# Patient Record
Sex: FEMALE | Race: BLACK OR AFRICAN AMERICAN | Employment: OTHER | URBAN - METROPOLITAN AREA
[De-identification: names, ages, dates, MRNs, and addresses within clinical notes are randomized per-mention and may not be internally consistent; named-entity substitution may affect disease eponyms.]

---

## 2017-03-22 ENCOUNTER — OP HISTORICAL/CONVERTED ENCOUNTER (OUTPATIENT)
Dept: OTHER | Age: 62
End: 2017-03-22

## 2017-06-06 ENCOUNTER — OP HISTORICAL/CONVERTED ENCOUNTER (OUTPATIENT)
Dept: OTHER | Age: 62
End: 2017-06-06

## 2017-12-06 ENCOUNTER — OP HISTORICAL/CONVERTED ENCOUNTER (OUTPATIENT)
Dept: OTHER | Age: 62
End: 2017-12-06

## 2018-01-24 ENCOUNTER — OP HISTORICAL/CONVERTED ENCOUNTER (OUTPATIENT)
Dept: OTHER | Age: 63
End: 2018-01-24

## 2018-06-07 ENCOUNTER — OP HISTORICAL/CONVERTED ENCOUNTER (OUTPATIENT)
Dept: OTHER | Age: 63
End: 2018-06-07

## 2019-02-04 ENCOUNTER — OP HISTORICAL/CONVERTED ENCOUNTER (OUTPATIENT)
Dept: OTHER | Age: 64
End: 2019-02-04

## 2019-02-04 LAB — MAMMOGRAPHY, EXTERNAL: NORMAL

## 2019-09-30 LAB — HBA1C MFR BLD HPLC: 7.7 %

## 2019-11-25 ENCOUNTER — OP HISTORICAL/CONVERTED ENCOUNTER (OUTPATIENT)
Dept: OTHER | Age: 64
End: 2019-11-25

## 2020-03-13 ENCOUNTER — OP HISTORICAL/CONVERTED ENCOUNTER (OUTPATIENT)
Dept: OTHER | Age: 65
End: 2020-03-13

## 2020-06-25 VITALS
SYSTOLIC BLOOD PRESSURE: 149 MMHG | TEMPERATURE: 97.8 F | BODY MASS INDEX: 31.82 KG/M2 | DIASTOLIC BLOOD PRESSURE: 90 MMHG | OXYGEN SATURATION: 94 % | HEIGHT: 66 IN | HEART RATE: 89 BPM | WEIGHT: 198 LBS

## 2020-06-25 PROBLEM — R01.1 HEART MURMUR: Status: ACTIVE | Noted: 2020-06-25

## 2020-06-25 PROBLEM — K21.9 GERD (GASTROESOPHAGEAL REFLUX DISEASE): Status: ACTIVE | Noted: 2020-06-25

## 2020-06-25 PROBLEM — I10 BENIGN ESSENTIAL HYPERTENSION: Status: ACTIVE | Noted: 2020-06-25

## 2020-06-25 PROBLEM — E78.2 MIXED HYPERLIPIDEMIA: Status: ACTIVE | Noted: 2020-06-25

## 2020-06-25 PROBLEM — F17.200 TOBACCO DEPENDENCE SYNDROME: Status: ACTIVE | Noted: 2020-06-25

## 2020-06-25 LAB
A/G RATIO, EXTERNAL: 1.3
ABS BASOPHILS, EXTERNAL: NORMAL
ABS EOSINOPHILS, EXTERNAL: NORMAL
ABS IMM GRANS, EXTERNAL: NORMAL
ABS LYMPHOCYTES, EXTERNAL: NORMAL
ABS MONOCYTES, EXTERNAL: NORMAL
ABS NEUTROPHILS, EXTERNAL: NORMAL
ALBUMIN, EXTERNAL: 4.6
ALK PHOS, EXTERNAL: 77
ANION GAP BLD CALC-SCNC: 11 MMOL/L
BASOPHILS, EXTERNAL: NORMAL
BILI DIRECT, EXTERNAL: NORMAL
BILI TOTAL, EXTERNAL: 0.6
BUN BLD-MCNC: 11 MG/DL
BUN/CREATININE RATIO, EXTERNAL: 17
CALCIUM, EXTERNAL: 10
CALCIUM, ION, EXTERNAL: NORMAL
CHLORIDE, EXTERNAL: 97
CO2, EXTERNAL: 28
CREATININE SER, EXTERNAL: 0.65
DF, EXTERNAL: NORMAL
EGFR IF AFA, EXTERNAL: >60
EGFR NOT AFA, EXTERNAL: >60
EOSINOPHILS, EXTERNAL: NORMAL
ERYTHROCYTE [DISTWIDTH] IN BLOOD BY AUTOMATED COUNT: 43.4 %
GLOBULIN, EXTERNAL: 3.5
GLUCOSE SER, EXTERNAL: 118
HCT, EXTERNAL: 42.2
HGB, EXTERNAL: 14.6
IMMATURE GRANULOCYTES, EXTERNAL: NORMAL
INR BLD: 1
LYMPHOCYTES, EXTERNAL: NORMAL
Lab: 26.4
MCH RBC QN AUTO: 28 PG
MCHC RBC AUTO-ENTMCNC: 34.6 G/DL
MCV RBC AUTO: 81 FL
MONOCYTES, EXTERNAL: NORMAL
NEUTROPHILS, EXTERNAL: NORMAL
NRBC, EXTERNAL: NORMAL
PLATELET ESTIMATE, EXTERNAL: NORMAL
PLATELETS, EXTERNAL: 459
PMV BLD: 8.9 FL
POTASSIUM, EXTERNAL: 3.7
PROTEIN TOT, EXTERNAL: 8.1
PROTHROMBIN TIME: 10.8 S
RBC # BLD: 5.21 10*6/UL
RBC COMMENTS, EXTERNAL: NORMAL
SGOT (AST), EXTERNAL: 18
SGPT (ALT), EXTERNAL: 21
SODIUM, EXTERNAL: 136
TSH SERPL-ACNC: 2.42 M[IU]/L
WBC # BLD: 9.21 10*3/UL

## 2020-06-25 RX ORDER — ATORVASTATIN CALCIUM 40 MG/1
TABLET, FILM COATED ORAL DAILY
COMMUNITY
End: 2020-10-16

## 2020-06-25 RX ORDER — AMMONIUM LACTATE 12 G/100G
LOTION TOPICAL 2 TIMES DAILY
COMMUNITY
End: 2021-05-10

## 2020-06-25 RX ORDER — IBUPROFEN 200 MG
CAPSULE ORAL 2 TIMES DAILY
COMMUNITY
End: 2020-11-25 | Stop reason: SDUPTHER

## 2020-06-25 RX ORDER — AMLODIPINE BESYLATE 10 MG/1
TABLET ORAL DAILY
COMMUNITY
End: 2021-01-03

## 2020-06-25 RX ORDER — KETOCONAZOLE 20 MG/G
CREAM TOPICAL DAILY
COMMUNITY
End: 2021-02-05

## 2020-06-25 RX ORDER — CARVEDILOL 3.12 MG/1
TABLET ORAL 2 TIMES DAILY WITH MEALS
COMMUNITY
End: 2021-04-12 | Stop reason: SDUPTHER

## 2020-06-25 RX ORDER — METFORMIN HYDROCHLORIDE 500 MG/1
1000 TABLET ORAL 2 TIMES DAILY WITH MEALS
COMMUNITY
End: 2020-09-01 | Stop reason: SDUPTHER

## 2020-06-25 RX ORDER — HYDROCHLOROTHIAZIDE 25 MG/1
25 TABLET ORAL DAILY
COMMUNITY
End: 2020-09-10 | Stop reason: SDUPTHER

## 2020-06-25 RX ORDER — GLIMEPIRIDE 2 MG/1
TABLET ORAL 2 TIMES DAILY
COMMUNITY
End: 2020-11-25 | Stop reason: DRUGHIGH

## 2020-06-25 RX ORDER — LISINOPRIL 20 MG/1
TABLET ORAL 2 TIMES DAILY
COMMUNITY
End: 2020-12-04 | Stop reason: SDUPTHER

## 2020-07-21 ENCOUNTER — TELEPHONE (OUTPATIENT)
Dept: INFECTIOUS DISEASES | Age: 65
End: 2020-07-21

## 2020-07-21 DIAGNOSIS — E11.65 TYPE 2 DIABETES MELLITUS WITH HYPERGLYCEMIA, WITHOUT LONG-TERM CURRENT USE OF INSULIN (HCC): Primary | ICD-10-CM

## 2020-08-28 VITALS
HEIGHT: 66 IN | SYSTOLIC BLOOD PRESSURE: 145 MMHG | WEIGHT: 196.9 LBS | HEART RATE: 83 BPM | BODY MASS INDEX: 31.64 KG/M2 | DIASTOLIC BLOOD PRESSURE: 79 MMHG | OXYGEN SATURATION: 95 % | TEMPERATURE: 98 F

## 2020-08-28 PROBLEM — D64.9 ANEMIA: Status: ACTIVE | Noted: 2020-08-28

## 2020-08-28 PROBLEM — E11.29 MICROALBUMINURIA DUE TO TYPE 2 DIABETES MELLITUS (HCC): Status: ACTIVE | Noted: 2020-08-28

## 2020-08-28 PROBLEM — K92.2 ACUTE GASTROINTESTINAL HEMORRHAGE: Status: ACTIVE | Noted: 2020-08-28

## 2020-08-28 PROBLEM — L84 FOOT CALLUS: Status: ACTIVE | Noted: 2020-08-28

## 2020-08-28 PROBLEM — E66.01 MORBID OBESITY (HCC): Status: ACTIVE | Noted: 2020-08-28

## 2020-08-28 PROBLEM — R80.9 MICROALBUMINURIA DUE TO TYPE 2 DIABETES MELLITUS (HCC): Status: ACTIVE | Noted: 2020-08-28

## 2020-08-28 PROBLEM — I44.7 LEFT BUNDLE BRANCH BLOCK: Status: ACTIVE | Noted: 2020-08-28

## 2020-08-28 PROBLEM — I25.10 CAD (CORONARY ARTERY DISEASE): Status: ACTIVE | Noted: 2020-08-28

## 2020-08-28 PROBLEM — J30.9 ALLERGIC RHINITIS: Status: ACTIVE | Noted: 2020-08-28

## 2020-08-28 RX ORDER — ASPIRIN 81 MG/1
TABLET ORAL DAILY
COMMUNITY
End: 2021-02-05

## 2020-09-01 RX ORDER — METFORMIN HYDROCHLORIDE 500 MG/1
500 TABLET ORAL 2 TIMES DAILY WITH MEALS
Qty: 180 TAB | Refills: 0 | Status: SHIPPED | OUTPATIENT
Start: 2020-09-01 | End: 2020-11-25 | Stop reason: ALTCHOICE

## 2020-09-01 RX ORDER — METFORMIN HYDROCHLORIDE 500 MG/1
TABLET ORAL
Qty: 120 TAB | Refills: 3 | Status: SHIPPED | OUTPATIENT
Start: 2020-09-01 | End: 2020-11-25 | Stop reason: SDUPTHER

## 2020-09-01 NOTE — TELEPHONE ENCOUNTER
Sent metformin to [facility]. I will address Jardiance issue when I see her at the next appointment and review her glucose readings.

## 2020-09-10 ENCOUNTER — TELEPHONE (OUTPATIENT)
Dept: FAMILY MEDICINE CLINIC | Age: 65
End: 2020-09-10

## 2020-09-10 RX ORDER — HYDROCHLOROTHIAZIDE 25 MG/1
25 TABLET ORAL DAILY
Qty: 90 TAB | Refills: 4 | Status: SHIPPED | OUTPATIENT
Start: 2020-09-10 | End: 2021-02-05

## 2020-09-10 RX ORDER — POTASSIUM CHLORIDE 750 MG/1
10 TABLET, EXTENDED RELEASE ORAL 2 TIMES WEEKLY
Qty: 90 TAB | Refills: 3 | Status: SHIPPED | OUTPATIENT
Start: 2020-09-11 | End: 2020-09-11 | Stop reason: SDUPTHER

## 2020-09-10 NOTE — TELEPHONE ENCOUNTER
Patient left a message requesting RX-Potassium and RX-HCTZ be sent to the pharmacy.  Patient requested call back at [phone]

## 2020-09-11 DIAGNOSIS — I10 BENIGN ESSENTIAL HYPERTENSION: Primary | ICD-10-CM

## 2020-09-11 RX ORDER — POTASSIUM CHLORIDE 750 MG/1
TABLET, EXTENDED RELEASE ORAL
Qty: 40 TAB | Refills: 3 | Status: SHIPPED | OUTPATIENT
Start: 2020-09-11 | End: 2021-12-13

## 2020-09-11 NOTE — PROGRESS NOTES
Patient concerned because the rx was supposed to be m, w, f and it says twice weeky AND it says micro on the bottle, never had. They are same. May proceed and I did send corrected rx to pharmacy. 1. Benign essential hypertension    - potassium chloride (KLOR-CON) 10 mEq tablet; By oral route take one tablet Monday, Wednesday and Friday  Dispense: 40 Tab;  Refill: [facility],

## 2020-10-07 ENCOUNTER — OFFICE VISIT (OUTPATIENT)
Dept: FAMILY MEDICINE CLINIC | Age: 65
End: 2020-10-07
Payer: MEDICARE

## 2020-10-07 VITALS
TEMPERATURE: 98.4 F | BODY MASS INDEX: 32.95 KG/M2 | HEIGHT: 66 IN | OXYGEN SATURATION: 92 % | SYSTOLIC BLOOD PRESSURE: 155 MMHG | DIASTOLIC BLOOD PRESSURE: 80 MMHG | WEIGHT: 205 LBS | HEART RATE: 100 BPM

## 2020-10-07 DIAGNOSIS — E11.65 UNCONTROLLED TYPE 2 DIABETES MELLITUS WITH HYPERGLYCEMIA (HCC): ICD-10-CM

## 2020-10-07 DIAGNOSIS — I10 BENIGN ESSENTIAL HYPERTENSION: Primary | ICD-10-CM

## 2020-10-07 DIAGNOSIS — R01.1 HEART MURMUR: ICD-10-CM

## 2020-10-07 DIAGNOSIS — I25.10 CORONARY ARTERY DISEASE INVOLVING NATIVE HEART WITHOUT ANGINA PECTORIS, UNSPECIFIED VESSEL OR LESION TYPE: ICD-10-CM

## 2020-10-07 DIAGNOSIS — K21.9 GASTROESOPHAGEAL REFLUX DISEASE WITHOUT ESOPHAGITIS: ICD-10-CM

## 2020-10-07 PROCEDURE — 99213 OFFICE O/P EST LOW 20 MIN: CPT | Performed by: FAMILY MEDICINE

## 2020-10-07 NOTE — PROGRESS NOTES
ID:  Leobardo Garcia , 77 y.o., female  CC:   Follow up medical problems    HPI:    Carlitos Cheek comes in for a follow up. Been scheduled for surgery on her heart valve and she had to have it cancelled due to COVID-19. Sugar was 107 this morning. Ran out of meds for a few days last month while we were transitioning to Epic. That did cause some stress for her. Has a history of anemia and she has no hematochezia or melena. PMHX:    Patient Active Problem List   Diagnosis Code    Benign essential hypertension I10    Body mass index 30.0-30.9, adult Z68.30    Type II diabetes mellitus, uncontrolled ([facility]) E11.65    GERD (gastroesophageal reflux disease) K21.9    Heart murmur R01.1    Mixed hyperlipidemia E78.2    Tobacco dependence syndrome F17.200    Acute gastrointestinal hemorrhage K92.2    Allergic rhinitis J30.9    Anemia D64.9    CAD (coronary artery disease) I25.10    Foot callus L84    Left bundle branch block I44.7    Microalbuminuria due to type 2 diabetes mellitus ([facility]) E11.29, R80.9    Morbid obesity ([facility]) E66.01         ALLERGIES:   Allergies   Allergen Reactions    Aspirin Unknown (comments)     hemorraging - ended up in the hospital         MEDICATIONS:     Current Outpatient Medications:     potassium chloride (KLOR-CON) 10 mEq tablet, By oral route take one tablet Monday, Wednesday and Friday, Disp: 40 Tab, Rfl: 3    hydroCHLOROthiazide (HYDRODIURIL) 25 mg tablet, Take 1 Tab by mouth daily. , Disp: 90 Tab, Rfl: 4    metFORMIN (GLUCOPHAGE) 500 mg tablet, Take 2 tablets twice a day with meals, Disp: 120 Tab, Rfl: 3    aspirin delayed-release (Aspirin Low Dose) 81 mg tablet, Take  by mouth daily. , Disp: , Rfl:     amLODIPine (NORVASC) 10 mg tablet, Take  by mouth daily. , Disp: , Rfl:     atorvastatin (LIPITOR) 40 mg tablet, Take  by mouth daily. , Disp: , Rfl:     glucose blood VI test strips (blood glucose test) strip, by Does Not Apply route two (2) times a day., Disp: , Rfl:   carvediloL (COREG) 3.125 mg tablet, Take  by mouth two (2) times daily (with meals). , Disp: , Rfl:     glimepiride (AMARYL) 2 mg tablet, Take  by mouth two (2) times a day., Disp: , Rfl:     lisinopriL (PRINIVIL, ZESTRIL) 20 mg tablet, Take  by mouth two (2) times a day., Disp: , Rfl:     metFORMIN (GLUCOPHAGE) 500 mg tablet, Take 1 Tab by mouth two (2) times daily (with meals). , Disp: 180 Tab, Rfl: 0    ammonium lactate (LAC-HYDRIN) 12 % lotion, Apply  to affected area two (2) times a day. rub in to affected area well, Disp: , Rfl:     ketoconazole (NIZORAL) 2 % topical cream, Apply  to affected area daily. , Disp: , Rfl:     SOCIAL:   Social History     Tobacco Use    Smoking status: Former Smoker     Packs/day: 0.50     Last attempt to quit:      Years since quittin.7    Smokeless tobacco: Never Used   Substance Use Topics    Alcohol use: Not Currently     Frequency: Never    Drug use: Never       SURGERIES:   Past Surgical History:   Procedure Laterality Date    HX COLONOSCOPY  2015    due in [id number]. Done for anemia and rectal bleeding          FAMILY HX:  Family History   Problem Relation Age of Onset    Cancer Mother         uterine    Hypertension Father     Cancer Sister         breast         Review of systems:   I personally collected this information from the patient , available records and family members present-[email or web address]  Review of Systems   Constitutional: Negative for appetite change, chills, diaphoresis, fatigue, fever and unexpected weight change. HENT: Negative for congestion, ear pain, hearing loss, postnasal drip, rhinorrhea, sinus pressure, sinus pain, sneezing, sore throat, tinnitus, trouble swallowing and voice change. Eyes: Negative for photophobia, pain, discharge, itching and visual disturbance. Respiratory: Negative for cough, chest tightness, shortness of breath and wheezing. Cardiovascular: Negative for chest pain, palpitations and leg swelling. Gastrointestinal: Positive for constipation. Negative for abdominal pain, blood in stool, diarrhea, nausea and vomiting. Endocrine: Negative for cold intolerance and heat intolerance. Genitourinary: Negative for difficulty urinating, dysuria, frequency, hematuria and urgency. Musculoskeletal: Positive for arthralgias, back pain and myalgias. Negative for gait problem and neck pain. Skin: Negative for color change and rash. Neurological: Negative for dizziness, tremors, syncope, speech difficulty, weakness, numbness and headaches. Hematological: Negative for adenopathy. Does not bruise/bleed easily. Psychiatric/Behavioral: Negative for decreased concentration, dysphoric mood, sleep disturbance and suicidal ideas. The patient is not nervous/anxious. Vitals:   Visit Vitals  BP (!) 155/80 (BP 1 Location: Left arm, BP Patient Position: Sitting)   Pulse 100   Temp 98.4 °F (36.9 °C) (Temporal)   Ht 5' 6\" (1.676 m)   Wt 205 lb (93 kg)   SpO2 92%   BMI 33.09 kg/m²           PHYSICAL:   Physical Exam  Vitals signs reviewed. Constitutional:       General: She is not in acute distress. Appearance: Normal appearance. She is obese. She is not ill-appearing. HENT:      Nose: No congestion or rhinorrhea. Mouth/Throat:      Mouth: Mucous membranes are moist.      Pharynx: No oropharyngeal exudate or posterior oropharyngeal erythema. Eyes:      General: No scleral icterus. Extraocular Movements: Extraocular movements intact. Pupils: Pupils are equal, round, and reactive to light. Neck:      Musculoskeletal: No neck rigidity or muscular tenderness. Cardiovascular:      Rate and Rhythm: Normal rate and regular rhythm. Heart sounds: Murmur (grade 2/6 early systolic murmur upper right sternal border) present. No friction rub. No gallop. Pulmonary:      Effort: Pulmonary effort is normal.      Breath sounds: No wheezing, rhonchi or rales.    Abdominal:      General: Bowel sounds are normal. There is no distension. Palpations: Abdomen is soft. There is no mass. Musculoskeletal:         General: No swelling, tenderness or deformity (knees). Right lower leg: No edema. Left lower leg: No edema. Lymphadenopathy:      Cervical: No cervical adenopathy. Skin:     Coloration: Skin is not jaundiced. Findings: No erythema or rash. Neurological:      General: No focal deficit present. Mental Status: She is alert and oriented to person, place, and time. Cranial Nerves: No cranial nerve deficit. Sensory: No sensory deficit. Gait: Gait (Ambulation status: ) normal.   Psychiatric:         Mood and Affect: Mood normal.         Behavior: Behavior normal.         Thought Content: Thought content normal.         Judgment: Judgment normal.         ASSESSMENT/PLAN:      1. Benign essential hypertension    - CBC WITH AUTOMATED DIFF    2. Coronary artery disease involving native heart without angina pectoris, unspecified vessel or lesion type    Echocardiogram done January 2020 showed his aortic stenosis, moderate with flow decrease and also some mitral valve regurg with no prolapse. She does need valvular repair but due to COVID-19 cannot get it. Her ejection fraction was 45 to 50% with some left ventricular hypertrophy and mild diastolic dysfunction. 3. Gastroesophageal reflux disease without esophagitis      4. Uncontrolled type 2 diabetes mellitus with hyperglycemia (HCC)    - HEMOGLOBIN A1C WITH EAG  - LIPID PANEL  - METABOLIC PANEL, COMPREHENSIVE    5. Body mass index 30.0-30.9, adult      6. Heart murmur            Discussion:    The patient and I discussed the following items/issues; Each diagnosis listed for today's visit including medications, treatment, testing such as labs, imagine, referrals and when to call regarding results and appointments. Reminded patient to keep any and all appointments with specialists, labs, imaging.    Reminded patient to make sure we get copies of any specialists care, labs and imaging. Reminded patient to call of come by the office if there are any concerns, questions , comments or problems. The patient verbalized understanding of the care plan and all questions were answered to the patient's satisfaction prior to leaving the office. The patient was told that failure to comply with recommended testing could result in abnormal health consequences. The patient was instructed to have yearly routine health maintenance including but not limited to age appropriate vaccines, testing, screening exams. The patient actively participated in medical decision making. FOLLOW UP:   Patient knows to keep any and all future visits scheduled unless told otherwise. Patient knows to call, come back if any concerns, questions, comments or problems arise. This visit may have been completed , in part, with voice recognition software as well as typing and may have syntax errors despite editing.       Mitchel Collins, DO

## 2020-10-07 NOTE — PATIENT INSTRUCTIONS
     
Routine Health maintenance: You need to get a yearly follow up/physical exam to review, discuss age and gender appropriate exams, labs, vaccines and screening tests. This includes cardiovascular health risk, cancer screens and other abi related topics. Medications-take all medications as directed. Please do not stop unless you talk to your doctor or health care provider first. Report any problems immediately. Referrals: if you have been given a referral, please call the office if you do not hear from provider in one week. You may make the appointment yourself. Please keep all appointments with specialists and ask them to send their notes, thoughts, recommendations to us , as your PCP. Imaging/Labs:  Be sure to get these images in a timely manner. IF your test must be scheduled, let us know if you need help getting this done and if you do not hear from that provider in a week , call us or them.   BE SURE to call the office if you do not hear regarding the results in one week after the test is performed Image or lab). It is our intention to inform you of the results ALWAYS, even if normal you should get a notification (Call, portal message). PLEASE jalil if you do not get the results. PLEASE follow all recommendations and call/come in /ask questions if you do not understand of if problems develop after or in between visits. Failure to comply with recommended health care advise could result in serious health consequences. Thank you for choosing our practice and please let us know how we can help you feel better and stay well!

## 2020-10-13 ENCOUNTER — TELEPHONE (OUTPATIENT)
Dept: FAMILY MEDICINE CLINIC | Age: 65
End: 2020-10-13

## 2020-10-13 NOTE — TELEPHONE ENCOUNTER
Pt came into the office to have lab work done. On the way out asked about getting a handicap plate. States that with her heart condition she gets SOB easily when walking. Especially with long distances. Form placed on your desk for completion.

## 2020-10-14 LAB
ALBUMIN SERPL-MCNC: 4.3 G/DL (ref 3.8–4.8)
ALBUMIN/GLOB SERPL: 1.3 {RATIO} (ref 1.2–2.2)
ALP SERPL-CCNC: 101 IU/L (ref 39–117)
ALT SERPL-CCNC: 30 IU/L (ref 0–32)
AST SERPL-CCNC: 24 IU/L (ref 0–40)
BASOPHILS # BLD AUTO: 0.1 X10E3/UL (ref 0–0.2)
BASOPHILS NFR BLD AUTO: 0 %
BILIRUB SERPL-MCNC: 0.5 MG/DL (ref 0–1.2)
BUN SERPL-MCNC: 10 MG/DL (ref 8–27)
BUN/CREAT SERPL: 19 (ref 12–28)
CALCIUM SERPL-MCNC: 10.1 MG/DL (ref 8.7–10.3)
CHLORIDE SERPL-SCNC: 92 MMOL/L (ref 96–106)
CHOLEST SERPL-MCNC: 123 MG/DL (ref 100–199)
CO2 SERPL-SCNC: 26 MMOL/L (ref 20–29)
CREAT SERPL-MCNC: 0.53 MG/DL (ref 0.57–1)
EOSINOPHIL # BLD AUTO: 0.2 X10E3/UL (ref 0–0.4)
EOSINOPHIL NFR BLD AUTO: 1 %
ERYTHROCYTE [DISTWIDTH] IN BLOOD BY AUTOMATED COUNT: 14.4 % (ref 11.7–15.4)
EST. AVERAGE GLUCOSE BLD GHB EST-MCNC: 180 MG/DL
GLOBULIN SER CALC-MCNC: 3.2 G/DL (ref 1.5–4.5)
GLUCOSE SERPL-MCNC: 170 MG/DL (ref 65–99)
HBA1C MFR BLD: 7.9 % (ref 4.8–5.6)
HCT VFR BLD AUTO: 42.4 % (ref 34–46.6)
HDLC SERPL-MCNC: 54 MG/DL
HGB BLD-MCNC: 14.1 G/DL (ref 11.1–15.9)
IMM GRANULOCYTES # BLD AUTO: 0 X10E3/UL (ref 0–0.1)
IMM GRANULOCYTES NFR BLD AUTO: 0 %
LDLC SERPL CALC-MCNC: 55 MG/DL (ref 0–99)
LYMPHOCYTES # BLD AUTO: 2.3 X10E3/UL (ref 0.7–3.1)
LYMPHOCYTES NFR BLD AUTO: 21 %
MCH RBC QN AUTO: 27.3 PG (ref 26.6–33)
MCHC RBC AUTO-ENTMCNC: 33.3 G/DL (ref 31.5–35.7)
MCV RBC AUTO: 82 FL (ref 79–97)
MONOCYTES # BLD AUTO: 0.8 X10E3/UL (ref 0.1–0.9)
MONOCYTES NFR BLD AUTO: 7 %
NEUTROPHILS # BLD AUTO: 7.9 X10E3/UL (ref 1.4–7)
NEUTROPHILS NFR BLD AUTO: 71 %
PLATELET # BLD AUTO: 422 X10E3/UL (ref 150–450)
POTASSIUM SERPL-SCNC: 4.2 MMOL/L (ref 3.5–5.2)
PROT SERPL-MCNC: 7.5 G/DL (ref 6–8.5)
RBC # BLD AUTO: 5.17 X10E6/UL (ref 3.77–5.28)
SODIUM SERPL-SCNC: 134 MMOL/L (ref 134–144)
TRIGL SERPL-MCNC: 65 MG/DL (ref 0–149)
VLDLC SERPL CALC-MCNC: 14 MG/DL (ref 5–40)
WBC # BLD AUTO: 11.3 X10E3/UL (ref 3.4–10.8)

## 2020-10-14 NOTE — PROGRESS NOTES
White count is mildly up, barely really. Likely allergies  A1c is high. Should increase the metformin to TWO tablets BID.  IF on that (DR Kirill De La Rosa says that she was on it but last refill was for 1 bid)  Cholesterol is great  Liver and kidneys are great

## 2020-10-19 DIAGNOSIS — E11.65 UNCONTROLLED TYPE 2 DIABETES MELLITUS WITH HYPERGLYCEMIA (HCC): Primary | ICD-10-CM

## 2020-10-19 NOTE — PROGRESS NOTES
77 y.o. , Alcidesjorge l BurkBenson , female       Allergies   Allergen Reactions    Aspirin Unknown (comments)     hemorraging - ended up in the hospital     Current Outpatient Medications on File Prior to Visit   Medication Sig Dispense Refill    atorvastatin (LIPITOR) 40 mg tablet TAKE 1 TABLET BY MOUTH ONCE DAILY AT BEDTIME FOR 30 DAYS 30 Tab 4    potassium chloride (KLOR-CON) 10 mEq tablet By oral route take one tablet Monday, Wednesday and Friday 40 Tab 3    hydroCHLOROthiazide (HYDRODIURIL) 25 mg tablet Take 1 Tab by mouth daily. 90 Tab 4    metFORMIN (GLUCOPHAGE) 500 mg tablet Take 1 Tab by mouth two (2) times daily (with meals). 180 Tab 0    metFORMIN (GLUCOPHAGE) 500 mg tablet Take 2 tablets twice a day with meals 120 Tab 3    aspirin delayed-release (Aspirin Low Dose) 81 mg tablet Take  by mouth daily.  amLODIPine (NORVASC) 10 mg tablet Take  by mouth daily.  ammonium lactate (LAC-HYDRIN) 12 % lotion Apply  to affected area two (2) times a day. rub in to affected area well      glucose blood VI test strips (blood glucose test) strip by Does Not Apply route two (2) times a day.  carvediloL (COREG) 3.125 mg tablet Take  by mouth two (2) times daily (with meals).  glimepiride (AMARYL) 2 mg tablet Take  by mouth two (2) times a day.  ketoconazole (NIZORAL) 2 % topical cream Apply  to affected area daily.  lisinopriL (PRINIVIL, ZESTRIL) 20 mg tablet Take  by mouth two (2) times a day. No current facility-administered medications on file prior to visit.       Patient Active Problem List   Diagnosis Code    Benign essential hypertension I10    Body mass index 30.0-30.9, adult Z68.30    Type II diabetes mellitus, uncontrolled (HCC) E11.65    GERD (gastroesophageal reflux disease) K21.9    Heart murmur R01.1    Mixed hyperlipidemia E78.2    Tobacco dependence syndrome F17.200    Acute gastrointestinal hemorrhage K92.2    Allergic rhinitis J30.9    Anemia D64.9    CAD (coronary artery disease) I25.10    Foot callus L84    Left bundle branch block I44.7    Microalbuminuria due to type 2 diabetes mellitus ([facility]) E11.29, R80.9    Morbid obesity ([facility]) E66.01       1. Uncontrolled type 2 diabetes mellitus with hyperglycemia ([facility])    - SITagliptin (JANUVIA) 50 mg tablet; Take 1 Tab by mouth daily. Dispense: 90 Tab;  Refill: [address] [facility],

## 2020-10-20 ENCOUNTER — TELEPHONE (OUTPATIENT)
Dept: FAMILY MEDICINE CLINIC | Age: 65
End: 2020-10-20

## 2020-10-20 DIAGNOSIS — E11.65 UNCONTROLLED TYPE 2 DIABETES MELLITUS WITH HYPERGLYCEMIA (HCC): Primary | ICD-10-CM

## 2020-10-20 RX ORDER — PIOGLITAZONEHYDROCHLORIDE 15 MG/1
TABLET ORAL
Qty: 90 TAB | Refills: 0 | Status: SHIPPED | OUTPATIENT
Start: 2020-10-20 | End: 2020-10-22 | Stop reason: SDUPTHER

## 2020-10-20 NOTE — TELEPHONE ENCOUNTER
Patient called stating that the prescription for SITagliptin (JANUVIA) 50 mg tablet costs $1800, she can not afford this amount.   Can another prescription for a different medication be sent in?

## 2020-10-20 NOTE — TELEPHONE ENCOUNTER
77 y.o. , Cherylene Plants , female       Allergies   Allergen Reactions    Aspirin Unknown (comments)     hemorraging - ended up in the hospital     Current Outpatient Medications on File Prior to Visit   Medication Sig Dispense Refill    [DISCONTINUED] SITagliptin (JANUVIA) 50 mg tablet Take 1 Tab by mouth daily. 90 Tab 3    atorvastatin (LIPITOR) 40 mg tablet TAKE 1 TABLET BY MOUTH ONCE DAILY AT BEDTIME FOR 30 DAYS 30 Tab 4    potassium chloride (KLOR-CON) 10 mEq tablet By oral route take one tablet Monday, Wednesday and Friday 40 Tab 3    hydroCHLOROthiazide (HYDRODIURIL) 25 mg tablet Take 1 Tab by mouth daily. 90 Tab 4    metFORMIN (GLUCOPHAGE) 500 mg tablet Take 1 Tab by mouth two (2) times daily (with meals). 180 Tab 0    metFORMIN (GLUCOPHAGE) 500 mg tablet Take 2 tablets twice a day with meals 120 Tab 3    aspirin delayed-release (Aspirin Low Dose) 81 mg tablet Take  by mouth daily.  amLODIPine (NORVASC) 10 mg tablet Take  by mouth daily.  ammonium lactate (LAC-HYDRIN) 12 % lotion Apply  to affected area two (2) times a day. rub in to affected area well      glucose blood VI test strips (blood glucose test) strip by Does Not Apply route two (2) times a day.  carvediloL (COREG) 3.125 mg tablet Take  by mouth two (2) times daily (with meals).  glimepiride (AMARYL) 2 mg tablet Take  by mouth two (2) times a day.  ketoconazole (NIZORAL) 2 % topical cream Apply  to affected area daily.  lisinopriL (PRINIVIL, ZESTRIL) 20 mg tablet Take  by mouth two (2) times a day. No current facility-administered medications on file prior to visit.       Patient Active Problem List   Diagnosis Code    Benign essential hypertension I10    Body mass index 30.0-30.9, adult Z68.30    Type II diabetes mellitus, uncontrolled (HCC) E11.65    GERD (gastroesophageal reflux disease) K21.9    Heart murmur R01.1    Mixed hyperlipidemia E78.2    Tobacco dependence syndrome F17.200    Acute gastrointestinal hemorrhage K92.2    Allergic rhinitis J30.9    Anemia D64.9    CAD (coronary artery disease) I25.10    Foot callus L84    Left bundle branch block I44.7    Microalbuminuria due to type 2 diabetes mellitus ([facility] E11.29, R80.9    Morbid obesity (HCC) E66.01       1. Uncontrolled type 2 diabetes mellitus with hyperglycemia ([facility]    Januvia over 1800.00 so we will try this, if not getting sugar down, consider insulin or endo or both. - pioglitazone (ACTOS) 15 mg tablet; One daily by oral route (NOT taking januvia)  Dispense: 90 Tab;  Refill: 0      Conrad Call, DO

## 2020-10-22 ENCOUNTER — TELEPHONE (OUTPATIENT)
Dept: FAMILY MEDICINE CLINIC | Age: 65
End: 2020-10-22

## 2020-10-22 DIAGNOSIS — E11.65 UNCONTROLLED TYPE 2 DIABETES MELLITUS WITH HYPERGLYCEMIA (HCC): ICD-10-CM

## 2020-10-22 RX ORDER — PIOGLITAZONEHYDROCHLORIDE 15 MG/1
TABLET ORAL
Qty: 90 TAB | Refills: 3 | Status: SHIPPED | OUTPATIENT
Start: 2020-10-22 | End: 2020-11-25 | Stop reason: DRUGHIGH

## 2020-10-22 NOTE — TELEPHONE ENCOUNTER
77 y.o. , Lordelmar Hoots , female       Allergies   Allergen Reactions    Aspirin Unknown (comments)     hemorraging - ended up in the hospital     Current Outpatient Medications on File Prior to Visit   Medication Sig Dispense Refill    [DISCONTINUED] pioglitazone (ACTOS) 15 mg tablet One daily by oral route (NOT taking januvia) 90 Tab 0    atorvastatin (LIPITOR) 40 mg tablet TAKE 1 TABLET BY MOUTH ONCE DAILY AT BEDTIME FOR 30 DAYS 30 Tab 4    potassium chloride (KLOR-CON) 10 mEq tablet By oral route take one tablet Monday, Wednesday and Friday 40 Tab 3    hydroCHLOROthiazide (HYDRODIURIL) 25 mg tablet Take 1 Tab by mouth daily. 90 Tab 4    metFORMIN (GLUCOPHAGE) 500 mg tablet Take 1 Tab by mouth two (2) times daily (with meals). 180 Tab 0    metFORMIN (GLUCOPHAGE) 500 mg tablet Take 2 tablets twice a day with meals 120 Tab 3    aspirin delayed-release (Aspirin Low Dose) 81 mg tablet Take  by mouth daily.  amLODIPine (NORVASC) 10 mg tablet Take  by mouth daily.  ammonium lactate (LAC-HYDRIN) 12 % lotion Apply  to affected area two (2) times a day. rub in to affected area well      glucose blood VI test strips (blood glucose test) strip by Does Not Apply route two (2) times a day.  carvediloL (COREG) 3.125 mg tablet Take  by mouth two (2) times daily (with meals).  glimepiride (AMARYL) 2 mg tablet Take  by mouth two (2) times a day.  ketoconazole (NIZORAL) 2 % topical cream Apply  to affected area daily.  lisinopriL (PRINIVIL, ZESTRIL) 20 mg tablet Take  by mouth two (2) times a day. No current facility-administered medications on file prior to visit.       Patient Active Problem List   Diagnosis Code    Benign essential hypertension I10    Body mass index 30.0-30.9, adult Z68.30    Type II diabetes mellitus, uncontrolled (HCC) E11.65    GERD (gastroesophageal reflux disease) K21.9    Heart murmur R01.1    Mixed hyperlipidemia E78.2    Tobacco dependence syndrome F17.200    Acute gastrointestinal hemorrhage K92.2    Allergic rhinitis J30.9    Anemia D64.9    CAD (coronary artery disease) I25.10    Foot callus L84    Left bundle branch block I44.7    [facility] to type [facility] E11.29, R80.9    Morbid obesity (HCC) E66.01       1. Uncontrolled type 2 diabetes mellitus with hyperglycemia (HCC)    - pioglitazone (ACTOS) 15 mg tablet; One daily by oral route (NOT taking januvia)  Dispense: 90 Tab;  Refill: [address], DO

## 2020-11-25 ENCOUNTER — OFFICE VISIT (OUTPATIENT)
Dept: ENDOCRINOLOGY | Age: 65
End: 2020-11-25
Payer: MEDICARE

## 2020-11-25 VITALS
OXYGEN SATURATION: 89 % | SYSTOLIC BLOOD PRESSURE: 159 MMHG | HEIGHT: 65 IN | DIASTOLIC BLOOD PRESSURE: 84 MMHG | WEIGHT: 207.3 LBS | HEART RATE: 97 BPM | BODY MASS INDEX: 34.54 KG/M2 | TEMPERATURE: 98.2 F

## 2020-11-25 DIAGNOSIS — E11.65 TYPE 2 DIABETES MELLITUS WITH HYPERGLYCEMIA, WITHOUT LONG-TERM CURRENT USE OF INSULIN (HCC): Primary | ICD-10-CM

## 2020-11-25 LAB
GLUCOSE POC: 60 MG/DL
GLUCOSE POC: 94 MG/DL

## 2020-11-25 PROCEDURE — 99214 OFFICE O/P EST MOD 30 MIN: CPT | Performed by: INTERNAL MEDICINE

## 2020-11-25 PROCEDURE — 82962 GLUCOSE BLOOD TEST: CPT | Performed by: INTERNAL MEDICINE

## 2020-11-25 RX ORDER — PIOGLITAZONEHYDROCHLORIDE 45 MG/1
45 TABLET ORAL DAILY
Qty: 30 TAB | Refills: 3 | Status: SHIPPED | OUTPATIENT
Start: 2020-11-25 | End: 2020-12-25

## 2020-11-25 RX ORDER — IBUPROFEN 200 MG
CAPSULE ORAL 2 TIMES DAILY
Qty: 50 STRIP | Refills: 5 | Status: SHIPPED | OUTPATIENT
Start: 2020-11-25 | End: 2021-02-25 | Stop reason: SDUPTHER

## 2020-11-25 RX ORDER — METFORMIN HYDROCHLORIDE 500 MG/1
TABLET ORAL
Qty: 120 TAB | Refills: 3 | Status: SHIPPED | OUTPATIENT
Start: 2020-11-25 | End: 2021-02-25 | Stop reason: SDUPTHER

## 2020-11-25 RX ORDER — LANCETS
EACH MISCELLANEOUS
Qty: 50 EACH | Refills: 5 | Status: SHIPPED | OUTPATIENT
Start: 2020-11-25 | End: 2021-02-25 | Stop reason: SDUPTHER

## 2020-11-25 RX ORDER — ATORVASTATIN CALCIUM 40 MG/1
40 TABLET, FILM COATED ORAL DAILY
Qty: 30 TAB | Refills: 4 | Status: SHIPPED | OUTPATIENT
Start: 2020-11-25 | End: 2020-12-25

## 2020-11-25 NOTE — PROGRESS NOTES
History and Physical    Patient: Tamir Hammond MRN: [id number]  SSN: [id number]    YOB: 1955  Age: 77 y.o. Sex: female      Subjective: Tamir Hammond is a 77 y.o. female with past medical history of hypertension, hyperlipidemia is here for follow-up of type 2 diabetes mellitus. She remains in primary care of Dr. Ouida Primrose. Patient continues to be on metformin 500 mg 2 tablets twice a day, glimepiride 2 mg 2 tablet twice a day. At the last visit we decided to start Jardiance but it was too expensive. Primary care physician put her on pioglitazone 15 mg daily which she is tolerating well. No lower extremity edema. She was supposed to have CABG in April 2020 but it was canceled because of coronavirus. She has follow-up appointment with Dr. Gabrielle Elizabeth in January. She denies any chest pain or shortness of breath. She has been very stressed because some of her relatives passed away because of COVID-19. Checking blood glucose once a day. She has symptoms of hypoglycemia sometimes overnight and sometimes during the day, approximately once in 2 weeks. She is taking all her medications regularly. Glucometer reading: patient is checking blood glucose one to 2 times every day.  Readings are ranging from 81 to 253 mg/dL    updated diabetes history:  · Diagnosis: 2015    · Current treatment: Glimepiride 2 mg 2 tabs bid, Metformin 500 mg 2 tablets twice a day, pioglitazone 15 mg daily    · Past treatment: januvia?, Jardiance (expensive)    · Glucose checks: not checking    · Hyperglycemia: unknown    · Hypoglycemia: no    · Meals per day: 3, breakfast: eggs, and hotdog, lunch: chicken and vegetables, dinner: same, snacks: junk food,     · Exercise: no    · DM related hospitalizations: no        Complications of DM:    · CAD: no    · CVA: no    · PVD: no    · Amputations: no     · Retinopathy: no; last exam was 7/2020    · Gastropathy: no    · Nephropathy: yes    · Neuropathy: no        Medications:    · Statin: atorvastatin 40 mg    · ACE-I: lisinopril    · ASA: no allergic        · Diabetes education: no     Past Medical History:   Diagnosis Date    Acute gastrointestinal hemorrhage 2020    Allergic rhinitis 2020    Anemia 2020    Benign essential hypertension 2020    Body mass index 30.0-30.9, adult 2020    CAD (coronary artery disease) 2020    Foot callus 2020    GERD (gastroesophageal reflux disease) 2020    Heart murmur 2020    Hx of colonoscopy 2015    Done for anemia and rectal bleeding    Left bundle branch block 2020    Microalbuminuria due to type 2 diabetes mellitus ([facility] [address] 2020    Mixed hyperlipidemia 2020    Morbid obesity ([facility] [address] 2020    Tobacco dependence syndrome 2020    Type II diabetes mellitus, uncontrolled ([facility] [address] 2020     Past Surgical History:   Procedure Laterality Date    HX COLONOSCOPY      due in [id number]. Done for anemia and rectal bleeding      Family History   Problem Relation Age of Onset    Cancer Mother         uterine    Hypertension Father     Cancer Sister         breast     Social History     Tobacco Use    Smoking status: Former Smoker     Packs/day: 0.50     Last attempt to quit:      Years since quittin.9    Smokeless tobacco: Never Used   Substance Use Topics    Alcohol use: Not Currently     Frequency: Never      Prior to Admission medications    Medication Sig Start Date End Date Taking? Authorizing Provider   metFORMIN (GLUCOPHAGE) 500 mg tablet Take 2 tablets twice a day with meals 20  Yes Kai Quiles MD   pioglitazone (ACTOS) 45 mg tablet Take 1 Tab by mouth daily for 30 days. 20 Yes Kai Quiles MD   atorvastatin (LIPITOR) 40 mg tablet Take 1 Tab by mouth daily for 30 days.  20 Yes Kai Quiles MD   lancets misc Use to check glucose once a day 20  Yes Kai Quiles MD   glucose blood VI test strips (blood glucose test) strip by Does Not Apply route two (2) times a day. Use to check glucose once a day 11/25/20  Yes Michael Osorio MD   potassium chloride (KLOR-CON) 10 mEq tablet By oral route take one tablet Monday, Wednesday and Friday 9/11/20  Yes Jovon Thakur, DO   hydroCHLOROthiazide (HYDRODIURIL) 25 mg tablet Take 1 Tab by mouth daily. 9/10/20  Yes Jovon Thakur, DO   aspirin delayed-release (Aspirin Low Dose) 81 mg tablet Take  by mouth daily. Yes Provider, Historical   amLODIPine (NORVASC) 10 mg tablet Take  by mouth daily. Yes Provider, Historical   ammonium lactate (LAC-HYDRIN) 12 % lotion Apply  to affected area two (2) times a day. rub in to affected area well   Yes Provider, Historical   carvediloL (COREG) 3.125 mg tablet Take  by mouth two (2) times daily (with meals). Yes Provider, Historical   ketoconazole (NIZORAL) 2 % topical cream Apply  to affected area daily. Yes Provider, Historical   lisinopriL (PRINIVIL, ZESTRIL) 20 mg tablet Take  by mouth two (2) times a day. Yes Provider, Historical        Allergies   Allergen Reactions    Aspirin Unknown (comments)     hemorraging - ended up in the hospital       Review of Systems:  ROS    A comprehensive review of systems was preformed and it is negative except mentioned in HPI    Objective:     Vitals:    11/25/20 1230   BP: (!) 159/84   Pulse: 97   Temp: 98.2 °F (36.8 °C)   TempSrc: Temporal   SpO2: (!) 89%   Weight: 207 lb 4.8 oz (94 kg)   Height: 5' 5\" (1.651 m)        Physical Exam:    Physical Exam  Vitals signs and nursing note reviewed. Constitutional:       Appearance: Normal appearance. Cardiovascular:      Rate and Rhythm: Normal rate and regular rhythm. Pulmonary:      Effort: Pulmonary effort is normal.      Breath sounds: Normal breath sounds. Neurological:      Mental Status: She is alert. Labs and Imaging:  Results for Skyler Fang (MRN [id number]) as of 11/25/2020 12:52   Ref.  Range 10/13/2020 10: 58   Sodium Latest Ref Range: 134 - 144 mmol/L 134   Potassium Latest Ref Range: 3.5 - 5.2 mmol/L 4.2   Chloride Latest Ref Range: 96 - 106 mmol/L 92 (L)   CO2 Latest Ref Range: 20 - 29 mmol/L 26   Glucose Latest Ref Range: 65 - 99 mg/dL 170 (H)   BUN Latest Ref Range: 8 - 27 mg/dL 10   Creatinine Latest Ref Range: 0.57 - 1.00 mg/dL 0.53 (L)   BUN/Creatinine ratio Latest Ref Range: 12 - 28  19   Calcium Latest Ref Range: 8.7 - 10.3 mg/dL 10.1   GFR est non-AA Latest Ref Range: >59 mL/min/1.73 99   GFR est AA Latest Ref Range: >59 mL/min/1.73 114   Bilirubin, total Latest Ref Range: 0.0 - 1.2 mg/dL 0.5   Protein, total Latest Ref Range: 6.0 - 8.5 g/dL 7.5   Albumin Latest Ref Range: 3.8 - 4.8 g/dL 4.3   A-G Ratio Latest Ref Range: 1.2 - 2.2  1.3   ALT Latest Ref Range: 0 - 32 IU/L 30   AST Latest Ref Range: 0 - 40 IU/L 24   Alk.  phosphatase Latest Ref Range: 39 - 117 IU/L 101   Triglyceride Latest Ref Range: 0 - 149 mg/dL 65   Cholesterol, total Latest Ref Range: 100 - 199 mg/dL 123   HDL Cholesterol Latest Ref Range: >39 mg/dL 54   LDL Cholesterol Latest Ref Range: 0 - 99 mg/dL 55   Hemoglobin A1c, (calculated) Latest Ref Range: 4.8 - 5.6 % 7.9 (H)   Estimated average glucose Latest Units: mg/dL 180     Last 3 Recorded Weights in this Encounter    11/25/20 1230   Weight: 207 lb 4.8 oz (94 kg)        Lab Results   Component Value Date/Time    Hemoglobin A1c 7.9 (H) 10/13/2020 10:58 AM    Hemoglobin A1c, External 7.7 09/30/2019        Assessment:     Patient Active Problem List   Diagnosis Code    Benign essential hypertension I10    Body mass index 30.0-30.9, adult Z68.30    Type II diabetes mellitus, uncontrolled (HCC) E11.65    GERD (gastroesophageal reflux disease) K21.9    Heart murmur R01.1    Mixed hyperlipidemia E78.2    Tobacco dependence syndrome F17.200    Acute gastrointestinal hemorrhage K92.2    Allergic rhinitis J30.9    Anemia D64.9    CAD (coronary artery disease) I25.10    Foot callus L84    Left bundle branch block I44.7    Microalbuminuria due to type 2 diabetes mellitus ([facility] [address] E11.29, R80.9    Morbid obesity ([facility] E66.01           Plan:     type II diabetes mellitus uncontrolled  Hemoglobin A1c was 10.2% on 5772536., 9.9% on 4146220, 7.7% on 9-, 7.4% on 1-6-2020, 7.9% on 10-    Fingerstick blood glucose is 66 mg/dL in my office today. 95 mg/dL after correction    Up to date with diabetes related annual labs: yes    Up to date with diabetic eye exam: yes    plan:  target A1c is less than 7%. However avoiding hypoglycemia is important given patient's cardiac issues. Continue metformin 500 mg 2 tablets twice a day   Stop glimepiride. Increase pioglitazone to 45 mg daily. Continue to follow diabetic diet. Check blood glucose 1-2 times per day and I will see her back in 3 months. essential hypertension  Blood pressure is elevated today as patient is stressed because of social issues. I will reevaluate at next visit. mixed hyperlipidemia  LDL 47 in January 2020, 55 in . Continue atorvastatin 40 mg daily. microalbuminuria due to type 2 diabetes mellitus  already on lisinopril. morbid obesity  diet and exercise  body mass index 30+ - obesity    heart murmur  following with cardiology    coronary arteriosclerosis  abnormal stress test and cardiac catheter. Patient has appointment with cardiothoracic surgery in January 2021 to discuss CABG. Orders Placed This Encounter    metFORMIN (GLUCOPHAGE) 500 mg tablet     Sig: Take 2 tablets twice a day with meals     Dispense:  120 Tab     Refill:  3    pioglitazone (ACTOS) 45 mg tablet     Sig: Take 1 Tab by mouth daily for 30 days. Dispense:  30 Tab     Refill:  3    atorvastatin (LIPITOR) 40 mg tablet     Sig: Take 1 Tab by mouth daily for 30 days.      Dispense:  30 Tab     Refill:  4    lancets misc     Sig: Use to check glucose once a day     Dispense:  50 Each     Refill:  5    glucose blood VI test strips (blood glucose test) strip     Sig: by Does Not Apply route two (2) times a day.  Use to check glucose once a day     Dispense:  50 Strip     Refill:  5        Signed By: Lamar Santillan MD     November 25, 2020      Return to clinic 3 months

## 2020-12-04 DIAGNOSIS — I10 BENIGN ESSENTIAL HYPERTENSION: Primary | ICD-10-CM

## 2020-12-07 RX ORDER — LISINOPRIL 20 MG/1
20 TABLET ORAL 2 TIMES DAILY
Qty: 180 TAB | Refills: 2 | Status: SHIPPED | OUTPATIENT
Start: 2020-12-07 | End: 2020-12-09 | Stop reason: SDUPTHER

## 2020-12-08 ENCOUNTER — TELEPHONE (OUTPATIENT)
Dept: FAMILY MEDICINE CLINIC | Age: 65
End: 2020-12-08

## 2020-12-08 DIAGNOSIS — I10 BENIGN ESSENTIAL HYPERTENSION: ICD-10-CM

## 2020-12-08 NOTE — TELEPHONE ENCOUNTER
Patient called stating that the prescription for lisinopriL (PRINIVIL, ZESTRIL) 20 mg tablet was sent to the wrong pharmacy [facility]). Prescription needs to be sent to [facility]). Can this be sent to [facility]?

## 2020-12-09 RX ORDER — LISINOPRIL 20 MG/1
20 TABLET ORAL 2 TIMES DAILY
Qty: 180 TAB | Refills: 2 | Status: SHIPPED | OUTPATIENT
Start: 2020-12-09 | End: 2020-12-11 | Stop reason: SDUPTHER

## 2020-12-09 NOTE — TELEPHONE ENCOUNTER
77 y.o. , [employer] , female       Allergies   Allergen Reactions    Aspirin Unknown (comments)     hemorraging - ended up in the hospital     Current Outpatient Medications on File Prior to Visit   Medication Sig Dispense Refill    [DISCONTINUED] lisinopriL (PRINIVIL, ZESTRIL) 20 mg tablet Take 1 Tab by mouth two (2) times a day. 180 Tab 2    metFORMIN (GLUCOPHAGE) 500 mg tablet Take 2 tablets twice a day with meals 120 Tab 3    pioglitazone (ACTOS) 45 mg tablet Take 1 Tab by mouth daily for 30 days. 30 Tab 3    atorvastatin (LIPITOR) 40 mg tablet Take 1 Tab by mouth daily for 30 days. 30 Tab 4    lancets misc Use to check glucose once a day 50 Each 5    glucose blood VI test strips (blood glucose test) strip by Does Not Apply route two (2) times a day. Use to check glucose once a day 50 Strip 5    potassium chloride (KLOR-CON) 10 mEq tablet By oral route take one tablet Monday, Wednesday and Friday 40 Tab 3    hydroCHLOROthiazide (HYDRODIURIL) 25 mg tablet Take 1 Tab by mouth daily. 90 Tab 4    aspirin delayed-release (Aspirin Low Dose) 81 mg tablet Take  by mouth daily.  amLODIPine (NORVASC) 10 mg tablet Take  by mouth daily.  ammonium lactate (LAC-HYDRIN) 12 % lotion Apply  to affected area two (2) times a day. rub in to affected area well      carvediloL (COREG) 3.125 mg tablet Take  by mouth two (2) times daily (with meals).  ketoconazole (NIZORAL) 2 % topical cream Apply  to affected area daily. No current facility-administered medications on file prior to visit.       Patient Active Problem List   Diagnosis Code    Benign essential hypertension I10    Body mass index 30.0-30.9, adult Z68.30    Type II diabetes mellitus, uncontrolled (HCC) E11.65    GERD (gastroesophageal reflux disease) K21.9    Heart murmur R01.1    Mixed hyperlipidemia E78.2    Tobacco dependence syndrome F17.200    Acute gastrointestinal hemorrhage K92.2    Allergic rhinitis J30.9    Anemia D64.9    CAD (coronary artery disease) I25.10    Foot callus L84    Left bundle branch block I44.7    Microalbuminuria due to type 2 diabetes mellitus ([facility]) E11.29, R80.9    Morbid obesity ([facility]) E66.01       1. Benign essential hypertension    - lisinopriL (PRINIVIL, ZESTRIL) 20 mg tablet; Take 1 Tab by mouth two (2) times a day. Dispense: 180 Tab;  Refill: [address], DO

## 2020-12-11 DIAGNOSIS — I10 BENIGN ESSENTIAL HYPERTENSION: ICD-10-CM

## 2020-12-11 RX ORDER — LISINOPRIL 20 MG/1
20 TABLET ORAL 2 TIMES DAILY
Qty: 180 TAB | Refills: 2 | Status: SHIPPED | OUTPATIENT
Start: 2020-12-11 | End: 2021-02-05

## 2020-12-13 ENCOUNTER — APPOINTMENT (OUTPATIENT)
Dept: GENERAL RADIOLOGY | Age: 65
DRG: 191 | End: 2020-12-13
Attending: EMERGENCY MEDICINE
Payer: MEDICARE

## 2020-12-13 ENCOUNTER — HOSPITAL ENCOUNTER (INPATIENT)
Age: 65
LOS: 5 days | Discharge: HOME HEALTH | DRG: 191 | End: 2020-12-18
Attending: EMERGENCY MEDICINE | Admitting: INTERNAL MEDICINE
Payer: MEDICARE

## 2020-12-13 DIAGNOSIS — E87.1 HYPONATREMIA: ICD-10-CM

## 2020-12-13 DIAGNOSIS — D64.9 ACUTE ANEMIA: Primary | ICD-10-CM

## 2020-12-13 DIAGNOSIS — J44.9 CHRONIC OBSTRUCTIVE PULMONARY DISEASE, UNSPECIFIED COPD TYPE (HCC): ICD-10-CM

## 2020-12-13 PROBLEM — J44.1 COPD EXACERBATION (HCC): Status: ACTIVE | Noted: 2020-12-13

## 2020-12-13 LAB
ALBUMIN SERPL-MCNC: 3.6 G/DL (ref 3.5–5)
ALBUMIN/GLOB SERPL: 0.8 {RATIO} (ref 1.1–2.2)
ALP SERPL-CCNC: 78 U/L (ref 45–117)
ALT SERPL-CCNC: 25 U/L (ref 12–78)
ANION GAP SERPL CALC-SCNC: 8 MMOL/L (ref 5–15)
AST SERPL W P-5'-P-CCNC: 18 U/L (ref 15–37)
ATRIAL RATE: 97 BPM
BASOPHILS # BLD: 0 K/UL (ref 0–0.1)
BASOPHILS NFR BLD: 0 % (ref 0–1)
BILIRUB SERPL-MCNC: 0.6 MG/DL (ref 0.2–1)
BNP SERPL-MCNC: 103 PG/ML
BUN SERPL-MCNC: 11 MG/DL (ref 6–20)
BUN/CREAT SERPL: 18 (ref 12–20)
CA-I BLD-MCNC: 8.9 MG/DL (ref 8.5–10.1)
CALCULATED P AXIS, ECG09: 52 DEGREES
CALCULATED R AXIS, ECG10: -57 DEGREES
CALCULATED T AXIS, ECG11: 104 DEGREES
CHLORIDE SERPL-SCNC: 86 MMOL/L (ref 97–108)
CO2 SERPL-SCNC: 27 MMOL/L (ref 21–32)
COVID-19 RAPID TEST, COVR: NEGATIVE
CREAT SERPL-MCNC: 0.62 MG/DL (ref 0.55–1.02)
CRP SERPL-MCNC: <0.29 MG/DL (ref 0–0.6)
D DIMER PPP FEU-MCNC: 0.5 UG/ML(FEU)
DIAGNOSIS, 93000: NORMAL
DIFFERENTIAL METHOD BLD: ABNORMAL
EOSINOPHIL # BLD: 0.1 K/UL (ref 0–0.4)
EOSINOPHIL NFR BLD: 1 % (ref 0–7)
ERYTHROCYTE [DISTWIDTH] IN BLOOD BY AUTOMATED COUNT: 15.4 % (ref 11.5–14.5)
GLOBULIN SER CALC-MCNC: 4.6 G/DL (ref 2–4)
GLUCOSE SERPL-MCNC: 148 MG/DL (ref 65–100)
HCT VFR BLD AUTO: 27.7 % (ref 35–47)
HGB BLD-MCNC: 9 G/DL (ref 11.5–16)
IMM GRANULOCYTES # BLD AUTO: 0.1 K/UL (ref 0–0.04)
IMM GRANULOCYTES NFR BLD AUTO: 1 % (ref 0–0.5)
LYMPHOCYTES # BLD: 2.3 K/UL (ref 0.8–3.5)
LYMPHOCYTES NFR BLD: 17 % (ref 12–49)
MAGNESIUM SERPL-MCNC: 1.9 MG/DL (ref 1.6–2.4)
MCH RBC QN AUTO: 25.5 PG (ref 26–34)
MCHC RBC AUTO-ENTMCNC: 32.5 G/DL (ref 30–36.5)
MCV RBC AUTO: 78.5 FL (ref 80–99)
MONOCYTES # BLD: 1 K/UL (ref 0–1)
MONOCYTES NFR BLD: 7 % (ref 5–13)
NEUTS SEG # BLD: 10.1 K/UL (ref 1.8–8)
NEUTS SEG NFR BLD: 74 % (ref 32–75)
P-R INTERVAL, ECG05: 136 MS
PHOSPHATE SERPL-MCNC: 3 MG/DL (ref 2.6–4.7)
PLATELET # BLD AUTO: 519 K/UL (ref 150–400)
PMV BLD AUTO: 8.4 FL (ref 8.9–12.9)
POTASSIUM SERPL-SCNC: 3.9 MMOL/L (ref 3.5–5.1)
PROCALCITONIN SERPL-MCNC: <0.05 NG/ML
PROT SERPL-MCNC: 8.2 G/DL (ref 6.4–8.2)
Q-T INTERVAL, ECG07: 384 MS
QRS DURATION, ECG06: 138 MS
QTC CALCULATION (BEZET), ECG08: 487 MS
RBC # BLD AUTO: 3.53 M/UL (ref 3.8–5.2)
SARS-COV-2, COV2: NORMAL
SODIUM SERPL-SCNC: 121 MMOL/L (ref 136–145)
SPECIMEN SOURCE: ABNORMAL
TROPONIN I SERPL-MCNC: <0.05 NG/ML
TROPONIN I SERPL-MCNC: <0.05 NG/ML
VENTRICULAR RATE, ECG03: 97 BPM
WBC # BLD AUTO: 13.4 K/UL (ref 3.6–11)

## 2020-12-13 PROCEDURE — A9270 NON-COVERED ITEM OR SERVICE: HCPCS | Performed by: EMERGENCY MEDICINE

## 2020-12-13 PROCEDURE — 74011250636 HC RX REV CODE- 250/636: Performed by: INTERNAL MEDICINE

## 2020-12-13 PROCEDURE — 74011636637 HC RX REV CODE- 636/637: Performed by: EMERGENCY MEDICINE

## 2020-12-13 PROCEDURE — 80053 COMPREHEN METABOLIC PANEL: CPT

## 2020-12-13 PROCEDURE — 84484 ASSAY OF TROPONIN QUANT: CPT

## 2020-12-13 PROCEDURE — 99283 EMERGENCY DEPT VISIT LOW MDM: CPT

## 2020-12-13 PROCEDURE — 83735 ASSAY OF MAGNESIUM: CPT

## 2020-12-13 PROCEDURE — 65270000029 HC RM PRIVATE

## 2020-12-13 PROCEDURE — 84100 ASSAY OF PHOSPHORUS: CPT

## 2020-12-13 PROCEDURE — 74011250637 HC RX REV CODE- 250/637: Performed by: INTERNAL MEDICINE

## 2020-12-13 PROCEDURE — 85379 FIBRIN DEGRADATION QUANT: CPT

## 2020-12-13 PROCEDURE — 94640 AIRWAY INHALATION TREATMENT: CPT

## 2020-12-13 PROCEDURE — 83540 ASSAY OF IRON: CPT

## 2020-12-13 PROCEDURE — 86140 C-REACTIVE PROTEIN: CPT

## 2020-12-13 PROCEDURE — 83880 ASSAY OF NATRIURETIC PEPTIDE: CPT

## 2020-12-13 PROCEDURE — 71045 X-RAY EXAM CHEST 1 VIEW: CPT

## 2020-12-13 PROCEDURE — 84145 PROCALCITONIN (PCT): CPT

## 2020-12-13 PROCEDURE — 87635 SARS-COV-2 COVID-19 AMP PRB: CPT

## 2020-12-13 PROCEDURE — 93005 ELECTROCARDIOGRAM TRACING: CPT

## 2020-12-13 PROCEDURE — 36415 COLL VENOUS BLD VENIPUNCTURE: CPT

## 2020-12-13 PROCEDURE — 74011000250 HC RX REV CODE- 250: Performed by: EMERGENCY MEDICINE

## 2020-12-13 PROCEDURE — 85025 COMPLETE CBC W/AUTO DIFF WBC: CPT

## 2020-12-13 RX ORDER — IPRATROPIUM BROMIDE AND ALBUTEROL SULFATE 2.5; .5 MG/3ML; MG/3ML
3 SOLUTION RESPIRATORY (INHALATION)
Status: COMPLETED | OUTPATIENT
Start: 2020-12-13 | End: 2020-12-13

## 2020-12-13 RX ORDER — GUAIFENESIN 100 MG/5ML
81 LIQUID (ML) ORAL DAILY
Status: DISCONTINUED | OUTPATIENT
Start: 2020-12-14 | End: 2020-12-14

## 2020-12-13 RX ORDER — ACETAMINOPHEN 325 MG/1
650 TABLET ORAL
Status: DISCONTINUED | OUTPATIENT
Start: 2020-12-13 | End: 2020-12-18 | Stop reason: HOSPADM

## 2020-12-13 RX ORDER — ENOXAPARIN SODIUM 100 MG/ML
40 INJECTION SUBCUTANEOUS DAILY
Status: DISCONTINUED | OUTPATIENT
Start: 2020-12-14 | End: 2020-12-14

## 2020-12-13 RX ORDER — SODIUM CHLORIDE 0.9 % (FLUSH) 0.9 %
5-40 SYRINGE (ML) INJECTION AS NEEDED
Status: DISCONTINUED | OUTPATIENT
Start: 2020-12-13 | End: 2020-12-18 | Stop reason: HOSPADM

## 2020-12-13 RX ORDER — IPRATROPIUM BROMIDE AND ALBUTEROL SULFATE 2.5; .5 MG/3ML; MG/3ML
3 SOLUTION RESPIRATORY (INHALATION)
Status: DISCONTINUED | OUTPATIENT
Start: 2020-12-13 | End: 2020-12-13

## 2020-12-13 RX ORDER — POLYETHYLENE GLYCOL 3350 17 G/17G
17 POWDER, FOR SOLUTION ORAL DAILY PRN
Status: DISCONTINUED | OUTPATIENT
Start: 2020-12-13 | End: 2020-12-18 | Stop reason: HOSPADM

## 2020-12-13 RX ORDER — PREDNISONE 20 MG/1
40 TABLET ORAL
Status: DISCONTINUED | OUTPATIENT
Start: 2020-12-14 | End: 2020-12-16

## 2020-12-13 RX ORDER — LISINOPRIL 40 MG/1
40 TABLET ORAL 2 TIMES DAILY
Status: DISCONTINUED | OUTPATIENT
Start: 2020-12-13 | End: 2020-12-14

## 2020-12-13 RX ORDER — PROMETHAZINE HYDROCHLORIDE 25 MG/1
12.5 TABLET ORAL
Status: DISCONTINUED | OUTPATIENT
Start: 2020-12-13 | End: 2020-12-18 | Stop reason: HOSPADM

## 2020-12-13 RX ORDER — BUDESONIDE AND FORMOTEROL FUMARATE DIHYDRATE 160; 4.5 UG/1; UG/1
2 AEROSOL RESPIRATORY (INHALATION)
Status: DISCONTINUED | OUTPATIENT
Start: 2020-12-13 | End: 2020-12-18 | Stop reason: HOSPADM

## 2020-12-13 RX ORDER — SODIUM CHLORIDE 9 MG/ML
50 INJECTION, SOLUTION INTRAVENOUS CONTINUOUS
Status: DISCONTINUED | OUTPATIENT
Start: 2020-12-13 | End: 2020-12-15

## 2020-12-13 RX ORDER — ATORVASTATIN CALCIUM 40 MG/1
40 TABLET, FILM COATED ORAL DAILY
Status: DISCONTINUED | OUTPATIENT
Start: 2020-12-14 | End: 2020-12-13

## 2020-12-13 RX ORDER — ACETAMINOPHEN 650 MG/1
650 SUPPOSITORY RECTAL
Status: DISCONTINUED | OUTPATIENT
Start: 2020-12-13 | End: 2020-12-18 | Stop reason: HOSPADM

## 2020-12-13 RX ORDER — ATORVASTATIN CALCIUM 40 MG/1
40 TABLET, FILM COATED ORAL DAILY
Status: DISCONTINUED | OUTPATIENT
Start: 2020-12-13 | End: 2020-12-18 | Stop reason: HOSPADM

## 2020-12-13 RX ORDER — HYDROCHLOROTHIAZIDE 25 MG/1
25 TABLET ORAL DAILY
Status: DISCONTINUED | OUTPATIENT
Start: 2020-12-14 | End: 2020-12-14

## 2020-12-13 RX ORDER — CARVEDILOL 3.12 MG/1
3.12 TABLET ORAL 2 TIMES DAILY WITH MEALS
Status: DISCONTINUED | OUTPATIENT
Start: 2020-12-14 | End: 2020-12-18 | Stop reason: HOSPADM

## 2020-12-13 RX ORDER — CARVEDILOL 3.12 MG/1
6.25 TABLET ORAL 2 TIMES DAILY WITH MEALS
Status: DISCONTINUED | OUTPATIENT
Start: 2020-12-14 | End: 2020-12-13

## 2020-12-13 RX ORDER — INSULIN LISPRO 100 [IU]/ML
INJECTION, SOLUTION INTRAVENOUS; SUBCUTANEOUS
Status: DISCONTINUED | OUTPATIENT
Start: 2020-12-13 | End: 2020-12-18 | Stop reason: HOSPADM

## 2020-12-13 RX ORDER — ONDANSETRON 2 MG/ML
4 INJECTION INTRAMUSCULAR; INTRAVENOUS
Status: DISCONTINUED | OUTPATIENT
Start: 2020-12-13 | End: 2020-12-18 | Stop reason: HOSPADM

## 2020-12-13 RX ORDER — SODIUM CHLORIDE 0.9 % (FLUSH) 0.9 %
5-40 SYRINGE (ML) INJECTION EVERY 8 HOURS
Status: DISCONTINUED | OUTPATIENT
Start: 2020-12-13 | End: 2020-12-18 | Stop reason: HOSPADM

## 2020-12-13 RX ORDER — MAGNESIUM SULFATE 100 %
4 CRYSTALS MISCELLANEOUS AS NEEDED
Status: DISCONTINUED | OUTPATIENT
Start: 2020-12-13 | End: 2020-12-18 | Stop reason: HOSPADM

## 2020-12-13 RX ORDER — PREDNISONE 20 MG/1
60 TABLET ORAL ONCE
Status: COMPLETED | OUTPATIENT
Start: 2020-12-13 | End: 2020-12-13

## 2020-12-13 RX ORDER — LISINOPRIL 40 MG/1
40 TABLET ORAL DAILY
Status: DISCONTINUED | OUTPATIENT
Start: 2020-12-14 | End: 2020-12-13

## 2020-12-13 RX ORDER — ALBUTEROL SULFATE 90 UG/1
2 AEROSOL, METERED RESPIRATORY (INHALATION)
Status: DISCONTINUED | OUTPATIENT
Start: 2020-12-13 | End: 2020-12-16 | Stop reason: SDUPTHER

## 2020-12-13 RX ORDER — DEXTROSE 50 % IN WATER (D50W) INTRAVENOUS SYRINGE
25-50 AS NEEDED
Status: DISCONTINUED | OUTPATIENT
Start: 2020-12-13 | End: 2020-12-18 | Stop reason: HOSPADM

## 2020-12-13 RX ORDER — CARVEDILOL 3.12 MG/1
6.25 TABLET ORAL 2 TIMES DAILY WITH MEALS
Status: DISCONTINUED | OUTPATIENT
Start: 2020-12-13 | End: 2020-12-13

## 2020-12-13 RX ADMIN — ATORVASTATIN CALCIUM 40 MG: 40 TABLET, FILM COATED ORAL at 20:53

## 2020-12-13 RX ADMIN — CARVEDILOL 6.25 MG: 3.12 TABLET, FILM COATED ORAL at 20:52

## 2020-12-13 RX ADMIN — SODIUM CHLORIDE 50 ML/HR: 9 INJECTION, SOLUTION INTRAVENOUS at 17:11

## 2020-12-13 RX ADMIN — IPRATROPIUM BROMIDE AND ALBUTEROL SULFATE 3 ML: .5; 3 SOLUTION RESPIRATORY (INHALATION) at 15:12

## 2020-12-13 RX ADMIN — ALBUTEROL SULFATE 2 PUFF: 108 AEROSOL, METERED RESPIRATORY (INHALATION) at 20:37

## 2020-12-13 RX ADMIN — IPRATROPIUM BROMIDE AND ALBUTEROL SULFATE 3 ML: .5; 3 SOLUTION RESPIRATORY (INHALATION) at 15:19

## 2020-12-13 RX ADMIN — PREDNISONE 60 MG: 20 TABLET ORAL at 14:30

## 2020-12-13 RX ADMIN — IPRATROPIUM BROMIDE AND ALBUTEROL SULFATE 3 ML: .5; 3 SOLUTION RESPIRATORY (INHALATION) at 15:08

## 2020-12-13 RX ADMIN — LISINOPRIL 40 MG: 40 TABLET ORAL at 21:00

## 2020-12-13 RX ADMIN — BUDESONIDE AND FORMOTEROL FUMARATE DIHYDRATE 2 PUFF: 160; 4.5 AEROSOL RESPIRATORY (INHALATION) at 20:37

## 2020-12-13 NOTE — LETTER
Herlinda Rowley accompanied Mynor Lal to the emergency department on 12/13/20 and assisting with care on 12/14/20. They may return to work on 12/15/20. If you have any questions or concerns, please don't hesitate to call.

## 2020-12-13 NOTE — ED PROVIDER NOTES
HPI   Chief Complaint   Patient presents with    Shortness of Breath   7year-old female history of type 2 diabetes, CAD, hypertension presents with shortness of breath. Patient reports 2 weeks of progressively worsening shortness of breath, now it is so bad that she has shortness of breath at rest, unable to lie down, unable to walk even to the bathroom due to shortness of breath. Patient denies any chest pain, cough, fevers, chills, sick contacts, body edema. Patient says she was a chronic smoker and quit 6 years ago, patient denies being prescribed any inhalers. Patient reports being compliant with all her meds including her diuretic. Patient says she is supposed to get a CABG in a month. Pt reports poor appetite several weeks, denies bleeding. Past Medical History:   Diagnosis Date    Acute gastrointestinal hemorrhage 8/28/2020    Allergic rhinitis 8/28/2020    Anemia 8/28/2020    Benign essential hypertension 6/25/2020    Body mass index 30.0-30.9, adult 6/25/2020    CAD (coronary artery disease) 8/28/2020    Foot callus 8/28/2020    GERD (gastroesophageal reflux disease) 6/25/2020    Heart murmur 6/25/2020    Hx of colonoscopy 01/01/2015    Done for anemia and rectal bleeding    Left bundle branch block 8/28/2020    Microalbuminuria due to type 2 diabetes mellitus ([address] 8/28/2020    Mixed hyperlipidemia 6/25/2020    Morbid obesity ([address] 8/28/2020    Tobacco dependence syndrome 6/25/2020    Type II diabetes mellitus, uncontrolled ([address] 6/25/2020       Past Surgical History:   Procedure Laterality Date    HX COLONOSCOPY  2015    due in [id number].   Done for anemia and rectal bleeding         Family History:   Problem Relation Age of Onset    Cancer Mother         uterine    Hypertension Father     Cancer Sister         breast       Social History     Socioeconomic History    Marital status:      Spouse name: Not on file    Number of children: Not on file    Years of education: Not on file    Highest education level: Not on file   Occupational History    Not on file   Social Needs    Financial resource strain: Not on file    Food insecurity     Worry: Not on file     Inability: Not on file    Transportation needs     Medical: Not on file     Non-medical: Not on file   Tobacco Use    Smoking status: Former Smoker     Packs/day: 0.50     Last attempt to quit:      Years since quittin.9    Smokeless tobacco: Never Used   Substance and Sexual Activity    Alcohol use: Not Currently     Frequency: Never    Drug use: Never    Sexual activity: Not Currently   Lifestyle    Physical activity     Days per week: Not on file     Minutes per session: Not on file    Stress: Only a little   Relationships    Social connections     Talks on phone: Not on file     Gets together: Not on file     Attends Bahai service: Not on file     Active member of club or organization: Not on file     Attends meetings of clubs or organizations: Not on file     Relationship status: Not on file    Intimate partner violence     Fear of current or ex partner: Not on file     Emotionally abused: Not on file     Physically abused: Not on file     Forced sexual activity: Not on file   Other Topics Concern     Service Not Asked    Blood Transfusions Not Asked    Caffeine Concern Not Asked    Occupational Exposure Not Asked   Sandor Erwin Hazards Not Asked    Sleep Concern Not Asked    Stress Concern Not Asked    Weight Concern Not Asked    Special Diet Not Asked    Back Care Not Asked    Exercise Not Asked    Bike Helmet Not Asked    [address] Not Asked    Self-Exams Not Asked   Social History Narrative    Not on file         ALLERGIES: Aspirin    Review of Systems   Constitutional: Positive for appetite change. Respiratory: Positive for shortness of breath. All other systems reviewed and are negative.       Vitals:    20 1352   BP: 137/75   Pulse: 96   Resp: 16 Temp: 98 °F (36.7 °C)   SpO2: 99%   Weight: 93.9 kg (207 lb)   Height: 5' 5\" (1.651 m)            Physical Exam   Patient Vitals for the past 8 hrs:   Temp Pulse Resp BP SpO2   12/13/20 1352 98 °F (36.7 °C) 96 16 137/75 99 %        Nursing note and vitals reviewed. Constitutional: NAD. Head: Normocephalic and atraumatic. Mouth/Throat: Airway patent. Moist mucous membranes. Eyes: EOMI. No scleral icterus. Neck: Neck supple. Cardiovascular: Normal rate, regular rhythm. +murmur. Good pulses throughout. Pulmonary/Chest: Mild respiratory distress on room air, tachypneic, accessory muscle use. Bilateral breath sounds diminished, prolonged expiratory phase. No crackles. Abdominal/GI: BS normal, Soft, non-tender, non-distended. No rebound or guarding. Musculoskeletal: No gross injuries or deformities. No leg edema. Neurological: Alert and oriented to person, place, and time. Cranial Nerves 2-12 intact. Moving all extremities. No gross deficits. Psych: Pleasant, cooperative. Skin: Skin is warm and dry. No rash noted. MDM   Ddx = undiagnosed asthma versus COPD, viral respiratory infection, pneumonia, heart failure, ACS, PE. Patient's EKG read by me at 1355 shows normal sinus rhythm, rate 97, left axis deviation, left bundle branch block, does not meet modified Sgarbossa criteria for STEMI equivalent. Treated with duoneb x 3, prednisone 60mg. On re-assessment breathing improved. Workup showing no pneumonia, no ACS< does have acute microcytic anemia and hyponatremia. I consulted Dr. Drew Mortimer, admitting.    Labs Reviewed   COVID-19 RAPID TEST - Abnormal; Notable for the following components:       Result Value    COVID-19 rapid test Negative (*)     All other components within normal limits   CBC WITH AUTOMATED DIFF - Abnormal; Notable for the following components:    WBC 13.4 (*)     RBC 3.53 (*)     HGB 9.0 (*)     HCT 27.7 (*)     MCV 78.5 (*)     MCH 25.5 (*)     RDW 15.4 (*)     PLATELET 083 (*) MPV 8.4 (*)     IMMATURE GRANULOCYTES 1 (*)     ABS. NEUTROPHILS 10.1 (*)     ABS. IMM. GRANS. 0.1 (*)     All other components within normal limits   METABOLIC PANEL, COMPREHENSIVE - Abnormal; Notable for the following components:    Sodium 121 (*)     Chloride 86 (*)     Glucose 148 (*)     Globulin 4.6 (*)     A-G Ratio 0.8 (*)     All other components within normal limits   PROCALCITONIN - Abnormal; Notable for the following components:    Procalcitonin <0.05 (*)     All other components within normal limits   D DIMER - Abnormal; Notable for the following components:    D DIMER 0.50 (*)     All other components within normal limits   TROPONIN I   BNP   MAGNESIUM   PHOSPHORUS   C REACTIVE PROTEIN, QT   SARS-COV-2   TROPONIN I   IRON PROFILE   OCCULT BLOOD, STOOL     XR CHEST SNGL V   Final Result   IMPRESSION: No acute pulmonary or pleural disease. Medications   albuterol-ipratropium (DUO-NEB) 2.5 MG-0.5 MG/3 ML (has no administration in time range)   albuterol-ipratropium (DUO-NEB) 2.5 MG-0.5 MG/3 ML (has no administration in time range)   albuterol-ipratropium (DUO-NEB) 2.5 MG-0.5 MG/3 ML (has no administration in time range)   predniSONE (DELTASONE) tablet 60 mg (has no administration in time range)     Daniel AMOR MD, am  the first and primary ED provider for this patient.           Procedures

## 2020-12-13 NOTE — CONSULTS
Consult  Pulmonary, Critical Care    Name: Alexandra Rey MRN: [id number]   : 1955 Hospital: [facility]   Date: 2020  Admission date: 2020 Hospital Day: 1       Subjective/Interval History:   Patient seen in the emergency room. Consult has been placed for shortness of breath and wheezing. States that several years ago her doctor put her on an inhaler but she does not remember what it was in did not continue it. She has a known history of heart disease and is scheduled to have nonemergent coronary artery bypass grafting sometime soon. Over the last 1 to 2 weeks she has had progressively worsening shortness of breath initially it was with exertion then it continued to worsen to the point of being at rest sitting up or laying down. She does have a history of reflux but states it is controlled on Pepcid. She was a smoker but only for about 30 years of about three fourths a pack per day having stopped 6 years ago. She denies any history of childhood asthma has never been told she has COPD. Hospital Problems  Date Reviewed: 2020          Codes Class Noted POA    Hyponatremia ICD-10-CM: E87.1  ICD-9-CM: 276.1  2020 Unknown        COPD exacerbation ([facility] ICD-10-CM: J44.1  ICD-9-CM: 491.21  2020 Unknown        Anemia ICD-10-CM: D64.9  ICD-9-CM: 285.9  2020 Unknown              IMPRESSION:   1. Dyspnea likely due to COPD  2. Exacerbation COPD has cleared with prednisone and nebulized albuterol Atrovent  3. Coronary artery disease with questionable scheduling for bypass surgery in the near future  4. Severe hyponatremia  5. Anemia Marked decrease in hemoglobin compared with October  6. Body mass index is 34.45 kg/m². 7.       RECOMMENDATIONS/PLAN:   1. Covid testing has been ordered so will be unable to give nebulized bronchodilators. Will use inhaled albuterol and Symbicort  2. Agree with your prednisone  3.  We will repeat hemoglobin in a.m.  4. We will give IV saline overnight and repeat sodium in a.m.  5.           [] High complexity decision making was performed  [x] See my orders for details      Subjective/Initial History:     I was asked by Tara Hamm MD to see Ayse White  a 77 y.o.  female in consultation for a chief complaint of dyspnea      Allergies   Allergen Reactions    Aspirin Unknown (comments)     hemorraging - ended up in the hospital        [facility] reviewed and pertinent medications noted or modified as needed     Current Facility-Administered Medications   Medication    sodium chloride (NS) flush 5-40 mL    sodium chloride (NS) flush 5-40 mL    acetaminophen (TYLENOL) tablet 650 mg    Or    acetaminophen (TYLENOL) suppository 650 mg    polyethylene glycol (MIRALAX) packet 17 g    promethazine (PHENERGAN) tablet 12.5 mg    Or    ondansetron (ZOFRAN) injection 4 mg    [START ON 12/14/2020] enoxaparin (LOVENOX) injection 40 mg    [START ON 12/14/2020] predniSONE (DELTASONE) tablet 40 mg    insulin lispro (HUMALOG) injection    glucose chewable tablet 16 g    glucagon (GLUCAGEN) injection 1 mg    dextrose (D50W) injection syrg 12.5-25 g    0.9% sodium chloride infusion    albuterol (PROVENTIL HFA, VENTOLIN HFA, PROAIR HFA) inhaler 2 Puff    budesonide-formoteroL (SYMBICORT) 160-4.5 mcg/actuation HFA inhaler 2 Puff     Current Outpatient Medications   Medication Sig    lisinopriL (PRINIVIL, ZESTRIL) 20 mg tablet Take 1 Tab by mouth two (2) times a day.  metFORMIN (GLUCOPHAGE) 500 mg tablet Take 2 tablets twice a day with meals    pioglitazone (ACTOS) 45 mg tablet Take 1 Tab by mouth daily for 30 days.  atorvastatin (LIPITOR) 40 mg tablet Take 1 Tab by mouth daily for 30 days.  lancets misc Use to check glucose once a day    glucose blood VI test strips (blood glucose test) strip by Does Not Apply route two (2) times a day.  Use to check glucose once a day    potassium chloride (KLOR-CON) 10 mEq tablet By oral route take one tablet Monday, Wednesday and Friday    hydroCHLOROthiazide (HYDRODIURIL) 25 mg tablet Take 1 Tab by mouth daily.  aspirin delayed-release (Aspirin Low Dose) 81 mg tablet Take  by mouth daily.  amLODIPine (NORVASC) 10 mg tablet Take  by mouth daily.  ammonium lactate (LAC-HYDRIN) 12 % lotion Apply  to affected area two (2) times a day. rub in to affected area well    carvediloL (COREG) 3.125 mg tablet Take  by mouth two (2) times daily (with meals).  ketoconazole (NIZORAL) 2 % topical cream Apply  to affected area daily. Patient PCP: Boni Last DO  PMH:  has a past medical history of Acute gastrointestinal hemorrhage (8/28/2020), Allergic rhinitis (8/28/2020), Anemia (8/28/2020), Benign essential hypertension (6/25/2020), Body mass index 30.0-30.9, adult (6/25/2020), CAD (coronary artery disease) (8/28/2020), Foot callus (8/28/2020), GERD (gastroesophageal reflux disease) (6/25/2020), Heart murmur (6/25/2020), colonoscopy (01/01/2015), Left bundle branch block (8/28/2020), Microalbuminuria due to type 2 diabetes mellitus ([facility] (8/28/2020), Mixed hyperlipidemia (6/25/2020), Morbid obesity ([address] (8/28/2020), Tobacco dependence syndrome (6/25/2020), and Type II diabetes mellitus, uncontrolled ([address] (6/25/2020). PSH:   has a past surgical history that includes hx colonoscopy (2015). FHX: family history includes Cancer in her mother and sister; Hypertension in her father. SHX:  reports that she quit smoking about 6 years ago. She smoked 0.50 packs per day. She has never used smokeless tobacco. She reports previous alcohol use. She reports that she does not use drugs.      Systemic review:  General weight stable no chronic fever chills or sweats  Eyes no double vision or momentary blindness  ENT no facial pain over the sinuses  Endocrinologic she has diabetes denies polyuria polydipsia  Musculoskeletal no swollen tender joints  Neurologic no seizures or syncope  Gastrointestinal she has a history of reflux takes Pepcid over-the-counter 10 mg once a day and states she is asymptomatic does not have any sour bitter taste waking her up at night  Genitourinary no pain or discomfort on urination no bleeding  Cardiovascular has a history of heart diseases being scheduled for bypass surgery sometime in the near future. Denies any ankle edema or chest pain no diaphoresis  Respiratory with worsening shortness of breath particularly with exertion but has progressed to being at rest.  She states she was wheezing could not talk in full sentences. Has received nebulized bronchodilator and prednisone IV and states she feels so much better    Objective:     Vital Signs: Telemetry:    normal sinus rhythm Intake/Output:   Visit Vitals  /75 (BP Patient Position: At rest)   Pulse 96   Temp 98 °F (36.7 °C)   Resp 16   Ht 5' 5\" (1.651 m)   Wt 93.9 kg (207 lb)   SpO2 100%   BMI 34.45 kg/m²       Temp (24hrs), Av °F (36.7 °C), Min:98 °F (36.7 °C), Max:98 °F (36.7 °C)        O2 Device: Room air         Wt Readings from Last 4 Encounters:   20 93.9 kg (207 lb)   20 94 kg (207 lb 4.8 oz)   10/07/20 93 kg (205 lb)   20 89.3 kg (196 lb 14.4 oz)        No intake or output data in the 24 hours ending 20 1711    Last shift:      No intake/output data recorded. Last 3 shifts: No intake/output data recorded.        Physical Exam:   General:  female; currently on room air in no distress oxygen saturation 98%  HEENT: NCAT, oral mucosa clear  Eyes: anicteric; conjunctiva clear extraocular movements normal  Neck: no nodes, no JVD no accessory muscle usage  Chest: no deformity,  Cardiac: Regular rate and rhythm no murmur no ankle edema  Lungs: Prolongation of exhalation and coarsening of exhalation but no definite wheezes there may be a few rales in the bases  Abd: Soft normal bowel sounds no organomegaly no tenderness  Ext: no edema; no joint swelling; No clubbing  : clear urine  Neuro: Awake alert oriented speech is clear moves all 4 extremities  Psych- no agitation, oriented to person;   Skin: warm, dry, no cyanosis;   Pulses: Brachial radial femoral pulses all intact  Capillary: Normal capillary refill    Labs:    Recent Labs     12/13/20  1400   WBC 13.4*   HGB 9.0*   *   10/13/2020 hemoglobin 14.1  Recent Labs     12/13/20  1400   *   K 3.9   CL 86*   CO2 27   *   BUN 11   CREA 0.62   CA 8.9   MG 1.9   PHOS 3.0   ALB 3.6   ALT 25     Room air oxygen saturation 98%  Recent Labs     12/13/20  1400   TROIQ <0.05     Procalcitonin less than 0.05    CRP less than 0.29  Lab Results   Component Value Date/Time    TSH, External 2.420 01/16/2019       Imaging:    CXR Results  (Last 48 hours)               12/13/20 1418  XR CHEST SNGL V Final result    Impression:  IMPRESSION: No acute pulmonary or pleural disease. Narrative:  Chest, AP upright portable, 1418 hours. Comparison with two-view exam from   11/25/2019. Cardiopericardial silhouette top normal in size. Central pulmonary   vessels appear borderline enlarged, stable. Calcification and tortuosity of   thoracic aorta. There is nonspecific central peribronchial cuffing. No evidence   of pulmonary edema, air space pneumonia, or pleural effusion. No evidence of   pneumothorax. Results from [place] encounter on 12/13/20   XR CHEST SNGL V    Narrative Chest, AP upright portable, 1418 hours. Comparison with two-view exam from  11/25/2019. Cardiopericardial silhouette top normal in size. Central pulmonary  vessels appear borderline enlarged, stable. Calcification and tortuosity of  thoracic aorta. There is nonspecific central peribronchial cuffing. No evidence  of pulmonary edema, air space pneumonia, or pleural effusion. No evidence of  pneumothorax. Impression IMPRESSION: No acute pulmonary or pleural disease.      · Discussion worsening dyspnea shortness of breath with marked improvement after 3 doses of nebulized albuterol Atrovent and 160 mg prednisone tablet. Most likely has underlying COPD/asthma with marked improvement after bronchodilators. Will place on albuterol and Symbicort inhalers. Prednisone 40 mg has been ordered for tomorrow. Covid testing is being underway. I see nothing on the chest x-ray that would make me worried about Covid. CRP is less than 0.29. Her sodium is markedly low.   Her hemoglobin is also markedly diminished compared with October and may be playing a role in her shortness of breath    Catherine Lanier MD

## 2020-12-13 NOTE — ED NOTES
TRANSFER - OUT REPORT:    Verbal report given to LISANDRO Wills on Alexandra Zavala  being transferred to [phone], 5W. Report consisted of patients Situation, Background, Assessment and   Recommendations(SBAR). Information from the following report(s) SBAR, ED Summary, [facility] and Recent Results was reviewed with the receiving nurse. Lines:       Opportunity for questions and clarification was provided.

## 2020-12-13 NOTE — ED TRIAGE NOTES
Pt stated she started having SOB ~ 2 weeks ago that has been getting progressively worse. Pt stated pt of [facility]. Needs CABG suppose to go see him next month to set a date.

## 2020-12-13 NOTE — H&P
History & Physical    Primary Care Provider: Trudy Bergman DO  Source of Information: Patient as well as ED physician and records. History of Presenting Illness:   Fred Roberts is a 77 y.o. female who presents with type 2 diabetes, coronary artery disease and follows with Dr. Julito Pichardo and tells me that a CABG is planned. She presents with 2 weeks of progressively worsening shortness of breath, extreme to the point that she could lie down, unable to walk to the bathroom due to shortness of breath. She denies any chest pain, cough, fevers, chills, sick contacts or edema. She states that she has had heart failure in the past and this feels different. She has been wheezing some as well. She is an ex-smoker quitting in about 2014. She states she is compliant with her medications. She denies prior history of COPD. Has had a cough not significantly productive. X-ray here is clear. White count elevated to 13.4, troponin negative and her BNP is 103. Labs reveal significant hyponatremia at 121 and her hemoglobin is dropped from 13-9.0. Denies any dark stools or bloody stools. Despite her significant shortness of breath she is saturating at 99% on room air. Is given duo nebs and prednisone in the emergency department and states that she is feeling improved. She is referred to us for admission in lieu of her significant shortness of breath, suspect possibly secondary to COPD but has an underlying significant coronary artery disease with planned intervention with CABG.            Review of Systems:  Review of Systems - General ROS: negative for - chills, fever or malaise  Psychological ROS: negative for - anxiety or depression  Ophthalmic ROS: negative for - blurry vision or double vision  Hematological and Lymphatic ROS: negative for - bleeding problems or night sweats  Endocrine ROS: negative for - palpitations, polydipsia/polyuria or temperature intolerance  Respiratory ROS: positive for - orthopnea, shortness of breath and wheezing  negative for - pleuritic pain or sputum changes  Cardiovascular ROS: positive for -exertional dyspnea  negative for - chest pain, edema, irregular heartbeat, loss of consciousness, palpitations or rapid heart rate  Gastrointestinal ROS: no abdominal pain, change in bowel habits, or black or bloody stools  Genito-Urinary ROS: no dysuria, trouble voiding, or hematuria  Musculoskeletal ROS: negative  Neurological ROS: no TIA or stroke symptoms      Past Medical History:   Diagnosis Date    Acute gastrointestinal hemorrhage 8/28/2020    Allergic rhinitis 8/28/2020    Anemia 8/28/2020    Benign essential hypertension 6/25/2020    Body mass index 30.0-30.9, adult 6/25/2020    CAD (coronary artery disease) 8/28/2020    Foot callus 8/28/2020    GERD (gastroesophageal reflux disease) 6/25/2020    Heart murmur 6/25/2020    Hx of colonoscopy 01/01/2015    Done for anemia and rectal bleeding    Left bundle branch block 8/28/2020    Microalbuminuria due to type 2 diabetes mellitus ([facility] 8/28/2020    Mixed hyperlipidemia 6/25/2020    Morbid obesity ([facility] 8/28/2020    Tobacco dependence syndrome 6/25/2020    Type II diabetes mellitus, uncontrolled ([facility] 6/25/2020      Past Surgical History:   Procedure Laterality Date    HX COLONOSCOPY  2015    due in [id number]. Done for anemia and rectal bleeding     Prior to Admission medications    Medication Sig Start Date End Date Taking? Authorizing Provider   lisinopriL (PRINIVIL, ZESTRIL) 20 mg tablet Take 1 Tab by mouth two (2) times a day. 12/11/20   Geraldine Sheets, DO   metFORMIN (GLUCOPHAGE) 500 mg tablet Take 2 tablets twice a day with meals 11/25/20   Manjit Meadows MD   pioglitazone (ACTOS) 45 mg tablet Take 1 Tab by mouth daily for 30 days. 11/25/20 12/25/20  Manjit Meadows MD   atorvastatin (LIPITOR) 40 mg tablet Take 1 Tab by mouth daily for 30 days.  11/25/20 12/25/20  Manjit eMadows MD   lancets misc Use to check glucose once a day 11/25/20   Cat Vincent MD   glucose blood VI test strips (blood glucose test) strip by Does Not Apply route two (2) times a day. Use to check glucose once a day 11/25/20   Cat Vincent MD   potassium chloride (KLOR-CON) 10 mEq tablet By oral route take one tablet Monday, Wednesday and Friday 9/11/20   Avril Mccormick, DO   hydroCHLOROthiazide (HYDRODIURIL) 25 mg tablet Take 1 Tab by mouth daily. 9/10/20   Avril Mccormick, DO   aspirin delayed-release (Aspirin Low Dose) 81 mg tablet Take  by mouth daily. Provider, Historical   amLODIPine (NORVASC) 10 mg tablet Take  by mouth daily. Provider, Historical   ammonium lactate (LAC-HYDRIN) 12 % lotion Apply  to affected area two (2) times a day. rub in to affected area well    Provider, Historical   carvediloL (COREG) 3.125 mg tablet Take  by mouth two (2) times daily (with meals). Provider, Historical   ketoconazole (NIZORAL) 2 % topical cream Apply  to affected area daily. Provider, Historical     Allergies   Allergen Reactions    Aspirin Unknown (comments)     hemorraging - ended up in the hospital      Family History   Problem Relation Age of Onset    Cancer Mother         uterine    Hypertension Father     Cancer Sister         breast        SOCIAL HISTORY:  Patient resides:  Independently x   Assisted Living    SNF    With family care       Smoking history:   None    Former x   Chronic      Alcohol history:   None x   Social    Chronic      Ambulates:   Independently x   w/cane    w/walker    w/wc    CODE STATUS:  DNR    Full x   Other      Objective:     Physical Exam:     Visit Vitals  /75 (BP Patient Position: At rest)   Pulse 96   Temp 98 °F (36.7 °C)   Resp 16   Ht 5' 5\" (1.651 m)   Wt 93.9 kg (207 lb)   SpO2 100%   BMI 34.45 kg/m²      O2 Device: Room air    General:   Alert, cooperative, calm now and in no distress. Head:  Normocephalic, without obvious abnormality, atraumatic.    Eyes:  Conjunctivae/corneas clear.  EOMI   Nose: Nares normal. Septum midline. Mucosa normal. No drainage or sinus tenderness. Throat: Lips, mucosa, and tongue normal. Teeth and gums normal.   Neck:  Oral mucosa is on the dry side   Lungs:    Good air entry, mild expiratory wheeze. Chest wall:  No tenderness or deformity. Heart:  Regular rate and rhythm, S1, S2 normal, no murmur, click, rub or gallop. Abdomen:   Soft, non-tender. Bowel sounds normal. No masses,  No organomegaly. Extremities: Extremities normal, atraumatic, no cyanosis or edema. Pulses: 2+ and symmetric all extremities. Skin: Skin color, texture, turgor normal. No rashes or lesions   Neurologic: CNII-XII intact. No motor or sensory deficits. Data Review:     Recent Days:  Recent Labs     12/13/20  1400   WBC 13.4*   HGB 9.0*   HCT 27.7*   *     Recent Labs     12/13/20  1400   *   K 3.9   CL 86*   CO2 27   *   BUN 11   CREA 0.62   CA 8.9   MG 1.9   PHOS 3.0   ALB 3.6   TBILI 0.6   ALT 25     No results for input(s): PH, PCO2, PO2, HCO3, FIO2 in the last 72 hours.     24 Hour Results:  Recent Results (from the past 24 hour(s))   EKG, 12 LEAD, INITIAL    Collection Time: 12/13/20  1:52 PM   Result Value Ref Range    Ventricular Rate 97 BPM    Atrial Rate 97 BPM    P-R Interval 136 ms    QRS Duration 138 ms    Q-T Interval 384 ms    QTC Calculation (Bezet) 487 ms    Calculated P Axis 52 degrees    Calculated R Axis -57 degrees    Calculated T Axis 104 degrees    Diagnosis       Normal sinus rhythm  Left axis deviation  Left bundle branch block  Abnormal ECG  When compared with ECG of 25-NOV-2019 10:09,  T wave inversion less evident in Lateral leads  Confirmed by Randi Gupta ([id number]) on 12/13/2020 3:40:59 PM     CBC WITH AUTOMATED DIFF    Collection Time: 12/13/20  2:00 PM   Result Value Ref Range    WBC 13.4 (H) 3.6 - 11.0 K/uL    RBC 3.53 (L) 3.80 - 5.20 M/uL    HGB 9.0 (L) 11.5 - 16.0 g/dL    HCT 27.7 (L) 35.0 - 47.0 %    MCV 78.5 (L) 80.0 - 99.0 FL    MCH 25.5 (L) 26.0 - 34.0 PG    MCHC 32.5 30.0 - 36.5 g/dL    RDW 15.4 (H) 11.5 - 14.5 %    PLATELET 810 (H) 618 - 400 K/uL    MPV 8.4 (L) 8.9 - 12.9 FL    NEUTROPHILS 74 32 - 75 %    LYMPHOCYTES 17 12 - 49 %    MONOCYTES 7 5 - 13 %    EOSINOPHILS 1 0 - 7 %    BASOPHILS 0 0 - 1 %    IMMATURE GRANULOCYTES 1 (H) 0.0 - 0.5 %    ABS. NEUTROPHILS 10.1 (H) 1.8 - 8.0 K/UL    ABS. LYMPHOCYTES 2.3 0.8 - 3.5 K/UL    ABS. MONOCYTES 1.0 0.0 - 1.0 K/UL    ABS. EOSINOPHILS 0.1 0.0 - 0.4 K/UL    ABS. BASOPHILS 0.0 0.0 - 0.1 K/UL    ABS. IMM. GRANS. 0.1 (H) 0.00 - 0.04 K/UL    DF AUTOMATED     TROPONIN I    Collection Time: 12/13/20  2:00 PM   Result Value Ref Range    Troponin-I, Qt. <0.05 <9.66 ng/mL   METABOLIC PANEL, COMPREHENSIVE    Collection Time: 12/13/20  2:00 PM   Result Value Ref Range    Sodium 121 (L) 136 - 145 mmol/L    Potassium 3.9 3.5 - 5.1 mmol/L    Chloride 86 (L) 97 - 108 mmol/L    CO2 27 21 - 32 mmol/L    Anion gap 8 5 - 15 mmol/L    Glucose 148 (H) 65 - 100 mg/dL    BUN 11 6 - 20 mg/dL    Creatinine 0.62 0.55 - 1.02 mg/dL    BUN/Creatinine ratio 18 12 - 20      GFR est AA >60 >60 ml/min/1.73m2    GFR est non-AA >60 >60 ml/min/1.73m2    Calcium 8.9 8.5 - 10.1 mg/dL    Bilirubin, total 0.6 0.2 - 1.0 mg/dL    AST (SGOT) 18 15 - 37 U/L    ALT (SGPT) 25 12 - 78 U/L    Alk.  phosphatase 78 45 - 117 U/L    Protein, total 8.2 6.4 - 8.2 g/dL    Albumin 3.6 3.5 - 5.0 g/dL    Globulin 4.6 (H) 2.0 - 4.0 g/dL    A-G Ratio 0.8 (L) 1.1 - 2.2     BNP    Collection Time: 12/13/20  2:00 PM   Result Value Ref Range    NT pro- <125 pg/mL   MAGNESIUM    Collection Time: 12/13/20  2:00 PM   Result Value Ref Range    Magnesium 1.9 1.6 - 2.4 mg/dL   PHOSPHORUS    Collection Time: 12/13/20  2:00 PM   Result Value Ref Range    Phosphorus 3.0 2.6 - 4.7 mg/dL   C REACTIVE PROTEIN, QT    Collection Time: 12/13/20  2:00 PM   Result Value Ref Range    C-Reactive protein <0.29 0.00 - 0.60 mg/dL         Imaging: Assessment/Plan:       --Shortness of breath: She reports a significant cardiac history with planned CABG upcoming following with Dr. Randi Novoa. She has no evidence for fluid overload or decompensated CHF though she has had it in the past.  She was wheezing on presentation, she is an ex-smoker, I suspect that there is an element of COPD as she did improve after prednisone and nebulizers in the ED. We will continue prednisone 40 mg for 5 days, add azithromycin empirically, Covid rapid test is done here in the ED and will follow up on this, D-dimer is requested just pro-Jovon.  CRP incidentally is less than 0.25. Will maintain oxygen as needed to maintain sats above 91%. --HypoNatremia: Sodium is 121. She is on hydrochlorothiazide and has had decreased appetite over the last several weeks she states. Hold hydrochlorothiazide, check urine electrolytes and osmolality. Judicious hydration. Significant cardiac history, does not look fluid overloaded or diluted but her hemoglobin did come down quite a bit from 14.1 in October to 9.0 now. We will follow this closely, check fecal occult blood and check anemia profile,    --Diabetes mellitus type II on oral medications: She is on glimepiride and Metformin, will continue and watch blood sugars before meals at bedtime, SSI coverage with hypoglycemic protocol    --Hypertension: Pressure is controlled right now, will review her home medications and resume appropriate medications. --Coronary artery disease apparently pretty severe as CABG is planned through Dr. Latricia Morris. Will consult Dr. Latricia Morris. Does not look like she is decompensated now, troponin negative, no chest pain. --HLD: cont statin    Vtep: Heparin  Full code  Dispo: IP anticipating 2 midnights, high risk for deterioration. We will consult pulmonology as well as cardiology. Anticipate home with outpatient follow-up self-care. Spent 45 minutes.   Signed By: Alfonzo Kern MD     December 13, 2020

## 2020-12-14 ENCOUNTER — APPOINTMENT (OUTPATIENT)
Dept: NON INVASIVE DIAGNOSTICS | Age: 65
DRG: 191 | End: 2020-12-14
Attending: INTERNAL MEDICINE
Payer: MEDICARE

## 2020-12-14 LAB
ALBUMIN SERPL-MCNC: 3.3 G/DL (ref 3.5–5)
ANION GAP SERPL CALC-SCNC: 10 MMOL/L (ref 5–15)
BASOPHILS # BLD: 0 K/UL (ref 0–0.1)
BASOPHILS NFR BLD: 0 % (ref 0–1)
BUN SERPL-MCNC: 12 MG/DL (ref 6–20)
BUN/CREAT SERPL: 17 (ref 12–20)
CA-I BLD-MCNC: 8.8 MG/DL (ref 8.5–10.1)
CHLORIDE SERPL-SCNC: 89 MMOL/L (ref 97–108)
CO2 SERPL-SCNC: 27 MMOL/L (ref 21–32)
CREAT SERPL-MCNC: 0.71 MG/DL (ref 0.55–1.02)
DIFFERENTIAL METHOD BLD: ABNORMAL
ECHO AV AREA PEAK VELOCITY: 1.04 CM2
ECHO AV AREA VTI: 1.14 CM2
ECHO AV AREA/BSA PEAK VELOCITY: 0.5 CM2/M2
ECHO AV AREA/BSA VTI: 0.6 CM2/M2
ECHO AV MEAN GRADIENT: 25 MMHG
ECHO AV MEAN VELOCITY: 235 CM/S
ECHO AV PEAK GRADIENT: 47 MMHG
ECHO AV VTI: 67.5 CM
ECHO EST RA PRESSURE: 3 MMHG
ECHO LV E' SEPTAL VELOCITY: 5.07 CM/S
ECHO LV EDV A2C: 64.5 CM3
ECHO LV EJECTION FRACTION A2C: 20 %
ECHO LV EJECTION FRACTION BIPLANE: 40.9 % (ref 55–100)
ECHO LV ESV A2C: 33.4 CM3
ECHO LV INTERNAL DIMENSION DIASTOLIC: 4.01 CM (ref 3.9–5.3)
ECHO LV INTERNAL DIMENSION SYSTOLIC: 3.22 CM
ECHO LV IVSD: 1.3 CM (ref 0.6–0.9)
ECHO LV MASS 2D: 195 G (ref 67–162)
ECHO LV MASS INDEX 2D: 97.2 G/M2 (ref 43–95)
ECHO LV POSTERIOR WALL DIASTOLIC: 1.37 CM (ref 0.6–0.9)
ECHO LVOT DIAM: 2 CM
ECHO LVOT PEAK GRADIENT: 5 MMHG
ECHO LVOT SV: 77 CM3
ECHO LVOT VTI: 23 CM
ECHO LVOT VTI: 23.8 CM
ECHO LVOT VTI: 24.6 CM
ECHO LVOT VTI: 27.1 CM
ECHO MV A VELOCITY: 117 CM/S
ECHO MV E DECELERATION TIME (DT): 151 MS
ECHO MV E VELOCITY: 135 CM/S
ECHO MV E/A RATIO: 1.15
ECHO MV E/E' SEPTAL: 26.63
ECHO PV PEAK INSTANTANEOUS GRADIENT SYSTOLIC: 6 MMHG
ECHO PV REGURGITANT MAX VELOCITY: 113 CM/S
ECHO PV REGURGITANT MAX VELOCITY: 122 CM/S
ECHO PV REGURGITANT MAX VELOCITY: 342 CM/S
ECHO PV REGURGITANT MAX VELOCITY: 545 CM/S
ECHO PVEIN A DURATION: 144 MS
ECHO PVEIN A VELOCITY: 48.9 CM/S
ECHO RIGHT VENTRICULAR SYSTOLIC PRESSURE (RVSP): 33 MMHG
ECHO RV INTERNAL DIMENSION: 2.78 CM
ECHO TV MAX VELOCITY: 272 CM/S
ECHO TV REGURGITANT PEAK GRADIENT: 30 MMHG
EOSINOPHIL # BLD: 0 K/UL (ref 0–0.4)
EOSINOPHIL NFR BLD: 0 % (ref 0–7)
ERYTHROCYTE [DISTWIDTH] IN BLOOD BY AUTOMATED COUNT: 15.3 % (ref 11.5–14.5)
GLUCOSE BLD STRIP.AUTO-MCNC: 159 MG/DL (ref 65–100)
GLUCOSE BLD STRIP.AUTO-MCNC: 240 MG/DL (ref 65–100)
GLUCOSE BLD STRIP.AUTO-MCNC: 242 MG/DL (ref 65–100)
GLUCOSE BLD STRIP.AUTO-MCNC: 262 MG/DL (ref 65–100)
GLUCOSE SERPL-MCNC: 197 MG/DL (ref 65–100)
HCT VFR BLD AUTO: 24.7 % (ref 35–47)
HGB BLD-MCNC: 8 G/DL (ref 11.5–16)
IMM GRANULOCYTES # BLD AUTO: 0 K/UL (ref 0–0.04)
IMM GRANULOCYTES NFR BLD AUTO: 0 % (ref 0–0.5)
IRON SATN MFR SERPL: 4 % (ref 20–50)
IRON SATN MFR SERPL: 5 % (ref 20–50)
IRON SERPL-MCNC: 21 UG/DL (ref 35–150)
IRON SERPL-MCNC: 22 UG/DL (ref 35–150)
LVOT MG: 3 MMHG
LYMPHOCYTES # BLD: 1.6 K/UL (ref 0.8–3.5)
LYMPHOCYTES NFR BLD: 17 % (ref 12–49)
MAGNESIUM SERPL-MCNC: 2.2 MG/DL (ref 1.6–2.4)
MCH RBC QN AUTO: 25.5 PG (ref 26–34)
MCHC RBC AUTO-ENTMCNC: 32.4 G/DL (ref 30–36.5)
MCV RBC AUTO: 78.7 FL (ref 80–99)
MONOCYTES # BLD: 0.7 K/UL (ref 0–1)
MONOCYTES NFR BLD: 8 % (ref 5–13)
NEUTS SEG # BLD: 6.8 K/UL (ref 1.8–8)
NEUTS SEG NFR BLD: 75 % (ref 32–75)
PERFORMED BY, TECHID: ABNORMAL
PHOSPHATE SERPL-MCNC: 2.8 MG/DL (ref 2.6–4.7)
PLATELET # BLD AUTO: 489 K/UL (ref 150–400)
PMV BLD AUTO: 8.5 FL (ref 8.9–12.9)
POTASSIUM SERPL-SCNC: 3.5 MMOL/L (ref 3.5–5.1)
RBC # BLD AUTO: 3.14 M/UL (ref 3.8–5.2)
SODIUM SERPL-SCNC: 126 MMOL/L (ref 136–145)
SODIUM UR-SCNC: 14 MMOL/L
TIBC SERPL-MCNC: 473 UG/DL (ref 250–450)
TIBC SERPL-MCNC: 527 UG/DL (ref 250–450)
WBC # BLD AUTO: 9.1 K/UL (ref 3.6–11)

## 2020-12-14 PROCEDURE — 84300 ASSAY OF URINE SODIUM: CPT

## 2020-12-14 PROCEDURE — 74011250637 HC RX REV CODE- 250/637: Performed by: INTERNAL MEDICINE

## 2020-12-14 PROCEDURE — 80069 RENAL FUNCTION PANEL: CPT

## 2020-12-14 PROCEDURE — 85025 COMPLETE CBC W/AUTO DIFF WBC: CPT

## 2020-12-14 PROCEDURE — 83540 ASSAY OF IRON: CPT

## 2020-12-14 PROCEDURE — 83735 ASSAY OF MAGNESIUM: CPT

## 2020-12-14 PROCEDURE — 94760 N-INVAS EAR/PLS OXIMETRY 1: CPT

## 2020-12-14 PROCEDURE — 83935 ASSAY OF URINE OSMOLALITY: CPT

## 2020-12-14 PROCEDURE — 82962 GLUCOSE BLOOD TEST: CPT

## 2020-12-14 PROCEDURE — 93306 TTE W/DOPPLER COMPLETE: CPT

## 2020-12-14 PROCEDURE — 65270000029 HC RM PRIVATE

## 2020-12-14 PROCEDURE — 94640 AIRWAY INHALATION TREATMENT: CPT

## 2020-12-14 PROCEDURE — 74011636637 HC RX REV CODE- 636/637: Performed by: INTERNAL MEDICINE

## 2020-12-14 PROCEDURE — 36415 COLL VENOUS BLD VENIPUNCTURE: CPT

## 2020-12-14 RX ORDER — PIOGLITAZONEHYDROCHLORIDE 15 MG/1
45 TABLET ORAL
Status: DISCONTINUED | OUTPATIENT
Start: 2020-12-15 | End: 2020-12-18 | Stop reason: HOSPADM

## 2020-12-14 RX ORDER — PANTOPRAZOLE SODIUM 40 MG/1
40 TABLET, DELAYED RELEASE ORAL
Status: DISCONTINUED | OUTPATIENT
Start: 2020-12-14 | End: 2020-12-18 | Stop reason: HOSPADM

## 2020-12-14 RX ORDER — METFORMIN HYDROCHLORIDE 500 MG/1
500 TABLET ORAL 2 TIMES DAILY WITH MEALS
Status: DISCONTINUED | OUTPATIENT
Start: 2020-12-15 | End: 2020-12-18 | Stop reason: HOSPADM

## 2020-12-14 RX ORDER — LISINOPRIL 20 MG/1
20 TABLET ORAL 2 TIMES DAILY
Status: DISCONTINUED | OUTPATIENT
Start: 2020-12-14 | End: 2020-12-18 | Stop reason: HOSPADM

## 2020-12-14 RX ADMIN — PREDNISONE 40 MG: 20 TABLET ORAL at 08:38

## 2020-12-14 RX ADMIN — ALBUTEROL SULFATE 2 PUFF: 108 AEROSOL, METERED RESPIRATORY (INHALATION) at 07:49

## 2020-12-14 RX ADMIN — Medication 10 ML: at 21:00

## 2020-12-14 RX ADMIN — ALBUTEROL SULFATE 2 PUFF: 108 AEROSOL, METERED RESPIRATORY (INHALATION) at 19:22

## 2020-12-14 RX ADMIN — INSULIN LISPRO 6 UNITS: 100 INJECTION, SOLUTION INTRAVENOUS; SUBCUTANEOUS at 17:24

## 2020-12-14 RX ADMIN — LISINOPRIL 40 MG: 40 TABLET ORAL at 08:38

## 2020-12-14 RX ADMIN — INSULIN LISPRO 4 UNITS: 100 INJECTION, SOLUTION INTRAVENOUS; SUBCUTANEOUS at 08:38

## 2020-12-14 RX ADMIN — PANTOPRAZOLE SODIUM 40 MG: 40 TABLET, DELAYED RELEASE ORAL at 17:24

## 2020-12-14 RX ADMIN — INSULIN LISPRO 2 UNITS: 100 INJECTION, SOLUTION INTRAVENOUS; SUBCUTANEOUS at 12:11

## 2020-12-14 RX ADMIN — LISINOPRIL 20 MG: 20 TABLET ORAL at 20:50

## 2020-12-14 RX ADMIN — INSULIN LISPRO 4 UNITS: 100 INJECTION, SOLUTION INTRAVENOUS; SUBCUTANEOUS at 20:50

## 2020-12-14 RX ADMIN — BUDESONIDE AND FORMOTEROL FUMARATE DIHYDRATE 2 PUFF: 160; 4.5 AEROSOL RESPIRATORY (INHALATION) at 07:49

## 2020-12-14 RX ADMIN — BUDESONIDE AND FORMOTEROL FUMARATE DIHYDRATE 2 PUFF: 160; 4.5 AEROSOL RESPIRATORY (INHALATION) at 19:22

## 2020-12-14 RX ADMIN — CARVEDILOL 3.12 MG: 3.12 TABLET, FILM COATED ORAL at 08:38

## 2020-12-14 RX ADMIN — CARVEDILOL 3.12 MG: 3.12 TABLET, FILM COATED ORAL at 17:24

## 2020-12-14 RX ADMIN — ALBUTEROL SULFATE 2 PUFF: 108 AEROSOL, METERED RESPIRATORY (INHALATION) at 01:23

## 2020-12-14 RX ADMIN — ALBUTEROL SULFATE 2 PUFF: 108 AEROSOL, METERED RESPIRATORY (INHALATION) at 13:44

## 2020-12-14 NOTE — PROGRESS NOTES
Hospitalist Progress Note               Daily Progress Note: 12/14/2020  75-year-old female presenting with type 2 diabetes, coronary artery disease significant enough to having a CABG and aortic valve replacement planned with Dr. Naif Olsen deferred secondary to Covid pandemic. Her ejection fraction was estimated at 40% in January 2020. No syncope or presyncope. I believe next month presents with 2 weeks of progressively worsening shortness of breath, extreme to the point that she could not lie down and unable to walk to the bathroom due to shortness of breath. No chest pain along the way and states that her shortness of breath feels different. She was also wheezing. She is an ex-smoker quitting in 2014. Despite her significant shortness of breath she still saturating above 98%. Also hyponatremia on presentation with a sodium of 121, not fluid overloaded, no evidence of CHF decompensation. Findings are not suggestive of Covid and CRP is less than 0.29. Her anemia has dropped from her recent visit in October from 13 roughly to 9. She was much better after receiving nebulized albuterol and Atrovent and prednisone in the ED. Judicious hydration, na better to 126. Cardio and pulm following. Subjective: The patient is seen for follow  up. Covid is negative. Better in re to sob, no wheeze. Pulm/cardio appreciated.       Problem List:  Problem List as of 12/14/2020 Date Reviewed: 11/25/2020          Codes Class Noted - Resolved    Hyponatremia ICD-10-CM: E87.1  ICD-9-CM: 276.1  12/13/2020 - Present        COPD exacerbation ([facility] ICD-10-CM: J44.1  ICD-9-CM: 491.21  12/13/2020 - Present        Acute gastrointestinal hemorrhage ICD-10-CM: K92.2  ICD-9-CM: 578.9  8/28/2020 - Present        Allergic rhinitis ICD-10-CM: J30.9  ICD-9-CM: 477.9  8/28/2020 - Present        Anemia ICD-10-CM: D64.9  ICD-9-CM: 285.9  8/28/2020 - Present        CAD (coronary artery disease) ICD-10-CM: I25.10  ICD-9-CM: 414.00 8/28/2020 - Present        Foot callus ICD-10-CM: L84  ICD-9-CM: 700  8/28/2020 - Present        Left bundle branch block ICD-10-CM: I44.7  ICD-9-CM: 426.3  8/28/2020 - Present        Microalbuminuria due to type 2 diabetes mellitus ([facility] ICD-10-CM: E11.29, R80.9  ICD-9-CM: 250.40, 791.0  8/28/2020 - Present        Morbid obesity ([facility] ICD-10-CM: E66.01  ICD-9-CM: 278.01  8/28/2020 - Present        Benign essential hypertension ICD-10-CM: I10  ICD-9-CM: 401.1  6/25/2020 - Present        Body mass index 30.0-30.9, adult ICD-10-CM: Z68.30  ICD-9-CM: V85.30  6/25/2020 - Present        Type II diabetes mellitus, uncontrolled ([place] [address].) ICD-10-CM: E11.65  ICD-9-CM: 250.02  6/25/2020 - Present        GERD (gastroesophageal reflux disease) ICD-10-CM: K21.9  ICD-9-CM: 530.81  6/25/2020 - Present        Heart murmur ICD-10-CM: R01.1  ICD-9-CM: 785.2  6/25/2020 - Present        Mixed hyperlipidemia ICD-10-CM: E78.2  ICD-9-CM: 272.2  6/25/2020 - Present        Tobacco dependence syndrome ICD-10-CM: F17.200  ICD-9-CM: 305.1  6/25/2020 - Present              Medications reviewed  Current Facility-Administered Medications   Medication Dose Route Frequency    sodium chloride (NS) flush 5-40 mL  5-40 mL IntraVENous Q8H    sodium chloride (NS) flush 5-40 mL  5-40 mL IntraVENous PRN    acetaminophen (TYLENOL) tablet 650 mg  650 mg Oral Q6H PRN    Or    acetaminophen (TYLENOL) suppository 650 mg  650 mg Rectal Q6H PRN    polyethylene glycol (MIRALAX) packet 17 g  17 g Oral DAILY PRN    promethazine (PHENERGAN) tablet 12.5 mg  12.5 mg Oral Q6H PRN    Or    ondansetron (ZOFRAN) injection 4 mg  4 mg IntraVENous Q6H PRN    predniSONE (DELTASONE) tablet 40 mg  40 mg Oral DAILY WITH BREAKFAST    insulin lispro (HUMALOG) injection   SubCUTAneous AC&HS    glucose chewable tablet 16 g  4 Tab Oral PRN    glucagon (GLUCAGEN) injection 1 mg  1 mg IntraMUSCular PRN    dextrose (D50W) injection syrg 12.5-25 g  25-50 mL IntraVENous PRN    0.9% sodium chloride infusion  50 mL/hr IntraVENous CONTINUOUS    albuterol (PROVENTIL HFA, VENTOLIN HFA, PROAIR HFA) inhaler 2 Puff  2 Puff Inhalation Q6H RT    budesonide-formoteroL (SYMBICORT) 160-4.5 mcg/actuation HFA inhaler 2 Puff  2 Puff Inhalation BID RT    hydroCHLOROthiazide (HYDRODIURIL) tablet 25 mg  25 mg Oral DAILY    lisinopriL (PRINIVIL, ZESTRIL) tablet 40 mg  40 mg Oral BID    atorvastatin (LIPITOR) tablet 40 mg  40 mg Oral DAILY    carvediloL (COREG) tablet 3.125 mg  3.125 mg Oral BID WITH MEALS       Review of Systems:   Review of Systems   Constitutional: Positive for malaise/fatigue. Negative for chills and fever. HENT: Negative. Eyes: Negative. Respiratory: Negative for cough and shortness of breath. Cardiovascular: Negative for chest pain. Gastrointestinal: Negative for abdominal pain, diarrhea, heartburn and nausea. Genitourinary: Negative. Musculoskeletal: Negative. Skin: Negative. Endo/Heme/Allergies: Negative. Objective:   Physical Exam:     Visit Vitals  /68 (BP 1 Location: Left arm, BP Patient Position: At rest)   Pulse (!) 101   Temp 97.7 °F (36.5 °C)   Resp 18   Ht 5' 5\" (1.651 m)   Wt 93.9 kg (207 lb)   SpO2 98%   Breastfeeding No   BMI 34.45 kg/m²      O2 Device: Room air    Temp (24hrs), Av.9 °F (36.6 °C), Min:97.5 °F (36.4 °C), Max:98.2 °F (36.8 °C)    No intake/output data recorded. No intake/output data recorded. General:  Alert, cooperative, no distress, appears stated age. RA   Lungs:   Prolonged exp phase, few scattered rhonci   Chest wall:  No tenderness or deformity. Heart:  Regular rate and rhythm, S1, S2 normal, no murmur, click, rub or gallop. no le edema   Abdomen:   Soft, non-tender. Bowel sounds normal. No masses,  No organomegaly. Extremities: Extremities normal, atraumatic, no cyanosis or edema. Pulses: 2+ and symmetric all extremities.    Skin: Skin color, texture, turgor normal. No rashes or lesions Neurologic: CNII-XII intact. No gross sensory or motor deficits     Data Review:       Recent Days:  Recent Labs     12/14/20  0505 12/13/20  1400   WBC 9.1 13.4*   HGB 8.0* 9.0*   HCT 24.7* 27.7*   * 519*     Recent Labs     12/14/20  0505 12/13/20  1400   * 121*   K 3.5 3.9   CL 89* 86*   CO2 27 27   * 148*   BUN 12 11   CREA 0.71 0.62   CA 8.8 8.9   MG 2.2 1.9   PHOS 2.8 3.0   ALB 3.3* 3.6   TBILI  --  0.6   ALT  --  25     No results for input(s): PH, PCO2, PO2, HCO3, FIO2 in the last 72 hours. 24 Hour Results:  Recent Results (from the past 24 hour(s))   EKG, 12 LEAD, INITIAL    Collection Time: 12/13/20  1:52 PM   Result Value Ref Range    Ventricular Rate 97 BPM    Atrial Rate 97 BPM    P-R Interval 136 ms    QRS Duration 138 ms    Q-T Interval 384 ms    QTC Calculation (Bezet) 487 ms    Calculated P Axis 52 degrees    Calculated R Axis -57 degrees    Calculated T Axis 104 degrees    Diagnosis       Normal sinus rhythm  Left axis deviation  Left bundle branch block  Abnormal ECG  When compared with ECG of 25-NOV-2019 10:09,  T wave inversion less evident in Lateral leads  Confirmed by Ramona Bender ([id number]) on 12/13/2020 3:40:59 PM     CBC WITH AUTOMATED DIFF    Collection Time: 12/13/20  2:00 PM   Result Value Ref Range    WBC 13.4 (H) 3.6 - 11.0 K/uL    RBC 3.53 (L) 3.80 - 5.20 M/uL    HGB 9.0 (L) 11.5 - 16.0 g/dL    HCT 27.7 (L) 35.0 - 47.0 %    MCV 78.5 (L) 80.0 - 99.0 FL    MCH 25.5 (L) 26.0 - 34.0 PG    MCHC 32.5 30.0 - 36.5 g/dL    RDW 15.4 (H) 11.5 - 14.5 %    PLATELET 340 (H) 509 - 400 K/uL    MPV 8.4 (L) 8.9 - 12.9 FL    NEUTROPHILS 74 32 - 75 %    LYMPHOCYTES 17 12 - 49 %    MONOCYTES 7 5 - 13 %    EOSINOPHILS 1 0 - 7 %    BASOPHILS 0 0 - 1 %    IMMATURE GRANULOCYTES 1 (H) 0.0 - 0.5 %    ABS. NEUTROPHILS 10.1 (H) 1.8 - 8.0 K/UL    ABS. LYMPHOCYTES 2.3 0.8 - 3.5 K/UL    ABS. MONOCYTES 1.0 0.0 - 1.0 K/UL    ABS. EOSINOPHILS 0.1 0.0 - 0.4 K/UL    ABS.  BASOPHILS 0.0 0.0 - 0.1 K/UL    ABS. IMM. GRANS. 0.1 (H) 0.00 - 0.04 K/UL    DF AUTOMATED     TROPONIN I    Collection Time: 12/13/20  2:00 PM   Result Value Ref Range    Troponin-I, Qt. <0.05 <9.93 ng/mL   METABOLIC PANEL, COMPREHENSIVE    Collection Time: 12/13/20  2:00 PM   Result Value Ref Range    Sodium 121 (L) 136 - 145 mmol/L    Potassium 3.9 3.5 - 5.1 mmol/L    Chloride 86 (L) 97 - 108 mmol/L    CO2 27 21 - 32 mmol/L    Anion gap 8 5 - 15 mmol/L    Glucose 148 (H) 65 - 100 mg/dL    BUN 11 6 - 20 mg/dL    Creatinine 0.62 0.55 - 1.02 mg/dL    BUN/Creatinine ratio 18 12 - 20      GFR est AA >60 >60 ml/min/1.73m2    GFR est non-AA >60 >60 ml/min/1.73m2    Calcium 8.9 8.5 - 10.1 mg/dL    Bilirubin, total 0.6 0.2 - 1.0 mg/dL    AST (SGOT) 18 15 - 37 U/L    ALT (SGPT) 25 12 - 78 U/L    Alk.  phosphatase 78 45 - 117 U/L    Protein, total 8.2 6.4 - 8.2 g/dL    Albumin 3.6 3.5 - 5.0 g/dL    Globulin 4.6 (H) 2.0 - 4.0 g/dL    A-G Ratio 0.8 (L) 1.1 - 2.2     BNP    Collection Time: 12/13/20  2:00 PM   Result Value Ref Range    NT pro- <125 pg/mL   MAGNESIUM    Collection Time: 12/13/20  2:00 PM   Result Value Ref Range    Magnesium 1.9 1.6 - 2.4 mg/dL   PHOSPHORUS    Collection Time: 12/13/20  2:00 PM   Result Value Ref Range    Phosphorus 3.0 2.6 - 4.7 mg/dL   C REACTIVE PROTEIN, QT    Collection Time: 12/13/20  2:00 PM   Result Value Ref Range    C-Reactive protein <0.29 0.00 - 0.60 mg/dL   PROCALCITONIN    Collection Time: 12/13/20  3:45 PM   Result Value Ref Range    Procalcitonin <0.05 (H) 0 ng/mL   D DIMER    Collection Time: 12/13/20  3:45 PM   Result Value Ref Range    D DIMER 0.50 (H) <0.50 ug/ml(FEU)   SARS-COV-2    Collection Time: 12/13/20  4:20 PM   Result Value Ref Range    SARS-CoV-2 Please find results under separate order     COVID-19 RAPID TEST    Collection Time: 12/13/20  4:20 PM   Result Value Ref Range    Specimen source Nasopharyngeal      COVID-19 rapid test Negative (A) Not Detected     TROPONIN I    Collection Time: 12/13/20  9:26 PM   Result Value Ref Range    Troponin-I, Qt. <0.05 <0.05 ng/mL   MAGNESIUM    Collection Time: 12/14/20  5:05 AM   Result Value Ref Range    Magnesium 2.2 1.6 - 2.4 mg/dL   RENAL FUNCTION PANEL    Collection Time: 12/14/20  5:05 AM   Result Value Ref Range    Sodium 126 (L) 136 - 145 mmol/L    Potassium 3.5 3.5 - 5.1 mmol/L    Chloride 89 (L) 97 - 108 mmol/L    CO2 27 21 - 32 mmol/L    Anion gap 10 5 - 15 mmol/L    Glucose 197 (H) 65 - 100 mg/dL    BUN 12 6 - 20 mg/dL    Creatinine 0.71 0.55 - 1.02 mg/dL    BUN/Creatinine ratio 17 12 - 20      GFR est AA >60 >60 ml/min/1.73m2    GFR est non-AA >60 >60 ml/min/1.73m2    Calcium 8.8 8.5 - 10.1 mg/dL    Phosphorus 2.8 2.6 - 4.7 mg/dL    Albumin 3.3 (L) 3.5 - 5.0 g/dL   CBC WITH AUTOMATED DIFF    Collection Time: 12/14/20  5:05 AM   Result Value Ref Range    WBC 9.1 3.6 - 11.0 K/uL    RBC 3.14 (L) 3.80 - 5.20 M/uL    HGB 8.0 (L) 11.5 - 16.0 g/dL    HCT 24.7 (L) 35.0 - 47.0 %    MCV 78.7 (L) 80.0 - 99.0 FL    MCH 25.5 (L) 26.0 - 34.0 PG    MCHC 32.4 30.0 - 36.5 g/dL    RDW 15.3 (H) 11.5 - 14.5 %    PLATELET 592 (H) 022 - 400 K/uL    MPV 8.5 (L) 8.9 - 12.9 FL    NEUTROPHILS 75 32 - 75 %    LYMPHOCYTES 17 12 - 49 %    MONOCYTES 8 5 - 13 %    EOSINOPHILS 0 0 - 7 %    BASOPHILS 0 0 - 1 %    IMMATURE GRANULOCYTES 0 0.0 - 0.5 %    ABS. NEUTROPHILS 6.8 1.8 - 8.0 K/UL    ABS. LYMPHOCYTES 1.6 0.8 - 3.5 K/UL    ABS. MONOCYTES 0.7 0.0 - 1.0 K/UL    ABS. EOSINOPHILS 0.0 0.0 - 0.4 K/UL    ABS. BASOPHILS 0.0 0.0 - 0.1 K/UL    ABS. IMM.  GRANS. 0.0 0.00 - 0.04 K/UL    DF AUTOMATED     GLUCOSE, POC    Collection Time: 12/14/20  8:04 AM   Result Value Ref Range    Glucose (POC) 240 (H) 65 - 100 mg/dL    Performed by WESLY JESUS            Assessment/     Patient Active Problem List   Diagnosis Code    Benign essential hypertension I10    Body mass index 30.0-30.9, adult Z68.30    Type II diabetes mellitus, uncontrolled (HCC) E11.65    GERD (gastroesophageal reflux disease) K21.9    Heart murmur R01.1    Mixed hyperlipidemia E78.2    Tobacco dependence syndrome F17.200    Acute gastrointestinal hemorrhage K92.2    Allergic rhinitis J30.9    Anemia D64.9    CAD (coronary artery disease) I25.10    Foot callus L84    Left bundle branch block I44.7    Microalbuminuria due to type 2 diabetes mellitus ([facility]) E11.29, R80.9    Morbid obesity ([facility]) E66.01    Hyponatremia E87.1    COPD exacerbation ([facility]) J44.1           --Apparent COPD exacerbation: No prior diagnosis, remote smoker quit in 2014. DuoNeb and prednisone in the ED, with improvement subsequently, maintaining adequate O2 saturation on room air. No evidence of volume overload or CHF decompensation with a history of cardiomyopathy. Perhaps anemia, is contributing. In October of this year her hemoglobin was 18.1, 9 on presentation here and 8 this morning. Cardio + pulm appreciated      --Hyponatremia, sodium 121, has improved to 126 with gentle hydration. She is on hydrochlorothiazide suspect this contributes as well as a 2 to 3-week history of very diminished p.o. intake. dc hydrochlorothiazide. Checking urine electrolytes and osmolality. She does not look fluid overloaded or decompensated in regards to her cardiomyopathy with an EF of 40%. --Diabetes mellitus type 2 on oral medications: She is on actos and Metformin, continued. SSI and hypoglycemic protocol, diabetic diet. --Hypertension: Pressure has remained adequate, resumed home medications holding hydrochlorothiazide and loop 2/2 hyponatremia. Coreg is resumed at 3.125 mg twice daily, lisinopril resumed but decreasing to 20 mg twice daily and lieu of SBP of 114, wanted to keep it above 120 in lieu of her aortic stenosis. --Coronary artery disease and moderate aortic stenosis, following with Dr. Andrew Perry, diagnosed earlier this year, deferred secondary to Covid pandemic. Tentatively scheduled for January.   Cardiology follow-up appreciated. --Anemia: Hemoglobin 14 in October of this year, is dropped to 9 on presentation and today is 8. Anemia panel and FOBT are pending. VTEP: Mechanical in lieu of her drop in hemoglobin  GI prophylaxis Pepcid  Full CODE STATUS  Disposition: Respiratory status is improved, sodium slowly improving, following hemoglobin, clinically she seems in proving and anticipate home within a couple of days with outpatient follow-up, self-care. Can prob go home 1-2 days. Care Plan discussed with: Patient/Family and Nurse    Total time spent with patient: 30 minutes.     Danielle Jose MD

## 2020-12-14 NOTE — PROGRESS NOTES
Pulmonary, Critical Care Note    Name: Luba Fernando MRN: [id number]   : 1955 Hospital: [address] [place] [address]   Date: 2020  Admission date: 2020 Hospital Day: 2       Subjective/Interval History:   Patient seen in the emergency room. Consult has been placed for shortness of breath and wheezing. States that several years ago her doctor put her on an inhaler but she does not remember what it was in did not continue it. She has a known history of heart disease and is scheduled to have nonemergent coronary artery bypass grafting sometime soon. Over the last 1 to 2 weeks she has had progressively worsening shortness of breath initially it was with exertion then it continued to worsen to the point of being at rest sitting up or laying down. She does have a history of reflux but states it is controlled on Pepcid. She was a smoker but only for about 30 years of about three fourths a pack per day having stopped 6 years ago. She denies any history of childhood asthma has never been told she has COPD. Hospital Problems  Date Reviewed: 2020          Codes Class Noted POA    Hyponatremia ICD-10-CM: E87.1  ICD-9-CM: 276.1  2020 Unknown        COPD exacerbation ([facility] ICD-10-CM: J44.1  ICD-9-CM: 491.21  2020 Unknown        Anemia ICD-10-CM: D64.9  ICD-9-CM: 285.9  2020 Unknown              IMPRESSION:   1. Dyspnea likely due to COPD  2. Exacerbation COPD has cleared with prednisone and nebulized albuterol Atrovent  3. Coronary artery disease with questionable scheduling for bypass surgery in the near future  4. Severe hyponatremia  5. Anemia Marked decrease in hemoglobin compared with October  Body mass index is 34.45 kg/m². RECOMMENDATIONS/PLAN:   1. Covid testing has been ordered so will be unable to give nebulized bronchodilators. Will use inhaled albuterol and Symbicort  2. Agree with your prednisone  3.  We will repeat hemoglobin in a.m.  4. We will give IV saline overnight and repeat sodium in a.m. [] High complexity decision making was performed  [x] See my orders for details      Subjective/Initial History:     I was asked by Tj Jaquez MD to see Sung Hope  a 77 y.o.    female in consultation for a chief complaint of dyspnea      Allergies   Allergen Reactions    Aspirin Unknown (comments)     hemorraging - ended up in the hospital        [facility] reviewed and pertinent medications noted or modified as needed     Current Facility-Administered Medications   Medication    sodium chloride (NS) flush 5-40 mL    sodium chloride (NS) flush 5-40 mL    acetaminophen (TYLENOL) tablet 650 mg    Or    acetaminophen (TYLENOL) suppository 650 mg    polyethylene glycol (MIRALAX) packet 17 g    promethazine (PHENERGAN) tablet 12.5 mg    Or    ondansetron (ZOFRAN) injection 4 mg    predniSONE (DELTASONE) tablet 40 mg    insulin lispro (HUMALOG) injection    glucose chewable tablet 16 g    glucagon (GLUCAGEN) injection 1 mg    dextrose (D50W) injection syrg 12.5-25 g    0.9% sodium chloride infusion    albuterol (PROVENTIL HFA, VENTOLIN HFA, PROAIR HFA) inhaler 2 Puff    budesonide-formoteroL (SYMBICORT) 160-4.5 mcg/actuation HFA inhaler 2 Puff    hydroCHLOROthiazide (HYDRODIURIL) tablet 25 mg    lisinopriL (PRINIVIL, ZESTRIL) tablet 40 mg    atorvastatin (LIPITOR) tablet 40 mg    carvediloL (COREG) tablet 3.125 mg      Patient PCP: Naheed Paul DO  PMH:  has a past medical history of Acute gastrointestinal hemorrhage (8/28/2020), Allergic rhinitis (8/28/2020), Anemia (8/28/2020), Benign essential hypertension (6/25/2020), Body mass index 30.0-30.9, adult (6/25/2020), CAD (coronary artery disease) (8/28/2020), Foot callus (8/28/2020), GERD (gastroesophageal reflux disease) (6/25/2020), Heart murmur (6/25/2020), colonoscopy (01/01/2015), Left bundle branch block (8/28/2020), Microalbuminuria due to type 2 diabetes mellitus ([facility] (2020), Mixed hyperlipidemia (2020), Morbid obesity ([facility] (2020), Tobacco dependence syndrome (2020), and Type II diabetes mellitus, uncontrolled ([facility] (2020). PSH:   has a past surgical history that includes hx colonoscopy (). FHX: family history includes Cancer in her mother and sister; Hypertension in her father. SHX:  reports that she quit smoking about 6 years ago. She smoked 0.50 packs per day. She has never used smokeless tobacco. She reports previous alcohol use. She reports that she does not use drugs. Systemic review:  General weight stable no chronic fever chills or sweats  Eyes no double vision or momentary blindness  ENT no facial pain over the sinuses  Endocrinologic she has diabetes denies polyuria polydipsia  Musculoskeletal no swollen tender joints  Neurologic no seizures or syncope  Gastrointestinal she has a history of reflux takes Pepcid over-the-counter 10 mg once a day and states she is asymptomatic does not have any sour bitter taste waking her up at night  Genitourinary no pain or discomfort on urination no bleeding  Cardiovascular has a history of heart diseases being scheduled for bypass surgery sometime in the near future. Denies any ankle edema or chest pain no diaphoresis  Respiratory with worsening shortness of breath particularly with exertion but has progressed to being at rest.  She states she was wheezing could not talk in full sentences.   Has received nebulized bronchodilator and prednisone IV and states she feels so much better    Objective:     Vital Signs: Telemetry:    normal sinus rhythm Intake/Output:   Visit Vitals  /68 (BP 1 Location: Left arm, BP Patient Position: At rest)   Pulse (!) 101   Temp 97.7 °F (36.5 °C)   Resp 18   Ht 5' 5\" (1.651 m)   Wt 93.9 kg (207 lb)   SpO2 98%   Breastfeeding No   BMI 34.45 kg/m²       Temp (24hrs), Av.9 °F (36.6 °C), Min:97.5 °F (36.4 °C), Max:98.2 °F (36.8 °C)        O2 Device: Room air         Wt Readings from Last 4 Encounters:   12/13/20 93.9 kg (207 lb)   11/25/20 94 kg (207 lb 4.8 oz)   10/07/20 93 kg (205 lb)   08/28/20 89.3 kg (196 lb 14.4 oz)        No intake or output data in the 24 hours ending 12/14/20 1016    Last shift:      No intake/output data recorded. Last 3 shifts: No intake/output data recorded. Physical Exam:   General:  female; currently on room air in no distress oxygen saturation 98%  HEENT: NCAT, oral mucosa clear  Eyes: anicteric; conjunctiva clear extraocular movements normal  Neck: no nodes, no JVD no accessory muscle usage  Chest: no deformity,  Cardiac: Regular rate and rhythm no murmur no ankle edema  Lungs: Prolongation of exhalation and coarsening of exhalation but no definite wheezes there may be a few rales in the bases  Abd: Soft normal bowel sounds no organomegaly no tenderness  Ext: no edema; no joint swelling; No clubbing  : clear urine  Neuro: Awake alert oriented speech is clear moves all 4 extremities  Psych- no agitation, oriented to person;   Skin: warm, dry, no cyanosis;   Pulses: Brachial radial femoral pulses all intact  Capillary: Normal capillary refill    Labs:    Recent Labs     12/14/20  0505 12/13/20  1400   WBC 9.1 13.4*   HGB 8.0* 9.0*   * 519*   10/13/2020 hemoglobin 14.1  Recent Labs     12/14/20  0505 12/13/20  1400   * 121*   K 3.5 3.9   CL 89* 86*   CO2 27 27   * 148*   BUN 12 11   CREA 0.71 0.62   CA 8.8 8.9   MG 2.2 1.9   PHOS 2.8 3.0   ALB 3.3* 3.6   ALT  --  25     Room air oxygen saturation 98%  Recent Labs     12/13/20  2126 12/13/20  1400   TROIQ <0.05 <0.05     Procalcitonin less than 0.05    CRP less than 0.29  Lab Results   Component Value Date/Time    TSH, External 2.420 01/16/2019       Imaging:    CXR Results  (Last 48 hours)               12/13/20 1418  XR CHEST SNGL V Final result    Impression:  IMPRESSION: No acute pulmonary or pleural disease.        Narrative: Chest, AP upright portable, 1418 hours. Comparison with two-view exam from   11/25/2019. Cardiopericardial silhouette top normal in size. Central pulmonary   vessels appear borderline enlarged, stable. Calcification and tortuosity of   thoracic aorta. There is nonspecific central peribronchial cuffing. No evidence   of pulmonary edema, air space pneumonia, or pleural effusion. No evidence of   pneumothorax. Results from [place] encounter on 12/13/20   XR CHEST SNGL V    Narrative Chest, AP upright portable, 1418 hours. Comparison with two-view exam from  11/25/2019. Cardiopericardial silhouette top normal in size. Central pulmonary  vessels appear borderline enlarged, stable. Calcification and tortuosity of  thoracic aorta. There is nonspecific central peribronchial cuffing. No evidence  of pulmonary edema, air space pneumonia, or pleural effusion. No evidence of  pneumothorax. Impression IMPRESSION: No acute pulmonary or pleural disease. · Discussion worsening dyspnea shortness of breath with marked improvement after 3 doses of nebulized albuterol Atrovent and 160 mg prednisone tablet. Most likely has underlying COPD/asthma with marked improvement after bronchodilators. Will place on albuterol and Symbicort inhalers. Prednisone 40 mg has been ordered for tomorrow. Covid testing is being underway. I see nothing on the chest x-ray that would make me worried about Covid. CRP is less than 0.29. Her sodium is markedly low.   Her hemoglobin is also markedly diminished compared with October and may be playing a role in her shortness of breath    Edgardo Chaudhary MD

## 2020-12-14 NOTE — PROGRESS NOTES
Bedside and Verbal shift change report given to Melody Smith RN (oncoming nurse) by Shell Morris (offgoing nurse). Report included the following information SBAR, ED Summary, Intake/Output, MAR, Recent Results and Alarm Parameters .

## 2020-12-14 NOTE — PROGRESS NOTES
Reason for Admission:   Abdominal Pain RUR: %15 Plan for utilizing home health:      None at this time. PCP: First and Last name:  Richard Alexander Name of Practice:  
 Are you a current patient: Yes/No:  yes Approximate date of last visit:  Nov. 
 Can you participate in a virtual visit with your PCP:  
                 
Current Advanced Directive/Advance Care Plan: Patient states she does not have an advanced directive. NOK is Karolyn Castaneda [phone] Transition of Care Plan:                   
Patient lives at home with a roommate. Does not drive, uses a cane at home. Patient plans on going home with no services at this time.

## 2020-12-14 NOTE — PROGRESS NOTES
Progress Note      12/14/2020 3:35 PM  NAME: Sung Hope   MRN:  [id number]   Admit Diagnosis: COPD exacerbation ([facility] [address] [J44.1]  Hyponatremia [E87.1]  Anemia [D64.9]      Problem List:     1. Dyspnea  2. Coronary artery disease, left main and three-vessel  3. Aortic stenosis  4. Left ventricular systolic dysfunction   5.anemia     Assessment/Plan:     1. She has significant coronary artery disease with moderate aortic stenosis. Is awaiting to have cardiac surgery. I will get an appointment with the cardiac surgeon for follow-up. 2. Protonix is added due to drop in hemoglobin  3. Echocardiogram shows moderate left ventricular systolic dysfunction with ejection fraction 40% and moderate aortic stenosis. []       High complexity decision making was performed in this patient at high risk for decompensation with multiple organ involvement. Subjective: Sung Life denies chest pain, dyspnea. Discussed with RN events overnight. Review of Systems:    Symptom Y/N Comments  Symptom Y/N Comments   Fever/Chills N   Chest Pain N    Poor Appetite N   Edema N    Cough N   Abdominal Pain N    Sputum N   Joint Pain N    SOB/ACEVEDO N   Pruritis/Rash N    Nausea/vomit N   Tolerating PT/OT Y    Diarrhea N   Tolerating Diet Y    Constipation N   Other       Could NOT obtain due to:      Objective:      Physical Exam:    Last 24hrs VS reviewed since prior progress note. Most recent are:    Visit Vitals  /68 (BP 1 Location: Left arm, BP Patient Position: At rest)   Pulse (!) 101   Temp 97.7 °F (36.5 °C)   Resp 18   Ht 5' 5\" (1.651 m)   Wt 93.9 kg (207 lb)   SpO2 98%   Breastfeeding No   BMI 34.45 kg/m²     No intake or output data in the 24 hours ending 12/14/20 1633     General Appearance: Well developed, well nourished, alert & oriented x 3,    no acute distress. Ears/Nose/Mouth/Throat: Hearing grossly normal.  Neck: Supple. Chest: Lungs clear to auscultation bilaterally.   Cardiovascular: Regular rate and rhythm, S1S2 normal, grade 2 x 6 ejection systolic murmur is heard in the aortic region. Abdomen: Soft, non-tender, bowel sounds are active. Extremities: No edema bilaterally. Skin: Warm and dry. []         Post-cath site without hematoma, bruit, tenderness, or thrill. Distal pulses intact. PMH/ reviewed - no change compared to H&P    Data Review    Telemetry: normal sinus rhythm     EKG:   []  No new EKG for review    Lab Data Personally Reviewed:    Recent Labs     12/14/20  0505 12/13/20  1400   WBC 9.1 13.4*   HGB 8.0* 9.0*   HCT 24.7* 27.7*   * 519*     No results for input(s): INR, PTP, APTT, INREXT in the last 72 hours. Recent Labs     12/14/20  0505 12/13/20  1400   * 121*   K 3.5 3.9   CL 89* 86*   CO2 27 27   BUN 12 11   CREA 0.71 0.62   * 148*   CA 8.8 8.9   MG 2.2 1.9     Recent Labs     12/13/20  2126 12/13/20  1400   TROIQ <0.05 <0.05     Lab Results   Component Value Date/Time    Cholesterol, total 123 10/13/2020 10:58 AM    HDL Cholesterol 54 10/13/2020 10:58 AM    LDL Chol Calc (NIH) 55 10/13/2020 10:58 AM    Triglyceride 65 10/13/2020 10:58 AM       Recent Labs     12/14/20  0505 12/13/20  1400   AP  --  78   TP  --  8.2   ALB 3.3* 3.6   GLOB  --  4.6*     No results for input(s): PH, PCO2, PO2 in the last 72 hours.     Medications Personally Reviewed:    Current Facility-Administered Medications   Medication Dose Route Frequency    lisinopriL (PRINIVIL, ZESTRIL) tablet 20 mg  20 mg Oral BID    pantoprazole (PROTONIX) tablet 40 mg  40 mg Oral ACB    sodium chloride (NS) flush 5-40 mL  5-40 mL IntraVENous Q8H    sodium chloride (NS) flush 5-40 mL  5-40 mL IntraVENous PRN    acetaminophen (TYLENOL) tablet 650 mg  650 mg Oral Q6H PRN    Or    acetaminophen (TYLENOL) suppository 650 mg  650 mg Rectal Q6H PRN    polyethylene glycol (MIRALAX) packet 17 g  17 g Oral DAILY PRN    promethazine (PHENERGAN) tablet 12.5 mg  12.5 mg Oral Q6H PRN    Or  ondansetron (ZOFRAN) injection 4 mg  4 mg IntraVENous Q6H PRN    predniSONE (DELTASONE) tablet 40 mg  40 mg Oral DAILY WITH BREAKFAST    insulin lispro (HUMALOG) injection   SubCUTAneous AC&HS    glucose chewable tablet 16 g  4 Tab Oral PRN    glucagon (GLUCAGEN) injection 1 mg  1 mg IntraMUSCular PRN    dextrose (D50W) injection syrg 12.5-25 g  25-50 mL IntraVENous PRN    0.9% sodium chloride infusion  50 mL/hr IntraVENous CONTINUOUS    albuterol (PROVENTIL HFA, VENTOLIN HFA, PROAIR HFA) inhaler 2 Puff  2 Puff Inhalation Q6H RT    budesonide-formoteroL (SYMBICORT) 160-4.5 mcg/actuation HFA inhaler 2 Puff  2 Puff Inhalation BID RT    hydroCHLOROthiazide (HYDRODIURIL) tablet 25 mg  25 mg Oral DAILY    atorvastatin (LIPITOR) tablet 40 mg  40 mg Oral DAILY    carvediloL (COREG) tablet 3.125 mg  3.125 mg Oral BID WITH MEALS   Serum sodium is gradually going up she has moderate left ventricular systolic dysfunction. Lower dose of diuretic is planned after sodium improves.     Zoila Ramirez MD

## 2020-12-14 NOTE — CONSULTS
This is an inpatient cardiology consultation requested by Dr. Alhaji Claros for dyspnea at rest and known chronic systolic heart failure / CAD. CHIEF COMPLAINT:  Shortness of breath at rest    HISTORY OF PRESENT ILLNESS  47-year-old woman followed by Nena Crowell. Has history of coronary atherosclerosis and per patient was found to have numerous lesions at time of cardiac catheterization about a year ago. I was able to obtain records from our office and indeed she has critical left main and ostial left circumflex stenosis. She has concomitant issue of moderate aortic stenosis. she was actually scheduled for cardiothoracic surgery (coronary artery bypass grafting with aortic valve replacement) however this was deferred owing to the COVID-19 viral pandemic. Per discussion between patient and her usual cardiologist discussion was to have CABG surgery in January 2021. On today on interview she states that for the last week she has been more short of breath than usual.  She notes no lower extremity edema. She notes no chest pain on my interview. She states that she has wheeze. She denies any more salt than usual.  She states that she does have history of chronic systolic heart failure. She personally thinks that her symptoms are more consistent with \"lung problem\"    In regards to her diabetes she notes no polyuria polydipsia. Diabetes is known to be controlled    In regards to her chronic systolic heart failure echocardiogram in the office dated 1/24/2020 demonstrated LVEF of 40%    In regards to her aortic stenosis she denies syncope or presyncope. Calculated aortic valve area on last echocardiogram was 1.26 cm² with dimensionless index of 0.40 all consistent with moderate stenosis. REVIEW OF SYSTEMS  All other systems reviewed and are negative except for the pertinent positives as noted in the HPI.       PAST MEDICAL HISTORY  Chronic systolic heart failure  COPD  Diabetes  CAD  Aortic valve stenosis, moderate    SOCIAL HISTORY  No alcohol, tobacco, or drug use. Tobacco use was extensive in the past      FAMILY HISTORY  Fully reviewed and negative. No history of early heart disease amongst first degree relatives. Father with history of hypertension. mother with history of ovarian cancer. Both parents are . PHYSICAL EXAMINATION  Visit Vitals  /75 (BP Patient Position: At rest)   Pulse 96   Temp 98 °F (36.7 °C)   Resp 16   Ht 5' 5\" (1.651 m)   Wt 93.9 kg (207 lb)   SpO2 100%   BMI 34.45 kg/m²       General: no acute distress, conversant  HEENT/neck: no JVD, no masses, trachea midline. Pulmonary: Clear to ausculation however decreased breath sounds than expected  Cardiovascular: Regular rate, regular rhythm; Mildly displaced PMI. 2/6 JOSE. GI: soft, nontender, no hepatosplenomegaly  Hematology/Oncology: no lymphadenopathy; no bruising  Skin: warm and dry, no rashes, lesions, or ulcer  Musculoskeletal: Moving all four extremities. Gait and station not assessed    MEDICAL DECISION MAKING  Last office visit note with Dr. Justa Santoro (cardiology) dated 10/15/2020 was independently viewed, my impression: Plan is for coronary bypass grafting as well as aortic valve replacement in 2021    Echocardiogram from 2020 was independently viewed, my impression: LVEF of 40 to 45% with moderate aortic valve stenosis    Laboratory panel from February of this year was independently viewed, my impression: Normal renal function    Laboratory panel from today was independently viewed, my impression: Hypomagnesemia is present    ECG was independently viewed, my impression: Left bundle branch block is present which has been present on previous electrocardiograms    IMPRESSION/PLAN  Acute COPD exacerbation  Chronic systolic heart failure  Coronary atherosclerosis, stable yet extensive  Aortic valve disease (moderate aortic stenosis, calcific)    For her acute COPD exacerbation agree with steroids. Agree with COVID-19 testing. Agree with nebulizer therapy as able. I agree with pulmonary that this is likely main reason for presentation. She already feels better with respiratory therapy    For her chronic systolic heart failure will continue home carvedilol and lisinopril therapy. Reasonable to continue amlodipine and hydrochlorothiazide therapy as well as these have been longstanding medications for her. Will defer to Dr. Verona Soto if he wishes to increase lisinopril therapy with discontinuation of either amlodipine or hydrochlorothiazide (in favor of more evidence-based medications for CHF). He will follow tomorrow    In regards to her coronary atherosclerosis please continue aspirin and statin therapy. No indication for P2 Y 12 agent at the current time. In regards to her aortic valve disease would limit hypotension as this can precipitate cardiovascular collapse owing to too much afterload reduction. Ideally keep systolic blood pressure greater than 120 mmHg. We will follow closely.

## 2020-12-15 LAB
ALBUMIN SERPL-MCNC: 3.3 G/DL (ref 3.5–5)
ALBUMIN/GLOB SERPL: 0.8 {RATIO} (ref 1.1–2.2)
ALP SERPL-CCNC: 66 U/L (ref 45–117)
ALT SERPL-CCNC: 27 U/L (ref 12–78)
ANION GAP SERPL CALC-SCNC: 7 MMOL/L (ref 5–15)
AST SERPL W P-5'-P-CCNC: 20 U/L (ref 15–37)
BASOPHILS # BLD: 0 K/UL (ref 0–0.1)
BASOPHILS NFR BLD: 0 % (ref 0–1)
BILIRUB SERPL-MCNC: 0.3 MG/DL (ref 0.2–1)
BUN SERPL-MCNC: 13 MG/DL (ref 6–20)
BUN/CREAT SERPL: 15 (ref 12–20)
CA-I BLD-MCNC: 8.8 MG/DL (ref 8.5–10.1)
CHLORIDE SERPL-SCNC: 98 MMOL/L (ref 97–108)
CO2 SERPL-SCNC: 27 MMOL/L (ref 21–32)
CREAT SERPL-MCNC: 0.89 MG/DL (ref 0.55–1.02)
DIFFERENTIAL METHOD BLD: ABNORMAL
EOSINOPHIL # BLD: 0 K/UL (ref 0–0.4)
EOSINOPHIL NFR BLD: 0 % (ref 0–7)
ERYTHROCYTE [DISTWIDTH] IN BLOOD BY AUTOMATED COUNT: 16 % (ref 11.5–14.5)
GLOBULIN SER CALC-MCNC: 4.1 G/DL (ref 2–4)
GLUCOSE BLD STRIP.AUTO-MCNC: 133 MG/DL (ref 65–100)
GLUCOSE BLD STRIP.AUTO-MCNC: 238 MG/DL (ref 65–100)
GLUCOSE BLD STRIP.AUTO-MCNC: 281 MG/DL (ref 65–100)
GLUCOSE BLD STRIP.AUTO-MCNC: 317 MG/DL (ref 65–100)
GLUCOSE SERPL-MCNC: 311 MG/DL (ref 65–100)
HCT VFR BLD AUTO: 25.1 % (ref 35–47)
HGB BLD-MCNC: 7.7 G/DL (ref 11.5–16)
IMM GRANULOCYTES # BLD AUTO: 0.1 K/UL (ref 0–0.04)
IMM GRANULOCYTES NFR BLD AUTO: 0 % (ref 0–0.5)
LYMPHOCYTES # BLD: 1.4 K/UL (ref 0.8–3.5)
LYMPHOCYTES NFR BLD: 11 % (ref 12–49)
MCH RBC QN AUTO: 24.8 PG (ref 26–34)
MCHC RBC AUTO-ENTMCNC: 30.7 G/DL (ref 30–36.5)
MCV RBC AUTO: 80.7 FL (ref 80–99)
MONOCYTES # BLD: 0.6 K/UL (ref 0–1)
MONOCYTES NFR BLD: 5 % (ref 5–13)
NEUTS SEG # BLD: 10.3 K/UL (ref 1.8–8)
NEUTS SEG NFR BLD: 84 % (ref 32–75)
OSMOLALITY UR: 273 MOSM/KG H2O
PERFORMED BY, TECHID: ABNORMAL
PLATELET # BLD AUTO: 527 K/UL (ref 150–400)
PMV BLD AUTO: 8.6 FL (ref 8.9–12.9)
POTASSIUM SERPL-SCNC: 4.5 MMOL/L (ref 3.5–5.1)
PROT SERPL-MCNC: 7.4 G/DL (ref 6.4–8.2)
RBC # BLD AUTO: 3.11 M/UL (ref 3.8–5.2)
SODIUM SERPL-SCNC: 132 MMOL/L (ref 136–145)
WBC # BLD AUTO: 12.3 K/UL (ref 3.6–11)

## 2020-12-15 PROCEDURE — 74011250637 HC RX REV CODE- 250/637: Performed by: INTERNAL MEDICINE

## 2020-12-15 PROCEDURE — 74011636637 HC RX REV CODE- 636/637: Performed by: INTERNAL MEDICINE

## 2020-12-15 PROCEDURE — 94640 AIRWAY INHALATION TREATMENT: CPT

## 2020-12-15 PROCEDURE — 74011000250 HC RX REV CODE- 250: Performed by: HOSPITALIST

## 2020-12-15 PROCEDURE — 36415 COLL VENOUS BLD VENIPUNCTURE: CPT

## 2020-12-15 PROCEDURE — 65270000029 HC RM PRIVATE

## 2020-12-15 PROCEDURE — 74011250637 HC RX REV CODE- 250/637: Performed by: HOSPITALIST

## 2020-12-15 PROCEDURE — 85025 COMPLETE CBC W/AUTO DIFF WBC: CPT

## 2020-12-15 PROCEDURE — 80053 COMPREHEN METABOLIC PANEL: CPT

## 2020-12-15 PROCEDURE — 94760 N-INVAS EAR/PLS OXIMETRY 1: CPT

## 2020-12-15 PROCEDURE — 82962 GLUCOSE BLOOD TEST: CPT

## 2020-12-15 RX ORDER — MAG HYDROX/ALUMINUM HYD/SIMETH 200-200-20
30 SUSPENSION, ORAL (FINAL DOSE FORM) ORAL
Status: DISCONTINUED | OUTPATIENT
Start: 2020-12-15 | End: 2020-12-18 | Stop reason: HOSPADM

## 2020-12-15 RX ADMIN — Medication 10 ML: at 05:23

## 2020-12-15 RX ADMIN — ALBUTEROL SULFATE 2 PUFF: 108 AEROSOL, METERED RESPIRATORY (INHALATION) at 07:58

## 2020-12-15 RX ADMIN — METFORMIN HYDROCHLORIDE 500 MG: 500 TABLET ORAL at 08:25

## 2020-12-15 RX ADMIN — INSULIN LISPRO 8 UNITS: 100 INJECTION, SOLUTION INTRAVENOUS; SUBCUTANEOUS at 20:46

## 2020-12-15 RX ADMIN — Medication 10 ML: at 14:30

## 2020-12-15 RX ADMIN — CARVEDILOL 3.12 MG: 3.12 TABLET, FILM COATED ORAL at 08:25

## 2020-12-15 RX ADMIN — LISINOPRIL 20 MG: 20 TABLET ORAL at 20:45

## 2020-12-15 RX ADMIN — INSULIN LISPRO 4 UNITS: 100 INJECTION, SOLUTION INTRAVENOUS; SUBCUTANEOUS at 17:02

## 2020-12-15 RX ADMIN — PIOGLITAZONE 45 MG: 15 TABLET ORAL at 08:25

## 2020-12-15 RX ADMIN — ALBUTEROL SULFATE 2 PUFF: 108 AEROSOL, METERED RESPIRATORY (INHALATION) at 01:46

## 2020-12-15 RX ADMIN — Medication 10 ML: at 20:48

## 2020-12-15 RX ADMIN — BUDESONIDE AND FORMOTEROL FUMARATE DIHYDRATE 2 PUFF: 160; 4.5 AEROSOL RESPIRATORY (INHALATION) at 20:03

## 2020-12-15 RX ADMIN — LIDOCAINE HYDROCHLORIDE 30 ML: 20 SOLUTION ORAL; TOPICAL at 16:30

## 2020-12-15 RX ADMIN — ATORVASTATIN CALCIUM 40 MG: 40 TABLET, FILM COATED ORAL at 08:25

## 2020-12-15 RX ADMIN — BUDESONIDE AND FORMOTEROL FUMARATE DIHYDRATE 2 PUFF: 160; 4.5 AEROSOL RESPIRATORY (INHALATION) at 07:57

## 2020-12-15 RX ADMIN — ALBUTEROL SULFATE 2 PUFF: 108 AEROSOL, METERED RESPIRATORY (INHALATION) at 13:33

## 2020-12-15 RX ADMIN — PREDNISONE 40 MG: 20 TABLET ORAL at 08:25

## 2020-12-15 RX ADMIN — LISINOPRIL 20 MG: 20 TABLET ORAL at 08:25

## 2020-12-15 RX ADMIN — INSULIN LISPRO 6 UNITS: 100 INJECTION, SOLUTION INTRAVENOUS; SUBCUTANEOUS at 12:27

## 2020-12-15 RX ADMIN — ALBUTEROL SULFATE 2 PUFF: 108 AEROSOL, METERED RESPIRATORY (INHALATION) at 20:03

## 2020-12-15 RX ADMIN — PANTOPRAZOLE SODIUM 40 MG: 40 TABLET, DELAYED RELEASE ORAL at 08:25

## 2020-12-15 RX ADMIN — METFORMIN HYDROCHLORIDE 500 MG: 500 TABLET ORAL at 17:02

## 2020-12-15 RX ADMIN — CARVEDILOL 3.12 MG: 3.12 TABLET, FILM COATED ORAL at 17:02

## 2020-12-15 RX ADMIN — ALUMINUM HYDROXIDE, MAGNESIUM HYDROXIDE, AND SIMETHICONE 30 ML: 200; 200; 20 SUSPENSION ORAL at 12:52

## 2020-12-15 NOTE — PROGRESS NOTES
Pulmonary, Critical Care Note    Name: Peri Barajas MRN: [id number]   : 1955 Hospital: [address] [place] [address]   Date: 12/15/2020  Admission date: 2020 Hospital Day: 3       Subjective/Interval History:   Patient seen in the emergency room. Consult has been placed for shortness of breath and wheezing. States that several years ago her doctor put her on an inhaler but she does not remember what it was in did not continue it. She has a known history of heart disease and is scheduled to have nonemergent coronary artery bypass grafting sometime soon. Over the last 1 to 2 weeks she has had progressively worsening shortness of breath initially it was with exertion then it continued to worsen to the point of being at rest sitting up or laying down. She does have a history of reflux but states it is controlled on Pepcid. She was a smoker but only for about 30 years of about three fourths a pack per day having stopped 6 years ago. She denies any history of childhood asthma has never been told she has COPD. Hospital Problems  Date Reviewed: 2020          Codes Class Noted POA    Hyponatremia ICD-10-CM: E87.1  ICD-9-CM: 276.1  2020 Unknown        [facility] ICD-10-CM: J44.1  ICD-9-CM: 491.21  2020 Unknown        Anemia ICD-10-CM: D64.9  ICD-9-CM: 285.9  2020 Unknown              IMPRESSION:   1. Dyspnea likely due to COPD  2. Exacerbation COPD has cleared with prednisone and nebulized albuterol Atrovent  3. Coronary artery disease with questionable scheduling for bypass surgery in the near future  4. Severe hyponatremia  5. Anemia Marked decrease in hemoglobin compared with October  Body mass index is 34.49 kg/m². RECOMMENDATIONS/PLAN:   1. Covid testing has been ordered so will be unable to give nebulized bronchodilators. Will use inhaled albuterol and Symbicort  2. Agree with your prednisone  3.  We will give IV saline overnight and repeat sodium pending          [] High complexity decision making was performed  [x] See my orders for details      Subjective/Initial History:     I was asked by Javier Capone MD to see Alexandra Elgin  a 77 y.o.    female in consultation for a chief complaint of dyspnea      Allergies   Allergen Reactions    Aspirin Unknown (comments)     hemorraging - ended up in the hospital        [facility] reviewed and pertinent medications noted or modified as needed     Current Facility-Administered Medications   Medication    lisinopriL (PRINIVIL, ZESTRIL) tablet 20 mg    pantoprazole (PROTONIX) tablet 40 mg    metFORMIN (GLUCOPHAGE) tablet 500 mg    pioglitazone (ACTOS) tablet 45 mg    sodium chloride (NS) flush 5-40 mL    sodium chloride (NS) flush 5-40 mL    acetaminophen (TYLENOL) tablet 650 mg    Or    acetaminophen (TYLENOL) suppository 650 mg    polyethylene glycol (MIRALAX) packet 17 g    promethazine (PHENERGAN) tablet 12.5 mg    Or    ondansetron (ZOFRAN) injection 4 mg    predniSONE (DELTASONE) tablet 40 mg    insulin lispro (HUMALOG) injection    glucose chewable tablet 16 g    glucagon (GLUCAGEN) injection 1 mg    dextrose (D50W) injection syrg 12.5-25 g    0.9% sodium chloride infusion    albuterol (PROVENTIL HFA, VENTOLIN HFA, PROAIR HFA) inhaler 2 Puff    budesonide-formoteroL (SYMBICORT) 160-4.5 mcg/actuation HFA inhaler 2 Puff    atorvastatin (LIPITOR) tablet 40 mg    carvediloL (COREG) tablet 3.125 mg      Patient PCP: Geraldine Nixon DO  PMH:  has a past medical history of Acute gastrointestinal hemorrhage (8/28/2020), Allergic rhinitis (8/28/2020), Anemia (8/28/2020), Benign essential hypertension (6/25/2020), Body mass index 30.0-30.9, adult (6/25/2020), CAD (coronary artery disease) (8/28/2020), Foot callus (8/28/2020), GERD (gastroesophageal reflux disease) (6/25/2020), Heart murmur (6/25/2020), colonoscopy (01/01/2015), Left bundle branch block (8/28/2020), Microalbuminuria due to type 2 diabetes mellitus ([facility] [address] (2020), Mixed hyperlipidemia (2020), Morbid obesity ([facility] [address] (2020), Tobacco dependence syndrome (2020), and Type II diabetes mellitus, uncontrolled ([facility] (2020). PSH:   has a past surgical history that includes hx colonoscopy (). FHX: family history includes Cancer in her mother and sister; Hypertension in her father. SHX:  reports that she quit smoking about 6 years ago. She smoked 0.50 packs per day. She has never used smokeless tobacco. She reports previous alcohol use. She reports that she does not use drugs. Systemic review:  General weight stable no chronic fever chills or sweats  Eyes no double vision or momentary blindness  ENT no facial pain over the sinuses  Endocrinologic she has diabetes denies polyuria polydipsia  Musculoskeletal no swollen tender joints  Neurologic no seizures or syncope  Gastrointestinal she has a history of reflux takes Pepcid over-the-counter 10 mg once a day and states she is asymptomatic does not have any sour bitter taste waking her up at night  Genitourinary no pain or discomfort on urination no bleeding  Cardiovascular has a history of heart diseases being scheduled for bypass surgery sometime in the near future. Denies any ankle edema or chest pain no diaphoresis  Respiratory with worsening shortness of breath particularly with exertion but has progressed to being at rest.  She states she was wheezing could not talk in full sentences.   Has received nebulized bronchodilator and prednisone IV and states she feels so much better    Objective:     Vital Signs: Telemetry:    normal sinus rhythm Intake/Output:   Visit Vitals  BP (!) 156/81 (BP 1 Location: Left arm, BP Patient Position: At rest)   Pulse (!) 102   Temp 97.6 °F (36.4 °C)   Resp 18   Ht 5' 4.96\" (1.65 m)   Wt 93.9 kg (207 lb 0.2 oz)   SpO2 96%   Breastfeeding No   BMI 34.49 kg/m²       Temp (24hrs), Av.9 °F (36.6 °C), Min:97.6 °F (36.4 °C), Max:98.1 °F (36.7 °C)        O2 Device: Room air         Wt Readings from Last 4 Encounters:   12/14/20 93.9 kg (207 lb 0.2 oz)   11/25/20 94 kg (207 lb 4.8 oz)   10/07/20 93 kg (205 lb)   08/28/20 89.3 kg (196 lb 14.4 oz)        No intake or output data in the 24 hours ending 12/15/20 0945    Last shift:      No intake/output data recorded. Last 3 shifts: No intake/output data recorded. Physical Exam:   General:  female; currently on room air in no distress oxygen saturation 98%  HEENT: NCAT, oral mucosa clear  Eyes: anicteric; conjunctiva clear extraocular movements normal  Neck: no nodes, no JVD no accessory muscle usage  Chest: no deformity,  Cardiac: Regular rate and rhythm no murmur no ankle edema  Lungs: Prolongation of exhalation and coarsening of exhalation but no definite wheezes there may be a few rales in the bases  Abd: Soft normal bowel sounds no organomegaly no tenderness  Ext: no edema; no joint swelling;  No clubbing  : clear urine  Neuro: Awake alert oriented speech is clear moves all 4 extremities  Psych- no agitation, oriented to person;   Skin: warm, dry, no cyanosis;   Pulses: Brachial radial femoral pulses all intact  Capillary: Normal capillary refill    Labs:    Recent Labs     12/14/20  0505 12/13/20  1400   WBC 9.1 13.4*   HGB 8.0* 9.0*   * 519*   10/13/2020 hemoglobin 14.1  Recent Labs     12/14/20  0505 12/13/20  1400   * 121*   K 3.5 3.9   CL 89* 86*   CO2 27 27   * 148*   BUN 12 11   CREA 0.71 0.62   CA 8.8 8.9   MG 2.2 1.9   PHOS 2.8 3.0   ALB 3.3* 3.6   ALT  --  25     Room air oxygen saturation 98%  Recent Labs     12/13/20  2126 12/13/20  1400   TROIQ <0.05 <0.05     Procalcitonin less than 0.05    CRP less than 0.29  Lab Results   Component Value Date/Time    TSH, External 2.420 01/16/2019       Imaging:    CXR Results  (Last 48 hours)               12/13/20 1418  XR CHEST SNGL V Final result    Impression: IMPRESSION: No acute pulmonary or pleural disease. Narrative:  Chest, AP upright portable, 1418 hours. Comparison with two-view exam from   11/25/2019. Cardiopericardial silhouette top normal in size. Central pulmonary   vessels appear borderline enlarged, stable. Calcification and tortuosity of   thoracic aorta. There is nonspecific central peribronchial cuffing. No evidence   of pulmonary edema, air space pneumonia, or pleural effusion. No evidence of   pneumothorax. Results from [place] encounter on 12/13/20   XR CHEST SNGL V    Narrative Chest, AP upright portable, 1418 hours. Comparison with two-view exam from  11/25/2019. Cardiopericardial silhouette top normal in size. Central pulmonary  vessels appear borderline enlarged, stable. Calcification and tortuosity of  thoracic aorta. There is nonspecific central peribronchial cuffing. No evidence  of pulmonary edema, air space pneumonia, or pleural effusion. No evidence of  pneumothorax. Impression IMPRESSION: No acute pulmonary or pleural disease. · Discussion worsening dyspnea shortness of breath with marked improvement after 3 doses of nebulized albuterol Atrovent and 160 mg prednisone tablet. Most likely has underlying COPD/asthma with marked improvement after bronchodilators. Will place on albuterol and Symbicort inhalers. Prednisone 40 mg has been ordered for tomorrow. Covid testing is being underway. I see nothing on the chest x-ray that would make me worried about Covid. CRP is less than 0.29. Her sodium is markedly low.   Her hemoglobin is also markedly diminished compared with October and may be playing a role in her shortness of breath    Swati Kay MD

## 2020-12-15 NOTE — PROGRESS NOTES
Hospitalist Progress Note               Daily Progress Note: 12/15/2020     65F, H/o CAD with severe AS complicated by anemia presents with shortnes of breath s.t AECOPD in the setting of AS and anemia. HOSPITAL COURSE:   She was managed with prednisone and gentle hydration while her HCTZ was stopped. She has a f/u with CTSurgery in January.    If ambulatory and less short winded will plan for DC [facility], will be living with daughter    Problem List:  Problem List as of 12/15/2020 Date Reviewed: 11/25/2020          Codes Class Noted - Resolved    Hyponatremia ICD-10-CM: E87.1  ICD-9-CM: 276.1  12/13/2020 - Present        COPD exacerbation ([facility] ICD-10-CM: J44.1  ICD-9-CM: 491.21  12/13/2020 - Present        Acute gastrointestinal hemorrhage ICD-10-CM: K92.2  ICD-9-CM: 578.9  8/28/2020 - Present        Allergic rhinitis ICD-10-CM: J30.9  ICD-9-CM: 477.9  8/28/2020 - Present        Anemia ICD-10-CM: D64.9  ICD-9-CM: 285.9  8/28/2020 - Present        CAD (coronary artery disease) ICD-10-CM: I25.10  ICD-9-CM: 414.00  8/28/2020 - Present        Foot callus ICD-10-CM: L84  ICD-9-CM: 700  8/28/2020 - Present        Left bundle branch block ICD-10-CM: I44.7  ICD-9-CM: 426.3  8/28/2020 - Present        Microalbuminuria due to type 2 diabetes mellitus ([facility] ICD-10-CM: E11.29, R80.9  ICD-9-CM: 250.40, 791.0  8/28/2020 - Present        Morbid obesity ([facility] ICD-10-CM: E66.01  ICD-9-CM: 278.01  8/28/2020 - Present        Benign essential hypertension ICD-10-CM: I10  ICD-9-CM: 401.1  6/25/2020 - Present        Body mass index 30.0-30.9, adult ICD-10-CM: Z68.30  ICD-9-CM: V85.30  6/25/2020 - Present        Type II diabetes mellitus, uncontrolled ([facility] ICD-10-CM: E11.65  ICD-9-CM: 250.02  6/25/2020 - Present        GERD (gastroesophageal reflux disease) ICD-10-CM: K21.9  ICD-9-CM: 530.81  6/25/2020 - Present        Heart murmur ICD-10-CM: R01.1  ICD-9-CM: 785.2  6/25/2020 - Present        Mixed hyperlipidemia ICD-10-CM: E78.2  ICD-9-CM: 272.2  6/25/2020 - Present        Tobacco dependence syndrome ICD-10-CM: F17.200  ICD-9-CM: 305.1  6/25/2020 - Present              Medications reviewed  Current Facility-Administered Medications   Medication Dose Route Frequency    lisinopriL (PRINIVIL, ZESTRIL) tablet 20 mg  20 mg Oral BID    pantoprazole (PROTONIX) tablet 40 mg  40 mg Oral ACB    metFORMIN (GLUCOPHAGE) tablet 500 mg  500 mg Oral BID WITH MEALS    pioglitazone (ACTOS) tablet 45 mg  45 mg Oral ACB    sodium chloride (NS) flush 5-40 mL  5-40 mL IntraVENous Q8H    sodium chloride (NS) flush 5-40 mL  5-40 mL IntraVENous PRN    acetaminophen (TYLENOL) tablet 650 mg  650 mg Oral Q6H PRN    Or    acetaminophen (TYLENOL) suppository 650 mg  650 mg Rectal Q6H PRN    polyethylene glycol (MIRALAX) packet 17 g  17 g Oral DAILY PRN    promethazine (PHENERGAN) tablet 12.5 mg  12.5 mg Oral Q6H PRN    Or    ondansetron (ZOFRAN) injection 4 mg  4 mg IntraVENous Q6H PRN    predniSONE (DELTASONE) tablet 40 mg  40 mg Oral DAILY WITH BREAKFAST    insulin lispro (HUMALOG) injection   SubCUTAneous AC&HS    glucose chewable tablet 16 g  4 Tab Oral PRN    glucagon (GLUCAGEN) injection 1 mg  1 mg IntraMUSCular PRN    dextrose (D50W) injection syrg 12.5-25 g  25-50 mL IntraVENous PRN    0.9% sodium chloride infusion  50 mL/hr IntraVENous CONTINUOUS    albuterol (PROVENTIL HFA, VENTOLIN HFA, PROAIR HFA) inhaler 2 Puff  2 Puff Inhalation Q6H RT    budesonide-formoteroL (SYMBICORT) 160-4.5 mcg/actuation HFA inhaler 2 Puff  2 Puff Inhalation BID RT    atorvastatin (LIPITOR) tablet 40 mg  40 mg Oral DAILY    carvediloL (COREG) tablet 3.125 mg  3.125 mg Oral BID WITH MEALS       Review of Systems:   Review of Systems   Constitutional: Positive for malaise/fatigue. Negative for chills and fever. HENT: Negative. Eyes: Negative. Respiratory: Negative for cough and shortness of breath. Cardiovascular: Negative for chest pain. Gastrointestinal: Negative for abdominal pain, diarrhea, heartburn and nausea. Genitourinary: Negative. Musculoskeletal: Negative. Skin: Negative. Endo/Heme/Allergies: Negative. Objective:   Physical Exam:     Visit Vitals  BP (!) 156/81 (BP 1 Location: Left arm, BP Patient Position: At rest)   Pulse (!) 102   Temp 97.6 °F (36.4 °C)   Resp 18   Ht 5' 4.96\" (1.65 m)   Wt 93.9 kg (207 lb 0.2 oz)   SpO2 96%   Breastfeeding No   BMI 34.49 kg/m²      O2 Device: Room air    Temp (24hrs), Av.9 °F (36.6 °C), Min:97.6 °F (36.4 °C), Max:98.1 °F (36.7 °C)    No intake/output data recorded. No intake/output data recorded. General:  Alert, cooperative, no distress, appears stated age. RA   Lungs:   Prolonged exp phase, few scattered rhonci   Chest wall:  No tenderness or deformity. Heart:  Regular rate and rhythm, S1, S2 normal, no murmur, click, rub or gallop. no le edema   Abdomen:   Soft, non-tender. Bowel sounds normal. No masses,  No organomegaly. Extremities: Extremities normal, atraumatic, no cyanosis or edema. Pulses: 2+ and symmetric all extremities. Skin: Skin color, texture, turgor normal. No rashes or lesions   Neurologic: CNII-XII intact. No gross sensory or motor deficits     Data Review:       Recent Days:  Recent Labs     20  0505 20  1400   WBC 9.1 13.4*   HGB 8.0* 9.0*   HCT 24.7* 27.7*   * 519*     Recent Labs     20  0505 20  1400   * 121*   K 3.5 3.9   CL 89* 86*   CO2 27 27   * 148*   BUN 12 11   CREA 0.71 0.62   CA 8.8 8.9   MG 2.2 1.9   PHOS 2.8 3.0   ALB 3.3* 3.6   TBILI  --  0.6   ALT  --  25     No results for input(s): PH, PCO2, PO2, HCO3, FIO2 in the last 72 hours.     24 Hour Results:  Recent Results (from the past 24 hour(s))   ECHO ADULT COMPLETE    Collection Time: 20  3:20 PM   Result Value Ref Range    Aortic Valve Systolic Mean Gradient 00.00 mmHg    AoV VTI 67.50 cm    Aortic valve mean velocity 235.00 cm/s Pulmonic Regurgitant End Max Velocity 342.00 cm/s    AoV PG 47.00 mmHg    Aortic Valve Area by Continuity of VTI 1.14 cm2    Aortic Valve Area by Continuity of Peak Velocity 1.04 cm2    IVSd 1.30 (A) 0.6 - 0.9 cm    LVIDd 4.01 3.9 - 5.3 cm    LVIDs 3.22 cm    LVOT d 2.00 cm    Left Ventricular Outflow Tract Mean Gradient 3.00 mmHg    LVOT VTI 24.60 cm    LVOT VTI 23.00 cm    LVOT VTI 27.10 cm    LVOT VTI 23.80 cm    Pulmonic Regurgitant End Max Velocity 113.00 cm/s    LVOT Peak Gradient 5.00 mmHg    LVPWd 1.37 (A) 0.6 - 0.9 cm    LV E' Septal Velocity 5.07 cm/s    LV ED Vol A2C 64.50 cm3    BP EF 40.9 (A) 55 - 100 %    LV ES Vol A2C 33.40 cm3    E/E' septal 26.63     LV Ejection Fraction MOD 2C 20 %    LVOT SV 77.0 cm3    Pulmonic Regurgitant End Max Velocity 545.00 cm/s    Mitral Valve E Wave Deceleration Time 151.00 ms    MV A Lior 117.00 cm/s    MV E Lior 135.00 cm/s    MV E/A 1.15     Pulmonic Regurgitant End Max Velocity 122.00 cm/s    Pulmonic Valve Systolic Peak Instantaneous Gradient 6.00 mmHg    P Vein A Dur 144.00 ms    Pulmonary Vein \"A\" Wave Velocity 48.90 cm/s    Est. RA Pressure 3.00 mmHg    RVIDd 2.78 cm    RVSP 33.00 mmHg    Tricuspid Valve Max Velocity 272.00 cm/s    Triscuspid Valve Regurgitation Peak Gradient 30.00 mmHg    LV Mass .0 67 - 162 g    LV Mass AL Index 97.2 43 - 95 g/m2    SHARON/BSA Pk Lior 0.5 cm2/m2    SHARON/BSA VTI 0.6 cm2/m2   GLUCOSE, POC    Collection Time: 12/14/20  4:24 PM   Result Value Ref Range    Glucose (POC) 262 (H) 65 - 100 mg/dL    Performed by WESLY JESUS    SODIUM, UR, RANDOM    Collection Time: 12/14/20  7:00 PM   Result Value Ref Range    Sodium,urine random 14 mmol/L   GLUCOSE, POC    Collection Time: 12/14/20  8:46 PM   Result Value Ref Range    Glucose (POC) 242 (H) 65 - 100 mg/dL    Performed by RUDDY Foote    Collection Time: 12/15/20  8:19 AM   Result Value Ref Range    Glucose (POC) 133 (H) 65 - 100 mg/dL    Performed by Brant Knowles GLUCOSE, POC    Collection Time: 12/15/20 11:51 AM   Result Value Ref Range    Glucose (POC) 281 (H) 65 - 100 mg/dL    Performed by Naveed Najera            Assessment/     Patient Active Problem List   Diagnosis Code    Benign essential hypertension I10    Body mass index 30.0-30.9, adult Z68.30    Type II diabetes mellitus, uncontrolled ([facility]) E11.65    GERD (gastroesophageal reflux disease) K21.9    Heart murmur R01.1    Mixed hyperlipidemia E78.2    Tobacco dependence syndrome F17.200    Acute gastrointestinal hemorrhage K92.2    Allergic rhinitis J30.9    Anemia D64.9    CAD (coronary artery disease) I25.10    Foot callus L84    Left bundle branch block I44.7    Microalbuminuria due to type 2 diabetes mellitus ([facility]) E11.29, R80.9    Morbid obesity ([facility]) E66.01    Hyponatremia E87.1    COPD exacerbation ([facility]) J44.1             (1) shortness of breath s.t AECOPD, AS and anemia: prednisone and duonebs, valve replacement in January. Improved. (2) hyponatremia: hypovolemic s/t HCTZ use. Will stop. Repeat Na in AM.    (3) DMII: on actos and metformin. (4) HTN: hold HCTZ and lasix s/t #2. (5) CAD needing CABG. Plan for January    (6) Anemia: Hemoglobin 14 in October of this year, is dropped to 9 on presentation and today is 8. Anemia panel and FOBT are pending. VTEP: Mechanical in lieu of her drop in hemoglobin  GI prophylaxis Pepcid  Full CODE STATUS  Disposition: [facility] tomorrow. Care Plan discussed with: Patient/Family and Nurse    Total time spent with patient: 30 minutes.     Stefani Mckeon MD

## 2020-12-15 NOTE — PROGRESS NOTES
Reason for Admission:    Anemia                   RUR Score:          15           Plan for utilizing home health: If I need it I would like it. PCP: First and Last name:  Mary Mcdonald   Name of Practice:    Are you a current patient: Yes/No: Yes   Approximate date of last visit: Oct 7th, 2020   Can you participate in a virtual visit with your PCP: YES                    Current Advanced Directive/Advance Care Plan:  Patients daughter Seema Tolbert is surrogate decision maker [phone]                         Transition of Care Plan:           Patient lives in [place], [place]. Lives alone. Patient drives. Independent with IADLS, ADLS. When patient is discharged from hospital she will be staying with her daughter until she gets her strength back up. Patient will not have any needs at this time on discharge. CM will continue to follow for discharge needs.

## 2020-12-15 NOTE — PROGRESS NOTES
Physician Progress Note      Richard Saldana  CSN #:                  [id number]  :                       1955  ADMIT DATE:       2020 1:53 PM  [address] [place] DATE:  RESPONDING  PROVIDER #:        Sylvan Prader MD          QUERY TEXT:    Pt admitted with COPD exacerbation and has anemia documented. If possible, please document in progress notes and discharge summary further specificity regarding the acuity and type of anemia:      The medical record reflects the following:  Risk Factors: 77year old female  Clinical Indicators:  Attending PN - Anemia: Hemoglobin 14 in October of this year, is dropped to 9 on presentation and today is 8. Iron: 21, 22. TIBC: 527, 473. Iron % saturation: 4, 5. Treatment: monitor labs    Please call [phone] with any questions  Options provided:  -- Anemia due to iron deficiency  -- Anemia due to chronic disease  -- Other - I will add my own diagnosis  -- Disagree - Not applicable / Not valid  -- Disagree - Clinically unable to determine / Unknown  -- Refer to Clinical Documentation Reviewer    PROVIDER RESPONSE TEXT:    This patient has anemia due to chronic disease. Query created by:  Fidelina Canales on 12/15/2020 10:40 AM      Electronically signed by:  Sylvan Prader MD 12/15/2020 4:48 PM

## 2020-12-16 ENCOUNTER — APPOINTMENT (OUTPATIENT)
Dept: GENERAL RADIOLOGY | Age: 65
DRG: 191 | End: 2020-12-16
Attending: HOSPITALIST
Payer: MEDICARE

## 2020-12-16 LAB
ALBUMIN SERPL-MCNC: 3.1 G/DL (ref 3.5–5)
ALBUMIN/GLOB SERPL: 0.9 {RATIO} (ref 1.1–2.2)
ALP SERPL-CCNC: 58 U/L (ref 45–117)
ALT SERPL-CCNC: 21 U/L (ref 12–78)
ANION GAP SERPL CALC-SCNC: 4 MMOL/L (ref 5–15)
AST SERPL W P-5'-P-CCNC: 13 U/L (ref 15–37)
BASOPHILS # BLD: 0 K/UL (ref 0–0.1)
BASOPHILS NFR BLD: 0 % (ref 0–1)
BILIRUB SERPL-MCNC: 0.4 MG/DL (ref 0.2–1)
BUN SERPL-MCNC: 12 MG/DL (ref 6–20)
BUN/CREAT SERPL: 21 (ref 12–20)
CA-I BLD-MCNC: 8.7 MG/DL (ref 8.5–10.1)
CHLORIDE SERPL-SCNC: 100 MMOL/L (ref 97–108)
CO2 SERPL-SCNC: 30 MMOL/L (ref 21–32)
CREAT SERPL-MCNC: 0.56 MG/DL (ref 0.55–1.02)
DIFFERENTIAL METHOD BLD: ABNORMAL
EOSINOPHIL # BLD: 0 K/UL (ref 0–0.4)
EOSINOPHIL NFR BLD: 0 % (ref 0–7)
ERYTHROCYTE [DISTWIDTH] IN BLOOD BY AUTOMATED COUNT: 16 % (ref 11.5–14.5)
GLOBULIN SER CALC-MCNC: 3.6 G/DL (ref 2–4)
GLUCOSE BLD STRIP.AUTO-MCNC: 131 MG/DL (ref 65–100)
GLUCOSE BLD STRIP.AUTO-MCNC: 268 MG/DL (ref 65–100)
GLUCOSE BLD STRIP.AUTO-MCNC: 293 MG/DL (ref 65–100)
GLUCOSE BLD STRIP.AUTO-MCNC: 298 MG/DL (ref 65–100)
GLUCOSE SERPL-MCNC: 122 MG/DL (ref 65–100)
HCT VFR BLD AUTO: 23.9 % (ref 35–47)
HGB BLD-MCNC: 7.4 G/DL (ref 11.5–16)
IMM GRANULOCYTES # BLD AUTO: 0.1 K/UL (ref 0–0.04)
IMM GRANULOCYTES NFR BLD AUTO: 0 % (ref 0–0.5)
LYMPHOCYTES # BLD: 4.2 K/UL (ref 0.8–3.5)
LYMPHOCYTES NFR BLD: 31 % (ref 12–49)
MCH RBC QN AUTO: 24.7 PG (ref 26–34)
MCHC RBC AUTO-ENTMCNC: 31 G/DL (ref 30–36.5)
MCV RBC AUTO: 79.7 FL (ref 80–99)
MONOCYTES # BLD: 1.2 K/UL (ref 0–1)
MONOCYTES NFR BLD: 9 % (ref 5–13)
NEUTS SEG # BLD: 8.3 K/UL (ref 1.8–8)
NEUTS SEG NFR BLD: 60 % (ref 32–75)
PERFORMED BY, TECHID: ABNORMAL
PLATELET # BLD AUTO: 473 K/UL (ref 150–400)
PMV BLD AUTO: 8.7 FL (ref 8.9–12.9)
POTASSIUM SERPL-SCNC: 3.8 MMOL/L (ref 3.5–5.1)
PROT SERPL-MCNC: 6.7 G/DL (ref 6.4–8.2)
RBC # BLD AUTO: 3 M/UL (ref 3.8–5.2)
SODIUM SERPL-SCNC: 134 MMOL/L (ref 136–145)
WBC # BLD AUTO: 13.7 K/UL (ref 3.6–11)

## 2020-12-16 PROCEDURE — 82962 GLUCOSE BLOOD TEST: CPT

## 2020-12-16 PROCEDURE — 65270000029 HC RM PRIVATE

## 2020-12-16 PROCEDURE — 71045 X-RAY EXAM CHEST 1 VIEW: CPT

## 2020-12-16 PROCEDURE — 74011636637 HC RX REV CODE- 636/637: Performed by: INTERNAL MEDICINE

## 2020-12-16 PROCEDURE — 74011250637 HC RX REV CODE- 250/637: Performed by: INTERNAL MEDICINE

## 2020-12-16 PROCEDURE — 74011000250 HC RX REV CODE- 250: Performed by: HOSPITALIST

## 2020-12-16 PROCEDURE — 74011250636 HC RX REV CODE- 250/636: Performed by: HOSPITALIST

## 2020-12-16 PROCEDURE — 94640 AIRWAY INHALATION TREATMENT: CPT

## 2020-12-16 PROCEDURE — 94760 N-INVAS EAR/PLS OXIMETRY 1: CPT

## 2020-12-16 PROCEDURE — 36415 COLL VENOUS BLD VENIPUNCTURE: CPT

## 2020-12-16 PROCEDURE — 85025 COMPLETE CBC W/AUTO DIFF WBC: CPT

## 2020-12-16 PROCEDURE — 80053 COMPREHEN METABOLIC PANEL: CPT

## 2020-12-16 PROCEDURE — 74011250637 HC RX REV CODE- 250/637: Performed by: HOSPITALIST

## 2020-12-16 RX ORDER — LORAZEPAM 1 MG/1
1 TABLET ORAL
Status: DISCONTINUED | OUTPATIENT
Start: 2020-12-16 | End: 2020-12-18 | Stop reason: HOSPADM

## 2020-12-16 RX ORDER — ALBUTEROL SULFATE 90 UG/1
2 AEROSOL, METERED RESPIRATORY (INHALATION)
Status: DISCONTINUED | OUTPATIENT
Start: 2020-12-16 | End: 2020-12-18 | Stop reason: HOSPADM

## 2020-12-16 RX ORDER — IPRATROPIUM BROMIDE AND ALBUTEROL SULFATE 2.5; .5 MG/3ML; MG/3ML
3 SOLUTION RESPIRATORY (INHALATION)
Status: DISCONTINUED | OUTPATIENT
Start: 2020-12-16 | End: 2020-12-16

## 2020-12-16 RX ORDER — PREDNISONE 20 MG/1
40 TABLET ORAL
Qty: 10 TAB | Refills: 0 | Status: SHIPPED | OUTPATIENT
Start: 2020-12-16 | End: 2020-12-21

## 2020-12-16 RX ADMIN — LIDOCAINE HYDROCHLORIDE 30 ML: 20 SOLUTION ORAL; TOPICAL at 11:00

## 2020-12-16 RX ADMIN — ALBUTEROL SULFATE 2 PUFF: 108 AEROSOL, METERED RESPIRATORY (INHALATION) at 19:53

## 2020-12-16 RX ADMIN — CEFTRIAXONE 1 G: 1 INJECTION, POWDER, FOR SOLUTION INTRAMUSCULAR; INTRAVENOUS at 12:21

## 2020-12-16 RX ADMIN — Medication 10 ML: at 20:42

## 2020-12-16 RX ADMIN — LORAZEPAM 1 MG: 1 TABLET ORAL at 20:30

## 2020-12-16 RX ADMIN — Medication 10 ML: at 14:08

## 2020-12-16 RX ADMIN — METFORMIN HYDROCHLORIDE 500 MG: 500 TABLET ORAL at 09:36

## 2020-12-16 RX ADMIN — PIOGLITAZONE 45 MG: 15 TABLET ORAL at 09:36

## 2020-12-16 RX ADMIN — LISINOPRIL 20 MG: 20 TABLET ORAL at 09:37

## 2020-12-16 RX ADMIN — Medication 10 ML: at 06:28

## 2020-12-16 RX ADMIN — BUDESONIDE AND FORMOTEROL FUMARATE DIHYDRATE 2 PUFF: 160; 4.5 AEROSOL RESPIRATORY (INHALATION) at 19:53

## 2020-12-16 RX ADMIN — INSULIN LISPRO 6 UNITS: 100 INJECTION, SOLUTION INTRAVENOUS; SUBCUTANEOUS at 12:15

## 2020-12-16 RX ADMIN — ALBUTEROL SULFATE 2 PUFF: 108 AEROSOL, METERED RESPIRATORY (INHALATION) at 02:00

## 2020-12-16 RX ADMIN — BUDESONIDE AND FORMOTEROL FUMARATE DIHYDRATE 2 PUFF: 160; 4.5 AEROSOL RESPIRATORY (INHALATION) at 07:36

## 2020-12-16 RX ADMIN — INSULIN LISPRO 6 UNITS: 100 INJECTION, SOLUTION INTRAVENOUS; SUBCUTANEOUS at 20:31

## 2020-12-16 RX ADMIN — PREDNISONE 40 MG: 20 TABLET ORAL at 09:37

## 2020-12-16 RX ADMIN — METHYLPREDNISOLONE SODIUM SUCCINATE 60 MG: 40 INJECTION, POWDER, FOR SOLUTION INTRAMUSCULAR; INTRAVENOUS at 12:19

## 2020-12-16 RX ADMIN — PANTOPRAZOLE SODIUM 40 MG: 40 TABLET, DELAYED RELEASE ORAL at 09:37

## 2020-12-16 RX ADMIN — INSULIN LISPRO 6 UNITS: 100 INJECTION, SOLUTION INTRAVENOUS; SUBCUTANEOUS at 18:11

## 2020-12-16 RX ADMIN — ALBUTEROL SULFATE 2 PUFF: 108 AEROSOL, METERED RESPIRATORY (INHALATION) at 07:36

## 2020-12-16 RX ADMIN — METFORMIN HYDROCHLORIDE 500 MG: 500 TABLET ORAL at 18:11

## 2020-12-16 RX ADMIN — ATORVASTATIN CALCIUM 40 MG: 40 TABLET, FILM COATED ORAL at 09:37

## 2020-12-16 RX ADMIN — METHYLPREDNISOLONE SODIUM SUCCINATE 40 MG: 40 INJECTION, POWDER, FOR SOLUTION INTRAMUSCULAR; INTRAVENOUS at 18:10

## 2020-12-16 RX ADMIN — CARVEDILOL 3.12 MG: 3.12 TABLET, FILM COATED ORAL at 18:11

## 2020-12-16 RX ADMIN — LISINOPRIL 20 MG: 20 TABLET ORAL at 20:31

## 2020-12-16 RX ADMIN — CARVEDILOL 3.12 MG: 3.12 TABLET, FILM COATED ORAL at 09:37

## 2020-12-16 NOTE — PROGRESS NOTES
Pulmonary, Critical Care Note    Name: Lissette Mccarty MRN: [id number]   : 1955 Hospital: [facility]   Date: 2020  Admission date: 2020 Hospital Day: 4       Subjective/Interval History:   Patient seen in the emergency room. Consult has been placed for shortness of breath and wheezing. States that several years ago her doctor put her on an inhaler but she does not remember what it was in did not continue it. She has a known history of heart disease and is scheduled to have nonemergent coronary artery bypass grafting sometime soon. Over the last 1 to 2 weeks she has had progressively worsening shortness of breath initially it was with exertion then it continued to worsen to the point of being at rest sitting up or laying down. She does have a history of reflux but states it is controlled on Pepcid. She was a smoker but only for about 30 years of about three fourths a pack per day having stopped 6 years ago. She denies any history of childhood asthma has never been told she has COPD. Hospital Problems  Date Reviewed: 2020          Codes Class Noted POA    Hyponatremia ICD-10-CM: E87.1  ICD-9-CM: 276.1  2020 Unknown        COPD exacerbation ([facility] ICD-10-CM: J44.1  ICD-9-CM: 491.21  2020 Unknown        Anemia ICD-10-CM: D64.9  ICD-9-CM: 285.9  2020 Unknown              IMPRESSION:   1. Dyspnea likely due to COPD  2. Exacerbation COPD has cleared with prednisone and nebulized albuterol Atrovent  3. Coronary artery disease with questionable scheduling for bypass surgery in the near future  4. Severe hyponatremia  5. Anemia Marked decrease in hemoglobin compared with October  Body mass index is 34.49 kg/m². RECOMMENDATIONS/PLAN:   1. Covid testing has been ordered so will be unable to give nebulized bronchodilators. Will use inhaled albuterol and Symbicort  2.  Agree with prednisone            [] High complexity decision making was performed  [x] See my orders for details      Subjective/Initial History:     I was asked by Brittanie Kelly MD to see Arlet Florentino  a 77 y.o.    female in consultation for a chief complaint of dyspnea      Allergies   Allergen Reactions    Aspirin Unknown (comments)     hemorraging - ended up in the hospital        [facility] reviewed and pertinent medications noted or modified as needed     Current Facility-Administered Medications   Medication    alum-mag hydroxide-simeth (MYLANTA) oral suspension 30 mL    lisinopriL (PRINIVIL, ZESTRIL) tablet 20 mg    pantoprazole (PROTONIX) tablet 40 mg    metFORMIN (GLUCOPHAGE) tablet 500 mg    pioglitazone (ACTOS) tablet 45 mg    sodium chloride (NS) flush 5-40 mL    sodium chloride (NS) flush 5-40 mL    acetaminophen (TYLENOL) tablet 650 mg    Or    acetaminophen (TYLENOL) suppository 650 mg    polyethylene glycol (MIRALAX) packet 17 g    promethazine (PHENERGAN) tablet 12.5 mg    Or    ondansetron (ZOFRAN) injection 4 mg    predniSONE (DELTASONE) tablet 40 mg    insulin lispro (HUMALOG) injection    glucose chewable tablet 16 g    glucagon (GLUCAGEN) injection 1 mg    dextrose (D50W) injection syrg 12.5-25 g    albuterol (PROVENTIL HFA, VENTOLIN HFA, PROAIR HFA) inhaler 2 Puff    budesonide-formoteroL (SYMBICORT) 160-4.5 mcg/actuation HFA inhaler 2 Puff    atorvastatin (LIPITOR) tablet 40 mg    carvediloL (COREG) tablet 3.125 mg      Patient PCP: Shannen Napier DO  PMH:  has a past medical history of Acute gastrointestinal hemorrhage (8/28/2020), Allergic rhinitis (8/28/2020), Anemia (8/28/2020), Benign essential hypertension (6/25/2020), Body mass index 30.0-30.9, adult (6/25/2020), CAD (coronary artery disease) (8/28/2020), Foot callus (8/28/2020), GERD (gastroesophageal reflux disease) (6/25/2020), Heart murmur (6/25/2020), colonoscopy (01/01/2015), Left bundle branch block (8/28/2020), Microalbuminuria due to type 2 diabetes mellitus ([facility] (2020), Mixed hyperlipidemia (2020), Morbid obesity ([facility] (2020), Tobacco dependence syndrome (2020), and Type II diabetes mellitus, uncontrolled ([facility] (2020). PSH:   has a past surgical history that includes hx colonoscopy (). FHX: family history includes Cancer in her mother and sister; Hypertension in her father. SHX:  reports that she quit smoking about 6 years ago. She smoked 0.50 packs per day. She has never used smokeless tobacco. She reports previous alcohol use. She reports that she does not use drugs. Systemic review:  General weight stable no chronic fever chills or sweats  Eyes no double vision or momentary blindness  ENT no facial pain over the sinuses  Endocrinologic she has diabetes denies polyuria polydipsia  Musculoskeletal no swollen tender joints  Neurologic no seizures or syncope  Gastrointestinal she has a history of reflux takes Pepcid over-the-counter 10 mg once a day and states she is asymptomatic does not have any sour bitter taste waking her up at night  Genitourinary no pain or discomfort on urination no bleeding  Cardiovascular has a history of heart diseases being scheduled for bypass surgery sometime in the near future. Denies any ankle edema or chest pain no diaphoresis  Respiratory with worsening shortness of breath particularly with exertion but has progressed to being at rest.  She states she was wheezing could not talk in full sentences.   Has received nebulized bronchodilator and prednisone IV and states she feels so much better    Objective:     Vital Signs: Telemetry:    normal sinus rhythm Intake/Output:   Visit Vitals  /64 (BP 1 Location: Left arm, BP Patient Position: At rest)   Pulse (!) 104   Temp 97.8 °F (36.6 °C)   Resp 18   Ht 5' 4.96\" (1.65 m)   Wt 93.9 kg (207 lb 0.2 oz)   SpO2 97%   Breastfeeding No   BMI 34.49 kg/m²       Temp (24hrs), Av.6 °F (36.4 °C), Min:97.5 °F (36.4 °C), Max:97.8 °F (36.6 °C)        O2 Device: Room air         Wt Readings from Last 4 Encounters:   12/14/20 93.9 kg (207 lb 0.2 oz)   11/25/20 94 kg (207 lb 4.8 oz)   10/07/20 93 kg (205 lb)   08/28/20 89.3 kg (196 lb 14.4 oz)        No intake or output data in the 24 hours ending 12/16/20 0951    Last shift:      No intake/output data recorded. Last 3 shifts: No intake/output data recorded. Physical Exam:   General:  female; currently on room air in no distress oxygen saturation 98%  HEENT: NCAT, oral mucosa clear  Eyes: anicteric; conjunctiva clear extraocular movements normal  Neck: no nodes, no JVD no accessory muscle usage  Chest: no deformity,  Cardiac: Regular rate and rhythm no murmur no ankle edema  Lungs: Prolongation of exhalation and coarsening of exhalation but no definite wheezes there may be a few rales in the bases  Abd: Soft normal bowel sounds no organomegaly no tenderness  Ext: no edema; no joint swelling;  No clubbing  : clear urine  Neuro: Awake alert oriented speech is clear moves all 4 extremities  Psych- no agitation, oriented to person;   Skin: warm, dry, no cyanosis;   Pulses: Brachial radial femoral pulses all intact  Capillary: Normal capillary refill    Labs:    Recent Labs     12/16/20  0727 12/15/20  1918 12/14/20  0505   WBC 13.7* 12.3* 9.1   HGB 7.4* 7.7* 8.0*   * 527* 489*   10/13/2020 hemoglobin 14.1  Recent Labs     12/16/20  0727 12/15/20  1918 12/14/20  0505 12/13/20  1400   * 132* 126* 121*   K 3.8 4.5 3.5 3.9    98 89* 86*   CO2 30 27 27 27   * 311* 197* 148*   BUN 12 13 12 11   CREA 0.56 0.89 0.71 0.62   CA 8.7 8.8 8.8 8.9   MG  --   --  2.2 1.9   PHOS  --   --  2.8 3.0   ALB 3.1* 3.3* 3.3* 3.6   ALT 21 27  --  25     Room air oxygen saturation 98%  Recent Labs     12/13/20 2126 12/13/20  1400   TROIQ <0.05 <0.05     Procalcitonin less than 0.05    CRP less than 0.29  Lab Results   Component Value Date/Time    TSH, External 2.420 01/16/2019 Imaging:    CXR Results  (Last 48 hours)    None        Results from Hospital Encounter encounter on 12/13/20   XR CHEST SNGL V    Narrative Chest, AP upright portable, 1418 hours. Comparison with two-view exam from  11/25/2019. Cardiopericardial silhouette top normal in size. Central pulmonary  vessels appear borderline enlarged, stable. Calcification and tortuosity of  thoracic aorta. There is nonspecific central peribronchial cuffing. No evidence  of pulmonary edema, air space pneumonia, or pleural effusion. No evidence of  pneumothorax. Impression IMPRESSION: No acute pulmonary or pleural disease. · Discussion worsening dyspnea shortness of breath with marked improvement after 3 doses of nebulized albuterol Atrovent and 160 mg prednisone tablet. Most likely has underlying COPD/asthma with marked improvement after bronchodilators. Will place on albuterol and Symbicort inhalers. Prednisone 40 mg has been ordered for tomorrow. Covid testing is being underway. I see nothing on the chest x-ray that would make me worried about Covid. CRP is less than 0.29. Her sodium is markedly low.   Her hemoglobin is also markedly diminished compared with October and may be playing a role in her shortness of breath    Paras Huntley MD

## 2020-12-16 NOTE — ROUTINE PROCESS
Bedside shift change report given to Kirsten Neal RN (oncoming nurse) by Kirsten Neal RN (offgoing nurse). Report included the following information SBAR.

## 2020-12-16 NOTE — DISCHARGE SUMMARY
Physician Discharge Summary     Patient ID:    Corky Diamond  [id number]  21 y.o.  1955    Admit date: 12/13/2020    Discharge date : 12/16/2020    Chronic Diagnoses:    Problem List as of 12/16/2020 Date Reviewed: 11/25/2020          Codes Class Noted - Resolved    Hyponatremia ICD-10-CM: E87.1  ICD-9-CM: 276.1  12/13/2020 - Present        COPD exacerbation ([place] [address].) ICD-10-CM: J44.1  ICD-9-CM: 491.21  12/13/2020 - Present        Acute gastrointestinal hemorrhage ICD-10-CM: K92.2  ICD-9-CM: 578.9  8/28/2020 - Present        Allergic rhinitis ICD-10-CM: J30.9  ICD-9-CM: 477.9  8/28/2020 - Present        Anemia ICD-10-CM: D64.9  ICD-9-CM: 285.9  8/28/2020 - Present        CAD (coronary artery disease) ICD-10-CM: I25.10  ICD-9-CM: 414.00  8/28/2020 - Present        Foot callus ICD-10-CM: L84  ICD-9-CM: 700  8/28/2020 - Present        Left bundle branch block ICD-10-CM: I44.7  ICD-9-CM: 426.3  8/28/2020 - Present        Microalbuminuria due to type 2 diabetes mellitus ([place] [address].) ICD-10-CM: E11.29, R80.9  ICD-9-CM: 250.40, 791.0  8/28/2020 - Present        Morbid obesity ([place] [address].) ICD-10-CM: E66.01  ICD-9-CM: 278.01  8/28/2020 - Present        Benign essential hypertension ICD-10-CM: I10  ICD-9-CM: 401.1  6/25/2020 - Present        Body mass index 30.0-30.9, adult ICD-10-CM: Z68.30  ICD-9-CM: V85.30  6/25/2020 - Present        Type II diabetes mellitus, uncontrolled ([place] [address].) ICD-10-CM: E11.65  ICD-9-CM: 250.02  6/25/2020 - Present        GERD (gastroesophageal reflux disease) ICD-10-CM: K21.9  ICD-9-CM: 530.81  6/25/2020 - Present        Heart murmur ICD-10-CM: R01.1  ICD-9-CM: 785.2  6/25/2020 - Present        Mixed hyperlipidemia ICD-10-CM: E78.2  ICD-9-CM: 272.2  6/25/2020 - Present        Tobacco dependence syndrome ICD-10-CM: F17.200  ICD-9-CM: 305.1  6/25/2020 - Present          22    Final Diagnoses:   COPD exacerbation ([facility] [address] [J44.1]  Hyponatremia [E87.1]  Anemia [D64.9]    Reason for Hospitalization:    Worsening shortness of breath since few days    Hospital Course:     65F, H/o CAD with severe AS complicated by anemia presents with shortnes of breath s.t AECOPD in the setting of AS and anemia. She was managed with prednisone and gentle hydration while her HCTZ was stopped. She has a f/u with CTSurgery in January. INSTRUCTIONS ON DISCHARGE      Please take prednisone 40mg daily for 5 days and then stop.      F/u with cardiothoracic surgery as scheduled.       has been set up for you     Incase of worsening shortness of breath and wheezing use inhaler as needed every 6 hourly        Discharge Medications:   Current Discharge Medication List      START taking these medications    Details   predniSONE (DELTASONE) 20 mg tablet Take 40 mg by mouth daily (with breakfast) for 5 days. Qty: 10 Tab, Refills: 0      albuterol sulfate 90 mcg/actuation aebs Take 1 Puff by inhalation every six (6) hours as needed for Wheezing for up to 1 day. Qty: 1 Each, Refills: 0         CONTINUE these medications which have NOT CHANGED    Details   lisinopriL (PRINIVIL, ZESTRIL) 20 mg tablet Take 1 Tab by mouth two (2) times a day. Qty: 180 Tab, Refills: 2    Associated Diagnoses: Benign essential hypertension      metFORMIN (GLUCOPHAGE) 500 mg tablet Take 2 tablets twice a day with meals  Qty: 120 Tab, Refills: 3    Associated Diagnoses: Type 2 diabetes mellitus with hyperglycemia, without long-term current use of insulin ([facility])      pioglitazone (ACTOS) 45 mg tablet Take 1 Tab by mouth daily for 30 days. Qty: 30 Tab, Refills: 3    Associated Diagnoses: Type 2 diabetes mellitus with hyperglycemia, without long-term current use of insulin ([facility])      atorvastatin (LIPITOR) 40 mg tablet Take 1 Tab by mouth daily for 30 days.   Qty: 30 Tab, Refills: 4    Associated Diagnoses: Type 2 diabetes mellitus with hyperglycemia, without long-term current use of insulin ([facility])      lancets misc Use to check glucose once a day  Qty: 50 Each, Refills: 5    Associated Diagnoses: Type 2 diabetes mellitus with hyperglycemia, without long-term current use of insulin ([facility])      glucose blood VI test strips (blood glucose test) strip by Does Not Apply route two (2) times a day. Use to check glucose once a day  Qty: 50 Strip, Refills: 5    Associated Diagnoses: Type 2 diabetes mellitus with hyperglycemia, without long-term current use of insulin ([facility])      potassium chloride (KLOR-CON) 10 mEq tablet By oral route take one tablet Monday, Wednesday and Friday  Qty: 40 Tab, Refills: 3    Associated Diagnoses: Benign essential hypertension      hydroCHLOROthiazide (HYDRODIURIL) 25 mg tablet Take 1 Tab by mouth daily. Qty: 90 Tab, Refills: 4      aspirin delayed-release (Aspirin Low Dose) 81 mg tablet Take  by mouth daily. amLODIPine (NORVASC) 10 mg tablet Take  by mouth daily. ammonium lactate (LAC-HYDRIN) 12 % lotion Apply  to affected area two (2) times a day. rub in to affected area well      carvediloL (COREG) 3.125 mg tablet Take  by mouth two (2) times daily (with meals). ketoconazole (NIZORAL) 2 % topical cream Apply  to affected area daily. Follow up Care:    1. Rosetta Lomas DO in 1-2 weeks. Please call to set up an appointment shortly after discharge. Diet: cardiac    Disposition:  Home with [facility]    Advanced Directive:   FULL c   DNR      Discharge Exam:  General:  Alert, cooperative, no distress, appears stated age. RA   Lungs:   Prolonged exp phase, few scattered rhonci   Chest wall:  No tenderness or deformity. Heart:  Regular rate and rhythm, S1, S2 normal, no murmur, click, rub or gallop. no le edema   Abdomen:   Soft, non-tender. Bowel sounds normal. No masses,  No organomegaly. Extremities: Extremities normal, atraumatic, no cyanosis or edema. Pulses: 2+ and symmetric all extremities. Skin: Skin color, texture, turgor normal. No rashes or lesions   Neurologic: CNII-XII intact.  No gross sensory or motor deficits          CONSULTATIONS: cardiology    Significant Diagnostic Studies:     Radiologic Studies -   Results from Hospital Encounter encounter on 12/13/20   XR CHEST SNGL V    Narrative Chest, AP upright portable, 1418 hours. Comparison with two-view exam from  11/25/2019. Cardiopericardial silhouette top normal in size. Central pulmonary  vessels appear borderline enlarged, stable. Calcification and tortuosity of  thoracic aorta. There is nonspecific central peribronchial cuffing. No evidence  of pulmonary edema, air space pneumonia, or pleural effusion. No evidence of  pneumothorax. Impression IMPRESSION: No acute pulmonary or pleural disease.       CT Results  (Last 48 hours)    None              Discharge time spent 35 minutes    Signed:  Elisabet Kline MD  12/16/2020  9:25 AM

## 2020-12-17 LAB
GLUCOSE BLD STRIP.AUTO-MCNC: 159 MG/DL (ref 65–100)
GLUCOSE BLD STRIP.AUTO-MCNC: 216 MG/DL (ref 65–100)
GLUCOSE BLD STRIP.AUTO-MCNC: 229 MG/DL (ref 65–100)
GLUCOSE BLD STRIP.AUTO-MCNC: 231 MG/DL (ref 65–100)
GLUCOSE BLD STRIP.AUTO-MCNC: 257 MG/DL (ref 65–100)
PERFORMED BY, TECHID: ABNORMAL

## 2020-12-17 PROCEDURE — 74011250637 HC RX REV CODE- 250/637: Performed by: INTERNAL MEDICINE

## 2020-12-17 PROCEDURE — 74011250636 HC RX REV CODE- 250/636: Performed by: HOSPITALIST

## 2020-12-17 PROCEDURE — 82962 GLUCOSE BLOOD TEST: CPT

## 2020-12-17 PROCEDURE — 94760 N-INVAS EAR/PLS OXIMETRY 1: CPT

## 2020-12-17 PROCEDURE — 74011000250 HC RX REV CODE- 250: Performed by: HOSPITALIST

## 2020-12-17 PROCEDURE — 65270000029 HC RM PRIVATE

## 2020-12-17 PROCEDURE — 94640 AIRWAY INHALATION TREATMENT: CPT

## 2020-12-17 PROCEDURE — 74011250637 HC RX REV CODE- 250/637: Performed by: HOSPITALIST

## 2020-12-17 PROCEDURE — 74011636637 HC RX REV CODE- 636/637: Performed by: INTERNAL MEDICINE

## 2020-12-17 RX ORDER — FAMOTIDINE 10 MG/1
10 TABLET ORAL
Qty: 30 TAB | Refills: 0 | Status: SHIPPED | OUTPATIENT
Start: 2020-12-17 | End: 2021-02-05

## 2020-12-17 RX ORDER — PANTOPRAZOLE SODIUM 40 MG/1
40 TABLET, DELAYED RELEASE ORAL DAILY
Qty: 30 TAB | Refills: 0 | Status: ON HOLD | OUTPATIENT
Start: 2020-12-17 | End: 2021-02-05 | Stop reason: SDUPTHER

## 2020-12-17 RX ORDER — LORAZEPAM 1 MG/1
1 TABLET ORAL
Qty: 5 TAB | Refills: 0 | Status: SHIPPED | OUTPATIENT
Start: 2020-12-17 | End: 2021-07-12

## 2020-12-17 RX ADMIN — LISINOPRIL 20 MG: 20 TABLET ORAL at 08:57

## 2020-12-17 RX ADMIN — METHYLPREDNISOLONE SODIUM SUCCINATE 40 MG: 40 INJECTION, POWDER, FOR SOLUTION INTRAMUSCULAR; INTRAVENOUS at 06:33

## 2020-12-17 RX ADMIN — METHYLPREDNISOLONE SODIUM SUCCINATE 40 MG: 40 INJECTION, POWDER, FOR SOLUTION INTRAMUSCULAR; INTRAVENOUS at 00:17

## 2020-12-17 RX ADMIN — METFORMIN HYDROCHLORIDE 500 MG: 500 TABLET ORAL at 08:57

## 2020-12-17 RX ADMIN — BUDESONIDE AND FORMOTEROL FUMARATE DIHYDRATE 2 PUFF: 160; 4.5 AEROSOL RESPIRATORY (INHALATION) at 19:48

## 2020-12-17 RX ADMIN — METHYLPREDNISOLONE SODIUM SUCCINATE 40 MG: 40 INJECTION, POWDER, FOR SOLUTION INTRAMUSCULAR; INTRAVENOUS at 12:36

## 2020-12-17 RX ADMIN — CARVEDILOL 3.12 MG: 3.12 TABLET, FILM COATED ORAL at 17:17

## 2020-12-17 RX ADMIN — INSULIN LISPRO 2 UNITS: 100 INJECTION, SOLUTION INTRAVENOUS; SUBCUTANEOUS at 17:17

## 2020-12-17 RX ADMIN — METFORMIN HYDROCHLORIDE 500 MG: 500 TABLET ORAL at 17:17

## 2020-12-17 RX ADMIN — PIOGLITAZONE 45 MG: 15 TABLET ORAL at 08:57

## 2020-12-17 RX ADMIN — Medication 10 ML: at 08:59

## 2020-12-17 RX ADMIN — ALBUTEROL SULFATE 2 PUFF: 108 AEROSOL, METERED RESPIRATORY (INHALATION) at 08:23

## 2020-12-17 RX ADMIN — ALBUTEROL SULFATE 2 PUFF: 108 AEROSOL, METERED RESPIRATORY (INHALATION) at 19:48

## 2020-12-17 RX ADMIN — ALBUTEROL SULFATE 2 PUFF: 108 AEROSOL, METERED RESPIRATORY (INHALATION) at 14:47

## 2020-12-17 RX ADMIN — INSULIN LISPRO 4 UNITS: 100 INJECTION, SOLUTION INTRAVENOUS; SUBCUTANEOUS at 08:58

## 2020-12-17 RX ADMIN — METHYLPREDNISOLONE SODIUM SUCCINATE 40 MG: 40 INJECTION, POWDER, FOR SOLUTION INTRAMUSCULAR; INTRAVENOUS at 17:17

## 2020-12-17 RX ADMIN — ALUMINUM HYDROXIDE, MAGNESIUM HYDROXIDE, AND SIMETHICONE 30 ML: 200; 200; 20 SUSPENSION ORAL at 17:18

## 2020-12-17 RX ADMIN — Medication 10 ML: at 14:00

## 2020-12-17 RX ADMIN — BUDESONIDE AND FORMOTEROL FUMARATE DIHYDRATE 2 PUFF: 160; 4.5 AEROSOL RESPIRATORY (INHALATION) at 08:23

## 2020-12-17 RX ADMIN — ALUMINUM HYDROXIDE, MAGNESIUM HYDROXIDE, AND SIMETHICONE 30 ML: 200; 200; 20 SUSPENSION ORAL at 05:00

## 2020-12-17 RX ADMIN — CEFTRIAXONE 1 G: 1 INJECTION, POWDER, FOR SOLUTION INTRAMUSCULAR; INTRAVENOUS at 12:36

## 2020-12-17 RX ADMIN — CARVEDILOL 3.12 MG: 3.12 TABLET, FILM COATED ORAL at 08:58

## 2020-12-17 RX ADMIN — PANTOPRAZOLE SODIUM 40 MG: 40 TABLET, DELAYED RELEASE ORAL at 06:33

## 2020-12-17 RX ADMIN — LISINOPRIL 20 MG: 20 TABLET ORAL at 20:42

## 2020-12-17 RX ADMIN — Medication 10 ML: at 21:38

## 2020-12-17 RX ADMIN — INSULIN LISPRO 4 UNITS: 100 INJECTION, SOLUTION INTRAVENOUS; SUBCUTANEOUS at 21:36

## 2020-12-17 RX ADMIN — INSULIN LISPRO 6 UNITS: 100 INJECTION, SOLUTION INTRAVENOUS; SUBCUTANEOUS at 12:37

## 2020-12-17 RX ADMIN — LORAZEPAM 1 MG: 1 TABLET ORAL at 21:36

## 2020-12-17 RX ADMIN — ATORVASTATIN CALCIUM 40 MG: 40 TABLET, FILM COATED ORAL at 08:58

## 2020-12-17 NOTE — PROGRESS NOTES
Pulmonary,  Note    Name: Arlet Florentino MRN: [id number]   : 1955 Hospital: [address] [place] [address]   Date: 2020  Admission date: 2020 Hospital Day: 5       Subjective/Interval History:   Patient seen in the emergency room. Consult has been placed for shortness of breath and wheezing. States that several years ago her doctor put her on an inhaler but she does not remember what it was in did not continue it. She has a known history of heart disease and is scheduled to have nonemergent coronary artery bypass grafting sometime soon. Over the last 1 to 2 weeks she has had progressively worsening shortness of breath initially it was with exertion then it continued to worsen to the point of being at rest sitting up or laying down. She does have a history of reflux but states it is controlled on Pepcid. She was a smoker but only for about 30 years of about three fourths a pack per day having stopped 6 years ago. She denies any history of childhood asthma has never been told she has COPD. Hospital Problems  Date Reviewed: 2020          Codes Class Noted POA    Hyponatremia ICD-10-CM: E87.1  ICD-9-CM: 276.1  2020 Unknown        COPD exacerbation ([facility] [address] ICD-10-CM: J44.1  ICD-9-CM: 491.21  2020 Unknown        Anemia ICD-10-CM: D64.9  ICD-9-CM: 285.9  2020 Unknown              IMPRESSION:   1. Dyspnea likely due to COPD  2. Exacerbation COPD has cleared with prednisone and nebulized albuterol Atrovent  3. Coronary artery disease with questionable scheduling for bypass surgery in the near future  4. Severe hyponatremia  5. Anemia Marked decrease in hemoglobin compared with October  Body mass index is 34.49 kg/m². RECOMMENDATIONS/PLAN:   1. Covid testing has been ordered so will be unable to give nebulized bronchodilators. Will use inhaled albuterol and Symbicort  2.  Agree with prednisone            [] High complexity decision making was performed  [x] See my orders for details      Subjective/Initial History:     I was asked by rAabella Peters MD to see Raina Sharpe  a 77 y.o.    female in consultation for a chief complaint of dyspnea      Allergies   Allergen Reactions    Aspirin Unknown (comments)     hemorraging - ended up in the hospital        [facility] reviewed and pertinent medications noted or modified as needed     Current Facility-Administered Medications   Medication    cefTRIAXone (ROCEPHIN) 1 g in sterile water (preservative free) 10 mL IV syringe    methylPREDNISolone (PF) (SOLU-MEDROL) injection 40 mg    albuterol (PROVENTIL HFA, VENTOLIN HFA, PROAIR HFA) inhaler 2 Puff    LORazepam (ATIVAN) tablet 1 mg    alum-mag hydroxide-simeth (MYLANTA) oral suspension 30 mL    lisinopriL (PRINIVIL, ZESTRIL) tablet 20 mg    pantoprazole (PROTONIX) tablet 40 mg    metFORMIN (GLUCOPHAGE) tablet 500 mg    pioglitazone (ACTOS) tablet 45 mg    sodium chloride (NS) flush 5-40 mL    sodium chloride (NS) flush 5-40 mL    acetaminophen (TYLENOL) tablet 650 mg    Or    acetaminophen (TYLENOL) suppository 650 mg    polyethylene glycol (MIRALAX) packet 17 g    promethazine (PHENERGAN) tablet 12.5 mg    Or    ondansetron (ZOFRAN) injection 4 mg    insulin lispro (HUMALOG) injection    glucose chewable tablet 16 g    glucagon (GLUCAGEN) injection 1 mg    dextrose (D50W) injection syrg 12.5-25 g    budesonide-formoteroL (SYMBICORT) 160-4.5 mcg/actuation HFA inhaler 2 Puff    atorvastatin (LIPITOR) tablet 40 mg    carvediloL (COREG) tablet 3.125 mg      Patient PCP: Vincent Davis DO  PMH:  has a past medical history of Acute gastrointestinal hemorrhage (8/28/2020), Allergic rhinitis (8/28/2020), Anemia (8/28/2020), Benign essential hypertension (6/25/2020), Body mass index 30.0-30.9, adult (6/25/2020), CAD (coronary artery disease) (8/28/2020), Foot callus (8/28/2020), GERD (gastroesophageal reflux disease) (6/25/2020), Heart murmur (6/25/2020), colonoscopy (01/01/2015), Left bundle branch block (8/28/2020), Microalbuminuria due to type 2 diabetes mellitus ([facility] [address] (8/28/2020), Mixed hyperlipidemia (6/25/2020), Morbid obesity ([facility] [address] (8/28/2020), Tobacco dependence syndrome (6/25/2020), and Type II diabetes mellitus, uncontrolled ([facility] (6/25/2020). PSH:   has a past surgical history that includes hx colonoscopy (2015). FHX: family history includes Cancer in her mother and sister; Hypertension in her father. SHX:  reports that she quit smoking about 6 years ago. She smoked 0.50 packs per day. She has never used smokeless tobacco. She reports previous alcohol use. She reports that she does not use drugs. Systemic review:  General weight stable no chronic fever chills or sweats  Eyes no double vision or momentary blindness  ENT no facial pain over the sinuses  Endocrinologic she has diabetes denies polyuria polydipsia  Musculoskeletal no swollen tender joints  Neurologic no seizures or syncope  Gastrointestinal she has a history of reflux takes Pepcid over-the-counter 10 mg once a day and states she is asymptomatic does not have any sour bitter taste waking her up at night  Genitourinary no pain or discomfort on urination no bleeding  Cardiovascular has a history of heart diseases being scheduled for bypass surgery sometime in the near future. Denies any ankle edema or chest pain no diaphoresis  Respiratory with worsening shortness of breath particularly with exertion but has progressed to being at rest.  She states she was wheezing could not talk in full sentences. Has received nebulized bronchodilator and prednisone IV and states she feels so much better    Objective:     Vital Signs: Telemetry:    normal sinus rhythm Intake/Output:   Visit Vitals  BP (!) 155/82   Pulse 98   Temp 98.2 °F (36.8 °C)   Resp 18   Ht 5' 4.96\" (1.65 m)   Wt 93.9 kg (207 lb 0.2 oz)   SpO2 95% Comment: Simultaneous filing. User may not have seen previous data. Breastfeeding No   BMI 34.49 kg/m²       Temp (24hrs), Av.1 °F (36.7 °C), Min:97.7 °F (36.5 °C), Max:98.6 °F (37 °C)        O2 Device: Room air(Simultaneous filing. User may not have seen previous data.)         Wt Readings from Last 4 Encounters:   20 93.9 kg (207 lb 0.2 oz)   20 94 kg (207 lb 4.8 oz)   10/07/20 93 kg (205 lb)   20 89.3 kg (196 lb 14.4 oz)        No intake or output data in the 24 hours ending 20 1144    Last shift:      No intake/output data recorded. Last 3 shifts: No intake/output data recorded. Physical Exam:   General:  female; currently on room air in no distress oxygen saturation 98%  HEENT: NCAT, oral mucosa clear  Eyes: anicteric; conjunctiva clear extraocular movements normal  Neck: no nodes, no JVD no accessory muscle usage  Chest: no deformity,  Cardiac: Regular rate and rhythm no murmur no ankle edema  Lungs: Prolongation of exhalation and coarsening of exhalation but no definite wheezes there may be a few rales in the bases  Abd: Soft normal bowel sounds no organomegaly no tenderness  Ext: no edema; no joint swelling; No clubbing  : clear urine  Neuro: Awake alert oriented speech is clear moves all 4 extremities  Psych- no agitation, oriented to person;   Skin: warm, dry, no cyanosis;   Pulses: Brachial radial femoral pulses all intact  Capillary: Normal capillary refill    Labs:    Recent Labs     20  0727 12/15/20  1918   WBC 13.7* 12.3*   HGB 7.4* 7.7*   * 527*   10/13/2020 hemoglobin 14.1  Recent Labs     20  0727 12/15/20  1918   * 132*   K 3.8 4.5    98   CO2 30 27   * 311*   BUN 12 13   CREA 0.56 0.89   CA 8.7 8.8   ALB 3.1* 3.3*   ALT 21 27     Room air oxygen saturation 98%  No results for input(s): CPK, CKNDX, TROIQ in the last 72 hours.     No lab exists for component: CPKMB  Procalcitonin less than 0.05    CRP less than 0.29  Lab Results   Component Value Date/Time    TSH, External 2.420 01/16/2019       Imaging:    CXR Results  (Last 48 hours)               12/16/20 1657  XR CHEST PORT Final result    Impression:  Impression:   No acute findings. Mild emphysematous change. Narrative:  Study: XR CHEST PORT       Clinical indication: wheezing,. Comparison: Chest x-ray 12/13/2020. Findings:       Lungs are slightly hyperinflated. No consolidative airspace disease, pleural   effusion or pneumothorax. Cardiomediastinal contours are within normal limits. No pulmonary edema. No acute osseous abnormality identified. Results from Hospital Encounter encounter on 12/13/20   XR CHEST PORT    Narrative Study: XR CHEST PORT    Clinical indication: wheezing,. Comparison: Chest x-ray 12/13/2020. Findings:    Lungs are slightly hyperinflated. No consolidative airspace disease, pleural  effusion or pneumothorax. Cardiomediastinal contours are within normal limits. No pulmonary edema. No acute osseous abnormality identified. Impression Impression:  No acute findings. Mild emphysematous change. XR CHEST SNGL V    Narrative Chest, AP upright portable, 1418 hours. Comparison with two-view exam from  11/25/2019. Cardiopericardial silhouette top normal in size. Central pulmonary  vessels appear borderline enlarged, stable. Calcification and tortuosity of  thoracic aorta. There is nonspecific central peribronchial cuffing. No evidence  of pulmonary edema, air space pneumonia, or pleural effusion. No evidence of  pneumothorax. Impression IMPRESSION: No acute pulmonary or pleural disease.        Emily Frye MD

## 2020-12-17 NOTE — DISCHARGE SUMMARY
Physician Discharge Summary     Patient ID:    Sung Hope  [id number]  89 y.o.  1955    Admit date: 12/13/2020    Discharge date : 12/17/2020    Chronic Diagnoses:    Problem List as of 12/17/2020 Date Reviewed: 11/25/2020          Codes Class Noted - Resolved    Hyponatremia ICD-10-CM: E87.1  ICD-9-CM: 276.1  12/13/2020 - Present        COPD exacerbation ([place] [address] ICD-10-CM: J44.1  ICD-9-CM: 491.21  12/13/2020 - Present        Acute gastrointestinal hemorrhage ICD-10-CM: K92.2  ICD-9-CM: 578.9  8/28/2020 - Present        Allergic rhinitis ICD-10-CM: J30.9  ICD-9-CM: 477.9  8/28/2020 - Present        Anemia ICD-10-CM: D64.9  ICD-9-CM: 285.9  8/28/2020 - Present        CAD (coronary artery disease) ICD-10-CM: I25.10  ICD-9-CM: 414.00  8/28/2020 - Present        Foot callus ICD-10-CM: L84  ICD-9-CM: 700  8/28/2020 - Present        Left bundle branch block ICD-10-CM: I44.7  ICD-9-CM: 426.3  8/28/2020 - Present        Microalbuminuria due to type 2 diabetes mellitus ([place] [address] ICD-10-CM: E11.29, R80.9  ICD-9-CM: 250.40, 791.0  8/28/2020 - Present        Morbid obesity ([place] [address] ICD-10-CM: E66.01  ICD-9-CM: 278.01  8/28/2020 - Present        Benign essential hypertension ICD-10-CM: I10  ICD-9-CM: 401.1  6/25/2020 - Present        Body mass index 30.0-30.9, adult ICD-10-CM: Z68.30  ICD-9-CM: V85.30  6/25/2020 - Present        Type II diabetes mellitus, uncontrolled ([place] [address] ICD-10-CM: E11.65  ICD-9-CM: 250.02  6/25/2020 - Present        GERD (gastroesophageal reflux disease) ICD-10-CM: K21.9  ICD-9-CM: 530.81  6/25/2020 - Present        Heart murmur ICD-10-CM: R01.1  ICD-9-CM: 785.2  6/25/2020 - Present        Mixed hyperlipidemia ICD-10-CM: E78.2  ICD-9-CM: 272.2  6/25/2020 - Present        Tobacco dependence syndrome ICD-10-CM: F17.200  ICD-9-CM: 305.1  6/25/2020 - Present          22    Final Diagnoses:   COPD exacerbation ([facility] [address] [J44.1]  Hyponatremia [E87.1]  Anemia [D64.9]    Reason for Hospitalization:    Worsening shortness of breath since few days    Hospital Course:       65F, H/o CAD with severe AS complicated by anemia presents with shortnes of breath s.t AECOPD in the setting of AS and anemia.     She was managed with prednisone and gentle hydration while her HCTZ was stopped. She has a f/u with CTSurgery in January.      INSTRUCTIONS ON DISCHARGE      Please take prednisone 40mg daily for 5 days and then stop.      F/u with cardiothoracic surgery as scheduled.       has been set up for you     Incase of worsening shortness of breath and wheezing use inhaler as needed every 6 hourly    protonix 40mg daily and famotidine 10mg at bed time        Discharge Medications:   Current Discharge Medication List      START taking these medications    Details   pantoprazole (Protonix) 40 mg tablet Take 1 Tab by mouth daily. Qty: 30 Tab, Refills: 0      famotidine (PEPCID) 10 mg tablet Take 1 Tab by mouth nightly. Qty: 30 Tab, Refills: 0      LORazepam (Ativan) 1 mg tablet Take 1 Tab by mouth nightly. Max Daily Amount: 1 mg. Qty: 5 Tab, Refills: 0    Associated Diagnoses: Chronic obstructive pulmonary disease, unspecified COPD type ([facility])      predniSONE (DELTASONE) 20 mg tablet Take 40 mg by mouth daily (with breakfast) for 5 days. Qty: 10 Tab, Refills: 0      albuterol sulfate 90 mcg/actuation aebs Take 1 Puff by inhalation every six (6) hours as needed for Wheezing for up to 1 day. Qty: 1 Each, Refills: 0         CONTINUE these medications which have NOT CHANGED    Details   lisinopriL (PRINIVIL, ZESTRIL) 20 mg tablet Take 1 Tab by mouth two (2) times a day.   Qty: 180 Tab, Refills: 2    Associated Diagnoses: Benign essential hypertension      metFORMIN (GLUCOPHAGE) 500 mg tablet Take 2 tablets twice a day with meals  Qty: 120 Tab, Refills: 3    Associated Diagnoses: Type 2 diabetes mellitus with hyperglycemia, without long-term current use of insulin ([facility])      pioglitazone (ACTOS) 45 mg tablet Take 1 Tab by mouth daily for 30 days. Qty: 30 Tab, Refills: 3    Associated Diagnoses: Type 2 diabetes mellitus with hyperglycemia, without long-term current use of insulin ([facility])      atorvastatin (LIPITOR) 40 mg tablet Take 1 Tab by mouth daily for 30 days. Qty: 30 Tab, Refills: 4    Associated Diagnoses: Type 2 diabetes mellitus with hyperglycemia, without long-term current use of insulin ([facility])      lancets misc Use to check glucose once a day  Qty: 50 Each, Refills: 5    Associated Diagnoses: Type 2 diabetes mellitus with hyperglycemia, without long-term current use of insulin ([facility])      glucose blood VI test strips (blood glucose test) strip by Does Not Apply route two (2) times a day. Use to check glucose once a day  Qty: 50 Strip, Refills: 5    Associated Diagnoses: Type 2 diabetes mellitus with hyperglycemia, without long-term current use of insulin ([facility])      potassium chloride (KLOR-CON) 10 mEq tablet By oral route take one tablet Monday, Wednesday and Friday  Qty: 40 Tab, Refills: 3    Associated Diagnoses: Benign essential hypertension      hydroCHLOROthiazide (HYDRODIURIL) 25 mg tablet Take 1 Tab by mouth daily. Qty: 90 Tab, Refills: 4      aspirin delayed-release (Aspirin Low Dose) 81 mg tablet Take  by mouth daily. amLODIPine (NORVASC) 10 mg tablet Take  by mouth daily. ammonium lactate (LAC-HYDRIN) 12 % lotion Apply  to affected area two (2) times a day. rub in to affected area well      carvediloL (COREG) 3.125 mg tablet Take  by mouth two (2) times daily (with meals). ketoconazole (NIZORAL) 2 % topical cream Apply  to affected area daily. Follow up Care:    1. René Cintron DO in 1-2 weeks. Please call to set up an appointment shortly after discharge. Diet:  cardiac    Disposition:  Home with [facility]    Advanced Directive:   FULL c   DNR      Discharge Exam:  See today's note. General:  Alert, cooperative, no distress, appears stated age. RA   Lungs: Prolonged exp phase, few scattered rhonci   Chest wall:  No tenderness or deformity. Heart:  Regular rate and rhythm, S1, S2 normal, no murmur, click, rub or gallop. no le edema   Abdomen:   Soft, non-tender. Bowel sounds normal. No masses,  No organomegaly. Extremities: Extremities normal, atraumatic, no cyanosis or edema. Pulses: 2+ and symmetric all extremities. Skin: Skin color, texture, turgor normal. No rashes or lesions   Neurologic: CNII-XII intact. No gross sensory or motor deficits      CONSULTATIONS: cardiology and pulmonology    Significant Diagnostic Studies:     Radiologic Studies -   Results from Hospital Encounter encounter on 12/13/20   XR CHEST PORT    Narrative Study: XR CHEST PORT    Clinical indication: wheezing,. Comparison: Chest x-ray 12/13/2020. Findings:    Lungs are slightly hyperinflated. No consolidative airspace disease, pleural  effusion or pneumothorax. Cardiomediastinal contours are within normal limits. No pulmonary edema. No acute osseous abnormality identified. Impression Impression:  No acute findings. Mild emphysematous change.       CT Results  (Last 48 hours)    None              Discharge time spent 35 minutes    Signed:  Mary Ellen Roberts MD  12/17/2020  10:54 AM

## 2020-12-17 NOTE — ROUTINE PROCESS
Bedside and Verbal shift change report given to [address] [place] [address] Road. (oncoming nurse) by Anjel Murguia. (offgoing nurse). Report included the following information SBAR, Intake/Output, MAR and Recent Results.

## 2020-12-17 NOTE — PROGRESS NOTES
Patient initially had a discharge order for home health but it was changed to home-self. Per nurse discharge is being held until tomorrow based on patient's condition. BOBO met with the patient at the bedside to discuss discharge plan. She stated she is going to go home with her daughter at discharge and would like home health arranged. She cannot exactly remember her daughter's address and will ask her and write it down tonight. BOBO inquired about HH choice and she stated she will also ask her daughter that and notify CM tomorrow morning.

## 2020-12-17 NOTE — PROGRESS NOTES
Progress Note      12/16/2020 9:34 PM  NAME: Cherylene Plants   MRN:  [id number]   Admit Diagnosis: COPD exacerbation ([facility] [address] [J44.1]  Hyponatremia [E87.1]  Anemia [D64.9]      Problem List:     1. Cad ,3 vessel   2. Aortic stenosis     Assessment/Plan:     1. Discussed patient's status with cardiac surgeon and he will follow up as out patient         []       High complexity decision making was performed in this patient at high risk for decompensation with multiple organ involvement. Subjective: Cherylene Plants denies chest pain, dyspnea. Discussed with RN events overnight. Review of Systems:    Symptom Y/N Comments  Symptom Y/N Comments   Fever/Chills N   Chest Pain N    Poor Appetite N   Edema N    Cough N   Abdominal Pain N    Sputum N   Joint Pain N    SOB/ACEVEDO N   Pruritis/Rash N    Nausea/vomit N   Tolerating PT/OT Y    Diarrhea N   Tolerating Diet Y    Constipation N   Other       Could NOT obtain due to:      Objective:      Physical Exam:    Last 24hrs VS reviewed since prior progress note. Most recent are:    Visit Vitals  BP (!) 164/85   Pulse 98   Temp 98 °F (36.7 °C)   Resp 20   Ht 5' 4.96\" (1.65 m)   Wt 93.9 kg (207 lb 0.2 oz)   SpO2 98%   Breastfeeding No   BMI 34.49 kg/m²     No intake or output data in the 24 hours ending 12/16/20 2134     General Appearance: Well developed, well nourished, alert & oriented x 3,    no acute distress. Ears/Nose/Mouth/Throat: Hearing grossly normal.  Neck: Supple. Chest: Lungs clear to auscultation bilaterally. Cardiovascular: Regular rate and rhythm, S1S2 normal, no murmur. Abdomen: Soft, non-tender, bowel sounds are active. Extremities: No edema bilaterally. Skin: Warm and dry. []         Post-cath site without hematoma, bruit, tenderness, or thrill. Distal pulses intact.     PMH/SH reviewed - no change compared to H&P    Data Review    Telemetry: normal sinus rhythm     EKG:   []  No new EKG for review    Lab Data Personally Reviewed:    Recent Labs     12/16/20  0727 12/15/20  1918   WBC 13.7* 12.3*   HGB 7.4* 7.7*   HCT 23.9* 25.1*   * 527*     No results for input(s): INR, PTP, APTT, INREXT in the last 72 hours. Recent Labs     12/16/20  0727 12/15/20  1918 12/14/20  0505   * 132* 126*   K 3.8 4.5 3.5    98 89*   CO2 30 27 27   BUN 12 13 12   CREA 0.56 0.89 0.71   * 311* 197*   CA 8.7 8.8 8.8   MG  --   --  2.2     No results for input(s): CPK, CKNDX, TROIQ in the last 72 hours. No lab exists for component: CPKMB  Lab Results   Component Value Date/Time    Cholesterol, total 123 10/13/2020 10:58 AM    HDL Cholesterol 54 10/13/2020 10:58 AM    LDL Chol Calc (NIH) 55 10/13/2020 10:58 AM    Triglyceride 65 10/13/2020 10:58 AM       Recent Labs     12/16/20  0727 12/15/20  1918 12/14/20  0505   AP 58 66  --    TP 6.7 7.4  --    ALB 3.1* 3.3* 3.3*   GLOB 3.6 4.1*  --      No results for input(s): PH, PCO2, PO2 in the last 72 hours.     Medications Personally Reviewed:    Current Facility-Administered Medications   Medication Dose Route Frequency    cefTRIAXone (ROCEPHIN) 1 g in sterile water (preservative free) 10 mL IV syringe  1 g IntraVENous Q24H    methylPREDNISolone (PF) (SOLU-MEDROL) injection 40 mg  40 mg IntraVENous Q6H    albuterol (PROVENTIL HFA, VENTOLIN HFA, PROAIR HFA) inhaler 2 Puff  2 Puff Inhalation Q6HWA RT    LORazepam (ATIVAN) tablet 1 mg  1 mg Oral Q6H PRN    alum-mag hydroxide-simeth (MYLANTA) oral suspension 30 mL  30 mL Oral Q4H PRN    lisinopriL (PRINIVIL, ZESTRIL) tablet 20 mg  20 mg Oral BID    pantoprazole (PROTONIX) tablet 40 mg  40 mg Oral ACB    metFORMIN (GLUCOPHAGE) tablet 500 mg  500 mg Oral BID WITH MEALS    pioglitazone (ACTOS) tablet 45 mg  45 mg Oral ACB    sodium chloride (NS) flush 5-40 mL  5-40 mL IntraVENous Q8H    sodium chloride (NS) flush 5-40 mL  5-40 mL IntraVENous PRN    acetaminophen (TYLENOL) tablet 650 mg  650 mg Oral Q6H PRN    Or    acetaminophen (TYLENOL) suppository 650 mg  650 mg Rectal Q6H PRN    polyethylene glycol (MIRALAX) packet 17 g  17 g Oral DAILY PRN    promethazine (PHENERGAN) tablet 12.5 mg  12.5 mg Oral Q6H PRN    Or    ondansetron (ZOFRAN) injection 4 mg  4 mg IntraVENous Q6H PRN    insulin lispro (HUMALOG) injection   SubCUTAneous AC&HS    glucose chewable tablet 16 g  4 Tab Oral PRN    glucagon (GLUCAGEN) injection 1 mg  1 mg IntraMUSCular PRN    dextrose (D50W) injection syrg 12.5-25 g  25-50 mL IntraVENous PRN    budesonide-formoteroL (SYMBICORT) 160-4.5 mcg/actuation HFA inhaler 2 Puff  2 Puff Inhalation BID RT    atorvastatin (LIPITOR) tablet 40 mg  40 mg Oral DAILY    carvediloL (COREG) tablet 3.125 mg  3.125 mg Oral BID WITH MEALS         Dinorah Aviles MD

## 2020-12-18 ENCOUNTER — TELEPHONE (OUTPATIENT)
Dept: FAMILY MEDICINE CLINIC | Age: 65
End: 2020-12-18

## 2020-12-18 VITALS
DIASTOLIC BLOOD PRESSURE: 81 MMHG | TEMPERATURE: 98.2 F | SYSTOLIC BLOOD PRESSURE: 157 MMHG | OXYGEN SATURATION: 98 % | WEIGHT: 214.73 LBS | HEART RATE: 89 BPM | HEIGHT: 65 IN | RESPIRATION RATE: 18 BRPM | BODY MASS INDEX: 35.78 KG/M2

## 2020-12-18 LAB
GLUCOSE BLD STRIP.AUTO-MCNC: 238 MG/DL (ref 65–100)
GLUCOSE BLD STRIP.AUTO-MCNC: 244 MG/DL (ref 65–100)
GLUCOSE BLD STRIP.AUTO-MCNC: 248 MG/DL (ref 65–100)
PERFORMED BY, TECHID: ABNORMAL

## 2020-12-18 PROCEDURE — 74011250637 HC RX REV CODE- 250/637: Performed by: INTERNAL MEDICINE

## 2020-12-18 PROCEDURE — 82962 GLUCOSE BLOOD TEST: CPT

## 2020-12-18 PROCEDURE — 94640 AIRWAY INHALATION TREATMENT: CPT

## 2020-12-18 PROCEDURE — 74011000250 HC RX REV CODE- 250: Performed by: HOSPITALIST

## 2020-12-18 PROCEDURE — 74011250637 HC RX REV CODE- 250/637: Performed by: HOSPITALIST

## 2020-12-18 PROCEDURE — 74011250636 HC RX REV CODE- 250/636: Performed by: HOSPITALIST

## 2020-12-18 PROCEDURE — 74011636637 HC RX REV CODE- 636/637: Performed by: INTERNAL MEDICINE

## 2020-12-18 RX ADMIN — PIOGLITAZONE 45 MG: 15 TABLET ORAL at 08:51

## 2020-12-18 RX ADMIN — INSULIN LISPRO 4 UNITS: 100 INJECTION, SOLUTION INTRAVENOUS; SUBCUTANEOUS at 12:11

## 2020-12-18 RX ADMIN — PANTOPRAZOLE SODIUM 40 MG: 40 TABLET, DELAYED RELEASE ORAL at 05:52

## 2020-12-18 RX ADMIN — Medication 10 ML: at 14:00

## 2020-12-18 RX ADMIN — BUDESONIDE AND FORMOTEROL FUMARATE DIHYDRATE 2 PUFF: 160; 4.5 AEROSOL RESPIRATORY (INHALATION) at 09:17

## 2020-12-18 RX ADMIN — INSULIN LISPRO 4 UNITS: 100 INJECTION, SOLUTION INTRAVENOUS; SUBCUTANEOUS at 08:50

## 2020-12-18 RX ADMIN — ATORVASTATIN CALCIUM 40 MG: 40 TABLET, FILM COATED ORAL at 08:52

## 2020-12-18 RX ADMIN — PIOGLITAZONE 45 MG: 15 TABLET ORAL at 05:51

## 2020-12-18 RX ADMIN — PANTOPRAZOLE SODIUM 40 MG: 40 TABLET, DELAYED RELEASE ORAL at 08:52

## 2020-12-18 RX ADMIN — LISINOPRIL 20 MG: 20 TABLET ORAL at 08:52

## 2020-12-18 RX ADMIN — METFORMIN HYDROCHLORIDE 500 MG: 500 TABLET ORAL at 08:52

## 2020-12-18 RX ADMIN — CARVEDILOL 3.12 MG: 3.12 TABLET, FILM COATED ORAL at 08:52

## 2020-12-18 RX ADMIN — ALBUTEROL SULFATE 2 PUFF: 108 AEROSOL, METERED RESPIRATORY (INHALATION) at 09:17

## 2020-12-18 RX ADMIN — METHYLPREDNISOLONE SODIUM SUCCINATE 40 MG: 40 INJECTION, POWDER, FOR SOLUTION INTRAMUSCULAR; INTRAVENOUS at 00:02

## 2020-12-18 RX ADMIN — METHYLPREDNISOLONE SODIUM SUCCINATE 40 MG: 40 INJECTION, POWDER, FOR SOLUTION INTRAMUSCULAR; INTRAVENOUS at 05:51

## 2020-12-18 RX ADMIN — CEFTRIAXONE 1 G: 1 INJECTION, POWDER, FOR SOLUTION INTRAMUSCULAR; INTRAVENOUS at 12:12

## 2020-12-18 RX ADMIN — Medication 10 ML: at 06:05

## 2020-12-18 RX ADMIN — METHYLPREDNISOLONE SODIUM SUCCINATE 40 MG: 40 INJECTION, POWDER, FOR SOLUTION INTRAMUSCULAR; INTRAVENOUS at 12:12

## 2020-12-18 NOTE — PROGRESS NOTES
Discharge plan of care/case management plan validated with provider discharge order. Pt discharged home with [id number]. Discharge education and materials provided at bedside with pt and pt daughter Luther Rojas. Both verbalized understanding. Pt IV removed. Pt in no acute distress. Pt wheeled out to private vehicle.

## 2020-12-18 NOTE — PROGRESS NOTES
CM met with the patient and her daughter, Deena Campbell, at the bedside regarding home health. They are agreeable with no preference. Choice obtained and IMM obtained. Referrals have been sent. Patient will be going home with her daughter, Rozina Feng [phone] Address: [address] [place] [address],[address].

## 2020-12-18 NOTE — PROGRESS NOTES
Pulmonary,  Note    Name: Raina Sharpe MRN: [id number]   : 1955 Hospital: [address] [place] [address]   Date: 2020  Admission date: 2020 Hospital Day: 6       Subjective/Interval History:   Patient seen in the emergency room. Consult has been placed for shortness of breath and wheezing. States that several years ago her doctor put her on an inhaler but she does not remember what it was in did not continue it. She has a known history of heart disease and is scheduled to have nonemergent coronary artery bypass grafting sometime soon. Over the last 1 to 2 weeks she has had progressively worsening shortness of breath initially it was with exertion then it continued to worsen to the point of being at rest sitting up or laying down. She does have a history of reflux but states it is controlled on Pepcid. She was a smoker but only for about 30 years of about three fourths a pack per day having stopped 6 years ago. She denies any history of childhood asthma has never been told she has COPD. Hospital Problems  Date Reviewed: 2020          Codes Class Noted POA    Hyponatremia ICD-10-CM: E87.1  ICD-9-CM: 276.1  2020 Unknown        COPD exacerbation ([facility] [address] ICD-10-CM: J44.1  ICD-9-CM: 491.21  2020 Unknown        Anemia ICD-10-CM: D64.9  ICD-9-CM: 285.9  2020 Unknown              IMPRESSION:   1. Dyspnea likely due to COPD  2. Exacerbation COPD has cleared with prednisone and nebulized albuterol Atrovent  3. Coronary artery disease with questionable scheduling for bypass surgery in the near future  4. Severe hyponatremia  5. Anemia Marked decrease in hemoglobin compared with October  Body mass index is 35.78 kg/m². RECOMMENDATIONS/PLAN:   1. Covid testing has been ordered so will be unable to give nebulized bronchodilators. Will use inhaled albuterol and Symbicort  2.  Agree with prednisone            [] High complexity decision making was performed  [x] See my orders for details      Subjective/Initial History:     I was asked by Felix Cain MD to see Marisela Souza  a 77 y.o.    female in consultation for a chief complaint of dyspnea      Allergies   Allergen Reactions    Aspirin Unknown (comments)     hemorraging - ended up in the hospital        [facility] reviewed and pertinent medications noted or modified as needed     Current Facility-Administered Medications   Medication    cefTRIAXone (ROCEPHIN) 1 g in sterile water (preservative free) 10 mL IV syringe    methylPREDNISolone (PF) (SOLU-MEDROL) injection 40 mg    albuterol (PROVENTIL HFA, VENTOLIN HFA, PROAIR HFA) inhaler 2 Puff    LORazepam (ATIVAN) tablet 1 mg    alum-mag hydroxide-simeth (MYLANTA) oral suspension 30 mL    lisinopriL (PRINIVIL, ZESTRIL) tablet 20 mg    pantoprazole (PROTONIX) tablet 40 mg    metFORMIN (GLUCOPHAGE) tablet 500 mg    pioglitazone (ACTOS) tablet 45 mg    sodium chloride (NS) flush 5-40 mL    sodium chloride (NS) flush 5-40 mL    acetaminophen (TYLENOL) tablet 650 mg    Or    acetaminophen (TYLENOL) suppository 650 mg    polyethylene glycol (MIRALAX) packet 17 g    promethazine (PHENERGAN) tablet 12.5 mg    Or    ondansetron (ZOFRAN) injection 4 mg    insulin lispro (HUMALOG) injection    glucose chewable tablet 16 g    glucagon (GLUCAGEN) injection 1 mg    dextrose (D50W) injection syrg 12.5-25 g    budesonide-formoteroL (SYMBICORT) 160-4.5 mcg/actuation HFA inhaler 2 Puff    atorvastatin (LIPITOR) tablet 40 mg    carvediloL (COREG) tablet 3.125 mg      Patient PCP: Thien Arnold DO  PMH:  has a past medical history of Acute gastrointestinal hemorrhage (8/28/2020), Allergic rhinitis (8/28/2020), Anemia (8/28/2020), Benign essential hypertension (6/25/2020), Body mass index 30.0-30.9, adult (6/25/2020), CAD (coronary artery disease) (8/28/2020), Foot callus (8/28/2020), GERD (gastroesophageal reflux disease) (6/25/2020), Heart murmur (6/25/2020), colonoscopy (01/01/2015), Left bundle branch block (8/28/2020), Microalbuminuria due to type 2 diabetes mellitus ([facility] [address] (8/28/2020), Mixed hyperlipidemia (6/25/2020), Morbid obesity ([facility] [address] (8/28/2020), Tobacco dependence syndrome (6/25/2020), and Type II diabetes mellitus, uncontrolled ([facility] [address] (6/25/2020). PSH:   has a past surgical history that includes hx colonoscopy (2015). FHX: family history includes Cancer in her mother and sister; Hypertension in her father. SHX:  reports that she quit smoking about 6 years ago. She smoked 0.50 packs per day. She has never used smokeless tobacco. She reports previous alcohol use. She reports that she does not use drugs. Systemic review:  General weight stable no chronic fever chills or sweats  Eyes no double vision or momentary blindness  ENT no facial pain over the sinuses  Endocrinologic she has diabetes denies polyuria polydipsia  Musculoskeletal no swollen tender joints  Neurologic no seizures or syncope  Gastrointestinal she has a history of reflux takes Pepcid over-the-counter 10 mg once a day and states she is asymptomatic does not have any sour bitter taste waking her up at night  Genitourinary no pain or discomfort on urination no bleeding  Cardiovascular has a history of heart diseases being scheduled for bypass surgery sometime in the near future. Denies any ankle edema or chest pain no diaphoresis  Respiratory with worsening shortness of breath particularly with exertion but has progressed to being at rest.  She states she was wheezing could not talk in full sentences.   Has received nebulized bronchodilator and prednisone IV and states she feels so much better    Objective:     Vital Signs: Telemetry:    normal sinus rhythm Intake/Output:   Visit Vitals  BP (!) 157/81 (BP 1 Location: Left arm, BP Patient Position: At rest)   Pulse 89   Temp 98.2 °F (36.8 °C)   Resp 18   Ht 5' 4.96\" (1.65 m)   Wt 97.4 kg (214 lb 11.7 oz)   SpO2 98% Breastfeeding No   BMI 35.78 kg/m²       Temp (24hrs), Av.2 °F (36.8 °C), Min:97.5 °F (36.4 °C), Max:99.3 °F (37.4 °C)        O2 Device: Room air         Wt Readings from Last 4 Encounters:   20 97.4 kg (214 lb 11.7 oz)   20 94 kg (207 lb 4.8 oz)   10/07/20 93 kg (205 lb)   20 89.3 kg (196 lb 14.4 oz)        No intake or output data in the 24 hours ending 20 1155    Last shift:      No intake/output data recorded. Last 3 shifts: No intake/output data recorded. Physical Exam:   General:  female; currently on room air in no distress oxygen saturation 98%  HEENT: NCAT, oral mucosa clear  Eyes: anicteric; conjunctiva clear extraocular movements normal  Neck: no nodes, no JVD no accessory muscle usage  Chest: no deformity,  Cardiac: Regular rate and rhythm no murmur no ankle edema  Lungs: Prolongation of exhalation and coarsening of exhalation but no definite wheezes there may be a few rales in the bases  Abd: Soft normal bowel sounds no organomegaly no tenderness  Ext: no edema; no joint swelling; No clubbing  : clear urine  Neuro: Awake alert oriented speech is clear moves all 4 extremities  Psych- no agitation, oriented to person;   Skin: warm, dry, no cyanosis;   Pulses: Brachial radial femoral pulses all intact  Capillary: Normal capillary refill    Labs:    Recent Labs     20  0727 12/15/20  1918   WBC 13.7* 12.3*   HGB 7.4* 7.7*   * 527*   10/13/2020 hemoglobin 14.1  Recent Labs     20  0727 12/15/20  1918   * 132*   K 3.8 4.5    98   CO2 30 27   * 311*   BUN 12 13   CREA 0.56 0.89   CA 8.7 8.8   ALB 3.1* 3.3*   ALT 21 27     Room air oxygen saturation 98%  No results for input(s): CPK, CKNDX, TROIQ in the last 72 hours.     No lab exists for component: CPKMB  Procalcitonin less than 0.05    CRP less than 0.29  Lab Results   Component Value Date/Time    TSH, External 2.420 2019       Imaging:    CXR Results (Last 48 hours)               12/16/20 1657  XR CHEST PORT Final result    Impression:  Impression:   No acute findings. Mild emphysematous change. Narrative:  Study: XR CHEST PORT       Clinical indication: wheezing,. Comparison: Chest x-ray 12/13/2020. Findings:       Lungs are slightly hyperinflated. No consolidative airspace disease, pleural   effusion or pneumothorax. Cardiomediastinal contours are within normal limits. No pulmonary edema. No acute osseous abnormality identified. Results from Hospital Encounter encounter on 12/13/20   XR CHEST PORT    Narrative Study: XR CHEST PORT    Clinical indication: wheezing,. Comparison: Chest x-ray 12/13/2020. Findings:    Lungs are slightly hyperinflated. No consolidative airspace disease, pleural  effusion or pneumothorax. Cardiomediastinal contours are within normal limits. No pulmonary edema. No acute osseous abnormality identified. Impression Impression:  No acute findings. Mild emphysematous change. XR CHEST SNGL V    Narrative Chest, AP upright portable, 1418 hours. Comparison with two-view exam from  11/25/2019. Cardiopericardial silhouette top normal in size. Central pulmonary  vessels appear borderline enlarged, stable. Calcification and tortuosity of  thoracic aorta. There is nonspecific central peribronchial cuffing. No evidence  of pulmonary edema, air space pneumonia, or pleural effusion. No evidence of  pneumothorax. Impression IMPRESSION: No acute pulmonary or pleural disease.        Lola Bernal MD

## 2020-12-18 NOTE — DISCHARGE INSTRUCTIONS

## 2020-12-18 NOTE — PROGRESS NOTES
Problem: Falls - Risk of  Goal: *Absence of Falls  Description: Document Nilsa Floresccasin Fall Risk and appropriate interventions in the flowsheet.   Outcome: Progressing Towards Goal  Note: Fall Risk Interventions:  Mobility Interventions: Bed/chair exit alarm         Medication Interventions: Teach patient to arise slowly, Patient to call before getting OOB, Bed/chair exit alarm                   Problem: Patient Education: Go to Patient Education Activity  Goal: Patient/Family Education  Outcome: Progressing Towards Goal

## 2020-12-18 NOTE — PROGRESS NOTES
Hospitalist Progress Note               Daily Progress Note: 12/18/2020     65F, H/o CAD with severe AS complicated by anemia presents with shortnes of breath s.t AECOPD in the setting of AS and anemia. HOSPITAL COURSE:   65F, H/o CAD with severe AS complicated by anemia presents with shortnes of breath s.t AECOPD in the setting of AS and anemia.     She was managed with prednisone and gentle hydration while her HCTZ was stopped.    She has a f/u with CTSurgery in January.      INSTRUCTIONS ON DISCHARGE      Please take prednisone 40mg daily for 5 days and then stop.      F/u with cardiothoracic surgery as scheduled.      HH has been set up for you     Incase of worsening shortness of breath and wheezing use inhaler as needed every 6 hourly     protonix 40mg daily and famotidine 10mg at bed time   Problem List:  Problem List as of 12/18/2020 Date Reviewed: 11/25/2020          Codes Class Noted - Resolved    Hyponatremia ICD-10-CM: E87.1  ICD-9-CM: 276.1  12/13/2020 - Present        COPD exacerbation ([facility] ICD-10-CM: J44.1  ICD-9-CM: 491.21  12/13/2020 - Present        Acute gastrointestinal hemorrhage ICD-10-CM: K92.2  ICD-9-CM: 578.9  8/28/2020 - Present        Allergic rhinitis ICD-10-CM: J30.9  ICD-9-CM: 477.9  8/28/2020 - Present        Anemia ICD-10-CM: D64.9  ICD-9-CM: 285.9  8/28/2020 - Present        CAD (coronary artery disease) ICD-10-CM: I25.10  ICD-9-CM: 414.00  8/28/2020 - Present        Foot callus ICD-10-CM: L84  ICD-9-CM: 700  8/28/2020 - Present        Left bundle branch block ICD-10-CM: I44.7  ICD-9-CM: 426.3  8/28/2020 - Present        Microalbuminuria due to type 2 diabetes mellitus ([facility] ICD-10-CM: E11.29, R80.9  ICD-9-CM: 250.40, 791.0  8/28/2020 - Present        Morbid obesity ([facility] ICD-10-CM: E66.01  ICD-9-CM: 278.01  8/28/2020 - Present        Benign essential hypertension ICD-10-CM: I10  ICD-9-CM: 401.1  6/25/2020 - Present        Body mass index 30.0-30.9, adult ICD-10-CM: Z68.30  ICD-9-CM: V85.30  6/25/2020 - Present        Type II diabetes mellitus, uncontrolled ([facility] ICD-10-CM: E11.65  ICD-9-CM: 250.02  6/25/2020 - Present        GERD (gastroesophageal reflux disease) ICD-10-CM: K21.9  ICD-9-CM: 530.81  6/25/2020 - Present        Heart murmur ICD-10-CM: R01.1  ICD-9-CM: 785.2  6/25/2020 - Present        Mixed hyperlipidemia ICD-10-CM: E78.2  ICD-9-CM: 272.2  6/25/2020 - Present        Tobacco dependence syndrome ICD-10-CM: F17.200  ICD-9-CM: 305.1  6/25/2020 - Present              Medications reviewed  Current Facility-Administered Medications   Medication Dose Route Frequency    cefTRIAXone (ROCEPHIN) 1 g in sterile water (preservative free) 10 mL IV syringe  1 g IntraVENous Q24H    methylPREDNISolone (PF) (SOLU-MEDROL) injection 40 mg  40 mg IntraVENous Q6H    albuterol (PROVENTIL HFA, VENTOLIN HFA, PROAIR HFA) inhaler 2 Puff  2 Puff Inhalation Q6HWA RT    LORazepam (ATIVAN) tablet 1 mg  1 mg Oral Q6H PRN    alum-mag hydroxide-simeth (MYLANTA) oral suspension 30 mL  30 mL Oral Q4H PRN    lisinopriL (PRINIVIL, ZESTRIL) tablet 20 mg  20 mg Oral BID    pantoprazole (PROTONIX) tablet 40 mg  40 mg Oral ACB    metFORMIN (GLUCOPHAGE) tablet 500 mg  500 mg Oral BID WITH MEALS    pioglitazone (ACTOS) tablet 45 mg  45 mg Oral ACB    sodium chloride (NS) flush 5-40 mL  5-40 mL IntraVENous Q8H    sodium chloride (NS) flush 5-40 mL  5-40 mL IntraVENous PRN    acetaminophen (TYLENOL) tablet 650 mg  650 mg Oral Q6H PRN    Or    acetaminophen (TYLENOL) suppository 650 mg  650 mg Rectal Q6H PRN    polyethylene glycol (MIRALAX) packet 17 g  17 g Oral DAILY PRN    promethazine (PHENERGAN) tablet 12.5 mg  12.5 mg Oral Q6H PRN    Or    ondansetron (ZOFRAN) injection 4 mg  4 mg IntraVENous Q6H PRN    insulin lispro (HUMALOG) injection   SubCUTAneous AC&HS    glucose chewable tablet 16 g  4 Tab Oral PRN    glucagon (GLUCAGEN) injection 1 mg  1 mg IntraMUSCular PRN    dextrose (D50W) injection syrg 12.5-25 g  25-50 mL IntraVENous PRN    budesonide-formoteroL (SYMBICORT) 160-4.5 mcg/actuation HFA inhaler 2 Puff  2 Puff Inhalation BID RT    atorvastatin (LIPITOR) tablet 40 mg  40 mg Oral DAILY    carvediloL (COREG) tablet 3.125 mg  3.125 mg Oral BID WITH MEALS       Review of Systems:   Review of Systems   Constitutional: Positive for malaise/fatigue. Negative for chills and fever. HENT: Negative. Eyes: Negative. Respiratory: Negative for cough and shortness of breath. Cardiovascular: Negative for chest pain. Gastrointestinal: Negative for abdominal pain, diarrhea, heartburn and nausea. Genitourinary: Negative. Musculoskeletal: Negative. Skin: Negative. Endo/Heme/Allergies: Negative. Objective:   Physical Exam:     Visit Vitals  BP (!) 157/81 (BP 1 Location: Left arm, BP Patient Position: At rest)   Pulse 89   Temp 98.2 °F (36.8 °C)   Resp 18   Ht 5' 4.96\" (1.65 m)   Wt 97.4 kg (214 lb 11.7 oz)   SpO2 98%   Breastfeeding No   BMI 35.78 kg/m²      O2 Device: Room air    Temp (24hrs), Av.2 °F (36.8 °C), Min:97.5 °F (36.4 °C), Max:99.3 °F (37.4 °C)    No intake/output data recorded. No intake/output data recorded. General:  Alert, cooperative, no distress, appears stated age. RA   Lungs:   Prolonged exp phase, few scattered rhonci   Chest wall:  No tenderness or deformity. Heart:  Regular rate and rhythm, S1, S2 normal, no murmur, click, rub or gallop. no le edema   Abdomen:   Soft, non-tender. Bowel sounds normal. No masses,  No organomegaly. Extremities: Extremities normal, atraumatic, no cyanosis or edema. Pulses: 2+ and symmetric all extremities. Skin: Skin color, texture, turgor normal. No rashes or lesions   Neurologic: CNII-XII intact.  No gross sensory or motor deficits     Data Review:       Recent Days:  Recent Labs     20  0727 12/15/20  1918   WBC 13.7* 12.3*   HGB 7.4* 7.7*   HCT 23.9* 25.1*   * 527*     Recent Labs 12/16/20  0727 12/15/20  1918   * 132*   K 3.8 4.5    98   CO2 30 27   * 311*   BUN 12 13   CREA 0.56 0.89   CA 8.7 8.8   ALB 3.1* 3.3*   TBILI 0.4 0.3   ALT 21 27     No results for input(s): PH, PCO2, PO2, HCO3, FIO2 in the last 72 hours.     24 Hour Results:  Recent Results (from the past 24 hour(s))   GLUCOSE, POC    Collection Time: 12/17/20 11:05 AM   Result Value Ref Range    Glucose (POC) 257 (H) 65 - 100 mg/dL    Performed by [address] [place] [address], POC    Collection Time: 12/17/20  4:10 PM   Result Value Ref Range    Glucose (POC) 159 (H) 65 - 100 mg/dL    Performed by [address] [place] [address], POC    Collection Time: 12/17/20  7:37 PM   Result Value Ref Range    Glucose (POC) 216 (H) 65 - 100 mg/dL    Performed by [address], POC    Collection Time: 12/17/20  8:46 PM   Result Value Ref Range    Glucose (POC) 229 (H) 65 - 100 mg/dL    Performed by [address], POC    Collection Time: 12/18/20  7:36 AM   Result Value Ref Range    Glucose (POC) 248 (H) 65 - 100 mg/dL    Performed by BRITTANEY GENAO    GLUCOSE, POC    Collection Time: 12/18/20  8:06 AM   Result Value Ref Range    Glucose (POC) 244 (H) 65 - 100 mg/dL    Performed by Luis Fernando Mendoza            Assessment/     Patient Active Problem List   Diagnosis Code    Benign essential hypertension I10    Body mass index 30.0-30.9, adult Z68.30    Type II diabetes mellitus, uncontrolled ([facility]) E11.65    GERD (gastroesophageal reflux disease) K21.9    Heart murmur R01.1    Mixed hyperlipidemia E78.2    Tobacco dependence syndrome F17.200    Acute gastrointestinal hemorrhage K92.2    Allergic rhinitis J30.9    Anemia D64.9    CAD (coronary artery disease) I25.10    Foot callus L84    Left bundle branch block I44.7    Microalbuminuria due to type 2 diabetes mellitus ([facility]) E11.29, R80.9    Morbid obesity ([facility]) E66.01    Hyponatremia E87.1    COPD exacerbation ([facility]) J44.1             (1) shortness of breath s.t AECOPD, AS and anemia: prednisone and duonebs, valve replacement in January. Improved. (2) hyponatremia: hypovolemic s/t HCTZ use. Will stop. Repeat Na in AM.    (3) DMII: on actos and metformin. (4) HTN: hold HCTZ and lasix s/t #2. (5) CAD needing CABG. Plan for January    (6) Anemia: Hemoglobin 14 in October of this year, is dropped to 9 on presentation and today is 8. Anemia panel and FOBT are pending. Care Plan discussed with: Patient/Family and Nurse    Total time spent with patient: 30 minutes.     Elisabet Kline MD

## 2020-12-18 NOTE — TELEPHONE ENCOUNTER
Leslie with [facility] would like to know if you would follow this patient for home health. Being discharged from the hospital today and being seen tomorrow for home health.

## 2020-12-22 ENCOUNTER — TELEPHONE (OUTPATIENT)
Dept: FAMILY MEDICINE CLINIC | Age: 65
End: 2020-12-22

## 2020-12-31 ENCOUNTER — OFFICE VISIT (OUTPATIENT)
Dept: FAMILY MEDICINE CLINIC | Age: 65
End: 2020-12-31
Payer: MEDICARE

## 2020-12-31 VITALS
TEMPERATURE: 98.4 F | WEIGHT: 200 LBS | OXYGEN SATURATION: 94 % | BODY MASS INDEX: 34.15 KG/M2 | HEART RATE: 78 BPM | HEIGHT: 64 IN | SYSTOLIC BLOOD PRESSURE: 109 MMHG | DIASTOLIC BLOOD PRESSURE: 57 MMHG

## 2020-12-31 DIAGNOSIS — Z09 HOSPITAL DISCHARGE FOLLOW-UP: Primary | ICD-10-CM

## 2020-12-31 DIAGNOSIS — K21.9 GASTROESOPHAGEAL REFLUX DISEASE WITHOUT ESOPHAGITIS: ICD-10-CM

## 2020-12-31 DIAGNOSIS — I25.10 CORONARY ARTERY DISEASE INVOLVING NATIVE HEART WITHOUT ANGINA PECTORIS, UNSPECIFIED VESSEL OR LESION TYPE: ICD-10-CM

## 2020-12-31 DIAGNOSIS — J44.9 CHRONIC OBSTRUCTIVE PULMONARY DISEASE, UNSPECIFIED COPD TYPE (HCC): ICD-10-CM

## 2020-12-31 DIAGNOSIS — I10 BENIGN ESSENTIAL HYPERTENSION: ICD-10-CM

## 2020-12-31 DIAGNOSIS — E11.65 UNCONTROLLED TYPE 2 DIABETES MELLITUS WITH HYPERGLYCEMIA (HCC): ICD-10-CM

## 2020-12-31 PROCEDURE — 2022F DILAT RTA XM EVC RTNOPTHY: CPT | Performed by: FAMILY MEDICINE

## 2020-12-31 PROCEDURE — G8754 DIAS BP LESS 90: HCPCS | Performed by: FAMILY MEDICINE

## 2020-12-31 PROCEDURE — G8400 PT W/DXA NO RESULTS DOC: HCPCS | Performed by: FAMILY MEDICINE

## 2020-12-31 PROCEDURE — 99214 OFFICE O/P EST MOD 30 MIN: CPT | Performed by: FAMILY MEDICINE

## 2020-12-31 PROCEDURE — G9899 SCRN MAM PERF RSLTS DOC: HCPCS | Performed by: FAMILY MEDICINE

## 2020-12-31 PROCEDURE — 1101F PT FALLS ASSESS-DOCD LE1/YR: CPT | Performed by: FAMILY MEDICINE

## 2020-12-31 PROCEDURE — G8427 DOCREV CUR MEDS BY ELIG CLIN: HCPCS | Performed by: FAMILY MEDICINE

## 2020-12-31 PROCEDURE — 1111F DSCHRG MED/CURRENT MED MERGE: CPT | Performed by: FAMILY MEDICINE

## 2020-12-31 PROCEDURE — 1090F PRES/ABSN URINE INCON ASSESS: CPT | Performed by: FAMILY MEDICINE

## 2020-12-31 PROCEDURE — G8432 DEP SCR NOT DOC, RNG: HCPCS | Performed by: FAMILY MEDICINE

## 2020-12-31 PROCEDURE — G8536 NO DOC ELDER MAL SCRN: HCPCS | Performed by: FAMILY MEDICINE

## 2020-12-31 PROCEDURE — G8752 SYS BP LESS 140: HCPCS | Performed by: FAMILY MEDICINE

## 2020-12-31 PROCEDURE — 3017F COLORECTAL CA SCREEN DOC REV: CPT | Performed by: FAMILY MEDICINE

## 2020-12-31 PROCEDURE — 3051F HG A1C>EQUAL 7.0%<8.0%: CPT | Performed by: FAMILY MEDICINE

## 2020-12-31 PROCEDURE — G8417 CALC BMI ABV UP PARAM F/U: HCPCS | Performed by: FAMILY MEDICINE

## 2020-12-31 RX ORDER — BUDESONIDE AND FORMOTEROL FUMARATE DIHYDRATE 160; 4.5 UG/1; UG/1
2 AEROSOL RESPIRATORY (INHALATION) 2 TIMES DAILY
Qty: 1 INHALER | Refills: 3 | Status: SHIPPED | OUTPATIENT
Start: 2020-12-31 | End: 2021-07-12

## 2020-12-31 RX ORDER — METHYLPREDNISOLONE 4 MG/1
TABLET ORAL
Qty: 1 DOSE PACK | Refills: 0 | Status: SHIPPED | OUTPATIENT
Start: 2020-12-31 | End: 2021-01-03

## 2020-12-31 RX ORDER — IPRATROPIUM BROMIDE AND ALBUTEROL SULFATE 2.5; .5 MG/3ML; MG/3ML
3 SOLUTION RESPIRATORY (INHALATION)
Qty: 120 NEBULE | Refills: 3 | Status: SHIPPED | OUTPATIENT
Start: 2020-12-31 | End: 2021-07-12

## 2020-12-31 NOTE — PROGRESS NOTES
IIDENTIFYING INFORMATION:  Paty Mccall , 77 y.o., female      CHIEF COMPLAINT:   Chief Complaint   Patient presents with   [facility] Follow Up     seen at [facility] for SOB. Dx with COPD. States that she was pit on Symbicort but the hospital did not send it to the pharmacy. Was given rx for Ativan for 5 days and was told to ask you about vistaril.  Other     Also asking for rx for nebulizer. Since being home from the hospital pt has had no energy and is \"going down hill\".  Other     States that she was told that she had an infection at her last appt with us. When she want to the hospital they told her she had a lung infection.  Medication Question     asking for rx to help get some energy back. HISTORY OF PRESENT ILLNESS:    Paty Mccall is a 77 y.o. female with the below listed medical problems whom comes in today for follow-up from hospital.  She was dyspneic and fatigued. She has had some coronary disease. She is actually diagnosed with COPD. This plain x-ray 1 view showed some airspace issues that seems to be new. She has no fever, chills, sweats. She was supposed to get a prescription for Symbicort before she left the hospital but she did not. Her daughter is present as if the nebulizer might help. She says that this did not start when she was put on a new diabetic medicine. After further questioning and investigation it seems to be when the pioglitazone was increased from 15 to 45 mg. She also says she was diagnosed with congestive heart failure. Those notes from the hospital are reviewed and in the record. She has no orthopnea or PND.   She has minimal leg swelling that does not seem any worse than usual.    PAST MEDICAL HISTORY:   Patient Active Problem List   Diagnosis Code    Benign essential hypertension I10    Body mass index 30.0-30.9, adult Z68.30    Type II diabetes mellitus, uncontrolled ([facility]) E11.65    GERD (gastroesophageal reflux disease) K21.9    Heart murmur R01.1    Mixed hyperlipidemia E78.2    Tobacco dependence syndrome F17.200    Acute gastrointestinal hemorrhage K92.2    Allergic rhinitis J30.9    Anemia D64.9    CAD (coronary artery disease) I25.10    Foot callus L84    Left bundle branch block I44.7    Microalbuminuria due to type 2 diabetes mellitus ([facility] E11.29, R80.9    Morbid obesity ([facility]) E66.01    Hyponatremia E87.1    COPD exacerbation ([facility]) J44.1        ALLERGIES:   Allergies   Allergen Reactions    Aspirin Unknown (comments)     hemorraging - ended up in the hospital         MEDICATIONS:     Current Outpatient Medications:     budesonide-formoteroL (SYMBICORT) 160-4.5 mcg/actuation HFAA, Take 2 Puffs by inhalation two (2) times a day., Disp: 1 Inhaler, Rfl: 3    albuterol-ipratropium (DUO-NEB) 2.5 mg-0.5 mg/3 ml nebu, 3 mL by Nebulization route every six (6) hours as needed for Wheezing, Shortness of Breath or Cough. , Disp: 120 Nebule, Rfl: 3    methylPREDNISolone (MEDROL DOSEPACK) 4 mg tablet, One dose pack orally as directed until gone, Disp: 1 Dose Pack, Rfl: 0    pantoprazole (Protonix) 40 mg tablet, Take 1 Tab by mouth daily. , Disp: 30 Tab, Rfl: 0    famotidine (PEPCID) 10 mg tablet, Take 1 Tab by mouth nightly., Disp: 30 Tab, Rfl: 0    LORazepam (Ativan) 1 mg tablet, Take 1 Tab by mouth nightly. Max Daily Amount: 1 mg., Disp: 5 Tab, Rfl: 0    lisinopriL (PRINIVIL, ZESTRIL) 20 mg tablet, Take 1 Tab by mouth two (2) times a day., Disp: 180 Tab, Rfl: 2    metFORMIN (GLUCOPHAGE) 500 mg tablet, Take 2 tablets twice a day with meals, Disp: 120 Tab, Rfl: 3    lancets misc, Use to check glucose once a day, Disp: 50 Each, Rfl: 5    glucose blood VI test strips (blood glucose test) strip, by Does Not Apply route two (2) times a day.  Use to check glucose once a day, Disp: 50 Strip, Rfl: 5    potassium chloride (KLOR-CON) 10 mEq tablet, By oral route take one tablet Monday, Wednesday and Friday, Disp: 40 Tab, Rfl: 3   hydroCHLOROthiazide (HYDRODIURIL) 25 mg tablet, Take 1 Tab by mouth daily. , Disp: 90 Tab, Rfl: 4    aspirin delayed-release (Aspirin Low Dose) 81 mg tablet, Take  by mouth daily. , Disp: , Rfl:     amLODIPine (NORVASC) 10 mg tablet, Take  by mouth daily. , Disp: , Rfl:     ammonium lactate (LAC-HYDRIN) 12 % lotion, Apply  to affected area two (2) times a day. rub in to affected area well, Disp: , Rfl:     carvediloL (COREG) 3.125 mg tablet, Take  by mouth two (2) times daily (with meals). , Disp: , Rfl:     ketoconazole (NIZORAL) 2 % topical cream, Apply  to affected area daily. , Disp: , Rfl:     SOCIAL HISTORY:   Social History     Tobacco Use    Smoking status: Former Smoker     Packs/day: 0.50     Quit date:      Years since quittin.0    Smokeless tobacco: Never Used   Substance Use Topics    Alcohol use: Not Currently     Frequency: Never    Drug use: Never       SURGICAL HISTORY:  Past Surgical History:   Procedure Laterality Date    HX COLONOSCOPY      due in [id number]. Done for anemia and rectal bleeding          FAMILY HISTORY:  Family History   Problem Relation Age of Onset    Cancer Mother         uterine    Hypertension Father     Cancer Sister         breast         REVIEW OF SYSTEMS:  I personally collected this information from all available source present (patient/others in room and records available) -[email or web address]    Review of Systems   Constitutional: Positive for fatigue. Negative for activity change, appetite change, chills, diaphoresis, fever and unexpected weight change. HENT: Negative for congestion, ear discharge, ear pain, hearing loss, rhinorrhea, sinus pressure, sneezing, sore throat, tinnitus, trouble swallowing and voice change. Eyes: Negative for photophobia, pain, discharge and visual disturbance. Respiratory: Positive for cough, shortness of breath and wheezing. Negative for chest tightness. Cardiovascular: Positive for leg swelling.  Negative for chest pain and palpitations. Gastrointestinal: Negative for abdominal distention, abdominal pain, blood in stool, constipation, diarrhea, nausea and vomiting. Endocrine: Negative for cold intolerance, heat intolerance, polydipsia and polyphagia. Genitourinary: Negative for difficulty urinating, dysuria, frequency, hematuria and urgency. Musculoskeletal: Positive for back pain and gait problem. Negative for arthralgias, joint swelling, myalgias and neck pain. Skin: Negative for color change and rash. Neurological: Negative for dizziness, tremors, syncope, speech difficulty, weakness, light-headedness, numbness and headaches. Hematological: Negative for adenopathy. Does not bruise/bleed easily. Psychiatric/Behavioral: Negative for agitation, behavioral problems, confusion, decreased concentration, dysphoric mood, hallucinations, sleep disturbance and suicidal ideas. The patient is not nervous/anxious. PHYSICAL EXAMINATION:  Vital Signs:    Visit Vitals  BP (!) 109/57 (BP 1 Location: Right arm, BP Patient Position: Sitting)   Pulse 78   Temp 98.4 °F (36.9 °C) (Temporal)   Ht 5' 4\" (1.626 m)   Wt 200 lb (90.7 kg)   SpO2 94%   BMI 34.33 kg/m²         Wt Readings from Last 3 Encounters:   12/31/20 200 lb (90.7 kg)   12/18/20 214 lb 11.7 oz (97.4 kg)   11/25/20 207 lb 4.8 oz (94 kg)     BP Readings from Last 3 Encounters:   12/31/20 (!) 109/57   12/18/20 (!) 157/81   11/25/20 (!) 159/84         Physical Exam  Nursing note reviewed. Constitutional:       General: She is not in acute distress. Appearance: Normal appearance. She is obese. She is not ill-appearing or toxic-appearing. HENT:      Nose: No congestion or rhinorrhea. Mouth/Throat:      Pharynx: Posterior oropharyngeal erythema present. No oropharyngeal exudate. Eyes:      General: No scleral icterus. Extraocular Movements: Extraocular movements intact.       Conjunctiva/sclera: Conjunctivae normal.      Pupils: Pupils are equal, round, and reactive to light. Neck:      Musculoskeletal: No neck rigidity or muscular tenderness. Vascular: No carotid bruit. Cardiovascular:      Rate and Rhythm: Normal rate and regular rhythm. Heart sounds: No murmur. No friction rub. No gallop. Pulmonary:      Effort: Pulmonary effort is normal.      Breath sounds: No wheezing, rhonchi or rales. Comments: Normal effort with no tachypnea or respiratory distress no stridor. However, her lungs are showing some tightness with inspiration/expiration. Abdominal:      General: Bowel sounds are normal. There is no distension. Palpations: Abdomen is soft. There is no mass. Tenderness: There is no abdominal tenderness. Musculoskeletal:         General: Tenderness and deformity present. No swelling. Right lower leg: No edema. Left lower leg: No edema. Comments: Arthritic hands and knees. Some soreness in her lower back but has not been doing much lately. Lymphadenopathy:      Cervical: No cervical adenopathy. Skin:     Capillary Refill: Capillary refill takes less than 2 seconds. Coloration: Skin is not jaundiced. Findings: No erythema or rash. Neurological:      General: No focal deficit present. Mental Status: She is alert and oriented to person, place, and time. Mental status is at baseline. Cranial Nerves: No cranial nerve deficit. Sensory: No sensory deficit. Coordination: Coordination normal.      Gait: Gait normal.   Psychiatric:         Mood and Affect: Mood normal.         Behavior: Behavior normal.         Thought Content: Thought content normal.         Judgment: Judgment normal.         ASSESSMENT/PLAN:      Discussion (regarding today's visit): Ramu Kelly and I did discuss the below listed diagnoses and all questions were answered to her satisfaction. She may have had a little bit of problem from increasing the pioglitazone.   Therefore, I told her to stop it.  She will call her endocrinologist and see if there are any other options. Also, I think continue her medicines would be a good idea. I did send the Symbicort in. Also sent a nebulizer and the medicine to her pharmacy. I would like to see her back in a few weeks to see how she is doing. She was also advised to keep all her other specialty appointments. WE also reviewed and discussed each diagnosis listed for today's visit including medications, treatment, testing such as labs, imagine, referrals and when to call regarding results and appointments. Reminded patient to keep any and all appointments with specialists, labs, imaging. Reminded patient to make sure we get copies of any specialists care, labs and imaging. Reminded patient to call of come by the office if there are any concerns, questions , comments or problems. The patient verbalized understanding of the care plan and all questions were answered to the patient's satisfaction prior to leaving the office. The patient was told that failure to comply with recommended testing could result in abnormal health consequences. The patient was instructed to have yearly routine health maintenance including but not limited to age appropriate vaccines, testing, screening exams. ICD-10-CM ICD-9-CM    1. Hospital discharge follow-up  Z09 V67.59 [facility] DISCHARGE MEDS RECONCILED W/ CURRENT OUTPATIENT MED LIST   2. Chronic obstructive pulmonary disease, unspecified COPD type ([facility])  J44.9 496 AMB SUPPLY ORDER      budesonide-formoteroL (SYMBICORT) 160-4.5 mcg/actuation HFAA      albuterol-ipratropium (DUO-NEB) 2.5 mg-0.5 mg/3 ml nebu      methylPREDNISolone (MEDROL DOSEPACK) 4 mg tablet   3. Benign essential hypertension  I10 401.1    4. Uncontrolled type 2 diabetes mellitus with hyperglycemia ([facility])  E11.65 250.02    5. Coronary artery disease involving native heart without angina pectoris, unspecified vessel or lesion type  I25.10 414.01    6. Gastroesophageal reflux disease without esophagitis  K21.9 530.81                The patient actively participated in medical decision making. FOLLOW UP:   Patient knows to keep any and all future visits scheduled unless told otherwise. Patient knows to call, come back if any concerns, questions, comments or problems arise. Follow-up and Dispositions    · Return in about 3 weeks (around 1/21/2021) for Follow up short of breath, fatigue, blood pressure, sugar. This visit may have been completed , in part, with voice recognition software as well as typing and may have syntax errors despite editing.       Li Lisa, DO

## 2021-01-01 ENCOUNTER — HOSPITAL ENCOUNTER (INPATIENT)
Age: 66
LOS: 2 days | Discharge: HOME | DRG: 177 | End: 2021-01-03
Attending: INTERNAL MEDICINE | Admitting: INTERNAL MEDICINE
Payer: MEDICARE

## 2021-01-01 ENCOUNTER — APPOINTMENT (OUTPATIENT)
Dept: GENERAL RADIOLOGY | Age: 66
DRG: 177 | End: 2021-01-01
Attending: EMERGENCY MEDICINE
Payer: MEDICARE

## 2021-01-01 DIAGNOSIS — D64.9 ANEMIA, UNSPECIFIED TYPE: ICD-10-CM

## 2021-01-01 DIAGNOSIS — J96.01 ACUTE HYPOXEMIC RESPIRATORY FAILURE (HCC): Primary | ICD-10-CM

## 2021-01-01 DIAGNOSIS — E87.1 HYPONATREMIA: ICD-10-CM

## 2021-01-01 DIAGNOSIS — J90 BILATERAL PLEURAL EFFUSION: ICD-10-CM

## 2021-01-01 LAB
ALBUMIN SERPL-MCNC: 2.8 G/DL (ref 3.5–5)
ALBUMIN/GLOB SERPL: 0.7 {RATIO} (ref 1.1–2.2)
ALP SERPL-CCNC: 64 U/L (ref 45–117)
ALT SERPL-CCNC: 24 U/L (ref 12–78)
ANION GAP SERPL CALC-SCNC: 11 MMOL/L (ref 5–15)
APPEARANCE UR: CLEAR
AST SERPL W P-5'-P-CCNC: 29 U/L (ref 15–37)
ATRIAL RATE: 88 BPM
BACTERIA URNS QL MICRO: NEGATIVE /HPF
BACTERIA URNS QL MICRO: NEGATIVE /HPF
BASOPHILS # BLD: 0.1 K/UL (ref 0–0.1)
BASOPHILS NFR BLD: 1 % (ref 0–1)
BILIRUB SERPL-MCNC: 0.7 MG/DL (ref 0.2–1)
BILIRUB UR QL: NEGATIVE
BNP SERPL-MCNC: 323 PG/ML
BUN SERPL-MCNC: 8 MG/DL (ref 6–20)
BUN/CREAT SERPL: 12 (ref 12–20)
CA-I BLD-MCNC: 8.2 MG/DL (ref 8.5–10.1)
CALCULATED P AXIS, ECG09: 44 DEGREES
CALCULATED R AXIS, ECG10: -51 DEGREES
CALCULATED T AXIS, ECG11: 121 DEGREES
CHLORIDE SERPL-SCNC: 79 MMOL/L (ref 97–108)
CO2 SERPL-SCNC: 28 MMOL/L (ref 21–32)
COLOR UR: ABNORMAL
COVID-19 RAPID TEST, COVR: DETECTED
CREAT SERPL-MCNC: 0.67 MG/DL (ref 0.55–1.02)
DIAGNOSIS, 93000: NORMAL
DIFFERENTIAL METHOD BLD: ABNORMAL
EOSINOPHIL # BLD: 0 K/UL (ref 0–0.4)
EOSINOPHIL NFR BLD: 0 % (ref 0–7)
ERYTHROCYTE [DISTWIDTH] IN BLOOD BY AUTOMATED COUNT: 19.1 % (ref 11.5–14.5)
GLOBULIN SER CALC-MCNC: 3.9 G/DL (ref 2–4)
GLUCOSE BLD STRIP.AUTO-MCNC: 256 MG/DL (ref 65–100)
GLUCOSE SERPL-MCNC: 178 MG/DL (ref 65–100)
GLUCOSE UR STRIP.AUTO-MCNC: NEGATIVE MG/DL
HCT VFR BLD AUTO: 25.1 % (ref 35–47)
HGB BLD-MCNC: 7.8 G/DL (ref 11.5–16)
HGB UR QL STRIP: NEGATIVE
IMM GRANULOCYTES # BLD AUTO: 0 K/UL
IMM GRANULOCYTES NFR BLD AUTO: 0 %
KETONES UR QL STRIP.AUTO: NEGATIVE MG/DL
LACTATE SERPL-SCNC: 2.5 MMOL/L (ref 0.4–2)
LEUKOCYTE ESTERASE UR QL STRIP.AUTO: ABNORMAL
LYMPHOCYTES # BLD: 1.1 K/UL (ref 0.8–3.5)
LYMPHOCYTES NFR BLD: 11 % (ref 12–49)
MCH RBC QN AUTO: 22.3 PG (ref 26–34)
MCHC RBC AUTO-ENTMCNC: 31.1 G/DL (ref 30–36.5)
MCV RBC AUTO: 71.9 FL (ref 80–99)
MONOCYTES # BLD: 0.7 K/UL (ref 0–1)
MONOCYTES NFR BLD: 7 % (ref 5–13)
NEUTS SEG # BLD: 8.3 K/UL (ref 1.8–8)
NEUTS SEG NFR BLD: 81 % (ref 32–75)
NITRITE UR QL STRIP.AUTO: NEGATIVE
NRBC # BLD: 0.41 K/UL
NRBC # BLD: 0.91 K/UL (ref 0–0.01)
NRBC BLD MANUAL-RTO: 4 PER 100 WBC
NRBC BLD-RTO: 8.5 PER 100 WBC
P-R INTERVAL, ECG05: 134 MS
PERFORMED BY, TECHID: ABNORMAL
PH UR STRIP: 7 [PH] (ref 5–8)
PLATELET # BLD AUTO: 364 K/UL (ref 150–400)
PMV BLD AUTO: 8.3 FL (ref 8.9–12.9)
POTASSIUM SERPL-SCNC: 3.3 MMOL/L (ref 3.5–5.1)
PROT SERPL-MCNC: 6.7 G/DL (ref 6.4–8.2)
PROT UR STRIP-MCNC: NEGATIVE MG/DL
Q-T INTERVAL, ECG07: 406 MS
QRS DURATION, ECG06: 138 MS
QTC CALCULATION (BEZET), ECG08: 491 MS
RBC # BLD AUTO: 3.49 M/UL (ref 3.8–5.2)
RBC #/AREA URNS HPF: ABNORMAL /HPF (ref 0–5)
RBC #/AREA URNS HPF: NORMAL /HPF (ref 0–5)
RBC MORPH BLD: ABNORMAL
SARS-COV-2, COV2: NORMAL
SODIUM SERPL-SCNC: 118 MMOL/L (ref 136–145)
SP GR UR REFRACTOMETRY: <1.005 (ref 1–1.03)
SPECIMEN SOURCE: ABNORMAL
TROPONIN I SERPL-MCNC: <0.05 NG/ML
UROBILINOGEN UR QL STRIP.AUTO: 0.1 EU/DL (ref 0.1–1)
VENTRICULAR RATE, ECG03: 88 BPM
WBC # BLD AUTO: 10.3 K/UL (ref 3.6–11)
WBC URNS QL MICRO: ABNORMAL /HPF (ref 0–4)
WBC URNS QL MICRO: NORMAL /HPF (ref 0–4)

## 2021-01-01 PROCEDURE — 74011250636 HC RX REV CODE- 250/636: Performed by: INTERNAL MEDICINE

## 2021-01-01 PROCEDURE — 87635 SARS-COV-2 COVID-19 AMP PRB: CPT

## 2021-01-01 PROCEDURE — 81001 URINALYSIS AUTO W/SCOPE: CPT

## 2021-01-01 PROCEDURE — 74011250637 HC RX REV CODE- 250/637: Performed by: INTERNAL MEDICINE

## 2021-01-01 PROCEDURE — 36415 COLL VENOUS BLD VENIPUNCTURE: CPT

## 2021-01-01 PROCEDURE — 74011250636 HC RX REV CODE- 250/636: Performed by: PHYSICIAN ASSISTANT

## 2021-01-01 PROCEDURE — 83880 ASSAY OF NATRIURETIC PEPTIDE: CPT

## 2021-01-01 PROCEDURE — 99285 EMERGENCY DEPT VISIT HI MDM: CPT

## 2021-01-01 PROCEDURE — 71045 X-RAY EXAM CHEST 1 VIEW: CPT

## 2021-01-01 PROCEDURE — 94640 AIRWAY INHALATION TREATMENT: CPT

## 2021-01-01 PROCEDURE — 74011636637 HC RX REV CODE- 636/637: Performed by: INTERNAL MEDICINE

## 2021-01-01 PROCEDURE — 84484 ASSAY OF TROPONIN QUANT: CPT

## 2021-01-01 PROCEDURE — 65270000029 HC RM PRIVATE

## 2021-01-01 PROCEDURE — 82962 GLUCOSE BLOOD TEST: CPT

## 2021-01-01 PROCEDURE — 83605 ASSAY OF LACTIC ACID: CPT

## 2021-01-01 PROCEDURE — 85025 COMPLETE CBC W/AUTO DIFF WBC: CPT

## 2021-01-01 PROCEDURE — 80053 COMPREHEN METABOLIC PANEL: CPT

## 2021-01-01 PROCEDURE — 93005 ELECTROCARDIOGRAM TRACING: CPT

## 2021-01-01 RX ORDER — POLYETHYLENE GLYCOL 3350 17 G/17G
17 POWDER, FOR SOLUTION ORAL DAILY PRN
Status: DISCONTINUED | OUTPATIENT
Start: 2021-01-01 | End: 2021-01-03 | Stop reason: HOSPADM

## 2021-01-01 RX ORDER — KETOCONAZOLE 20 MG/G
CREAM TOPICAL DAILY
Status: DISCONTINUED | OUTPATIENT
Start: 2021-01-02 | End: 2021-01-03

## 2021-01-01 RX ORDER — ACETAMINOPHEN 325 MG/1
650 TABLET ORAL
Status: DISCONTINUED | OUTPATIENT
Start: 2021-01-01 | End: 2021-01-03 | Stop reason: HOSPADM

## 2021-01-01 RX ORDER — FAMOTIDINE 10 MG/1
10 TABLET ORAL
Status: DISCONTINUED | OUTPATIENT
Start: 2021-01-01 | End: 2021-01-03 | Stop reason: HOSPADM

## 2021-01-01 RX ORDER — CARVEDILOL 3.12 MG/1
3.12 TABLET ORAL 2 TIMES DAILY WITH MEALS
Status: DISCONTINUED | OUTPATIENT
Start: 2021-01-01 | End: 2021-01-01

## 2021-01-01 RX ORDER — LISINOPRIL 40 MG/1
40 TABLET ORAL DAILY
Status: DISCONTINUED | OUTPATIENT
Start: 2021-01-03 | End: 2021-01-03 | Stop reason: HOSPADM

## 2021-01-01 RX ORDER — ONDANSETRON 2 MG/ML
4 INJECTION INTRAMUSCULAR; INTRAVENOUS
Status: DISCONTINUED | OUTPATIENT
Start: 2021-01-01 | End: 2021-01-03 | Stop reason: HOSPADM

## 2021-01-01 RX ORDER — DEXTROSE 50 % IN WATER (D50W) INTRAVENOUS SYRINGE
25-50 AS NEEDED
Status: DISCONTINUED | OUTPATIENT
Start: 2021-01-01 | End: 2021-01-03 | Stop reason: HOSPADM

## 2021-01-01 RX ORDER — IPRATROPIUM BROMIDE AND ALBUTEROL SULFATE 2.5; .5 MG/3ML; MG/3ML
3 SOLUTION RESPIRATORY (INHALATION)
Status: DISCONTINUED | OUTPATIENT
Start: 2021-01-01 | End: 2021-01-03 | Stop reason: HOSPADM

## 2021-01-01 RX ORDER — CARVEDILOL 3.12 MG/1
6.25 TABLET ORAL 2 TIMES DAILY WITH MEALS
Status: DISCONTINUED | OUTPATIENT
Start: 2021-01-02 | End: 2021-01-03 | Stop reason: HOSPADM

## 2021-01-01 RX ORDER — LISINOPRIL 10 MG/1
20 TABLET ORAL 2 TIMES DAILY
Status: DISCONTINUED | OUTPATIENT
Start: 2021-01-01 | End: 2021-01-01

## 2021-01-01 RX ORDER — SODIUM CHLORIDE 9 MG/ML
40 INJECTION, SOLUTION INTRAVENOUS CONTINUOUS
Status: DISCONTINUED | OUTPATIENT
Start: 2021-01-01 | End: 2021-01-03 | Stop reason: HOSPADM

## 2021-01-01 RX ORDER — LORAZEPAM 1 MG/1
1 TABLET ORAL
Status: DISCONTINUED | OUTPATIENT
Start: 2021-01-01 | End: 2021-01-03 | Stop reason: HOSPADM

## 2021-01-01 RX ORDER — MAGNESIUM SULFATE 100 %
4 CRYSTALS MISCELLANEOUS AS NEEDED
Status: DISCONTINUED | OUTPATIENT
Start: 2021-01-01 | End: 2021-01-03 | Stop reason: HOSPADM

## 2021-01-01 RX ORDER — BUDESONIDE AND FORMOTEROL FUMARATE DIHYDRATE 160; 4.5 UG/1; UG/1
2 AEROSOL RESPIRATORY (INHALATION) 2 TIMES DAILY
Status: DISCONTINUED | OUTPATIENT
Start: 2021-01-01 | End: 2021-01-03 | Stop reason: HOSPADM

## 2021-01-01 RX ORDER — HYDROCHLOROTHIAZIDE 25 MG/1
25 TABLET ORAL DAILY
Status: DISCONTINUED | OUTPATIENT
Start: 2021-01-02 | End: 2021-01-03 | Stop reason: HOSPADM

## 2021-01-01 RX ORDER — ENOXAPARIN SODIUM 100 MG/ML
40 INJECTION SUBCUTANEOUS DAILY
Status: DISCONTINUED | OUTPATIENT
Start: 2021-01-02 | End: 2021-01-02

## 2021-01-01 RX ORDER — FUROSEMIDE 10 MG/ML
40 INJECTION INTRAMUSCULAR; INTRAVENOUS EVERY 12 HOURS
Status: DISCONTINUED | OUTPATIENT
Start: 2021-01-01 | End: 2021-01-03 | Stop reason: HOSPADM

## 2021-01-01 RX ORDER — AMLODIPINE BESYLATE 5 MG/1
10 TABLET ORAL DAILY
Status: DISCONTINUED | OUTPATIENT
Start: 2021-01-02 | End: 2021-01-01

## 2021-01-01 RX ORDER — POTASSIUM CHLORIDE 750 MG/1
10 TABLET, FILM COATED, EXTENDED RELEASE ORAL DAILY
Status: DISCONTINUED | OUTPATIENT
Start: 2021-01-02 | End: 2021-01-03 | Stop reason: HOSPADM

## 2021-01-01 RX ORDER — AMMONIUM LACTATE 12 G/100G
CREAM TOPICAL
Status: DISCONTINUED | OUTPATIENT
Start: 2021-01-01 | End: 2021-01-03 | Stop reason: HOSPADM

## 2021-01-01 RX ORDER — ACETAMINOPHEN 650 MG/1
650 SUPPOSITORY RECTAL
Status: DISCONTINUED | OUTPATIENT
Start: 2021-01-01 | End: 2021-01-03 | Stop reason: HOSPADM

## 2021-01-01 RX ORDER — SODIUM CHLORIDE 0.9 % (FLUSH) 0.9 %
5-40 SYRINGE (ML) INJECTION EVERY 8 HOURS
Status: DISCONTINUED | OUTPATIENT
Start: 2021-01-01 | End: 2021-01-03 | Stop reason: HOSPADM

## 2021-01-01 RX ORDER — PROMETHAZINE HYDROCHLORIDE 25 MG/1
12.5 TABLET ORAL
Status: DISCONTINUED | OUTPATIENT
Start: 2021-01-01 | End: 2021-01-03 | Stop reason: HOSPADM

## 2021-01-01 RX ORDER — SODIUM CHLORIDE 0.9 % (FLUSH) 0.9 %
5-40 SYRINGE (ML) INJECTION AS NEEDED
Status: DISCONTINUED | OUTPATIENT
Start: 2021-01-01 | End: 2021-01-03 | Stop reason: HOSPADM

## 2021-01-01 RX ORDER — PANTOPRAZOLE SODIUM 40 MG/1
40 TABLET, DELAYED RELEASE ORAL DAILY
Status: DISCONTINUED | OUTPATIENT
Start: 2021-01-02 | End: 2021-01-03 | Stop reason: HOSPADM

## 2021-01-01 RX ORDER — INSULIN LISPRO 100 [IU]/ML
INJECTION, SOLUTION INTRAVENOUS; SUBCUTANEOUS
Status: DISCONTINUED | OUTPATIENT
Start: 2021-01-01 | End: 2021-01-03 | Stop reason: HOSPADM

## 2021-01-01 RX ORDER — ALBUTEROL SULFATE 90 UG/1
2 AEROSOL, METERED RESPIRATORY (INHALATION)
Status: DISCONTINUED | OUTPATIENT
Start: 2021-01-01 | End: 2021-01-03 | Stop reason: HOSPADM

## 2021-01-01 RX ADMIN — BUDESONIDE AND FORMOTEROL FUMARATE DIHYDRATE 2 PUFF: 160; 4.5 AEROSOL RESPIRATORY (INHALATION) at 20:50

## 2021-01-01 RX ADMIN — METHYLPREDNISOLONE SODIUM SUCCINATE 125 MG: 125 INJECTION, POWDER, FOR SOLUTION INTRAMUSCULAR; INTRAVENOUS at 15:32

## 2021-01-01 RX ADMIN — ALBUTEROL SULFATE 2 PUFF: 108 AEROSOL, METERED RESPIRATORY (INHALATION) at 20:50

## 2021-01-01 RX ADMIN — SODIUM CHLORIDE 100 ML/HR: 9 INJECTION, SOLUTION INTRAVENOUS at 14:06

## 2021-01-01 RX ADMIN — INSULIN LISPRO 5 UNITS: 100 INJECTION, SOLUTION INTRAVENOUS; SUBCUTANEOUS at 22:37

## 2021-01-01 RX ADMIN — LORAZEPAM 1 MG: 1 TABLET ORAL at 22:37

## 2021-01-01 RX ADMIN — FAMOTIDINE 10 MG: 20 TABLET ORAL at 22:37

## 2021-01-01 RX ADMIN — Medication 10 ML: at 22:00

## 2021-01-01 RX ADMIN — FUROSEMIDE 40 MG: 10 INJECTION, SOLUTION INTRAMUSCULAR; INTRAVENOUS at 20:07

## 2021-01-01 NOTE — CONSULTS
Consult  Pulmonary, Critical Care    Name: Li Zarate MRN: [id number]   : 1955 Hospital: [address] [place] [address]   Date: 2021  Admission date: 2021 Hospital Day: 1       Subjective/Interval History:   Seen in the emergency room. This is 1 of several admissions for shortness of breath. She has known aortic stenosis mitral regurgitation grade 2 diastolic dysfunction and develops CHF. Of interest she states that she has been told that it is good to take at least 8 glasses of water a day and she adheres to that. In any event she was just in the hospital several weeks ago COVID-19 antigen testing was negative. She had shortness of breath believed COPD was treated and discharged home. Over the last several days she has had worsening shortness of breath. Very little cough she does state that whenever she gets sick she has poor appetite and indeed she has poor appetite now. She has not noted any change in her sense of taste. She presented to the emergency room has been found to have bilateral pleural effusions and severe hyponatremia. She denies any fever chills or sweats. She is severely hypoxic 80% FiO2 on room air. On 4 L she is 98% saturation    Hospital Problems  Date Reviewed: 2020          Codes Class Noted POA    Acute respiratory failure with hypoxia [facility]) ICD-10-CM: J96.01  ICD-9-CM: 518.81  2021 Unknown              IMPRESSION:   1. Acute hypoxic respiratory failure questionably due to mild pulmonary edema from aortic stenosis and mitral regurgitation  2. Questionable COPD I do not hear any wheezing will discontinue Solu-Medrol but give albuterol with Symbicort  3. Moderate aortic stenosis valve area 1.1 cm² with max gradient 47 mean 25  4. Moderate mitral regurgitation  5. Mild pulmonary hypertension  6. Grade 2 diastolic left ventricular dysfunction  7. Hypokalemia  8.   9. Body mass index is 34.33 kg/m². 10.       RECOMMENDATIONS/PLAN:   1.  Agree with your Symbicort inhaler we will add albuterol  2. Agree with IV Lasix  3. Agree with IV sodium chloride for her hyponatremia  4. Acute hypoxia seems out of range of her chest x-ray findings we will check duplex scan of the legs to rule out DVT. If electrolytes stable tomorrow would consider CT angiogram of the chest  5. Continue oxygen supplementation to maintain oxygen saturation greater than 92%  6. [x] High complexity decision making was performed  [x] See my orders for details      Subjective/Initial History:     I was asked by Do Read MD to see Trae Moreno  a 77 y.o.    female in consultation for a chief complaint of shortness of breath hypoxia bilateral pleural effusion    Allergies   Allergen Reactions    Aspirin Unknown (comments)     hemorraging - ended up in the hospital        [facility] reviewed and pertinent medications noted or modified as needed     Current Facility-Administered Medications   Medication    0.9% sodium chloride infusion    albuterol-ipratropium (DUO-NEB) 2.5 MG-0.5 MG/3 ML    [START ON 1/2/2021] amLODIPine (NORVASC) tablet 10 mg    ammonium lactate (AMLACTIN) 12 % cream    budesonide-formoteroL (SYMBICORT) 160-4.5 mcg/actuation HFA inhaler 2 Puff    carvediloL (COREG) tablet 3.125 mg    famotidine (PEPCID) tablet 10 mg    [START ON 1/2/2021] hydroCHLOROthiazide (HYDRODIURIL) tablet 25 mg    [START ON 1/2/2021] ketoconazole (NIZORAL) 2 % cream    lisinopriL (PRINIVIL, ZESTRIL) tablet 20 mg    LORazepam (ATIVAN) tablet 1 mg    [START ON 1/2/2021] pantoprazole (PROTONIX) tablet 40 mg    [START ON 1/2/2021] potassium chloride SR (KLOR-CON 10) tablet 10 mEq    furosemide (LASIX) injection 40 mg    sodium chloride (NS) flush 5-40 mL    sodium chloride (NS) flush 5-40 mL    acetaminophen (TYLENOL) tablet 650 mg    Or    acetaminophen (TYLENOL) suppository 650 mg    polyethylene glycol (MIRALAX) packet 17 g    promethazine (PHENERGAN) tablet 12.5 mg    Or    ondansetron (ZOFRAN) injection 4 mg    [START ON 1/2/2021] enoxaparin (LOVENOX) injection 40 mg    glucose chewable tablet 16 g    dextrose (D50W) injection syrg 12.5-25 g    glucagon (GLUCAGEN) injection 1 mg    insulin lispro (HUMALOG) injection    albuterol (PROVENTIL HFA, VENTOLIN HFA, PROAIR HFA) inhaler 2 Puff     Current Outpatient Medications   Medication Sig    budesonide-formoteroL (SYMBICORT) 160-4.5 mcg/actuation HFAA Take 2 Puffs by inhalation two (2) times a day.  albuterol-ipratropium (DUO-NEB) 2.5 mg-0.5 mg/3 ml nebu 3 mL by Nebulization route every six (6) hours as needed for Wheezing, Shortness of Breath or Cough.  methylPREDNISolone (MEDROL DOSEPACK) 4 mg tablet One dose pack orally as directed until gone    pantoprazole (Protonix) 40 mg tablet Take 1 Tab by mouth daily.  famotidine (PEPCID) 10 mg tablet Take 1 Tab by mouth nightly.  LORazepam (Ativan) 1 mg tablet Take 1 Tab by mouth nightly. Max Daily Amount: 1 mg.  lisinopriL (PRINIVIL, ZESTRIL) 20 mg tablet Take 1 Tab by mouth two (2) times a day.  metFORMIN (GLUCOPHAGE) 500 mg tablet Take 2 tablets twice a day with meals    lancets misc Use to check glucose once a day    glucose blood VI test strips (blood glucose test) strip by Does Not Apply route two (2) times a day. Use to check glucose once a day    potassium chloride (KLOR-CON) 10 mEq tablet By oral route take one tablet Monday, Wednesday and Friday    hydroCHLOROthiazide (HYDRODIURIL) 25 mg tablet Take 1 Tab by mouth daily.  aspirin delayed-release (Aspirin Low Dose) 81 mg tablet Take  by mouth daily.  amLODIPine (NORVASC) 10 mg tablet Take  by mouth daily.  ammonium lactate (LAC-HYDRIN) 12 % lotion Apply  to affected area two (2) times a day. rub in to affected area well    carvediloL (COREG) 3.125 mg tablet Take  by mouth two (2) times daily (with meals).     ketoconazole (NIZORAL) 2 % topical cream Apply  to affected area daily. Patient PCP: Michael Marr DO  PMH:  has a past medical history of Acute gastrointestinal hemorrhage (8/28/2020), Allergic rhinitis (8/28/2020), Anemia (8/28/2020), Benign essential hypertension (6/25/2020), Body mass index 30.0-30.9, adult (6/25/2020), CAD (coronary artery disease) (8/28/2020), Foot callus (8/28/2020), GERD (gastroesophageal reflux disease) (6/25/2020), Heart murmur (6/25/2020), colonoscopy (01/01/2015), Left bundle branch block (8/28/2020), Microalbuminuria due to type 2 diabetes mellitus ([facility] [address] (8/28/2020), Mixed hyperlipidemia (6/25/2020), Morbid obesity ([address] (8/28/2020), Tobacco dependence syndrome (6/25/2020), and Type II diabetes mellitus, uncontrolled ([facility] [address] (6/25/2020). PSH:   has a past surgical history that includes hx colonoscopy (2015). FHX: family history includes Cancer in her mother and sister; Hypertension in her father. SHX:  reports that she quit smoking about 7 years ago. She smoked 0.50 packs per day. She has never used smokeless tobacco. She reports previous alcohol use. She reports that she does not use drugs. Systemic review:  General she states her weight stable with no chronic fever chills or sweats  Eyes no double vision or momentary blindness  ENT no facial pain over the sinuses  Endocrinologic she denies polyuria polydipsia  Musculoskeletal no swollen tender joints  Neurologic no seizures or syncope  Gastrointestinal she states she has indigestion heartburn last had it when she was in the hospital 2 weeks ago has not been bothered with it at home. She states it has awakened her at night in the past but again has not had problems over the last 2 weeks has not had nausea vomiting. States she has poor appetite whenever she gets sick states her sense of taste and smell seems normal  Genitourinary no difficulty voiding no drainage or bleeding  Cardiovascular has a history of atrial stenosis mitral regurg diastolic dysfunction.   She states she does not have ankle edema has had worsening shortness of breath with exertion and when she lays down no chest pain or diaphoresis  Respiratory was a smoker stopped 7 years ago states she gets wheezy when she has her shortness of breath was just started on albuterol inhaler last admission questionably was to start a Symbicort inhaler within the last day or 2    Objective:     Vital Signs: Telemetry:    normal sinus rhythm Intake/Output:   Visit Vitals  BP (!) 136/115   Pulse 88   Temp 98.2 °F (36.8 °C)   Resp 20   Ht 5' 4\" (1.626 m)   Wt 90.7 kg (200 lb)   SpO2 96%   BMI 34.33 kg/m²       Temp (24hrs), Av.2 °F (36.8 °C), Min:98.2 °F (36.8 °C), Max:98.2 °F (36.8 °C)        O2 Device: Nasal cannula O2 Flow Rate (L/min): 4 l/min       Wt Readings from Last 4 Encounters:   21 90.7 kg (200 lb)   20 90.7 kg (200 lb)   20 97.4 kg (214 lb 11.7 oz)   20 94 kg (207 lb 4.8 oz)        No intake or output data in the 24 hours ending 21 1856    Last shift:      No intake/output data recorded. Last 3 shifts: No intake/output data recorded. Physical Exam:   General:  female; no distress sitting up in bed with nasal oxygen in place  HEENT: NCAT, oral mucosa is clear  Eyes: anicteric; conjunctiva clear extraocular movements intact  Neck: no nodes, neck veins are visible no accessory muscle usage  Chest: no deformity  Cardiac: Regular rate and rhythm questionable systolic murmur no definite ankle edema no definite sacral edema  Lungs: Clear anteriorly and laterally no definite wheezes exhalation only mildly prolonged a few rales in the bases  Abd: Soft nontender normal bowel sounds  Ext: no edema; no joint swelling;  No clubbing  : clear urine  Neuro: Awake alert oriented moves all 4 extremities well  Psych- no agitation, oriented to person;   Skin: warm, dry, no cyanosis;   Pulses: Brachial radial femoral pulses intact  Capillary: Normal capillary refill    Labs:    Recent Labs     01/01/21  1245   WBC 10.3   HGB 7.8*        Recent Labs     01/01/21  1245   *   K 3.3*   CL 79*   CO2 28   *   BUN 8   CREA 0.67   CA 8.2*   LAC 2.5*   ALB 2.8*   ALT 24     No results for input(s): PH, PCO2, PO2, HCO3, FIO2 in the last 72 hours. Recent Labs     01/01/21  1245   TROIQ <0.05     Troponin less than 0.05    12/13 Covid 19 antigen negative  12/14 ejection fraction 41% visual normal grade 2 diastolic dysfunction aortic valve area 1.16 cm² mean gradient 25 max gradient 47 mild to moderate mitral regurg RVSP 33  Lab Results   Component Value Date/Time    TSH, External 2.420 01/16/2019       Imaging:    CXR Results  (Last 48 hours)               01/01/21 1336  XR CHEST SNGL V Final result    Impression:  Impression: Small bilateral pleural effusions. Narrative:  XR CHEST SNGL V       Comparison: December 16, 2020. There is blunting of both costophrenic angles suggesting small bilateral pleural   effusions. No infiltrate or consolidation. No pulmonary vascular congestion. Heart size is normal. Mediastinum is unremarkable. The bony thorax is intact. Results from [place] encounter on 01/01/21   XR CHEST SNGL V    Narrative XR CHEST SNGL V    Comparison: December 16, 2020. There is blunting of both costophrenic angles suggesting small bilateral pleural  effusions. No infiltrate or consolidation. No pulmonary vascular congestion. Heart size is normal. Mediastinum is unremarkable. The bony thorax is intact. Impression Impression: Small bilateral pleural effusions. Results from Hospital Encounter encounter on 12/13/20   XR CHEST PORT    Narrative Study: XR CHEST PORT    Clinical indication: wheezing,. Comparison: Chest x-ray 12/13/2020. Findings:    Lungs are slightly hyperinflated. No consolidative airspace disease, pleural  effusion or pneumothorax. Cardiomediastinal contours are within normal limits.  No pulmonary edema. No acute osseous abnormality identified. Impression Impression:  No acute findings. Mild emphysematous change. XR CHEST SNGL V    Narrative Chest, AP upright portable, 1418 hours. Comparison with two-view exam from  11/25/2019. Cardiopericardial silhouette top normal in size. Central pulmonary  vessels appear borderline enlarged, stable. Calcification and tortuosity of  thoracic aorta. There is nonspecific central peribronchial cuffing. No evidence  of pulmonary edema, air space pneumonia, or pleural effusion. No evidence of  pneumothorax. Impression IMPRESSION: No acute pulmonary or pleural disease. Discussion acute hypoxia probably related to cardiac disease with mild fluid overload with bilateral pleural effusions however the degree of hypoxia seems out of range of the changes on chest x-ray. Pulmonary emboli are a possibility. Will check duplex scan of the legs.   If electrolytes improved tomorrow will check CT angiogram of the chest  Time of care 45 minutes  Stevo Connelly MD

## 2021-01-01 NOTE — ED TRIAGE NOTES
Pt reports she is SOB and very weak, symptoms x 3-4 days. Reports recently admitted in December. Pt dehydrated, has had very little po intake. Lips cracked and dry. Spo2 85% on RA.

## 2021-01-01 NOTE — ED PROVIDER NOTES
EMERGENCY DEPARTMENT HISTORY AND PHYSICAL EXAM      Date: 1/1/2021  Patient Name: Benjie Romero    History of Presenting Illness     Chief Complaint   Patient presents with    Shortness of Breath    Fatigue    Syncope       History Provided By: Patient    HPI: Benjie Romero, 77 y.o. female with a past medical history significant for CAD, hypertension, hyperlipidemia, DM, COPD, anemia who presents to the ED with cc of intermittent shortness of breath ongoing for several weeks worsening over the last 3 to 4 days. Associated symptoms include fatigue/generalized weakness, wheezing, and chest pain. No particular alleviating or exacerbating factors. Has not treated symptoms at home. Reports recent admission on 12/13/2020 for acute anemia and COPD exacerbation. Does not wear O2 at home. States she supposed to have a CABG but that has been canceled recently due to her recent admissions and health problems. No known recent COVID exposure. Denies any fever, chills, nausea, vomiting. There are no other complaints, changes, or physical findings at this time. PCP: Violet Delgado DO   Cardiologist: Dr. Jose Roberto Adhikari    No current facility-administered medications on file prior to encounter. Current Outpatient Medications on File Prior to Encounter   Medication Sig Dispense Refill    budesonide-formoteroL (SYMBICORT) 160-4.5 mcg/actuation HFAA Take 2 Puffs by inhalation two (2) times a day. 1 Inhaler 3    albuterol-ipratropium (DUO-NEB) 2.5 mg-0.5 mg/3 ml nebu 3 mL by Nebulization route every six (6) hours as needed for Wheezing, Shortness of Breath or Cough. 120 Nebule 3    methylPREDNISolone (MEDROL DOSEPACK) 4 mg tablet One dose pack orally as directed until gone 1 Dose Pack 0    pantoprazole (Protonix) 40 mg tablet Take 1 Tab by mouth daily. 30 Tab 0    famotidine (PEPCID) 10 mg tablet Take 1 Tab by mouth nightly. 30 Tab 0    LORazepam (Ativan) 1 mg tablet Take 1 Tab by mouth nightly.  Max Daily Amount: 1 mg. 5 Tab 0    lisinopriL (PRINIVIL, ZESTRIL) 20 mg tablet Take 1 Tab by mouth two (2) times a day. 180 Tab 2    metFORMIN (GLUCOPHAGE) 500 mg tablet Take 2 tablets twice a day with meals 120 Tab 3    lancets misc Use to check glucose once a day 50 Each 5    glucose blood VI test strips (blood glucose test) strip by Does Not Apply route two (2) times a day. Use to check glucose once a day 50 Strip 5    potassium chloride (KLOR-CON) 10 mEq tablet By oral route take one tablet Monday, Wednesday and Friday 40 Tab 3    hydroCHLOROthiazide (HYDRODIURIL) 25 mg tablet Take 1 Tab by mouth daily. 90 Tab 4    aspirin delayed-release (Aspirin Low Dose) 81 mg tablet Take  by mouth daily.  amLODIPine (NORVASC) 10 mg tablet Take  by mouth daily.  ammonium lactate (LAC-HYDRIN) 12 % lotion Apply  to affected area two (2) times a day. rub in to affected area well      carvediloL (COREG) 3.125 mg tablet Take  by mouth two (2) times daily (with meals).  ketoconazole (NIZORAL) 2 % topical cream Apply  to affected area daily.          Past History     Past Medical History:  Past Medical History:   Diagnosis Date    Acute gastrointestinal hemorrhage 8/28/2020    Allergic rhinitis 8/28/2020    Anemia 8/28/2020    Benign essential hypertension 6/25/2020    Body mass index 30.0-30.9, adult 6/25/2020    CAD (coronary artery disease) 8/28/2020    Foot callus 8/28/2020    GERD (gastroesophageal reflux disease) 6/25/2020    Heart murmur 6/25/2020    Hx of colonoscopy 01/01/2015    Done for anemia and rectal bleeding    Left bundle branch block 8/28/2020    Microalbuminuria due to type 2 diabetes mellitus ([address] 8/28/2020    Mixed hyperlipidemia 6/25/2020    Morbid obesity ([address] 8/28/2020    Tobacco dependence syndrome 6/25/2020    Type II diabetes mellitus, uncontrolled ([address] 6/25/2020       Past Surgical History:  Past Surgical History:   Procedure Laterality Date    HX COLONOSCOPY  2015    due in 2244-2143. Done for anemia and rectal bleeding       Family History:  Family History   Problem Relation Age of Onset    Cancer Mother         uterine    Hypertension Father     Cancer Sister         breast       Social History:  Social History     Tobacco Use    Smoking status: Former Smoker     Packs/day: 0.50     Quit date:      Years since quittin.0    Smokeless tobacco: Never Used   Substance Use Topics    Alcohol use: Not Currently     Frequency: Never    Drug use: Never       Allergies: Allergies   Allergen Reactions    Aspirin Unknown (comments)     hemorraging - ended up in the hospital         Review of Systems     Review of Systems   Constitutional: Positive for fatigue. Negative for chills and fever. HENT: Negative. Respiratory: Positive for shortness of breath and wheezing. Negative for cough and chest tightness. Cardiovascular: Positive for chest pain. Negative for palpitations. Gastrointestinal: Negative for abdominal pain, diarrhea, nausea and vomiting. Genitourinary: Negative for frequency and urgency. Musculoskeletal: Negative for back pain, neck pain and neck stiffness. Skin: Negative for rash. Neurological: Negative for dizziness, weakness, light-headedness and headaches. Psychiatric/Behavioral: Negative. All other systems reviewed and are negative. Physical Exam     Physical Exam  Vitals signs and nursing note reviewed. Constitutional:       Appearance: Normal appearance. She is well-developed. She is not toxic-appearing or diaphoretic. Comments: Tired appearing AA female   HENT:      Head: Normocephalic and atraumatic. Nose: Nose normal. No congestion or rhinorrhea. Mouth/Throat:      Mouth: Mucous membranes are moist.      Pharynx: Oropharynx is clear. No oropharyngeal exudate or posterior oropharyngeal erythema. Eyes:      General: No scleral icterus.      Conjunctiva/sclera: Conjunctivae normal.      Pupils: Pupils are equal, round, and reactive to light. Neck:      Musculoskeletal: Normal range of motion and neck supple. No neck rigidity or muscular tenderness. Cardiovascular:      Rate and Rhythm: Normal rate and regular rhythm. Pulses:           Radial pulses are 2+ on the right side and 2+ on the left side. Dorsalis pedis pulses are 2+ on the right side and 2+ on the left side. Heart sounds: No murmur. No friction rub. No gallop. Pulmonary:      Effort: Tachypnea and accessory muscle usage present. No respiratory distress or retractions. Breath sounds: Normal breath sounds. No stridor. No decreased breath sounds, wheezing, rhonchi or rales. Comments:   SpO2 76% on room air  SpO2 100% on 4L NC O2  Chest:      Chest wall: No tenderness. Abdominal:      General: Bowel sounds are normal. There is no distension. Palpations: Abdomen is soft. There is no mass. Tenderness: There is no abdominal tenderness. There is no right CVA tenderness, left CVA tenderness, guarding or rebound. Musculoskeletal: Normal range of motion. General: No deformity. Right lower leg: No edema. Left lower leg: No edema. Skin:     General: Skin is warm and dry. Capillary Refill: Capillary refill takes less than 2 seconds. Coloration: Skin is pale. Skin is not jaundiced. Findings: No bruising, erythema or rash. Neurological:      General: No focal deficit present. Mental Status: She is alert and oriented to person, place, and time. Mental status is at baseline. Sensory: Sensation is intact. Motor: Motor function is intact. Psychiatric:         Mood and Affect: Mood normal.         Behavior: Behavior normal. Behavior is cooperative. Thought Content:  Thought content normal.         Judgment: Judgment normal.         Lab and Diagnostic Study Results     Labs -     Recent Results (from the past 12 hour(s))   CBC WITH AUTOMATED DIFF    Collection Time: 01/01/21 12:45 PM   Result Value Ref Range    WBC 10.3 3.6 - 11.0 K/uL    RBC 3.49 (L) 3.80 - 5.20 M/uL    HGB 7.8 (L) 11.5 - 16.0 g/dL    HCT 25.1 (L) 35.0 - 47.0 %    MCV 71.9 (L) 80.0 - 99.0 FL    MCH 22.3 (L) 26.0 - 34.0 PG    MCHC 31.1 30.0 - 36.5 g/dL    RDW 19.1 (H) 11.5 - 14.5 %    PLATELET 599 161 - 681 K/uL    MPV 8.3 (L) 8.9 - 12.9 FL    NRBC 8.5 (H) 0  WBC    ABSOLUTE NRBC 0.91 (H) 0.00 - 0.01 K/uL    NEUTROPHILS 81 (H) 32 - 75 %    LYMPHOCYTES 11 (L) 12 - 49 %    MONOCYTES 7 5 - 13 %    EOSINOPHILS 0 0 - 7 %    BASOPHILS 1 0 - 1 %    NRBC 4.0  WBC    IMMATURE GRANULOCYTES 0 %    ABS. NEUTROPHILS 8.3 (H) 1.8 - 8.0 K/UL    ABS. LYMPHOCYTES 1.1 0.8 - 3.5 K/UL    ABS. MONOCYTES 0.7 0.0 - 1.0 K/UL    ABS. EOSINOPHILS 0.0 0.0 - 0.4 K/UL    ABS. BASOPHILS 0.1 0.0 - 0.1 K/UL    ABSOLUTE NRBC 0.41 K/uL    ABS. IMM. GRANS. 0.0 K/UL    DF Smear Scanned      RBC COMMENTS Macrocytosis  1+       METABOLIC PANEL, COMPREHENSIVE    Collection Time: 01/01/21 12:45 PM   Result Value Ref Range    Sodium 118 (LL) 136 - 145 mmol/L    Potassium 3.3 (L) 3.5 - 5.1 mmol/L    Chloride 79 (L) 97 - 108 mmol/L    CO2 28 21 - 32 mmol/L    Anion gap 11 5 - 15 mmol/L    Glucose 178 (H) 65 - 100 mg/dL    BUN 8 6 - 20 mg/dL    Creatinine 0.67 0.55 - 1.02 mg/dL    BUN/Creatinine ratio 12 12 - 20      GFR est AA >60 >60 ml/min/1.73m2    GFR est non-AA >60 >60 ml/min/1.73m2    Calcium 8.2 (L) 8.5 - 10.1 mg/dL    Bilirubin, total 0.7 0.2 - 1.0 mg/dL    AST (SGOT) 29 15 - 37 U/L    ALT (SGPT) 24 12 - 78 U/L    Alk.  phosphatase 64 45 - 117 U/L    Protein, total 6.7 6.4 - 8.2 g/dL    Albumin 2.8 (L) 3.5 - 5.0 g/dL    Globulin 3.9 2.0 - 4.0 g/dL    A-G Ratio 0.7 (L) 1.1 - 2.2     LACTIC ACID    Collection Time: 01/01/21 12:45 PM   Result Value Ref Range    Lactic acid 2.5 (HH) 0.4 - 2.0 mmol/L   TROPONIN I    Collection Time: 01/01/21 12:45 PM   Result Value Ref Range    Troponin-I, Qt. <0.05 <0.05 ng/mL   BNP    Collection Time: 01/01/21 12:45 PM   Result Value Ref Range    NT pro- (H) <125 pg/mL       Radiologic Studies -   CXR Results  (Last 48 hours)               01/01/21 1336  XR CHEST SNGL V Final result    Impression:  Impression: Small bilateral pleural effusions. Narrative:  XR CHEST SNGL V       Comparison: December 16, 2020. There is blunting of both costophrenic angles suggesting small bilateral pleural   effusions. No infiltrate or consolidation. No pulmonary vascular congestion. Heart size is normal. Mediastinum is unremarkable. The bony thorax is intact. Medical Decision Making   - I am the first provider for this patient. - I reviewed the vital signs, available nursing notes, past medical history, past surgical history, family history and social history. - Initial assessment performed. The patients presenting problems have been discussed, and they are in agreement with the care plan formulated and outlined with them. I have encouraged them to ask questions as they arise throughout their visit. Vital Signs-Reviewed the patient's vital signs. Patient Vitals for the past 12 hrs:   Temp Pulse Resp BP SpO2   01/01/21 1318     98 %   01/01/21 1308     (!) 77 %   01/01/21 1234 98.2 °F (36.8 °C) 89 18 116/67 (!) 86 %       EKG interpretation: (Preliminary) 1237  Rhythm: sinus rhythm with PACs; and regular .  Rate (approx.): 88; Axis: LAD; P wave: normal; QRS interval: normal ; ST/T wave: normal; LBBB      Records Reviewed: Nursing Notes, Old Medical Records, Previous Radiology Studies and Previous Laboratory Studies    The patient presents with shortness of breath with a differential diagnosis of COPD exacerbation, acute hypoxemic respiratory failure, ACS, PNA, bronchitis, COVID-19, acute anemia      ED Course:     ED Course as of Jan 01 1519   Fri Jan 01, 2021   1508 CONSULT NOTE:  Consultant: Dr. Catherine Kamara  Specialty: Hospitalist  Discussed pt's history, disposition, and available diagnostic and imaging results. Reviewed care plans. Patient will be admitted to the hospital for further management. JUAN MANUEL Marcos    [NO]      ED Course User Index  [NO] JUAN MANUEL Box       Provider Notes (Medical Decision Making):     MDM  Number of Diagnoses or Management Options  Acute hypoxemic respiratory failure (HCC)  Anemia, unspecified type  Bilateral pleural effusion  Hyponatremia  Diagnosis management comments:     49-year-old female with shortness of breath. History of CAD, no history of CHF. Followed by Dr. Gilberto Darling. Hypoxic with SPO2 86 percent on room air improved on 4 L nasal cannula O2. Does not wear oxygen at home. Work-up revealing hyponatremia, anemia which is similar compared to baseline, elevated lactate. X-ray with bilateral pleural effusion. Will admit to the hospital for further evaluation and work-up. Rapid Covid test sent. Amount and/or Complexity of Data Reviewed  Clinical lab tests: ordered and reviewed  Tests in the radiology section of CPT®: ordered and reviewed  Discussion of test results with the performing providers: yes  Review and summarize past medical records: yes  Discuss the patient with other providers: yes  Independent visualization of images, tracings, or specimens: yes    Patient Progress  Patient progress: stable             Disposition   Disposition: Admitted to hospital by Dr. Kristian Clifton    Admitted    Diagnosis     Clinical Impression:   1. Acute hypoxemic respiratory failure (HCC)    2. Hyponatremia    3. Anemia, unspecified type    4. Bilateral pleural effusion        Attestations:    JUAN MANUEL Marcos    Please note that this dictation was completed with [employer], the computer voice recognition software. Quite often unanticipated grammatical, syntax, homophones, and other interpretive errors are inadvertently transcribed by the computer software. Please disregard these errors. Please excuse any errors that have escaped final proofreading. Thank you.

## 2021-01-01 NOTE — H&P
GENERAL GENERIC H&P/CONSULT  Presenting complaint: Shortness of Breath    Subjective: 61-year-old female with past medical history of multiple comorbidities presents to [facility] with complaints shortness of breath. Patient states she was in her usual state of health until 3 days back which started sprinting gradual onset persistent progressive shortness of breath that was initially on exertion and then progressed to being at rest, following which patient presented to the ED. Patient denies any fevers chills nausea vomiting lightheadedness dizziness orthopnea paroxysmal nocturnal dyspnea chest pain palpitations headache focal weakness loss sensation auditory or visual symptoms abdominal stool or urinary complaints or any other associated symptoms. Past Medical History:   Diagnosis Date    Acute gastrointestinal hemorrhage 8/28/2020    Allergic rhinitis 8/28/2020    Anemia 8/28/2020    Benign essential hypertension 6/25/2020    Body mass index 30.0-30.9, adult 6/25/2020    CAD (coronary artery disease) 8/28/2020    Foot callus 8/28/2020    GERD (gastroesophageal reflux disease) 6/25/2020    Heart murmur 6/25/2020    Hx of colonoscopy 01/01/2015    Done for anemia and rectal bleeding    Left bundle branch block 8/28/2020    Microalbuminuria due to type 2 diabetes mellitus ([facility] [address] 8/28/2020    Mixed hyperlipidemia 6/25/2020    Morbid obesity ([address] 8/28/2020    Tobacco dependence syndrome 6/25/2020    Type II diabetes mellitus, uncontrolled ([facility] [address] 6/25/2020      Past Surgical History:   Procedure Laterality Date    HX COLONOSCOPY  2015    due in [id number]. Done for anemia and rectal bleeding      Prior to Admission medications    Medication Sig Start Date End Date Taking? Authorizing Provider   budesonide-formoteroL (SYMBICORT) 160-4.5 mcg/actuation HFAA Take 2 Puffs by inhalation two (2) times a day.  12/31/20  Yes Desma Isabella, DO   albuterol-ipratropium (DUO-NEB) 2.5 mg-0.5 mg/3 ml nebu 3 mL by Nebulization route every six (6) hours as needed for Wheezing, Shortness of Breath or Cough. 12/31/20  Yes Mustapha Gao DO   methylPREDNISolone (MEDROL DOSEPACK) 4 mg tablet One dose pack orally as directed until gone 12/31/20  Yes Mustapha Gao DO   pantoprazole (Protonix) 40 mg tablet Take 1 Tab by mouth daily. 12/17/20  Yes Wenceslao Davis MD   famotidine (PEPCID) 10 mg tablet Take 1 Tab by mouth nightly. 12/17/20  Yes Wenceslao Davis MD   LORazepam (Ativan) 1 mg tablet Take 1 Tab by mouth nightly. Max Daily Amount: 1 mg. 12/17/20  Yes Wenceslao Davis MD   lisinopriL (PRINIVIL, ZESTRIL) 20 mg tablet Take 1 Tab by mouth two (2) times a day. 12/11/20  Yes Mustapha Gao DO   metFORMIN (GLUCOPHAGE) 500 mg tablet Take 2 tablets twice a day with meals 11/25/20  Yes Azar Mancera MD   lancets misc Use to check glucose once a day 11/25/20  Yes Azar Mancera MD   glucose blood VI test strips (blood glucose test) strip by Does Not Apply route two (2) times a day. Use to check glucose once a day 11/25/20  Yes Azar Mancera MD   potassium chloride (KLOR-CON) 10 mEq tablet By oral route take one tablet Monday, Wednesday and Friday 9/11/20  Yes Mustapha Gao DO   hydroCHLOROthiazide (HYDRODIURIL) 25 mg tablet Take 1 Tab by mouth daily. 9/10/20  Yes Mustapha Gao DO   aspirin delayed-release (Aspirin Low Dose) 81 mg tablet Take  by mouth daily. Yes Provider, Historical   amLODIPine (NORVASC) 10 mg tablet Take  by mouth daily. Yes Provider, Historical   ammonium lactate (LAC-HYDRIN) 12 % lotion Apply  to affected area two (2) times a day. rub in to affected area well   Yes Provider, Historical   carvediloL (COREG) 3.125 mg tablet Take  by mouth two (2) times daily (with meals). Yes Provider, Historical   ketoconazole (NIZORAL) 2 % topical cream Apply  to affected area daily.    Yes Provider, Historical     Allergies   Allergen Reactions    Aspirin Unknown (comments)     hemorraging - ended up in the hospital      Social History     Tobacco Use    Smoking status: Former Smoker     Packs/day: 0.50     Quit date:      Years since quittin.0    Smokeless tobacco: Never Used   Substance Use Topics    Alcohol use: Not Currently     Frequency: Never      Family History   Problem Relation Age of Onset    Cancer Mother         uterine    Hypertension Father     Cancer Sister         breast      Review of Systems   Constitutional: Positive for activity change, appetite change and fatigue. Negative for chills, diaphoresis, fever and unexpected weight change. HENT: Negative for congestion, dental problem, drooling, ear discharge, ear pain, facial swelling, hearing loss, mouth sores, nosebleeds, postnasal drip, rhinorrhea, sinus pressure, sinus pain, sneezing, sore throat, tinnitus, trouble swallowing and voice change. Eyes: Negative for photophobia, pain, discharge, redness, itching and visual disturbance. Respiratory: Positive for cough, shortness of breath and wheezing. Negative for apnea, choking, chest tightness and stridor. Cardiovascular: Negative for chest pain, palpitations and leg swelling. Gastrointestinal: Negative for abdominal distention, abdominal pain, anal bleeding, blood in stool, constipation, diarrhea, nausea, rectal pain and vomiting. Endocrine: Negative for cold intolerance, heat intolerance, polydipsia, polyphagia and polyuria. Genitourinary: Negative for decreased urine volume, difficulty urinating, dyspareunia, dysuria, enuresis, flank pain, frequency, genital sores, hematuria, menstrual problem, pelvic pain, urgency, vaginal bleeding, vaginal discharge and vaginal pain. Musculoskeletal: Negative for arthralgias, back pain, gait problem, joint swelling, myalgias, neck pain and neck stiffness. Skin: Negative for color change, pallor, rash and wound. Allergic/Immunologic: Negative for environmental allergies, food allergies and immunocompromised state. Neurological: Positive for weakness. Negative for dizziness, tremors, seizures, syncope, facial asymmetry, speech difficulty, light-headedness, numbness and headaches. Hematological: Negative for adenopathy. Does not bruise/bleed easily. Psychiatric/Behavioral: Negative for agitation, behavioral problems, confusion, decreased concentration, dysphoric mood, hallucinations, self-injury, sleep disturbance and suicidal ideas. The patient is not nervous/anxious and is not hyperactive. Objective:    No intake/output data recorded. No intake/output data recorded. Patient Vitals for the past 8 hrs:   BP Temp Pulse Resp SpO2 Height Weight   01/01/21 1537 (!) 125/93  90 22 98 %     01/01/21 1318     98 %     01/01/21 1308     (!) 77 %     01/01/21 1234 116/67 98.2 °F (36.8 °C) 89 18 (!) 86 % 5' 4\" (1.626 m) 90.7 kg (200 lb)     Physical Exam  Vitals signs reviewed. Constitutional:       General: She is not in acute distress. Appearance: Normal appearance. She is normal weight. She is ill-appearing. She is not toxic-appearing or diaphoretic. HENT:      Head: Normocephalic and atraumatic. Nose: Nose normal. No congestion or rhinorrhea. Mouth/Throat:      Mouth: Mucous membranes are moist.      Pharynx: Oropharynx is clear. No oropharyngeal exudate or posterior oropharyngeal erythema. Eyes:      General: No scleral icterus. Right eye: No discharge. Left eye: No discharge. Extraocular Movements: Extraocular movements intact. Conjunctiva/sclera: Conjunctivae normal.      Pupils: Pupils are equal, round, and reactive to light. Neck:      Musculoskeletal: Normal range of motion and neck supple. No neck rigidity or muscular tenderness. Vascular: No carotid bruit. Cardiovascular:      Rate and Rhythm: Normal rate and regular rhythm. Pulses: Normal pulses. Heart sounds: Normal heart sounds. No murmur. No friction rub. No gallop.     Pulmonary: Effort: No respiratory distress. Breath sounds: No stridor. Wheezing present. No rhonchi or rales. Chest:      Chest wall: No tenderness. Abdominal:      General: Abdomen is flat. Bowel sounds are normal. There is no distension. Palpations: Abdomen is soft. There is no mass. Tenderness: There is no abdominal tenderness. There is no right CVA tenderness, left CVA tenderness, guarding or rebound. Hernia: No hernia is present. Musculoskeletal: Normal range of motion. General: No swelling, tenderness, deformity or signs of injury. Right lower leg: No edema. Left lower leg: No edema. Lymphadenopathy:      Cervical: No cervical adenopathy. Skin:     General: Skin is warm. Capillary Refill: Capillary refill takes less than 2 seconds. Coloration: Skin is not jaundiced or pale. Findings: No bruising, erythema, lesion or rash. Neurological:      General: No focal deficit present. Mental Status: She is alert and oriented to person, place, and time. Mental status is at baseline. Cranial Nerves: No cranial nerve deficit. Sensory: No sensory deficit. Motor: No weakness. Coordination: Coordination normal.      Gait: Gait normal.      Deep Tendon Reflexes: Reflexes normal.   Psychiatric:         Mood and Affect: Mood normal.         Behavior: Behavior normal.         Thought Content:  Thought content normal.         Judgment: Judgment normal.          Labs:    Recent Results (from the past 24 hour(s))   CBC WITH AUTOMATED DIFF    Collection Time: 01/01/21 12:45 PM   Result Value Ref Range    WBC 10.3 3.6 - 11.0 K/uL    RBC 3.49 (L) 3.80 - 5.20 M/uL    HGB 7.8 (L) 11.5 - 16.0 g/dL    HCT 25.1 (L) 35.0 - 47.0 %    MCV 71.9 (L) 80.0 - 99.0 FL    MCH 22.3 (L) 26.0 - 34.0 PG    MCHC 31.1 30.0 - 36.5 g/dL    RDW 19.1 (H) 11.5 - 14.5 %    PLATELET 824 685 - 081 K/uL    MPV 8.3 (L) 8.9 - 12.9 FL    NRBC 8.5 (H) 0  WBC    ABSOLUTE NRBC 0.91 (H) 0.00 - 0.01 K/uL    NEUTROPHILS 81 (H) 32 - 75 %    LYMPHOCYTES 11 (L) 12 - 49 %    MONOCYTES 7 5 - 13 %    EOSINOPHILS 0 0 - 7 %    BASOPHILS 1 0 - 1 %    NRBC 4.0  WBC    IMMATURE GRANULOCYTES 0 %    ABS. NEUTROPHILS 8.3 (H) 1.8 - 8.0 K/UL    ABS. LYMPHOCYTES 1.1 0.8 - 3.5 K/UL    ABS. MONOCYTES 0.7 0.0 - 1.0 K/UL    ABS. EOSINOPHILS 0.0 0.0 - 0.4 K/UL    ABS. BASOPHILS 0.1 0.0 - 0.1 K/UL    ABSOLUTE NRBC 0.41 K/uL    ABS. IMM. GRANS. 0.0 K/UL    DF Smear Scanned      RBC COMMENTS Macrocytosis  1+       METABOLIC PANEL, COMPREHENSIVE    Collection Time: 01/01/21 12:45 PM   Result Value Ref Range    Sodium 118 (LL) 136 - 145 mmol/L    Potassium 3.3 (L) 3.5 - 5.1 mmol/L    Chloride 79 (L) 97 - 108 mmol/L    CO2 28 21 - 32 mmol/L    Anion gap 11 5 - 15 mmol/L    Glucose 178 (H) 65 - 100 mg/dL    BUN 8 6 - 20 mg/dL    Creatinine 0.67 0.55 - 1.02 mg/dL    BUN/Creatinine ratio 12 12 - 20      GFR est AA >60 >60 ml/min/1.73m2    GFR est non-AA >60 >60 ml/min/1.73m2    Calcium 8.2 (L) 8.5 - 10.1 mg/dL    Bilirubin, total 0.7 0.2 - 1.0 mg/dL    AST (SGOT) 29 15 - 37 U/L    ALT (SGPT) 24 12 - 78 U/L    Alk.  phosphatase 64 45 - 117 U/L    Protein, total 6.7 6.4 - 8.2 g/dL    Albumin 2.8 (L) 3.5 - 5.0 g/dL    Globulin 3.9 2.0 - 4.0 g/dL    A-G Ratio 0.7 (L) 1.1 - 2.2     LACTIC ACID    Collection Time: 01/01/21 12:45 PM   Result Value Ref Range    Lactic acid 2.5 (HH) 0.4 - 2.0 mmol/L   TROPONIN I    Collection Time: 01/01/21 12:45 PM   Result Value Ref Range    Troponin-I, Qt. <0.05 <0.05 ng/mL   BNP    Collection Time: 01/01/21 12:45 PM   Result Value Ref Range    NT pro- (H) <125 pg/mL   URINALYSIS W/ RFLX MICROSCOPIC    Collection Time: 01/01/21  3:40 PM   Result Value Ref Range    Color Yellow/Straw      Appearance Clear Clear      Specific gravity <1.005 1.003 - 1.030    pH (UA) 7.0 5.0 - 8.0      Protein Negative Negative mg/dL    Glucose Negative Negative mg/dL    Ketone Negative Negative mg/dL Bilirubin Negative Negative      Blood Negative Negative      Urobilinogen 0.1 0.1 - 1.0 EU/dL    Nitrites Negative Negative      Leukocyte Esterase Moderate (A) Negative      WBC 10-20 0 - 4 /hpf    RBC 0-5 0 - 5 /hpf    Bacteria Negative Negative /hpf   URINE MICROSCOPIC    Collection Time: 01/01/21  3:40 PM   Result Value Ref Range    WBC 10-20 0 - 4 /hpf    RBC 0-5 0 - 5 /hpf    Bacteria Negative Negative /hpf       ECG: unchanged from previous tracings   Chest X-Ray: pleural effusion(s): bilateral    Assessment:  Active Problems:    Acute respiratory failure with hypoxia (HCC) (1/1/2021)        Plan:  Acute respiratory failure with hypoxia/COPD exacerbation-patient presents with above-mentioned symptomatology and based on patient's clinical presentation patient was found to be in acute respiratory failure with hypoxia with an O2 saturation of 85% requiring 4 L of nasal cannula for ventilatory support based on patient's clinical presentation secondary to COPD exacerbation  Follow-up COVID-19 testing  Obtain blood cultures  Obtain influenza swab  Methylprednisone 40 mg IV every 8  DuoNebs as needed  Pulmonology consult    Acute exacerbation of CHF with reduced ejection fractionpatient has an ejection fraction of 40% and based on patient's clinical presentation likely component of acute decompensated heart failure  Daily weights  Frequent intake and output monitoring  Lasix 40 mg IV twice daily  Cardiology consult    Hypertensioncontinue home antihypertensive medications    Hyperglycemia type 2 diabeteswill recommend insulin sliding scale    ProphylaxisLovenox  FENcardiac/diabetic diet, replete potassium and magnesium  Full code, will clarify about surrogate decision-maker, admitted to telemetry further management      Signed:   Leeanne Chavez MD 1/1/2021

## 2021-01-02 ENCOUNTER — APPOINTMENT (OUTPATIENT)
Dept: NON INVASIVE DIAGNOSTICS | Age: 66
DRG: 177 | End: 2021-01-02
Attending: INTERNAL MEDICINE
Payer: MEDICARE

## 2021-01-02 ENCOUNTER — APPOINTMENT (OUTPATIENT)
Dept: CT IMAGING | Age: 66
DRG: 177 | End: 2021-01-02
Attending: INTERNAL MEDICINE
Payer: MEDICARE

## 2021-01-02 LAB
ALBUMIN SERPL-MCNC: 2.5 G/DL (ref 3.5–5)
ALBUMIN SERPL-MCNC: 2.6 G/DL (ref 3.5–5)
ALBUMIN/GLOB SERPL: 0.7 {RATIO} (ref 1.1–2.2)
ALP SERPL-CCNC: 57 U/L (ref 45–117)
ALT SERPL-CCNC: 27 U/L (ref 12–78)
ANION GAP SERPL CALC-SCNC: 6 MMOL/L (ref 5–15)
ANION GAP SERPL CALC-SCNC: 8 MMOL/L (ref 5–15)
AST SERPL W P-5'-P-CCNC: 33 U/L (ref 15–37)
BASOPHILS # BLD: 0.1 K/UL (ref 0–0.1)
BASOPHILS NFR BLD: 1 % (ref 0–1)
BILIRUB SERPL-MCNC: 0.5 MG/DL (ref 0.2–1)
BNP SERPL-MCNC: 284 PG/ML
BUN SERPL-MCNC: 8 MG/DL (ref 6–20)
BUN SERPL-MCNC: 8 MG/DL (ref 6–20)
BUN/CREAT SERPL: 13 (ref 12–20)
BUN/CREAT SERPL: 13 (ref 12–20)
CA-I BLD-MCNC: 7.8 MG/DL (ref 8.5–10.1)
CA-I BLD-MCNC: 8 MG/DL (ref 8.5–10.1)
CHLORIDE SERPL-SCNC: 85 MMOL/L (ref 97–108)
CHLORIDE SERPL-SCNC: 86 MMOL/L (ref 97–108)
CO2 SERPL-SCNC: 34 MMOL/L (ref 21–32)
CO2 SERPL-SCNC: 34 MMOL/L (ref 21–32)
CREAT SERPL-MCNC: 0.6 MG/DL (ref 0.55–1.02)
CREAT SERPL-MCNC: 0.62 MG/DL (ref 0.55–1.02)
DIFFERENTIAL METHOD BLD: ABNORMAL
EOSINOPHIL # BLD: 0 K/UL (ref 0–0.4)
EOSINOPHIL NFR BLD: 0 % (ref 0–7)
ERYTHROCYTE [DISTWIDTH] IN BLOOD BY AUTOMATED COUNT: 19.2 % (ref 11.5–14.5)
GLOBULIN SER CALC-MCNC: 3.9 G/DL (ref 2–4)
GLUCOSE BLD STRIP.AUTO-MCNC: 265 MG/DL (ref 65–100)
GLUCOSE BLD STRIP.AUTO-MCNC: 330 MG/DL (ref 65–100)
GLUCOSE SERPL-MCNC: 243 MG/DL (ref 65–100)
GLUCOSE SERPL-MCNC: 245 MG/DL (ref 65–100)
HCT VFR BLD AUTO: 23.2 % (ref 35–47)
HGB BLD-MCNC: 7.1 G/DL (ref 11.5–16)
IMM GRANULOCYTES # BLD AUTO: 0.2 K/UL (ref 0–0.04)
IMM GRANULOCYTES NFR BLD AUTO: 3 % (ref 0–0.5)
LYMPHOCYTES # BLD: 1.5 K/UL (ref 0.8–3.5)
LYMPHOCYTES NFR BLD: 19 % (ref 12–49)
MCH RBC QN AUTO: 22.2 PG (ref 26–34)
MCHC RBC AUTO-ENTMCNC: 30.6 G/DL (ref 30–36.5)
MCV RBC AUTO: 72.5 FL (ref 80–99)
MONOCYTES # BLD: 0.2 K/UL (ref 0–1)
MONOCYTES NFR BLD: 3 % (ref 5–13)
NEUTS SEG # BLD: 5.8 K/UL (ref 1.8–8)
NEUTS SEG NFR BLD: 77 % (ref 32–75)
PERFORMED BY, TECHID: ABNORMAL
PERFORMED BY, TECHID: ABNORMAL
PHOSPHATE SERPL-MCNC: 2.9 MG/DL (ref 2.6–4.7)
PLATELET # BLD AUTO: 344 K/UL (ref 150–400)
PMV BLD AUTO: 8 FL (ref 8.9–12.9)
POTASSIUM SERPL-SCNC: 3 MMOL/L (ref 3.5–5.1)
POTASSIUM SERPL-SCNC: 3.2 MMOL/L (ref 3.5–5.1)
PROT SERPL-MCNC: 6.5 G/DL (ref 6.4–8.2)
RBC # BLD AUTO: 3.2 M/UL (ref 3.8–5.2)
SODIUM SERPL-SCNC: 126 MMOL/L (ref 136–145)
SODIUM SERPL-SCNC: 127 MMOL/L (ref 136–145)
WBC # BLD AUTO: 7.6 K/UL (ref 3.6–11)

## 2021-01-02 PROCEDURE — 85025 COMPLETE CBC W/AUTO DIFF WBC: CPT

## 2021-01-02 PROCEDURE — 74011250636 HC RX REV CODE- 250/636: Performed by: INTERNAL MEDICINE

## 2021-01-02 PROCEDURE — 80069 RENAL FUNCTION PANEL: CPT

## 2021-01-02 PROCEDURE — 83880 ASSAY OF NATRIURETIC PEPTIDE: CPT

## 2021-01-02 PROCEDURE — 36415 COLL VENOUS BLD VENIPUNCTURE: CPT

## 2021-01-02 PROCEDURE — 74011636637 HC RX REV CODE- 636/637: Performed by: INTERNAL MEDICINE

## 2021-01-02 PROCEDURE — 74011250637 HC RX REV CODE- 250/637: Performed by: INTERNAL MEDICINE

## 2021-01-02 PROCEDURE — 94640 AIRWAY INHALATION TREATMENT: CPT

## 2021-01-02 PROCEDURE — 71250 CT THORAX DX C-: CPT

## 2021-01-02 PROCEDURE — 93970 EXTREMITY STUDY: CPT

## 2021-01-02 PROCEDURE — 80053 COMPREHEN METABOLIC PANEL: CPT

## 2021-01-02 PROCEDURE — 82962 GLUCOSE BLOOD TEST: CPT

## 2021-01-02 PROCEDURE — 65270000029 HC RM PRIVATE

## 2021-01-02 RX ORDER — ENOXAPARIN SODIUM 100 MG/ML
30 INJECTION SUBCUTANEOUS EVERY 12 HOURS
Status: DISCONTINUED | OUTPATIENT
Start: 2021-01-02 | End: 2021-01-03 | Stop reason: HOSPADM

## 2021-01-02 RX ORDER — DEXAMETHASONE SODIUM PHOSPHATE 4 MG/ML
4 INJECTION, SOLUTION INTRA-ARTICULAR; INTRALESIONAL; INTRAMUSCULAR; INTRAVENOUS; SOFT TISSUE EVERY 12 HOURS
Status: DISCONTINUED | OUTPATIENT
Start: 2021-01-02 | End: 2021-01-03 | Stop reason: HOSPADM

## 2021-01-02 RX ADMIN — HYDROCHLOROTHIAZIDE 25 MG: 25 TABLET ORAL at 08:05

## 2021-01-02 RX ADMIN — ALBUTEROL SULFATE 2 PUFF: 108 AEROSOL, METERED RESPIRATORY (INHALATION) at 01:41

## 2021-01-02 RX ADMIN — CARVEDILOL 6.25 MG: 3.12 TABLET, FILM COATED ORAL at 08:05

## 2021-01-02 RX ADMIN — ALBUTEROL SULFATE 2 PUFF: 108 AEROSOL, METERED RESPIRATORY (INHALATION) at 13:01

## 2021-01-02 RX ADMIN — DEXAMETHASONE SODIUM PHOSPHATE 4 MG: 4 INJECTION, SOLUTION INTRAMUSCULAR; INTRAVENOUS at 12:14

## 2021-01-02 RX ADMIN — BUDESONIDE AND FORMOTEROL FUMARATE DIHYDRATE 2 PUFF: 160; 4.5 AEROSOL RESPIRATORY (INHALATION) at 09:00

## 2021-01-02 RX ADMIN — Medication 10 ML: at 22:00

## 2021-01-02 RX ADMIN — FAMOTIDINE 10 MG: 20 TABLET ORAL at 23:33

## 2021-01-02 RX ADMIN — FUROSEMIDE 40 MG: 10 INJECTION, SOLUTION INTRAMUSCULAR; INTRAVENOUS at 08:05

## 2021-01-02 RX ADMIN — DEXAMETHASONE SODIUM PHOSPHATE 4 MG: 4 INJECTION, SOLUTION INTRAMUSCULAR; INTRAVENOUS at 23:34

## 2021-01-02 RX ADMIN — POTASSIUM CHLORIDE 10 MEQ: 750 TABLET, FILM COATED, EXTENDED RELEASE ORAL at 08:05

## 2021-01-02 RX ADMIN — BUDESONIDE AND FORMOTEROL FUMARATE DIHYDRATE 2 PUFF: 160; 4.5 AEROSOL RESPIRATORY (INHALATION) at 19:31

## 2021-01-02 RX ADMIN — ALBUTEROL SULFATE 2 PUFF: 108 AEROSOL, METERED RESPIRATORY (INHALATION) at 19:31

## 2021-01-02 RX ADMIN — FUROSEMIDE 40 MG: 10 INJECTION, SOLUTION INTRAMUSCULAR; INTRAVENOUS at 23:34

## 2021-01-02 RX ADMIN — ENOXAPARIN SODIUM 40 MG: 40 INJECTION SUBCUTANEOUS at 08:05

## 2021-01-02 RX ADMIN — INSULIN LISPRO 7 UNITS: 100 INJECTION, SOLUTION INTRAVENOUS; SUBCUTANEOUS at 23:33

## 2021-01-02 RX ADMIN — PANTOPRAZOLE SODIUM 40 MG: 40 TABLET, DELAYED RELEASE ORAL at 08:05

## 2021-01-02 RX ADMIN — INSULIN LISPRO 5 UNITS: 100 INJECTION, SOLUTION INTRAVENOUS; SUBCUTANEOUS at 10:25

## 2021-01-02 RX ADMIN — CARVEDILOL 6.25 MG: 3.12 TABLET, FILM COATED ORAL at 23:32

## 2021-01-02 RX ADMIN — LORAZEPAM 1 MG: 1 TABLET ORAL at 23:33

## 2021-01-02 RX ADMIN — ALBUTEROL SULFATE 2 PUFF: 108 AEROSOL, METERED RESPIRATORY (INHALATION) at 08:00

## 2021-01-02 NOTE — ED NOTES
Decreased oxygen to 2L as patient had oxygen sat 99%, drained 1650 from [place], medicated with am meds, accuck [phone], patient alert, oriented, with expiratory wheezing noted. Oral hydration and snack provided with am meds. Will continue to monitor.      Nelwyn Rinne, RN

## 2021-01-02 NOTE — CONSULTS
Consult  Pulmonary, Critical Care    Name: Wilson Caldera MRN: [id number]   : 1955 Hospital: [address] [place] [address]   Date: 2021  Admission date: 2021 Hospital Day: 2       Subjective/Interval History:   Seen in the emergency room. This is 1 of several admissions for shortness of breath. She has known aortic stenosis mitral regurgitation grade 2 diastolic dysfunction and develops CHF. Of interest she states that she has been told that it is good to take at least 8 glasses of water a day and she adheres to that. In any event she was just in the hospital several weeks ago COVID-19 antigen testing was negative. She had shortness of breath believed COPD was treated and discharged home. Over the last several days she has had worsening shortness of breath. Very little cough she does state that whenever she gets sick she has poor appetite and indeed she has poor appetite now. She has not noted any change in her sense of taste. She presented to the emergency room has been found to have bilateral pleural effusions and severe hyponatremia. She denies any fever chills or sweats. She is severely hypoxic 80% FiO2 on room air. On 4 L she is 98% saturation  1/2 oxygen has been tapered to 2 L with good oxygen saturation. COVID-19 antigen test is positive. She still has a cough. Hospital Problems  Date Reviewed: 2020          Codes Class Noted POA    Acute respiratory failure with hypoxia [facility]) ICD-10-CM: J96.01  ICD-9-CM: 518.81  2021 Unknown              IMPRESSION:   1. Acute hypoxic respiratory failure questionably due to mild pulmonary edema from aortic stenosis and mitral regurgitation  2. COVID-19 positive. Oxygenation is improved we will give Decadron but hold remdesivir and Actemra  3. Questionable COPD I do not hear any wheezing continue albuterol with Symbicort  4. Moderate aortic stenosis valve area 1.1 cm² with max gradient 47 mean 25  5.  Moderate mitral regurgitation  6. Mild pulmonary hypertension  7. Grade 2 diastolic left ventricular dysfunction  8. Hypokalemia  9. Body mass index is 34.33 kg/m². 10.       RECOMMENDATIONS/PLAN:   1. We will check CT chest to evaluate for changes of COVID-19  2. Continue inhaled albuterol and Symbicort  3. Continue IV Lasix  4. Decrease IV saline  5. We will add Decadron empirically  6. Continue oxygen supplementation to maintain oxygen saturation greater than 92%  7. [x] High complexity decision making was performed  [x] See my orders for details      Subjective/Initial History:     I was asked by Cyndi Jason MD to see Rex Landers  a 77 y.o.    female in consultation for a chief complaint of shortness of breath hypoxia bilateral pleural effusion    Allergies   Allergen Reactions    Aspirin Unknown (comments)     hemorraging - ended up in the hospital        [facility] reviewed and pertinent medications noted or modified as needed     Current Facility-Administered Medications   Medication    dexamethasone (DECADRON) 4 mg/mL injection 4 mg    0.9% sodium chloride infusion    albuterol-ipratropium (DUO-NEB) 2.5 MG-0.5 MG/3 ML    ammonium lactate (AMLACTIN) 12 % cream    budesonide-formoteroL (SYMBICORT) 160-4.5 mcg/actuation HFA inhaler 2 Puff    famotidine (PEPCID) tablet 10 mg    hydroCHLOROthiazide (HYDRODIURIL) tablet 25 mg    ketoconazole (NIZORAL) 2 % cream    LORazepam (ATIVAN) tablet 1 mg    pantoprazole (PROTONIX) tablet 40 mg    potassium chloride SR (KLOR-CON 10) tablet 10 mEq    furosemide (LASIX) injection 40 mg    sodium chloride (NS) flush 5-40 mL    sodium chloride (NS) flush 5-40 mL    acetaminophen (TYLENOL) tablet 650 mg    Or    acetaminophen (TYLENOL) suppository 650 mg    polyethylene glycol (MIRALAX) packet 17 g    promethazine (PHENERGAN) tablet 12.5 mg    Or    ondansetron (ZOFRAN) injection 4 mg    enoxaparin (LOVENOX) injection 40 mg    glucose chewable tablet 16 g    dextrose (D50W) injection syrg 12.5-25 g    glucagon (GLUCAGEN) injection 1 mg    insulin lispro (HUMALOG) injection    albuterol (PROVENTIL HFA, VENTOLIN HFA, PROAIR HFA) inhaler 2 Puff    [START ON 1/3/2021] lisinopriL (PRINIVIL, ZESTRIL) tablet 40 mg    carvediloL (COREG) tablet 6.25 mg     Current Outpatient Medications   Medication Sig    budesonide-formoteroL (SYMBICORT) 160-4.5 mcg/actuation HFAA Take 2 Puffs by inhalation two (2) times a day.  albuterol-ipratropium (DUO-NEB) 2.5 mg-0.5 mg/3 ml nebu 3 mL by Nebulization route every six (6) hours as needed for Wheezing, Shortness of Breath or Cough.  methylPREDNISolone (MEDROL DOSEPACK) 4 mg tablet One dose pack orally as directed until gone    pantoprazole (Protonix) 40 mg tablet Take 1 Tab by mouth daily.  famotidine (PEPCID) 10 mg tablet Take 1 Tab by mouth nightly.  LORazepam (Ativan) 1 mg tablet Take 1 Tab by mouth nightly. Max Daily Amount: 1 mg.  lisinopriL (PRINIVIL, ZESTRIL) 20 mg tablet Take 1 Tab by mouth two (2) times a day.  metFORMIN (GLUCOPHAGE) 500 mg tablet Take 2 tablets twice a day with meals    lancets misc Use to check glucose once a day    glucose blood VI test strips (blood glucose test) strip by Does Not Apply route two (2) times a day. Use to check glucose once a day    potassium chloride (KLOR-CON) 10 mEq tablet By oral route take one tablet Monday, Wednesday and Friday    hydroCHLOROthiazide (HYDRODIURIL) 25 mg tablet Take 1 Tab by mouth daily.  aspirin delayed-release (Aspirin Low Dose) 81 mg tablet Take  by mouth daily.  amLODIPine (NORVASC) 10 mg tablet Take  by mouth daily.  ammonium lactate (LAC-HYDRIN) 12 % lotion Apply  to affected area two (2) times a day. rub in to affected area well    carvediloL (COREG) 3.125 mg tablet Take  by mouth two (2) times daily (with meals).  ketoconazole (NIZORAL) 2 % topical cream Apply  to affected area daily. Patient PCP: Rosio Talley DO  PMH:  has a past medical history of Acute gastrointestinal hemorrhage (8/28/2020), Allergic rhinitis (8/28/2020), Anemia (8/28/2020), Benign essential hypertension (6/25/2020), Body mass index 30.0-30.9, adult (6/25/2020), CAD (coronary artery disease) (8/28/2020), Foot callus (8/28/2020), GERD (gastroesophageal reflux disease) (6/25/2020), Heart murmur (6/25/2020), colonoscopy (01/01/2015), Left bundle branch block (8/28/2020), Microalbuminuria due to type 2 diabetes mellitus ([facility] [address] (8/28/2020), Mixed hyperlipidemia (6/25/2020), Morbid obesity ([facility] [address] (8/28/2020), Tobacco dependence syndrome (6/25/2020), and Type II diabetes mellitus, uncontrolled ([facility] [address] (6/25/2020). PSH:   has a past surgical history that includes hx colonoscopy (2015). FHX: family history includes Cancer in her mother and sister; Hypertension in her father. SHX:  reports that she quit smoking about 7 years ago. She smoked 0.50 packs per day. She has never used smokeless tobacco. She reports previous alcohol use. She reports that she does not use drugs. Systemic review:  General she states her weight stable with no chronic fever chills or sweats  Eyes no double vision or momentary blindness  ENT no facial pain over the sinuses  Endocrinologic she denies polyuria polydipsia  Musculoskeletal no swollen tender joints  Neurologic no seizures or syncope  Gastrointestinal she states she has indigestion heartburn last had it when she was in the hospital 2 weeks ago has not been bothered with it at home. She states it has awakened her at night in the past but again has not had problems over the last 2 weeks has not had nausea vomiting. States she has poor appetite whenever she gets sick states her sense of taste and smell seems normal  Genitourinary no difficulty voiding no drainage or bleeding  Cardiovascular has a history of atrial stenosis mitral regurg diastolic dysfunction.   She states she does not have ankle edema has had worsening shortness of breath with exertion and when she lays down no chest pain or diaphoresis  Respiratory was a smoker stopped 7 years ago states she gets wheezy when she has her shortness of breath was just started on albuterol inhaler last admission questionably was to start a Symbicort inhaler within the last day or 2    Objective:     Vital Signs: Telemetry:    normal sinus rhythm Intake/Output:   Visit Vitals  /71 (BP 1 Location: Left arm)   Pulse 88   Temp 98.2 °F (36.8 °C)   Resp 26   Ht 5' 4\" (1.626 m)   Wt 90.7 kg (200 lb)   SpO2 98%   BMI 34.33 kg/m²       Temp (24hrs), Av.2 °F (36.8 °C), Min:98.2 °F (36.8 °C), Max:98.2 °F (36.8 °C)        O2 Device: Nasal cannula O2 Flow Rate (L/min): 2 l/min       Wt Readings from Last 4 Encounters:   21 90.7 kg (200 lb)   20 90.7 kg (200 lb)   20 97.4 kg (214 lb 11.7 oz)   20 94 kg (207 lb 4.8 oz)        No intake or output data in the 24 hours ending 21 1046    Last shift:      No intake/output data recorded. Last 3 shifts: No intake/output data recorded. Physical Exam:   General:  female; no distress sitting up in bed with nasal oxygen in place  HEENT: NCAT, oral mucosa is clear  Eyes: anicteric; conjunctiva clear extraocular movements intact  Neck: no nodes, neck veins are visible no accessory muscle usage  Chest: no deformity  Cardiac: Regular rate and rhythm questionable systolic murmur no definite ankle edema no definite sacral edema  Lungs: Clear anteriorly and laterally no definite wheezes exhalation only mildly prolonged a few rales in the bases  Abd: Soft nontender normal bowel sounds  Ext: no edema; no joint swelling;  No clubbing  : clear urine  Neuro: Awake alert oriented moves all 4 extremities well  Psych- no agitation, oriented to person;   Skin: warm, dry, no cyanosis;   Pulses: Brachial radial femoral pulses intact  Capillary: Normal capillary refill    Labs:    Recent Labs     01/02/21  0538 01/01/21  1245   WBC 7.6 10.3   HGB 7.1* 7.8*    364     Recent Labs     01/02/21  0538 01/01/21  1245   *  126* 118*   K 3.0*  3.2* 3.3*   CL 85*  86* 79*   CO2 34*  34* 28   *  245* 178*   BUN 8  8 8   CREA 0.60  0.62 0.67   CA 8.0*  7.8* 8.2*   PHOS 2.9  --    LAC  --  2.5*   ALB 2.6*  2.5* 2.8*   ALT 27 24     No results for input(s): PH, PCO2, PO2, HCO3, FIO2 in the last 72 hours. Recent Labs     01/01/21  1245   TROIQ <0.05   1/1 COVID-19 antigen positive  Troponin less than 0.05    12/13 Covid 19 antigen negative  12/14 ejection fraction 41% visual normal grade 2 diastolic dysfunction aortic valve area 1.16 cm² mean gradient 25 max gradient 47 mild to moderate mitral regurg RVSP 33  Lab Results   Component Value Date/Time    TSH, External 2.420 01/16/2019       Imaging:    CXR Results  (Last 48 hours)               01/01/21 1336  XR CHEST SNGL V Final result    Impression:  Impression: Small bilateral pleural effusions. Narrative:  XR CHEST SNGL V       Comparison: December 16, 2020. There is blunting of both costophrenic angles suggesting small bilateral pleural   effusions. No infiltrate or consolidation. No pulmonary vascular congestion. Heart size is normal. Mediastinum is unremarkable. The bony thorax is intact. Results from [place] encounter on 01/01/21   XR CHEST SNGL V    Narrative XR CHEST SNGL V    Comparison: December 16, 2020. There is blunting of both costophrenic angles suggesting small bilateral pleural  effusions. No infiltrate or consolidation. No pulmonary vascular congestion. Heart size is normal. Mediastinum is unremarkable. The bony thorax is intact. Impression Impression: Small bilateral pleural effusions. Results from Hospital Encounter encounter on 12/13/20   XR CHEST PORT    Narrative Study: XR CHEST PORT    Clinical indication: wheezing,.     Comparison: Chest x-ray 12/13/2020. Findings:    Lungs are slightly hyperinflated. No consolidative airspace disease, pleural  effusion or pneumothorax. Cardiomediastinal contours are within normal limits. No pulmonary edema. No acute osseous abnormality identified. Impression Impression:  No acute findings. Mild emphysematous change. XR CHEST SNGL V    Narrative Chest, AP upright portable, 1418 hours. Comparison with two-view exam from  11/25/2019. Cardiopericardial silhouette top normal in size. Central pulmonary  vessels appear borderline enlarged, stable. Calcification and tortuosity of  thoracic aorta. There is nonspecific central peribronchial cuffing. No evidence  of pulmonary edema, air space pneumonia, or pleural effusion. No evidence of  pneumothorax. Impression IMPRESSION: No acute pulmonary or pleural disease. Discussion acute hypoxia probably related to cardiac disease with mild fluid overload with bilateral pleural effusions however the degree of hypoxia seems out of range of the changes on chest x-ray. Pulmonary emboli are a possibility. Will check duplex scan of the legs. If electrolytes improved tomorrow will check CT angiogram of the chest  1/2 COVID-19 test is positive. Will check CT chest no contrast to see if changes consistent with COVID-19.   Her oxygenation is well-maintained and in fact have decreased oxygen to 2 L will give Decadron but with hold remdesivir and Actemra for the time being  Sodium markedly improved will decrease IV saline and continue Lasix  Time of care 20 minutes  Nikole Lyon MD

## 2021-01-02 NOTE — PROGRESS NOTES
Hospitalist Progress Note    NAME: José Muhammad   :  1955   MRN:  [id number]           Subjective:     Chief Complaint / Reason for Physician Visit  Patient seen and evaluated at bedside, of note patient shortness of breath has improved however still requiring supplemental oxygen at this time. Discussed with RN events overnight. Review of Systems:  Symptom Y/N Comments  Symptom Y/N Comments   Fever/Chills Y   Chest Pain N    Poor Appetite Y   Edema N    Cough Y   Abdominal Pain N    Sputum Y   Joint Pain N    SOB/ACEVEDO Y   Pruritis/Rash N    Nausea/vomit N   Tolerating PT/OT NA    Diarrhea N   Tolerating Diet Y    Constipation N   Other       Could NOT obtain due to:      Objective:     VITALS:   Last 24hrs VS reviewed since prior progress note. Most recent are:  Patient Vitals for the past 24 hrs:   Temp Pulse Resp BP SpO2   21 1228 98.1 °F (36.7 °C) 87 17 110/62 100 %   21 1038     98 %   21 1027  88 26 107/71 96 %   21 0808 98.2 °F (36.8 °C) 91 19 122/63 99 %   21 0805  97  118/62    21 0552  (!) 101 17 107/76 98 %   21 2239  (!) 101 18 (!) 113/56 95 %   21 2050     96 %   21 2010  93 20 (!) 113/56 95 %   21 2007    113/63    21 1732  88 20 (!) 136/115 96 %   21 1537  90 22 (!) 125/93 98 %   21 1318     98 %   21 1308     (!) 77 %       Intake/Output Summary (Last 24 hours) at 2021 1255  Last data filed at 2021 1229  Gross per 24 hour   Intake    Output 1200 ml   Net -1200 ml        PHYSICAL EXAM:  General: Patient appears comfortable  EENT:  EOMI. Anicteric sclerae. MMM  Resp:  Decreased air entry bilaterally with appreciable bilateral bibasilar crackles  CV:  Regular  rhythm,  No edema  GI:  Soft, Non distended, Non tender. +Bowel sounds  Neurologic:  Alert and oriented X 3, normal speech,   Psych:   Good insight. Not anxious nor agitated  Skin:  No rashes.   No jaundice    Procedures: see electronic medical records for all procedures/Xrays and details which were not copied into this note but were reviewed prior to creation of Plan. LABS:  I reviewed today's most current labs and imaging studies. Pertinent labs include:  Recent Labs     01/02/21  0538 01/01/21  1245   WBC 7.6 10.3   HGB 7.1* 7.8*   HCT 23.2* 25.1*    364     Recent Labs     01/02/21  0538 01/01/21  1245   *  126* 118*   K 3.0*  3.2* 3.3*   CL 85*  86* 79*   CO2 34*  34* 28   *  245* 178*   BUN 8  8 8   CREA 0.60  0.62 0.67   CA 8.0*  7.8* 8.2*   PHOS 2.9  --    ALB 2.6*  2.5* 2.8*   TBILI 0.5 0.7   ALT 27 24       Signed:  Memo Jaime MD        Reviewed most current lab test results and cultures  YES  Reviewed most current radiology test results   YES  Review and summation of old records today    NO  Reviewed patient's current orders and MAR    YES  PMH/ reviewed - no change compared to H&P      Assessment / Plan:  Acute respiratory failure with hypoxia/COPD exacerbation/COVID-19 pneumonia-patient presents with above-mentioned symptomatology and based on patient's clinical presentation patient was found to be in acute respiratory failure with hypoxia with an O2 saturation of 85% requiring 4 L of nasal cannula for ventilatory support based on patient's clinical presentation secondary to COPD exacerbation/COVID-19 pneumonia  Follow blood cultures  Continue Decadron every 12  DuoNebs as needed  Pulmonology consult appreciated, will continue to follow  Infectious disease consult appreciated, will continue to follow     Acute exacerbation of CHF with reduced ejection fractionpatient has an ejection fraction of 40% and based on patient's clinical presentation likely component of acute decompensated heart failure, patient is currently net -1.2 L  Daily weights  Frequent intake and output monitoring  Lasix 40 mg IV twice daily  Cardiology consult appreciated, will continue to follow     Hypertensioncontinue home antihypertensive medications     Hyperglycemia type 2 diabetescontinue insulin sliding scale     ProphylaxisLovenox  FENcardiac/diabetic diet, replete potassium and magnesium  Full code, will clarify about surrogate decision-maker,  Disposition - pending  Clinical improvement  25.0 - 29.9 Overweight / Body mass index is 34.33 kg/m². Code status: Full  Prophylaxis: Lovenox  Recommended Disposition:  PT, OT, RN     ________________________________________________________________________  Care Plan discussed with:    Comments   Patient X    Family      RN x    Care Manager X    Consultant  x                     X Multidiciplinary team rounds were held today with , nursing, pharmacist and clinical coordinator. Patient's plan of care was discussed; medications were reviewed and discharge planning was addressed.      ________________________________________________________________________  Total NON critical care TIME:  35   Minutes        Comments   >50% of visit spent in counseling and coordination of care X    ________________________________________________________________________  Samson Coughlin MD

## 2021-01-02 NOTE — CONSULTS
This is an inpatient cardiology consultation requested by Dr. Dee Tierney for acute systolic heart failure    CHIEF COMPLAINT:  Dyspnea at rest    [address] [facility]  65yow known well to Dr. Sully Gutierrez. I am covering. Has h/o COPD, chronic systolic heart failure with most recent LVEF of 40% and moderate aortic valve stenosis. On interview and careful med rec (looked through all pill bottles) it is clear she is taking no diuretic since last discharge (other than HCTZ). States she remembers vaguely hearing about lasix but never got the medication. Does appear also to have dietary indiscretion with fluids. Has been progressively short of breath for the last week. No cough. No change in taste or smell. Severely hypoxic on presentation with SPO2 of 90%. REVIEW OF SYSTEMS  All other systems reviewed and are negative except for the pertinent positives as noted in the HPI.       PAST MEDICAL HISTORY  COPD  CHF  Moderate aortic valve stenosis    Current Outpatient Medications   Medication Instructions    albuterol-ipratropium (DUO-NEB) 2.5 mg-0.5 mg/3 ml nebu 3 mL, Nebulization, EVERY 6 HOURS AS NEEDED    amLODIPine (NORVASC) 10 mg tablet Oral, DAILY    ammonium lactate (LAC-HYDRIN) 12 % lotion Topical, 2 TIMES DAILY, rub in to affected area well     aspirin delayed-release (Aspirin Low Dose) 81 mg tablet Oral, DAILY    budesonide-formoteroL (SYMBICORT) 160-4.5 mcg/actuation HFAA 2 Puffs, Inhalation, 2 TIMES DAILY    carvediloL (COREG) 3.125 mg tablet Oral, 2 TIMES DAILY WITH MEALS    famotidine (PEPCID) 10 mg, Oral, EVERY BEDTIME    glucose blood VI test strips (blood glucose test) strip Does Not Apply, 2 TIMES DAILY, Use to check glucose once a day    hydroCHLOROthiazide (HYDRODIURIL) 25 mg, Oral, DAILY    ketoconazole (NIZORAL) 2 % topical cream Topical, DAILY    lancets misc Use to check glucose once a day    lisinopriL (PRINIVIL, ZESTRIL) 20 mg, Oral, 2 TIMES DAILY    LORazepam (ATIVAN) 1 mg, Oral, EVERY BEDTIME    metFORMIN (GLUCOPHAGE) 500 mg tablet Take 2 tablets twice a day with meals    methylPREDNISolone (MEDROL DOSEPACK) 4 mg tablet One dose pack orally as directed until gone    pantoprazole (PROTONIX) 40 mg, Oral, DAILY    potassium chloride (KLOR-CON) 10 mEq tablet By oral route take one tablet Monday, Wednesday and Friday       SOCIAL HISTORY  Tobacco: 1/2ppd until 7 years ago, then stopped. Previous alcohol use. No drugs. FAMILY HISTORY  Fully reviewed and negative. No history of early heart disease amongst first degree relatives. PHYSICAL EXAMINATION  General: SOB on conversation. HEENT/neck: JVD~8cm, no masses, trachea midline. Pulmonary: decreased but I cannot appreciate crackles or wheeze. Cardiovascular: Regular rate, regular rhythm; no murmurs, rubs or gallops. Normal point of maximal impulse. No peripheral edema   GI: soft, nontender, no hepatosplenomegaly  Hematology/Oncology: no lymphadenopathy; no bruising  Skin: warm and dry, no rashes, lesions, or ulcer  Musculoskeletal: Moving all four extremities. Gait and station not assessed    MEDICAL DECISION MAKING  Echocardiogram from early Dec 2020 independently reviewed, my impression:  LVEF 40% with moderate aortic valve stenosis    ECG independently reviewed, my impression: LBBB present    CXR independently reviewed, my impression: small bilateral pleural effusions. IMPRESSION/PLAN  Acute hypoxic respiratory failure  Acute on chronic systolic heart failure  COPD without exacerbation  Aortic valve disease (aortic valve stenosis)    -- agree with Lasix 40mg IV BID. When ready for oral would utilize bumex 1mg po BID, but not yet. Will stop amlodipine in favor of more evidenced-based meds for CHF. Will increase coreg. Continue lisinopril -- but can change to lisinopril 40mg once daily. For COPD, defer to pulmonary. Low theshold for chest CT. For aortic valve disease, no indication for replacement at current time. Note that severe AS with mean gradient of 40mmHg and patient's gradients much lower on last echo. Despite depressed LVEF, dimensionless index still in moderate range severity. Call with ?'s.  -DT

## 2021-01-02 NOTE — PROGRESS NOTES
PROGRESS NOTE - CARDIOLOGY    CHIEF COMPLAINT:  Dyspnea -- now found to be COVID-19 positive. HISTORY OF PRESENT ILLNESS / OVERNIGHT EVENTS  Tearful on my arrival with multiple nonspecific complaints. Breathing has improved but still oxygen dependent. On 2L oxygen with O2 saturation of 94%. No nausea, vomiting or headaches. Denies chest pain.     MEDICATIONS/PMHx    Current Facility-Administered Medications:     dexamethasone (DECADRON) 4 mg/mL injection 4 mg, 4 mg, IntraVENous, Q12H, Hair Smith MD, 4 mg at 01/02/21 1214    enoxaparin (LOVENOX) injection 30 mg, 30 mg, SubCUTAneous, Q12H, Hair Smith MD    0.9% sodium chloride infusion, 40 mL/hr, IntraVENous, CONTINUOUS, Hair Smith MD, Last Rate: 100 mL/hr at 01/01/21 1406, 100 mL/hr at 01/01/21 1406    albuterol-ipratropium (DUO-NEB) 2.5 MG-0.5 MG/3 ML, 3 mL, Nebulization, Q6H PRN, Luwanna Schlatter, Myrla Lavender, MD    ammonium lactate (AMLACTIN) 12 % cream, , Topical, BID PRN, Narciso Chung MD    budesonide-formoteroL [facility]) 160-4.5 mcg/actuation HFA inhaler 2 Puff, 2 Puff, Inhalation, BID, Gianni Ahmadi MD, 2 Puff at 01/02/21 0900    famotidine (PEPCID) tablet 10 mg, 10 mg, Oral, QHS, Gianni Ahmadi MD, 10 mg at 01/01/21 2237    hydroCHLOROthiazide (HYDRODIURIL) tablet 25 mg, 25 mg, Oral, DAILY, Narciso Chung MD, 25 mg at 01/02/21 0805    ketoconazole (NIZORAL) 2 % cream, , Topical, DAILY, Gianni Ahmadi MD    LORazepam (ATIVAN) tablet 1 mg, 1 mg, Oral, QHS, Gianni Ahmadi MD, 1 mg at 01/01/21 2237    pantoprazole (PROTONIX) tablet 40 mg, 40 mg, Oral, DAILY, Narciso Chung MD, 40 mg at 01/02/21 0805    potassium chloride SR (KLOR-CON 10) tablet 10 mEq, 10 mEq, Oral, DAILY, Gianni Ahmadi MD, 10 mEq at 01/02/21 0805    furosemide (LASIX) injection 40 mg, 40 mg, IntraVENous, Q12H, Gianni Ahmadi MD, 40 mg at 01/02/21 0805    sodium chloride (NS) flush 5-40 mL, 5-40 mL, IntraVENous, Q8H, Eric Ahmadi MD, 10 mL at 01/01/21 2200    sodium chloride (NS) flush 5-40 mL, 5-40 mL, IntraVENous, PRN, Eric Ahmadi MD    acetaminophen (TYLENOL) tablet 650 mg, 650 mg, Oral, Q6H PRN **OR** acetaminophen (TYLENOL) suppository 650 mg, 650 mg, Rectal, Q6H PRN, Eric Ahmadi MD    polyethylene glycol (MIRALAX) packet 17 g, 17 g, Oral, DAILY PRN, Eric Ahmadi MD    promethazine (PHENERGAN) tablet 12.5 mg, 12.5 mg, Oral, Q6H PRN **OR** ondansetron (ZOFRAN) injection 4 mg, 4 mg, IntraVENous, Q6H PRN, Erick Martinez MD    glucose chewable tablet 16 g, 4 Tab, Oral, PRN, Erick Martinez MD    dextrose (D50W) injection syrg 12.5-25 g, 25-50 mL, IntraVENous, PRN, Eric Ahmadi MD    glucagon [facility]) injection 1 mg, 1 mg, IntraMUSCular, PRN, Erick Martinez MD    insulin lispro (HUMALOG) injection, , SubCUTAneous, AC&HS, Erick Martinez MD, 5 Units at 01/02/21 1025    albuterol (PROVENTIL HFA, VENTOLIN HFA, PROAIR HFA) inhaler 2 Puff, 2 Puff, Inhalation, Q6H RT, Brandy Franco MD, 2 Puff at 01/02/21 1301    [START ON 1/3/2021] lisinopriL (PRINIVIL, ZESTRIL) tablet 40 mg, 40 mg, Oral, DAILY, Tova Vidal MD    carvediloL (COREG) tablet 6.25 mg, 6.25 mg, Oral, BID WITH MEALS, Tova Vidal MD, 6.25 mg at 01/02/21 0805    Current Outpatient Medications:     budesonide-formoteroL (SYMBICORT) 160-4.5 mcg/actuation HFAA, Take 2 Puffs by inhalation two (2) times a day., Disp: 1 Inhaler, Rfl: 3    albuterol-ipratropium (DUO-NEB) 2.5 mg-0.5 mg/3 ml nebu, 3 mL by Nebulization route every six (6) hours as needed for Wheezing, Shortness of Breath or Cough. , Disp: 120 Nebule, Rfl: 3    methylPREDNISolone (MEDROL DOSEPACK) 4 mg tablet, One dose pack orally as directed until gone, Disp: 1 Dose Pack, Rfl: 0   pantoprazole (Protonix) 40 mg tablet, Take 1 Tab by mouth daily. , Disp: 30 Tab, Rfl: 0    famotidine (PEPCID) 10 mg tablet, Take 1 Tab by mouth nightly., Disp: 30 Tab, Rfl: 0    LORazepam (Ativan) 1 mg tablet, Take 1 Tab by mouth nightly. Max Daily Amount: 1 mg., Disp: 5 Tab, Rfl: 0    lisinopriL (PRINIVIL, ZESTRIL) 20 mg tablet, Take 1 Tab by mouth two (2) times a day., Disp: 180 Tab, Rfl: 2    metFORMIN (GLUCOPHAGE) 500 mg tablet, Take 2 tablets twice a day with meals, Disp: 120 Tab, Rfl: 3    lancets misc, Use to check glucose once a day, Disp: 50 Each, Rfl: 5    glucose blood VI test strips (blood glucose test) strip, by Does Not Apply route two (2) times a day. Use to check glucose once a day, Disp: 50 Strip, Rfl: 5    potassium chloride (KLOR-CON) 10 mEq tablet, By oral route take one tablet Monday, Wednesday and Friday, Disp: 40 Tab, Rfl: 3    hydroCHLOROthiazide (HYDRODIURIL) 25 mg tablet, Take 1 Tab by mouth daily. , Disp: 90 Tab, Rfl: 4    aspirin delayed-release (Aspirin Low Dose) 81 mg tablet, Take  by mouth daily. , Disp: , Rfl:     amLODIPine (NORVASC) 10 mg tablet, Take  by mouth daily. , Disp: , Rfl:     ammonium lactate (LAC-HYDRIN) 12 % lotion, Apply  to affected area two (2) times a day. rub in to affected area well, Disp: , Rfl:     carvediloL (COREG) 3.125 mg tablet, Take  by mouth two (2) times daily (with meals). , Disp: , Rfl:     ketoconazole (NIZORAL) 2 % topical cream, Apply  to affected area daily. , Disp: , Rfl:     PHYSICAL EXAMINATION  Visit Vitals  /62   Pulse 87   Temp 98.1 °F (36.7 °C)   Resp 17   Ht 5' 4\" (1.626 m)   Wt 90.7 kg (200 lb)   SpO2 94%   BMI 34.33 kg/m²     General: no acute distress  HEENT/neck: no JVD, no masses, trachea midline. Pulmonary: clear to ausculation bilaterally with good air movement  Cardiovascular: regular rate, regular rhythm; no murmurs, rubs or gallops. Normal point of maximal impulse. No peripheral edema.   GI: soft, nontender, no hepatosplenomegaly  Hematology/Oncology: no lymphadenopathy; no bruising  Skin: warm and dry, no rashes, lesions, or ulcer  Musculoskeletal: Moving all four extremities. Normal gait and station. MEDICAL DECISION MAKING  Recent Results (from the past 24 hour(s))   GLUCOSE, POC    Collection Time: 01/01/21  5:27 PM   Result Value Ref Range    Glucose (POC) 256 (H) 65 - 100 mg/dL    Performed by Martinez Shaffer    SARS-COV-2    Collection Time: 01/01/21  6:36 PM   Result Value Ref Range    SARS-CoV-2 Please find results under separate order     COVID-19 RAPID TEST    Collection Time: 01/01/21  6:36 PM   Result Value Ref Range    Specimen source Nasopharyngeal      COVID-19 rapid test DETECTED (A) Not Detected     METABOLIC PANEL, COMPREHENSIVE    Collection Time: 01/02/21  5:38 AM   Result Value Ref Range    Sodium 127 (L) 136 - 145 mmol/L    Potassium 3.0 (L) 3.5 - 5.1 mmol/L    Chloride 85 (L) 97 - 108 mmol/L    CO2 34 (H) 21 - 32 mmol/L    Anion gap 8 5 - 15 mmol/L    Glucose 243 (H) 65 - 100 mg/dL    BUN 8 6 - 20 mg/dL    Creatinine 0.60 0.55 - 1.02 mg/dL    BUN/Creatinine ratio 13 12 - 20      GFR est AA >60 >60 ml/min/1.73m2    GFR est non-AA >60 >60 ml/min/1.73m2    Calcium 8.0 (L) 8.5 - 10.1 mg/dL    Bilirubin, total 0.5 0.2 - 1.0 mg/dL    AST (SGOT) 33 15 - 37 U/L    ALT (SGPT) 27 12 - 78 U/L    Alk.  phosphatase 57 45 - 117 U/L    Protein, total 6.5 6.4 - 8.2 g/dL    Albumin 2.6 (L) 3.5 - 5.0 g/dL    Globulin 3.9 2.0 - 4.0 g/dL    A-G Ratio 0.7 (L) 1.1 - 2.2     RENAL FUNCTION PANEL    Collection Time: 01/02/21  5:38 AM   Result Value Ref Range    Sodium 126 (L) 136 - 145 mmol/L    Potassium 3.2 (L) 3.5 - 5.1 mmol/L    Chloride 86 (L) 97 - 108 mmol/L    CO2 34 (H) 21 - 32 mmol/L    Anion gap 6 5 - 15 mmol/L    Glucose 245 (H) 65 - 100 mg/dL    BUN 8 6 - 20 mg/dL    Creatinine 0.62 0.55 - 1.02 mg/dL    BUN/Creatinine ratio 13 12 - 20      GFR est AA >60 >60 ml/min/1.73m2    GFR est non-AA >60 >60 ml/min/1.73m2    Calcium 7.8 (L) 8.5 - 10.1 mg/dL    Phosphorus 2.9 2.6 - 4.7 mg/dL    Albumin 2.5 (L) 3.5 - 5.0 g/dL   CBC WITH AUTOMATED DIFF    Collection Time: 01/02/21  5:38 AM   Result Value Ref Range    WBC 7.6 3.6 - 11.0 K/uL    RBC 3.20 (L) 3.80 - 5.20 M/uL    HGB 7.1 (L) 11.5 - 16.0 g/dL    HCT 23.2 (L) 35.0 - 47.0 %    MCV 72.5 (L) 80.0 - 99.0 FL    MCH 22.2 (L) 26.0 - 34.0 PG    MCHC 30.6 30.0 - 36.5 g/dL    RDW 19.2 (H) 11.5 - 14.5 %    PLATELET 590 924 - 250 K/uL    MPV 8.0 (L) 8.9 - 12.9 FL    NEUTROPHILS 77 (H) 32 - 75 %    LYMPHOCYTES 19 12 - 49 %    MONOCYTES 3 (L) 5 - 13 %    EOSINOPHILS 0 0 - 7 %    BASOPHILS 1 0 - 1 %    IMMATURE GRANULOCYTES 3 (H) 0.0 - 0.5 %    ABS. NEUTROPHILS 5.8 1.8 - 8.0 K/UL    ABS. LYMPHOCYTES 1.5 0.8 - 3.5 K/UL    ABS. MONOCYTES 0.2 0.0 - 1.0 K/UL    ABS. EOSINOPHILS 0.0 0.0 - 0.4 K/UL    ABS. BASOPHILS 0.1 0.0 - 0.1 K/UL    ABS. IMM. GRANS. 0.2 (H) 0.00 - 0.04 K/UL    DF AUTOMATED     BNP    Collection Time: 01/02/21  5:38 AM   Result Value Ref Range    NT pro- (H) <125 pg/mL   GLUCOSE, POC    Collection Time: 01/02/21  8:02 AM   Result Value Ref Range    Glucose (POC) 265 (H) 65 - 100 mg/dL    Performed by WESLY SCHWARTZ        IMPRESSION/PLAN  Covid-19 pneumonitis - agree with CT chest. Continue with decadron. Acute hypoxic respiratory failure - improved with steroids and diuresis. Acute on chronic systolic heart failure - continue lasix 40mg IV BID and coreg; lisinopril  COPD without exacerbation - cont steroids. Doubt can give nebs given covid 19 pneumonitis and risk of aerosolization  Aortic valve disease (aortic valve stenosis) - no need for repeat echo.

## 2021-01-03 VITALS
OXYGEN SATURATION: 94 % | RESPIRATION RATE: 20 BRPM | DIASTOLIC BLOOD PRESSURE: 44 MMHG | TEMPERATURE: 98 F | BODY MASS INDEX: 34.15 KG/M2 | WEIGHT: 200 LBS | SYSTOLIC BLOOD PRESSURE: 133 MMHG | HEIGHT: 64 IN | HEART RATE: 82 BPM

## 2021-01-03 LAB
ALBUMIN SERPL-MCNC: 3.3 G/DL (ref 3.5–5)
ALBUMIN/GLOB SERPL: 0.7 {RATIO} (ref 1.1–2.2)
ALP SERPL-CCNC: 68 U/L (ref 45–117)
ALT SERPL-CCNC: 44 U/L (ref 12–78)
ANION GAP SERPL CALC-SCNC: 10 MMOL/L (ref 5–15)
AST SERPL W P-5'-P-CCNC: 40 U/L (ref 15–37)
BASOPHILS # BLD: 0.2 K/UL (ref 0–0.1)
BASOPHILS NFR BLD: 1 % (ref 0–1)
BILIRUB SERPL-MCNC: 0.6 MG/DL (ref 0.2–1)
BUN SERPL-MCNC: 18 MG/DL (ref 6–20)
BUN/CREAT SERPL: 17 (ref 12–20)
CA-I BLD-MCNC: 9.2 MG/DL (ref 8.5–10.1)
CHLORIDE SERPL-SCNC: 79 MMOL/L (ref 97–108)
CO2 SERPL-SCNC: 36 MMOL/L (ref 21–32)
CREAT SERPL-MCNC: 1.05 MG/DL (ref 0.55–1.02)
CRP SERPL-MCNC: 1.55 MG/DL (ref 0–0.6)
DIFFERENTIAL METHOD BLD: ABNORMAL
EOSINOPHIL # BLD: 0 K/UL (ref 0–0.4)
EOSINOPHIL NFR BLD: 0 % (ref 0–7)
ERYTHROCYTE [DISTWIDTH] IN BLOOD BY AUTOMATED COUNT: 19.4 % (ref 11.5–14.5)
GLOBULIN SER CALC-MCNC: 4.9 G/DL (ref 2–4)
GLUCOSE BLD STRIP.AUTO-MCNC: 241 MG/DL (ref 65–100)
GLUCOSE BLD STRIP.AUTO-MCNC: 394 MG/DL (ref 65–100)
GLUCOSE SERPL-MCNC: 189 MG/DL (ref 65–100)
HCT VFR BLD AUTO: 27.5 % (ref 35–47)
HGB BLD-MCNC: 8.3 G/DL (ref 11.5–16)
IMM GRANULOCYTES # BLD AUTO: 0 K/UL
IMM GRANULOCYTES NFR BLD AUTO: 0 %
LDH SERPL L TO P-CCNC: 391 U/L (ref 81–246)
LYMPHOCYTES # BLD: 1.2 K/UL (ref 0.8–3.5)
LYMPHOCYTES NFR BLD: 6 % (ref 12–49)
MCH RBC QN AUTO: 22.1 PG (ref 26–34)
MCHC RBC AUTO-ENTMCNC: 30.2 G/DL (ref 30–36.5)
MCV RBC AUTO: 73.1 FL (ref 80–99)
MONOCYTES # BLD: 1 K/UL (ref 0–1)
MONOCYTES NFR BLD: 5 % (ref 5–13)
NEUTS SEG # BLD: 17.3 K/UL (ref 1.8–8)
NEUTS SEG NFR BLD: 88 % (ref 32–75)
NRBC # BLD: 1.17 K/UL (ref 0–0.01)
NRBC BLD-RTO: 5.9 PER 100 WBC
PERFORMED BY, TECHID: ABNORMAL
PERFORMED BY, TECHID: ABNORMAL
PLATELET # BLD AUTO: 550 K/UL (ref 150–400)
PMV BLD AUTO: 8.5 FL (ref 8.9–12.9)
POTASSIUM SERPL-SCNC: 2.8 MMOL/L (ref 3.5–5.1)
PROT SERPL-MCNC: 8.2 G/DL (ref 6.4–8.2)
RBC # BLD AUTO: 3.76 M/UL (ref 3.8–5.2)
RBC MORPH BLD: ABNORMAL
SODIUM SERPL-SCNC: 125 MMOL/L (ref 136–145)
WBC # BLD AUTO: 19.7 K/UL (ref 3.6–11)

## 2021-01-03 PROCEDURE — 85025 COMPLETE CBC W/AUTO DIFF WBC: CPT

## 2021-01-03 PROCEDURE — 36415 COLL VENOUS BLD VENIPUNCTURE: CPT

## 2021-01-03 PROCEDURE — 74011250637 HC RX REV CODE- 250/637: Performed by: INTERNAL MEDICINE

## 2021-01-03 PROCEDURE — 94762 N-INVAS EAR/PLS OXIMTRY CONT: CPT

## 2021-01-03 PROCEDURE — 86140 C-REACTIVE PROTEIN: CPT

## 2021-01-03 PROCEDURE — 77010033678 HC OXYGEN DAILY

## 2021-01-03 PROCEDURE — 99222 1ST HOSP IP/OBS MODERATE 55: CPT | Performed by: INTERNAL MEDICINE

## 2021-01-03 PROCEDURE — 74011250636 HC RX REV CODE- 250/636: Performed by: INTERNAL MEDICINE

## 2021-01-03 PROCEDURE — 83615 LACTATE (LD) (LDH) ENZYME: CPT

## 2021-01-03 PROCEDURE — 80053 COMPREHEN METABOLIC PANEL: CPT

## 2021-01-03 PROCEDURE — 74011636637 HC RX REV CODE- 636/637: Performed by: INTERNAL MEDICINE

## 2021-01-03 PROCEDURE — 82962 GLUCOSE BLOOD TEST: CPT

## 2021-01-03 PROCEDURE — 94640 AIRWAY INHALATION TREATMENT: CPT

## 2021-01-03 PROCEDURE — 82728 ASSAY OF FERRITIN: CPT

## 2021-01-03 PROCEDURE — 65270000029 HC RM PRIVATE

## 2021-01-03 RX ORDER — METHYLPREDNISOLONE 4 MG/1
TABLET ORAL
Qty: 1 DOSE PACK | Refills: 0 | Status: SHIPPED | OUTPATIENT
Start: 2021-01-03 | End: 2021-02-05

## 2021-01-03 RX ORDER — FUROSEMIDE 40 MG/1
40 TABLET ORAL DAILY
Qty: 30 TAB | Refills: 0 | Status: ON HOLD | OUTPATIENT
Start: 2021-01-03 | End: 2021-02-05 | Stop reason: SDUPTHER

## 2021-01-03 RX ORDER — IBUPROFEN 200 MG
CAPSULE ORAL
Qty: 100 STRIP | Refills: 0 | Status: SHIPPED | OUTPATIENT
Start: 2021-01-03 | End: 2021-02-05

## 2021-01-03 RX ORDER — INSULIN PUMP SYRINGE, 3 ML
EACH MISCELLANEOUS
Qty: 1 KIT | Refills: 0 | Status: SHIPPED | OUTPATIENT
Start: 2021-01-03 | End: 2021-07-12

## 2021-01-03 RX ORDER — INSULIN LISPRO 100 [IU]/ML
INJECTION, SOLUTION INTRAVENOUS; SUBCUTANEOUS
Qty: 1 VIAL | Refills: 0 | Status: SHIPPED
Start: 2021-01-03 | End: 2021-01-03

## 2021-01-03 RX ORDER — INSULIN LISPRO 100 [IU]/ML
INJECTION, SOLUTION INTRAVENOUS; SUBCUTANEOUS
Qty: 1 VIAL | Refills: 0 | Status: SHIPPED | OUTPATIENT
Start: 2021-01-03 | End: 2021-05-10 | Stop reason: ALTCHOICE

## 2021-01-03 RX ORDER — LANCETS
EACH MISCELLANEOUS
Qty: 1 EACH | Refills: 0 | Status: SHIPPED | OUTPATIENT
Start: 2021-01-03 | End: 2021-02-05

## 2021-01-03 RX ADMIN — FUROSEMIDE 40 MG: 10 INJECTION, SOLUTION INTRAMUSCULAR; INTRAVENOUS at 10:40

## 2021-01-03 RX ADMIN — POTASSIUM CHLORIDE 10 MEQ: 750 TABLET, FILM COATED, EXTENDED RELEASE ORAL at 10:40

## 2021-01-03 RX ADMIN — ALBUTEROL SULFATE 2 PUFF: 108 AEROSOL, METERED RESPIRATORY (INHALATION) at 14:42

## 2021-01-03 RX ADMIN — ALBUTEROL SULFATE 2 PUFF: 108 AEROSOL, METERED RESPIRATORY (INHALATION) at 01:51

## 2021-01-03 RX ADMIN — PANTOPRAZOLE SODIUM 40 MG: 40 TABLET, DELAYED RELEASE ORAL at 10:40

## 2021-01-03 RX ADMIN — ENOXAPARIN SODIUM 30 MG: 30 INJECTION SUBCUTANEOUS at 10:41

## 2021-01-03 RX ADMIN — INSULIN LISPRO 3 UNITS: 100 INJECTION, SOLUTION INTRAVENOUS; SUBCUTANEOUS at 09:30

## 2021-01-03 RX ADMIN — INSULIN LISPRO 12 UNITS: 100 INJECTION, SOLUTION INTRAVENOUS; SUBCUTANEOUS at 13:25

## 2021-01-03 RX ADMIN — CARVEDILOL 6.25 MG: 3.12 TABLET, FILM COATED ORAL at 10:40

## 2021-01-03 RX ADMIN — LISINOPRIL 40 MG: 40 TABLET ORAL at 10:40

## 2021-01-03 RX ADMIN — HYDROCHLOROTHIAZIDE 25 MG: 25 TABLET ORAL at 10:40

## 2021-01-03 RX ADMIN — Medication 10 ML: at 05:30

## 2021-01-03 RX ADMIN — DEXAMETHASONE SODIUM PHOSPHATE 4 MG: 4 INJECTION, SOLUTION INTRAMUSCULAR; INTRAVENOUS at 10:40

## 2021-01-03 RX ADMIN — BUDESONIDE AND FORMOTEROL FUMARATE DIHYDRATE 2 PUFF: 160; 4.5 AEROSOL RESPIRATORY (INHALATION) at 09:23

## 2021-01-03 NOTE — ROUTINE PROCESS
TRANSFER - OUT REPORT: 
 
Verbal report given to Carline(name) maurizio Richard  being transferred to (unit) for routine progression of care Report consisted of patients Situation, Background, Assessment and  
Recommendations(SBAR). Information from the following report(s) SBAR was reviewed with the receiving nurse. Lines:  
Peripheral IV 01/01/21 Right Antecubital (Active) Opportunity for questions and clarification was provided. Patient transported with: 
 Monitor Tech

## 2021-01-03 NOTE — DISCHARGE INSTRUCTIONS
Patient Education        Learning How To Care for Someone Who Has COVID-19  Things to know  Most people who get COVID-19 will recover with time and home care. Here are some things to know if you're caring for someone who's sick. · Treat the symptoms. Common symptoms include a fever, coughing, and feeling short of breath. Urge the person to get extra rest and drink plenty of fluids to replace fluids lost from fever. To reduce a fever, offer acetaminophen (such as Tylenol). It may also help with muscle aches. Read and follow all instructions on the label. · Watch for signs that the illness is getting worse. The person may need medical care if they're getting sicker (for example, if it's hard to breathe). But call the doctor's office before you go. They can tell you what to do. Call 911 or emergency services if the person has any of these symptoms:  ? Severe trouble breathing or shortness of breath. ? Constant pain or pressure in their chest.  ? Confusion, or trouble thinking clearly. ? A blue tint to their lips or face. Some people are more likely to get very sick and need medical care. Call the doctor as soon as symptoms start if the person you're caring for is over 72, smokes, or has a serious health problem, like asthma, heart disease, diabetes, or an immune system problem. · Protect yourself and others. The virus spreads easily from person to person, so take extra care to avoid catching or spreading the infection. ? Keep the sick person away from others as much as you can. § Have the person stay in one room. If you can, give them their own bathroom to use. § Have only one person take care of them. Keep other people--and pets--out of the sickroom. § Have the person wear a cloth face cover around other people. This includes when anyone is in the room with them or if they leave their room (for example, to go to the bathroom).  If the face cover makes it harder for the sick person to breathe, the other person should wear a face cover. § Don't share personal items. These include dishes, cups, towels, and bedding. ? Wash your hands often and well. Use soap and water, and scrub for at least 20 seconds. This is especially important after you've been around the sick person or touched things they've touched. If soap and water aren't handy, use a hand  with at least 60% alcohol. ? Avoid touching your mouth, nose, and eyes. ? Take care with the person's laundry. It's okay to wash the sick person's laundry with yours. If you have them, wear disposable gloves when handling their dirty laundry, and wash your hands well after you touch it. Wash items in the warmest water allowed for the fabric type, and dry them completely. ? Clean high-touch items every day and anytime the sick person touches them. These include doorknobs, light switches, toilets, counters, and remote controls. Use a household disinfectant or a homemade bleach solution. (Follow the directions on the label.) If the sick person has their own room, have them disinfect it every day. ? Limit visitors to your home. To help protect family and friends, stay in touch with them only by phone or computer. Current as of: July 10, 2020               Content Version: 12.6  © 9586-4015 [employer]. Care instructions adapted under license by [employer] (which disclaims liability or warranty for this information). If you have questions about a medical condition or this instruction, always ask your healthcare professional. [place] [address] any warranty or liability for your use of this information.

## 2021-01-03 NOTE — PROGRESS NOTES
Nurse called and spoke to Jaquelin Avila (daughter). Connected daughter to Dr. Karena Dumas to discuss patient's clinical status.

## 2021-01-03 NOTE — PROGRESS NOTES
Discharge plan of care/case management plan validated with provider discharge order. I have reviewed discharge instructions with the patient and (Tomer Diehl_daughter) caregiver. The patient and caregiver verbalized understanding. Patient received oxygen tank from [facility]. Nurse explained how to power on machine. Nurse provided receipt from [facility].  IV access discontinued and skin enforced with band-aid. Telemetry box #69 removed and returned to unit. No questions or concerns at time of discharge. VS WNL. Patient alert and oriented x 4. Patient transported to ED entrance via wheelchair.

## 2021-01-03 NOTE — PROGRESS NOTES
Nurse explained to daughter and patient about new order for insulin, how to administer, when to administer, and where to find the sliding scale. Nurse was informed by daughter that patient already have a glucose meter. No questions or concerns at time of departure. Nurse informed the patient/family that she can call at any time back to the nurse station if she should have any later questions. Nurse informed that two prescriptions were sent to the pharmacy and three new prescriptions were printed for insulin and glucose meter.

## 2021-01-03 NOTE — PROGRESS NOTES
Patient is being discharged to home self care. CM reviewed IMM via phone with patient. ADDENDUM    CM spoke to [facility] via regarding patient being discharged with home 02. CM obtained a choice for [facility] and completed home 02 paperwork. CM sent home 02 paperwork to [facility] via [address] spoke to Frederic Mccormick from [place]. Frederic Mccormick reported that he will deliver a POC to the ER in 30 minutes. BOBO contacted [facility] [phone]; received no answer. ADDENDUM    [facility] [place] ([phone]). He reported that the 02 was delivered to the ER.  BOBO notified Gustavo Pacheco [phone]

## 2021-01-03 NOTE — CONSULTS
Consult Date: 1/3/2021    Consults:  Acute respiratory failure and pneumonia    Subjective      This is a 77year old female with severe AS, hospitalized last month with COPD exacerbation (testing negative for Covid-19), who presented this time with weakness and SOB. She was afebrile but severely hypoxic. WBC was normal but lactic acid was high. This time she tested positive for Covid-19. CXR showed only small bilateral effusions. CT Chest has been ordered. Both images reviewed by me. Patient is currently on Dexamethasone. ID has been consulted for this reason. Pulmonary has also been consulted. It appears that patient has been discharged. She is off the floor at this time. Past Medical History:   Diagnosis Date    Acute gastrointestinal hemorrhage 2020    Allergic rhinitis 2020    Anemia 2020    Benign essential hypertension 2020    Body mass index 30.0-30.9, adult 2020    CAD (coronary artery disease) 2020    Foot callus 2020    GERD (gastroesophageal reflux disease) 2020    Heart murmur 2020    Hx of colonoscopy 2015    Done for anemia and rectal bleeding    Left bundle branch block 2020    Microalbuminuria due to type 2 diabetes mellitus ([address] 2020    Mixed hyperlipidemia 2020    Morbid obesity ([address] 2020    Tobacco dependence syndrome 2020    Type II diabetes mellitus, uncontrolled ([address] 2020      Past Surgical History:   Procedure Laterality Date    HX COLONOSCOPY      due in [id number].   Done for anemia and rectal bleeding     Family History   Problem Relation Age of Onset    Cancer Mother         uterine    Hypertension Father     Cancer Sister         breast      Social History     Tobacco Use    Smoking status: Former Smoker     Packs/day: 0.50     Quit date:      Years since quittin.0    Smokeless tobacco: Never Used   Substance Use Topics    Alcohol use: Not Currently Frequency: Never       Current Facility-Administered Medications   Medication Dose Route Frequency Provider Last Rate Last Admin    dexamethasone (DECADRON) 4 mg/mL injection 4 mg  4 mg IntraVENous Q12H Red Carvajal MD   4 mg at 01/02/21 2334    enoxaparin (LOVENOX) injection 30 mg  30 mg SubCUTAneous Q12H Red Carvajal MD   Stopped at 01/02/21 2005    0.9% sodium chloride infusion  40 mL/hr IntraVENous CONTINUOUS Red Carvajal  mL/hr at 01/01/21 1406 100 mL/hr at 01/01/21 1406    albuterol-ipratropium (DUO-NEB) 2.5 MG-0.5 MG/3 ML  3 mL Nebulization Q6H PRN Dwain Doyle MD        ammonium lactate (AMLACTIN) 12 % cream   Topical BID PRN Dwain Doyle MD        budesonide-formoteroL [facility]) 160-4.5 mcg/actuation HFA inhaler 2 Puff  2 Puff Inhalation BID Dwain Doyle MD   2 Puff at 01/02/21 1931    famotidine (PEPCID) tablet 10 mg  10 mg Oral QHS Dwain Doyle MD   10 mg at 01/02/21 2333    hydroCHLOROthiazide (HYDRODIURIL) tablet 25 mg  25 mg Oral DAILY Dwain Doyle MD   25 mg at 01/02/21 0805    ketoconazole (NIZORAL) 2 % cream   Topical DAILY Corrine Ahmadi MD        LORazepam (ATIVAN) tablet 1 mg  1 mg Oral QHS Dwain Doyle MD   1 mg at 01/02/21 2333    pantoprazole (PROTONIX) tablet 40 mg  40 mg Oral DAILY Dwain Doyle MD   40 mg at 01/02/21 0805    potassium chloride SR (KLOR-CON 10) tablet 10 mEq  10 mEq Oral DAILY Corrine Ahmadi MD   10 mEq at 01/02/21 0805    furosemide (LASIX) injection 40 mg  40 mg IntraVENous Q12H Dwain Doyle MD   40 mg at 01/02/21 2334    sodium chloride (NS) flush 5-40 mL  5-40 mL IntraVENous Eric Alcazar MD   10 mL at 01/03/21 0530    sodium chloride (NS) flush 5-40 mL  5-40 mL IntraVENous PRN Dwain Doyle MD        acetaminophen (TYLENOL) tablet 650 mg  650 mg Oral Q6H PRN Ca Spence MD        Or    acetaminophen (TYLENOL) suppository 650 mg  650 mg Rectal Q6H PRN Ca Spence MD        polyethylene glycol [facility]) packet 17 g  17 g Oral DAILY PRN Eric Gramajo MD        promethazine (PHENERGAN) tablet 12.5 mg  12.5 mg Oral Q6H PRN Ca Spence MD        Or    [facility]ron [facility]) injection 4 mg  4 mg IntraVENous Q6H PRN Ca Spence MD        glucose chewable tablet 16 g  4 Tab Oral PRN Ca Spence MD        dextrose (D50W) injection syrg 12.5-25 g  25-50 mL IntraVENous PRN Ca Spence MD        glucagon [facility]) injection 1 mg  1 mg IntraMUSCular PRN Ca Spence MD        insulin lispro (HUMALOG) injection   SubCUTAneous AC&HS Ca Spence MD   7 Units at 01/02/21 2333    albuterol (PROVENTIL HFA, VENTOLIN HFA, PROAIR HFA) inhaler 2 Puff  2 Puff Inhalation Q6H RT Earline Fabry, MD   Stopped at 01/03/21 0800    lisinopriL (PRINIVIL, ZESTRIL) tablet 40 mg  40 mg Oral DAILY Jorge Ng MD        carvediloL (COREG) tablet 6.25 mg  6.25 mg Oral BID WITH MEALS Jorge Ng MD   6.25 mg at 01/02/21 2332        Review of Systems   Constitutional: Negative for chills. HENT: Negative. Eyes: Negative. Respiratory: Positive for shortness of breath and wheezing. Negative for cough. Cardiovascular: Negative for chest pain. Gastrointestinal: Negative. Endocrine: Negative. Genitourinary: Negative. Musculoskeletal: Negative. Skin: Negative. Allergic/Immunologic: Negative. Neurological: Negative. Hematological: Negative. Psychiatric/Behavioral: Negative.         Objective     Vital signs for last 24 hours:  Visit Vitals  BP (!) 133/44   Pulse 86   Temp 98 °F (36.7 °C)   Resp 20   Ht 5' 4\" (1.626 m)   Wt 200 lb (90.7 kg)   SpO2 97%   BMI 34.33 kg/m²       Intake/Output this shift:  Current Shift: No intake/output data recorded. Last 3 Shifts: 01/01 1901 - 01/03 0700  In: -   Out: 1900 [Urine:1900]    Data Review:   Recent Results (from the past 24 hour(s))   GLUCOSE, POC    Collection Time: 01/02/21 11:27 PM   Result Value Ref Range    Glucose (POC) 330 (H) 65 - 100 mg/dL    Performed by Chaim POC    Collection Time: 01/03/21  8:05 AM   Result Value Ref Range    Glucose (POC) 241 (H) 65 - 100 mg/dL    Performed by Chanda Tucker        Physical Exam  Vitals signs and nursing note reviewed. Patient is unavailable for examination    ASSESSMENT/PLAN    1. Covid-19 positive  2. Acute hypoxic respiratory failure  3. Aortic stenosis    Comment:  Patient discharged before being seen. Decision was made not to treat with Remdesivir. CT Chest abnormal but not classic for Covid-19. Nonetheless there may be reasons to treat with Remdesivir. 1. Unable to provide timely recommendation in this case  2. If she worsens clinically, should return to hospital and be considered for Remdesivir.     Alexandre Olsen MD

## 2021-01-03 NOTE — DISCHARGE SUMMARY
Hospitalist Discharge Summary     Patient ID:  Anna Mclaughlin  [id number]  1 y.o.  1955 1/1/2021    PCP on record: Jenny Mooney DO    Admit date: 1/1/2021  Discharge date and time: 1/3/2021    DISCHARGE DIAGNOSIS:    Acute respiratory failure with hypoxia/COPD exacerbation/CHF exacerbation    CONSULTATIONS:  IP CONSULT TO HOSPITALIST  IP CONSULT TO CARDIOLOGY  IP CONSULT TO PULMONOLOGY  IP CONSULT TO INFECTIOUS DISEASES    Excerpted HPI from H&P of Mortimer Starch, MD:  73-year-old female with past medical history of multiple comorbidities presents to [facility] (formerly [facility] with complaints shortness of breath. Patient states she was in her usual state of health until 3 days back which started sprinting gradual onset persistent progressive shortness of breath that was initially on exertion and then progressed to being at rest, following which patient presented to the ED. Patient denies any fevers chills nausea vomiting lightheadedness dizziness orthopnea paroxysmal nocturnal dyspnea chest pain palpitations headache focal weakness loss sensation auditory or visual symptoms abdominal stool or urinary complaints or any other associated symptoms. ______________________________________________________________________  DISCHARGE SUMMARY/HOSPITAL COURSE:  for full details see H&P, daily progress notes, labs, consult notes.    Patient was subsequently admitted to [facility] (formerly [facility]) with a diagnosis of acute respiratory failure with hypoxia secondary to COPD exacerbation as well as COVID-19 as well as acute decompensated heart failure, patient was evaluated by pulmonology as well as cardiology, patient was initiated on Decadron as well as albuterol as needed, patient received IV diuresis, patient responded well to IV diuresis, patient's clinical status improved significantly following which after evaluation by physical therapy as well as Occupational Therapy and respiratory therapy patient was deemed stable for discharge with close outpatient follow-up with primary care physician as well as pulmonology as well as cardiology, the plan was explained to the patient who is agreeable to current plan.          _______________________________________________________________________  Patient seen and examined by me on discharge day. Pertinent Findings:  Gen:    Not in distress  Chest: Decreased air entry bilaterally in bilateral lower lung zones  CVS:   Regular rhythm, s1/s2 no m/r/g  No edema  Abd:  Soft, not distended, not tender  Neuro:  Alert, Oriented  _______________________________________________________________________  DISCHARGE MEDICATIONS:   Current Discharge Medication List      START taking these medications    Details   furosemide (Lasix) 40 mg tablet Take 1 Tab by mouth daily. Qty: 30 Tab, Refills: 0      insulin lispro (HUMALOG) 100 unit/mL injection INITIATE CORRECTIVE INSULIN PROTOCOL (MAGALI):  RX MAGALI Normal Sensitivity (Average weight)    AC (before meals), Q6H, and Q4H CORRECTIONAL SCALE only For Blood Sugar (mg/dl) of :             140-199=2 units            200-249=3 units  250-299=5 units  300-349=7 units  350 or greater = Call MD  Give in addition to basal medications. Do Not Hold for NPO    BEDTIME CORRECTIONAL sliding scale when scheduled:  200-249=2 units  250-299=3 units   300-349=4 units  350 or greater = Call MD  Give in addition to basal medications. Do Not Hold for NPO Fast Acting - Administer Immediately - or within 15 minutes of start of meal, if mealtime coverage. Qty: 1 Vial, Refills: 0         CONTINUE these medications which have CHANGED    Details   methylPREDNISolone (MEDROL DOSEPACK) 4 mg tablet As directed  Qty: 1 Dose Pack, Refills: 0         CONTINUE these medications which have NOT CHANGED    Details   budesonide-formoteroL (SYMBICORT) 160-4.5 mcg/actuation HFAA Take 2 Puffs by inhalation two (2) times a day.   Qty: 1 Inhaler, Refills: 3 Associated Diagnoses: Chronic obstructive pulmonary disease, unspecified COPD type ([facility]      albuterol-ipratropium (DUO-NEB) 2.5 mg-0.5 mg/3 ml nebu 3 mL by Nebulization route every six (6) hours as needed for Wheezing, Shortness of Breath or Cough. Qty: 120 Nebule, Refills: 3    Associated Diagnoses: Chronic obstructive pulmonary disease, unspecified COPD type ([facility])      pantoprazole (Protonix) 40 mg tablet Take 1 Tab by mouth daily. Qty: 30 Tab, Refills: 0      famotidine (PEPCID) 10 mg tablet Take 1 Tab by mouth nightly. Qty: 30 Tab, Refills: 0      LORazepam (Ativan) 1 mg tablet Take 1 Tab by mouth nightly. Max Daily Amount: 1 mg. Qty: 5 Tab, Refills: 0    Associated Diagnoses: Chronic obstructive pulmonary disease, unspecified COPD type ([facility]      lisinopriL (PRINIVIL, ZESTRIL) 20 mg tablet Take 1 Tab by mouth two (2) times a day. Qty: 180 Tab, Refills: 2    Associated Diagnoses: Benign essential hypertension      metFORMIN (GLUCOPHAGE) 500 mg tablet Take 2 tablets twice a day with meals  Qty: 120 Tab, Refills: 3    Associated Diagnoses: Type 2 diabetes mellitus with hyperglycemia, without long-term current use of insulin ([facility])      lancets misc Use to check glucose once a day  Qty: 50 Each, Refills: 5    Associated Diagnoses: Type 2 diabetes mellitus with hyperglycemia, without long-term current use of insulin ([facility])      glucose blood VI test strips (blood glucose test) strip by Does Not Apply route two (2) times a day. Use to check glucose once a day  Qty: 50 Strip, Refills: 5    Associated Diagnoses: Type 2 diabetes mellitus with hyperglycemia, without long-term current use of insulin ([facility])      potassium chloride (KLOR-CON) 10 mEq tablet By oral route take one tablet Monday, Wednesday and Friday  Qty: 40 Tab, Refills: 3    Associated Diagnoses: Benign essential hypertension      hydroCHLOROthiazide (HYDRODIURIL) 25 mg tablet Take 1 Tab by mouth daily.   Qty: 90 Tab, Refills: 4      aspirin delayed-release (Aspirin Low Dose) 81 mg tablet Take  by mouth daily. ammonium lactate (LAC-HYDRIN) 12 % lotion Apply  to affected area two (2) times a day. rub in to affected area well      carvediloL (COREG) 3.125 mg tablet Take  by mouth two (2) times daily (with meals). ketoconazole (NIZORAL) 2 % topical cream Apply  to affected area daily. STOP taking these medications       amLODIPine (NORVASC) 10 mg tablet Comments:   Reason for Stopping:                 Patient Follow Up Instructions: Activity: PT/OT per Home Health  Diet: Cardiac Diet  Wound Care: As directed    Follow-up with PCP/Cardiology/Pulmonology in 2 weeks. Follow-up tests/labs As per above physicians  Follow-up Information     Follow up With Specialties Details Why Contact Info    Lamar Servin DO Family Medicine In 1 week  [facility] [place] [address] [place] [address]  [phone]      Matilda Marti MD Pulmonary Disease In 1 week  2020 [address] [place], [place] [id number] [phone]      Wagner Burleson MD Internal Medicine In 1 week  [place] [address]  [phone]          ________________________________________________________________    Risk of deterioration: Moderate    Condition at Discharge:  Stable  __________________________________________________________________    Disposition  Home with family, no needs    ____________________________________________________________________    Code Status: Full Code  ___________________________________________________________________      Total time in minutes spent coordinating this discharge (includes going over instructions, follow-up, prescriptions, and preparing report for sign off to her PCP) :  50 minutes    Signed:   Joceline Nolasco MD

## 2021-01-03 NOTE — PROGRESS NOTES
Pulmonary, Critical Care    Name: Keo Ferrari MRN: [id number]   : 1955 Hospital: [address] [place] [address]   Date: 1/3/2021  Admission date: 2021 Hospital Day: 3       Subjective/Interval History:   Seen in the emergency room. This is 1 of several admissions for shortness of breath. She has known aortic stenosis mitral regurgitation grade 2 diastolic dysfunction and develops CHF. Of interest she states that she has been told that it is good to take at least 8 glasses of water a day and she adheres to that. In any event she was just in the hospital several weeks ago COVID-19 antigen testing was negative. She had shortness of breath believed COPD was treated and discharged home. Over the last several days she has had worsening shortness of breath. Very little cough she does state that whenever she gets sick she has poor appetite and indeed she has poor appetite now. She has not noted any change in her sense of taste. She presented to the emergency room has been found to have bilateral pleural effusions and severe hyponatremia. She denies any fever chills or sweats. She is severely hypoxic 80% FiO2 on room air. On 4 L she is 98% saturation  1/2 oxygen has been tapered to 2 L with good oxygen saturation. COVID-19 antigen test is positive. She still has a cough. Hospital Problems  Date Reviewed: 2020          Codes Class Noted POA    Acute respiratory failure with hypoxia [facility]) ICD-10-CM: J96.01  ICD-9-CM: 518.81  2021 Unknown              IMPRESSION:   1. Acute hypoxic respiratory failure questionably due to mild pulmonary edema from aortic stenosis and mitral regurgitation  2. COVID-19 positive. Oxygenation is improved we will give Decadron but hold remdesivir and Actemra  3. Questionable COPD I do not hear any wheezing continue albuterol with Symbicort  4. Moderate aortic stenosis valve area 1.1 cm² with max gradient 47 mean 25  5.  Moderate mitral regurgitation  6. Mild pulmonary hypertension  7. Grade 2 diastolic left ventricular dysfunction  8. Hypokalemia  Body mass index is 34.33 kg/m². RECOMMENDATIONS/PLAN:   1. We will check CT chest to evaluate for changes of COVID-19  2. Continue inhaled albuterol and Symbicort  3. Continue IV Lasix   4. Decrease IV saline  5. We will add Decadron empirically  6. Continue oxygen supplementation to maintain oxygen saturation greater than 92%          [x] High complexity decision making was performed  [x] See my orders for details      Subjective/Initial History:     I was asked by Micky Greene MD to see Keo Ferrari  a 77 y.o.    female in consultation for a chief complaint of shortness of breath hypoxia bilateral pleural effusion    Allergies   Allergen Reactions    Aspirin Unknown (comments)     hemorraging - ended up in the hospital        [facility] reviewed and pertinent medications noted or modified as needed     Current Facility-Administered Medications   Medication    dexamethasone (DECADRON) 4 mg/mL injection 4 mg    enoxaparin (LOVENOX) injection 30 mg    0.9% sodium chloride infusion    albuterol-ipratropium (DUO-NEB) 2.5 MG-0.5 MG/3 ML    ammonium lactate (AMLACTIN) 12 % cream    budesonide-formoteroL (SYMBICORT) 160-4.5 mcg/actuation HFA inhaler 2 Puff    famotidine (PEPCID) tablet 10 mg    hydroCHLOROthiazide (HYDRODIURIL) tablet 25 mg    LORazepam (ATIVAN) tablet 1 mg    pantoprazole (PROTONIX) tablet 40 mg    potassium chloride SR (KLOR-CON 10) tablet 10 mEq    furosemide (LASIX) injection 40 mg    sodium chloride (NS) flush 5-40 mL    sodium chloride (NS) flush 5-40 mL    acetaminophen (TYLENOL) tablet 650 mg    Or    acetaminophen (TYLENOL) suppository 650 mg    polyethylene glycol (MIRALAX) packet 17 g    promethazine (PHENERGAN) tablet 12.5 mg    Or    ondansetron (ZOFRAN) injection 4 mg    glucose chewable tablet 16 g    dextrose (D50W) injection syrg 12.5-25 g    glucagon (GLUCAGEN) injection 1 mg    insulin lispro (HUMALOG) injection    albuterol (PROVENTIL HFA, VENTOLIN HFA, PROAIR HFA) inhaler 2 Puff    lisinopriL (PRINIVIL, ZESTRIL) tablet 40 mg    carvediloL (COREG) tablet 6.25 mg      Patient PCP: Hammad Bazzi DO  PMH:  has a past medical history of Acute gastrointestinal hemorrhage (8/28/2020), Allergic rhinitis (8/28/2020), Anemia (8/28/2020), Benign essential hypertension (6/25/2020), Body mass index 30.0-30.9, adult (6/25/2020), CAD (coronary artery disease) (8/28/2020), Foot callus (8/28/2020), GERD (gastroesophageal reflux disease) (6/25/2020), Heart murmur (6/25/2020), colonoscopy (01/01/2015), Left bundle branch block (8/28/2020), Microalbuminuria due to type 2 diabetes mellitus ([address] (8/28/2020), Mixed hyperlipidemia (6/25/2020), Morbid obesity ([address] (8/28/2020), Tobacco dependence syndrome (6/25/2020), and Type II diabetes mellitus, uncontrolled ([address] (6/25/2020). PSH:   has a past surgical history that includes hx colonoscopy (2015). FHX: family history includes Cancer in her mother and sister; Hypertension in her father. SHX:  reports that she quit smoking about 7 years ago. She smoked 0.50 packs per day. She has never used smokeless tobacco. She reports previous alcohol use. She reports that she does not use drugs. Systemic review:  General she states her weight stable with no chronic fever chills or sweats  Eyes no double vision or momentary blindness  ENT no facial pain over the sinuses  Endocrinologic she denies polyuria polydipsia  Musculoskeletal no swollen tender joints  Neurologic no seizures or syncope  Gastrointestinal she states she has indigestion heartburn last had it when she was in the hospital 2 weeks ago has not been bothered with it at home. She states it has awakened her at night in the past but again has not had problems over the last 2 weeks has not had nausea vomiting.   States she has poor appetite whenever she gets sick states her sense of taste and smell seems normal  Genitourinary no difficulty voiding no drainage or bleeding  Cardiovascular has a history of atrial stenosis mitral regurg diastolic dysfunction. She states she does not have ankle edema has had worsening shortness of breath with exertion and when she lays down no chest pain or diaphoresis  Respiratory was a smoker stopped 7 years ago states she gets wheezy when she has her shortness of breath was just started on albuterol inhaler last admission questionably was to start a Symbicort inhaler within the last day or 2    Objective:     Vital Signs: Telemetry:    normal sinus rhythm Intake/Output:   Visit Vitals  BP (!) 133/44   Pulse 82   Temp 98 °F (36.7 °C)   Resp 20   Ht 5' 4\" (1.626 m)   Wt 90.7 kg (200 lb)   SpO2 92%   BMI 34.33 kg/m²       Temp (24hrs), Av.1 °F (36.7 °C), Min:97.4 °F (36.3 °C), Max:98.7 °F (37.1 °C)        O2 Device: Nasal cannula O2 Flow Rate (L/min): 2 l/min       Wt Readings from Last 4 Encounters:   21 90.7 kg (200 lb)   20 90.7 kg (200 lb)   20 97.4 kg (214 lb 11.7 oz)   20 94 kg (207 lb 4.8 oz)          Intake/Output Summary (Last 24 hours) at 1/3/2021 1212  Last data filed at 1/3/2021 0012  Gross per 24 hour   Intake    Output 1900 ml   Net -1900 ml       Last shift:      No intake/output data recorded.   Last 3 shifts: 1901 -  0700  In: -   Out: 1900 [Urine:1900]       Physical Exam:   General:  female; no distress sitting up in bed with nasal oxygen in place  HEENT: NCAT, oral mucosa is clear  Eyes: anicteric; conjunctiva clear extraocular movements intact  Neck: no nodes, neck veins are visible no accessory muscle usage  Chest: no deformity  Cardiac: Regular rate and rhythm questionable systolic murmur no definite ankle edema no definite sacral edema  Lungs: Clear anteriorly and laterally no definite wheezes exhalation only mildly prolonged a few rales in the bases  Abd: Soft nontender normal bowel sounds  Ext: no edema; no joint swelling; No clubbing  : clear urine  Neuro: Awake alert oriented moves all 4 extremities well  Psych- no agitation, oriented to person;   Skin: warm, dry, no cyanosis;   Pulses: Brachial radial femoral pulses intact  Capillary: Normal capillary refill    Labs:    Recent Labs     01/02/21  0538 01/01/21  1245   WBC 7.6 10.3   HGB 7.1* 7.8*    364     Recent Labs     01/02/21  0538 01/01/21  1245   *  126* 118*   K 3.0*  3.2* 3.3*   CL 85*  86* 79*   CO2 34*  34* 28   *  245* 178*   BUN 8  8 8   CREA 0.60  0.62 0.67   CA 8.0*  7.8* 8.2*   PHOS 2.9  --    LAC  --  2.5*   ALB 2.6*  2.5* 2.8*   ALT 27 24     No results for input(s): PH, PCO2, PO2, HCO3, FIO2 in the last 72 hours. Recent Labs     01/01/21  1245   TROIQ <0.05   1/1 COVID-19 antigen positive  Troponin less than 0.05    12/13 Covid 19 antigen negative  12/14 ejection fraction 41% visual normal grade 2 diastolic dysfunction aortic valve area 1.16 cm² mean gradient 25 max gradient 47 mild to moderate mitral regurg RVSP 33  Lab Results   Component Value Date/Time    TSH, External 2.420 01/16/2019       Imaging:    CXR Results  (Last 48 hours)               01/01/21 1336  XR CHEST SNGL V Final result    Impression:  Impression: Small bilateral pleural effusions. Narrative:  XR CHEST SNGL V       Comparison: December 16, 2020. There is blunting of both costophrenic angles suggesting small bilateral pleural   effusions. No infiltrate or consolidation. No pulmonary vascular congestion. Heart size is normal. Mediastinum is unremarkable. The bony thorax is intact. Results from [place] encounter on 01/01/21   XR CHEST SNGL V    Narrative XR CHEST SNGL V    Comparison: December 16, 2020. There is blunting of both costophrenic angles suggesting small bilateral pleural  effusions.  No infiltrate or consolidation. No pulmonary vascular congestion. Heart size is normal. Mediastinum is unremarkable. The bony thorax is intact. Impression Impression: Small bilateral pleural effusions. Results from Hospital Encounter encounter on 12/13/20   XR CHEST PORT    Narrative Study: XR CHEST PORT    Clinical indication: wheezing,. Comparison: Chest x-ray 12/13/2020. Findings:    Lungs are slightly hyperinflated. No consolidative airspace disease, pleural  effusion or pneumothorax. Cardiomediastinal contours are within normal limits. No pulmonary edema. No acute osseous abnormality identified. Impression Impression:  No acute findings. Mild emphysematous change. XR CHEST SNGL V    Narrative Chest, AP upright portable, 1418 hours. Comparison with two-view exam from  11/25/2019. Cardiopericardial silhouette top normal in size. Central pulmonary  vessels appear borderline enlarged, stable. Calcification and tortuosity of  thoracic aorta. There is nonspecific central peribronchial cuffing. No evidence  of pulmonary edema, air space pneumonia, or pleural effusion. No evidence of  pneumothorax. Impression IMPRESSION: No acute pulmonary or pleural disease. Discussion acute hypoxia probably related to cardiac disease with mild fluid overload with bilateral pleural effusions however the degree of hypoxia seems out of range of the changes on chest x-ray. Pulmonary emboli are a possibility. Will check duplex scan of the legs. If electrolytes improved tomorrow will check CT angiogram of the chest  1/2 COVID-19 test is positive. Will check CT chest no contrast to see if changes consistent with COVID-19.   Her oxygenation is well-maintained and in fact have decreased oxygen to 2 L will give Decadron but with hold remdesivir and Actemra for the time being  Sodium markedly improved will decrease IV saline and continue Lasix  Time of care 20 minutes  Cosme Capellan MD

## 2021-01-03 NOTE — PROGRESS NOTES
Patient alert and oriented to room. Skin assessment done with Yara Del Cid with no skin issues noted. Bed in lowest position, call bell within reach.

## 2021-01-04 LAB — FERRITIN SERPL-MCNC: 23 NG/ML (ref 26–388)

## 2021-01-21 NOTE — PROGRESS NOTES
AC (before meals), Q6H, and Q4H CORRECTIONAL SCALE only For Blood Sugar (mg/dl) of :   140-199=2 units   200-249=3 units  250-299=5 units  300-349=7 units  350 or greater = Call MD  Give in addition to basal medications. Do Not Hold for NPO    BEDTIME CORRECTIONAL sliding scale when scheduled:  200-249=2 units  250-299=3 units   300-349=4 units  350 or greater = Call MD  Give in addition to basal medications. Ordered on: 1/3/2021       Authorized by: Viktoria Short       Dispense: 1 Vial       Refills: 0 ordered       Admin Instructions: INITIATE CORRECTIVE INSULIN PROTOCOL (MAGALI):  RX MAGALI Normal Sensitivity (Average weight)        Spoke to patient on steroid for breathing, sugar was 541, this ss was given to patient upon DC from [facility] and I told her, if above 400 just take 8 units, stay hydrate and can call if needed.       Chastity Perera, DO

## 2021-01-25 ENCOUNTER — HOSPITAL ENCOUNTER (INPATIENT)
Age: 66
LOS: 11 days | Discharge: HOME HEALTH | DRG: 377 | End: 2021-02-05
Attending: EMERGENCY MEDICINE | Admitting: HOSPITALIST
Payer: MEDICARE

## 2021-01-25 ENCOUNTER — APPOINTMENT (OUTPATIENT)
Dept: GENERAL RADIOLOGY | Age: 66
DRG: 377 | End: 2021-01-25
Attending: EMERGENCY MEDICINE
Payer: MEDICARE

## 2021-01-25 DIAGNOSIS — D64.9 ANEMIA, UNSPECIFIED TYPE: ICD-10-CM

## 2021-01-25 DIAGNOSIS — K92.2 GASTROINTESTINAL HEMORRHAGE, UNSPECIFIED GASTROINTESTINAL HEMORRHAGE TYPE: Primary | ICD-10-CM

## 2021-01-25 LAB
ALBUMIN SERPL-MCNC: 3.1 G/DL (ref 3.5–5)
ALBUMIN/GLOB SERPL: 0.7 {RATIO} (ref 1.1–2.2)
ALP SERPL-CCNC: 68 U/L (ref 45–117)
ALT SERPL-CCNC: 21 U/L (ref 12–78)
ANION GAP SERPL CALC-SCNC: 12 MMOL/L (ref 5–15)
AST SERPL W P-5'-P-CCNC: 17 U/L (ref 15–37)
ATRIAL RATE: 95 BPM
BASOPHILS # BLD: 0.1 K/UL (ref 0–0.1)
BASOPHILS NFR BLD: 1 % (ref 0–1)
BILIRUB SERPL-MCNC: 0.5 MG/DL (ref 0.2–1)
BNP SERPL-MCNC: 616 PG/ML
BUN SERPL-MCNC: 7 MG/DL (ref 6–20)
BUN/CREAT SERPL: 9 (ref 12–20)
CA-I BLD-MCNC: 9.1 MG/DL (ref 8.5–10.1)
CALCULATED P AXIS, ECG09: 55 DEGREES
CALCULATED R AXIS, ECG10: -26 DEGREES
CALCULATED T AXIS, ECG11: 130 DEGREES
CHLORIDE SERPL-SCNC: 91 MMOL/L (ref 97–108)
CO2 SERPL-SCNC: 27 MMOL/L (ref 21–32)
COLLECT DATE STL: 10
COVID-19 RAPID TEST, COVR: NOT DETECTED
CREAT SERPL-MCNC: 0.75 MG/DL (ref 0.55–1.02)
DIAGNOSIS, 93000: NORMAL
DIFFERENTIAL METHOD BLD: ABNORMAL
EOSINOPHIL # BLD: 0 K/UL (ref 0–0.4)
EOSINOPHIL NFR BLD: 0 % (ref 0–7)
ERYTHROCYTE [DISTWIDTH] IN BLOOD BY AUTOMATED COUNT: 23 % (ref 11.5–14.5)
GLOBULIN SER CALC-MCNC: 4.4 G/DL (ref 2–4)
GLUCOSE BLD STRIP.AUTO-MCNC: 185 MG/DL (ref 65–100)
GLUCOSE BLD STRIP.AUTO-MCNC: 245 MG/DL (ref 65–100)
GLUCOSE SERPL-MCNC: 163 MG/DL (ref 65–100)
HCT VFR BLD AUTO: 19.4 % (ref 35–47)
HEMOCCULT SP1 STL QL: POSITIVE
HGB BLD-MCNC: 5.4 G/DL (ref 11.5–16)
IMM GRANULOCYTES # BLD AUTO: 0 K/UL
IMM GRANULOCYTES NFR BLD AUTO: 0 %
LYMPHOCYTES # BLD: 2.5 K/UL (ref 0.8–3.5)
LYMPHOCYTES NFR BLD: 25 % (ref 12–49)
MCH RBC QN AUTO: 18.4 PG (ref 26–34)
MCHC RBC AUTO-ENTMCNC: 27.8 G/DL (ref 30–36.5)
MCV RBC AUTO: 66 FL (ref 80–99)
MONOCYTES # BLD: 0.4 K/UL (ref 0–1)
MONOCYTES NFR BLD: 4 % (ref 5–13)
NEUTS SEG # BLD: 6.9 K/UL (ref 1.8–8)
NEUTS SEG NFR BLD: 70 % (ref 32–75)
NRBC # BLD: 0.2 K/UL
NRBC BLD MANUAL-RTO: 2 PER 100 WBC
P-R INTERVAL, ECG05: 128 MS
PERFORMED BY, TECHID: ABNORMAL
PERFORMED BY, TECHID: ABNORMAL
PLATELET # BLD AUTO: 622 K/UL (ref 150–400)
PMV BLD AUTO: 8.5 FL (ref 8.9–12.9)
POTASSIUM SERPL-SCNC: 3.7 MMOL/L (ref 3.5–5.1)
PROT SERPL-MCNC: 7.5 G/DL (ref 6.4–8.2)
Q-T INTERVAL, ECG07: 388 MS
QRS DURATION, ECG06: 132 MS
QTC CALCULATION (BEZET), ECG08: 487 MS
RBC # BLD AUTO: 2.94 M/UL (ref 3.8–5.2)
RBC MORPH BLD: ABNORMAL
SARS-COV-2, COV2: NORMAL
SODIUM SERPL-SCNC: 130 MMOL/L (ref 136–145)
SPECIMEN SOURCE: NORMAL
TROPONIN I SERPL-MCNC: <0.05 NG/ML
VENTRICULAR RATE, ECG03: 95 BPM
WBC # BLD AUTO: 9.8 K/UL (ref 3.6–11)

## 2021-01-25 PROCEDURE — 83880 ASSAY OF NATRIURETIC PEPTIDE: CPT

## 2021-01-25 PROCEDURE — 71045 X-RAY EXAM CHEST 1 VIEW: CPT

## 2021-01-25 PROCEDURE — 30233N1 TRANSFUSION OF NONAUTOLOGOUS RED BLOOD CELLS INTO PERIPHERAL VEIN, PERCUTANEOUS APPROACH: ICD-10-PCS | Performed by: INTERNAL MEDICINE

## 2021-01-25 PROCEDURE — 99285 EMERGENCY DEPT VISIT HI MDM: CPT

## 2021-01-25 PROCEDURE — 82272 OCCULT BLD FECES 1-3 TESTS: CPT

## 2021-01-25 PROCEDURE — 36415 COLL VENOUS BLD VENIPUNCTURE: CPT

## 2021-01-25 PROCEDURE — 74011250636 HC RX REV CODE- 250/636

## 2021-01-25 PROCEDURE — 80053 COMPREHEN METABOLIC PANEL: CPT

## 2021-01-25 PROCEDURE — 74011250636 HC RX REV CODE- 250/636: Performed by: EMERGENCY MEDICINE

## 2021-01-25 PROCEDURE — 84484 ASSAY OF TROPONIN QUANT: CPT

## 2021-01-25 PROCEDURE — 96361 HYDRATE IV INFUSION ADD-ON: CPT

## 2021-01-25 PROCEDURE — 93005 ELECTROCARDIOGRAM TRACING: CPT

## 2021-01-25 PROCEDURE — 86923 COMPATIBILITY TEST ELECTRIC: CPT

## 2021-01-25 PROCEDURE — 74011250637 HC RX REV CODE- 250/637: Performed by: NURSE PRACTITIONER

## 2021-01-25 PROCEDURE — 65270000029 HC RM PRIVATE

## 2021-01-25 PROCEDURE — 86900 BLOOD TYPING SEROLOGIC ABO: CPT

## 2021-01-25 PROCEDURE — 36430 TRANSFUSION BLD/BLD COMPNT: CPT

## 2021-01-25 PROCEDURE — 87635 SARS-COV-2 COVID-19 AMP PRB: CPT

## 2021-01-25 PROCEDURE — P9016 RBC LEUKOCYTES REDUCED: HCPCS

## 2021-01-25 PROCEDURE — 82962 GLUCOSE BLOOD TEST: CPT

## 2021-01-25 PROCEDURE — 74011250636 HC RX REV CODE- 250/636: Performed by: NURSE PRACTITIONER

## 2021-01-25 PROCEDURE — 96360 HYDRATION IV INFUSION INIT: CPT

## 2021-01-25 PROCEDURE — 85025 COMPLETE CBC W/AUTO DIFF WBC: CPT

## 2021-01-25 PROCEDURE — 74011000250 HC RX REV CODE- 250

## 2021-01-25 RX ORDER — ONDANSETRON 4 MG/1
4 TABLET, ORALLY DISINTEGRATING ORAL
Status: DISCONTINUED | OUTPATIENT
Start: 2021-01-25 | End: 2021-02-05 | Stop reason: HOSPADM

## 2021-01-25 RX ORDER — SODIUM CHLORIDE 9 MG/ML
50 INJECTION, SOLUTION INTRAVENOUS CONTINUOUS
Status: DISCONTINUED | OUTPATIENT
Start: 2021-01-25 | End: 2021-02-05 | Stop reason: HOSPADM

## 2021-01-25 RX ORDER — PANTOPRAZOLE SODIUM 40 MG/1
40 TABLET, DELAYED RELEASE ORAL 2 TIMES DAILY
Status: DISCONTINUED | OUTPATIENT
Start: 2021-01-25 | End: 2021-01-27

## 2021-01-25 RX ORDER — MAGNESIUM SULFATE 100 %
4 CRYSTALS MISCELLANEOUS AS NEEDED
Status: DISCONTINUED | OUTPATIENT
Start: 2021-01-25 | End: 2021-02-05 | Stop reason: HOSPADM

## 2021-01-25 RX ORDER — BUDESONIDE AND FORMOTEROL FUMARATE DIHYDRATE 160; 4.5 UG/1; UG/1
2 AEROSOL RESPIRATORY (INHALATION) 2 TIMES DAILY
Status: DISCONTINUED | OUTPATIENT
Start: 2021-01-25 | End: 2021-01-26

## 2021-01-25 RX ORDER — ONDANSETRON 2 MG/ML
4 INJECTION INTRAMUSCULAR; INTRAVENOUS
Status: DISCONTINUED | OUTPATIENT
Start: 2021-01-25 | End: 2021-02-05 | Stop reason: HOSPADM

## 2021-01-25 RX ORDER — LORAZEPAM 1 MG/1
1 TABLET ORAL
Status: DISCONTINUED | OUTPATIENT
Start: 2021-01-25 | End: 2021-02-05 | Stop reason: HOSPADM

## 2021-01-25 RX ORDER — INSULIN LISPRO 100 [IU]/ML
INJECTION, SOLUTION INTRAVENOUS; SUBCUTANEOUS
Status: DISCONTINUED | OUTPATIENT
Start: 2021-01-25 | End: 2021-02-05 | Stop reason: HOSPADM

## 2021-01-25 RX ORDER — LISINOPRIL 20 MG/1
20 TABLET ORAL 2 TIMES DAILY
Status: DISCONTINUED | OUTPATIENT
Start: 2021-01-25 | End: 2021-01-29

## 2021-01-25 RX ORDER — SODIUM CHLORIDE 9 MG/ML
250 INJECTION, SOLUTION INTRAVENOUS AS NEEDED
Status: DISCONTINUED | OUTPATIENT
Start: 2021-01-25 | End: 2021-01-25

## 2021-01-25 RX ORDER — DEXTROSE 50 % IN WATER (D50W) INTRAVENOUS SYRINGE
25-50 AS NEEDED
Status: DISCONTINUED | OUTPATIENT
Start: 2021-01-25 | End: 2021-02-05 | Stop reason: HOSPADM

## 2021-01-25 RX ORDER — ACETAMINOPHEN 325 MG/1
650 TABLET ORAL
Status: DISCONTINUED | OUTPATIENT
Start: 2021-01-25 | End: 2021-02-05 | Stop reason: HOSPADM

## 2021-01-25 RX ORDER — CARVEDILOL 3.12 MG/1
3.12 TABLET ORAL 2 TIMES DAILY WITH MEALS
Status: DISCONTINUED | OUTPATIENT
Start: 2021-01-25 | End: 2021-01-26

## 2021-01-25 RX ADMIN — SODIUM CHLORIDE 50 ML/HR: 9 INJECTION, SOLUTION INTRAVENOUS at 18:56

## 2021-01-25 RX ADMIN — PANTOPRAZOLE SODIUM 40 MG: 40 TABLET, DELAYED RELEASE ORAL at 22:29

## 2021-01-25 RX ADMIN — LORAZEPAM 1 MG: 1 TABLET ORAL at 22:28

## 2021-01-25 RX ADMIN — SODIUM CHLORIDE 1000 ML: 9 INJECTION, SOLUTION INTRAVENOUS at 14:38

## 2021-01-25 NOTE — ED TRIAGE NOTES
Positive covid x 1 month ago with no relief in symptoms, sob worsening, 2L NC needed since covid diagnosis

## 2021-01-25 NOTE — H&P
History and Physical    Patient: Markel Stephens MRN: [id number]  SSN: [id number]    YOB: 1955  Age: 77 y.o. Sex: female      Subjective: Markel Stephens is a 77 y.o. female who presents to ED with acute anemia secondary to GI bleed. Pt presented with HGB of 5.4, reported seeing dark stools at home. Pt was hospitalized in 21 for covid 19 infection, and was found to need continuous home oxygen upon discharge. PMH includes CHF with EF 41% , 3 vessel disease and aortic stenosis. Pt was tentatively scheduled past April for aortic valve replacement, however, was cancelled due to the covid pandemic. On presentation,  Will receive 2 units PRBCs and be admitted for further evaluation and treatment. Past Medical History:   Diagnosis Date    Acute gastrointestinal hemorrhage 2020    Allergic rhinitis 2020    Anemia 2020    Benign essential hypertension 2020    Body mass index 30.0-30.9, adult 2020    CAD (coronary artery disease) 2020    Foot callus 2020    GERD (gastroesophageal reflux disease) 2020    Heart murmur 2020    Hx of colonoscopy 2015    Done for anemia and rectal bleeding    Left bundle branch block 2020    Microalbuminuria due to type 2 diabetes mellitus ([address] 2020    Mixed hyperlipidemia 2020    Morbid obesity ([address] 2020    Tobacco dependence syndrome 2020    Type II diabetes mellitus, uncontrolled ([address] 2020     Past Surgical History:   Procedure Laterality Date    HX COLONOSCOPY      due in [id number].   Done for anemia and rectal bleeding      Family History   Problem Relation Age of Onset    Cancer Mother         uterine    Hypertension Father     Cancer Sister         breast     Social History     Tobacco Use    Smoking status: Former Smoker     Packs/day: 0.50     Quit date:      Years since quittin.0    Smokeless tobacco: Never Used   Substance Use Topics    Alcohol use: Not Currently     Frequency: Never      Prior to Admission medications    Medication Sig Start Date End Date Taking? Authorizing Provider   furosemide (Lasix) 40 mg tablet Take 1 Tab by mouth daily. 1/3/21  Yes Ara Primrose, MD   insulin lispro (HUMALOG) 100 unit/mL injection INITIATE CORRECTIVE INSULIN PROTOCOL (MAGALI):  RX MAGALI Normal Sensitivity (Average weight)    AC (before meals), Q6H, and Q4H CORRECTIONAL SCALE only For Blood Sugar (mg/dl) of :             140-199=2 units            200-249=3 units  250-299=5 units  300-349=7 units  350 or greater = Call MD  Give in addition to basal medications. Do Not Hold for NPO    BEDTIME CORRECTIONAL sliding scale when scheduled:  200-249=2 units  250-299=3 units   300-349=4 units  350 or greater = Call MD  Give in addition to basal medications. 1/3/21  Yes Ara Primrose, MD   lancets misc Please use as directed 1/3/21  Yes Ara Primrose, MD   Blood-Glucose Meter monitoring kit Use as directed 1/3/21  Yes Ara Primrose, MD   glucose blood VI test strips (blood glucose test) strip Use as directed 1/3/21  Yes Ara Primrose, MD   budesonide-formoteroL [facility]) 160-4.5 mcg/actuation HFAA Take 2 Puffs by inhalation two (2) times a day. 12/31/20  Yes Cary Stevens DO   albuterol-ipratropium (DUO-NEB) 2.5 mg-0.5 mg/3 ml nebu 3 mL by Nebulization route every six (6) hours as needed for Wheezing, Shortness of Breath or Cough. 12/31/20  Yes Cary Stevens DO   pantoprazole (Protonix) 40 mg tablet Take 1 Tab by mouth daily. 12/17/20  Yes Marleny Richardson MD   LORazepam (Ativan) 1 mg tablet Take 1 Tab by mouth nightly. Max Daily Amount: 1 mg. 12/17/20  Yes Marleny Richardson MD   lisinopriL (PRINIVIL, ZESTRIL) 20 mg tablet Take 1 Tab by mouth two (2) times a day.  12/11/20  Yes Cary Stevens DO   metFORMIN (GLUCOPHAGE) 500 mg tablet Take 2 tablets twice a day with meals 11/25/20  Yes Eun Michaels, Jhonatan Law MD   lancets misc Use to check glucose once a day 11/25/20  Yes Ghulam Thompson MD   glucose blood VI test strips (blood glucose test) strip by Does Not Apply route two (2) times a day. Use to check glucose once a day 11/25/20  Yes Ghulam Thompson MD   potassium chloride (KLOR-CON) 10 mEq tablet By oral route take one tablet Monday, Wednesday and Friday 9/11/20  Yes Doroteo Whalen DO   aspirin delayed-release (Aspirin Low Dose) 81 mg tablet Take  by mouth daily. Yes Provider, Historical   carvediloL (COREG) 3.125 mg tablet Take  by mouth two (2) times daily (with meals). Yes Provider, Historical   methylPREDNISolone (MEDROL DOSEPACK) 4 mg tablet As directed 1/3/21   Erick Martinez MD   famotidine (PEPCID) 10 mg tablet Take 1 Tab by mouth nightly. 12/17/20   Tavon Craig MD   hydroCHLOROthiazide (HYDRODIURIL) 25 mg tablet Take 1 Tab by mouth daily. 9/10/20   Doroteo Whalen DO   ammonium lactate (LAC-HYDRIN) 12 % lotion Apply  to affected area two (2) times a day. rub in to affected area well    Provider, Historical   ketoconazole (NIZORAL) 2 % topical cream Apply  to affected area daily. Provider, Historical        Allergies   Allergen Reactions    Aspirin Unknown (comments)     hemorraging - ended up in the hospital       Review of Systems:  Review of Systems   Constitutional: Negative for chills and fever. HENT: Negative for congestion and sore throat. Respiratory: Negative for cough and shortness of breath. Cardiovascular: Negative for chest pain and palpitations. Gastrointestinal: Positive for blood in stool. Negative for heartburn, nausea and vomiting. Genitourinary: Negative for dysuria and urgency. Neurological: Negative for dizziness and headaches.         Objective:     Vitals:    01/25/21 1253 01/25/21 1350 01/25/21 1501 01/25/21 1615   BP: (!) 100/58 (!) 101/55 (!) 101/57 (!) 89/63   Pulse: (!) 105 94 95 100   Resp: 22 20 20 23   Temp: 98 °F (36.7 °C)   97.6 °F (36.4 °C)   SpO2: 96% 100% 100% 100%   Weight: 90.7 kg (200 lb)      Height: 5' 5\" (1.651 m)           Physical Exam:  Physical Exam   Constitutional: She appears well-developed. No distress. Neck: Normal range of motion. Neck supple. Cardiovascular: Normal rate and regular rhythm. Murmur heard. Pulmonary/Chest: Effort normal and breath sounds normal.   Abdominal: Soft. Bowel sounds are normal.   Musculoskeletal: Normal range of motion. Neurological: She is alert. Skin: Skin is warm and dry. Psychiatric: She has a normal mood and affect. Her behavior is normal.        Recent Results (from the past 24 hour(s))   BNP    Collection Time: 01/25/21  1:15 PM   Result Value Ref Range    NT pro- (H) <125 pg/mL   CBC WITH AUTOMATED DIFF    Collection Time: 01/25/21  1:15 PM   Result Value Ref Range    WBC 9.8 3.6 - 11.0 K/uL    RBC 2.94 (L) 3.80 - 5.20 M/uL    HGB 5.4 (LL) 11.5 - 16.0 g/dL    HCT 19.4 (L) 35.0 - 47.0 %    MCV 66.0 (L) 80.0 - 99.0 FL    MCH 18.4 (L) 26.0 - 34.0 PG    MCHC 27.8 (L) 30.0 - 36.5 g/dL    RDW 23.0 (H) 11.5 - 14.5 %    PLATELET 178 (H) 954 - 400 K/uL    MPV 8.5 (L) 8.9 - 12.9 FL    NEUTROPHILS 70 32 - 75 %    LYMPHOCYTES 25 12 - 49 %    MONOCYTES 4 (L) 5 - 13 %    EOSINOPHILS 0 0 - 7 %    BASOPHILS 1 0 - 1 %    NRBC 2.0  WBC    IMMATURE GRANULOCYTES 0 %    ABS. NEUTROPHILS 6.9 1.8 - 8.0 K/UL    ABS. LYMPHOCYTES 2.5 0.8 - 3.5 K/UL    ABS. MONOCYTES 0.4 0.0 - 1.0 K/UL    ABS. EOSINOPHILS 0.0 0.0 - 0.4 K/UL    ABS. BASOPHILS 0.1 0.0 - 0.1 K/UL    ABSOLUTE NRBC 0.20 K/uL    ABS. IMM.  GRANS. 0.0 K/UL    DF Manual      RBC COMMENTS Hypochromia  2+        RBC COMMENTS Microcytosis  1+        RBC COMMENTS Ovalocytes  1+        RBC COMMENTS Macrocytosis  1+        RBC COMMENTS Anisocytosis  2+       METABOLIC PANEL, COMPREHENSIVE    Collection Time: 01/25/21  1:15 PM   Result Value Ref Range    Sodium 130 (L) 136 - 145 mmol/L    Potassium 3.7 3.5 - 5.1 mmol/L    Chloride 91 (L) 97 - 108 mmol/L    CO2 27 21 - 32 mmol/L    Anion gap 12 5 - 15 mmol/L    Glucose 163 (H) 65 - 100 mg/dL    BUN 7 6 - 20 mg/dL    Creatinine 0.75 0.55 - 1.02 mg/dL    BUN/Creatinine ratio 9 (L) 12 - 20      GFR est AA >60 >60 ml/min/1.73m2    GFR est non-AA >60 >60 ml/min/1.73m2    Calcium 9.1 8.5 - 10.1 mg/dL    Bilirubin, total 0.5 0.2 - 1.0 mg/dL    AST (SGOT) 17 15 - 37 U/L    ALT (SGPT) 21 12 - 78 U/L    Alk.  phosphatase 68 45 - 117 U/L    Protein, total 7.5 6.4 - 8.2 g/dL    Albumin 3.1 (L) 3.5 - 5.0 g/dL    Globulin 4.4 (H) 2.0 - 4.0 g/dL    A-G Ratio 0.7 (L) 1.1 - 2.2     TROPONIN I    Collection Time: 01/25/21  1:15 PM   Result Value Ref Range    Troponin-I, Qt. <0.05 <0.05 ng/mL   TYPE & SCREEN    Collection Time: 01/25/21  2:20 PM   Result Value Ref Range    Crossmatch Expiration 01/28/2021,2359     ABO/Rh(D) A Positive     Antibody screen Negative     Unit number [id number]     Blood component type [facility]     Unit division 00     Status of unit Allocated     TRANSFUSION STATUS Ok to transfuse     Crossmatch result Compatible     Unit number [id number]     Blood component type [facility]     Unit division 00     Status of unit [address] to transfuse     Crossmatch result Compatible    OCCULT BLOOD, STOOL    Collection Time: 01/25/21  2:30 PM   Result Value Ref Range    Occult Blood,day 1 Positive (A) Negative      Day 1 date: 10     EKG, 12 LEAD, INITIAL    Collection Time: 01/25/21  3:10 PM   Result Value Ref Range    Ventricular Rate 95 BPM    Atrial Rate 95 BPM    P-R Interval 128 ms    QRS Duration 132 ms    Q-T Interval 388 ms    QTC Calculation (Bezet) 487 ms    Calculated P Axis 55 degrees    Calculated R Axis -26 degrees    Calculated T Axis 130 degrees    Diagnosis       Sinus rhythm with Premature atrial complexes  Left bundle branch block  Abnormal ECG  When compared with ECG of 01-JAN-2021 12:37,  No significant change was found  Confirmed by Sin Stevens ([id number]) on 1/25/2021 3:39:31 PM         XR Results (maximum last 3): Results from [place] encounter on 01/25/21   XR CHEST PORT    Narrative EXAM: XR CHEST PORT    INDICATION: sob    COMPARISON: January 1 and 2, 2021    FINDINGS: Minimal residual right basilar opacity with resolved left basilar  opacity. Unremarkable cardiomediastinal contours. No pneumothorax or pleural  effusion. No acute fracture or dislocation. Impression Minimal residual right basilar opacity with resolved left basilar  opacity. Results from [place] encounter on 01/01/21   XR CHEST SNGL V    Narrative XR CHEST SNGL V    Comparison: December 16, 2020. There is blunting of both costophrenic angles suggesting small bilateral pleural  effusions. No infiltrate or consolidation. No pulmonary vascular congestion. Heart size is normal. Mediastinum is unremarkable. The bony thorax is intact. Impression Impression: Small bilateral pleural effusions. Results from Hospital Encounter encounter on 12/13/20   XR CHEST PORT    Narrative Study: XR CHEST PORT    Clinical indication: wheezing,. Comparison: Chest x-ray 12/13/2020. Findings:    Lungs are slightly hyperinflated. No consolidative airspace disease, pleural  effusion or pneumothorax. Cardiomediastinal contours are within normal limits. No pulmonary edema. No acute osseous abnormality identified. Impression Impression:  No acute findings. Mild emphysematous change. CT Results (maximum last 3): Results from [place] encounter on 01/01/21   CT CHEST WO CONT    Narrative Chest CT without contrast.    Comparison: Chest radiograph dated 1/1/2021. Technique: CT images of the chest are performed without contrast and reformatted  in multiple planes for review.     Dose Reduction: All CT scans at this facility are performed using dose reduction  optimization techniques as appropriate to a performed exam including the  following: Automated exposure control, adjustments of the mA and/or kV according  to patient size, or use of iterative reconstruction technique. Findings: The visualized thyroid gland images normally. There is no axillary,  mediastinal or hilar lymphadenopathy. The heart is normal in size. There is a  small pericardial effusion measuring up to 12 mm anteriorly. There are severe  coronary artery calcifications. The main pulmonary artery and thoracic aorta are  normal in caliber. There is moderate calcified atherosclerotic disease within  the thoracic aorta. There is minimal scarring at the lung apices. There is mild peripheral fibrosis  at the lung bases. There is patchy bibasilar airspace disease. There is no  pleural effusion or pneumothorax. There are senescent changes in the spine. There is no acute process within the included upper abdomen. Impression IMPRESSION:  1. Patchy bibasilar airspace disease. 2. Small pericardial effusion. 3. Severe coronary artery calcifications. 4. Mild pulmonary fibrosis. MRI Results (maximum last 3): No results found for this or any previous visit. Nuclear Medicine Results (maximum last 3): No results found for this or any previous visit. US Results (maximum last 3): No results found for this or any previous visit. Active Problems:    GI bleed (1/25/2021)        Assessment/Plan:   1. Acute anemia- secondary to GI bleed, transfuse 2 units PRBCs, repeat cbc in am  Consult to GI, hold asa, no NSAIDS, CL diet, start PPI bid. 2. Hypertension- hold BP due to hypotension. 3. DM II- s/s insulin coverage, hold metformin. 4. Hx of aortic stenosis- planned for valve replacement,    5. Hx of CAD- has 3 vessel disease, was planned for CABG,  Will consult cardiology to notify of admission. 6. Hx of COPD- on continuous home  Oxgyen, 2 L NC, continue with bid inhalers, prn albuterol    7.  Hx of covid- will get rapid test.         DVT Prophylaxis sequential compression  Code Status  Full   POA/NOK Chetan Breath, daughter   Total Time  45 minutes       Signed By: Karen Love NP     January 25, 2021

## 2021-01-25 NOTE — ED PROVIDER NOTES
EMERGENCY DEPARTMENT HISTORY AND PHYSICAL EXAM        Date: 1/25/2021  Patient Name: Keo Ferrari    History of Presenting Illness     Chief Complaint   Patient presents with    Shortness of Breath    Positive For Covid-19       History Provided By: Patient    HPI: Keo Ferrari, 77 y.o. female with past medical history of CAD, diabetes, GERD, and hyperlipidemia who presents with increased shortness of breath. States that she started having shortness of breath this morning. She is also had lightheadedness on review of systems. She denies any other symptoms which is leg swelling, cough, fevers, body aches. She was diagnosed with COVID-19 about 1 month ago and also was started on 2 L of oxygen at home for chronic use at that time. She was admitted for congestive heart failure exacerbation according to the patient. PCP: Eun Mccabe DO    Current Facility-Administered Medications   Medication Dose Route Frequency Provider Last Rate Last Admin    0.9% sodium chloride infusion 250 mL  250 mL IntraVENous PRN Cameron Zhang DO        sodium chloride 0.9 % bolus infusion 1,000 mL  1,000 mL IntraVENous ONCE Cameron Ko DO 1,000 mL/hr at 01/25/21 1438 1,000 mL at 01/25/21 1438    pantoprazole (PROTONIX) 40 mg in 0.9% sodium chloride 10 mL injection  40 mg IntraVENous NOW Kiana Walls DO         Current Outpatient Medications   Medication Sig Dispense Refill    methylPREDNISolone (MEDROL DOSEPACK) 4 mg tablet As directed 1 Dose Pack 0    furosemide (Lasix) 40 mg tablet Take 1 Tab by mouth daily. 30 Tab 0    insulin lispro (HUMALOG) 100 unit/mL injection INITIATE CORRECTIVE INSULIN PROTOCOL (MAGALI):  RX MAGALI Normal Sensitivity (Average weight)    AC (before meals), Q6H, and Q4H CORRECTIONAL SCALE only For Blood Sugar (mg/dl) of :             140-199=2 units            200-249=3 units  250-299=5 units  300-349=7 units  350 or greater = Call MD  Give in addition to basal medications.   Do Not Hold for NPO    BEDTIME CORRECTIONAL sliding scale when scheduled:  200-249=2 units  250-299=3 units   300-349=4 units  350 or greater = Call MD  Give in addition to basal medications. 1 Vial 0    lancets misc Please use as directed 1 Each 0    Blood-Glucose Meter monitoring kit Use as directed 1 Kit 0    glucose blood VI test strips (blood glucose test) strip Use as directed 100 Strip 0    budesonide-formoteroL (SYMBICORT) 160-4.5 mcg/actuation HFAA Take 2 Puffs by inhalation two (2) times a day. 1 Inhaler 3    albuterol-ipratropium (DUO-NEB) 2.5 mg-0.5 mg/3 ml nebu 3 mL by Nebulization route every six (6) hours as needed for Wheezing, Shortness of Breath or Cough. 120 Nebule 3    pantoprazole (Protonix) 40 mg tablet Take 1 Tab by mouth daily. 30 Tab 0    famotidine (PEPCID) 10 mg tablet Take 1 Tab by mouth nightly. 30 Tab 0    LORazepam (Ativan) 1 mg tablet Take 1 Tab by mouth nightly. Max Daily Amount: 1 mg. 5 Tab 0    lisinopriL (PRINIVIL, ZESTRIL) 20 mg tablet Take 1 Tab by mouth two (2) times a day. 180 Tab 2    metFORMIN (GLUCOPHAGE) 500 mg tablet Take 2 tablets twice a day with meals 120 Tab 3    lancets misc Use to check glucose once a day 50 Each 5    glucose blood VI test strips (blood glucose test) strip by Does Not Apply route two (2) times a day. Use to check glucose once a day 50 Strip 5    potassium chloride (KLOR-CON) 10 mEq tablet By oral route take one tablet Monday, Wednesday and Friday 40 Tab 3    hydroCHLOROthiazide (HYDRODIURIL) 25 mg tablet Take 1 Tab by mouth daily. 90 Tab 4    aspirin delayed-release (Aspirin Low Dose) 81 mg tablet Take  by mouth daily.  ammonium lactate (LAC-HYDRIN) 12 % lotion Apply  to affected area two (2) times a day. rub in to affected area well      carvediloL (COREG) 3.125 mg tablet Take  by mouth two (2) times daily (with meals).  ketoconazole (NIZORAL) 2 % topical cream Apply  to affected area daily.          Past History     Past Medical History:  Past Medical History:   Diagnosis Date    Acute gastrointestinal hemorrhage 2020    Allergic rhinitis 2020    Anemia 2020    Benign essential hypertension 2020    Body mass index 30.0-30.9, adult 2020    CAD (coronary artery disease) 2020    Foot callus 2020    GERD (gastroesophageal reflux disease) 2020    Heart murmur 2020    Hx of colonoscopy 2015    Done for anemia and rectal bleeding    Left bundle branch block 2020    Microalbuminuria due to type 2 diabetes mellitus ([address] 2020    Mixed hyperlipidemia 2020    Morbid obesity ([address] 2020    Tobacco dependence syndrome 2020    Type II diabetes mellitus, uncontrolled ([facility] [address] 2020       Past Surgical History:  Past Surgical History:   Procedure Laterality Date    HX COLONOSCOPY      due in 9429-1724. Done for anemia and rectal bleeding       Family History:  Family History   Problem Relation Age of Onset    Cancer Mother         uterine    Hypertension Father     Cancer Sister         breast       Social History:  Social History     Tobacco Use    Smoking status: Former Smoker     Packs/day: 0.50     Quit date:      Years since quittin.0    Smokeless tobacco: Never Used   Substance Use Topics    Alcohol use: Not Currently     Frequency: Never    Drug use: Never       Allergies: Allergies   Allergen Reactions    Aspirin Unknown (comments)     hemorraging - ended up in the hospital       Review of Systems   Review of Systems   Constitutional: Negative for fever. HENT: Negative for congestion. Eyes: Negative for visual disturbance. Respiratory: Positive for shortness of breath. Cardiovascular: Negative for chest pain. Gastrointestinal: Negative for abdominal pain. Genitourinary: Negative for dysuria. Musculoskeletal: Negative for arthralgias. Skin: Negative for rash. Neurological: Positive for light-headedness.  Negative for headaches. Physical Exam   General: No acute distress. Well-nourished. Skin: No rash. Head: Normocephalic. Atraumatic. Eye: No proptosis or conjunctival injections. Respiratory: No apparent respiratory distress. Gastrointestinal: Nondistended. Musculoskeletal: No obvious bony deformities. Psychiatric: Cooperative. Appropriate mood and affect. Diagnostic Study Results     Labs -     Recent Results (from the past 24 hour(s))   BNP    Collection Time: 01/25/21  1:15 PM   Result Value Ref Range    NT pro- (H) <125 pg/mL   CBC WITH AUTOMATED DIFF    Collection Time: 01/25/21  1:15 PM   Result Value Ref Range    WBC 9.8 3.6 - 11.0 K/uL    RBC 2.94 (L) 3.80 - 5.20 M/uL    HGB 5.4 (LL) 11.5 - 16.0 g/dL    HCT 19.4 (L) 35.0 - 47.0 %    MCV 66.0 (L) 80.0 - 99.0 FL    MCH 18.4 (L) 26.0 - 34.0 PG    MCHC 27.8 (L) 30.0 - 36.5 g/dL    RDW 23.0 (H) 11.5 - 14.5 %    PLATELET 882 (H) 261 - 400 K/uL    MPV 8.5 (L) 8.9 - 12.9 FL    NEUTROPHILS 70 32 - 75 %    LYMPHOCYTES 25 12 - 49 %    MONOCYTES 4 (L) 5 - 13 %    EOSINOPHILS 0 0 - 7 %    BASOPHILS 1 0 - 1 %    NRBC 2.0  WBC    IMMATURE GRANULOCYTES 0 %    ABS. NEUTROPHILS 6.9 1.8 - 8.0 K/UL    ABS. LYMPHOCYTES 2.5 0.8 - 3.5 K/UL    ABS. MONOCYTES 0.4 0.0 - 1.0 K/UL    ABS. EOSINOPHILS 0.0 0.0 - 0.4 K/UL    ABS. BASOPHILS 0.1 0.0 - 0.1 K/UL    ABSOLUTE NRBC 0.20 K/uL    ABS. IMM.  GRANS. 0.0 K/UL    DF Manual      RBC COMMENTS Hypochromia  2+        RBC COMMENTS Microcytosis  1+        RBC COMMENTS Ovalocytes  1+        RBC COMMENTS Macrocytosis  1+        RBC COMMENTS Anisocytosis  2+       METABOLIC PANEL, COMPREHENSIVE    Collection Time: 01/25/21  1:15 PM   Result Value Ref Range    Sodium 130 (L) 136 - 145 mmol/L    Potassium 3.7 3.5 - 5.1 mmol/L    Chloride 91 (L) 97 - 108 mmol/L    CO2 27 21 - 32 mmol/L    Anion gap 12 5 - 15 mmol/L    Glucose 163 (H) 65 - 100 mg/dL    BUN 7 6 - 20 mg/dL    Creatinine 0.75 0.55 - 1.02 mg/dL    BUN/Creatinine ratio 9 (L) 12 - 20      GFR est AA >60 >60 ml/min/1.73m2    GFR est non-AA >60 >60 ml/min/1.73m2    Calcium 9.1 8.5 - 10.1 mg/dL    Bilirubin, total 0.5 0.2 - 1.0 mg/dL    AST (SGOT) 17 15 - 37 U/L    ALT (SGPT) 21 12 - 78 U/L    Alk. phosphatase 68 45 - 117 U/L    Protein, total 7.5 6.4 - 8.2 g/dL    Albumin 3.1 (L) 3.5 - 5.0 g/dL    Globulin 4.4 (H) 2.0 - 4.0 g/dL    A-G Ratio 0.7 (L) 1.1 - 2.2     TROPONIN I    Collection Time: 01/25/21  1:15 PM   Result Value Ref Range    Troponin-I, Qt. <0.05 <0.05 ng/mL   OCCULT BLOOD, STOOL    Collection Time: 01/25/21  2:30 PM   Result Value Ref Range    Occult Blood,day 1 Positive (A) Negative      Day 1 date: 10         Radiologic Studies -   XR CHEST PORT   Final Result   Minimal residual right basilar opacity with resolved left basilar   opacity. CT Results  (Last 48 hours)    None        CXR Results  (Last 48 hours)               01/25/21 1340  XR CHEST PORT Final result    Impression:  Minimal residual right basilar opacity with resolved left basilar   opacity. Narrative:  EXAM: XR CHEST PORT       INDICATION: sob       COMPARISON: January 1 and 2, 2021       FINDINGS: Minimal residual right basilar opacity with resolved left basilar   opacity. Unremarkable cardiomediastinal contours. No pneumothorax or pleural   effusion. No acute fracture or dislocation. PROCEDURES    Critical Care Note:   1:44 PM  Amount of critical care time: 30 minutes  Impending deterioration: Cardiovascular, CNS, Renal and Hepatic  Associated risk factors: Hypotension and Bleeding  Management: Bedside Assessment and Supervision of Care  Interpretation: ECG and Blood Pressure  Interventions: IV fluids, blood transfusion  Case review: Hospitalist  Treatment response: Guarded  Performed by: Self  Notes: I have spent critical care time involved in lab review, decision making, bedside attention, and documentation.  This time excludes time spent in any separate billed procedures. During this entire length of time I was immediately available to the patient. Antonio Beckford DO    Medical Decision Making and ED Course     I reviewed the available vital signs, nursing notes, past medical history, past surgical history, family history, and social history. Vital Signs - Reviewed the patient's vital signs. Patient Vitals for the past 12 hrs:   Temp Pulse Resp BP SpO2   01/25/21 1501  95 20 (!) 101/57 100 %   01/25/21 1350  94 20 (!) 101/55 100 %   01/25/21 1253 98 °F (36.7 °C) (!) 105 22 (!) 100/58 96 %       EKG interpretation: Obtained on 1/25/2021 at 1510. Sinus rhythm at rate of 95 bpm.  Normal MD interval, QRS duration, QTc interval.  No ST segment abnormalities. Normal axis. Left bundle branch block. Medical Decision Making:   Presented with shortness of breath. The differential diagnosis is ACS, atypical chest pain, anemia, peptic ulcer disease,  gastrointestinal hemorrhage, COVID-19, pneumonia. Work-up shows hemoglobin of 5.4. Patient relatively stable however is hypotensive to some degree. Given IV fluids as well as have ordered 2 units of packed red blood cells. Does have blood in her stool. Protonix given. Suspect peptic ulcer disease as cause. Almost to hospitalist for further care. Disposition     Admitted    Diagnosis     Clinical impression:   1. Gastrointestinal hemorrhage, unspecified gastrointestinal hemorrhage type    2. Anemia, unspecified type       Attestation:  Please note that this dictation was completed with [employer], the computer voice recognition software. Quite often unanticipated grammatical, syntax, homophones, and other interpretive errors are inadvertently transcribed by the computer software. Please disregard these errors. Please excuse any errors that have escaped final proofreading. Thank you.   Antonio Beckford DO

## 2021-01-26 ENCOUNTER — APPOINTMENT (OUTPATIENT)
Dept: ENDOSCOPY | Age: 66
DRG: 377 | End: 2021-01-26
Attending: INTERNAL MEDICINE
Payer: MEDICARE

## 2021-01-26 LAB
ABO + RH BLD: NORMAL
ANION GAP SERPL CALC-SCNC: 5 MMOL/L (ref 5–15)
BLD PROD TYP BPU: NORMAL
BLD PROD TYP BPU: NORMAL
BLOOD GROUP ANTIBODIES SERPL: NEGATIVE
BPU ID: NORMAL
BPU ID: NORMAL
BUN SERPL-MCNC: 6 MG/DL (ref 6–20)
BUN/CREAT SERPL: 13 (ref 12–20)
CA-I BLD-MCNC: 9.1 MG/DL (ref 8.5–10.1)
CHLORIDE SERPL-SCNC: 96 MMOL/L (ref 97–108)
CO2 SERPL-SCNC: 31 MMOL/L (ref 21–32)
CREAT SERPL-MCNC: 0.48 MG/DL (ref 0.55–1.02)
CROSSMATCH RESULT,%XM: NORMAL
CROSSMATCH RESULT,%XM: NORMAL
ERYTHROCYTE [DISTWIDTH] IN BLOOD BY AUTOMATED COUNT: 24.2 % (ref 11.5–14.5)
GLUCOSE BLD STRIP.AUTO-MCNC: 142 MG/DL (ref 65–100)
GLUCOSE BLD STRIP.AUTO-MCNC: 213 MG/DL (ref 65–100)
GLUCOSE BLD STRIP.AUTO-MCNC: 213 MG/DL (ref 65–100)
GLUCOSE BLD STRIP.AUTO-MCNC: 302 MG/DL (ref 65–100)
GLUCOSE SERPL-MCNC: 112 MG/DL (ref 65–100)
HCT VFR BLD AUTO: 27.1 % (ref 35–47)
HGB BLD-MCNC: 8.1 G/DL (ref 11.5–16)
MCH RBC QN AUTO: 21.8 PG (ref 26–34)
MCHC RBC AUTO-ENTMCNC: 29.9 G/DL (ref 30–36.5)
MCV RBC AUTO: 73 FL (ref 80–99)
PERFORMED BY, TECHID: ABNORMAL
PLATELET # BLD AUTO: 501 K/UL (ref 150–400)
PMV BLD AUTO: 8.2 FL (ref 8.9–12.9)
POTASSIUM SERPL-SCNC: 3.6 MMOL/L (ref 3.5–5.1)
RBC # BLD AUTO: 3.71 M/UL (ref 3.8–5.2)
SODIUM SERPL-SCNC: 132 MMOL/L (ref 136–145)
SPECIMEN EXP DATE BLD: NORMAL
STATUS OF UNIT,%ST: NORMAL
STATUS OF UNIT,%ST: NORMAL
TRANSFUSION STATUS PATIENT QL: NORMAL
TRANSFUSION STATUS PATIENT QL: NORMAL
UNIT DIVISION, %UDIV: 0
UNIT DIVISION, %UDIV: 0
WBC # BLD AUTO: 7.4 K/UL (ref 3.6–11)

## 2021-01-26 PROCEDURE — 80048 BASIC METABOLIC PNL TOTAL CA: CPT

## 2021-01-26 PROCEDURE — 82962 GLUCOSE BLOOD TEST: CPT

## 2021-01-26 PROCEDURE — 74011250637 HC RX REV CODE- 250/637: Performed by: HOSPITALIST

## 2021-01-26 PROCEDURE — 94640 AIRWAY INHALATION TREATMENT: CPT

## 2021-01-26 PROCEDURE — 74011250637 HC RX REV CODE- 250/637: Performed by: NURSE PRACTITIONER

## 2021-01-26 PROCEDURE — 3E0F7SF INTRODUCTION OF OTHER GAS INTO RESPIRATORY TRACT, VIA NATURAL OR ARTIFICIAL OPENING: ICD-10-PCS | Performed by: INTERNAL MEDICINE

## 2021-01-26 PROCEDURE — 85027 COMPLETE CBC AUTOMATED: CPT

## 2021-01-26 PROCEDURE — 74011250636 HC RX REV CODE- 250/636: Performed by: INTERNAL MEDICINE

## 2021-01-26 PROCEDURE — 74011636637 HC RX REV CODE- 636/637: Performed by: NURSE PRACTITIONER

## 2021-01-26 PROCEDURE — 74011250636 HC RX REV CODE- 250/636: Performed by: NURSE PRACTITIONER

## 2021-01-26 PROCEDURE — 74011250637 HC RX REV CODE- 250/637: Performed by: INTERNAL MEDICINE

## 2021-01-26 PROCEDURE — 65270000029 HC RM PRIVATE

## 2021-01-26 PROCEDURE — 36415 COLL VENOUS BLD VENIPUNCTURE: CPT

## 2021-01-26 PROCEDURE — 74011000250 HC RX REV CODE- 250: Performed by: INTERNAL MEDICINE

## 2021-01-26 RX ORDER — ALBUTEROL SULFATE 90 UG/1
2 AEROSOL, METERED RESPIRATORY (INHALATION)
Status: DISCONTINUED | OUTPATIENT
Start: 2021-01-26 | End: 2021-01-28

## 2021-01-26 RX ORDER — BUDESONIDE AND FORMOTEROL FUMARATE DIHYDRATE 160; 4.5 UG/1; UG/1
2 AEROSOL RESPIRATORY (INHALATION)
Status: DISCONTINUED | OUTPATIENT
Start: 2021-01-26 | End: 2021-01-28

## 2021-01-26 RX ORDER — IPRATROPIUM BROMIDE AND ALBUTEROL SULFATE 2.5; .5 MG/3ML; MG/3ML
3 SOLUTION RESPIRATORY (INHALATION)
Status: DISCONTINUED | OUTPATIENT
Start: 2021-01-26 | End: 2021-01-29

## 2021-01-26 RX ORDER — CARVEDILOL 3.12 MG/1
3.12 TABLET ORAL 2 TIMES DAILY WITH MEALS
Status: DISCONTINUED | OUTPATIENT
Start: 2021-01-26 | End: 2021-02-05 | Stop reason: HOSPADM

## 2021-01-26 RX ADMIN — INSULIN LISPRO 4 UNITS: 100 INJECTION, SOLUTION INTRAVENOUS; SUBCUTANEOUS at 13:36

## 2021-01-26 RX ADMIN — SODIUM CHLORIDE 50 ML/HR: 9 INJECTION, SOLUTION INTRAVENOUS at 15:09

## 2021-01-26 RX ADMIN — IPRATROPIUM BROMIDE AND ALBUTEROL SULFATE 3 ML: .5; 3 SOLUTION RESPIRATORY (INHALATION) at 23:04

## 2021-01-26 RX ADMIN — BUDESONIDE AND FORMOTEROL FUMARATE DIHYDRATE 2 PUFF: 160; 4.5 AEROSOL RESPIRATORY (INHALATION) at 20:00

## 2021-01-26 RX ADMIN — CARVEDILOL 3.12 MG: 3.12 TABLET, FILM COATED ORAL at 19:33

## 2021-01-26 RX ADMIN — ALBUTEROL SULFATE 2 PUFF: 108 AEROSOL, METERED RESPIRATORY (INHALATION) at 20:00

## 2021-01-26 RX ADMIN — METHYLPREDNISOLONE SODIUM SUCCINATE 60 MG: 40 INJECTION, POWDER, FOR SOLUTION INTRAMUSCULAR; INTRAVENOUS at 15:10

## 2021-01-26 RX ADMIN — LORAZEPAM 1 MG: 1 TABLET ORAL at 23:00

## 2021-01-26 RX ADMIN — INSULIN LISPRO 4 UNITS: 100 INJECTION, SOLUTION INTRAVENOUS; SUBCUTANEOUS at 23:01

## 2021-01-26 RX ADMIN — PANTOPRAZOLE SODIUM 40 MG: 40 TABLET, DELAYED RELEASE ORAL at 23:00

## 2021-01-26 RX ADMIN — INSULIN LISPRO 4 UNITS: 100 INJECTION, SOLUTION INTRAVENOUS; SUBCUTANEOUS at 00:06

## 2021-01-26 RX ADMIN — BUDESONIDE AND FORMOTEROL FUMARATE DIHYDRATE 2 PUFF: 160; 4.5 AEROSOL RESPIRATORY (INHALATION) at 07:42

## 2021-01-26 RX ADMIN — PANTOPRAZOLE SODIUM 40 MG: 40 TABLET, DELAYED RELEASE ORAL at 10:03

## 2021-01-26 RX ADMIN — INSULIN LISPRO 8 UNITS: 100 INJECTION, SOLUTION INTRAVENOUS; SUBCUTANEOUS at 19:14

## 2021-01-26 RX ADMIN — IPRATROPIUM BROMIDE AND ALBUTEROL SULFATE 3 ML: .5; 3 SOLUTION RESPIRATORY (INHALATION) at 17:12

## 2021-01-26 RX ADMIN — ONDANSETRON 4 MG: 2 INJECTION INTRAMUSCULAR; INTRAVENOUS at 22:58

## 2021-01-26 RX ADMIN — ALBUTEROL SULFATE 2 PUFF: 108 AEROSOL, METERED RESPIRATORY (INHALATION) at 13:22

## 2021-01-26 NOTE — PROGRESS NOTES
Hospitalist Progress Note               Daily Progress Note: 1/26/2021      Subjective: The patient is seen for follow up. She is a 58-year-old female admitted yesterday with melena. She is found to have a hemoglobin of 5.4. She was hospitalized earlier this month for COVID-19 infection and discharged on home oxygen. She has a history of systolic heart failure, coronary artery disease and aortic stenosis. Her aortic valve replacement surgery was canceled this spring because of the pandemic. She was admitted and given 2 units of blood.   Hemoglobin this morning is 8.1    Dr. John Friedman is planning EGD pending cardiology clearance    We have recurrent peptic ulcer bleeding in the past which she relates to aspirin use which was recently restarted by her cardiologist    Problem List:  Problem List as of 1/26/2021 Date Reviewed: 1/25/2021          Codes Class Noted - Resolved    GI bleed ICD-10-CM: K92.2  ICD-9-CM: 578.9  1/25/2021 - Present        Acute respiratory failure with hypoxia [facility]) ICD-10-CM: J96.01  ICD-9-CM: 518.81  1/1/2021 - Present        Hyponatremia ICD-10-CM: E87.1  ICD-9-CM: 276.1  12/13/2020 - Present        COPD exacerbation ([facility] ICD-10-CM: J44.1  ICD-9-CM: 491.21  12/13/2020 - Present        Acute gastrointestinal hemorrhage ICD-10-CM: K92.2  ICD-9-CM: 578.9  8/28/2020 - Present        Allergic rhinitis ICD-10-CM: J30.9  ICD-9-CM: 477.9  8/28/2020 - Present        Anemia ICD-10-CM: D64.9  ICD-9-CM: 285.9  8/28/2020 - Present        CAD (coronary artery disease) ICD-10-CM: I25.10  ICD-9-CM: 414.00  8/28/2020 - Present        Foot callus ICD-10-CM: L84  ICD-9-CM: 700  8/28/2020 - Present        Left bundle branch block ICD-10-CM: I44.7  ICD-9-CM: 426.3  8/28/2020 - Present        Microalbuminuria due to type 2 diabetes mellitus ([facility] ICD-10-CM: E11.29, R80.9  ICD-9-CM: 250.40, 791.0  8/28/2020 - Present        Morbid obesity ([facility] ICD-10-CM: E66.01  ICD-9-CM: 278.01  8/28/2020 - Present Benign essential hypertension ICD-10-CM: I10  ICD-9-CM: 401.1  6/25/2020 - Present        Body mass index 30.0-30.9, adult ICD-10-CM: Z68.30  ICD-9-CM: V85.30  6/25/2020 - Present        Type II diabetes mellitus, uncontrolled ([facility] ICD-10-CM: E11.65  ICD-9-CM: 250.02  6/25/2020 - Present        GERD (gastroesophageal reflux disease) ICD-10-CM: K21.9  ICD-9-CM: 530.81  6/25/2020 - Present        Heart murmur ICD-10-CM: R01.1  ICD-9-CM: 785.2  6/25/2020 - Present        Mixed hyperlipidemia ICD-10-CM: E78.2  ICD-9-CM: 272.2  6/25/2020 - Present        Tobacco dependence syndrome ICD-10-CM: F17.200  ICD-9-CM: 305.1  6/25/2020 - Present              Medications reviewed  Current Facility-Administered Medications   Medication Dose Route Frequency    0.9% sodium chloride infusion  50 mL/hr IntraVENous CONTINUOUS    pantoprazole (PROTONIX) tablet 40 mg  40 mg Oral BID    budesonide-formoteroL (SYMBICORT) 160-4.5 mcg/actuation HFA inhaler 2 Puff  2 Puff Inhalation BID    [Held by provider] carvediloL (COREG) tablet 3.125 mg  3.125 mg Oral BID WITH MEALS    [Held by provider] lisinopriL (PRINIVIL, ZESTRIL) tablet 20 mg  20 mg Oral BID    LORazepam (ATIVAN) tablet 1 mg  1 mg Oral QHS    acetaminophen (TYLENOL) tablet 650 mg  650 mg Oral Q4H PRN    ondansetron (ZOFRAN ODT) tablet 4 mg  4 mg Oral Q8H PRN    ondansetron (ZOFRAN) injection 4 mg  4 mg IntraVENous Q6H PRN    insulin lispro (HUMALOG) injection   SubCUTAneous AC&HS    glucose chewable tablet 16 g  4 Tab Oral PRN    glucagon (GLUCAGEN) injection 1 mg  1 mg IntraMUSCular PRN    dextrose (D50W) injection syrg 12.5-25 g  25-50 mL IntraVENous PRN     Current Outpatient Medications   Medication Sig    furosemide (Lasix) 40 mg tablet Take 1 Tab by mouth daily.     insulin lispro (HUMALOG) 100 unit/mL injection INITIATE CORRECTIVE INSULIN PROTOCOL (MAGALI):  RX MAGALI Normal Sensitivity (Average weight)    AC (before meals), Q6H, and Q4H CORRECTIONAL SCALE only For Blood Sugar (mg/dl) of :             140-199=2 units            200-249=3 units  250-299=5 units  300-349=7 units  350 or greater = Call MD  Give in addition to basal medications. Do Not Hold for NPO    BEDTIME CORRECTIONAL sliding scale when scheduled:  200-249=2 units  250-299=3 units   300-349=4 units  350 or greater = Call MD  Give in addition to basal medications.  lancets misc Please use as directed    Blood-Glucose Meter monitoring kit Use as directed    glucose blood VI test strips (blood glucose test) strip Use as directed    budesonide-formoteroL (SYMBICORT) 160-4.5 mcg/actuation HFAA Take 2 Puffs by inhalation two (2) times a day.  albuterol-ipratropium (DUO-NEB) 2.5 mg-0.5 mg/3 ml nebu 3 mL by Nebulization route every six (6) hours as needed for Wheezing, Shortness of Breath or Cough.  pantoprazole (Protonix) 40 mg tablet Take 1 Tab by mouth daily.  LORazepam (Ativan) 1 mg tablet Take 1 Tab by mouth nightly. Max Daily Amount: 1 mg.  lisinopriL (PRINIVIL, ZESTRIL) 20 mg tablet Take 1 Tab by mouth two (2) times a day.  metFORMIN (GLUCOPHAGE) 500 mg tablet Take 2 tablets twice a day with meals    lancets misc Use to check glucose once a day    glucose blood VI test strips (blood glucose test) strip by Does Not Apply route two (2) times a day. Use to check glucose once a day    potassium chloride (KLOR-CON) 10 mEq tablet By oral route take one tablet Monday, Wednesday and Friday    aspirin delayed-release (Aspirin Low Dose) 81 mg tablet Take  by mouth daily.  carvediloL (COREG) 3.125 mg tablet Take  by mouth two (2) times daily (with meals).  methylPREDNISolone (MEDROL DOSEPACK) 4 mg tablet As directed    famotidine (PEPCID) 10 mg tablet Take 1 Tab by mouth nightly.  hydroCHLOROthiazide (HYDRODIURIL) 25 mg tablet Take 1 Tab by mouth daily.  ammonium lactate (LAC-HYDRIN) 12 % lotion Apply  to affected area two (2) times a day.  rub in to affected area well    ketoconazole (NIZORAL) 2 % topical cream Apply  to affected area daily. Review of Systems:   A comprehensive review of systems was negative except for that written in the HPI. Objective:   Physical Exam:     Visit Vitals  /78 (BP 1 Location: Left arm, BP Patient Position: Sitting)   Pulse 96   Temp 98 °F (36.7 °C)   Resp 20   Ht 5' 5\" (1.651 m)   Wt 90.7 kg (200 lb)   SpO2 97%   BMI 33.28 kg/m²    O2 Flow Rate (L/min): 2 l/min O2 Device: Nasal cannula    Temp (24hrs), Av.1 °F (36.7 °C), Min:97.6 °F (36.4 °C), Max:98.6 °F (37 °C)    No intake/output data recorded.  1901 -  0700  In: 2334   Out: -     General:   Awake and alert   Lungs:   Clear to auscultation bilaterally. Chest wall:  No tenderness or deformity. Heart:  Regular rate and rhythm, S1, S2 normal, 6 systolic ejection murmur, no click, rub or gallop. Abdomen:   Soft, non-tender. Bowel sounds normal. No masses,  No organomegaly. Extremities: Extremities normal, atraumatic, no cyanosis or edema. Pulses: 2+ and symmetric all extremities. Skin: Skin color, texture, turgor normal. No rashes or lesions   Neurologic: CNII-XII intact. No gross focal deficits         Data Review:       Recent Days:  Recent Labs     21  0440 21  1315   WBC 7.4 9.8   HGB 8.1* 5.4*   HCT 27.1* 19.4*   * 622*     Recent Labs     21  0440 21  1315   * 130*   K 3.6 3.7   CL 96* 91*   CO2 31 27   * 163*   BUN 6 7   CREA 0.48* 0.75   CA 9.1 9.1   ALB  --  3.1*   TBILI  --  0.5   ALT  --  21     No results for input(s): PH, PCO2, PO2, HCO3, FIO2 in the last 72 hours.     24 Hour Results:  Recent Results (from the past 24 hour(s))   BNP    Collection Time: 21  1:15 PM   Result Value Ref Range    NT pro- (H) <125 pg/mL   CBC WITH AUTOMATED DIFF    Collection Time: 21  1:15 PM   Result Value Ref Range    WBC 9.8 3.6 - 11.0 K/uL    RBC 2.94 (L) 3.80 - 5.20 M/uL    HGB 5.4 (LL) 11.5 - 16.0 g/dL    HCT 19.4 (L) 35.0 - 47.0 %    MCV 66.0 (L) 80.0 - 99.0 FL    MCH 18.4 (L) 26.0 - 34.0 PG    MCHC 27.8 (L) 30.0 - 36.5 g/dL    RDW 23.0 (H) 11.5 - 14.5 %    PLATELET 176 (H) 629 - 400 K/uL    MPV 8.5 (L) 8.9 - 12.9 FL    NEUTROPHILS 70 32 - 75 %    LYMPHOCYTES 25 12 - 49 %    MONOCYTES 4 (L) 5 - 13 %    EOSINOPHILS 0 0 - 7 %    BASOPHILS 1 0 - 1 %    NRBC 2.0  WBC    IMMATURE GRANULOCYTES 0 %    ABS. NEUTROPHILS 6.9 1.8 - 8.0 K/UL    ABS. LYMPHOCYTES 2.5 0.8 - 3.5 K/UL    ABS. MONOCYTES 0.4 0.0 - 1.0 K/UL    ABS. EOSINOPHILS 0.0 0.0 - 0.4 K/UL    ABS. BASOPHILS 0.1 0.0 - 0.1 K/UL    ABSOLUTE NRBC 0.20 K/uL    ABS. IMM. GRANS. 0.0 K/UL    DF Manual      RBC COMMENTS Hypochromia  2+        RBC COMMENTS Microcytosis  1+        RBC COMMENTS Ovalocytes  1+        RBC COMMENTS Macrocytosis  1+        RBC COMMENTS Anisocytosis  2+       METABOLIC PANEL, COMPREHENSIVE    Collection Time: 01/25/21  1:15 PM   Result Value Ref Range    Sodium 130 (L) 136 - 145 mmol/L    Potassium 3.7 3.5 - 5.1 mmol/L    Chloride 91 (L) 97 - 108 mmol/L    CO2 27 21 - 32 mmol/L    Anion gap 12 5 - 15 mmol/L    Glucose 163 (H) 65 - 100 mg/dL    BUN 7 6 - 20 mg/dL    Creatinine 0.75 0.55 - 1.02 mg/dL    BUN/Creatinine ratio 9 (L) 12 - 20      GFR est AA >60 >60 ml/min/1.73m2    GFR est non-AA >60 >60 ml/min/1.73m2    Calcium 9.1 8.5 - 10.1 mg/dL    Bilirubin, total 0.5 0.2 - 1.0 mg/dL    AST (SGOT) 17 15 - 37 U/L    ALT (SGPT) 21 12 - 78 U/L    Alk.  phosphatase 68 45 - 117 U/L    Protein, total 7.5 6.4 - 8.2 g/dL    Albumin 3.1 (L) 3.5 - 5.0 g/dL    Globulin 4.4 (H) 2.0 - 4.0 g/dL    A-G Ratio 0.7 (L) 1.1 - 2.2     TROPONIN I    Collection Time: 01/25/21  1:15 PM   Result Value Ref Range    Troponin-I, Qt. <0.05 <0.05 ng/mL   TYPE & SCREEN    Collection Time: 01/25/21  2:20 PM   Result Value Ref Range    Crossmatch Expiration 01/28/2021,2359     ABO/Rh(D) A Positive     Antibody screen Negative     Unit number [id number]     Blood component type  LR     Unit division 00     Status of unit [address] to transfuse     Crossmatch result Compatible     Unit number [id number]     Blood component type RC LR     Unit division 00     Status of unit [address] to transfuse     Crossmatch result Compatible    OCCULT BLOOD, STOOL    Collection Time: 01/25/21  2:30 PM   Result Value Ref Range    Occult Blood,day 1 Positive (A) Negative      Day 1 date: 10     EKG, 12 LEAD, INITIAL    Collection Time: 01/25/21  3:10 PM   Result Value Ref Range    Ventricular Rate 95 BPM    Atrial Rate 95 BPM    P-R Interval 128 ms    QRS Duration 132 ms    Q-T Interval 388 ms    QTC Calculation (Bezet) 487 ms    Calculated P Axis 55 degrees    Calculated R Axis -26 degrees    Calculated T Axis 130 degrees    Diagnosis       Sinus rhythm with Premature atrial complexes  Left bundle branch block  Abnormal ECG  When compared with ECG of 01-JAN-2021 12:37,  No significant change was found  Conf[facility] by [id number] on 1/25/2021 3:39:31 PM     SARS-COV-2    Collection Time: 01/25/21  4:41 PM   Result Value Ref Range    SARS-CoV-2 Please find results under separate order     COVID-19 RAPID TEST    Collection Time: 01/25/21  4:41 PM   Result Value Ref Range    Specimen source Nasopharyngeal      COVID-19 rapid test Not Detected Not Detected     GLUCOSE, POC    Collection Time: 01/25/21  6:41 PM   Result Value Ref Range    Glucose (POC) 185 (H) 65 - 100 mg/dL    Performed by Yohana MAX    GLUCOSE, POC    Collection Time: 01/25/21 11:41 PM   Result Value Ref Range    Glucose (POC) 245 (H) 65 - 100 mg/dL    Performed by Vick Ramirez    CBC W/O DIFF    Collection Time: 01/26/21  4:40 AM   Result Value Ref Range    WBC 7.4 3.6 - 11.0 K/uL    RBC 3.71 (L) 3.80 - 5.20 M/uL    HGB 8.1 (L) 11.5 - 16.0 g/dL    HCT 27.1 (L) 35.0 - 47.0 %    MCV 73.0 (L) 80.0 - 99.0 FL    MCH 21.8 (L) 26.0 - 34.0 PG    MCHC 29.9 (L) 30.0 - 36.5 g/dL    RDW 24.2 (H) 11.5 - 14.5 %    PLATELET 067 (H) 429 - 400 K/uL    MPV 8.2 (L) 8.9 - 04.4 FL   METABOLIC PANEL, BASIC    Collection Time: 01/26/21  4:40 AM   Result Value Ref Range    Sodium 132 (L) 136 - 145 mmol/L    Potassium 3.6 3.5 - 5.1 mmol/L    Chloride 96 (L) 97 - 108 mmol/L    CO2 31 21 - 32 mmol/L    Anion gap 5 5 - 15 mmol/L    Glucose 112 (H) 65 - 100 mg/dL    BUN 6 6 - 20 mg/dL    Creatinine 0.48 (L) 0.55 - 1.02 mg/dL    BUN/Creatinine ratio 13 12 - 20      GFR est AA >60 >60 ml/min/1.73m2    GFR est non-AA >60 >60 ml/min/1.73m2    Calcium 9.1 8.5 - 10.1 mg/dL       XR CHEST PORT   Final Result   Minimal residual right basilar opacity with resolved left basilar   opacity. Assessment:  Acute upper gastrointestinal bleeding    Acute blood loss anemia    Coronary artery disease    Aortic stenosis    Hypertension    Diabetes mellitus type 2    Chronic left bundle branch block    Recent history of COVID-19      Plan:  EGD pending cardiology clearance  Monitor hemoglobin  Continue PPI      Care Plan discussed with: Patient/Family    Total time spent with patient: 30 minutes.     Scarlett Canavan, MD

## 2021-01-26 NOTE — ED NOTES
Pt received assistance on bedpan, bm liquid noted, pt got increased sob with movement on and off bedpan, hr increased 122/ min,

## 2021-01-26 NOTE — PROGRESS NOTES
1/26/21. CM met with pt. PCP is Jayna Noel - last seen a month ago. Pt ambulates with no DME. [facility] ( [phone]). CM spoke with Romeo Cortez - pt will need no update for services unless an increase in O2 upon discharge & then new order will be needed, D/C Plan is home with family - grand child/family to transport upon discharge.

## 2021-01-26 NOTE — CONSULTS
Consult    Patient: Anna Mclaughlin MRN: [id number]  SSN: [id number]    YOB: 1955  Age: 77 y.o. Sex: female      Subjective: Anna Mclaughlin is a 77 y.o. female who is being seen for gi bleeding. She presents to ED with acute anemia secondary to GI bleed. Er labs showed HGB of 5.4, reported seeing dark stools at home. On presentation,  Will receive 2 units PRBCs and be admitted for further evaluation and treatment. Pt was hospitalized in 21 for covid 19 infection, test for COVDI 19 today pending  Past Medical History:   Diagnosis Date    Acute gastrointestinal hemorrhage 2020    Allergic rhinitis 2020    Anemia 2020    Benign essential hypertension 2020    Body mass index 30.0-30.9, adult 2020    CAD (coronary artery disease) 2020    Foot callus 2020    GERD (gastroesophageal reflux disease) 2020    Heart murmur 2020    Hx of colonoscopy 2015    Done for anemia and rectal bleeding    Left bundle branch block 2020    Microalbuminuria due to type 2 diabetes mellitus ([address] 2020    Mixed hyperlipidemia 2020    Morbid obesity ([address] 2020    Tobacco dependence syndrome 2020    Type II diabetes mellitus, uncontrolled ([address] 2020     Past Surgical History:   Procedure Laterality Date    HX COLONOSCOPY      due in [id number].   Done for anemia and rectal bleeding      Family History   Problem Relation Age of Onset    Cancer Mother         uterine    Hypertension Father     Cancer Sister         breast     Social History     Tobacco Use    Smoking status: Former Smoker     Packs/day: 0.50     Quit date:      Years since quittin.0    Smokeless tobacco: Never Used   Substance Use Topics    Alcohol use: Not Currently     Frequency: Never      Current Facility-Administered Medications   Medication Dose Route Frequency Provider Last Rate Last Admin    0.9% sodium chloride infusion 50 mL/hr IntraVENous CONTINUOUS Ed Fuentes NP 50 mL/hr at 01/25/21 1856 50 mL/hr at 01/25/21 1856    pantoprazole (PROTONIX) tablet 40 mg  40 mg Oral BID Ed Fuentes NP        budesonide-formoteroL (SYMBICORT) 160-4.5 mcg/actuation HFA inhaler 2 Puff  2 Puff Inhalation BID Ed Fuentes NP   Stopped at 01/25/21 2100    [Held by provider] carvediloL (COREG) tablet 3.125 mg  3.125 mg Oral BID WITH MEALS Ed Fuentes NP        [Held by provider] lisinopriL (PRINIVIL, ZESTRIL) tablet 20 mg  20 mg Oral BID Ed Fuentes NP        LORazepam (ATIVAN) tablet 1 mg  1 mg Oral QHS Ed Fuentes NP        acetaminophen (TYLENOL) tablet 650 mg  650 mg Oral Q4H PRN Ed Fuentes NP        ondansetron (ZOFRAN ODT) tablet 4 mg  4 mg Oral Q8H PRN Ed Fuentes NP        ondansetron [facility]) injection 4 mg  4 mg IntraVENous Q6H PRN Ed Fuentes NP        insulin lispro (HUMALOG) injection   SubCUTAneous AC&HS Ed Fuentes NP   Stopped at 01/25/21 1630    glucose chewable tablet 16 g  4 Tab Oral PRN Ed Fuentes NP        glucagon (GLUCAGEN) injection 1 mg  1 mg IntraMUSCular PRN Ed Fuentes NP        dextrose (D50W) injection syrg 12.5-25 g  25-50 mL IntraVENous PRN Ed Fuentes NP         Current Outpatient Medications   Medication Sig Dispense Refill    furosemide (Lasix) 40 mg tablet Take 1 Tab by mouth daily. 30 Tab 0    insulin lispro (HUMALOG) 100 unit/mL injection INITIATE CORRECTIVE INSULIN PROTOCOL (MAGALI):  RX MAGALI Normal Sensitivity (Average weight)    AC (before meals), Q6H, and Q4H CORRECTIONAL SCALE only For Blood Sugar (mg/dl) of :             140-199=2 units            200-249=3 units  250-299=5 units  300-349=7 units  350 or greater = Call MD  Give in addition to basal medications.   Do Not Hold for NPO    BEDTIME CORRECTIONAL sliding scale when scheduled:  200-249=2 units  250-299=3 units   300-349=4 units  350 or greater = Call MD  Give in addition to basal medications. 1 Vial 0    lancets misc Please use as directed 1 Each 0    Blood-Glucose Meter monitoring kit Use as directed 1 Kit 0    glucose blood VI test strips (blood glucose test) strip Use as directed 100 Strip 0    budesonide-formoteroL (SYMBICORT) 160-4.5 mcg/actuation HFAA Take 2 Puffs by inhalation two (2) times a day. 1 Inhaler 3    albuterol-ipratropium (DUO-NEB) 2.5 mg-0.5 mg/3 ml nebu 3 mL by Nebulization route every six (6) hours as needed for Wheezing, Shortness of Breath or Cough. 120 Nebule 3    pantoprazole (Protonix) 40 mg tablet Take 1 Tab by mouth daily. 30 Tab 0    LORazepam (Ativan) 1 mg tablet Take 1 Tab by mouth nightly. Max Daily Amount: 1 mg. 5 Tab 0    lisinopriL (PRINIVIL, ZESTRIL) 20 mg tablet Take 1 Tab by mouth two (2) times a day. 180 Tab 2    metFORMIN (GLUCOPHAGE) 500 mg tablet Take 2 tablets twice a day with meals 120 Tab 3    lancets misc Use to check glucose once a day 50 Each 5    glucose blood VI test strips (blood glucose test) strip by Does Not Apply route two (2) times a day. Use to check glucose once a day 50 Strip 5    potassium chloride (KLOR-CON) 10 mEq tablet By oral route take one tablet Monday, Wednesday and Friday 40 Tab 3    aspirin delayed-release (Aspirin Low Dose) 81 mg tablet Take  by mouth daily.  carvediloL (COREG) 3.125 mg tablet Take  by mouth two (2) times daily (with meals).  methylPREDNISolone (MEDROL DOSEPACK) 4 mg tablet As directed 1 Dose Pack 0    famotidine (PEPCID) 10 mg tablet Take 1 Tab by mouth nightly. 30 Tab 0    hydroCHLOROthiazide (HYDRODIURIL) 25 mg tablet Take 1 Tab by mouth daily. 90 Tab 4    ammonium lactate (LAC-HYDRIN) 12 % lotion Apply  to affected area two (2) times a day. rub in to affected area well      ketoconazole (NIZORAL) 2 % topical cream Apply  to affected area daily.           Allergies   Allergen Reactions    Aspirin Unknown (comments)     hemorraging - ended up in the hospital Review of Systems:  Review of Systems   Constitutional: Positive for malaise/fatigue and weight loss. HENT: Negative. Eyes: Negative. Respiratory: Negative for cough and hemoptysis. Cardiovascular: Positive for palpitations and orthopnea. Gastrointestinal: Positive for blood in stool, heartburn, melena and nausea. Negative for abdominal pain, diarrhea and vomiting. Genitourinary: Positive for urgency. Negative for dysuria and hematuria. Musculoskeletal: Negative. Neurological: Positive for dizziness and weakness. Negative for tremors and seizures. Psychiatric/Behavioral: Negative. Objective:     Vitals:    01/25/21 2033 01/25/21 2047 01/25/21 2049 01/25/21 2133   BP: 107/75  (!) 115/54 100/69   Pulse: (!) 103 99 99 100   Resp: 22 20 20 22   Temp: 98.5 °F (36.9 °C) 98.6 °F (37 °C) 98.6 °F (37 °C) 98.6 °F (37 °C)   SpO2: 100% 100% 100% 100%   Weight:       Height:            Physical Exam:  Physical Exam   Constitutional: She appears distressed. HENT:   Head: Normocephalic and atraumatic. Eyes: Pupils are equal, round, and reactive to light. Conjunctivae and EOM are normal.   Neck: Normal range of motion. Neck supple. No JVD present. No tracheal deviation present. Cardiovascular: Normal rate. Murmur heard. Pulmonary/Chest: Effort normal.   Abdominal: Soft. Bowel sounds are normal. There is no abdominal tenderness. Musculoskeletal:         General: Edema present. Skin: Skin is warm.    Psychiatric: Her behavior is normal.        Recent Results (from the past 24 hour(s))   BNP    Collection Time: 01/25/21  1:15 PM   Result Value Ref Range    NT pro- (H) <125 pg/mL   CBC WITH AUTOMATED DIFF    Collection Time: 01/25/21  1:15 PM   Result Value Ref Range    WBC 9.8 3.6 - 11.0 K/uL    RBC 2.94 (L) 3.80 - 5.20 M/uL    HGB 5.4 (LL) 11.5 - 16.0 g/dL    HCT 19.4 (L) 35.0 - 47.0 %    MCV 66.0 (L) 80.0 - 99.0 FL    MCH 18.4 (L) 26.0 - 34.0 PG    MCHC 27.8 (L) 30.0 - 36.5 g/dL RDW 23.0 (H) 11.5 - 14.5 %    PLATELET 807 (H) 107 - 400 K/uL    MPV 8.5 (L) 8.9 - 12.9 FL    NEUTROPHILS 70 32 - 75 %    LYMPHOCYTES 25 12 - 49 %    MONOCYTES 4 (L) 5 - 13 %    EOSINOPHILS 0 0 - 7 %    BASOPHILS 1 0 - 1 %    NRBC 2.0  WBC    IMMATURE GRANULOCYTES 0 %    ABS. NEUTROPHILS 6.9 1.8 - 8.0 K/UL    ABS. LYMPHOCYTES 2.5 0.8 - 3.5 K/UL    ABS. MONOCYTES 0.4 0.0 - 1.0 K/UL    ABS. EOSINOPHILS 0.0 0.0 - 0.4 K/UL    ABS. BASOPHILS 0.1 0.0 - 0.1 K/UL    ABSOLUTE NRBC 0.20 K/uL    ABS. IMM. GRANS. 0.0 K/UL    DF Manual      RBC COMMENTS Hypochromia  2+        RBC COMMENTS Microcytosis  1+        RBC COMMENTS Ovalocytes  1+        RBC COMMENTS Macrocytosis  1+        RBC COMMENTS Anisocytosis  2+       METABOLIC PANEL, COMPREHENSIVE    Collection Time: 01/25/21  1:15 PM   Result Value Ref Range    Sodium 130 (L) 136 - 145 mmol/L    Potassium 3.7 3.5 - 5.1 mmol/L    Chloride 91 (L) 97 - 108 mmol/L    CO2 27 21 - 32 mmol/L    Anion gap 12 5 - 15 mmol/L    Glucose 163 (H) 65 - 100 mg/dL    BUN 7 6 - 20 mg/dL    Creatinine 0.75 0.55 - 1.02 mg/dL    BUN/Creatinine ratio 9 (L) 12 - 20      GFR est AA >60 >60 ml/min/1.73m2    GFR est non-AA >60 >60 ml/min/1.73m2    Calcium 9.1 8.5 - 10.1 mg/dL    Bilirubin, total 0.5 0.2 - 1.0 mg/dL    AST (SGOT) 17 15 - 37 U/L    ALT (SGPT) 21 12 - 78 U/L    Alk.  phosphatase 68 45 - 117 U/L    Protein, total 7.5 6.4 - 8.2 g/dL    Albumin 3.1 (L) 3.5 - 5.0 g/dL    Globulin 4.4 (H) 2.0 - 4.0 g/dL    A-G Ratio 0.7 (L) 1.1 - 2.2     TROPONIN I    Collection Time: 01/25/21  1:15 PM   Result Value Ref Range    Troponin-I, Qt. <0.05 <0.05 ng/mL   TYPE & SCREEN    Collection Time: 01/25/21  2:20 PM   Result Value Ref Range    Crossmatch Expiration 01/28/2021,2359     ABO/Rh(D) A Positive     Antibody screen Negative     Unit number [id number]     Blood component type  LR     Unit division 00     Status of unit Issued     [facility]     Crossmatch result Compatible     Unit number [id number]     Blood component type  LR     Unit division 00     Status of unit [address] to transfuse     Crossmatch result Compatible    OCCULT BLOOD, STOOL    Collection Time: 01/25/21  2:30 PM   Result Value Ref Range    Occult Blood,day 1 Positive (A) Negative      Day 1 date: 10     EKG, 12 LEAD, INITIAL    Collection Time: 01/25/21  3:10 PM   Result Value Ref Range    Ventricular Rate 95 BPM    Atrial Rate 95 BPM    P-R Interval 128 ms    QRS Duration 132 ms    Q-T Interval 388 ms    QTC Calculation (Bezet) 487 ms    Calculated P Axis 55 degrees    Calculated R Axis -26 degrees    Calculated T Axis 130 degrees    Diagnosis       Sinus rhythm with Premature atrial complexes  Left bundle branch block  Abnormal ECG  When compared with ECG of 01-JAN-2021 12:37,  No significant change was found  Confirmed by [facility] ([id number][facility] on 1/25/2021 3:39:31 PM     SARS-COV-2    Collection Time: 01/25/21  4:41 PM   Result Value Ref Range    SARS-CoV-2 Please find results under separate order     COVID-19 RAPID TEST    Collection Time: 01/25/21  4:41 PM   Result Value Ref Range    Specimen source Nasopharyngeal      COVID-19 rapid test Not Detected Not Detected     GLUCOSE, POC    Collection Time: 01/25/21  6:41 PM   Result Value Ref Range    Glucose (POC) 185 (H) 65 - 100 mg/dL    Performed by APRIL MAX         XR CHEST PORT   Final Result   Minimal residual right basilar opacity with resolved left basilar   opacity. Assessment:     Hospital Problems  Date Reviewed: 1/25/2021          Codes Class Noted POA    GI bleed ICD-10-CM: K92.2  ICD-9-CM: 578.9  1/25/2021 Unknown          Acute anemia- secondary to GI bleed,      Hx of aortic stenosis- planned for valve replacement,    Hx of CAD- has 3 vessel disease, was planned   Was on antiplatelet agent   Plan:   transfuse 2 units PRBCs, repeat cbc in am   hold asa, no NSAIDS,   PPI bid.   Cardiology consult to see the patient  If cardiology  determine patient is stable,  May schedule for EGD for the evaluation of upper GI bleeding  Signed By: Maggie Perales MD     January 25, 2021         Thank you for allowing me to participate in this patients care  Cc Referring Physician   Dex Soni DO

## 2021-01-26 NOTE — PROGRESS NOTES
1/26/21. Daughter returned call. Requesting home health . Pt  informed & agreed. Choice form signed by pt. Referral sent via [employer].

## 2021-01-26 NOTE — CONSULTS
Cardiology Consult    NAME: Donaldo Jarvis   :  1955   MRN:  [id number]     Date/Time:  2021 5:01 PM    Patient PCP: Fredy Benjamin, DO  ________________________________________________________________________     Assessment/Plan:   Shortness of breath, coronary artery disease, aortic valve disease, being worked up for CABG and aortic valve replacement. Does follow-up with Dr. Lazarus Calvillo. Mild left ventricular systolic dysfunction, shortness of breath, without significant edema or jugular venous distention, clear lung fields    Anemia, melena, being transfused and work-up in progress. For EGD, patient says she does have a history of bleeding ulcers. Possibility of AV malformation secondary to aortic stenosis also exist.  Agree to proceed with endoscopy. COPD, Covid 19 infection? Pneumonia             []        High complexity decision making was performed        Subjective:   CHIEF COMPLAINT:   Shortness of breath    REASON FOR CONSULT:  Preop for endoscopy    HISTORY OF PRESENT ILLNESS:     Donaldo Jarvis is a 77 y.o. BLACK OR  female who presents with increasing shortness of breath. Denies any chest pain. Patient says she was to the emergency room 3 times within the last month. This time she does come with melena. Found to be severely anemic. Has been transfused. For upper endoscopy. Patient says she was told to have COPD. History of aortic valve disease and coronary artery disease, was for aortic valve replacement and CABG. Patient had this on hold due to the pandemic.         Past Medical History:   Diagnosis Date    Acute gastrointestinal hemorrhage 2020    Allergic rhinitis 2020    Anemia 2020    Benign essential hypertension 2020    Body mass index 30.0-30.9, adult 2020    CAD (coronary artery disease) 2020    Foot callus 2020    GERD (gastroesophageal reflux disease) 2020    Heart murmur 2020    Hx of colonoscopy 2015    Done for anemia and rectal bleeding    Left bundle branch block 2020    Microalbuminuria due to type 2 diabetes mellitus ([facility] 2020    Mixed hyperlipidemia 2020    Morbid obesity ([facility] 2020    Tobacco dependence syndrome 2020    Type II diabetes mellitus, uncontrolled ([facility] 2020      Past Surgical History:   Procedure Laterality Date    HX COLONOSCOPY      due in [id number]. Done for anemia and rectal bleeding     Allergies   Allergen Reactions    Aspirin Unknown (comments)     hemorraging - ended up in the hospital      Meds:  See below  Social History     Tobacco Use    Smoking status: Former Smoker     Packs/day: 0.50     Quit date:      Years since quittin.0    Smokeless tobacco: Never Used   Substance Use Topics    Alcohol use: Not Currently     Frequency: Never      Family History   Problem Relation Age of Onset    Cancer Mother         uterine    Hypertension Father     Cancer Sister         breast       REVIEW OF SYSTEMS:     []         Unable to obtain  ROS due to ---   [x]         Total of 12 systems reviewed as follows:    Constitutional: negative fever, negative chills, negative weight loss  Eyes:   negative visual changes  ENT:   negative sore throat, tongue or lip swelling  Respiratory:  negative cough, negative dyspnea  Cards:  See HPI  GI:   negative for nausea, vomiting, diarrhea, and abdominal pain  Genitourinary: negative for frequency, dysuria  Integument:  negative for rash   Hematologic:  negative for easy bruising and gum/nose bleeding  Musculoskel: negative for myalgias,  back pain  Neurological:  negative for headaches, dizziness, vertigo, weakness  Behavl/Psych: negative for feelings of anxiety, depression     Pertinent Positives include :    Objective:      Physical Exam:    Last 24hrs VS reviewed since prior progress note.  Most recent are:    Visit Vitals  /62   Pulse (!) 105   Temp 98 °F (36.7 °C) Resp 21   Ht 5' 5\" (1.651 m)   Wt 90.7 kg (200 lb)   SpO2 95%   BMI 33.28 kg/m²       Intake/Output Summary (Last 24 hours) at 1/26/2021 1701  Last data filed at 1/26/2021 1015  Gross per 24 hour   Intake 1640 ml   Output    Net 1640 ml        General Appearance: Well developed, alert, no acute distress. Ears/Nose/Mouth/Throat: Pupils equal and round, Hearing grossly normal.  Neck: Supple. JVP within normal limits. Carotids good upstrokes, with no bruit. Chest: Lungs clear to auscultation bilaterally. Cardiovascular: Regular rate and rhythm, S1S2 normal, 2/6 systolic murmur rubs, gallops. Abdomen: Soft, non-tender, bowel sounds are active. No organomegaly. Extremities: No edema bilaterally. Femoral pulses +2, Distal Pulses +1. Skin: Warm and dry. Neuro: Alert and oriented x3, normal speech; follows simple commands  Psychiatric: Cooperative, normal affect and judgment    []         Post-cath site without hematoma, bruit, tenderness, or thrill. Distal pulses intact. Data:      Telemetry:    EKG:  []  No new EKG for review. Prior to Admission medications    Medication Sig Start Date End Date Taking? Authorizing Provider   furosemide (Lasix) 40 mg tablet Take 1 Tab by mouth daily. 1/3/21  Yes Franky Clements MD   insulin lispro (HUMALOG) 100 unit/mL injection INITIATE CORRECTIVE INSULIN PROTOCOL (MAGALI):  RX MAGALI Normal Sensitivity (Average weight)    AC (before meals), Q6H, and Q4H CORRECTIONAL SCALE only For Blood Sugar (mg/dl) of :             140-199=2 units            200-249=3 units  250-299=5 units  300-349=7 units  350 or greater = Call MD  Give in addition to basal medications. Do Not Hold for NPO    BEDTIME CORRECTIONAL sliding scale when scheduled:  200-249=2 units  250-299=3 units   300-349=4 units  350 or greater = Call MD  Give in addition to basal medications.  1/3/21  Yes Franky Clements MD   lancets misc Please use as directed 1/3/21  Yes Raimundo Patel Hussein Alexander MD   Blood-Glucose Meter monitoring kit Use as directed 1/3/21  Yes Krista Campbell MD   glucose blood VI test strips (blood glucose test) strip Use as directed 1/3/21  Yes Krista Campbell MD   budesonide-formoteroL [facility]) 160-4.5 mcg/actuation HFAA Take 2 Puffs by inhalation two (2) times a day. 12/31/20  Yes Dex Soni DO   albuterol-ipratropium (DUO-NEB) 2.5 mg-0.5 mg/3 ml nebu 3 mL by Nebulization route every six (6) hours as needed for Wheezing, Shortness of Breath or Cough. 12/31/20  Yes Dex Soni DO   pantoprazole (Protonix) 40 mg tablet Take 1 Tab by mouth daily. 12/17/20  Yes Penny Mistry MD   LORazepam (Ativan) 1 mg tablet Take 1 Tab by mouth nightly. Max Daily Amount: 1 mg. 12/17/20  Yes Penny Mistyr MD   lisinopriL (PRINIVIL, ZESTRIL) 20 mg tablet Take 1 Tab by mouth two (2) times a day. 12/11/20  Yes Dex Soni DO   metFORMIN (GLUCOPHAGE) 500 mg tablet Take 2 tablets twice a day with meals 11/25/20  Yes Michelle Leung MD   lancets misc Use to check glucose once a day 11/25/20  Yes Michelle Leung MD   glucose blood VI test strips (blood glucose test) strip by Does Not Apply route two (2) times a day. Use to check glucose once a day 11/25/20  Yes Michelle Leung MD   potassium chloride (KLOR-CON) 10 mEq tablet By oral route take one tablet Monday, Wednesday and Friday 9/11/20  Yes Dex Soni DO   aspirin delayed-release (Aspirin Low Dose) 81 mg tablet Take  by mouth daily. Yes Provider, Historical   carvediloL (COREG) 3.125 mg tablet Take  by mouth two (2) times daily (with meals). Yes Provider, Historical   methylPREDNISolone (MEDROL DOSEPACK) 4 mg tablet As directed 1/3/21   Krista Campbell MD   famotidine (PEPCID) 10 mg tablet Take 1 Tab by mouth nightly. 12/17/20   Penny Mistry MD   hydroCHLOROthiazide (HYDRODIURIL) 25 mg tablet Take 1 Tab by mouth daily.  9/10/20   Dex Soni DO   ammonium lactate (LAC-HYDRIN) 12 % lotion Apply  to affected area two (2) times a day. rub in to affected area well    Provider, Historical   ketoconazole (NIZORAL) 2 % topical cream Apply  to affected area daily.     Provider, Historical       Recent Results (from the past 24 hour(s))   GLUCOSE, POC    Collection Time: 01/25/21  6:41 PM   Result Value Ref Range    Glucose (POC) 185 (H) 65 - 100 mg/dL    Performed by Carlos A Arias, POC    Collection Time: 01/25/21 11:41 PM   Result Value Ref Range    Glucose (POC) 245 (H) 65 - 100 mg/dL    Performed by Saba Corbin    CBC W/O DIFF    Collection Time: 01/26/21  4:40 AM   Result Value Ref Range    WBC 7.4 3.6 - 11.0 K/uL    RBC 3.71 (L) 3.80 - 5.20 M/uL    HGB 8.1 (L) 11.5 - 16.0 g/dL    HCT 27.1 (L) 35.0 - 47.0 %    MCV 73.0 (L) 80.0 - 99.0 FL    MCH 21.8 (L) 26.0 - 34.0 PG    MCHC 29.9 (L) 30.0 - 36.5 g/dL    RDW 24.2 (H) 11.5 - 14.5 %    PLATELET 842 (H) 922 - 400 K/uL    MPV 8.2 (L) 8.9 - 69.7 FL   METABOLIC PANEL, BASIC    Collection Time: 01/26/21  4:40 AM   Result Value Ref Range    Sodium 132 (L) 136 - 145 mmol/L    Potassium 3.6 3.5 - 5.1 mmol/L    Chloride 96 (L) 97 - 108 mmol/L    CO2 31 21 - 32 mmol/L    Anion gap 5 5 - 15 mmol/L    Glucose 112 (H) 65 - 100 mg/dL    BUN 6 6 - 20 mg/dL    Creatinine 0.48 (L) 0.55 - 1.02 mg/dL    BUN/Creatinine ratio 13 12 - 20      GFR est AA >60 >60 ml/min/1.73m2    GFR est non-AA >60 >60 ml/min/1.73m2    Calcium 9.1 8.5 - 10.1 mg/dL   GLUCOSE, POC    Collection Time: 01/26/21  8:53 AM   Result Value Ref Range    Glucose (POC) 142 (H) 65 - 100 mg/dL    Performed by COLTON Bird 114, POC    Collection Time: 01/26/21  1:12 PM   Result Value Ref Range    Glucose (POC) 213 (H) 65 - 100 mg/dL    Performed by Paresh Harrison MD

## 2021-01-26 NOTE — PROGRESS NOTES
Reason for Admission:  GIB                  RUR Score:     19%             PCP: First and Last name:  Umesh Carvajal   Name of Practice:    Are you a current patient: Yes/No: Yes   Approximate date of last visit: A month ago. Can you participate in a virtual visit if needed: Yes    Do you (patient/family) have any concerns for transition/discharge? No                 Plan for utilizing home health:   Waiting on return call from Daughter Nam Somers @ [phone]) per pt ask daughter d/t bad experience with previous agency & pt can't remember agenncies name. Current Advanced Directive/Advance Care Plan:            No  Transition of Care Plan:        D/C Plan is home with family. Family/granchild will . Pt uses no DME except home O2 via [facility] Josue) called - no updated needed for referral unless O2 flow increases & then new MD order will be needed.

## 2021-01-26 NOTE — ED NOTES
Assisted to the bathroom via wheelchair and o2 at 2 liters maintained while toileting, pt tolerated well with o2

## 2021-01-26 NOTE — PROGRESS NOTES
Hospitalist Progress Note         ANDREA Vasquez, FNP-C    Daily Progress Note: 1/26/2021      Subjective:   Subjective   Patient see on f/u laying in bed  No complaints at this time  No acute distress noted    Review of Systems:   Review of Systems   Constitutional: Negative. HENT: Negative. Eyes: Negative. Respiratory: Positive for shortness of breath. Cardiovascular: Negative. Gastrointestinal: Negative. Genitourinary: Negative. Musculoskeletal: Negative. Skin: Negative. Neurological: Positive for weakness. Endo/Heme/Allergies: Negative. Psychiatric/Behavioral: Negative. Objective:   Objective      Vitals:  Patient Vitals for the past 12 hrs:   Temp Pulse Resp BP SpO2   01/26/21 1107  (!) 105 21 103/62 98 %   01/26/21 0828  (!) 105 16 117/79 100 %   01/26/21 0743     96 %   01/26/21 0500 98 °F (36.7 °C) 96 20 129/78 97 %   01/26/21 0428 97.8 °F (36.6 °C) 100 20 116/68 100 %        Physical Exam:  Physical Exam  Vitals signs and nursing note reviewed. Constitutional:       Appearance: Normal appearance. HENT:      Head: Normocephalic. Nose: Nose normal.      Mouth/Throat:      Mouth: Mucous membranes are moist.   Eyes:      Extraocular Movements: Extraocular movements intact. Neck:      Musculoskeletal: Normal range of motion. Cardiovascular:      Rate and Rhythm: Tachycardia present. Pulses: Normal pulses. Heart sounds: Normal heart sounds. Pulmonary:      Effort: Pulmonary effort is normal.      Comments: Expiratory wheeze, diminished air entry, 4L NC  Abdominal:      General: Bowel sounds are normal.      Palpations: Abdomen is soft. Musculoskeletal: Normal range of motion. Skin:     General: Skin is warm and dry. Capillary Refill: Capillary refill takes 2 to 3 seconds. Neurological:      Mental Status: She is alert and oriented to person, place, and time. Motor: Weakness present.    Psychiatric:         Mood and Affect: Mood normal.         Behavior: Behavior normal.          Lab Results:  Recent Results (from the past 24 hour(s))   BNP    Collection Time: 01/25/21  1:15 PM   Result Value Ref Range    NT pro- (H) <125 pg/mL   CBC WITH AUTOMATED DIFF    Collection Time: 01/25/21  1:15 PM   Result Value Ref Range    WBC 9.8 3.6 - 11.0 K/uL    RBC 2.94 (L) 3.80 - 5.20 M/uL    HGB 5.4 (LL) 11.5 - 16.0 g/dL    HCT 19.4 (L) 35.0 - 47.0 %    MCV 66.0 (L) 80.0 - 99.0 FL    MCH 18.4 (L) 26.0 - 34.0 PG    MCHC 27.8 (L) 30.0 - 36.5 g/dL    RDW 23.0 (H) 11.5 - 14.5 %    PLATELET 510 (H) 848 - 400 K/uL    MPV 8.5 (L) 8.9 - 12.9 FL    NEUTROPHILS 70 32 - 75 %    LYMPHOCYTES 25 12 - 49 %    MONOCYTES 4 (L) 5 - 13 %    EOSINOPHILS 0 0 - 7 %    BASOPHILS 1 0 - 1 %    NRBC 2.0  WBC    IMMATURE GRANULOCYTES 0 %    ABS. NEUTROPHILS 6.9 1.8 - 8.0 K/UL    ABS. LYMPHOCYTES 2.5 0.8 - 3.5 K/UL    ABS. MONOCYTES 0.4 0.0 - 1.0 K/UL    ABS. EOSINOPHILS 0.0 0.0 - 0.4 K/UL    ABS. BASOPHILS 0.1 0.0 - 0.1 K/UL    ABSOLUTE NRBC 0.20 K/uL    ABS. IMM. GRANS. 0.0 K/UL    DF Manual      RBC COMMENTS Hypochromia  2+        RBC COMMENTS Microcytosis  1+        RBC COMMENTS Ovalocytes  1+        RBC COMMENTS Macrocytosis  1+        RBC COMMENTS Anisocytosis  2+       METABOLIC PANEL, COMPREHENSIVE    Collection Time: 01/25/21  1:15 PM   Result Value Ref Range    Sodium 130 (L) 136 - 145 mmol/L    Potassium 3.7 3.5 - 5.1 mmol/L    Chloride 91 (L) 97 - 108 mmol/L    CO2 27 21 - 32 mmol/L    Anion gap 12 5 - 15 mmol/L    Glucose 163 (H) 65 - 100 mg/dL    BUN 7 6 - 20 mg/dL    Creatinine 0.75 0.55 - 1.02 mg/dL    BUN/Creatinine ratio 9 (L) 12 - 20      GFR est AA >60 >60 ml/min/1.73m2    GFR est non-AA >60 >60 ml/min/1.73m2    Calcium 9.1 8.5 - 10.1 mg/dL    Bilirubin, total 0.5 0.2 - 1.0 mg/dL    AST (SGOT) 17 15 - 37 U/L    ALT (SGPT) 21 12 - 78 U/L    Alk.  phosphatase 68 45 - 117 U/L    Protein, total 7.5 6.4 - 8.2 g/dL    Albumin 3.1 (L) 3.5 - 5.0 g/dL    Globulin 4.4 (H) 2.0 - 4.0 g/dL    A-G Ratio 0.7 (L) 1.1 - 2.2     TROPONIN I    Collection Time: 01/25/21  1:15 PM   Result Value Ref Range    Troponin-I, Qt. <0.05 <0.05 ng/mL   TYPE & SCREEN    Collection Time: 01/25/21  2:20 PM   Result Value Ref Range    Crossmatch Expiration 01/28/2021,2359     ABO/Rh(D) A Positive     Antibody screen Negative     Unit number [id number]     Blood component type [facility]     Unit division 00     Status of unit Issued,final     TRANSFUSION STATUS Ok to transfuse     Crossmatch result Compatible     Unit number [id number]     Blood component type [facility]     Unit division 00     Status of unit Issued,final     TRANSFUSION STATUS Ok to transfuse     Crossmatch result Compatible    OCCULT BLOOD, STOOL    Collection Time: 01/25/21  2:30 PM   Result Value Ref Range    Occult Blood,day 1 Positive (A) Negative      Day 1 date: 10     EKG, 12 LEAD, INITIAL    Collection Time: 01/25/21  3:10 PM   Result Value Ref Range    Ventricular Rate 95 BPM    Atrial Rate 95 BPM    P-R Interval 128 ms    QRS Duration 132 ms    Q-T Interval 388 ms    QTC Calculation (Bezet) 487 ms    Calculated P Axis 55 degrees    Calculated R Axis -26 degrees    Calculated T Axis 130 degrees    Diagnosis       Sinus rhythm with Premature atrial complexes  Left bundle branch block  Abnormal ECG  When compared with ECG of 01-JAN-2021 12:37,  No significant change was found  Confirmed by [place] [id number] ([id number]) on 1/25/2021 3:39:31 PM     SARS-COV-2    Collection Time: 01/25/21  4:41 PM   Result Value Ref Range    SARS-CoV-2 Please find results under separate order     COVID-19 RAPID TEST    Collection Time: 01/25/21  4:41 PM   Result Value Ref Range    Specimen source Nasopharyngeal      COVID-19 rapid test Not Detected Not Detected     GLUCOSE, POC    Collection Time: 01/25/21  6:41 PM   Result Value Ref Range    Glucose (POC) 185 (H) 65 - 100 mg/dL    Performed by RUDDY Metzger Collection Time: 01/25/21 11:41 PM   Result Value Ref Range    Glucose (POC) 245 (H) 65 - 100 mg/dL    Performed by [facility]    CBC W/O DIFF    Collection Time: 01/26/21  4:40 AM   Result Value Ref Range    WBC 7.4 3.6 - 11.0 K/uL    RBC 3.71 (L) 3.80 - 5.20 M/uL    HGB 8.1 (L) 11.5 - 16.0 g/dL    HCT 27.1 (L) 35.0 - 47.0 %    MCV 73.0 (L) 80.0 - 99.0 FL    MCH 21.8 (L) 26.0 - 34.0 PG    MCHC 29.9 (L) 30.0 - 36.5 g/dL    RDW 24.2 (H) 11.5 - 14.5 %    PLATELET 195 (H) 459 - 400 K/uL    MPV 8.2 (L) 8.9 - 80.3 FL   METABOLIC PANEL, BASIC    Collection Time: 01/26/21  4:40 AM   Result Value Ref Range    Sodium 132 (L) 136 - 145 mmol/L    Potassium 3.6 3.5 - 5.1 mmol/L    Chloride 96 (L) 97 - 108 mmol/L    CO2 31 21 - 32 mmol/L    Anion gap 5 5 - 15 mmol/L    Glucose 112 (H) 65 - 100 mg/dL    BUN 6 6 - 20 mg/dL    Creatinine 0.48 (L) 0.55 - 1.02 mg/dL    BUN/Creatinine ratio 13 12 - 20      GFR est AA >60 >60 ml/min/1.73m2    GFR est non-AA >60 >60 ml/min/1.73m2    Calcium 9.1 8.5 - 10.1 mg/dL   GLUCOSE, POC    Collection Time: 01/26/21  8:53 AM   Result Value Ref Range    Glucose (POC) 142 (H) 65 - 100 mg/dL    Performed by [facility]           Diagnostic Images:  CT Results  (Last 48 hours)    None          Current Medications:    Current Facility-Administered Medications:     budesonide-formoteroL (SYMBICORT) 160-4.5 mcg/actuation HFA inhaler 2 Puff, 2 Puff, Inhalation, BID RT, Yann Mims MD, 2 Puff at 01/26/21 3043    0.9% sodium chloride infusion, 50 mL/hr, IntraVENous, CONTINUOUS, Ed Fuentes NP, Last Rate: 50 mL/hr at 01/25/21 1856, 50 mL/hr at 01/25/21 1856    pantoprazole (PROTONIX) tablet 40 mg, 40 mg, Oral, BID, Ed Fuentes NP, 40 mg at 01/26/21 1003    [Held by provider] carvediloL (COREG) tablet 3.125 mg, 3.125 mg, Oral, BID WITH MEALS, Ed Fuentes NP    [Held by provider] lisinopriL (PRINIVIL, ZESTRIL) tablet 20 mg, 20 mg, Oral, BID, Ed Fuentes NP    LORazepam (ATIVAN) tablet 1 mg, 1 mg, Oral, QHS, Ed Fuentes, NP, 1 mg at 01/25/21 2228    acetaminophen (TYLENOL) tablet 650 mg, 650 mg, Oral, Q4H PRN, Ed Fuentes, NP    ondansetron (ZOFRAN ODT) tablet 4 mg, 4 mg, Oral, Q8H PRN, Ed Fuentes, NP    [facility] injection 4 mg, 4 mg, IntraVENous, Q6H PRN, Ed Fuentes, NP    insulin lispro (HUMALOG) injection, , SubCUTAneous, AC&HS, Ed Fuentes, NP, Stopped at 01/26/21 0730    glucose chewable tablet 16 g, 4 Tab, Oral, PRN, Ed Fuentes, NP    glucagon (GLUCAGEN) injection 1 mg, 1 mg, IntraMUSCular, PRN, Ed Fuentes, NP    dextrose (D50W) injection syrg 12.5-25 g, 25-50 mL, IntraVENous, PRN, Ed Fuentes, NP    Current Outpatient Medications:     furosemide (Lasix) 40 mg tablet, Take 1 Tab by mouth daily. , Disp: 30 Tab, Rfl: 0    insulin lispro (HUMALOG) 100 unit/mL injection, INITIATE CORRECTIVE INSULIN PROTOCOL (MAGALI): RX MAGALI Normal Sensitivity (Average weight)  AC (before meals), Q6H, and Q4H CORRECTIONAL SCALE only For Blood Sugar (mg/dl) of :            140-199=2 units           200-249=3 units 250-299=5 units 300-349=7 units 350 or greater = Call MD Give in addition to basal medications. Do Not Hold for NPO  BEDTIME CORRECTIONAL sliding scale when scheduled: 200-249=2 units 250-299=3 units  300-349=4 units 350 or greater = Call MD Give in addition to basal medications. , Disp: 1 Vial, Rfl: 0    lancets misc, Please use as directed, Disp: 1 Each, Rfl: 0    Blood-Glucose Meter monitoring kit, Use as directed, Disp: 1 Kit, Rfl: 0    glucose blood VI test strips (blood glucose test) strip, Use as directed, Disp: 100 Strip, Rfl: 0    budesonide-formoteroL (SYMBICORT) 160-4.5 mcg/actuation HFAA, Take 2 Puffs by inhalation two (2) times a day., Disp: 1 Inhaler, Rfl: 3    albuterol-ipratropium (DUO-NEB) 2.5 mg-0.5 mg/3 ml nebu, 3 mL by Nebulization route every six (6) hours as needed for Wheezing, Shortness of Breath or Cough. , Disp: 120 Nebule, Rfl: 3    pantoprazole (Protonix) 40 mg tablet, Take 1 Tab by mouth daily. , Disp: 30 Tab, Rfl: 0    LORazepam (Ativan) 1 mg tablet, Take 1 Tab by mouth nightly. Max Daily Amount: 1 mg., Disp: 5 Tab, Rfl: 0    lisinopriL (PRINIVIL, ZESTRIL) 20 mg tablet, Take 1 Tab by mouth two (2) times a day., Disp: 180 Tab, Rfl: 2    metFORMIN (GLUCOPHAGE) 500 mg tablet, Take 2 tablets twice a day with meals, Disp: 120 Tab, Rfl: 3    lancets misc, Use to check glucose once a day, Disp: 50 Each, Rfl: 5    glucose blood VI test strips (blood glucose test) strip, by Does Not Apply route two (2) times a day. Use to check glucose once a day, Disp: 50 Strip, Rfl: 5    potassium chloride (KLOR-CON) 10 mEq tablet, By oral route take one tablet Monday, Wednesday and Friday, Disp: 40 Tab, Rfl: 3    aspirin delayed-release (Aspirin Low Dose) 81 mg tablet, Take  by mouth daily. , Disp: , Rfl:     carvediloL (COREG) 3.125 mg tablet, Take  by mouth two (2) times daily (with meals). , Disp: , Rfl:     methylPREDNISolone (MEDROL DOSEPACK) 4 mg tablet, As directed, Disp: 1 Dose Pack, Rfl: 0    famotidine (PEPCID) 10 mg tablet, Take 1 Tab by mouth nightly., Disp: 30 Tab, Rfl: 0    hydroCHLOROthiazide (HYDRODIURIL) 25 mg tablet, Take 1 Tab by mouth daily. , Disp: 90 Tab, Rfl: 4    ammonium lactate (LAC-HYDRIN) 12 % lotion, Apply  to affected area two (2) times a day. rub in to affected area well, Disp: , Rfl:     ketoconazole (NIZORAL) 2 % topical cream, Apply  to affected area daily. , Disp: , Rfl:        ASSESSMENT:    GI bleed:  -reports blood in stool, hgb 5.4 on admission  -s/p transfused 2u PRBC  -EGD planned later today  -GI following    Iron Deficiency Anemia:  -hgb 8.1 s/p 2u PRBC  -continue to trend h/h  -EGD planned later today    Acute dehydration:  -continue w/ gentle IV hydration    Type 2 DM:  -ac/hs accu check, med dose ssi    Recent Coivd 19:  -continue supplemental O2 4L NC, wean as tolerated    Generalized weakness:  -PT/OT/OOB    PLAN:  -hgb 8.1 s/p 2u PRBC  -continue to trend h/h  -EGD planned later today  -continue w/ gentle IV hydration  -CM dispo planning    Full Code  Dvt Prophylaxis  GI Prophylaxis  Home medications reviewed and reconciled      Above treatment plan reviewed and discussed with patient in detail at bedside, all questions answered. Care Plan discussed with: Interdisciplinary team    Total time spent with patient: 30 minutes.     Dakota Banks NP

## 2021-01-26 NOTE — ED NOTES
Bedside and Verbal shift change report given to Kev Dupree (oncoming nurse) by April (offgoing nurse). Report included the following information SBAR, ED Summary, Intake/Output, MAR and Recent Results.

## 2021-01-26 NOTE — PROGRESS NOTES
Reason for Readmission: GIB             RUR Score/Risk Level:    19%     PCP: First and Last name:  Britany Fernando   Name of Practice:    Are you a current patient: Yes/No: yes   Approximate date of last visit:    Can you participate in a virtual visit with your PCP: Yes    Is a Care Conference indicated:  no      Did you attend your follow up appointment (s): If not, why not:  No ,was sick & not time for appt. Resources/supports as identified by patient/family:   Daughter Chad Woodruff @ [phone]). Top Challenges facing patient (as identified by patient/family and CM): Finances/Medication cost?    No   Transportation     No   Support system or lack thereof? No       Living arrangements? No       Self-care/ADLs/Cognition? No        Current Advanced Directive/Advance Care Plan:  No           Plan for utilizing home health:   Message left for daughter to return call to CM - pt wants daughter to decided d/t bad experience with home health agency & pt cannot remember agencies name. Transition of Care Plan:    Based on readmission, the patient's previous Plan of Care   has been evaluated and/or modified. The current Transition of Care Plan is: D/C home with family - grand child will transport home. Possibly home health.

## 2021-01-27 ENCOUNTER — ANESTHESIA EVENT (OUTPATIENT)
Dept: ENDOSCOPY | Age: 66
DRG: 377 | End: 2021-01-27
Payer: MEDICARE

## 2021-01-27 ENCOUNTER — ANESTHESIA (OUTPATIENT)
Dept: ENDOSCOPY | Age: 66
DRG: 377 | End: 2021-01-27
Payer: MEDICARE

## 2021-01-27 ENCOUNTER — APPOINTMENT (OUTPATIENT)
Dept: ENDOSCOPY | Age: 66
DRG: 377 | End: 2021-01-27
Attending: INTERNAL MEDICINE
Payer: MEDICARE

## 2021-01-27 ENCOUNTER — APPOINTMENT (OUTPATIENT)
Dept: GENERAL RADIOLOGY | Age: 66
DRG: 377 | End: 2021-01-27
Attending: FAMILY MEDICINE
Payer: MEDICARE

## 2021-01-27 ENCOUNTER — APPOINTMENT (OUTPATIENT)
Dept: CT IMAGING | Age: 66
DRG: 377 | End: 2021-01-27
Attending: FAMILY MEDICINE
Payer: MEDICARE

## 2021-01-27 LAB
ALBUMIN SERPL-MCNC: 3.1 G/DL (ref 3.5–5)
ANION GAP SERPL CALC-SCNC: 8 MMOL/L (ref 5–15)
BASOPHILS # BLD: 0 K/UL (ref 0–0.1)
BASOPHILS NFR BLD: 0 % (ref 0–1)
BUN SERPL-MCNC: 4 MG/DL (ref 6–20)
BUN/CREAT SERPL: 9 (ref 12–20)
CA-I BLD-MCNC: 9 MG/DL (ref 8.5–10.1)
CHLORIDE SERPL-SCNC: 97 MMOL/L (ref 97–108)
CO2 SERPL-SCNC: 30 MMOL/L (ref 21–32)
CREAT SERPL-MCNC: 0.46 MG/DL (ref 0.55–1.02)
DIFFERENTIAL METHOD BLD: ABNORMAL
EOSINOPHIL # BLD: 0 K/UL (ref 0–0.4)
EOSINOPHIL NFR BLD: 0 % (ref 0–7)
ERYTHROCYTE [DISTWIDTH] IN BLOOD BY AUTOMATED COUNT: 24.2 % (ref 11.5–14.5)
GLUCOSE BLD STRIP.AUTO-MCNC: 149 MG/DL (ref 65–100)
GLUCOSE BLD STRIP.AUTO-MCNC: 154 MG/DL (ref 65–100)
GLUCOSE BLD STRIP.AUTO-MCNC: 177 MG/DL (ref 65–100)
GLUCOSE BLD STRIP.AUTO-MCNC: 285 MG/DL (ref 65–100)
GLUCOSE BLD STRIP.AUTO-MCNC: 353 MG/DL (ref 65–100)
GLUCOSE BLD STRIP.AUTO-MCNC: 374 MG/DL (ref 65–100)
GLUCOSE SERPL-MCNC: 157 MG/DL (ref 65–100)
HCT VFR BLD AUTO: 26.4 % (ref 35–47)
HGB BLD-MCNC: 7.9 G/DL (ref 11.5–16)
IMM GRANULOCYTES # BLD AUTO: 0 K/UL (ref 0–0.04)
IMM GRANULOCYTES NFR BLD AUTO: 0 % (ref 0–0.5)
LYMPHOCYTES # BLD: 0.6 K/UL (ref 0.8–3.5)
LYMPHOCYTES NFR BLD: 10 % (ref 12–49)
MCH RBC QN AUTO: 21.8 PG (ref 26–34)
MCHC RBC AUTO-ENTMCNC: 29.9 G/DL (ref 30–36.5)
MCV RBC AUTO: 72.7 FL (ref 80–99)
MONOCYTES # BLD: 0.1 K/UL (ref 0–1)
MONOCYTES NFR BLD: 2 % (ref 5–13)
NEUTS SEG # BLD: 5.2 K/UL (ref 1.8–8)
NEUTS SEG NFR BLD: 88 % (ref 32–75)
PERFORMED BY, TECHID: ABNORMAL
PHOSPHATE SERPL-MCNC: 3.7 MG/DL (ref 2.6–4.7)
PLATELET # BLD AUTO: 531 K/UL (ref 150–400)
PMV BLD AUTO: 8.4 FL (ref 8.9–12.9)
POTASSIUM SERPL-SCNC: 3.7 MMOL/L (ref 3.5–5.1)
RBC # BLD AUTO: 3.63 M/UL (ref 3.8–5.2)
SODIUM SERPL-SCNC: 135 MMOL/L (ref 136–145)
WBC # BLD AUTO: 5.9 K/UL (ref 3.6–11)

## 2021-01-27 PROCEDURE — 94640 AIRWAY INHALATION TREATMENT: CPT

## 2021-01-27 PROCEDURE — 76060000033 HC ANESTHESIA 1 TO 1.5 HR: Performed by: INTERNAL MEDICINE

## 2021-01-27 PROCEDURE — 71045 X-RAY EXAM CHEST 1 VIEW: CPT

## 2021-01-27 PROCEDURE — 74011250637 HC RX REV CODE- 250/637: Performed by: NURSE PRACTITIONER

## 2021-01-27 PROCEDURE — 74011000250 HC RX REV CODE- 250: Performed by: NURSE ANESTHETIST, CERTIFIED REGISTERED

## 2021-01-27 PROCEDURE — 76040000008: Performed by: INTERNAL MEDICINE

## 2021-01-27 PROCEDURE — 74011000250 HC RX REV CODE- 250: Performed by: ANESTHESIOLOGY

## 2021-01-27 PROCEDURE — 74011250636 HC RX REV CODE- 250/636: Performed by: FAMILY MEDICINE

## 2021-01-27 PROCEDURE — 77030019988 HC FCPS ENDOSC DISP BSC -B: Performed by: INTERNAL MEDICINE

## 2021-01-27 PROCEDURE — 74011250636 HC RX REV CODE- 250/636: Performed by: NURSE PRACTITIONER

## 2021-01-27 PROCEDURE — 71275 CT ANGIOGRAPHY CHEST: CPT

## 2021-01-27 PROCEDURE — 70450 CT HEAD/BRAIN W/O DYE: CPT

## 2021-01-27 PROCEDURE — 85025 COMPLETE CBC W/AUTO DIFF WBC: CPT

## 2021-01-27 PROCEDURE — 82962 GLUCOSE BLOOD TEST: CPT

## 2021-01-27 PROCEDURE — 80053 COMPREHEN METABOLIC PANEL: CPT

## 2021-01-27 PROCEDURE — 36415 COLL VENOUS BLD VENIPUNCTURE: CPT

## 2021-01-27 PROCEDURE — 74011250637 HC RX REV CODE- 250/637: Performed by: INTERNAL MEDICINE

## 2021-01-27 PROCEDURE — 74011000250 HC RX REV CODE- 250: Performed by: INTERNAL MEDICINE

## 2021-01-27 PROCEDURE — 2709999900 HC NON-CHARGEABLE SUPPLY: Performed by: INTERNAL MEDICINE

## 2021-01-27 PROCEDURE — 74011250636 HC RX REV CODE- 250/636: Performed by: NURSE ANESTHETIST, CERTIFIED REGISTERED

## 2021-01-27 PROCEDURE — 83605 ASSAY OF LACTIC ACID: CPT

## 2021-01-27 PROCEDURE — 74011250637 HC RX REV CODE- 250/637: Performed by: HOSPITALIST

## 2021-01-27 PROCEDURE — 65270000029 HC RM PRIVATE

## 2021-01-27 PROCEDURE — 74011000250 HC RX REV CODE- 250: Performed by: FAMILY MEDICINE

## 2021-01-27 PROCEDURE — 84484 ASSAY OF TROPONIN QUANT: CPT

## 2021-01-27 PROCEDURE — 80069 RENAL FUNCTION PANEL: CPT

## 2021-01-27 PROCEDURE — 74011636637 HC RX REV CODE- 636/637: Performed by: NURSE PRACTITIONER

## 2021-01-27 PROCEDURE — 74011250636 HC RX REV CODE- 250/636: Performed by: HOSPITALIST

## 2021-01-27 PROCEDURE — 0DJ08ZZ INSPECTION OF UPPER INTESTINAL TRACT, VIA NATURAL OR ARTIFICIAL OPENING ENDOSCOPIC: ICD-10-PCS | Performed by: INTERNAL MEDICINE

## 2021-01-27 RX ORDER — PANTOPRAZOLE SODIUM 40 MG/1
40 TABLET, DELAYED RELEASE ORAL
Status: DISCONTINUED | OUTPATIENT
Start: 2021-01-28 | End: 2021-02-05 | Stop reason: HOSPADM

## 2021-01-27 RX ORDER — LIDOCAINE HYDROCHLORIDE 20 MG/ML
INJECTION, SOLUTION EPIDURAL; INFILTRATION; INTRACAUDAL; PERINEURAL AS NEEDED
Status: DISCONTINUED | OUTPATIENT
Start: 2021-01-27 | End: 2021-01-27 | Stop reason: HOSPADM

## 2021-01-27 RX ORDER — PROPOFOL 10 MG/ML
INJECTION, EMULSION INTRAVENOUS
Status: DISPENSED
Start: 2021-01-27 | End: 2021-01-28

## 2021-01-27 RX ORDER — IPRATROPIUM BROMIDE AND ALBUTEROL SULFATE 2.5; .5 MG/3ML; MG/3ML
3 SOLUTION RESPIRATORY (INHALATION)
Status: COMPLETED | OUTPATIENT
Start: 2021-01-27 | End: 2021-01-27

## 2021-01-27 RX ORDER — PROPOFOL 10 MG/ML
INJECTION, EMULSION INTRAVENOUS AS NEEDED
Status: DISCONTINUED | OUTPATIENT
Start: 2021-01-27 | End: 2021-01-27 | Stop reason: HOSPADM

## 2021-01-27 RX ORDER — SODIUM CHLORIDE, SODIUM LACTATE, POTASSIUM CHLORIDE, CALCIUM CHLORIDE 600; 310; 30; 20 MG/100ML; MG/100ML; MG/100ML; MG/100ML
INJECTION, SOLUTION INTRAVENOUS
Status: DISCONTINUED | OUTPATIENT
Start: 2021-01-27 | End: 2021-01-27 | Stop reason: HOSPADM

## 2021-01-27 RX ORDER — ETOMIDATE 2 MG/ML
INJECTION INTRAVENOUS AS NEEDED
Status: DISCONTINUED | OUTPATIENT
Start: 2021-01-27 | End: 2021-01-27 | Stop reason: HOSPADM

## 2021-01-27 RX ADMIN — IPRATROPIUM BROMIDE AND ALBUTEROL SULFATE 3 ML: .5; 3 SOLUTION RESPIRATORY (INHALATION) at 14:56

## 2021-01-27 RX ADMIN — INSULIN LISPRO 2 UNITS: 100 INJECTION, SOLUTION INTRAVENOUS; SUBCUTANEOUS at 08:56

## 2021-01-27 RX ADMIN — PANTOPRAZOLE SODIUM 40 MG: 40 TABLET, DELAYED RELEASE ORAL at 08:57

## 2021-01-27 RX ADMIN — IPRATROPIUM BROMIDE AND ALBUTEROL SULFATE 3 ML: .5; 3 SOLUTION RESPIRATORY (INHALATION) at 22:53

## 2021-01-27 RX ADMIN — LIDOCAINE HYDROCHLORIDE 100 MG: 20 INJECTION, SOLUTION EPIDURAL; INFILTRATION; INTRACAUDAL; PERINEURAL at 14:23

## 2021-01-27 RX ADMIN — PROPOFOL INJECTABLE EMULSION 35 MCG/KG/MIN: 10 INJECTION, EMULSION INTRAVENOUS at 23:30

## 2021-01-27 RX ADMIN — SODIUM CHLORIDE, POTASSIUM CHLORIDE, SODIUM LACTATE AND CALCIUM CHLORIDE: 600; 310; 30; 20 INJECTION, SOLUTION INTRAVENOUS at 13:26

## 2021-01-27 RX ADMIN — ETOMIDATE 6 MG: 2 INJECTION INTRAVENOUS at 14:25

## 2021-01-27 RX ADMIN — ETOMIDATE 10 MG: 2 INJECTION INTRAVENOUS at 23:26

## 2021-01-27 RX ADMIN — SODIUM CHLORIDE 50 ML/HR: 9 INJECTION, SOLUTION INTRAVENOUS at 12:51

## 2021-01-27 RX ADMIN — SODIUM PHOSPHATE 1 ENEMA: 7; 19 ENEMA RECTAL at 22:23

## 2021-01-27 RX ADMIN — CARVEDILOL 3.12 MG: 3.12 TABLET, FILM COATED ORAL at 16:02

## 2021-01-27 RX ADMIN — ALBUTEROL SULFATE 2 PUFF: 108 AEROSOL, METERED RESPIRATORY (INHALATION) at 07:33

## 2021-01-27 RX ADMIN — CARVEDILOL 3.12 MG: 3.12 TABLET, FILM COATED ORAL at 08:57

## 2021-01-27 RX ADMIN — BUDESONIDE AND FORMOTEROL FUMARATE DIHYDRATE 2 PUFF: 160; 4.5 AEROSOL RESPIRATORY (INHALATION) at 07:33

## 2021-01-27 RX ADMIN — INSULIN LISPRO 2 UNITS: 100 INJECTION, SOLUTION INTRAVENOUS; SUBCUTANEOUS at 16:02

## 2021-01-27 RX ADMIN — ALBUTEROL SULFATE 2 PUFF: 108 AEROSOL, METERED RESPIRATORY (INHALATION) at 20:16

## 2021-01-27 RX ADMIN — SUCCINYLCHOLINE CHLORIDE 60 MG: 20 INJECTION, SOLUTION INTRAMUSCULAR; INTRAVENOUS at 23:26

## 2021-01-27 RX ADMIN — PROPOFOL 60 MG: 10 INJECTION, EMULSION INTRAVENOUS at 14:25

## 2021-01-27 RX ADMIN — ALBUTEROL SULFATE 2 PUFF: 108 AEROSOL, METERED RESPIRATORY (INHALATION) at 13:21

## 2021-01-27 RX ADMIN — BUDESONIDE AND FORMOTEROL FUMARATE DIHYDRATE 2 PUFF: 160; 4.5 AEROSOL RESPIRATORY (INHALATION) at 20:16

## 2021-01-27 RX ADMIN — INSULIN LISPRO 6 UNITS: 100 INJECTION, SOLUTION INTRAVENOUS; SUBCUTANEOUS at 22:53

## 2021-01-27 NOTE — ED NOTES
Bedside and Verbal shift change report given to Chung Nguyen (oncoming nurse) by April (offgoing nurse). Report included the following information SBAR, ED Summary, Intake/Output, MAR and Recent Results.

## 2021-01-27 NOTE — ANESTHESIA POSTPROCEDURE EVALUATION
Procedure(s):  ESOPHAGOGASTRODUODENOSCOPY (EGD). total IV anesthesia, general    Anesthesia Post Evaluation      Multimodal analgesia: multimodal analgesia not used between 6 hours prior to anesthesia start to PACU discharge  Patient location during evaluation: bedside (Endoscopy suite)  Patient participation: complete - patient cannot participate  Level of consciousness: sleepy but conscious  Pain score: 0  Pain management: adequate  Airway patency: patent  Anesthetic complications: no  Cardiovascular status: acceptable  Respiratory status: acceptable and nasal cannula  Hydration status: acceptable  Comments: This patient remained on the stretcher. The patient was handed off to the endoscopy nursing team.  All questions regarding pre-, intra-, and postoperative care were answered.   Post anesthesia nausea and vomiting:  none      INITIAL Post-op Vital signs:   Vitals Value Taken Time   /80 01/27/21 1428   Temp 36.2 °C (97.2 °F) 01/27/21 1428   Pulse 95 01/27/21 1428   Resp 18 01/27/21 1428   SpO2 100 % 01/27/21 1428

## 2021-01-27 NOTE — ED NOTES
o2 sats 97 percent with o2 maintained at 6 liters, pt calm iv restarted , respiratory therapy called to check on pt, plans to keep pt on 6 liters for now , slowly regina down o2 , clear liquids only po

## 2021-01-27 NOTE — CONSULTS
Consult    NAME: Al Torres   :  1955   MRN:  [id number]     Date/Time:  2021 2:34 PM    Patient PCP: Gonazles Godinez DO  ________________________________________________________________________     Assessment:     1. Acute GI bleeding  2. Moderate aortic stenosis with significant three-vessel coronary artery disease awaiting cardiac surgery. 3. Status post Covid infection  4. Severe anemia secondary to acute GI bleeding        Plan:     1. Severe GI bleeding requiring multiple units of blood agree with discontinuing aspirin and Plavix. 2. Weekly patient has a stable cardiac status. Her recent echocardiogram showed left ventricular ejection fraction 41%. Peak aortic valve gradient was 47 mmHg and calculated aortic valve area was 1.1cm² in 2020. 3. React testing is planned at this time. When GI bleeding completely recovers we will try to get a follow-up appointment with the CT surgery in [facility]      []        High complexity decision making was performed        Subjective:   CHIEF COMPLAINT:     HISTORY OF PRESENT ILLNESS:     Gustavo Agee is a 77 y.o.   female who is known to have moderate aortic stenosis with three-vessel coronary artery disease was admitted with a severe upper GI bleeding. She required 2 units of blood transfusion. At present she denies any chest pain, palpitation, paroxysmal nocturnal dyspnea, pedal edema, dizziness or syncope. Patient had severe three-vessel coronary artery disease and was awaiting aortic valve replacement and bypass surgery in [facility]. Meanwhile she developed acute Covid infection. She recovered from Covid infection several weeks ago. Then she started having severe GI bleeding and was brought to the emergency room. Patient denies any alcoholic addiction. He has hypertension and is controlled. She has a type 2 diabetes mellitus and is controlled. Does not smoke now.       We were asked to consult for work up and evaluation of the above problems. Past Medical History:   Diagnosis Date    Acute gastrointestinal hemorrhage 2020    Allergic rhinitis 2020    Anemia 2020    Benign essential hypertension 2020    Body mass index 30.0-30.9, adult 2020    CAD (coronary artery disease) 2020    Foot callus 2020    GERD (gastroesophageal reflux disease) 2020    Heart murmur 2020    Hx of colonoscopy 2015    Done for anemia and rectal bleeding    Left bundle branch block 2020    Microalbuminuria due to type 2 diabetes mellitus ([facility] [address] 2020    Mixed hyperlipidemia 2020    Morbid obesity ([facility] [address] 2020    Tobacco dependence syndrome 2020    Type II diabetes mellitus, uncontrolled ([facility] [address] 2020      Past Surgical History:   Procedure Laterality Date    HX COLONOSCOPY      due in [id number]. Done for anemia and rectal bleeding     Allergies   Allergen Reactions    Aspirin Unknown (comments)     hemorraging - ended up in the hospital      Meds:  See below  Social History     Tobacco Use    Smoking status: Former Smoker     Packs/day: 0.50     Quit date:      Years since quittin.0    Smokeless tobacco: Never Used   Substance Use Topics    Alcohol use: Not Currently     Frequency: Never      Family History   Problem Relation Age of Onset    Cancer Mother         uterine    Hypertension Father     Cancer Sister         breast       REVIEW OF SYSTEMS:     []         Unable to obtain  ROS due to ---   [x]         Total of 12 systems reviewed as follows:    Constitutional: negative fever, negative chills, negative weight loss  Eyes:   negative visual changes  ENT:   negative sore throat, tongue or lip swelling  Respiratory:  negative cough, negative dyspnea  Cards:  negative for chest pain, palpitations, lower extremity edema  GI:   Stools.   Genitourinary: negative for frequency, dysuria  Integument:  negative for rash   Hematologic:  negative for easy bruising and gum/nose bleeding  Musculoskel: negative for myalgias,  back pain  Neurological:  negative for headaches, dizziness, vertigo, weakness  Behavl/Psych: negative for feelings of anxiety, depression     Pertinent Positives include :    Objective:      Physical Exam:    Last 24hrs VS reviewed since prior progress note. Most recent are:    Visit Vitals  /80   Pulse 95   Temp 97.2 °F (36.2 °C)   Resp 18   Ht 5' 5\" (1.651 m)   Wt 90.7 kg (200 lb)   SpO2 100%   BMI 33.28 kg/m²       Intake/Output Summary (Last 24 hours) at 1/27/2021 1434  Last data filed at 1/27/2021 1427  Gross per 24 hour   Intake 50 ml   Output 500 ml   Net -450 ml        General Appearance: Well developed, well nourished, alert & oriented x 3,    no acute distress. Ears/Nose/Mouth/Throat: Pupils equal and round, Hearing grossly normal.  Neck: Supple. JVP within normal limits. Carotids good upstrokes, with no bruit. Chest: Lungs clear to auscultation bilaterally. Cardiovascular: Regular rate and rhythm, S1S2 normal, 2 x 6 ejection systolic murmur heard in the aortic area conducted to both carotids. , rubs, gallops. Abdomen: Soft, non-tender, bowel sounds are active. No organomegaly. Extremities: No edema bilaterally. Femoral pulses +2, Distal Pulses +1. Skin: Warm and dry. Neuro:  No neurological deficit is detected[]         Post-cath site without hematoma, bruit, tenderness, or thrill. Distal pulses intact.     Data:      I have reviewed vital signs,labs and ekg independently    Telemetry:    EKG:     EKG RESULTS     Procedure Component Value Units Date/Time    EKG, 12 LEAD, INITIAL [[id number]] Collected: 01/25/21 1510    Order Status: Completed Updated: 01/25/21 1539     Ventricular Rate 95 BPM      Atrial Rate 95 BPM      P-R Interval 128 ms      QRS Duration 132 ms      Q-T Interval 388 ms      QTC Calculation (Bezet) 487 ms      Calculated P Axis 55 degrees      Calculated R Axis -26 degrees      Calculated T Axis 130 degrees      Diagnosis --     Sinus rhythm with Premature atrial complexes  Left bundle branch block  Abnormal ECG  When compared with ECG of 01-JAN-2021 12:37,  No significant change was found  Confirmed by [facility] Renan VILLEDA ([id number]) on 1/25/2021 3:39:31 PM      EKG 12 LEAD INITIAL [[id number]] Collected: 01/01/21 1237    Order Status: Completed Updated: 01/01/21 1726     Ventricular Rate 88 BPM      Atrial Rate 88 BPM      P-R Interval 134 ms      QRS Duration 138 ms      Q-T Interval 406 ms      QTC Calculation (Bezet) 491 ms      Calculated P Axis 44 degrees      Calculated R Axis -51 degrees      Calculated T Axis 121 degrees      Diagnosis --     Sinus rhythm with Premature atrial complexes  Left axis deviation  Left bundle branch block  Abnormal ECG  When compared with ECG of 13-DEC-2020 13:52,  Premature atrial complexes are now Present  Confirmed by Tristin Espinal ([id number]) on 1/1/2021 5:26:27 PM             XR CHEST PORT   Final Result   Minimal residual right basilar opacity with resolved left basilar   opacity. Echo Results  (Last 48 hours)    None        Cardiolite (Tc-99m Sestamibi) stress test    XR Results (most recent):     Results from Hospital Encounter encounter on 01/25/21   XR CHEST PORT    Narrative EXAM: XR CHEST PORT    INDICATION: sob    COMPARISON: January 1 and 2, 2021    FINDINGS: Minimal residual right basilar opacity with resolved left basilar  opacity. Unremarkable cardiomediastinal contours. No pneumothorax or pleural  effusion. No acute fracture or dislocation. Impression Minimal residual right basilar opacity with resolved left basilar  opacity. Prior to Admission medications    Medication Sig Start Date End Date Taking? Authorizing Provider   furosemide (Lasix) 40 mg tablet Take 1 Tab by mouth daily.  1/3/21  Yes Saroj Cueto MD   insulin lispro (HUMALOG) 100 unit/mL injection INITIATE CORRECTIVE INSULIN PROTOCOL (MAGALI):  RX MAGALI Normal Sensitivity (Average weight)    AC (before meals), Q6H, and Q4H CORRECTIONAL SCALE only For Blood Sugar (mg/dl) of :             140-199=2 units            200-249=3 units  250-299=5 units  300-349=7 units  350 or greater = Call MD  Give in addition to basal medications. Do Not Hold for NPO    BEDTIME CORRECTIONAL sliding scale when scheduled:  200-249=2 units  250-299=3 units   300-349=4 units  350 or greater = Call MD  Give in addition to basal medications. 1/3/21  Yes MD antolin Peña misc Please use as directed 1/3/21  Yes Jaclyn Chambers MD   Blood-Glucose Meter monitoring kit Use as directed 1/3/21  Yes Jaclyn Chambers MD   glucose blood VI test strips (blood glucose test) strip Use as directed 1/3/21  Yes Jaclyn Chambers MD   budesonide-formoteroL [facility]) 160-4.5 mcg/actuation HFAA Take 2 Puffs by inhalation two (2) times a day. 12/31/20  Yes Rosio Talley DO   albuterol-ipratropium (DUO-NEB) 2.5 mg-0.5 mg/3 ml nebu 3 mL by Nebulization route every six (6) hours as needed for Wheezing, Shortness of Breath or Cough. 12/31/20  Yes Rosio Talley DO   pantoprazole (Protonix) 40 mg tablet Take 1 Tab by mouth daily. 12/17/20  Yes Hazel Wills MD   LORazepam (Ativan) 1 mg tablet Take 1 Tab by mouth nightly. Max Daily Amount: 1 mg. 12/17/20  Yes Hazel Wills MD   lisinopriL (PRINIVIL, ZESTRIL) 20 mg tablet Take 1 Tab by mouth two (2) times a day. 12/11/20  Yes Rosio Talley DO   metFORMIN (GLUCOPHAGE) 500 mg tablet Take 2 tablets twice a day with meals 11/25/20  Yes Altagracia Medrano MD   lancets misc Use to check glucose once a day 11/25/20  Yes Altagracia Medrano MD   glucose blood VI test strips (blood glucose test) strip by Does Not Apply route two (2) times a day.  Use to check glucose once a day 11/25/20  Yes Altagracia Medrano MD   potassium chloride (KLOR-CON) 10 mEq tablet By oral route take one tablet Monday, Wednesday and Friday 9/11/20  Yes Rosio Talley DO aspirin delayed-release (Aspirin Low Dose) 81 mg tablet Take  by mouth daily. Yes Provider, Historical   carvediloL (COREG) 3.125 mg tablet Take  by mouth two (2) times daily (with meals). Yes Provider, Historical   methylPREDNISolone (MEDROL DOSEPACK) 4 mg tablet As directed 1/3/21   Dwain Doyle MD   famotidine (PEPCID) 10 mg tablet Take 1 Tab by mouth nightly. 12/17/20   Saniya Sheehan MD   hydroCHLOROthiazide (HYDRODIURIL) 25 mg tablet Take 1 Tab by mouth daily. 9/10/20   Catie Escoto DO   ammonium lactate (LAC-HYDRIN) 12 % lotion Apply  to affected area two (2) times a day. rub in to affected area well    Provider, Historical   ketoconazole (NIZORAL) 2 % topical cream Apply  to affected area daily. Provider, Historical       Recent Results (from the past 24 hour(s))   GLUCOSE, POC    Collection Time: 01/26/21  7:08 PM   Result Value Ref Range    Glucose (POC) 302 (H) 65 - 100 mg/dL    Performed by Chary Thomas    GLUCOSE, POC    Collection Time: 01/26/21 10:48 PM   Result Value Ref Range    Glucose (POC) 213 (H) 65 - 100 mg/dL    Performed by Chary Thomas    CBC WITH AUTOMATED DIFF    Collection Time: 01/27/21  4:50 AM   Result Value Ref Range    WBC 5.9 3.6 - 11.0 K/uL    RBC 3.63 (L) 3.80 - 5.20 M/uL    HGB 7.9 (L) 11.5 - 16.0 g/dL    HCT 26.4 (L) 35.0 - 47.0 %    MCV 72.7 (L) 80.0 - 99.0 FL    MCH 21.8 (L) 26.0 - 34.0 PG    MCHC 29.9 (L) 30.0 - 36.5 g/dL    RDW 24.2 (H) 11.5 - 14.5 %    PLATELET 819 (H) 058 - 400 K/uL    MPV 8.4 (L) 8.9 - 12.9 FL    NEUTROPHILS 88 (H) 32 - 75 %    LYMPHOCYTES 10 (L) 12 - 49 %    MONOCYTES 2 (L) 5 - 13 %    EOSINOPHILS 0 0 - 7 %    BASOPHILS 0 0 - 1 %    IMMATURE GRANULOCYTES 0 0.0 - 0.5 %    ABS. NEUTROPHILS 5.2 1.8 - 8.0 K/UL    ABS. LYMPHOCYTES 0.6 (L) 0.8 - 3.5 K/UL    ABS. MONOCYTES 0.1 0.0 - 1.0 K/UL    ABS. EOSINOPHILS 0.0 0.0 - 0.4 K/UL    ABS. BASOPHILS 0.0 0.0 - 0.1 K/UL    ABS. IMM.  GRANS. 0.0 0.00 - 0.04 K/UL    DF AUTOMATED     RENAL FUNCTION PANEL    Collection Time: 01/27/21  4:50 AM   Result Value Ref Range    Sodium 135 (L) 136 - 145 mmol/L    Potassium 3.7 3.5 - 5.1 mmol/L    Chloride 97 97 - 108 mmol/L    CO2 30 21 - 32 mmol/L    Anion gap 8 5 - 15 mmol/L    Glucose 157 (H) 65 - 100 mg/dL    BUN 4 (L) 6 - 20 mg/dL    Creatinine 0.46 (L) 0.55 - 1.02 mg/dL    BUN/Creatinine ratio 9 (L) 12 - 20      GFR est AA >60 >60 ml/min/1.73m2    GFR est non-AA >60 >60 ml/min/1.73m2    Calcium 9.0 8.5 - 10.1 mg/dL    Phosphorus 3.7 2.6 - 4.7 mg/dL    Albumin 3.1 (L) 3.5 - 5.0 g/dL   GLUCOSE, POC    Collection Time: 01/27/21  7:45 AM   Result Value Ref Range    Glucose (POC) 177 (H) 65 - 100 mg/dL    Performed by Rosie Gupta    GLUCOSE, POC    Collection Time: 01/27/21 11:47 AM   Result Value Ref Range    Glucose (POC) 149 (H) 65 - 100 mg/dL    Performed by Lalito Isaac       With the nursing staff and will discuss with attending  Raj Patricia MD    Patient PCP: Danny Palafox Micky Jay, MD

## 2021-01-27 NOTE — ED NOTES
Pt became confused during the night, awoke confused, pulled her o2 cannula off, accidentally pulled out iv from left ac, pt;s o2 sats decreased to 78 percent on room air ; off x 30 minutes, immediately re applied o2 cannula, at 6 liters , reassured pt verbally,

## 2021-01-27 NOTE — PROGRESS NOTES
Hospitalist Progress Note         ANDREA Cantrell FNP-C    Daily Progress Note: 1/27/2021      Subjective:   Subjective   Patient see on f/u laying in bed  No complaints at this time  No acute distress noted    Review of Systems:   Review of Systems   Constitutional: Negative. HENT: Negative. Eyes: Negative. Respiratory: Positive for shortness of breath. Cardiovascular: Negative. Gastrointestinal: Negative. Genitourinary: Negative. Musculoskeletal: Negative. Skin: Negative. Neurological: Positive for weakness. Endo/Heme/Allergies: Negative. Psychiatric/Behavioral: Negative. Objective:   Objective      Vitals:  Patient Vitals for the past 12 hrs:   Pulse Resp BP SpO2   01/27/21 0733    97 %   01/27/21 0427 97 18 123/68 99 %   01/27/21 0341    99 %   01/27/21 0330    97 %   01/27/21 0205    92 %   01/27/21 0200    (!) 84 %   01/26/21 2303 (!) 102 20 124/71 93 %   01/26/21 2003 (!) 114 22 134/84 92 %        Physical Exam:  Physical Exam  Vitals signs and nursing note reviewed. Constitutional:       Appearance: Normal appearance. HENT:      Head: Normocephalic. Nose: Nose normal.      Mouth/Throat:      Mouth: Mucous membranes are moist.   Eyes:      Extraocular Movements: Extraocular movements intact. Neck:      Musculoskeletal: Normal range of motion. Cardiovascular:      Rate and Rhythm: Tachycardia present. Pulses: Normal pulses. Heart sounds: Normal heart sounds. Pulmonary:      Effort: Pulmonary effort is normal.      Comments: Expiratory wheeze, diminished air entry, 4L NC  Abdominal:      General: Bowel sounds are normal.      Palpations: Abdomen is soft. Musculoskeletal: Normal range of motion. Skin:     General: Skin is warm and dry. Capillary Refill: Capillary refill takes 2 to 3 seconds. Neurological:      Mental Status: She is alert and oriented to person, place, and time. Motor: Weakness present. Psychiatric:         Mood and Affect: Mood normal.         Behavior: Behavior normal.          Lab Results:  Recent Results (from the past 24 hour(s))   GLUCOSE, POC    Collection Time: 01/26/21  8:53 AM   Result Value Ref Range    Glucose (POC) 142 (H) 65 - 100 mg/dL    Performed by COLTON Bird 114, POC    Collection Time: 01/26/21  1:12 PM   Result Value Ref Range    Glucose (POC) 213 (H) 65 - 100 mg/dL    Performed by Lesa Haque    GLUCOSE, POC    Collection Time: 01/26/21  7:08 PM   Result Value Ref Range    Glucose (POC) 302 (H) 65 - 100 mg/dL    Performed by Syeda Mcclure    GLUCOSE, POC    Collection Time: 01/26/21 10:48 PM   Result Value Ref Range    Glucose (POC) 213 (H) 65 - 100 mg/dL    Performed by Syeda Mcclure    CBC WITH AUTOMATED DIFF    Collection Time: 01/27/21  4:50 AM   Result Value Ref Range    WBC 5.9 3.6 - 11.0 K/uL    RBC 3.63 (L) 3.80 - 5.20 M/uL    HGB 7.9 (L) 11.5 - 16.0 g/dL    HCT 26.4 (L) 35.0 - 47.0 %    MCV 72.7 (L) 80.0 - 99.0 FL    MCH 21.8 (L) 26.0 - 34.0 PG    MCHC 29.9 (L) 30.0 - 36.5 g/dL    RDW 24.2 (H) 11.5 - 14.5 %    PLATELET 277 (H) 905 - 400 K/uL    MPV 8.4 (L) 8.9 - 12.9 FL    NEUTROPHILS 88 (H) 32 - 75 %    LYMPHOCYTES 10 (L) 12 - 49 %    MONOCYTES 2 (L) 5 - 13 %    EOSINOPHILS 0 0 - 7 %    BASOPHILS 0 0 - 1 %    IMMATURE GRANULOCYTES 0 0.0 - 0.5 %    ABS. NEUTROPHILS 5.2 1.8 - 8.0 K/UL    ABS. LYMPHOCYTES 0.6 (L) 0.8 - 3.5 K/UL    ABS. MONOCYTES 0.1 0.0 - 1.0 K/UL    ABS. EOSINOPHILS 0.0 0.0 - 0.4 K/UL    ABS. BASOPHILS 0.0 0.0 - 0.1 K/UL    ABS. IMM.  GRANS. 0.0 0.00 - 0.04 K/UL    DF AUTOMATED     RENAL FUNCTION PANEL    Collection Time: 01/27/21  4:50 AM   Result Value Ref Range    Sodium 135 (L) 136 - 145 mmol/L    Potassium 3.7 3.5 - 5.1 mmol/L    Chloride 97 97 - 108 mmol/L    CO2 30 21 - 32 mmol/L    Anion gap 8 5 - 15 mmol/L    Glucose 157 (H) 65 - 100 mg/dL    BUN 4 (L) 6 - 20 mg/dL    Creatinine 0.46 (L) 0.55 - 1.02 mg/dL    BUN/Creatinine ratio 9 (L) 12 - 20      GFR est AA >60 >60 ml/min/1.73m2    GFR est non-AA >60 >60 ml/min/1.73m2    Calcium 9.0 8.5 - 10.1 mg/dL    Phosphorus 3.7 2.6 - 4.7 mg/dL    Albumin 3.1 (L) 3.5 - 5.0 g/dL          Diagnostic Images:  CT Results  (Last 48 hours)    None          Current Medications:    Current Facility-Administered Medications:   •  budesonide-formoteroL (SYMBICORT) 160-4.5 mcg/actuation HFA inhaler 2 Puff, 2 Puff, Inhalation, BID RT, Adrianne Marquez MD, 2 Puff at 01/27/21 0733  •  albuterol (PROVENTIL HFA, VENTOLIN HFA, PROAIR HFA) inhaler 2 Puff, 2 Puff, Inhalation, Q6H RT, Ronel Sheth NP, 2 Puff at 01/27/21 0733  •  albuterol-ipratropium (DUO-NEB) 2.5 MG-0.5 MG/3 ML, 3 mL, Nebulization, Q6H PRN, Tavon Rodriguez MD, 3 mL at 01/26/21 2304  •  carvediloL (COREG) tablet 3.125 mg, 3.125 mg, Oral, BID WITH MEALS, Tavon Rodriguez MD, 3.125 mg at 01/26/21 1933  •  0.9% sodium chloride infusion, 50 mL/hr, IntraVENous, CONTINUOUS, Ed Fuentes NP, Last Rate: 50 mL/hr at 01/26/21 1509, 50 mL/hr at 01/26/21 1509  •  pantoprazole (PROTONIX) tablet 40 mg, 40 mg, Oral, BID, Ed Fuentes NP, 40 mg at 01/26/21 2300  •  [Held by provider] lisinopriL (PRINIVIL, ZESTRIL) tablet 20 mg, 20 mg, Oral, BID, Ed Fuentes NP  •  LORazepam (ATIVAN) tablet 1 mg, 1 mg, Oral, QHS, Ed Fuentes NP, 1 mg at 01/26/21 2300  •  acetaminophen (TYLENOL) tablet 650 mg, 650 mg, Oral, Q4H PRN, Ed Fuentes NP  •  ondansetron (ZOFRAN ODT) tablet 4 mg, 4 mg, Oral, Q8H PRN, Ed Fuentes NP  •  ondansetron (ZOFRAN) injection 4 mg, 4 mg, IntraVENous, Q6H PRN, Ed Fuentes, JOSE, 4 mg at 01/26/21 2258  •  insulin lispro (HUMALOG) injection, , SubCUTAneous, AC&HS, Ed Fuentes NP, 4 Units at 01/26/21 2301  •  glucose chewable tablet 16 g, 4 Tab, Oral, PRN, Ed Fuentes NP  •  glucagon (GLUCAGEN) injection 1 mg, 1 mg, IntraMUSCular, PRN, Ed Fuentes NP  •  dextrose (D50W) injection syrg 12.5-25 g, 25-50 mL,  IntraVENous, PRN, Ed Fuentes NP    Current Outpatient Medications:     furosemide (Lasix) 40 mg tablet, Take 1 Tab by mouth daily. , Disp: 30 Tab, Rfl: 0    insulin lispro (HUMALOG) 100 unit/mL injection, INITIATE CORRECTIVE INSULIN PROTOCOL (MAGALI): RX MAGALI Normal Sensitivity (Average weight)  AC (before meals), Q6H, and Q4H CORRECTIONAL SCALE only For Blood Sugar (mg/dl) of :            140-199=2 units           200-249=3 units 250-299=5 units 300-349=7 units 350 or greater = Call MD Give in addition to basal medications. Do Not Hold for NPO  BEDTIME CORRECTIONAL sliding scale when scheduled: 200-249=2 units 250-299=3 units  300-349=4 units 350 or greater = Call MD Give in addition to basal medications. , Disp: 1 Vial, Rfl: 0    lancets misc, Please use as directed, Disp: 1 Each, Rfl: 0    Blood-Glucose Meter monitoring kit, Use as directed, Disp: 1 Kit, Rfl: 0    glucose blood VI test strips (blood glucose test) strip, Use as directed, Disp: 100 Strip, Rfl: 0    budesonide-formoteroL (SYMBICORT) 160-4.5 mcg/actuation HFAA, Take 2 Puffs by inhalation two (2) times a day., Disp: 1 Inhaler, Rfl: 3    albuterol-ipratropium (DUO-NEB) 2.5 mg-0.5 mg/3 ml nebu, 3 mL by Nebulization route every six (6) hours as needed for Wheezing, Shortness of Breath or Cough. , Disp: 120 Nebule, Rfl: 3    pantoprazole (Protonix) 40 mg tablet, Take 1 Tab by mouth daily. , Disp: 30 Tab, Rfl: 0    LORazepam (Ativan) 1 mg tablet, Take 1 Tab by mouth nightly. Max Daily Amount: 1 mg., Disp: 5 Tab, Rfl: 0    lisinopriL (PRINIVIL, ZESTRIL) 20 mg tablet, Take 1 Tab by mouth two (2) times a day., Disp: 180 Tab, Rfl: 2    metFORMIN (GLUCOPHAGE) 500 mg tablet, Take 2 tablets twice a day with meals, Disp: 120 Tab, Rfl: 3    lancets misc, Use to check glucose once a day, Disp: 50 Each, Rfl: 5    glucose blood VI test strips (blood glucose test) strip, by Does Not Apply route two (2) times a day.  Use to check glucose once a day, Disp: 50 Strip, Rfl: 5    potassium chloride (KLOR-CON) 10 mEq tablet, By oral route take one tablet Monday, Wednesday and Friday, Disp: 40 Tab, Rfl: 3    aspirin delayed-release (Aspirin Low Dose) 81 mg tablet, Take  by mouth daily. , Disp: , Rfl:     carvediloL (COREG) 3.125 mg tablet, Take  by mouth two (2) times daily (with meals). , Disp: , Rfl:     methylPREDNISolone (MEDROL DOSEPACK) 4 mg tablet, As directed, Disp: 1 Dose Pack, Rfl: 0    famotidine (PEPCID) 10 mg tablet, Take 1 Tab by mouth nightly., Disp: 30 Tab, Rfl: 0    hydroCHLOROthiazide (HYDRODIURIL) 25 mg tablet, Take 1 Tab by mouth daily. , Disp: 90 Tab, Rfl: 4    ammonium lactate (LAC-HYDRIN) 12 % lotion, Apply  to affected area two (2) times a day. rub in to affected area well, Disp: , Rfl:     ketoconazole (NIZORAL) 2 % topical cream, Apply  to affected area daily. , Disp: , Rfl:        ASSESSMENT:    GI bleed:  -reports blood in stool, hgb 5.4 on admission  -s/p transfused 2u PRBC hgb 7.9  -EGD planned later today  -GI following    Iron Deficiency Anemia:  -hgb 7.9 s/p 2u PRBC  -continue to trend h/h  -EGD planned later today    Acute dehydration:  -continue w/ gentle IV hydration  -Na levels improving    Type 2 DM:  -ac/hs accu check, med dose ssi    Recent Coivd 19:  -continue supplemental O2 4L NC, wean as tolerated    Generalized weakness:  -PT/OT/OOB    PLAN:  -continue to trend h/h  -EGD planned later today  -continue w/ gentle IV hydration  -CM dispo planning  -cbc, bmp in am  -dispo planning 24-48hrs    Full Code  Dvt Prophylaxis  GI Prophylaxis  Home medications reviewed and reconciled      Above treatment plan reviewed and discussed with patient in detail at bedside, all questions answered. Care Plan discussed with:  Interdisciplinary team      Temitope Sheppard NP

## 2021-01-27 NOTE — PROGRESS NOTES
Progress Note    Patient: Shantell Blanco MRN: [id number]  SSN: [id number]    YOB: 1955  Age: 77 y.o.   Sex: female      Admit Date: 1/25/2021    LOS: 1 day     Subjective:   Patient is seen in the emergency room, 4, covid 19 test negative,  Stool still dark, no nausea vomiting no hematemesis, no red blood per rectum, tolerated liquid diet,  Past Medical History:   Diagnosis Date    Acute gastrointestinal hemorrhage 8/28/2020    Allergic rhinitis 8/28/2020    Anemia 8/28/2020    Benign essential hypertension 6/25/2020    Body mass index 30.0-30.9, adult 6/25/2020    CAD (coronary artery disease) 8/28/2020    Foot callus 8/28/2020    GERD (gastroesophageal reflux disease) 6/25/2020    Heart murmur 6/25/2020    Hx of colonoscopy 01/01/2015    Done for anemia and rectal bleeding    Left bundle branch block 8/28/2020    Microalbuminuria due to type 2 diabetes mellitus ([facility] [address] 8/28/2020    Mixed hyperlipidemia 6/25/2020    Morbid obesity ([facility] [address] 8/28/2020    Tobacco dependence syndrome 6/25/2020    Type II diabetes mellitus, uncontrolled ([facility] [address] 6/25/2020        Current Facility-Administered Medications:     budesonide-formoteroL (SYMBICORT) 160-4.5 mcg/actuation HFA inhaler 2 Puff, 2 Puff, Inhalation, BID RT, Candelaria Campbell MD, 2 Puff at 01/26/21 2000    albuterol (PROVENTIL HFA, VENTOLIN HFA, PROAIR HFA) inhaler 2 Puff, 2 Puff, Inhalation, Q6H RT, Ronel Sheth NP, 2 Puff at 01/26/21 2000    albuterol-ipratropium (DUO-NEB) 2.5 MG-0.5 MG/3 ML, 3 mL, Nebulization, Q6H PRN, Barney Angel MD, 3 mL at 01/26/21 1712    carvediloL (COREG) tablet 3.125 mg, 3.125 mg, Oral, BID WITH MEALS, Barney Angel MD, 3.125 mg at 01/26/21 1933    0.9% sodium chloride infusion, 50 mL/hr, IntraVENous, CONTINUOUS, Ed Fuentes NP, Last Rate: 50 mL/hr at 01/26/21 1509, 50 mL/hr at 01/26/21 1509    pantoprazole (PROTONIX) tablet 40 mg, 40 mg, Oral, BID, Ed Fuentes NP, 40 mg at 01/26/21 1003    [Held by provider] lisinopriL (PRINIVIL, ZESTRIL) tablet 20 mg, 20 mg, Oral, BID, Ed Fuentes NP    LORazepam (ATIVAN) tablet 1 mg, 1 mg, Oral, QHS, Ed Fuentes, NP, 1 mg at 01/25/21 2228    acetaminophen (TYLENOL) tablet 650 mg, 650 mg, Oral, Q4H PRN, Ed Fuentes NP    ondansetron (ZOFRAN ODT) tablet 4 mg, 4 mg, Oral, Q8H PRN, Ed Fuentes NP    ondansetron [facility]) injection 4 mg, 4 mg, IntraVENous, Q6H PRN, Ed Fuentes, NP    insulin lispro (HUMALOG) injection, , SubCUTAneous, AC&HS, Ed Fuentes, JOSE, 8 Units at 01/26/21 1914    glucose chewable tablet 16 g, 4 Tab, Oral, PRN, Ed Fuentes, NP    glucagon (GLUCAGEN) injection 1 mg, 1 mg, IntraMUSCular, PRN, Ed Fuentes, NP    dextrose (D50W) injection syrg 12.5-25 g, 25-50 mL, IntraVENous, PRN, Ed Fuentes, JOSE    Current Outpatient Medications:     furosemide (Lasix) 40 mg tablet, Take 1 Tab by mouth daily. , Disp: 30 Tab, Rfl: 0    insulin lispro (HUMALOG) 100 unit/mL injection, INITIATE CORRECTIVE INSULIN PROTOCOL (MAGALI): RX MAGALI Normal Sensitivity (Average weight)  AC (before meals), Q6H, and Q4H CORRECTIONAL SCALE only For Blood Sugar (mg/dl) of :            140-199=2 units           200-249=3 units 250-299=5 units 300-349=7 units 350 or greater = Call MD Give in addition to basal medications. Do Not Hold for NPO  BEDTIME CORRECTIONAL sliding scale when scheduled: 200-249=2 units 250-299=3 units  300-349=4 units 350 or greater = Call MD Give in addition to basal medications. , Disp: 1 Vial, Rfl: 0    lancets misc, Please use as directed, Disp: 1 Each, Rfl: 0    Blood-Glucose Meter monitoring kit, Use as directed, Disp: 1 Kit, Rfl: 0    glucose blood VI test strips (blood glucose test) strip, Use as directed, Disp: 100 Strip, Rfl: 0    budesonide-formoteroL (SYMBICORT) 160-4.5 mcg/actuation HFAA, Take 2 Puffs by inhalation two (2) times a day., Disp: 1 Inhaler, Rfl: 3    albuterol-ipratropium (DUO-NEB) 2.5 mg-0.5 mg/3 ml nebu, 3 mL by Nebulization route every six (6) hours as needed for Wheezing, Shortness of Breath or Cough. , Disp: 120 Nebule, Rfl: 3    pantoprazole (Protonix) 40 mg tablet, Take 1 Tab by mouth daily. , Disp: 30 Tab, Rfl: 0    LORazepam (Ativan) 1 mg tablet, Take 1 Tab by mouth nightly. Max Daily Amount: 1 mg., Disp: 5 Tab, Rfl: 0    lisinopriL (PRINIVIL, ZESTRIL) 20 mg tablet, Take 1 Tab by mouth two (2) times a day., Disp: 180 Tab, Rfl: 2    metFORMIN (GLUCOPHAGE) 500 mg tablet, Take 2 tablets twice a day with meals, Disp: 120 Tab, Rfl: 3    lancets misc, Use to check glucose once a day, Disp: 50 Each, Rfl: 5    glucose blood VI test strips (blood glucose test) strip, by Does Not Apply route two (2) times a day. Use to check glucose once a day, Disp: 50 Strip, Rfl: 5    potassium chloride (KLOR-CON) 10 mEq tablet, By oral route take one tablet Monday, Wednesday and Friday, Disp: 40 Tab, Rfl: 3    aspirin delayed-release (Aspirin Low Dose) 81 mg tablet, Take  by mouth daily. , Disp: , Rfl:     carvediloL (COREG) 3.125 mg tablet, Take  by mouth two (2) times daily (with meals). , Disp: , Rfl:     methylPREDNISolone (MEDROL DOSEPACK) 4 mg tablet, As directed, Disp: 1 Dose Pack, Rfl: 0    famotidine (PEPCID) 10 mg tablet, Take 1 Tab by mouth nightly., Disp: 30 Tab, Rfl: 0    hydroCHLOROthiazide (HYDRODIURIL) 25 mg tablet, Take 1 Tab by mouth daily. , Disp: 90 Tab, Rfl: 4    ammonium lactate (LAC-HYDRIN) 12 % lotion, Apply  to affected area two (2) times a day. rub in to affected area well, Disp: , Rfl:     ketoconazole (NIZORAL) 2 % topical cream, Apply  to affected area daily. , Disp: , Rfl:     Objective:     Vitals:    01/26/21 1914 01/26/21 1923 01/26/21 1933 01/26/21 2003   BP: 132/79  132/79 134/84   Pulse: (!) 111 97 (!) 106 (!) 114   Resp: 18 20  22   Temp:       SpO2: 94% 100%  92%   Weight:       Height:            Intake and Output:  Current Shift: No intake/output data recorded. Last three shifts: 01/25 0701 - 01/26 1900  In: 2660 [P.O.:1440]  Out: -     Physical Exam:   Physical Exam   Constitutional: She is oriented to person, place, and time. She appears distressed. HENT:   Head: Atraumatic. Eyes: Pupils are equal, round, and reactive to light. Conjunctivae and EOM are normal.   Neck: No JVD present. No tracheal deviation present. Cardiovascular: Normal heart sounds. Pulmonary/Chest: No respiratory distress. She has no wheezes. Abdominal: Bowel sounds are normal. She exhibits no distension and no mass. There is abdominal tenderness. There is no rebound and no guarding. Musculoskeletal:         General: No tenderness, deformity or edema. Lymphadenopathy:     She has no cervical adenopathy. Neurological: She is oriented to person, place, and time. Skin: Skin is dry.    Psychiatric: Her behavior is normal.        Lab/Data Review:  Recent Results (from the past 24 hour(s))   GLUCOSE, POC    Collection Time: 01/25/21 11:41 PM   Result Value Ref Range    Glucose (POC) 245 (H) 65 - 100 mg/dL    Performed by Asim Beckford    CBC W/O DIFF    Collection Time: 01/26/21  4:40 AM   Result Value Ref Range    WBC 7.4 3.6 - 11.0 K/uL    RBC 3.71 (L) 3.80 - 5.20 M/uL    HGB 8.1 (L) 11.5 - 16.0 g/dL    HCT 27.1 (L) 35.0 - 47.0 %    MCV 73.0 (L) 80.0 - 99.0 FL    MCH 21.8 (L) 26.0 - 34.0 PG    MCHC 29.9 (L) 30.0 - 36.5 g/dL    RDW 24.2 (H) 11.5 - 14.5 %    PLATELET 311 (H) 230 - 400 K/uL    MPV 8.2 (L) 8.9 - 72.4 FL   METABOLIC PANEL, BASIC    Collection Time: 01/26/21  4:40 AM   Result Value Ref Range    Sodium 132 (L) 136 - 145 mmol/L    Potassium 3.6 3.5 - 5.1 mmol/L    Chloride 96 (L) 97 - 108 mmol/L    CO2 31 21 - 32 mmol/L    Anion gap 5 5 - 15 mmol/L    Glucose 112 (H) 65 - 100 mg/dL    BUN 6 6 - 20 mg/dL    Creatinine 0.48 (L) 0.55 - 1.02 mg/dL    BUN/Creatinine ratio 13 12 - 20      GFR est AA >60 >60 ml/min/1.73m2    GFR est non-AA >60 >60 ml/min/1.73m2    Calcium 9.1 8.5 - 10.1 mg/dL   GLUCOSE, POC    Collection Time: 01/26/21  8:53 AM   Result Value Ref Range    Glucose (POC) 142 (H) 65 - 100 mg/dL    Performed by COLTON Bird 114, POC    Collection Time: 01/26/21  1:12 PM   Result Value Ref Range    Glucose (POC) 213 (H) 65 - 100 mg/dL    Performed by Carlos A Arias, POC    Collection Time: 01/26/21  7:08 PM   Result Value Ref Range    Glucose (POC) 302 (H) 65 - 100 mg/dL    Performed by Asim Beckford         XR CHEST PORT   Final Result   Minimal residual right basilar opacity with resolved left basilar   opacity. Assessment:     Principal Problem:    GI bleed (1/25/2021)    Acute anemia- secondary to GI bleed,      Hx of aortic stenosis- planned for valve replacement,    Hx of CAD- has 3 vessel disease, was planned   Was on antiplatelet agent again  Cardiology consultation is appreciated,    Plan:   transfuse 2 units PRBCs, repeat cbc in am   hold asa, no NSAIDS,   PPI bid.   Will  schedule pt for EGD for the evaluation of upper GI bleeding in the am  Indication, risks, and options discussed with patient    Plan:       Signed By: Lj South MD     January 26, 2021        Thank you for allowing me to participate in this patients care  Cc Referring Physician   Franky Pedroza DO

## 2021-01-27 NOTE — ANESTHESIA PREPROCEDURE EVALUATION
Relevant Problems   RESPIRATORY SYSTEM   (+) COPD exacerbation (HCC)      CARDIOVASCULAR   (+) Benign essential hypertension   (+) CAD (coronary artery disease)   (+) Heart murmur   (+) Left bundle branch block      GASTROINTESTINAL   (+) GERD (gastroesophageal reflux disease)      RENAL FAILURE   (+) Microalbuminuria due to type 2 diabetes mellitus (HCC)      ENDOCRINE   (+) Morbid obesity (HCC)      HEMATOLOGY   (+) Anemia       Anesthetic History   No history of anesthetic complications            Review of Systems / Medical History  Patient summary reviewed, nursing notes reviewed and pertinent labs reviewed    Pulmonary    COPD               Neuro/Psych   Within defined limits           Cardiovascular    Hypertension        Dysrhythmias   CAD         GI/Hepatic/Renal     GERD           Endo/Other    Diabetes: type 2, using insulin    Obesity     Other Findings   Comments: Medical History  Benign essential hypertension  Body mass index 30.0-30.9, adult  Type II diabetes mellitus, uncontrolled (HCC)  GERD (gastroesophageal reflux disease)  Heart murmur  Mixed hyperlipidemia  Tobacco dependence syndrome  Acute gastrointestinal hemorrhage  Allergic rhinitis  Anemia  CAD (coronary artery disease)  Foot callus  Left bundle branch block  Microalbuminuria due to type 2 diabetes mellitus (HCC)  Morbid obesity (HCC)  Hx of colonoscopy             Physical Exam    Airway  Mallampati: III  TM Distance: 4 - 6 cm  Neck ROM: normal range of motion   Mouth opening: Normal     Cardiovascular    Rhythm: regular  Rate: normal         Dental  No notable dental hx       Pulmonary  Breath sounds clear to auscultation               Abdominal  GI exam deferred       Other Findings   Comments: Results for Whitley Love (MRN [id number]) as of 1/27/2021 13:23    1/27/2021 04:50  WBC: 5.9  RBC: 3.63 (L)  HGB: 7.9 (L)  HCT: 26.4 (L)  MCV: 72.7 (L)  MCH: 21.8 (L)  MCHC: 29.9 (L)  RDW: 24.2 (H)  PLATELET: 083 (H)  MPV: 8.4 (L)  NEUTROPHILS: 88 (H)  LYMPHOCYTES: 10 (L)  MONOCYTES: 2 (L)  EOSINOPHILS: 0  BASOPHILS: 0  IMMATURE GRANULOCYTES: 0  DF: AUTOMATED  ABS. NEUTROPHILS: 5.2  ABS. IMM. GRANS.: 0.0  ABS. LYMPHOCYTES: 0.6 (L)  ABS. MONOCYTES: 0.1  ABS. EOSINOPHILS: 0.0  ABS. BASOPHILS: 0.0    Results for Justina Dickson (MRN [id number]) as of 1/27/2021 13:23    1/27/2021 04:50  Sodium: 135 (L)  Potassium: 3.7  Chloride: 97  CO2: 30  Anion gap: 8  Glucose: 157 (H)  BUN: 4 (L)  Creatinine: 0.46 (L)  BUN/Creatinine ratio: 9 (L)  Calcium: 9.0  Phosphorus: 3.7  GFR est non-AA: >60  GFR est AA: >60  Albumin: 3.1 (L)         Anesthetic Plan    ASA: 4  Anesthesia type: total IV anesthesia and general          Induction: Intravenous  Anesthetic plan and risks discussed with: Patient      General anesthesia was prescribed for this patient because by definition it is \"a drug-induced loss of consciousness during which patients are not arousable, even by painful stimulation. \" Sometimes, the ability to independently maintain ventilatory function is often impaired and patients often require assistance in maintaining a patent airway. Occasionally, positive pressure ventilation may be required because of depressed spontaneous ventilation or drug-induced depression of neuromuscular function. This depth of anesthesia is preferred for endoscopic procedures to facilitate the procedure and for patient safety/quality of care.

## 2021-01-28 LAB
ALBUMIN SERPL-MCNC: 3 G/DL (ref 3.5–5)
ALBUMIN/GLOB SERPL: 0.8 {RATIO} (ref 1.1–2.2)
ALP SERPL-CCNC: 64 U/L (ref 45–117)
ALT SERPL-CCNC: 23 U/L (ref 12–78)
ANION GAP SERPL CALC-SCNC: 6 MMOL/L (ref 5–15)
APPEARANCE UR: ABNORMAL
APTT PPP: 28.6 SEC (ref 23–35.7)
ARTERIAL PATENCY WRIST A: ABNORMAL
AST SERPL W P-5'-P-CCNC: 22 U/L (ref 15–37)
ATRIAL RATE: 68 BPM
BACTERIA URNS QL MICRO: NEGATIVE /HPF
BASE EXCESS BLDA CALC-SCNC: 2.7 MMOL/L (ref 0–2)
BASOPHILS # BLD: 0 K/UL (ref 0–0.1)
BASOPHILS NFR BLD: 0 % (ref 0–1)
BDY SITE: ABNORMAL
BILIRUB SERPL-MCNC: 0.6 MG/DL (ref 0.2–1)
BILIRUB UR QL: NEGATIVE
BNP SERPL-MCNC: 1194 PG/ML
BUN SERPL-MCNC: 8 MG/DL (ref 6–20)
BUN/CREAT SERPL: 11 (ref 12–20)
CA-I BLD-MCNC: 8.3 MG/DL (ref 8.5–10.1)
CALCULATED P AXIS, ECG09: 53 DEGREES
CALCULATED R AXIS, ECG10: -2 DEGREES
CALCULATED T AXIS, ECG11: 103 DEGREES
CHLORIDE SERPL-SCNC: 98 MMOL/L (ref 97–108)
CO2 SERPL-SCNC: 32 MMOL/L (ref 21–32)
COLOR UR: ABNORMAL
CREAT SERPL-MCNC: 0.72 MG/DL (ref 0.55–1.02)
DIAGNOSIS, 93000: NORMAL
DIFFERENTIAL METHOD BLD: ABNORMAL
EOSINOPHIL # BLD: 0 K/UL (ref 0–0.4)
EOSINOPHIL NFR BLD: 0 % (ref 0–7)
EPAP/CPAP/PEEP, PAPEEP: 8
ERYTHROCYTE [DISTWIDTH] IN BLOOD BY AUTOMATED COUNT: 24.9 % (ref 11.5–14.5)
FIO2 ON VENT: 100 %
GAS FLOW.O2 SETTING OXYMISER: 12 L/MIN
GLOBULIN SER CALC-MCNC: 3.9 G/DL (ref 2–4)
GLUCOSE BLD STRIP.AUTO-MCNC: 124 MG/DL (ref 65–100)
GLUCOSE BLD STRIP.AUTO-MCNC: 134 MG/DL (ref 65–100)
GLUCOSE BLD STRIP.AUTO-MCNC: 147 MG/DL (ref 65–100)
GLUCOSE BLD STRIP.AUTO-MCNC: 151 MG/DL (ref 65–100)
GLUCOSE SERPL-MCNC: 171 MG/DL (ref 65–100)
GLUCOSE UR STRIP.AUTO-MCNC: 150 MG/DL
HCO3 BLDA-SCNC: 27 MMOL/L (ref 22–26)
HCT VFR BLD AUTO: 29.2 % (ref 35–47)
HGB BLD-MCNC: 8.4 G/DL (ref 11.5–16)
HGB UR QL STRIP: NEGATIVE
IMM GRANULOCYTES # BLD AUTO: 0.1 K/UL (ref 0–0.04)
IMM GRANULOCYTES NFR BLD AUTO: 1 % (ref 0–0.5)
INR PPP: 1.1 (ref 0.9–1.1)
KETONES UR QL STRIP.AUTO: NEGATIVE MG/DL
LACTATE SERPL-SCNC: 1.3 MMOL/L (ref 0.4–2)
LEUKOCYTE ESTERASE UR QL STRIP.AUTO: NEGATIVE
LYMPHOCYTES # BLD: 0.9 K/UL (ref 0.8–3.5)
LYMPHOCYTES NFR BLD: 7 % (ref 12–49)
MAGNESIUM SERPL-MCNC: 1.8 MG/DL (ref 1.6–2.4)
MCH RBC QN AUTO: 21.4 PG (ref 26–34)
MCHC RBC AUTO-ENTMCNC: 28.8 G/DL (ref 30–36.5)
MCV RBC AUTO: 74.3 FL (ref 80–99)
MONOCYTES # BLD: 0.9 K/UL (ref 0–1)
MONOCYTES NFR BLD: 7 % (ref 5–13)
MRSA DNA SPEC QL NAA+PROBE: NOT DETECTED
MUCOUS THREADS URNS QL MICRO: ABNORMAL /LPF
NEUTS SEG # BLD: 11.3 K/UL (ref 1.8–8)
NEUTS SEG NFR BLD: 85 % (ref 32–75)
NITRITE UR QL STRIP.AUTO: NEGATIVE
NRBC # BLD: 0.42 K/UL (ref 0–0.01)
NRBC BLD-RTO: 3.2 PER 100 WBC
OTHER URINE MICRO,5051: <1
P-R INTERVAL, ECG05: 118 MS
PCO2 BLDA: 68 MMHG (ref 35–45)
PERFORMED BY, TECHID: ABNORMAL
PH BLDA: 7.26 [PH] (ref 7.35–7.45)
PH UR STRIP: 5 [PH] (ref 5–8)
PLATELET # BLD AUTO: 590 K/UL (ref 150–400)
PMV BLD AUTO: 8.4 FL (ref 8.9–12.9)
PO2 BLDA: 81 MMHG (ref 75–100)
POTASSIUM SERPL-SCNC: 3.4 MMOL/L (ref 3.5–5.1)
PROT SERPL-MCNC: 6.9 G/DL (ref 6.4–8.2)
PROT UR STRIP-MCNC: 100 MG/DL
PROTHROMBIN TIME: 14.4 SEC (ref 11.9–14.7)
Q-T INTERVAL, ECG07: 394 MS
QRS DURATION, ECG06: 132 MS
QTC CALCULATION (BEZET), ECG08: 418 MS
RBC # BLD AUTO: 3.93 M/UL (ref 3.8–5.2)
RBC #/AREA URNS HPF: ABNORMAL /HPF (ref 0–5)
SAO2 % BLD: 96 %
SAO2% DEVICE SAO2% SENSOR NAME: ABNORMAL
SODIUM SERPL-SCNC: 136 MMOL/L (ref 136–145)
SP GR UR REFRACTOMETRY: >1.03 (ref 1–1.03)
THERAPEUTIC RANGE,PTTT: NORMAL SEC (ref 68–109)
TROPONIN I SERPL-MCNC: <0.05 NG/ML
UROBILINOGEN UR QL STRIP.AUTO: 2 EU/DL (ref 0.1–1)
VENTILATION MODE VENT: ABNORMAL
VENTRICULAR RATE, ECG03: 68 BPM
VT SETTING VENT: 500 ML
WBC # BLD AUTO: 13.1 K/UL (ref 3.6–11)
WBC URNS QL MICRO: ABNORMAL /HPF (ref 0–4)

## 2021-01-28 PROCEDURE — 77010033678 HC OXYGEN DAILY

## 2021-01-28 PROCEDURE — 3E033XZ INTRODUCTION OF VASOPRESSOR INTO PERIPHERAL VEIN, PERCUTANEOUS APPROACH: ICD-10-PCS | Performed by: INTERNAL MEDICINE

## 2021-01-28 PROCEDURE — 83880 ASSAY OF NATRIURETIC PEPTIDE: CPT

## 2021-01-28 PROCEDURE — 74011000250 HC RX REV CODE- 250

## 2021-01-28 PROCEDURE — 74011250637 HC RX REV CODE- 250/637: Performed by: NURSE PRACTITIONER

## 2021-01-28 PROCEDURE — 74011250636 HC RX REV CODE- 250/636: Performed by: NURSE PRACTITIONER

## 2021-01-28 PROCEDURE — 85730 THROMBOPLASTIN TIME PARTIAL: CPT

## 2021-01-28 PROCEDURE — 87641 MR-STAPH DNA AMP PROBE: CPT

## 2021-01-28 PROCEDURE — 74011000250 HC RX REV CODE- 250: Performed by: INTERNAL MEDICINE

## 2021-01-28 PROCEDURE — 85610 PROTHROMBIN TIME: CPT

## 2021-01-28 PROCEDURE — 83735 ASSAY OF MAGNESIUM: CPT

## 2021-01-28 PROCEDURE — 74011250636 HC RX REV CODE- 250/636: Performed by: INTERNAL MEDICINE

## 2021-01-28 PROCEDURE — 74011000636 HC RX REV CODE- 636: Performed by: HOSPITALIST

## 2021-01-28 PROCEDURE — 94003 VENT MGMT INPAT SUBQ DAY: CPT

## 2021-01-28 PROCEDURE — 82803 BLOOD GASES ANY COMBINATION: CPT

## 2021-01-28 PROCEDURE — 81001 URINALYSIS AUTO W/SCOPE: CPT

## 2021-01-28 PROCEDURE — 82962 GLUCOSE BLOOD TEST: CPT

## 2021-01-28 PROCEDURE — 74011250636 HC RX REV CODE- 250/636: Performed by: HOSPITALIST

## 2021-01-28 PROCEDURE — 65610000006 HC RM INTENSIVE CARE

## 2021-01-28 PROCEDURE — 94002 VENT MGMT INPAT INIT DAY: CPT

## 2021-01-28 PROCEDURE — 87070 CULTURE OTHR SPECIMN AEROBIC: CPT

## 2021-01-28 PROCEDURE — 87086 URINE CULTURE/COLONY COUNT: CPT

## 2021-01-28 PROCEDURE — 36415 COLL VENOUS BLD VENIPUNCTURE: CPT

## 2021-01-28 PROCEDURE — 74011250637 HC RX REV CODE- 250/637: Performed by: INTERNAL MEDICINE

## 2021-01-28 PROCEDURE — 74011250636 HC RX REV CODE- 250/636

## 2021-01-28 PROCEDURE — 93005 ELECTROCARDIOGRAM TRACING: CPT

## 2021-01-28 PROCEDURE — 85025 COMPLETE CBC W/AUTO DIFF WBC: CPT

## 2021-01-28 PROCEDURE — 74011000258 HC RX REV CODE- 258: Performed by: INTERNAL MEDICINE

## 2021-01-28 PROCEDURE — 5A1935Z RESPIRATORY VENTILATION, LESS THAN 24 CONSECUTIVE HOURS: ICD-10-PCS | Performed by: INTERNAL MEDICINE

## 2021-01-28 PROCEDURE — 0BH17EZ INSERTION OF ENDOTRACHEAL AIRWAY INTO TRACHEA, VIA NATURAL OR ARTIFICIAL OPENING: ICD-10-PCS | Performed by: INTERNAL MEDICINE

## 2021-01-28 PROCEDURE — 94640 AIRWAY INHALATION TREATMENT: CPT

## 2021-01-28 RX ORDER — FUROSEMIDE 10 MG/ML
40 INJECTION INTRAMUSCULAR; INTRAVENOUS ONCE
Status: COMPLETED | OUTPATIENT
Start: 2021-01-28 | End: 2021-01-28

## 2021-01-28 RX ORDER — IPRATROPIUM BROMIDE AND ALBUTEROL SULFATE 2.5; .5 MG/3ML; MG/3ML
3 SOLUTION RESPIRATORY (INHALATION)
Status: DISCONTINUED | OUTPATIENT
Start: 2021-01-28 | End: 2021-02-05 | Stop reason: HOSPADM

## 2021-01-28 RX ORDER — BUDESONIDE 0.5 MG/2ML
500 INHALANT ORAL
Status: DISCONTINUED | OUTPATIENT
Start: 2021-01-28 | End: 2021-02-05 | Stop reason: HOSPADM

## 2021-01-28 RX ORDER — ETOMIDATE 2 MG/ML
10 INJECTION INTRAVENOUS ONCE
Status: COMPLETED | OUTPATIENT
Start: 2021-01-28 | End: 2021-01-27

## 2021-01-28 RX ORDER — SUCCINYLCHOLINE CHLORIDE 20 MG/ML
60 INJECTION INTRAMUSCULAR; INTRAVENOUS
Status: COMPLETED | OUTPATIENT
Start: 2021-01-28 | End: 2021-01-27

## 2021-01-28 RX ORDER — FUROSEMIDE 10 MG/ML
INJECTION INTRAMUSCULAR; INTRAVENOUS
Status: COMPLETED
Start: 2021-01-28 | End: 2021-01-28

## 2021-01-28 RX ORDER — PROPOFOL 10 MG/ML
INJECTION, EMULSION INTRAVENOUS
Status: COMPLETED
Start: 2021-01-28 | End: 2021-01-28

## 2021-01-28 RX ORDER — PROPOFOL 10 MG/ML
0-50 VIAL (ML) INTRAVENOUS
Status: DISCONTINUED | OUTPATIENT
Start: 2021-01-28 | End: 2021-01-30

## 2021-01-28 RX ORDER — FUROSEMIDE 10 MG/ML
20 INJECTION INTRAMUSCULAR; INTRAVENOUS EVERY 12 HOURS
Status: DISCONTINUED | OUTPATIENT
Start: 2021-01-28 | End: 2021-01-29

## 2021-01-28 RX ORDER — PIPERACILLIN SODIUM, TAZOBACTAM SODIUM 3; .375 G/15ML; G/15ML
INJECTION, POWDER, LYOPHILIZED, FOR SOLUTION INTRAVENOUS
Status: DISPENSED
Start: 2021-01-28 | End: 2021-01-29

## 2021-01-28 RX ORDER — BUDESONIDE 0.5 MG/2ML
INHALANT ORAL
Status: COMPLETED
Start: 2021-01-28 | End: 2021-01-28

## 2021-01-28 RX ADMIN — FUROSEMIDE 40 MG: 10 INJECTION INTRAMUSCULAR; INTRAVENOUS at 01:06

## 2021-01-28 RX ADMIN — CARVEDILOL 3.12 MG: 3.12 TABLET, FILM COATED ORAL at 17:00

## 2021-01-28 RX ADMIN — PIPERACILLIN SODIUM AND TAZOBACTAM SODIUM 3.38 G: 3; .375 INJECTION, POWDER, LYOPHILIZED, FOR SOLUTION INTRAVENOUS at 12:00

## 2021-01-28 RX ADMIN — IPRATROPIUM BROMIDE AND ALBUTEROL SULFATE 3 ML: .5; 3 SOLUTION RESPIRATORY (INHALATION) at 08:55

## 2021-01-28 RX ADMIN — IOPAMIDOL 100 ML: 755 INJECTION, SOLUTION INTRAVENOUS at 00:05

## 2021-01-28 RX ADMIN — PROPOFOL INJECTABLE EMULSION 40 MCG/KG/MIN: 10 INJECTION, EMULSION INTRAVENOUS at 06:29

## 2021-01-28 RX ADMIN — IPRATROPIUM BROMIDE AND ALBUTEROL SULFATE 3 ML: .5; 3 SOLUTION RESPIRATORY (INHALATION) at 19:43

## 2021-01-28 RX ADMIN — FUROSEMIDE 20 MG: 10 INJECTION, SOLUTION INTRAMUSCULAR; INTRAVENOUS at 20:33

## 2021-01-28 RX ADMIN — PROPOFOL INJECTABLE EMULSION 40 MCG/KG/MIN: 10 INJECTION, EMULSION INTRAVENOUS at 22:38

## 2021-01-28 RX ADMIN — PROPOFOL INJECTABLE EMULSION 50 MCG/KG/MIN: 10 INJECTION, EMULSION INTRAVENOUS at 00:55

## 2021-01-28 RX ADMIN — PIPERACILLIN SODIUM AND TAZOBACTAM SODIUM 3.38 G: 3; .375 INJECTION, POWDER, LYOPHILIZED, FOR SOLUTION INTRAVENOUS at 20:32

## 2021-01-28 RX ADMIN — PROPOFOL INJECTABLE EMULSION 40 MCG/KG/MIN: 10 INJECTION, EMULSION INTRAVENOUS at 18:00

## 2021-01-28 RX ADMIN — IPRATROPIUM BROMIDE AND ALBUTEROL SULFATE 3 ML: .5; 3 SOLUTION RESPIRATORY (INHALATION) at 13:39

## 2021-01-28 RX ADMIN — PROPOFOL INJECTABLE EMULSION 40 MCG/KG/MIN: 10 INJECTION, EMULSION INTRAVENOUS at 04:22

## 2021-01-28 RX ADMIN — LORAZEPAM 1 MG: 1 TABLET ORAL at 22:33

## 2021-01-28 RX ADMIN — PANTOPRAZOLE SODIUM 40 MG: 40 TABLET, DELAYED RELEASE ORAL at 09:16

## 2021-01-28 RX ADMIN — FUROSEMIDE 40 MG: 10 INJECTION, SOLUTION INTRAMUSCULAR; INTRAVENOUS at 01:06

## 2021-01-28 RX ADMIN — SODIUM CHLORIDE 50 ML/HR: 9 INJECTION, SOLUTION INTRAVENOUS at 09:25

## 2021-01-28 RX ADMIN — BUDESONIDE 500 MCG: 0.5 INHALANT RESPIRATORY (INHALATION) at 20:00

## 2021-01-28 RX ADMIN — BUDESONIDE: 0.5 SUSPENSION RESPIRATORY (INHALATION) at 10:00

## 2021-01-28 RX ADMIN — PROPOFOL INJECTABLE EMULSION 40 MCG/KG/MIN: 10 INJECTION, EMULSION INTRAVENOUS at 08:33

## 2021-01-28 RX ADMIN — PROPOFOL INJECTABLE EMULSION 40 MCG/KG/MIN: 10 INJECTION, EMULSION INTRAVENOUS at 14:00

## 2021-01-28 RX ADMIN — PROPOFOL INJECTABLE EMULSION 40 MCG/KG/MIN: 10 INJECTION, EMULSION INTRAVENOUS at 23:42

## 2021-01-28 RX ADMIN — BUDESONIDE 500 MCG: 0.5 INHALANT RESPIRATORY (INHALATION) at 08:55

## 2021-01-28 RX ADMIN — PROPOFOL INJECTABLE EMULSION 50 MCG/KG/MIN: 10 INJECTION, EMULSION INTRAVENOUS at 02:45

## 2021-01-28 NOTE — PROGRESS NOTES
Pt SOB on exertion and states she just cannot take golytely ordered by Dr. Alecia Moreno and be getting up and down to the [facility], that she will get so SOB she will get hysterical. Notified Juan Cain and ordered 2 enema's instead.

## 2021-01-28 NOTE — PROGRESS NOTES
Bedside shift change report given to Pb Powell RN (oncoming nurse) by Wili Valera RN (offgoing nurse). Report included the following information SBAR.

## 2021-01-28 NOTE — PROGRESS NOTES
Patient received from [facility] intubated and ventilated. HR 105bpm, spo2 94%,/67 mmHg.   Primary Nurse Kosta Doyle RN and Antonia Garcia RN performed a dual skin assessment on this patient No impairment noted  Deuce score is 12

## 2021-01-28 NOTE — PROGRESS NOTES
Comprehensive Nutrition Assessment    Type and Reason for Visit: Initial(intubation)    Nutrition Recommendations/Plan:   Initiate TF via OG of Promote at 25mL/hr, continuous  Add 2 pkt Prosource BID (240kcal, 60g protein)  Flush with 150mL free water q4hr  Providing 840kcal, 98g protein, 1403mL fluid (60%kcal, 86%pro, 78%fluid needs)        Propofol providing 718kcal/day (113%kcal needs with TF)    Document TF rate, protein modular provision, water flushes, and GRVs in EMR    Nutrition Assessment:  Recent dx COVID 1x month ago. COVID negative. Anemia 2/2 GIB pta. S/p multiple units PRBC. EGD (1/27) finding acute gastritis. Enema provided last night, patient developed hypoxia and required intubation. Now in ICU intubated, sedated Labs: Na 135, BUN 4, Cr 0.46, , BG . Meds: IVF, Propofol at 50mcg/kg/min, zosyn, PPI.    Malnutrition Assessment:  Malnutrition Status:  Moderate malnutrition    Context:  Acute illness     Findings of the 6 clinical characteristics of malnutrition:   Energy Intake:  1 - 75% or less of est energy req for 7 or more days(poor PO x3 days)  Weight Loss:  No significant weight loss     Body Fat Loss:  No significant body fat loss,     Muscle Mass Loss:  1 - Mild muscle mass loss, Temples (temporalis)  Fluid Accumulation:  No significant fluid accumulation,      Estimated Daily Nutrient Needs:  Energy (kcal): 1000-1300kcal (11-14kcal/kg); Weight Used for Energy Requirements: Current  Protein (g): 114g (2g/kg IBW); Weight Used for Protein Requirements: Ideal  Fluid (ml/day): 1800mL (20mL/kg); Method Used for Fluid Requirements: ml/kg      Nutrition Related Findings:  NFPE finding mild muscle wasting. Daughter at bedside denies dysphagia, n/v, or c/d. Last BM 1/27. No edema per EMR, however pt clearly fluid overloaded.      Wounds:    None       Current Nutrition Therapies:  DIET NPO    Anthropometric Measures:  · Height:  5' 5\" (165.1 cm)  · Current Body Wt:  90.7 kg (199 lb 15.3  oz)(1/25)   · Ideal Body Wt:  125 lbs:  160 %   · BMI Category:  Obese class 1 (BMI 30.0-34. 9)     Limited wt hx indicates general wt stability- noted wt gain and subsequent loss, likely 2/2 fluid status. Pt daughter endorses wt stability. Wt Readings from Last 10 Encounters:   01/25/21 90.7 kg (200 lb)   01/01/21 90.7 kg (200 lb)   12/18/20 97.4 kg (214 lb 11.7 oz)   08/28/20 89.3 kg (196 lb 14.4 oz)   02/04/20 89.8 kg (198 lb)       Nutrition Diagnosis:   · Inadequate oral intake related to impaired respiratory function as evidenced by intubation    Nutrition Interventions:   Food and/or Nutrient Delivery: Continue NPO, Start tube feeding  Nutrition Education and Counseling: No recommendations at this time  Coordination of Nutrition Care: Continue to monitor while inpatient    Goals:  Meet >75% EENs via TF  Wt maintenance +/- 0.5kg per week  Maintain skin integrity       Nutrition Monitoring and Evaluation:   Behavioral-Environmental Outcomes: None identified  Food/Nutrient Intake Outcomes: Enteral nutrition intake/tolerance  Physical Signs/Symptoms Outcomes: None identified    Discharge Planning:     Too soon to determine     Electronically signed by Paulette Beatty on 1/28/2021 at 1:08 PM    Contact:

## 2021-01-28 NOTE — PROGRESS NOTES
Physician Progress Note      PATIENTLenor Meals  CSN #:                  [id number]  :                       1955  ADMIT DATE:       2021 12:56 PM  [address] [place] DATE:  RESPONDING  PROVIDER #:        Sowmya Garcia MD          QUERY TEXT:    Pt admitted with GI bleeding and has CHF with EF of 41 documented. If possible, please document in progress notes and discharge summary further specificity regarding the type and acuity of CHF:    The medical record reflects the following:  Risk Factors: Hx of CHF and increased BNP  Clinical Indicators: SOB, , CHF with EF of 41  Treatment: Lasix PO. Thank you. Suraj Shah RN CDI, CCS  Ext [phone]  Options provided:  -- Acute on Chronic Systolic CHF/HFrEF  -- Chronic Systolic CHF/HFrEF  -- Other - I will add my own diagnosis  -- Disagree - Not applicable / Not valid  -- Disagree - Clinically unable to determine / Unknown  -- Refer to Clinical Documentation Reviewer    PROVIDER RESPONSE TEXT:    This patient has chronic systolic CHF/HFrEF.     Query created by: Sheryle Pepper on 2021 12:02 PM      Electronically signed by:  Sowmya Garcia MD 2021 4:00 PM

## 2021-01-28 NOTE — PROGRESS NOTES
Progress Note      1/28/2021 4:28 PM  NAME: Donald Bobo   MRN:  [id number]   Admit Diagnosis: GI bleed [K92.2]      Problem List:     1. Acute pulmonary edema  2. Acute respiratory failure  3. Aortic stenosis moderate with three-vessel coronary artery disease  4. Chronic systolic heart failure with ejection fraction 41%. 5. Acute GI bleeding with a severe anemia requiring blood transfusion  6. Assessment/Plan:     1. Cardiac enzymes are negative. NT proBNP is elevated. With the severe anemia patient might have developed acute pulmonary edema. There is no evidence of acute myocardial infarction. Agree with the present management. Once is stabilized will refer back to CT surgery for coronary artery bypass surgery and aortic valve replacement. 2. Discussed patient's status with the patient's daughter. []       High complexity decision making was performed in this patient at high risk for decompensation with multiple organ involvement. Subjective: Donald Bobo denies chest pain, dyspnea. Discussed with RN events overnight. Review of Systems: Patient is on a respirator and she is alert review of system cannot be obtained    Symptom Y/N Comments  Symptom Y/N Comments   Fever/Chills N   Chest Pain N    Poor Appetite N   Edema N    Cough N   Abdominal Pain N    Sputum N   Joint Pain N    SOB/ACEVEDO N   Pruritis/Rash N    Nausea/vomit N   Tolerating PT/OT Y    Diarrhea N   Tolerating Diet Y    Constipation N   Other       Could NOT obtain due to:      Objective:      Physical Exam:    Last 24hrs VS reviewed since prior progress note.  Most recent are:    Visit Vitals  /76   Pulse (!) 102   Temp 98.6 °F (37 °C)   Resp 20   Ht 5' 5\" (1.651 m)   Wt 90.7 kg (200 lb)   SpO2 100%   BMI 33.28 kg/m²       Intake/Output Summary (Last 24 hours) at 1/28/2021 1628  Last data filed at 1/28/2021 0803  Gross per 24 hour   Intake 740 ml   Output 980 ml   Net -240 ml        General Appearance: Well developed, well nourished, alert & oriented x 3,    no acute distress. Ears/Nose/Mouth/Throat: Hearing grossly normal.  Neck: Supple. Chest: Lungs clear to auscultation bilaterally. Cardiovascular: Regular rate and rhythm, S1S2 normal, 2 x 6 ejection systolic murmur heard in the aortic area   abdomen: Soft, non-tender, bowel sounds are active. Extremities: No edema bilaterally. Skin: Warm and dry. []         Post-cath site without hematoma, bruit, tenderness, or thrill. Distal pulses intact.     PMH/SH reviewed - no change compared to H&P    Data Review    Telemetry: normal sinus rhythm     EKG:   []  No new EKG for review    Lab Data Personally Reviewed:    Recent Labs     01/28/21 0230 01/27/21 0450   WBC 13.1* 5.9   HGB 8.4* 7.9*   HCT 29.2* 26.4*   * 531*     Recent Labs     01/28/21 0230   INR 1.1   PTP 14.4   APTT 28.6      Recent Labs     01/28/21 0230 01/27/21 0450 01/27/21 0230 01/26/21 0440   NA  --  135* 136 132*   K  --  3.7 3.4* 3.6   CL  --  97 98 96*   CO2  --  30 32 31   BUN  --  4* 8 6   CREA  --  0.46* 0.72 0.48*   GLU  --  157* 171* 112*   CA  --  9.0 8.3* 9.1   MG 1.8  --   --   --      Recent Labs     01/27/21 0230   TROIQ <0.05     Lab Results   Component Value Date/Time    Cholesterol, total 123 10/13/2020 10:58 AM    HDL Cholesterol 54 10/13/2020 10:58 AM    LDL, calculated 55 10/13/2020 10:58 AM    Triglyceride 65 10/13/2020 10:58 AM       Recent Labs     01/27/21 0450 01/27/21 0230   AP  --  64   TP  --  6.9   ALB 3.1* 3.0*   GLOB  --  3.9     Recent Labs     01/28/21 0215   PH 7.26*   PCO2 68*   PO2 81       Medications Personally Reviewed:    Current Facility-Administered Medications   Medication Dose Route Frequency    propofol (DIPRIVAN) 10 mg/mL infusion  0-50 mcg/kg/min IntraVENous TITRATE    budesonide (PULMICORT) 500 mcg/2 ml nebulizer suspension  500 mcg Nebulization BID RT    albuterol-ipratropium (DUO-NEB) 2.5 MG-0.5 MG/3 ML  3 mL Nebulization Q6H RT    budesonide (PULMICORT) 0.5 mg/2 mL nebulizer suspension        piperacillin-tazobactam (ZOSYN) 3.375 g in 0.9% sodium chloride (MBP/ADV) 100 mL MBP  3.375 g IntraVENous Q8H    pantoprazole (PROTONIX) tablet 40 mg  40 mg Oral ACB    sodium phosphate (FLEET'S) enema 1 Enema  1 Enema Rectal ONCE    albuterol-ipratropium (DUO-NEB) 2.5 MG-0.5 MG/3 ML  3 mL Nebulization Q6H PRN    carvediloL (COREG) tablet 3.125 mg  3.125 mg Oral BID WITH MEALS    0.9% sodium chloride infusion  50 mL/hr IntraVENous CONTINUOUS    [Held by provider] lisinopriL (PRINIVIL, ZESTRIL) tablet 20 mg  20 mg Oral BID    LORazepam (ATIVAN) tablet 1 mg  1 mg Oral QHS    acetaminophen (TYLENOL) tablet 650 mg  650 mg Oral Q4H PRN    ondansetron (ZOFRAN ODT) tablet 4 mg  4 mg Oral Q8H PRN    ondansetron (ZOFRAN) injection 4 mg  4 mg IntraVENous Q6H PRN    insulin lispro (HUMALOG) injection   SubCUTAneous AC&HS    glucose chewable tablet 16 g  4 Tab Oral PRN    glucagon (GLUCAGEN) injection 1 mg  1 mg IntraMUSCular PRN    dextrose (D50W) injection syrg 12.5-25 g  25-50 mL IntraVENous PRN       Patient's status with the patient's her daughter.   Elidia Chun MD

## 2021-01-28 NOTE — CONSULTS
IMPRESSION:      1. Acute hypoxic hypercapnic respiratory failure  2. Acute pulmonary edema  3. Acute GI bleed  4. Severe sepsis  5. History of aortic stenosis coronary artery disease  6. Pericardial and pleural effusion  7. History of COVID-19 pneumonia  8. Symptomatic anemia patient hemoglobin was 5.4 today is 8.4 we will continue to follow  9. Additional workup outlined below  10. Pt is at high risk of sudden decline and decompensation with life threatening consequenses and continued end organ dysfunction and failure  11. Pt is critically ill. Time spent with pt and staff actively rendering care, managing pt and coordinating care as stated below;  55 minutes, exclusive of any procedures      RECOMMENDATIONS/PLAN:     1. Ventilator for mechanical life support and prevent respiratory arrest with protective lung strategies patient intubated on ventilator assist control mode pH 7.26 PCO2 68 PO2 81 on 100% FiO2 will change the vent setting increase the rate  2. Patient chest x-ray shows CHF pulmonary edema will start patient on Lasix with given history of aortic stenosis and congestive heart failure coronary artery disease three-vessel disease he was awaiting for surgery cardiology on board  3. Will start patient on antibiotic Zosyn panculture  4. Continue with the nebulizer treatment and will change Symbicort to budesonide  5. Somatic anemia  6. Daily AM SAT,SBT   7. CXR in AM while on vent  8. BIPAP for non invasive ventilatory life support to avoid worsening respiratory acidosis, hypercacarbic or hypoxic respiratory arrest  9. Intubate and place on vent if NIV fails  10. Agree with Empiric IV antibiotics pending culture results   11. Follow culture results  12. CVP monitoring  13. IV vasopressors for circulatory shock refractory to fluids to maintain SBP> 90   14. Patient requiring restraints due to threat of injury to self, interference with medical devices  15. Transfuse prn to maintain Hgb > 7  16.  Labs to follow electrolytes, renal function and and blood counts  17. Bronchial hygiene with respiratory therapy techniques, bronchodilators  18. Pt needs IV fluids with additives and Drug therapy requiring intensive monitoring for toxicity  19. Prescription drug management with home med reconciliation reviewed  20. DVT, SUP prophylaxis  21. Will be available to assist in medical management while in the CCU pending disposition     [x] High complexity decision making was performed  [x] See my orders for details  HPI  57-year-old lady came in because of generalized weakness and possible GI bleed hemoglobin was 5.4 on admission and patient has dark stool at home cardiologist and gastroenterologist was seen the patient and patient was hospitalized on January 1 because of COVID-19 infection she also has congestive heart failure ejection fraction of 41% coronary artery disease and aortic stenosis history of aortic valve replacement. Yesterday patient was given enema and he developed shortness of breath hypoxia and wheezing she was put on supplemental oxygen patient became unresponsive tachycardic than patient had a rapid response transferred to ICU now intubated on ventilator. Was in flash pulmonary edema received Lasix she was having pink frothy sputum so not critical care consult was called as she is hemodynamically unstable.      PMH:  has a past medical history of Acute gastrointestinal hemorrhage (8/28/2020), Allergic rhinitis (8/28/2020), Anemia (8/28/2020), Benign essential hypertension (6/25/2020), Body mass index 30.0-30.9, adult (6/25/2020), CAD (coronary artery disease) (8/28/2020), Foot callus (8/28/2020), GERD (gastroesophageal reflux disease) (6/25/2020), Heart murmur (6/25/2020), colonoscopy (01/01/2015), Left bundle branch block (8/28/2020), Microalbuminuria due to type 2 diabetes mellitus ([facility] [address] (8/28/2020), Mixed hyperlipidemia (6/25/2020), Morbid obesity ([address] (8/28/2020), Tobacco dependence syndrome (6/25/2020), and Type II diabetes mellitus, uncontrolled ([facility] [address] (6/25/2020). PSH:   has a past surgical history that includes hx colonoscopy (2015). FHX: family history includes Cancer in her mother and sister; Hypertension in her father. SHX:  reports that she quit smoking about 7 years ago. She smoked 0.50 packs per day. She has never used smokeless tobacco. She reports previous alcohol use. She reports that she does not use drugs.     ALL:   Allergies   Allergen Reactions    Aspirin Unknown (comments)     hemorraging - ended up in the hospital        MEDS:   [x] Reviewed - As Below   [] Not reviewed    Current Facility-Administered Medications   Medication    propofol (DIPRIVAN) 10 mg/mL infusion    propofoL (DIPRIVAN) 10 mg/mL injection    pantoprazole (PROTONIX) tablet 40 mg    peg 3350-electrolytes (COLYTE) 4000 mL    sodium phosphate (FLEET'S) enema 1 Enema    propofoL (DIPRIVAN) 10 mg/mL injection    budesonide-formoteroL (SYMBICORT) 160-4.5 mcg/actuation HFA inhaler 2 Puff    albuterol (PROVENTIL HFA, VENTOLIN HFA, PROAIR HFA) inhaler 2 Puff    albuterol-ipratropium (DUO-NEB) 2.5 MG-0.5 MG/3 ML    carvediloL (COREG) tablet 3.125 mg    0.9% sodium chloride infusion    [Held by provider] lisinopriL (PRINIVIL, ZESTRIL) tablet 20 mg    LORazepam (ATIVAN) tablet 1 mg    acetaminophen (TYLENOL) tablet 650 mg    ondansetron (ZOFRAN ODT) tablet 4 mg    ondansetron (ZOFRAN) injection 4 mg    insulin lispro (HUMALOG) injection    glucose chewable tablet 16 g    glucagon (GLUCAGEN) injection 1 mg    dextrose (D50W) injection syrg 12.5-25 g      MAR reviewed and pertinent medications noted or modified as needed   Current Facility-Administered Medications   Medication    propofol (DIPRIVAN) 10 mg/mL infusion    propofoL (DIPRIVAN) 10 mg/mL injection    pantoprazole (PROTONIX) tablet 40 mg    peg 3350-electrolytes (COLYTE) 4000 mL    sodium phosphate (FLEET'S) enema 1 Enema    propofoL (DIPRIVAN) 10 mg/mL injection    budesonide-formoteroL (SYMBICORT) 160-4.5 mcg/actuation HFA inhaler 2 Puff    albuterol (PROVENTIL HFA, VENTOLIN HFA, PROAIR HFA) inhaler 2 Puff    albuterol-ipratropium (DUO-NEB) 2.5 MG-0.5 MG/3 ML    carvediloL (COREG) tablet 3.125 mg    0.9% sodium chloride infusion    [Held by provider] lisinopriL (PRINIVIL, ZESTRIL) tablet 20 mg    LORazepam (ATIVAN) tablet 1 mg    acetaminophen (TYLENOL) tablet 650 mg    ondansetron (ZOFRAN ODT) tablet 4 mg    ondansetron (ZOFRAN) injection 4 mg    insulin lispro (HUMALOG) injection    glucose chewable tablet 16 g    glucagon (GLUCAGEN) injection 1 mg    dextrose (D50W) injection syrg 12.5-25 g      PMH:  has a past medical history of Acute gastrointestinal hemorrhage (8/28/2020), Allergic rhinitis (8/28/2020), Anemia (8/28/2020), Benign essential hypertension (6/25/2020), Body mass index 30.0-30.9, adult (6/25/2020), CAD (coronary artery disease) (8/28/2020), Foot callus (8/28/2020), GERD (gastroesophageal reflux disease) (6/25/2020), Heart murmur (6/25/2020), colonoscopy (01/01/2015), Left bundle branch block (8/28/2020), Microalbuminuria due to type 2 diabetes mellitus ([address] (8/28/2020), Mixed hyperlipidemia (6/25/2020), Morbid obesity ([address] (8/28/2020), Tobacco dependence syndrome (6/25/2020), and Type II diabetes mellitus, uncontrolled ([address] (6/25/2020). PSH:   has a past surgical history that includes hx colonoscopy (2015). FHX: family history includes Cancer in her mother and sister; Hypertension in her father. SHX:  reports that she quit smoking about 7 years ago. She smoked 0.50 packs per day. She has never used smokeless tobacco. She reports previous alcohol use. She reports that she does not use drugs.      ROS:  Unable to obtain intubated sedated on ventilator    Hemodynamics:    CO:    CI:    CVP:    SVR:   PAP Systolic:    PAP Diastolic:    PVR:    WV26:        Ventilator Settings:      Mode Rate TV Press PEEP FiO2 PIP Min. Vent   Assist control, Pressure control    500 ml    8 cm H20 100 %  33 cm H2O  8.8 l/min        Vital Signs: Telemetry:    normal sinus rhythm Intake/Output:   Visit Vitals  BP (!) 108/56   Pulse 90   Temp 99.5 °F (37.5 °C)   Resp 14   Ht 5' 5\" (1.651 m)   Wt 90.7 kg (200 lb)   SpO2 95%   BMI 33.28 kg/m²       Temp (24hrs), Av.5 °F (36.9 °C), Min:97.2 °F (36.2 °C), Max:99.7 °F (37.6 °C)        O2 Device: Ventilator O2 Flow Rate (L/min): 15 l/min       Wt Readings from Last 4 Encounters:   21 90.7 kg (200 lb)   21 90.7 kg (200 lb)   20 90.7 kg (200 lb)   20 97.4 kg (214 lb 11.7 oz)          Intake/Output Summary (Last 24 hours) at 2021 0744  Last data filed at 2021 1848  Gross per 24 hour   Intake 790 ml   Output —   Net 790 ml       Last shift:      No intake/output data recorded.  Last 3 shifts: 1901 -  0700  In: 790 [P.O.:740; I.V.:50]  Out: 500 [Urine:500]       Physical Exam:     General:  intubated on vent  HEENT: NCAT, poor dentition, lips and mucosa dry  Eyes: anicteric; conjunctiva clear  Neck: no nodes, no cuff leak, trach midline; no accessory MM use.  Chest: no deformity,   Cardiac: R regular; no murmur;   Lungs: distant breath sounds; no wheezes  Abd: soft, NT, hypoactive BS  Ext: no edema; no joint swelling; No clubbing  : NO casas, clear urine  Neuro: sedated;   Psych- no agitation, oriented to person;  unable to assess  Skin: warm, dry, no cyanosis;   Pulses: 1-2+ Bilateral pedal, radial  Capillary: brisk; pale      DATA:    MAR reviewed and pertinent medications noted or modified as needed  MEDS:   Current Facility-Administered Medications   Medication   • propofol (DIPRIVAN) 10 mg/mL infusion   • propofoL (DIPRIVAN) 10 mg/mL injection   • pantoprazole (PROTONIX) tablet 40 mg   • peg 3350-electrolytes (COLYTE) 4000 mL   • sodium phosphate (FLEET'S) enema 1 Enema   • propofoL (DIPRIVAN) 10 mg/mL injection   • budesonide-formoteroL  (SYMBICORT) 160-4.5 mcg/actuation HFA inhaler 2 Puff    albuterol (PROVENTIL HFA, VENTOLIN HFA, PROAIR HFA) inhaler 2 Puff    albuterol-ipratropium (DUO-NEB) 2.5 MG-0.5 MG/3 ML    carvediloL (COREG) tablet 3.125 mg    0.9% sodium chloride infusion    [Held by provider] lisinopriL (PRINIVIL, ZESTRIL) tablet 20 mg    LORazepam (ATIVAN) tablet 1 mg    acetaminophen (TYLENOL) tablet 650 mg    ondansetron (ZOFRAN ODT) tablet 4 mg    ondansetron (ZOFRAN) injection 4 mg    insulin lispro (HUMALOG) injection    glucose chewable tablet 16 g    glucagon (GLUCAGEN) injection 1 mg    dextrose (D50W) injection syrg 12.5-25 g        Labs:    Recent Labs     01/28/21 0230 01/27/21 0450 01/26/21  0440   WBC 13.1* 5.9 7.4   HGB 8.4* 7.9* 8.1*   * 531* 501*   INR 1.1  --   --    APTT 28.6  --   --      Recent Labs     01/28/21 0230 01/27/21 0450 01/27/21 0230 01/26/21  0440 01/25/21  1315   NA  --  135* 136 132* 130*   K  --  3.7 3.4* 3.6 3.7   CL  --  97 98 96* 91*   CO2  --  30 32 31 27   GLU  --  157* 171* 112* 163*   BUN  --  4* 8 6 7   CREA  --  0.46* 0.72 0.48* 0.75   CA  --  9.0 8.3* 9.1 9.1   MG 1.8  --   --   --   --    PHOS  --  3.7  --   --   --    LAC  --   --  1.3  --   --    ALB  --  3.1* 3.0*  --  3.1*   ALT  --   --  23  --  21     Recent Labs     01/28/21  0215   PH 7.26*   PCO2 68*   PO2 81   HCO3 27*   FIO2 100.0     Recent Labs     01/27/21  0230 01/25/21  1315   TROIQ <0.05 <0.05     No results found for: BNPP, BNP   No results found for: CULT  Lab Results   Component Value Date/Time    TSH, External 2.420 01/16/2019        Imaging:    Results from Hospital Encounter encounter on 01/25/21   XR CHEST PORT    Narrative Exam: Frontal chest    Comparison: 1/25/2021    Findings: Interval intubation, with endotracheal tube tip projected over the  middle third of the trachea. Small bilateral pleural effusions. Bilateral  heterogeneous airspace disease compatible with pneumonia.  Diffuse interstitial  disease compatible with pulmonary edema. Impression Impression:   1. Apparent satisfactory tracheal intubation. 2. Compatible with pneumonia. 3. Compatible with pulmonary interstitial edema. Results from [place] encounter on 01/25/21   CTA CHEST W OR W WO CONT    Narrative Exam: CTA chest with intravenous contrast    Comparison: Plain radiograph 1/25/2021. CT 1/2/2021. Technique: During intravenous contrast administration (100 cc [id number]), axial  sections were obtained through the chest. Coronal and sagittal reconstructions  were generated. Maximal intensity projection images were generated. Dose reduction: All CT scans at this facility are performed using dose reduction  optimization techniques as appropriate to perform the exam including the  following: automated exposure control, adjustments of the mA and/or kV according  to patient size, or use of iterative reconstruction technique. Findings: The examination is limited by motion degradation and by adduction of  the patient's upper extremities, which renders scattering and attenuation of the  diagnostic x-ray beam. Nevertheless, there is no demonstrated pulmonary embolus. Endotracheal tube tip in the middle third of the trachea. Distal trachea and  main bronchi poorly expanded. There is calcific atherosclerotic disease of a  nonaneurysmal thoracic aorta, which manifests no evidence of intimal  incompetence. Calcific atherosclerotic coronary arterial disease is present. Prominent bilateral mediastinal and hilar lymph nodes, increased compared to  1/2/2021, likely inflammatory. Small to moderate pericardial effusion of near  water attenuation fluid. It is 2 cm greatest axial thickness. Small bilateral  pleural effusions. Bilateral dependent airspace disease compatible with  pneumonia. Interstitial disease also present, compatible with pulmonary edema.   Included abdomen indicates a stable 2 cm left adrenal nodule of water  attenuation, compatible with a benign adenoma. No axillary or supraclavicular  adenopathy or mass. Visualized thoracic bone scaffolding unremarkable. Impression Impression:  1. No evidence of pulmonary embolus. 2. Bilateral dependent airspace disease compatible with pneumonia. 3. Diffuse pulmonary interstitial disease compatible with pulmonary edema. 4. Bilateral pleural effusions. 5. Coronary artery calcific atherosclerotic disease. 6. Small to moderate pericardial effusion. 7. Distal trachea and main bronchi are poorly expanded. This care involved high complexity decision making which includes independently reviewing the patient's past medical records, current laboratory results, medication profiles that were immediately available to me and actual Xray images at the bedside in order to assess, support vital system function, and to treat this degree of vital organ system failure, and to prevent further life threatening deterioration of the patients condition. I was in direct communication with the nursing staff throughout this time.     Medical Decision Making Today  · Reviewed the flowsheet and previous days notes  · Reviewed and summarized records or history from previous days note or discussions with staff, family  · Parenteral controlled substances - Reviewed/ Adjusted / Esmer Limb / Started  · High Risk Drug therapy requiring intensive monitoring for toxicity: eg steroids, pressors, antibiotics  · Review and order of Clinical lab tests  · Review and Order of Radiology tests  · Review and Order of Medicine tests  · Independent visualization of radiologic Images  · Reviewed Ventilator / NiPPV  · I have personally reviewed the patients ECG / Telemetry  · Diagnostic endoscopies with identified risk factors    My assessment/management discussed with: Consultants, Nursing, Pharmacy, Case Management, PT, OT, Respiratory Therapy, Hospitalist and Family for coordination of care    I have provided total of 55 minutes of critical care time rendering care exclusive of any procedures. During this entire length of time the patient's condition was unstable, unpredictable and critically ill in the CCU/ ICU. I was immediately available to the patient whose care required several interactions with nursing, multidisciplinary team members leading to multiple interventions with fluid resuscitation and medication adjustments to optimize respiratory support, hemodynamic treatment, medication changes based on repeat labs results, reviews, exams and assessments. The reason for providing this level of medical care was due to a critical illness that impaired one or more vital organ systems, such that there was a high probability of sudden or life threatening deterioration in the patient's condition.

## 2021-01-28 NOTE — PROGRESS NOTES
Chart reviewed. Patient intubated. Patient seen by CM in the ED. Patient accepted with [facility] home [facility]. Patient will likely require home oxygen set up. CM will continue to follow.

## 2021-01-28 NOTE — PROGRESS NOTES
Hospitalist Progress Note             Daily Progress Note: 1/28/2021    65F, H/o CHF unknown EF with CAD s/p stent with aortic stenosis with UGIB with ABLA. Last ngiht, patient became obtunded requiring increased amounts of oxygen, was intubated. XR showed, pulmonary edema, started on lasix    Thought process: pulmonary edema s/t IVF in the setting of AS. Aspiration and COVID also a concern      Review of Systems:   Review of Systems   Constitutional: Negative. HENT: Negative. Eyes: Negative. Respiratory: Positive for shortness of breath. Cardiovascular: Negative. Gastrointestinal: Negative. Genitourinary: Negative. Musculoskeletal: Negative. Skin: Negative. Neurological: Positive for weakness. Endo/Heme/Allergies: Negative. Psychiatric/Behavioral: Negative. Objective:   Objective      Vitals:  Patient Vitals for the past 12 hrs:   Temp Pulse Resp BP SpO2   01/28/21 0857  69 17  100 %   01/28/21 0800  74  (!) 91/56    01/28/21 0700 99.5 °F (37.5 °C)       01/28/21 0600  83 14 92/67 100 %   01/28/21 0500  79 14 (!) 89/59 100 %   01/28/21 0405  90 14  95 %   01/28/21 0400  90 14 (!) 83/52 93 %   01/28/21 0300  91 14 (!) 90/57 93 %   01/28/21 0253  98 18     01/28/21 0200  (!) 105 21 (!) 89/52    01/28/21 0145  86 15     01/28/21 0106  (!) 105  (!) 108/56    01/28/21 0100  (!) 105 20 (!) 108/56 98 %   01/28/21 0030 98.8 °F (37.1 °C) (!) 117 20 111/66 100 %   01/28/21 0022 97.7 °F (36.5 °C) (!) 116      01/27/21 2303     92 %        Physical Exam:  Physical Exam  Vitals signs and nursing note reviewed. Constitutional:       Appearance: Normal appearance. HENT:      Head: Normocephalic. Nose: Nose normal.      Mouth/Throat:      Mouth: Mucous membranes are moist.   Eyes:      Extraocular Movements: Extraocular movements intact. Neck:      Musculoskeletal: Normal range of motion.    Cardiovascular:      Rate and Rhythm: Tachycardia present. Pulses: Normal pulses. Heart sounds: Normal heart sounds. Pulmonary:      Effort: Pulmonary effort is normal.      Comments: Expiratory wheeze, diminished air entry, 4L NC  Abdominal:      General: Bowel sounds are normal.      Palpations: Abdomen is soft. Musculoskeletal: Normal range of motion. Skin:     General: Skin is warm and dry. Capillary Refill: Capillary refill takes 2 to 3 seconds. Neurological:      Mental Status: She is alert and oriented to person, place, and time. Motor: Weakness present.    Psychiatric:         Mood and Affect: Mood normal.         Behavior: Behavior normal.          Lab Results:  Recent Results (from the past 24 hour(s))   GLUCOSE, POC    Collection Time: 01/27/21 11:47 AM   Result Value Ref Range    Glucose (POC) 149 (H) 65 - 100 mg/dL    Performed by Lisa Bragg    GLUCOSE, POC    Collection Time: 01/27/21  3:52 PM   Result Value Ref Range    Glucose (POC) 154 (H) 65 - 100 mg/dL    Performed by [address] [place] [address], POC    Collection Time: 01/27/21 10:24 PM   Result Value Ref Range    Glucose (POC) 285 (H) 65 - 100 mg/dL    Performed by Nanda Couch, POC    Collection Time: 01/27/21 11:24 PM   Result Value Ref Range    Glucose (POC) 374 (H) 65 - 100 mg/dL    Performed by Leighann Jack [id number], POC    Collection Time: 01/27/21 11:33 PM   Result Value Ref Range    Glucose (POC) 353 (H) 65 - 100 mg/dL    Performed by Lelo Bashir    MRSA SCREEN - PCR (NASAL)    Collection Time: 01/28/21 12:30 AM   Result Value Ref Range    MRSA by PCR, Nasal Not Detected Not Detected     URINALYSIS W/MICROSCOPIC    Collection Time: 01/28/21 12:30 AM   Result Value Ref Range    Color Yellow/Straw      Appearance Turbid (A) Clear      Specific gravity >1.030 (H) 1.003 - 1.030    pH (UA) 5.0 5.0 - 8.0      Protein 100 (A) Negative mg/dL    Glucose 150 (A) Negative mg/dL    Ketone Negative Negative mg/dL    Bilirubin Negative Negative Blood Negative Negative      Urobilinogen 2.0 (H) 0.1 - 1.0 EU/dL    Nitrites Negative Negative      Leukocyte Esterase Negative Negative      WBC 10-20 0 - 4 /hpf    RBC 5-10 0 - 5 /hpf    Bacteria Negative Negative /hpf    Mucus Trace /lpf    Other Urine Micro <1    BLOOD GAS, ARTERIAL    Collection Time: 01/28/21  2:15 AM   Result Value Ref Range    pH 7.26 (L) 7.35 - 7.45      PCO2 68 (H) 35 - 45 mmHg    PO2 81 75 - 100 mmHg    O2 SAT 96 >95 %    BICARBONATE 27 (H) 22 - 26 mmol/L    BASE EXCESS 2.7 (H) 0 - 2 mmol/L    O2 METHOD VENT      FIO2 100.0 %    MODE Assist Control/Volume Control      Tidal volume 500      SET RATE 12      EPAP/CPAP/PEEP 8.0      SITE Right Radial      ABDULAZIZ'S TEST PASS     CBC WITH AUTOMATED DIFF    Collection Time: 01/28/21  2:30 AM   Result Value Ref Range    WBC 13.1 (H) 3.6 - 11.0 K/uL    RBC 3.93 3.80 - 5.20 M/uL    HGB 8.4 (L) 11.5 - 16.0 g/dL    HCT 29.2 (L) 35.0 - 47.0 %    MCV 74.3 (L) 80.0 - 99.0 FL    MCH 21.4 (L) 26.0 - 34.0 PG    MCHC 28.8 (L) 30.0 - 36.5 g/dL    RDW 24.9 (H) 11.5 - 14.5 %    PLATELET 560 (H) 749 - 400 K/uL    MPV 8.4 (L) 8.9 - 12.9 FL    NRBC 3.2 (H) 0  WBC    ABSOLUTE NRBC 0.42 (H) 0.00 - 0.01 K/uL    NEUTROPHILS 85 (H) 32 - 75 %    LYMPHOCYTES 7 (L) 12 - 49 %    MONOCYTES 7 5 - 13 %    EOSINOPHILS 0 0 - 7 %    BASOPHILS 0 0 - 1 %    IMMATURE GRANULOCYTES 1 (H) 0.0 - 0.5 %    ABS. NEUTROPHILS 11.3 (H) 1.8 - 8.0 K/UL    ABS. LYMPHOCYTES 0.9 0.8 - 3.5 K/UL    ABS. MONOCYTES 0.9 0.0 - 1.0 K/UL    ABS. EOSINOPHILS 0.0 0.0 - 0.4 K/UL    ABS. BASOPHILS 0.0 0.0 - 0.1 K/UL    ABS. IMM.  GRANS. 0.1 (H) 0.00 - 0.04 K/UL    DF AUTOMATED     MAGNESIUM    Collection Time: 01/28/21  2:30 AM   Result Value Ref Range    Magnesium 1.8 1.6 - 2.4 mg/dL   PROTHROMBIN TIME + INR    Collection Time: 01/28/21  2:30 AM   Result Value Ref Range    Prothrombin time 14.4 11.9 - 14.7 sec    INR 1.1 0.9 - 1.1     PTT    Collection Time: 01/28/21  2:30 AM   Result Value Ref Range    aPTT 28.6 23.0 - 35.7 sec    aPTT, therapeutic range   68 - 109 sec   BNP    Collection Time: 01/28/21  2:30 AM   Result Value Ref Range    NT pro-BNP 1,194 (H) <125 pg/mL   EKG, 12 LEAD, INITIAL    Collection Time: 01/28/21  7:41 AM   Result Value Ref Range    Ventricular Rate 68 BPM    Atrial Rate 68 BPM    P-R Interval 118 ms    QRS Duration 132 ms    Q-T Interval 394 ms    QTC Calculation (Bezet) 418 ms    Calculated P Axis 53 degrees    Calculated R Axis -2 degrees    Calculated T Axis 103 degrees    Diagnosis       Normal sinus rhythm with sinus arrhythmia  Left bundle branch block  Abnormal ECG  When compared with ECG of 25-JAN-2021 15:10,  Premature atrial complexes are no longer Present  QT has shortened  Confirmed by PANCHO GARCIA [place] ([id number]) on 1/28/2021 9:35:57 AM     GLUCOSE, POC    Collection Time: 01/28/21  9:05 AM   Result Value Ref Range    Glucose (POC) 147 (H) 65 - 100 mg/dL    Performed by Marcial Mireles           Diagnostic Images:  CT Results  (Last 48 hours)               01/28/21 0004  CT HEAD WO CONT Final result    Impression:  No acute or active intracranial process. Narrative:  PROCEDURE: CT HEAD WO CONT       HISTORY:Unresponsive       COMPARISON:None       Department policy stipulates all CT scans at this facility are performed using   dose reduction optimization techniques as appropriate to the performed exam,   including the following: Automated exposure control, adjustments of the mA   and/or KVP according to the patient size, and the use of iterative   reconstruction technique. TECHNIQUE: Axial images with multiplanar reconstruction. No IV contrast.       BONE/EXTRACRANIAL SOFT TISSUES:  Within normal limits       EXTRA-AXIAL SPACES:  No hemorrhage, mass or focal fluid collection. VENTRICLES/SULCI:  Within normal limits       BRAIN PARENCHYMA:  No focal lesions. No mass effect.  There is good gray-white   differentiation           01/28/21 0004 CTA CHEST W OR W WO CONT Final result    Impression:  Impression:   1. No evidence of pulmonary embolus. 2. Bilateral dependent airspace disease compatible with pneumonia. 3. Diffuse pulmonary interstitial disease compatible with pulmonary edema. 4. Bilateral pleural effusions. 5. Coronary artery calcific atherosclerotic disease. 6. Small to moderate pericardial effusion. 7. Distal trachea and main bronchi are poorly expanded. Narrative:  Exam: CTA chest with intravenous contrast       Comparison: Plain radiograph 1/25/2021. CT 1/2/2021. Technique: During intravenous contrast administration (100 cc [id number]), axial   sections were obtained through the chest. Coronal and sagittal reconstructions   were generated. Maximal intensity projection images were generated. Dose reduction: All CT scans at this facility are performed using dose reduction   optimization techniques as appropriate to perform the exam including the   following: automated exposure control, adjustments of the mA and/or kV according   to patient size, or use of iterative reconstruction technique. Findings: The examination is limited by motion degradation and by adduction of   the patient's upper extremities, which renders scattering and attenuation of the   diagnostic x-ray beam. Nevertheless, there is no demonstrated pulmonary embolus. Endotracheal tube tip in the middle third of the trachea. Distal trachea and   main bronchi poorly expanded. There is calcific atherosclerotic disease of a   nonaneurysmal thoracic aorta, which manifests no evidence of intimal   incompetence. Calcific atherosclerotic coronary arterial disease is present. Prominent bilateral mediastinal and hilar lymph nodes, increased compared to   1/2/2021, likely inflammatory. Small to moderate pericardial effusion of near   water attenuation fluid. It is 2 cm greatest axial thickness. Small bilateral   pleural effusions. Bilateral dependent airspace disease compatible with   pneumonia. Interstitial disease also present, compatible with pulmonary edema. Included abdomen indicates a stable 2 cm left adrenal nodule of water   attenuation, compatible with a benign adenoma. No axillary or supraclavicular   adenopathy or mass. Visualized thoracic bone scaffolding unremarkable.                  Current Medications:    Current Facility-Administered Medications:     budesonide (PULMICORT) 0.5 mg/2 mL nebulizer suspension, , , ,     propofol (DIPRIVAN) 10 mg/mL infusion, 0-50 mcg/kg/min, IntraVENous, TITRATE, Tonie Zhu MD, Last Rate: 21.8 mL/hr at 01/28/21 0833, 40 mcg/kg/min at 01/28/21 0833    propofoL (DIPRIVAN) 10 mg/mL injection, , , ,     piperacillin-tazobactam (ZOSYN) 3.375 g in 0.9% sodium chloride (MBP/ADV) 100 mL MBP, 3.375 g, IntraVENous, Q6H, Dario Edwards MD    budesonide (PULMICORT) 500 mcg/2 ml nebulizer suspension, 500 mcg, Nebulization, BID RT, Dario Edwards MD, 500 mcg at 01/28/21 0855    albuterol-ipratropium (DUO-NEB) 2.5 MG-0.5 MG/3 ML, 3 mL, Nebulization, Q6H RT, Dario Edwards MD, 3 mL at 01/28/21 0855    pantoprazole (PROTONIX) tablet 40 mg, 40 mg, Oral, ACB, Henry Ward MD, 40 mg at 01/28/21 0916    sodium phosphate (FLEET'S) enema 1 Enema, 1 Enema, Rectal, ONCE, Henry Ward MD, Stopped at 01/28/21 0600    propofoL (DIPRIVAN) 10 mg/mL injection, , , ,     albuterol-ipratropium (DUO-NEB) 2.5 MG-0.5 MG/3 ML, 3 mL, Nebulization, Q6H PRN, Tyler Will, Bryson Michaud MD, 3 mL at 01/27/21 2253    carvediloL (COREG) tablet 3.125 mg, 3.125 mg, Oral, BID WITH MEALS, Bryson Man MD, Stopped at 01/28/21 0800    0.9% sodium chloride infusion, 50 mL/hr, IntraVENous, CONTINUOUS, Ed Fuentes NP, Last Rate: 50 mL/hr at 01/28/21 0925, 50 mL/hr at 01/28/21 0925    [Held by provider] lisinopriL (PRINIVIL, ZESTRIL) tablet 20 mg, 20 mg, Oral, BID, Ed Fuentes NP, Stopped at 01/28/21 0900   LORazepam (ATIVAN) tablet 1 mg, 1 mg, Oral, QHS, Ed Fuentes, NP, 1 mg at 01/26/21 2300    acetaminophen (TYLENOL) tablet 650 mg, 650 mg, Oral, Q4H PRN, Ed Fuentes, NP    ondansetron (ZOFRAN ODT) tablet 4 mg, 4 mg, Oral, Q8H PRN, Ed Fuentes NP    ondansetron [facility]) injection 4 mg, 4 mg, IntraVENous, Q6H PRN, Ed Fuentes, NP, 4 mg at 01/26/21 2258    insulin lispro (HUMALOG) injection, , SubCUTAneous, AC&HS, Ed Fuentes, JOSE, Stopped at 01/28/21 0730    glucose chewable tablet 16 g, 4 Tab, Oral, PRN, Ed Fuentes, NP    glucagon (GLUCAGEN) injection 1 mg, 1 mg, IntraMUSCular, PRN, Ed Fuentes, NP    dextrose (D50W) injection syrg 12.5-25 g, 25-50 mL, IntraVENous, PRN, Ed Fuentes, NP       ASSESSMENT:    (1) Acute encephalopathy: s/t GCS 10. Septic and pulmonary edea. Treat for sepsis with abx, lasix    (2) Acute hypoxic respiratory failure: intubated. Lasix for pulmonary edema and zosyn for possible infection, COVID r/o. Daily SBTs     (3) sepsis : same as 2    (4) CAD with aortic stenosis: complicates all aspects of care    (5) UGIB with ABLA: EGD today, hb 8.4    (6) anemia with ABLA : s/t #%    (7) DMII: SSI, accu < 180    (8) HTN: hold all BP meds    (9) CHF unknwon EF: resume lasix, cardiology        Full Code  Dvt Prophylaxis  GI Prophylaxis  Home medications reviewed and reconciled      Above treatment plan reviewed and discussed with patient in detail at bedside, all questions answered. Care Plan discussed with:  Interdisciplinary team      Sienna Taveras MD

## 2021-01-28 NOTE — PROGRESS NOTES
Gave patient Fleets emema at 2230. Became SOB after getting on bedpan and having some fluid return from rectum. Increased O2 NC from3L to 4L. Patient then needed bedpan again and became very SOB and wheezing. Asked for a breathing treatment. RT came and gave [id number] [phone] and on assessing patient noted that she was 70% on 4L. RT placed patient on a nonrebreather and O2 sats staying in the 80's. Patient also looking drowsy and not responding as well. Code Purple called Supervisor, 2nd RT, ICU nurse, security, and  arrived. RT intubated patient, a second IV was started, orders received to transfer patient to ICU and take for CT scans. Patient taken down CT around [phone] and arrived at ICU around [address].

## 2021-01-28 NOTE — PROGRESS NOTES
Called Daughter Anil Schrader and notified of transfer to ICU room 263 and extension [phone]. Answered some questions and told her to give the ICU some time to get her settled.

## 2021-01-28 NOTE — PROGRESS NOTES
Nursing staff report that patient was given enema and then developed SOB, hypoxia and wheezing. Patient was placed on O2 supplementation via nasal cannula without improvement in oxygen saturation. RT was called to assist however patient became unresponsive. Per nursing, pulse remained tachycardic and blood pressure remained stable. Rapid response was called for acute hypoxia and unresponsiveness. On initial examination, patient was not responsive. Decision was made for intubation. Chart and recent laboratory results were reviewed. Order STAT CT head, CTA chest, CBC. CMP, lactic acid, EKG, troponin. Transfer to the ICU. Further plan pending results. Of test/labs ordered. I performed 30 minutes of critical care time which does not include time spent on separately billable procedures.

## 2021-01-28 NOTE — PROGRESS NOTES
Progress Note    Patient: Wilson Caldera MRN: [id number]  SSN: [id number]    YOB: 1955  Age: 77 y.o.   Sex: female      Admit Date: 1/25/2021    LOS: 3 days     Subjective:   Patient in icu now for acute respiratory failure, yesterday EGD for anemia, GI bleeding showed mild gastritis,  Past Medical History:   Diagnosis Date    Acute gastrointestinal hemorrhage 8/28/2020    Allergic rhinitis 8/28/2020    Anemia 8/28/2020    Benign essential hypertension 6/25/2020    Body mass index 30.0-30.9, adult 6/25/2020    CAD (coronary artery disease) 8/28/2020    Foot callus 8/28/2020    GERD (gastroesophageal reflux disease) 6/25/2020    Heart murmur 6/25/2020    Hx of colonoscopy 01/01/2015    Done for anemia and rectal bleeding    Left bundle branch block 8/28/2020    Microalbuminuria due to type 2 diabetes mellitus ([facility] 8/28/2020    Mixed hyperlipidemia 6/25/2020    Morbid obesity ([facility] 8/28/2020    Tobacco dependence syndrome 6/25/2020    Type II diabetes mellitus, uncontrolled ([facility] 6/25/2020        Current Facility-Administered Medications:     propofol (DIPRIVAN) 10 mg/mL infusion, 0-50 mcg/kg/min, IntraVENous, TITRATE, Breanne Zhu MD, Last Rate: 21.8 mL/hr at 01/28/21 0833, 40 mcg/kg/min at 01/28/21 0833    propofoL (DIPRIVAN) 10 mg/mL injection, , , ,     budesonide (PULMICORT) 500 mcg/2 ml nebulizer suspension, 500 mcg, Nebulization, BID RT, Dario Edwards MD, 500 mcg at 01/28/21 0855    albuterol-ipratropium (DUO-NEB) 2.5 MG-0.5 MG/3 ML, 3 mL, Nebulization, Q6H RT, Dario Edwards MD, 3 mL at 01/28/21 0855    budesonide (PULMICORT) 0.5 mg/2 mL nebulizer suspension, , , ,     piperacillin-tazobactam (ZOSYN) 3.375 g in 0.9% sodium chloride (MBP/ADV) 100 mL MBP, 3.375 g, IntraVENous, Q8H, Dario Edwards MD, Last Rate: 25 mL/hr at 01/28/21 1200, 3.375 g at 01/28/21 1200    pantoprazole (PROTONIX) tablet 40 mg, 40 mg, Oral, ACB, Anderson Elder MD, 40 mg at 01/28/21 0916    sodium phosphate (FLEET'S) enema 1 Enema, 1 Enema, Rectal, ONCE, Kiran Chris MD, Stopped at 01/28/21 0600    albuterol-ipratropium (DUO-NEB) 2.5 MG-0.5 MG/3 ML, 3 mL, Nebulization, Q6H PRN, Farideh Andino MD, 3 mL at 01/27/21 2253    carvediloL (COREG) tablet 3.125 mg, 3.125 mg, Oral, BID WITH MEALS, Farideh Andino MD, Stopped at 01/28/21 0800    0.9% sodium chloride infusion, 50 mL/hr, IntraVENous, CONTINUOUS, Ed Fuentes NP, Last Rate: 50 mL/hr at 01/28/21 0925, 50 mL/hr at 01/28/21 0925    [Held by provider] lisinopriL (PRINIVIL, ZESTRIL) tablet 20 mg, 20 mg, Oral, BID, Ed Fuentes NP, Stopped at 01/28/21 0900    LORazepam (ATIVAN) tablet 1 mg, 1 mg, Oral, QHS, Ed Fuentes NP, 1 mg at 01/26/21 2300    acetaminophen (TYLENOL) tablet 650 mg, 650 mg, Oral, Q4H PRN, Ed Fuentes NP    ondansetron (ZOFRAN ODT) tablet 4 mg, 4 mg, Oral, Q8H PRN, Ed Fuentes NP    ondansetron [facility]) injection 4 mg, 4 mg, IntraVENous, Q6H PRN, Ed Fuentes NP, 4 mg at 01/26/21 2258    insulin lispro (HUMALOG) injection, , SubCUTAneous, AC&HS, Ed Fuentes NP, Stopped at 01/28/21 0730    glucose chewable tablet 16 g, 4 Tab, Oral, PRN, Ed Fuentes NP    glucagon (GLUCAGEN) injection 1 mg, 1 mg, IntraMUSCular, PRN, Ed Fuentes NP    dextrose (D50W) injection syrg 12.5-25 g, 25-50 mL, IntraVENous, PRN, Ed Fuentes NP    Objective:     Vitals:    01/28/21 0900 01/28/21 1000 01/28/21 1100 01/28/21 1153   BP: 118/70 (!) 107/56     Pulse: 85 78  69   Resp: 14 14     Temp:   98.2 °F (36.8 °C)    SpO2: 100% 100%     Weight:       Height:            Intake and Output:  Current Shift: 01/28 0701 - 01/28 1900  In: -   Out: 980 [Urine:980]  Last three shifts: 01/26 1901 - 01/28 0700  In: 790 [P.O.:740; I.V.:50]  Out: 500 [Urine:500]    Physical Exam:   Physical Exam   Constitutional: She appears lethargic. She is intubated. HENT:   Head: Atraumatic.    Eyes: No scleral icterus. Neck: Normal carotid pulses and no JVD present. No thyromegaly present. Cardiovascular: Regular rhythm. Pulmonary/Chest: She is intubated. Abdominal: Soft. Bowel sounds are normal. She exhibits no distension, no fluid wave and no ascites. Neurological: She appears lethargic.         Lab/Data Review:  Recent Results (from the past 24 hour(s))   GLUCOSE, POC    Collection Time: 01/27/21  3:52 PM   Result Value Ref Range    Glucose (POC) 154 (H) 65 - 100 mg/dL    Performed by [address] [place] [address], POC    Collection Time: 01/27/21 10:24 PM   Result Value Ref Range    Glucose (POC) 285 (H) 65 - 100 mg/dL    Performed by [employer]    GLUCOSE, POC    Collection Time: 01/27/21 11:24 PM   Result Value Ref Range    Glucose (POC) 374 (H) 65 - 100 mg/dL    Performed by Leighann Jack [id number], POC    Collection Time: 01/27/21 11:33 PM   Result Value Ref Range    Glucose (POC) 353 (H) 65 - 100 mg/dL    Performed by Anibal Canseco    MRSA SCREEN - PCR (NASAL)    Collection Time: 01/28/21 12:30 AM   Result Value Ref Range    MRSA by PCR, Nasal Not Detected Not Detected     URINALYSIS W/MICROSCOPIC    Collection Time: 01/28/21 12:30 AM   Result Value Ref Range    Color Yellow/Straw      Appearance Turbid (A) Clear      Specific gravity >1.030 (H) 1.003 - 1.030    pH (UA) 5.0 5.0 - 8.0      Protein 100 (A) Negative mg/dL    Glucose 150 (A) Negative mg/dL    Ketone Negative Negative mg/dL    Bilirubin Negative Negative      Blood Negative Negative      Urobilinogen 2.0 (H) 0.1 - 1.0 EU/dL    Nitrites Negative Negative      Leukocyte Esterase Negative Negative      WBC 10-20 0 - 4 /hpf    RBC 5-10 0 - 5 /hpf    Bacteria Negative Negative /hpf    Mucus Trace /lpf    Other Urine Micro <1    BLOOD GAS, ARTERIAL    Collection Time: 01/28/21  2:15 AM   Result Value Ref Range    pH 7.26 (L) 7.35 - 7.45      PCO2 68 (H) 35 - 45 mmHg    PO2 81 75 - 100 mmHg    O2 SAT 96 >95 %    BICARBONATE 27 (H) 22 - 26 mmol/L    BASE EXCESS 2.7 (H) 0 - 2 mmol/L    O2 METHOD VENT      FIO2 100.0 %    MODE Assist Control/Volume Control      Tidal volume 500      SET RATE 12      EPAP/CPAP/PEEP 8.0      SITE Right Radial      ABDULAZIZ'S TEST PASS     CBC WITH AUTOMATED DIFF    Collection Time: 01/28/21  2:30 AM   Result Value Ref Range    WBC 13.1 (H) 3.6 - 11.0 K/uL    RBC 3.93 3.80 - 5.20 M/uL    HGB 8.4 (L) 11.5 - 16.0 g/dL    HCT 29.2 (L) 35.0 - 47.0 %    MCV 74.3 (L) 80.0 - 99.0 FL    MCH 21.4 (L) 26.0 - 34.0 PG    MCHC 28.8 (L) 30.0 - 36.5 g/dL    RDW 24.9 (H) 11.5 - 14.5 %    PLATELET 102 (H) 719 - 400 K/uL    MPV 8.4 (L) 8.9 - 12.9 FL    NRBC 3.2 (H) 0  WBC    ABSOLUTE NRBC 0.42 (H) 0.00 - 0.01 K/uL    NEUTROPHILS 85 (H) 32 - 75 %    LYMPHOCYTES 7 (L) 12 - 49 %    MONOCYTES 7 5 - 13 %    EOSINOPHILS 0 0 - 7 %    BASOPHILS 0 0 - 1 %    IMMATURE GRANULOCYTES 1 (H) 0.0 - 0.5 %    ABS. NEUTROPHILS 11.3 (H) 1.8 - 8.0 K/UL    ABS. LYMPHOCYTES 0.9 0.8 - 3.5 K/UL    ABS. MONOCYTES 0.9 0.0 - 1.0 K/UL    ABS. EOSINOPHILS 0.0 0.0 - 0.4 K/UL    ABS. BASOPHILS 0.0 0.0 - 0.1 K/UL    ABS. IMM.  GRANS. 0.1 (H) 0.00 - 0.04 K/UL    DF AUTOMATED     MAGNESIUM    Collection Time: 01/28/21  2:30 AM   Result Value Ref Range    Magnesium 1.8 1.6 - 2.4 mg/dL   PROTHROMBIN TIME + INR    Collection Time: 01/28/21  2:30 AM   Result Value Ref Range    Prothrombin time 14.4 11.9 - 14.7 sec    INR 1.1 0.9 - 1.1     PTT    Collection Time: 01/28/21  2:30 AM   Result Value Ref Range    aPTT 28.6 23.0 - 35.7 sec    aPTT, therapeutic range   68 - 109 sec   BNP    Collection Time: 01/28/21  2:30 AM   Result Value Ref Range    NT pro-BNP 1,194 (H) <125 pg/mL   EKG, 12 LEAD, INITIAL    Collection Time: 01/28/21  7:41 AM   Result Value Ref Range    Ventricular Rate 68 BPM    Atrial Rate 68 BPM    P-R Interval 118 ms    QRS Duration 132 ms    Q-T Interval 394 ms    QTC Calculation (Bezet) 418 ms    Calculated P Axis 53 degrees    Calculated R Axis -2 degrees Calculated T Axis 103 degrees    Diagnosis       Normal sinus rhythm with sinus arrhythmia  Left bundle branch block  Abnormal ECG  When compared with ECG of 25-JAN-2021 15:10,  Premature atrial complexes are no longer Present  QT has shortened  Confirmed by PANCHO GARCIA, Maureen Morales ([id number]) on 1/28/2021 9:35:57 AM     GLUCOSE, POC    Collection Time: 01/28/21  9:05 AM   Result Value Ref Range    Glucose (POC) 147 (H) 65 - 100 mg/dL    Performed by Jaya Walsh    GLUCOSE, POC    Collection Time: 01/28/21 10:57 AM   Result Value Ref Range    Glucose (POC) 124 (H) 65 - 100 mg/dL    Performed by Jaya Walsh         CT HEAD WO CONT   Final Result   No acute or active intracranial process. CTA CHEST W OR W WO CONT   Final Result   Impression:   1. No evidence of pulmonary embolus. 2. Bilateral dependent airspace disease compatible with pneumonia. 3. Diffuse pulmonary interstitial disease compatible with pulmonary edema. 4. Bilateral pleural effusions. 5. Coronary artery calcific atherosclerotic disease. 6. Small to moderate pericardial effusion. 7. Distal trachea and main bronchi are poorly expanded. XR CHEST PORT   Final Result   Impression:    1. Apparent satisfactory tracheal intubation. 2. Compatible with pneumonia. 3. Compatible with pulmonary interstitial edema. XR CHEST PORT   Final Result   Minimal residual right basilar opacity with resolved left basilar   opacity.           XR CHEST PORT    (Results Pending)        Assessment:     Principal Problem:    GI bleed (1/25/2021)    Acute anemia- secondary to GI bleed,      Hx of aortic stenosis- planned for valve replacement,    Hx of CAD- has 3 vessel disease,   Was on antiplatelet agent . egd acute bleeding,  Plan:   Follow the hemoglobin closely,   hold asa, no NSAIDS,   Iron Placement   Protonix daily     Plan:       Signed By: Michael Bennett MD     January 28, 2021        Thank you for allowing me to participate in this patients care  Cc Referring Physician   Erick Casas DO

## 2021-01-29 ENCOUNTER — APPOINTMENT (OUTPATIENT)
Dept: GENERAL RADIOLOGY | Age: 66
DRG: 377 | End: 2021-01-29
Attending: INTERNAL MEDICINE
Payer: MEDICARE

## 2021-01-29 LAB
ALBUMIN SERPL-MCNC: 2 G/DL (ref 3.5–5)
ALBUMIN/GLOB SERPL: 0.7 {RATIO} (ref 1.1–2.2)
ALP SERPL-CCNC: 44 U/L (ref 45–117)
ALT SERPL-CCNC: 16 U/L (ref 12–78)
ANION GAP SERPL CALC-SCNC: 7 MMOL/L (ref 5–15)
ANION GAP SERPL CALC-SCNC: 7 MMOL/L (ref 5–15)
ARTERIAL PATENCY WRIST A: ABNORMAL
AST SERPL W P-5'-P-CCNC: 30 U/L (ref 15–37)
BACTERIA SPEC CULT: NORMAL
BASE EXCESS BLDA CALC-SCNC: 6.1 MMOL/L (ref 0–2)
BASOPHILS # BLD: 0 K/UL (ref 0–0.1)
BASOPHILS NFR BLD: 0 % (ref 0–1)
BDY SITE: ABNORMAL
BILIRUB SERPL-MCNC: 0.8 MG/DL (ref 0.2–1)
BUN SERPL-MCNC: 10 MG/DL (ref 6–20)
BUN SERPL-MCNC: 11 MG/DL (ref 6–20)
BUN/CREAT SERPL: 21 (ref 12–20)
BUN/CREAT SERPL: 23 (ref 12–20)
CA-I BLD-MCNC: 6.7 MG/DL (ref 8.5–10.1)
CA-I BLD-MCNC: 8.1 MG/DL (ref 8.5–10.1)
CHLORIDE SERPL-SCNC: 100 MMOL/L (ref 97–108)
CHLORIDE SERPL-SCNC: 104 MMOL/L (ref 97–108)
CO2 SERPL-SCNC: 27 MMOL/L (ref 21–32)
CO2 SERPL-SCNC: 30 MMOL/L (ref 21–32)
CREAT SERPL-MCNC: 0.47 MG/DL (ref 0.55–1.02)
CREAT SERPL-MCNC: 0.47 MG/DL (ref 0.55–1.02)
DIFFERENTIAL METHOD BLD: ABNORMAL
EOSINOPHIL # BLD: 0.1 K/UL (ref 0–0.4)
EOSINOPHIL NFR BLD: 0 % (ref 0–7)
EPAP/CPAP/PEEP, PAPEEP: 8
ERYTHROCYTE [DISTWIDTH] IN BLOOD BY AUTOMATED COUNT: 25.9 % (ref 11.5–14.5)
FIO2 ON VENT: 65 %
GAS FLOW.O2 SETTING OXYMISER: 14 L/MIN
GLOBULIN SER CALC-MCNC: 2.9 G/DL (ref 2–4)
GLUCOSE BLD STRIP.AUTO-MCNC: 130 MG/DL (ref 65–100)
GLUCOSE BLD STRIP.AUTO-MCNC: 156 MG/DL (ref 65–100)
GLUCOSE BLD STRIP.AUTO-MCNC: 197 MG/DL (ref 65–100)
GLUCOSE BLD STRIP.AUTO-MCNC: 75 MG/DL (ref 65–100)
GLUCOSE SERPL-MCNC: 131 MG/DL (ref 65–100)
GLUCOSE SERPL-MCNC: 98 MG/DL (ref 65–100)
HCO3 BLDA-SCNC: 30 MMOL/L (ref 22–26)
HCT VFR BLD AUTO: 23.2 % (ref 35–47)
HGB BLD-MCNC: 6.8 G/DL (ref 11.5–16)
IMM GRANULOCYTES # BLD AUTO: 0.1 K/UL (ref 0–0.04)
IMM GRANULOCYTES NFR BLD AUTO: 1 % (ref 0–0.5)
IPAP/PIP, IPAPIP: 24
LYMPHOCYTES # BLD: 1 K/UL (ref 0.8–3.5)
LYMPHOCYTES NFR BLD: 12 % (ref 12–49)
MCH RBC QN AUTO: 21.4 PG (ref 26–34)
MCHC RBC AUTO-ENTMCNC: 29.3 G/DL (ref 30–36.5)
MCV RBC AUTO: 73 FL (ref 80–99)
MONOCYTES # BLD: 0.9 K/UL (ref 0–1)
MONOCYTES NFR BLD: 11 % (ref 5–13)
NEUTS SEG # BLD: 6.3 K/UL (ref 1.8–8)
NEUTS SEG NFR BLD: 77 % (ref 32–75)
PCO2 BLDA: 38 MMHG (ref 35–45)
PERFORMED BY, TECHID: ABNORMAL
PERFORMED BY, TECHID: NORMAL
PH BLDA: 7.5 [PH] (ref 7.35–7.45)
PLATELET # BLD AUTO: 428 K/UL (ref 150–400)
PMV BLD AUTO: 8.6 FL (ref 8.9–12.9)
PO2 BLDA: 226 MMHG (ref 75–100)
POTASSIUM SERPL-SCNC: 2.6 MMOL/L (ref 3.5–5.1)
POTASSIUM SERPL-SCNC: 3.1 MMOL/L (ref 3.5–5.1)
PROT SERPL-MCNC: 4.9 G/DL (ref 6.4–8.2)
RBC # BLD AUTO: 3.18 M/UL (ref 3.8–5.2)
SAO2 % BLD: 100 %
SAO2% DEVICE SAO2% SENSOR NAME: ABNORMAL
SODIUM SERPL-SCNC: 137 MMOL/L (ref 136–145)
SODIUM SERPL-SCNC: 138 MMOL/L (ref 136–145)
SPECIAL REQUESTS,SREQ: NORMAL
SPECIMEN SITE: ABNORMAL
VENTILATION MODE VENT: ABNORMAL
WBC # BLD AUTO: 8.2 K/UL (ref 3.6–11)

## 2021-01-29 PROCEDURE — 82962 GLUCOSE BLOOD TEST: CPT

## 2021-01-29 PROCEDURE — 82803 BLOOD GASES ANY COMBINATION: CPT

## 2021-01-29 PROCEDURE — 74011250636 HC RX REV CODE- 250/636: Performed by: INTERNAL MEDICINE

## 2021-01-29 PROCEDURE — 36430 TRANSFUSION BLD/BLD COMPNT: CPT

## 2021-01-29 PROCEDURE — 74011250636 HC RX REV CODE- 250/636: Performed by: HOSPITALIST

## 2021-01-29 PROCEDURE — 74011000258 HC RX REV CODE- 258: Performed by: INTERNAL MEDICINE

## 2021-01-29 PROCEDURE — 71045 X-RAY EXAM CHEST 1 VIEW: CPT

## 2021-01-29 PROCEDURE — 86901 BLOOD TYPING SEROLOGIC RH(D): CPT

## 2021-01-29 PROCEDURE — 80048 BASIC METABOLIC PNL TOTAL CA: CPT

## 2021-01-29 PROCEDURE — 74011250637 HC RX REV CODE- 250/637: Performed by: INTERNAL MEDICINE

## 2021-01-29 PROCEDURE — 74011000250 HC RX REV CODE- 250: Performed by: INTERNAL MEDICINE

## 2021-01-29 PROCEDURE — 36415 COLL VENOUS BLD VENIPUNCTURE: CPT

## 2021-01-29 PROCEDURE — 94003 VENT MGMT INPAT SUBQ DAY: CPT

## 2021-01-29 PROCEDURE — 86923 COMPATIBILITY TEST ELECTRIC: CPT

## 2021-01-29 PROCEDURE — 94640 AIRWAY INHALATION TREATMENT: CPT

## 2021-01-29 PROCEDURE — 74011636637 HC RX REV CODE- 636/637: Performed by: NURSE PRACTITIONER

## 2021-01-29 PROCEDURE — 80053 COMPREHEN METABOLIC PANEL: CPT

## 2021-01-29 PROCEDURE — 65610000006 HC RM INTENSIVE CARE

## 2021-01-29 PROCEDURE — P9016 RBC LEUKOCYTES REDUCED: HCPCS

## 2021-01-29 PROCEDURE — 74011250637 HC RX REV CODE- 250/637: Performed by: HOSPITALIST

## 2021-01-29 PROCEDURE — 74011250637 HC RX REV CODE- 250/637: Performed by: PHYSICIAN ASSISTANT

## 2021-01-29 PROCEDURE — 74011250637 HC RX REV CODE- 250/637: Performed by: NURSE PRACTITIONER

## 2021-01-29 PROCEDURE — 85025 COMPLETE CBC W/AUTO DIFF WBC: CPT

## 2021-01-29 PROCEDURE — 74011250636 HC RX REV CODE- 250/636: Performed by: PHYSICIAN ASSISTANT

## 2021-01-29 RX ORDER — LISINOPRIL 5 MG/1
5 TABLET ORAL DAILY
Status: DISCONTINUED | OUTPATIENT
Start: 2021-01-30 | End: 2021-02-05 | Stop reason: HOSPADM

## 2021-01-29 RX ORDER — POTASSIUM CHLORIDE 7.45 MG/ML
10 INJECTION INTRAVENOUS
Status: COMPLETED | OUTPATIENT
Start: 2021-01-29 | End: 2021-01-29

## 2021-01-29 RX ORDER — POTASSIUM CHLORIDE 20 MEQ/1
10 TABLET, EXTENDED RELEASE ORAL
Status: COMPLETED | OUTPATIENT
Start: 2021-01-29 | End: 2021-01-29

## 2021-01-29 RX ORDER — POTASSIUM CHLORIDE 7.45 MG/ML
10 INJECTION INTRAVENOUS
Status: COMPLETED | OUTPATIENT
Start: 2021-01-29 | End: 2021-01-30

## 2021-01-29 RX ORDER — POTASSIUM CHLORIDE 1.5 G/1.77G
40 POWDER, FOR SOLUTION ORAL
Status: COMPLETED | OUTPATIENT
Start: 2021-01-29 | End: 2021-01-29

## 2021-01-29 RX ORDER — POTASSIUM CHLORIDE 1.5 G/1.77G
40 POWDER, FOR SOLUTION ORAL 2 TIMES DAILY WITH MEALS
Status: DISCONTINUED | OUTPATIENT
Start: 2021-01-30 | End: 2021-01-29

## 2021-01-29 RX ORDER — SODIUM CHLORIDE 9 MG/ML
250 INJECTION, SOLUTION INTRAVENOUS AS NEEDED
Status: DISCONTINUED | OUTPATIENT
Start: 2021-01-29 | End: 2021-02-05 | Stop reason: HOSPADM

## 2021-01-29 RX ORDER — FUROSEMIDE 10 MG/ML
40 INJECTION INTRAMUSCULAR; INTRAVENOUS EVERY 12 HOURS
Status: DISCONTINUED | OUTPATIENT
Start: 2021-01-29 | End: 2021-02-05 | Stop reason: HOSPADM

## 2021-01-29 RX ADMIN — IPRATROPIUM BROMIDE AND ALBUTEROL SULFATE 3 ML: .5; 3 SOLUTION RESPIRATORY (INHALATION) at 12:32

## 2021-01-29 RX ADMIN — POTASSIUM CHLORIDE 10 MEQ: 7.46 INJECTION, SOLUTION INTRAVENOUS at 08:45

## 2021-01-29 RX ADMIN — IPRATROPIUM BROMIDE AND ALBUTEROL SULFATE 3 ML: .5; 3 SOLUTION RESPIRATORY (INHALATION) at 08:08

## 2021-01-29 RX ADMIN — ACETAMINOPHEN 650 MG: 325 TABLET ORAL at 16:49

## 2021-01-29 RX ADMIN — POTASSIUM CHLORIDE 10 MEQ: 7.46 INJECTION, SOLUTION INTRAVENOUS at 10:12

## 2021-01-29 RX ADMIN — POTASSIUM CHLORIDE 10 MEQ: 7.46 INJECTION, SOLUTION INTRAVENOUS at 09:45

## 2021-01-29 RX ADMIN — IPRATROPIUM BROMIDE AND ALBUTEROL SULFATE 3 ML: .5; 3 SOLUTION RESPIRATORY (INHALATION) at 00:52

## 2021-01-29 RX ADMIN — PROPOFOL INJECTABLE EMULSION 40 MCG/KG/MIN: 10 INJECTION, EMULSION INTRAVENOUS at 02:14

## 2021-01-29 RX ADMIN — BUDESONIDE 500 MCG: 0.5 INHALANT RESPIRATORY (INHALATION) at 22:52

## 2021-01-29 RX ADMIN — POTASSIUM CHLORIDE 20 MEQ: 1500 TABLET, EXTENDED RELEASE ORAL at 10:12

## 2021-01-29 RX ADMIN — PIPERACILLIN SODIUM AND TAZOBACTAM SODIUM 3.38 G: 3; .375 INJECTION, POWDER, LYOPHILIZED, FOR SOLUTION INTRAVENOUS at 18:46

## 2021-01-29 RX ADMIN — IPRATROPIUM BROMIDE AND ALBUTEROL SULFATE 3 ML: .5; 3 SOLUTION RESPIRATORY (INHALATION) at 22:52

## 2021-01-29 RX ADMIN — POTASSIUM CHLORIDE 40 MEQ: 1.5 FOR SOLUTION ORAL at 23:00

## 2021-01-29 RX ADMIN — PANTOPRAZOLE SODIUM 40 MG: 40 TABLET, DELAYED RELEASE ORAL at 10:12

## 2021-01-29 RX ADMIN — PIPERACILLIN SODIUM AND TAZOBACTAM SODIUM 3.38 G: 3; .375 INJECTION, POWDER, LYOPHILIZED, FOR SOLUTION INTRAVENOUS at 10:12

## 2021-01-29 RX ADMIN — INSULIN LISPRO 5 UNITS: 100 INJECTION, SOLUTION INTRAVENOUS; SUBCUTANEOUS at 07:45

## 2021-01-29 RX ADMIN — PROPOFOL INJECTABLE EMULSION 40 MCG/KG/MIN: 10 INJECTION, EMULSION INTRAVENOUS at 06:35

## 2021-01-29 RX ADMIN — FUROSEMIDE 20 MG: 10 INJECTION, SOLUTION INTRAMUSCULAR; INTRAVENOUS at 10:12

## 2021-01-29 RX ADMIN — POTASSIUM CHLORIDE 10 MEQ: 7.46 INJECTION, SOLUTION INTRAVENOUS at 11:00

## 2021-01-29 RX ADMIN — CARVEDILOL 3.12 MG: 3.12 TABLET, FILM COATED ORAL at 16:29

## 2021-01-29 RX ADMIN — POTASSIUM CHLORIDE 10 MEQ: 7.46 INJECTION, SOLUTION INTRAVENOUS at 23:00

## 2021-01-29 RX ADMIN — FUROSEMIDE 40 MG: 10 INJECTION, SOLUTION INTRAMUSCULAR; INTRAVENOUS at 23:30

## 2021-01-29 RX ADMIN — PROPOFOL INJECTABLE EMULSION 40 MCG/KG/MIN: 10 INJECTION, EMULSION INTRAVENOUS at 04:43

## 2021-01-29 RX ADMIN — PIPERACILLIN SODIUM AND TAZOBACTAM SODIUM 3.38 G: 3; .375 INJECTION, POWDER, LYOPHILIZED, FOR SOLUTION INTRAVENOUS at 02:15

## 2021-01-29 RX ADMIN — BUDESONIDE 500 MCG: 0.5 INHALANT RESPIRATORY (INHALATION) at 08:07

## 2021-01-29 RX ADMIN — INSULIN LISPRO 5 UNITS: 100 INJECTION, SOLUTION INTRAVENOUS; SUBCUTANEOUS at 11:52

## 2021-01-29 RX ADMIN — POTASSIUM CHLORIDE 10 MEQ: 7.46 INJECTION, SOLUTION INTRAVENOUS at 21:32

## 2021-01-29 NOTE — PROGRESS NOTES
Progress Note    Patient: Donald Bobo MRN: [id number]  SSN: [id number]    YOB: 1955  Age: 77 y.o.   Sex: female      Admit Date: 1/25/2021    LOS: 4 days     Subjective:   Patient in icu now for acute respiratory failure, yesterday EGD for anemia, GI bleeding showed mild gastritis,  Past Medical History:   Diagnosis Date    Acute gastrointestinal hemorrhage 8/28/2020    Allergic rhinitis 8/28/2020    Anemia 8/28/2020    Benign essential hypertension 6/25/2020    Body mass index 30.0-30.9, adult 6/25/2020    CAD (coronary artery disease) 8/28/2020    Foot callus 8/28/2020    GERD (gastroesophageal reflux disease) 6/25/2020    Heart murmur 6/25/2020    Hx of colonoscopy 01/01/2015    Done for anemia and rectal bleeding    Left bundle branch block 8/28/2020    [facility]albumin[facility] due to type 2 [facility] mellitus [facility] 8/28/2020    Mixed hyperlipidemia 6/25/2020    Morbid obesity ([facility] 8/28/2020    Tobacco dependence syndrome 6/25/2020    Type II diabetes mellitus, uncontrolled ([facility] 6/25/2020        Current Facility-Administered Medications:     0.9% sodium chloride infusion 250 mL, 250 mL, IntraVENous, PRN, Dario Edwards MD    furosemide (LASIX) injection 40 mg, 40 mg, IntraVENous, Q12H, Tigre Luke MD    [START ON 1/30/2021] lisinopriL (PRINIVIL, ZESTRIL) tablet 5 mg, 5 mg, Oral, DAILY, Jeremy Glover MD    propofol (DIPRIVAN) 10 mg/mL infusion, 0-50 mcg/kg/min, IntraVENous, TITRATE, Amelia Zhu MD, Last Rate: 21.8 mL/hr at 01/29/21 0635, 40 mcg/kg/min at 01/29/21 0635    budesonide (PULMICORT) 500 mcg/2 ml nebulizer suspension, 500 mcg, Nebulization, BID RT, Dario Edwards MD, 500 mcg at 01/29/21 0807    albuterol-ipratropium (DUO-NEB) 2.5 MG-0.5 MG/3 ML, 3 mL, Nebulization, Q6H RT, Dario Edwards MD, Stopped at 01/29/21 1400    piperacillin-tazobactam (ZOSYN) 3.375 g in 0.9% sodium chloride (MBP/ADV) 100 mL MBP, 3.375 g, IntraVENous, Q8H, Derrick Mott MD, Last Rate: 25 mL/hr at 01/29/21 1012, 3.375 g at 01/29/21 1012    pantoprazole (PROTONIX) tablet 40 mg, 40 mg, Oral, ACB, Carrie Garcia MD, 40 mg at 01/29/21 1012    carvediloL (COREG) tablet 3.125 mg, 3.125 mg, Oral, BID WITH MEALS, Jun Nuñez, Akbar Trevizo MD, 3.125 mg at 01/29/21 1629    0.9% sodium chloride infusion, 50 mL/hr, IntraVENous, CONTINUOUS, Ed Fuentes NP, Last Rate: 50 mL/hr at 01/28/21 0925, 50 mL/hr at 01/28/21 0925    LORazepam (ATIVAN) tablet 1 mg, 1 mg, Oral, QHS, Ed Fuentes, JOSE, 1 mg at 01/28/21 2233    acetaminophen (TYLENOL) tablet 650 mg, 650 mg, Oral, Q4H PRN, Ed Fuentes NP, 650 mg at 01/29/21 1649    ondansetron (ZOFRAN ODT) tablet 4 mg, 4 mg, Oral, Q8H PRN, Ed Fuentes NP    [facility] injection 4 mg, 4 mg, IntraVENous, Q6H PRN, Ed Fuentes, JOSE, 4 mg at 01/26/21 2258    insulin lispro (HUMALOG) injection, , SubCUTAneous, AC&HS, Ed Fuentes NP, 5 Units at 01/29/21 1152    glucose chewable tablet 16 g, 4 Tab, Oral, PRN, Ed Fuentes NP    glucagon (GLUCAGEN) injection 1 mg, 1 mg, IntraMUSCular, PRN, Ed Fuentes NP    dextrose (D50W) injection syrg 12.5-25 g, 25-50 mL, IntraVENous, PRN, Ed Fuentes, NP    Objective:     Vitals:    01/29/21 1400 01/29/21 1500 01/29/21 1556 01/29/21 1629   BP: 116/64 128/72  (!) 140/74   Pulse: (!) 107 (!) 109  (!) 112   Resp: 23 24  23   Temp:    (!) 100.5 °F (38.1 °C)   SpO2: 92% 92% 95% 92%   Weight:       Height:            Intake and Output:  Current Shift: No intake/output data recorded. Last three shifts: 01/27 1901 - 01/29 0700  In: -   Out: 1280 [Urine:1280]    Physical Exam:   Physical Exam   Constitutional: She is not intubated. HENT:   Head: Atraumatic. Eyes: No scleral icterus. Neck: Normal carotid pulses and no JVD present. No thyromegaly present. Cardiovascular: Regular rhythm. Pulmonary/Chest: Effort normal. No accessory muscle usage. She is not intubated. No respiratory distress. Abdominal: Soft. Bowel sounds are normal. She exhibits no distension, no fluid wave and no ascites. Neurological: She is alert. Lab/Data Review:  Recent Results (from the past 24 hour(s))   GLUCOSE, POC    Collection Time: 01/28/21 10:29 PM   Result Value Ref Range    Glucose (POC) 151 (H) 65 - 100 mg/dL    Performed by Jordi Maher    CBC WITH AUTOMATED DIFF    Collection Time: 01/29/21  3:30 AM   Result Value Ref Range    WBC 8.2 3.6 - 11.0 K/uL    RBC 3.18 (L) 3.80 - 5.20 M/uL    HGB 6.8 (L) 11.5 - 16.0 g/dL    HCT 23.2 (L) 35.0 - 47.0 %    MCV 73.0 (L) 80.0 - 99.0 FL    MCH 21.4 (L) 26.0 - 34.0 PG    MCHC 29.3 (L) 30.0 - 36.5 g/dL    RDW 25.9 (H) 11.5 - 14.5 %    PLATELET 992 (H) 557 - 400 K/uL    MPV 8.6 (L) 8.9 - 12.9 FL    NEUTROPHILS 77 (H) 32 - 75 %    LYMPHOCYTES 12 12 - 49 %    MONOCYTES 11 5 - 13 %    EOSINOPHILS 0 0 - 7 %    BASOPHILS 0 0 - 1 %    IMMATURE GRANULOCYTES 1 (H) 0.0 - 0.5 %    ABS. NEUTROPHILS 6.3 1.8 - 8.0 K/UL    ABS. LYMPHOCYTES 1.0 0.8 - 3.5 K/UL    ABS. MONOCYTES 0.9 0.0 - 1.0 K/UL    ABS. EOSINOPHILS 0.1 0.0 - 0.4 K/UL    ABS. BASOPHILS 0.0 0.0 - 0.1 K/UL    ABS. IMM. GRANS. 0.1 (H) 0.00 - 0.04 K/UL    DF AUTOMATED     METABOLIC PANEL, COMPREHENSIVE    Collection Time: 01/29/21  3:30 AM   Result Value Ref Range    Sodium 138 136 - 145 mmol/L    Potassium 2.6 (LL) 3.5 - 5.1 mmol/L    Chloride 104 97 - 108 mmol/L    CO2 27 21 - 32 mmol/L    Anion gap 7 5 - 15 mmol/L    Glucose 131 (H) 65 - 100 mg/dL    BUN 10 6 - 20 mg/dL    Creatinine 0.47 (L) 0.55 - 1.02 mg/dL    BUN/Creatinine ratio 21 (H) 12 - 20      GFR est AA >60 >60 ml/min/1.73m2    GFR est non-AA >60 >60 ml/min/1.73m2    Calcium 6.7 (L) 8.5 - 10.1 mg/dL    Bilirubin, total 0.8 0.2 - 1.0 mg/dL    AST (SGOT) 30 15 - 37 U/L    ALT (SGPT) 16 12 - 78 U/L    Alk.  phosphatase 44 (L) 45 - 117 U/L    Protein, total 4.9 (L) 6.4 - 8.2 g/dL    Albumin 2.0 (L) 3.5 - 5.0 g/dL    Globulin 2.9 2.0 - 4.0 g/dL    A-G Ratio 0.7 (L) 1.1 - 2.2     BLOOD GAS, ARTERIAL    Collection Time: 01/29/21  3:48 AM   Result Value Ref Range    pH 7.50 (H) 7.35 - 7.45      PCO2 38 35 - 45 mmHg    PO2 226 (H) 75 - 100 mmHg    O2  >95 %    BICARBONATE 30 (H) 22 - 26 mmol/L    BASE EXCESS 6.1 (H) 0 - 2 mmol/L    O2 METHOD VENT      FIO2 65.0 %    MODE AssistControl/Pressure Control      SET RATE 14      IPAP/PIP 24      EPAP/CPAP/PEEP 8.0      Sample source Arterial      SITE Right Radial      ABDULAZIZ'S TEST PASS     GLUCOSE, POC    Collection Time: 01/29/21  7:35 AM   Result Value Ref Range    Glucose (POC) 156 (H) 65 - 100 mg/dL    Performed by [address] [place] [address], POC    Collection Time: 01/29/21 11:18 AM   Result Value Ref Range    Glucose (POC) 197 (H) 65 - 100 mg/dL    Performed by [address] [place] [address]    Collection Time: 01/29/21 12:00 PM   Result Value Ref Range    Crossmatch Expiration 02/01/2021,2359     ABO/Rh(D) A Positive     Antibody screen Negative     Unit number [id number]     Blood component type [facility]     Unit division 00     Status of unit [address] to transfuse     Crossmatch result Compatible     Unit number [id number]     Blood component type  LR     Unit division 00     Status of unit [place] to transfuse     Crossmatch result Compatible    GLUCOSE, POC    Collection Time: 01/29/21  3:03 PM   Result Value Ref Range    Glucose (POC) 75 65 - 100 mg/dL    Performed by Chapis Antonio         XR CHEST PORT   Final Result      CT HEAD WO CONT   Final Result   No acute or active intracranial process. CTA CHEST W OR W WO CONT   Final Result   Impression:   1. No evidence of pulmonary embolus. 2. Bilateral dependent airspace disease compatible with pneumonia. 3. Diffuse pulmonary interstitial disease compatible with pulmonary edema. 4. Bilateral pleural effusions.    5. Coronary artery calcific atherosclerotic disease. 6. Small to moderate pericardial effusion. 7. Distal trachea and main bronchi are poorly expanded. XR CHEST PORT   Final Result   Impression:    1. Apparent satisfactory tracheal intubation. 2. Compatible with pneumonia. 3. Compatible with pulmonary interstitial edema. XR CHEST PORT   Final Result   Minimal residual right basilar opacity with resolved left basilar   opacity.           XR CHEST PORT    (Results Pending)        Assessment:     Principal Problem:    GI bleed (1/25/2021)    Acute anemia- secondary to GI bleed,      Hx of aortic stenosis- planned for valve replacement,    Hx of CAD- has 3 vessel disease,   Was on antiplatelet agent .  Plan:   Follow the hemoglobin closely,   hold asa, no NSAIDS,   Iron Placement   Protonix daily   May need endoscopy study once patient is more stable           Signed By: Srikanth Means MD     January 29, 2021        Thank you for allowing me to participate in this patients care  Cc Referring Physician   Catie Escoto DO

## 2021-01-29 NOTE — PROGRESS NOTES
Oral care performed at bedside and patient tolerated well. Patient reactant to verbal stimuli and obeys commands.

## 2021-01-29 NOTE — PROGRESS NOTES
Hospitalist Progress Note             Daily Progress Note: 1/29/2021    65F, H/o CHF unknown EF with CAD s/p stent with aortic stenosis with UGIB with ABLA. Last ngiht, patient became obtunded requiring increased amounts of oxygen, was intubated. XR showed, pulmonary edema, started on lasix    Thought process: pulmonary edema s/t IVF in the setting of AS. Aspiration and COVID also a concern    On 1/29 she was extubated  EGD showed gastritis  Transfer out of ICU tomorrow      Review of Systems:   Review of Systems   Constitutional: Negative. HENT: Negative. Eyes: Negative. Respiratory: Positive for shortness of breath. Cardiovascular: Negative. Gastrointestinal: Negative. Genitourinary: Negative. Musculoskeletal: Negative. Skin: Negative. Neurological: Positive for weakness. Endo/Heme/Allergies: Negative. Psychiatric/Behavioral: Negative. Objective:   Objective      Vitals:  Patient Vitals for the past 12 hrs:   Temp Pulse Resp BP SpO2   01/29/21 1500  (!) 109 24 128/72 92 %   01/29/21 1400  (!) 107 23 116/64 92 %   01/29/21 1300  (!) 107 13 (!) 139/91 99 %   01/29/21 1256     91 %   01/29/21 1231     96 %   01/29/21 1200  (!) 107 13 112/70 100 %   01/29/21 1100 100.3 °F (37.9 °C) (!) 113 13 126/68 100 %   01/29/21 1000  (!) 104 10 118/64 100 %   01/29/21 0900  (!) 101 13 118/62 100 %   01/29/21 0807     92 %   01/29/21 0800  93 14 122/71 94 %   01/29/21 0759  (!) 105 14  92 %   01/29/21 0728     100 %   01/29/21 0700 (!) 100.7 °F (38.2 °C) 78 14 111/61 100 %   01/29/21 0614  74 14 (!) 101/58 100 %   01/29/21 0516  75 14 (!) 104/55 100 %   01/29/21 0400  80 14 (!) 103/55 98 %        Physical Exam:  Physical Exam  Vitals signs and nursing note reviewed. Constitutional:       Appearance: Normal appearance. HENT:      Head: Normocephalic.       Nose: Nose normal.      Mouth/Throat:      Mouth: Mucous membranes are moist.   Eyes: Extraocular Movements: Extraocular movements intact. Neck:      Musculoskeletal: Normal range of motion. Cardiovascular:      Rate and Rhythm: Tachycardia present. Pulses: Normal pulses. Heart sounds: Normal heart sounds. Pulmonary:      Effort: Pulmonary effort is normal.      Comments: Expiratory wheeze, diminished air entry, 4L NC  Abdominal:      General: Bowel sounds are normal.      Palpations: Abdomen is soft. Musculoskeletal: Normal range of motion. Skin:     General: Skin is warm and dry. Capillary Refill: Capillary refill takes 2 to 3 seconds. Neurological:      Mental Status: She is alert and oriented to person, place, and time. Motor: Weakness present. Psychiatric:         Mood and Affect: Mood normal.         Behavior: Behavior normal.          Lab Results:  Recent Results (from the past 24 hour(s))   GLUCOSE, POC    Collection Time: 01/28/21  4:30 PM   Result Value Ref Range    Glucose (POC) 134 (H) 65 - 100 mg/dL    Performed by Cristina Potts    GLUCOSE, POC    Collection Time: 01/28/21 10:29 PM   Result Value Ref Range    Glucose (POC) 151 (H) 65 - 100 mg/dL    Performed by Evy Crawford    CBC WITH AUTOMATED DIFF    Collection Time: 01/29/21  3:30 AM   Result Value Ref Range    WBC 8.2 3.6 - 11.0 K/uL    RBC 3.18 (L) 3.80 - 5.20 M/uL    HGB 6.8 (L) 11.5 - 16.0 g/dL    HCT 23.2 (L) 35.0 - 47.0 %    MCV 73.0 (L) 80.0 - 99.0 FL    MCH 21.4 (L) 26.0 - 34.0 PG    MCHC 29.3 (L) 30.0 - 36.5 g/dL    RDW 25.9 (H) 11.5 - 14.5 %    PLATELET 994 (H) 534 - 400 K/uL    MPV 8.6 (L) 8.9 - 12.9 FL    NEUTROPHILS 77 (H) 32 - 75 %    LYMPHOCYTES 12 12 - 49 %    MONOCYTES 11 5 - 13 %    EOSINOPHILS 0 0 - 7 %    BASOPHILS 0 0 - 1 %    IMMATURE GRANULOCYTES 1 (H) 0.0 - 0.5 %    ABS. NEUTROPHILS 6.3 1.8 - 8.0 K/UL    ABS. LYMPHOCYTES 1.0 0.8 - 3.5 K/UL    ABS. MONOCYTES 0.9 0.0 - 1.0 K/UL    ABS. EOSINOPHILS 0.1 0.0 - 0.4 K/UL    ABS. BASOPHILS 0.0 0.0 - 0.1 K/UL    ABS. IMM. GRANS. 0.1 (H) 0.00 - 0.04 K/UL    DF AUTOMATED     METABOLIC PANEL, COMPREHENSIVE    Collection Time: 01/29/21  3:30 AM   Result Value Ref Range    Sodium 138 136 - 145 mmol/L    Potassium 2.6 (LL) 3.5 - 5.1 mmol/L    Chloride 104 97 - 108 mmol/L    CO2 27 21 - 32 mmol/L    Anion gap 7 5 - 15 mmol/L    Glucose 131 (H) 65 - 100 mg/dL    BUN 10 6 - 20 mg/dL    Creatinine 0.47 (L) 0.55 - 1.02 mg/dL    BUN/Creatinine ratio 21 (H) 12 - 20      GFR est AA >60 >60 ml/min/1.73m2    GFR est non-AA >60 >60 ml/min/1.73m2    Calcium 6.7 (L) 8.5 - 10.1 mg/dL    Bilirubin, total 0.8 0.2 - 1.0 mg/dL    AST (SGOT) 30 15 - 37 U/L    ALT (SGPT) 16 12 - 78 U/L    Alk.  phosphatase 44 (L) 45 - 117 U/L    Protein, total 4.9 (L) 6.4 - 8.2 g/dL    Albumin 2.0 (L) 3.5 - 5.0 g/dL    Globulin 2.9 2.0 - 4.0 g/dL    A-G Ratio 0.7 (L) 1.1 - 2.2     BLOOD GAS, ARTERIAL    Collection Time: 01/29/21  3:48 AM   Result Value Ref Range    pH 7.50 (H) 7.35 - 7.45      PCO2 38 35 - 45 mmHg    PO2 226 (H) 75 - 100 mmHg    O2  >95 %    BICARBONATE 30 (H) 22 - 26 mmol/L    BASE EXCESS 6.1 (H) 0 - 2 mmol/L    O2 METHOD VENT      FIO2 65.0 %    MODE AssistControl/Pressure Control      SET RATE 14      IPAP/PIP 24      EPAP/CPAP/PEEP 8.0      Sample source Arterial      SITE Right Radial      ABDULAZIZ'S TEST PASS     GLUCOSE, POC    Collection Time: 01/29/21  7:35 AM   Result Value Ref Range    Glucose (POC) 156 (H) 65 - 100 mg/dL    Performed by Jenn Dominguez, POC    Collection Time: 01/29/21 11:18 AM   Result Value Ref Range    Glucose (POC) 197 (H) 65 - 100 mg/dL    Performed by Massiel Morales    Collection Time: 01/29/21 12:00 PM   Result Value Ref Range    Crossmatch Expiration 02/01/2021,2359     ABO/Rh(D) A Positive     Antibody screen Negative     Unit number [id number]     Blood component type  LR     Unit division 00     Status of unit Allocated     TRANSFUSION STATUS Ok to transfuse     Crossmatch result Compatible     Unit number [id number]     Blood component type RC LR     Unit division 00     Status of unit [place] to transfuse     Crossmatch result Compatible    GLUCOSE, POC    Collection Time: 01/29/21  3:03 PM   Result Value Ref Range    Glucose (POC) 75 65 - 100 mg/dL    Performed by Nasir Valle           Diagnostic Images:  CT Results  (Last 48 hours)               01/28/21 0004  CT HEAD WO CONT Final result    Impression:  No acute or active intracranial process. Narrative:  PROCEDURE: CT HEAD WO CONT       HISTORY:Unresponsive       COMPARISON:None       Department policy stipulates all CT scans at this facility are performed using   dose reduction optimization techniques as appropriate to the performed exam,   including the following: Automated exposure control, adjustments of the mA   and/or KVP according to the patient size, and the use of iterative   reconstruction technique. TECHNIQUE: Axial images with multiplanar reconstruction. No IV contrast.       BONE/EXTRACRANIAL SOFT TISSUES:  Within normal limits       EXTRA-AXIAL SPACES:  No hemorrhage, mass or focal fluid collection. VENTRICLES/SULCI:  Within normal limits       BRAIN PARENCHYMA:  No focal lesions. No mass effect. There is good gray-white   differentiation           01/28/21 0004  CTA CHEST W OR W WO CONT Final result    Impression:  Impression:   1. No evidence of pulmonary embolus. 2. Bilateral dependent airspace disease compatible with pneumonia. 3. Diffuse pulmonary interstitial disease compatible with pulmonary edema. 4. Bilateral pleural effusions. 5. Coronary artery calcific atherosclerotic disease. 6. Small to moderate pericardial effusion. 7. Distal trachea and main bronchi are poorly expanded. Narrative:  Exam: CTA chest with intravenous contrast       Comparison: Plain radiograph 1/25/2021. CT 1/2/2021.        Technique: During intravenous contrast administration (100 cc [id number]), axial   sections were obtained through the chest. Coronal and sagittal reconstructions   were generated. Maximal intensity projection images were generated. Dose reduction: All CT scans at this facility are performed using dose reduction   optimization techniques as appropriate to perform the exam including the   following: automated exposure control, adjustments of the mA and/or kV according   to patient size, or use of iterative reconstruction technique. Findings: The examination is limited by motion degradation and by adduction of   the patient's upper extremities, which renders scattering and attenuation of the   diagnostic x-ray beam. Nevertheless, there is no demonstrated pulmonary embolus. Endotracheal tube tip in the middle third of the trachea. Distal trachea and   main bronchi poorly expanded. There is calcific atherosclerotic disease of a   nonaneurysmal thoracic aorta, which manifests no evidence of intimal   incompetence. Calcific atherosclerotic coronary arterial disease is present. Prominent bilateral mediastinal and hilar lymph nodes, increased compared to   1/2/2021, likely inflammatory. Small to moderate pericardial effusion of near   water attenuation fluid. It is 2 cm greatest axial thickness. Small bilateral   pleural effusions. Bilateral dependent airspace disease compatible with   pneumonia. Interstitial disease also present, compatible with pulmonary edema. Included abdomen indicates a stable 2 cm left adrenal nodule of water   attenuation, compatible with a benign adenoma. No axillary or supraclavicular   adenopathy or mass. Visualized thoracic bone scaffolding unremarkable.                  Current Medications:    Current Facility-Administered Medications:     0.9% sodium chloride infusion 250 mL, 250 mL, IntraVENous, PRN, Dario Edwards MD    furosemide (LASIX) injection 40 mg, 40 mg, IntraVENous, Q12H, [place] Prima, Karl Acevedo MD  Brandan Diogo  [facility] [place] 1/30/2021] lisinopriL (PRINIVIL, ZESTRIL) tablet 5 mg, 5 mg, Oral, DAILY, Geovany Christine MD    propofol (DIPRIVAN) 10 mg/mL infusion, 0-50 mcg/kg/min, IntraVENous, TITRATE, Audra, Rita Sandoval MD, Last Rate: 21.8 mL/hr at 01/29/21 0635, 40 mcg/kg/min at 01/29/21 0635    budesonide (PULMICORT) 500 mcg/2 ml nebulizer suspension, 500 mcg, Nebulization, BID RT, Dario Edwards MD, 500 mcg at 01/29/21 0807    albuterol-ipratropium (DUO-NEB) 2.5 MG-0.5 MG/3 ML, 3 mL, Nebulization, Q6H RT, Dario Edwards MD, Stopped at 01/29/21 1400    piperacillin-tazobactam (ZOSYN) 3.375 g in 0.9% sodium chloride (MBP/ADV) 100 mL MBP, 3.375 g, IntraVENous, Q8H, Dario Edwards MD, Last Rate: 25 mL/hr at 01/29/21 1012, 3.375 g at 01/29/21 1012    pantoprazole (PROTONIX) tablet 40 mg, 40 mg, Oral, ACB, Neo Vieira MD, 40 mg at 01/29/21 1012    carvediloL (COREG) tablet 3.125 mg, 3.125 mg, Oral, BID WITH MEALS, Ayo De Oliveira MD, 3.125 mg at 01/28/21 1700    0.9% sodium chloride infusion, 50 mL/hr, IntraVENous, CONTINUOUS, Ed Fuentes NP, Last Rate: 50 mL/hr at 01/28/21 0925, 50 mL/hr at 01/28/21 0925    LORazepam (ATIVAN) tablet 1 mg, 1 mg, Oral, QHS, Ed Fuentes NP, 1 mg at 01/28/21 2233    acetaminophen (TYLENOL) tablet 650 mg, 650 mg, Oral, Q4H PRN, Ed Fuentes NP    ondansetron (ZOFRAN ODT) tablet 4 mg, 4 mg, Oral, Q8H PRN, Ed Fuentes NP    ondansetron [facility] injection 4 mg, 4 mg, IntraVENous, Q6H PRN, Ed Fuentes NP, 4 mg at 01/26/21 2258    insulin lispro (HUMALOG) injection, , SubCUTAneous, AC&HS, Ed Fuentes NP, 5 Units at 01/29/21 1152    glucose chewable tablet 16 g, 4 Tab, Oral, PRN, Ed Fuentes NP    glucagon (GLUCAGEN) injection 1 mg, 1 mg, IntraMUSCular, PRN, Ed Fuentes NP    dextrose (D50W) injection syrg 12.5-25 g, 25-50 mL, IntraVENous, PRN, Ed Fuentes NP       ASSESSMENT:    (1) Acute encephalopathy: s/t GCS 10. Septic and pulmonary edea. Treat for sepsis with abx, lasix    (2) Acute hypoxic respiratory failure: EXTUBATED. Lasix for pulmonary edema and zosyn for possible infection, COVID r/o. Daily SBTs     (3) sepsis : same as 2    (4) CAD with aortic stenosis: complicates all aspects of care    (5) UGIB with ABLA: EGD today, hb 8.4, EGD showed gastritic    (6) anemia with ABLA : s/t #5    (7) DMII: SSI, accu < 180    (8) HTN: hold all BP meds    (9) CHF unknwon EF: resume lasix, cardiology        Full Code  Dvt Prophylaxis  GI Prophylaxis  Home medications reviewed and reconciled      Transfer to floor tomorrow. Care Plan discussed with:  Interdisciplinary team      Vinicius So MD

## 2021-01-29 NOTE — PROGRESS NOTES
PROGRESS NOTE - CARDIOLOGY    CHIEF COMPLAINT:  F/u acute respiratory failure    HISTORY OF PRESENT ILLNESS / OVERNIGHT EVENTS  Patient successfully extubated. Feels some throat soreness but breathing well with NC in place. Able to hold full conversation, smiling. She is encouraged by progress. Hg stable with no further signs of bleeding. Per discussion with nursing EGD showed gastritis but no active source. Reina denies chest pain on my interview.     MEDICATIONS/PMHx    Current Facility-Administered Medications:     0.9% sodium chloride infusion 250 mL, 250 mL, IntraVENous, PRN, Dario Edwards MD    propofol (DIPRIVAN) 10 mg/mL infusion, 0-50 mcg/kg/min, IntraVENous, TITRATE, Anat Zhu MD, Last Rate: 21.8 mL/hr at 01/29/21 0635, 40 mcg/kg/min at 01/29/21 0635    budesonide (PULMICORT) 500 mcg/2 ml nebulizer suspension, 500 mcg, Nebulization, BID RT, Dario Edwards MD, 500 mcg at 01/29/21 0807    albuterol-ipratropium (DUO-NEB) 2.5 MG-0.5 MG/3 ML, 3 mL, Nebulization, Q6H RT, Dario Edwards MD, Stopped at 01/29/21 1400    piperacillin-tazobactam (ZOSYN) 3.375 g in 0.9% sodium chloride (MBP/ADV) 100 mL MBP, 3.375 g, IntraVENous, Q8H, Dario Edwards MD, Last Rate: 25 mL/hr at 01/29/21 1012, 3.375 g at 01/29/21 1012    furosemide (LASIX) injection 20 mg, 20 mg, IntraVENous, Q12H, AndreaDimas oakley MD, 20 mg at 01/29/21 1012    pantoprazole (PROTONIX) tablet 40 mg, 40 mg, Oral, ACB, Janis Rodriguez MD, 40 mg at 01/29/21 1012    carvediloL (COREG) tablet 3.125 mg, 3.125 mg, Oral, BID WITH MEALS, Marah Lao, Sudha Silva MD, 3.125 mg at 01/28/21 1700    0.9% sodium chloride infusion, 50 mL/hr, IntraVENous, CONTINUOUS, Ed Fuentes NP, Last Rate: 50 mL/hr at 01/28/21 0925, 50 mL/hr at 01/28/21 0925    [Held by provider] lisinopriL (PRINIVIL, ZESTRIL) tablet 20 mg, 20 mg, Oral, BID, Ed Fuentes NP, Stopped at 01/28/21 0900    LORazepam (ATIVAN) tablet 1 mg, 1 mg, Oral, QHS, Ed Fuentes, NP, 1 mg at 01/28/21 2233    acetaminophen (TYLENOL) tablet 650 mg, 650 mg, Oral, Q4H PRN, Ed Fuentes NP    ondansetron (ZOFRAN ODT) tablet 4 mg, 4 mg, Oral, Q8H PRN, Ed Fuentes NP    ondansetron [facility]) injection 4 mg, 4 mg, IntraVENous, Q6H PRN, Ed Fuentes, JOSE, 4 mg at 01/26/21 2258    insulin lispro (HUMALOG) injection, , SubCUTAneous, AC&HS, Ed Fuentes, NP, 5 Units at 01/29/21 1152    glucose chewable tablet 16 g, 4 Tab, Oral, PRN, Ed Fuentes NP    glucagon (GLUCAGEN) injection 1 mg, 1 mg, IntraMUSCular, PRN, Ed Fuentes, NP    dextrose (D50W) injection syrg 12.5-25 g, 25-50 mL, IntraVENous, PRN, Ed Fuentes, JOSE    PHYSICAL EXAMINATION  Visit Vitals  /68   Pulse (!) 113   Temp 100.3 °F (37.9 °C)   Resp 13   Ht 5' 5\" (1.651 m)   Wt 90.7 kg (200 lb)   SpO2 91%   BMI 33.28 kg/m²     General: no acute distress  HEENT/neck: no JVD, no masses, trachea midline. Pulmonary: clear but decreased breath sounds than expected  Cardiovascular: regular rate, regular rhythm;  II/VI JOSE. Normal point of maximal impulse. No peripheral edema. GI: soft, nontender, no hepatosplenomegaly  Hematology/Oncology: no lymphadenopathy; no bruising  Skin: warm and dry, no rashes, lesions, or ulcer  Musculoskeletal: Moving all four extremities. Gait and station not assessed.       MEDICAL DECISION MAKING  Recent Results (from the past 24 hour(s))   GLUCOSE, POC    Collection Time: 01/28/21  4:30 PM   Result Value Ref Range    Glucose (POC) 134 (H) 65 - 100 mg/dL    Performed by Ej Adams, POC    Collection Time: 01/28/21 10:29 PM   Result Value Ref Range    Glucose (POC) 151 (H) 65 - 100 mg/dL    Performed by [facility]    CBC WITH AUTOMATED DIFF    Collection Time: 01/29/21  3:30 AM   Result Value Ref Range    WBC 8.2 3.6 - 11.0 K/uL    RBC 3.18 (L) 3.80 - 5.20 M/uL    HGB 6.8 (L) 11.5 - 16.0 g/dL    HCT 23.2 (L) 35.0 - 47.0 %    MCV 73.0 (L) 80.0 - 99.0 FL    MCH 21.4 (L) 26.0 - 34.0 PG    MCHC 29.3 (L) 30.0 - 36.5 g/dL    RDW 25.9 (H) 11.5 - 14.5 %    PLATELET 162 (H) 927 - 400 K/uL    MPV 8.6 (L) 8.9 - 12.9 FL    NEUTROPHILS 77 (H) 32 - 75 %    LYMPHOCYTES 12 12 - 49 %    MONOCYTES 11 5 - 13 %    EOSINOPHILS 0 0 - 7 %    BASOPHILS 0 0 - 1 %    IMMATURE GRANULOCYTES 1 (H) 0.0 - 0.5 %    ABS. NEUTROPHILS 6.3 1.8 - 8.0 K/UL    ABS. LYMPHOCYTES 1.0 0.8 - 3.5 K/UL    ABS. MONOCYTES 0.9 0.0 - 1.0 K/UL    ABS. EOSINOPHILS 0.1 0.0 - 0.4 K/UL    ABS. BASOPHILS 0.0 0.0 - 0.1 K/UL    ABS. IMM. GRANS. 0.1 (H) 0.00 - 0.04 K/UL    DF AUTOMATED     METABOLIC PANEL, COMPREHENSIVE    Collection Time: 01/29/21  3:30 AM   Result Value Ref Range    Sodium 138 136 - 145 mmol/L    Potassium 2.6 (LL) 3.5 - 5.1 mmol/L    Chloride 104 97 - 108 mmol/L    CO2 27 21 - 32 mmol/L    Anion gap 7 5 - 15 mmol/L    Glucose 131 (H) 65 - 100 mg/dL    BUN 10 6 - 20 mg/dL    Creatinine 0.47 (L) 0.55 - 1.02 mg/dL    BUN/Creatinine ratio 21 (H) 12 - 20      GFR est AA >60 >60 ml/min/1.73m2    GFR est non-AA >60 >60 ml/min/1.73m2    Calcium 6.7 (L) 8.5 - 10.1 mg/dL    Bilirubin, total 0.8 0.2 - 1.0 mg/dL    AST (SGOT) 30 15 - 37 U/L    ALT (SGPT) 16 12 - 78 U/L    Alk.  phosphatase 44 (L) 45 - 117 U/L    Protein, total 4.9 (L) 6.4 - 8.2 g/dL    Albumin 2.0 (L) 3.5 - 5.0 g/dL    Globulin 2.9 2.0 - 4.0 g/dL    A-G Ratio 0.7 (L) 1.1 - 2.2     BLOOD GAS, ARTERIAL    Collection Time: 01/29/21  3:48 AM   Result Value Ref Range    pH 7.50 (H) 7.35 - 7.45      PCO2 38 35 - 45 mmHg    PO2 226 (H) 75 - 100 mmHg    O2  >95 %    BICARBONATE 30 (H) 22 - 26 mmol/L    BASE EXCESS 6.1 (H) 0 - 2 mmol/L    O2 METHOD VENT      FIO2 65.0 %    MODE AssistControl/Pressure Control      SET RATE 14      IPAP/PIP 24      EPAP/CPAP/PEEP 8.0      Sample source Arterial      SITE Right Radial      ABDULAZIZ'S TEST PASS     GLUCOSE, POC    Collection Time: 01/29/21  7:35 AM   Result Value Ref Range    Glucose (POC) 156 (H) 65 - 100 mg/dL Performed by Addison Moody POC    Collection Time: 01/29/21 11:18 AM   Result Value Ref Range    Glucose (POC) 197 (H) 65 - 100 mg/dL    Performed by Massiel Morales    Collection Time: 01/29/21 12:00 PM   Result Value Ref Range    Crossmatch Expiration 02/01/2021,2359     ABO/Rh(D) A Positive     Antibody screen Negative     Unit number [id number]     Blood component type  LR     Unit division 00     Status of unit [place] to transfuse     Crossmatch result Compatible     Unit number [id number]     Blood component type  LR     Unit division 00     Status of unit [place] to transfuse     Crossmatch result Compatible        IMPRESSION/PLAN  Acute hypoxic respiratory failure  Aortic stenosis, moderate  CAD, extensive but stable  Acute GI bleeding    Will increase lasix to 40mg IV BID and monitor renal function closely. Continue Coreg. Will add low dose ace-inhibitor. If Hg not stable overnight, consider colonoscopy tomorrow. No signs of acute MI. Encouraged by progress. No plans for cardiac catheterization this admission.     Critical care time: 40 minutes

## 2021-01-29 NOTE — PROGRESS NOTES
IMPRESSION:      1. Acute hypoxic hypercapnic respiratory failure  2. Acute pulmonary edema  3. Acute GI bleed  4. Severe sepsis  5. History of aortic stenosis coronary artery disease  6. Pericardial and pleural effusion  7. History of COVID-19 pneumonia  8. Symptomatic anemia patient hemoglobin was 5.4 today is 6.8 will transfuse 2 unit packed RBC  9. Additional workup outlined below  10. Pt is at high risk of sudden decline and decompensation with life threatening consequenses and continued end organ dysfunction and failure  11. Pt is critically ill. Time spent with pt and staff actively rendering care, managing pt and coordinating care as stated below;  55 minutes, exclusive of any procedures      RECOMMENDATIONS/PLAN:     1. Ventilator for mechanical life support and prevent respiratory arrest with protective lung strategies patient intubated on ventilator assist control mode pH 7.50 PCO2 38 PO2 226 on 65% FiO2 will change the vent setting discontinue propofol start weaning change vent settings to spontaneous if weaning criteria acceptable will extubate  2. Patient chest x-ray shows CHF pulmonary edema will start patient on Lasix with given history of aortic stenosis and congestive heart failure coronary artery disease three-vessel disease he was awaiting for valve replacement surgery  3. X-ray shows significant improvement in pulmonary edema  4. Will start patient on antibiotic Zosyn panculture  5. Continue with the nebulizer treatment and will change Symbicort to budesonide  6. Somatic anemia  7. Daily AM SAT,SBT   8. CXR in AM while on vent  9. CVP monitoring  10. IV vasopressors for circulatory shock refractory to fluids to maintain SBP> 90   11. Patient requiring restraints due to threat of injury to self, interference with medical devices  12. Transfuse prn to maintain Hgb > 7  13. Labs to follow electrolytes, renal function and and blood counts  14.  Bronchial hygiene with respiratory therapy techniques, bronchodilators  15. Pt needs IV fluids with additives and Drug therapy requiring intensive monitoring for toxicity  16. Prescription drug management with home med reconciliation reviewed  17. DVT, SUP prophylaxis  18. Will be available to assist in medical management while in the CCU pending disposition     [x] High complexity decision making was performed  [x] See my orders for details  HPI  24-year-old lady came in because of generalized weakness and possible GI bleed hemoglobin was 5.4 on admission and patient has dark stool at home cardiologist and gastroenterologist was seen the patient and patient was hospitalized on January 1 because of COVID-19 infection she also has congestive heart failure ejection fraction of 41% coronary artery disease and aortic stenosis history of aortic valve replacement. Yesterday patient was given enema and he developed shortness of breath hypoxia and wheezing she was put on supplemental oxygen patient became unresponsive tachycardic than patient had a rapid response transferred to ICU now intubated on ventilator. Was in flash pulmonary edema received Lasix she was having pink frothy sputum so not critical care consult was called as she is hemodynamically unstable. PMH:  has a past medical history of Acute gastrointestinal hemorrhage (8/28/2020), Allergic rhinitis (8/28/2020), Anemia (8/28/2020), Benign essential hypertension (6/25/2020), Body mass index 30.0-30.9, adult (6/25/2020), CAD (coronary artery disease) (8/28/2020), Foot callus (8/28/2020), GERD (gastroesophageal reflux disease) (6/25/2020), Heart murmur (6/25/2020), colonoscopy (01/01/2015), Left bundle branch block (8/28/2020), Microalbuminuria due to type 2 diabetes mellitus ([address] (8/28/2020), Mixed hyperlipidemia (6/25/2020), Morbid obesity ([address] (8/28/2020), Tobacco dependence syndrome (6/25/2020), and Type II diabetes mellitus, uncontrolled ([address] (6/25/2020).     PSH:   has a past surgical history that includes hx colonoscopy (2015). FHX: family history includes Cancer in her mother and sister; Hypertension in her father. SHX:  reports that she quit smoking about 7 years ago. She smoked 0.50 packs per day. She has never used smokeless tobacco. She reports previous alcohol use. She reports that she does not use drugs.     ALL:   Allergies   Allergen Reactions    Aspirin Unknown (comments)     hemorraging - ended up in the hospital        MEDS:   [x] Reviewed - As Below   [] Not reviewed    Current Facility-Administered Medications   Medication    potassium chloride (K-DUR, KLOR-CON) SR tablet 20 mEq    potassium chloride 10 mEq in 100 ml IVPB    0.9% sodium chloride infusion 250 mL    propofol (DIPRIVAN) 10 mg/mL infusion    budesonide (PULMICORT) 500 mcg/2 ml nebulizer suspension    albuterol-ipratropium (DUO-NEB) 2.5 MG-0.5 MG/3 ML    piperacillin-tazobactam (ZOSYN) 3.375 g in 0.9% sodium chloride (MBP/ADV) 100 mL MBP    furosemide (LASIX) injection 20 mg    piperacillin-tazobactam (ZOSYN) 3.375 gram injection    pantoprazole (PROTONIX) tablet 40 mg    albuterol-ipratropium (DUO-NEB) 2.5 MG-0.5 MG/3 ML    carvediloL (COREG) tablet 3.125 mg    0.9% sodium chloride infusion    [Held by provider] lisinopriL (PRINIVIL, ZESTRIL) tablet 20 mg    LORazepam (ATIVAN) tablet 1 mg    acetaminophen (TYLENOL) tablet 650 mg    ondansetron (ZOFRAN ODT) tablet 4 mg    ondansetron (ZOFRAN) injection 4 mg    insulin lispro (HUMALOG) injection    glucose chewable tablet 16 g    glucagon (GLUCAGEN) injection 1 mg    dextrose (D50W) injection syrg 12.5-25 g      MAR reviewed and pertinent medications noted or modified as needed   Current Facility-Administered Medications   Medication    potassium chloride (K-DUR, KLOR-CON) SR tablet 20 mEq    potassium chloride 10 mEq in 100 ml IVPB    0.9% sodium chloride infusion 250 mL    propofol (DIPRIVAN) 10 mg/mL infusion    budesonide (PULMICORT) 500 mcg/2 ml nebulizer suspension    albuterol-ipratropium (DUO-NEB) 2.5 MG-0.5 MG/3 ML    piperacillin-tazobactam (ZOSYN) 3.375 g in 0.9% sodium chloride (MBP/ADV) 100 mL MBP    furosemide (LASIX) injection 20 mg    piperacillin-tazobactam (ZOSYN) 3.375 gram injection    pantoprazole (PROTONIX) tablet 40 mg    albuterol-ipratropium (DUO-NEB) 2.5 MG-0.5 MG/3 ML    carvediloL (COREG) tablet 3.125 mg    0.9% sodium chloride infusion    [Held by provider] lisinopriL (PRINIVIL, ZESTRIL) tablet 20 mg    LORazepam (ATIVAN) tablet 1 mg    acetaminophen (TYLENOL) tablet 650 mg    ondansetron (ZOFRAN ODT) tablet 4 mg    ondansetron (ZOFRAN) injection 4 mg    insulin lispro (HUMALOG) injection    glucose chewable tablet 16 g    glucagon (GLUCAGEN) injection 1 mg    dextrose (D50W) injection syrg 12.5-25 g      PMH:  has a past medical history of Acute gastrointestinal hemorrhage (8/28/2020), Allergic rhinitis (8/28/2020), Anemia (8/28/2020), Benign essential hypertension (6/25/2020), Body mass index 30.0-30.9, adult (6/25/2020), CAD (coronary artery disease) (8/28/2020), Foot callus (8/28/2020), GERD (gastroesophageal reflux disease) (6/25/2020), Heart murmur (6/25/2020), colonoscopy (01/01/2015), Left bundle branch block (8/28/2020), Microalbuminuria due to type 2 diabetes mellitus ([address] (8/28/2020), Mixed hyperlipidemia (6/25/2020), Morbid obesity ([address] (8/28/2020), Tobacco dependence syndrome (6/25/2020), and Type II diabetes mellitus, uncontrolled ([address] (6/25/2020). PSH:   has a past surgical history that includes hx colonoscopy (2015). FHX: family history includes Cancer in her mother and sister; Hypertension in her father. SHX:  reports that she quit smoking about 7 years ago. She smoked 0.50 packs per day. She has never used smokeless tobacco. She reports previous alcohol use. She reports that she does not use drugs.      ROS:  Unable to obtain intubated sedated on ventilator    Hemodynamics:    CO:    CI: CVP:    SVR:   PAP Systolic:    PAP Diastolic:    PVR:    VF43:        Ventilator Settings:      Mode Rate TV Press PEEP FiO2 PIP Min. Vent   Spontaneous, Pressure support    50 ml 15 cm H2O  8 cm H20 40 %  32 cm H2O  9.02 l/min        Vital Signs: Telemetry:    normal sinus rhythm Intake/Output:   Visit Vitals  BP (!) 101/58   Pulse (!) 105   Temp (!) 100.7 °F (38.2 °C)   Resp 14   Ht 5' 5\" (1.651 m)   Wt 90.7 kg (200 lb)   SpO2 92%   BMI 33.28 kg/m²       Temp (24hrs), Av.4 °F (37.4 °C), Min:98.2 °F (36.8 °C), Max:100.7 °F (38.2 °C)        O2 Device: Ventilator O2 Flow Rate (L/min): 15 l/min       Wt Readings from Last 4 Encounters:   21 90.7 kg (200 lb)   21 90.7 kg (200 lb)   20 90.7 kg (200 lb)   20 97.4 kg (214 lb 11.7 oz)          Intake/Output Summary (Last 24 hours) at 2021 0828  Last data filed at 2021 1742  Gross per 24 hour   Intake    Output 300 ml   Net -300 ml       Last shift:      No intake/output data recorded. Last 3 shifts:  1901 -  0700  In: -   Out: 1280 [Urine:1280]       Physical Exam:     General:  intubated on vent  HEENT: NCAT, poor dentition, lips and mucosa dry  Eyes: anicteric; conjunctiva clear  Neck: no nodes, no cuff leak, trach midline; no accessory MM use. Chest: no deformity,   Cardiac: R regular; no murmur;   Lungs: distant breath sounds; no wheezes  Abd: soft, NT, hypoactive BS  Ext: no edema; no joint swelling;  No clubbing  : NO casas, clear urine  Neuro: sedated;   Psych- no agitation, oriented to person;  unable to assess  Skin: warm, dry, no cyanosis;   Pulses: 1-2+ Bilateral pedal, radial  Capillary: brisk; pale      DATA:    MAR reviewed and pertinent medications noted or modified as needed  MEDS:   Current Facility-Administered Medications   Medication    potassium chloride (K-DUR, KLOR-CON) SR tablet 20 mEq    potassium chloride 10 mEq in 100 ml IVPB    0.9% sodium chloride infusion 250 mL    propofol (DIPRIVAN) 10 mg/mL infusion    budesonide (PULMICORT) 500 mcg/2 ml nebulizer suspension    albuterol-ipratropium (DUO-NEB) 2.5 MG-0.5 MG/3 ML    piperacillin-tazobactam (ZOSYN) 3.375 g in 0.9% sodium chloride (MBP/ADV) 100 mL MBP    furosemide (LASIX) injection 20 mg    piperacillin-tazobactam (ZOSYN) 3.375 gram injection    pantoprazole (PROTONIX) tablet 40 mg    albuterol-ipratropium (DUO-NEB) 2.5 MG-0.5 MG/3 ML    carvediloL (COREG) tablet 3.125 mg    0.9% sodium chloride infusion    [Held by provider] lisinopriL (PRINIVIL, ZESTRIL) tablet 20 mg    LORazepam (ATIVAN) tablet 1 mg    acetaminophen (TYLENOL) tablet 650 mg    ondansetron (ZOFRAN ODT) tablet 4 mg    ondansetron (ZOFRAN) injection 4 mg    insulin lispro (HUMALOG) injection    glucose chewable tablet 16 g    glucagon (GLUCAGEN) injection 1 mg    dextrose (D50W) injection syrg 12.5-25 g        Labs:    Recent Labs     01/29/21 0330 01/28/21 0230 01/27/21  0450   WBC 8.2 13.1* 5.9   HGB 6.8* 8.4* 7.9*   * 590* 531*   INR  --  1.1  --    APTT  --  28.6  --      Recent Labs     01/29/21  0330 01/28/21  0230 01/27/21  0450 01/27/21  0230     --  135* 136   K 2.6*  --  3.7 3.4*     --  97 98   CO2 27  --  30 32   *  --  157* 171*   BUN 10  --  4* 8   CREA 0.47*  --  0.46* 0.72   CA 6.7*  --  9.0 8.3*   MG  --  1.8  --   --    PHOS  --   --  3.7  --    LAC  --   --   --  1.3   ALB 2.0*  --  3.1* 3.0*   ALT 16  --   --  23     Recent Labs     01/29/21  0348 01/28/21  0215   PH 7.50* 7.26*   PCO2 38 68*   PO2 226* 81   HCO3 30* 27*   FIO2 65.0 100.0     Recent Labs     01/27/21  0230   TROIQ <0.05     No results found for: BNPP, BNP   Lab Results   Component Value Date/Time    Culture result: PENDING 01/28/2021 09:43 AM     Lab Results   Component Value Date/Time    TSH, External 2.420 01/16/2019        Imaging:    Results from [facility] encounter on 01/25/21   XR CHEST PORT    Narrative 1 view comparison 27th    NG tube extends well into the stomach, stent is not visible. ET tube remains    Significant improvement of previously extensive interstitial edema. Mild  atelectasis each basal small effusions. Normal heart and no congestion       Results from Hospital Encounter encounter on 01/25/21   CTA CHEST W OR W WO CONT    Narrative Exam: CTA chest with intravenous contrast    Comparison: Plain radiograph 1/25/2021. CT 1/2/2021. Technique: During intravenous contrast administration (100 cc [id number]), axial  sections were obtained through the chest. Coronal and sagittal reconstructions  were generated. Maximal intensity projection images were generated. Dose reduction: All CT scans at this facility are performed using dose reduction  optimization techniques as appropriate to perform the exam including the  following: automated exposure control, adjustments of the mA and/or kV according  to patient size, or use of iterative reconstruction technique. Findings: The examination is limited by motion degradation and by adduction of  the patient's upper extremities, which renders scattering and attenuation of the  diagnostic x-ray beam. Nevertheless, there is no demonstrated pulmonary embolus. Endotracheal tube tip in the middle third of the trachea. Distal trachea and  main bronchi poorly expanded. There is calcific atherosclerotic disease of a  nonaneurysmal thoracic aorta, which manifests no evidence of intimal  incompetence. Calcific atherosclerotic coronary arterial disease is present. Prominent bilateral mediastinal and hilar lymph nodes, increased compared to  1/2/2021, likely inflammatory. Small to moderate pericardial effusion of near  water attenuation fluid. It is 2 cm greatest axial thickness. Small bilateral  pleural effusions. Bilateral dependent airspace disease compatible with  pneumonia. Interstitial disease also present, compatible with pulmonary edema.   Included abdomen indicates a stable 2 cm left adrenal nodule of water  attenuation, compatible with a benign adenoma. No axillary or supraclavicular  adenopathy or mass. Visualized thoracic bone scaffolding unremarkable. Impression Impression:  1. No evidence of pulmonary embolus. 2. Bilateral dependent airspace disease compatible with pneumonia. 3. Diffuse pulmonary interstitial disease compatible with pulmonary edema. 4. Bilateral pleural effusions. 5. Coronary artery calcific atherosclerotic disease. 6. Small to moderate pericardial effusion. 7. Distal trachea and main bronchi are poorly expanded. This care involved high complexity decision making which includes independently reviewing the patient's past medical records, current laboratory results, medication profiles that were immediately available to me and actual Xray images at the bedside in order to assess, support vital system function, and to treat this degree of vital organ system failure, and to prevent further life threatening deterioration of the patients condition. I was in direct communication with the nursing staff throughout this time.     Medical Decision Making Today  · Reviewed the flowsheet and previous days notes  · Reviewed and summarized records or history from previous days note or discussions with staff, family  · Parenteral controlled substances - Reviewed/ Adjusted / Catheline Boers / Started  · High Risk Drug therapy requiring intensive monitoring for toxicity: eg steroids, pressors, antibiotics  · Review and order of Clinical lab tests  · Review and Order of Radiology tests  · Review and Order of Medicine tests  · Independent visualization of radiologic Images  · Reviewed Ventilator / NiPPV  · I have personally reviewed the patients ECG / Telemetry  · Diagnostic endoscopies with identified risk factors    My assessment/management discussed with: Consultants, Nursing, Pharmacy, Case Management, PT, OT, Respiratory Therapy, Hospitalist and Family for coordination of care    I have provided total of 55 minutes of critical care time rendering care exclusive of any procedures. During this entire length of time the patient's condition was unstable, unpredictable and critically ill in the CCU/ ICU. I was immediately available to the patient whose care required several interactions with nursing, multidisciplinary team members leading to multiple interventions with fluid resuscitation and medication adjustments to optimize respiratory support, hemodynamic treatment, medication changes based on repeat labs results, reviews, exams and assessments. The reason for providing this level of medical care was due to a critical illness that impaired one or more vital organ systems, such that there was a high probability of sudden or life threatening deterioration in the patient's condition.

## 2021-01-30 ENCOUNTER — APPOINTMENT (OUTPATIENT)
Dept: GENERAL RADIOLOGY | Age: 66
DRG: 377 | End: 2021-01-30
Attending: INTERNAL MEDICINE
Payer: MEDICARE

## 2021-01-30 LAB
ABO + RH BLD: NORMAL
ANION GAP SERPL CALC-SCNC: 7 MMOL/L (ref 5–15)
ARTERIAL PATENCY WRIST A: ABNORMAL
BACTERIA SPEC CULT: NORMAL
BASE EXCESS BLDA CALC-SCNC: 2.6 MMOL/L (ref 0–2)
BASOPHILS # BLD: 0 K/UL (ref 0–0.1)
BASOPHILS NFR BLD: 0 % (ref 0–1)
BDY SITE: ABNORMAL
BLD PROD TYP BPU: NORMAL
BLD PROD TYP BPU: NORMAL
BLOOD GROUP ANTIBODIES SERPL: NEGATIVE
BPU ID: NORMAL
BPU ID: NORMAL
BUN SERPL-MCNC: 9 MG/DL (ref 6–20)
BUN/CREAT SERPL: 15 (ref 12–20)
CA-I BLD-MCNC: 8.5 MG/DL (ref 8.5–10.1)
CHLORIDE SERPL-SCNC: 100 MMOL/L (ref 97–108)
CO2 SERPL-SCNC: 30 MMOL/L (ref 21–32)
CREAT SERPL-MCNC: 0.59 MG/DL (ref 0.55–1.02)
CROSSMATCH RESULT,%XM: NORMAL
CROSSMATCH RESULT,%XM: NORMAL
DIFFERENTIAL METHOD BLD: ABNORMAL
EOSINOPHIL # BLD: 0.1 K/UL (ref 0–0.4)
EOSINOPHIL NFR BLD: 0 % (ref 0–7)
EPAP/CPAP/PEEP, PAPEEP: 8
ERYTHROCYTE [DISTWIDTH] IN BLOOD BY AUTOMATED COUNT: 24.6 % (ref 11.5–14.5)
FIO2 ON VENT: 70 %
GAS FLOW.O2 SETTING OXYMISER: 16 L/MIN
GLUCOSE BLD STRIP.AUTO-MCNC: 130 MG/DL (ref 65–100)
GLUCOSE BLD STRIP.AUTO-MCNC: 169 MG/DL (ref 65–100)
GLUCOSE BLD STRIP.AUTO-MCNC: 187 MG/DL (ref 65–100)
GLUCOSE BLD STRIP.AUTO-MCNC: 226 MG/DL (ref 65–100)
GLUCOSE SERPL-MCNC: 181 MG/DL (ref 65–100)
GRAM STN SPEC: NORMAL
HCO3 BLDA-SCNC: 27 MMOL/L (ref 22–26)
HCT VFR BLD AUTO: 38.1 % (ref 35–47)
HGB BLD-MCNC: 11.8 G/DL (ref 11.5–16)
IMM GRANULOCYTES # BLD AUTO: 0.1 K/UL (ref 0–0.04)
IMM GRANULOCYTES NFR BLD AUTO: 1 % (ref 0–0.5)
LYMPHOCYTES # BLD: 0.9 K/UL (ref 0.8–3.5)
LYMPHOCYTES NFR BLD: 5 % (ref 12–49)
MCH RBC QN AUTO: 23.8 PG (ref 26–34)
MCHC RBC AUTO-ENTMCNC: 31 G/DL (ref 30–36.5)
MCV RBC AUTO: 77 FL (ref 80–99)
MONOCYTES # BLD: 1.1 K/UL (ref 0–1)
MONOCYTES NFR BLD: 7 % (ref 5–13)
NEUTS SEG # BLD: 14.9 K/UL (ref 1.8–8)
NEUTS SEG NFR BLD: 87 % (ref 32–75)
NRBC # BLD: 0.16 K/UL (ref 0–0.01)
NRBC BLD-RTO: 0.9 PER 100 WBC
PCO2 BLDA: 50 MMHG (ref 35–45)
PERFORMED BY, TECHID: ABNORMAL
PH BLDA: 7.36 [PH] (ref 7.35–7.45)
PLATELET # BLD AUTO: 455 K/UL (ref 150–400)
PMV BLD AUTO: 8.5 FL (ref 8.9–12.9)
PO2 BLDA: 81 MMHG (ref 75–100)
POTASSIUM SERPL-SCNC: 4 MMOL/L (ref 3.5–5.1)
RBC # BLD AUTO: 4.95 M/UL (ref 3.8–5.2)
SAO2 % BLD: 96 %
SAO2% DEVICE SAO2% SENSOR NAME: ABNORMAL
SODIUM SERPL-SCNC: 137 MMOL/L (ref 136–145)
SPECIAL REQUESTS,SREQ: NORMAL
SPECIMEN EXP DATE BLD: NORMAL
STATUS OF UNIT,%ST: NORMAL
STATUS OF UNIT,%ST: NORMAL
TRANSFUSION STATUS PATIENT QL: NORMAL
TRANSFUSION STATUS PATIENT QL: NORMAL
UNIT DIVISION, %UDIV: 0
UNIT DIVISION, %UDIV: 0
WBC # BLD AUTO: 16.9 K/UL (ref 3.6–11)

## 2021-01-30 PROCEDURE — 74011250637 HC RX REV CODE- 250/637: Performed by: INTERNAL MEDICINE

## 2021-01-30 PROCEDURE — 74011000250 HC RX REV CODE- 250: Performed by: INTERNAL MEDICINE

## 2021-01-30 PROCEDURE — 74011636637 HC RX REV CODE- 636/637: Performed by: NURSE PRACTITIONER

## 2021-01-30 PROCEDURE — 5A09357 ASSISTANCE WITH RESPIRATORY VENTILATION, LESS THAN 24 CONSECUTIVE HOURS, CONTINUOUS POSITIVE AIRWAY PRESSURE: ICD-10-PCS | Performed by: INTERNAL MEDICINE

## 2021-01-30 PROCEDURE — 85025 COMPLETE CBC W/AUTO DIFF WBC: CPT

## 2021-01-30 PROCEDURE — 74011000258 HC RX REV CODE- 258: Performed by: INTERNAL MEDICINE

## 2021-01-30 PROCEDURE — 65610000006 HC RM INTENSIVE CARE

## 2021-01-30 PROCEDURE — 94660 CPAP INITIATION&MGMT: CPT

## 2021-01-30 PROCEDURE — 80048 BASIC METABOLIC PNL TOTAL CA: CPT

## 2021-01-30 PROCEDURE — 71045 X-RAY EXAM CHEST 1 VIEW: CPT

## 2021-01-30 PROCEDURE — 36415 COLL VENOUS BLD VENIPUNCTURE: CPT

## 2021-01-30 PROCEDURE — 94640 AIRWAY INHALATION TREATMENT: CPT

## 2021-01-30 PROCEDURE — 74011250636 HC RX REV CODE- 250/636: Performed by: INTERNAL MEDICINE

## 2021-01-30 PROCEDURE — 77010033678 HC OXYGEN DAILY

## 2021-01-30 PROCEDURE — 82803 BLOOD GASES ANY COMBINATION: CPT

## 2021-01-30 PROCEDURE — 74011250636 HC RX REV CODE- 250/636: Performed by: PHYSICIAN ASSISTANT

## 2021-01-30 PROCEDURE — 82962 GLUCOSE BLOOD TEST: CPT

## 2021-01-30 RX ORDER — SPIRONOLACTONE 25 MG/1
25 TABLET ORAL 2 TIMES DAILY
Status: DISCONTINUED | OUTPATIENT
Start: 2021-01-30 | End: 2021-02-05 | Stop reason: HOSPADM

## 2021-01-30 RX ORDER — POTASSIUM CHLORIDE 750 MG/1
40 TABLET, FILM COATED, EXTENDED RELEASE ORAL ONCE
Status: COMPLETED | OUTPATIENT
Start: 2021-01-30 | End: 2021-01-30

## 2021-01-30 RX ORDER — FUROSEMIDE 10 MG/ML
40 INJECTION INTRAMUSCULAR; INTRAVENOUS ONCE
Status: ACTIVE | OUTPATIENT
Start: 2021-01-30 | End: 2021-01-30

## 2021-01-30 RX ADMIN — IPRATROPIUM BROMIDE AND ALBUTEROL SULFATE 3 ML: .5; 3 SOLUTION RESPIRATORY (INHALATION) at 13:40

## 2021-01-30 RX ADMIN — BUDESONIDE 500 MCG: 0.5 INHALANT RESPIRATORY (INHALATION) at 07:34

## 2021-01-30 RX ADMIN — IPRATROPIUM BROMIDE AND ALBUTEROL SULFATE 3 ML: .5; 3 SOLUTION RESPIRATORY (INHALATION) at 19:18

## 2021-01-30 RX ADMIN — POTASSIUM CHLORIDE 10 MEQ: 7.46 INJECTION, SOLUTION INTRAVENOUS at 00:00

## 2021-01-30 RX ADMIN — PIPERACILLIN SODIUM AND TAZOBACTAM SODIUM 3.38 G: 3; .375 INJECTION, POWDER, LYOPHILIZED, FOR SOLUTION INTRAVENOUS at 18:05

## 2021-01-30 RX ADMIN — POTASSIUM CHLORIDE 10 MEQ: 7.46 INJECTION, SOLUTION INTRAVENOUS at 01:00

## 2021-01-30 RX ADMIN — POTASSIUM CHLORIDE 40 MEQ: 750 TABLET, FILM COATED, EXTENDED RELEASE ORAL at 20:30

## 2021-01-30 RX ADMIN — IPRATROPIUM BROMIDE AND ALBUTEROL SULFATE 3 ML: .5; 3 SOLUTION RESPIRATORY (INHALATION) at 07:34

## 2021-01-30 RX ADMIN — CARVEDILOL 3.12 MG: 3.12 TABLET, FILM COATED ORAL at 09:54

## 2021-01-30 RX ADMIN — IPRATROPIUM BROMIDE AND ALBUTEROL SULFATE 3 ML: .5; 3 SOLUTION RESPIRATORY (INHALATION) at 01:34

## 2021-01-30 RX ADMIN — PANTOPRAZOLE SODIUM 40 MG: 40 TABLET, DELAYED RELEASE ORAL at 09:54

## 2021-01-30 RX ADMIN — INSULIN LISPRO 4 UNITS: 100 INJECTION, SOLUTION INTRAVENOUS; SUBCUTANEOUS at 22:00

## 2021-01-30 RX ADMIN — FUROSEMIDE 40 MG: 10 INJECTION, SOLUTION INTRAMUSCULAR; INTRAVENOUS at 09:52

## 2021-01-30 RX ADMIN — SPIRONOLACTONE 25 MG: 25 TABLET ORAL at 20:30

## 2021-01-30 RX ADMIN — FUROSEMIDE 40 MG: 10 INJECTION, SOLUTION INTRAMUSCULAR; INTRAVENOUS at 20:30

## 2021-01-30 RX ADMIN — PIPERACILLIN SODIUM AND TAZOBACTAM SODIUM 3.38 G: 3; .375 INJECTION, POWDER, LYOPHILIZED, FOR SOLUTION INTRAVENOUS at 10:04

## 2021-01-30 RX ADMIN — LISINOPRIL 5 MG: 5 TABLET ORAL at 09:52

## 2021-01-30 RX ADMIN — BUDESONIDE 500 MCG: 0.5 INHALANT RESPIRATORY (INHALATION) at 19:19

## 2021-01-30 RX ADMIN — PIPERACILLIN SODIUM AND TAZOBACTAM SODIUM 3.38 G: 3; .375 INJECTION, POWDER, LYOPHILIZED, FOR SOLUTION INTRAVENOUS at 03:00

## 2021-01-30 RX ADMIN — SPIRONOLACTONE 25 MG: 25 TABLET ORAL at 09:52

## 2021-01-30 NOTE — PROGRESS NOTES
IMPRESSION:      1. Acute hypoxic hypercapnic respiratory failure  2. Acute pulmonary edema  3. Acute GI bleed  4. Severe sepsis  5. History of aortic stenosis coronary artery disease  6. Pericardial and pleural effusion  7. History of COVID-19 pneumonia  8. Symptomatic anemia patient hemoglobin was 5.4 today is 6.8 will transfuse 2 unit packed RBC  9. Additional workup outlined below  10. Pt is at high risk of sudden decline and decompensation with life threatening consequenses and continued end organ dysfunction and failure  11. Pt is critically ill. Time spent with pt and staff actively rendering care, managing pt and coordinating care as stated below;  30 minutes, exclusive of any procedures      RECOMMENDATIONS/PLAN:     1. She was extubated yesterday but again late evening she was short of breath hypoxic started on noninvasive ventilator BiPAP machine, blood transfusion and went again into pulmonary edema Lasix given she is on 70% FiO2  2. Patient chest x-ray shows CHF pulmonary edema will start patient on Lasix with given history of aortic stenosis and congestive heart failure coronary artery disease three-vessel disease he was awaiting for valve replacement surgery  3. X-ray shows pulmonary edema we will continue high-dose Lasix  4. Patient on antibiotic Zosyn panculture  5. Continue with the nebulizer treatment and will change Symbicort to budesonide  6. Symptomatic anemia  7. IV vasopressors for circulatory shock refractory to fluids to maintain SBP> 90   8. Transfuse prn to maintain Hgb > 7  9. Labs to follow electrolytes, renal function and and blood counts  10. Bronchial hygiene with respiratory therapy techniques, bronchodilators  11. Pt needs IV fluids with additives and Drug therapy requiring intensive monitoring for toxicity  12. Prescription drug management with home med reconciliation reviewed  13. DVT, SUP prophylaxis  14.  Will be available to assist in medical management while in the CCU pending disposition     [x] High complexity decision making was performed  [x] See my orders for details  HPI  78-year-old lady came in because of generalized weakness and possible GI bleed hemoglobin was 5.4 on admission and patient has dark stool at home cardiologist and gastroenterologist was seen the patient and patient was hospitalized on January 1 because of COVID-19 infection she also has congestive heart failure ejection fraction of 41% coronary artery disease and aortic stenosis history of aortic valve replacement. Yesterday patient was given enema and he developed shortness of breath hypoxia and wheezing she was put on supplemental oxygen patient became unresponsive tachycardic than patient had a rapid response transferred to ICU now intubated on ventilator. Was in flash pulmonary edema received Lasix she was having pink frothy sputum so not critical care consult was called as she is hemodynamically unstable. PMH:  has a past medical history of Acute gastrointestinal hemorrhage (8/28/2020), Allergic rhinitis (8/28/2020), Anemia (8/28/2020), Benign essential hypertension (6/25/2020), Body mass index 30.0-30.9, adult (6/25/2020), CAD (coronary artery disease) (8/28/2020), Foot callus (8/28/2020), GERD (gastroesophageal reflux disease) (6/25/2020), Heart murmur (6/25/2020), colonoscopy (01/01/2015), Left bundle branch block (8/28/2020), Microalbuminuria due to type 2 diabetes mellitus ([address] (8/28/2020), Mixed hyperlipidemia (6/25/2020), Morbid obesity ([address] (8/28/2020), Tobacco dependence syndrome (6/25/2020), and Type II diabetes mellitus, uncontrolled ([address] (6/25/2020). PSH:   has a past surgical history that includes hx colonoscopy (2015). FHX: family history includes Cancer in her mother and sister; Hypertension in her father. SHX:  reports that she quit smoking about 7 years ago. She smoked 0.50 packs per day. She has never used smokeless tobacco. She reports previous alcohol use.  She reports that she does not use drugs.     ALL:   Allergies   Allergen Reactions    Aspirin Unknown (comments)     hemorraging - ended up in the hospital        MEDS:   [x] Reviewed - As Below   [] Not reviewed    Current Facility-Administered Medications   Medication    furosemide (LASIX) injection 40 mg    spironolactone (ALDACTONE) tablet 25 mg    0.9% sodium chloride infusion 250 mL    furosemide (LASIX) injection 40 mg    lisinopriL (PRINIVIL, ZESTRIL) tablet 5 mg    propofol (DIPRIVAN) 10 mg/mL infusion    budesonide (PULMICORT) 500 mcg/2 ml nebulizer suspension    albuterol-ipratropium (DUO-NEB) 2.5 MG-0.5 MG/3 ML    piperacillin-tazobactam (ZOSYN) 3.375 g in 0.9% sodium chloride (MBP/ADV) 100 mL MBP    pantoprazole (PROTONIX) tablet 40 mg    carvediloL (COREG) tablet 3.125 mg    0.9% sodium chloride infusion    LORazepam (ATIVAN) tablet 1 mg    acetaminophen (TYLENOL) tablet 650 mg    ondansetron (ZOFRAN ODT) tablet 4 mg    ondansetron (ZOFRAN) injection 4 mg    insulin lispro (HUMALOG) injection    glucose chewable tablet 16 g    glucagon (GLUCAGEN) injection 1 mg    dextrose (D50W) injection syrg 12.5-25 g      MAR reviewed and pertinent medications noted or modified as needed   Current Facility-Administered Medications   Medication    furosemide (LASIX) injection 40 mg    spironolactone (ALDACTONE) tablet 25 mg    0.9% sodium chloride infusion 250 mL    furosemide (LASIX) injection 40 mg    lisinopriL (PRINIVIL, ZESTRIL) tablet 5 mg    propofol (DIPRIVAN) 10 mg/mL infusion    budesonide (PULMICORT) 500 mcg/2 ml nebulizer suspension    albuterol-ipratropium (DUO-NEB) 2.5 MG-0.5 MG/3 ML    piperacillin-tazobactam (ZOSYN) 3.375 g in 0.9% sodium chloride (MBP/ADV) 100 mL MBP    pantoprazole (PROTONIX) tablet 40 mg    carvediloL (COREG) tablet 3.125 mg    0.9% sodium chloride infusion    LORazepam (ATIVAN) tablet 1 mg    acetaminophen (TYLENOL) tablet 650 mg    ondansetron (ZOFRAN ODT) tablet 4 mg    ondansetron (ZOFRAN) injection 4 mg    insulin lispro (HUMALOG) injection    glucose chewable tablet 16 g    glucagon (GLUCAGEN) injection 1 mg    dextrose (D50W) injection syrg 12.5-25 g      PMH:  has a past medical history of Acute gastrointestinal hemorrhage (2020), Allergic rhinitis (2020), Anemia (2020), Benign essential hypertension (2020), Body mass index 30.0-30.9, adult (2020), CAD (coronary artery disease) (2020), Foot callus (2020), GERD (gastroesophageal reflux disease) (2020), Heart murmur (2020), colonoscopy (2015), Left bundle branch block (2020), Microalbuminuria due to type 2 diabetes mellitus ([facility] (2020), Mixed hyperlipidemia (2020), Morbid obesity ([facility] (2020), Tobacco dependence syndrome (2020), and Type II diabetes mellitus, uncontrolled ([facility] (2020). PSH:   has a past surgical history that includes hx colonoscopy (). FHX: family history includes Cancer in her mother and sister; Hypertension in her father. SHX:  reports that she quit smoking about 7 years ago. She smoked 0.50 packs per day. She has never used smokeless tobacco. She reports previous alcohol use. She reports that she does not use drugs. ROS:  Unable to obtain intubated sedated on ventilator    Hemodynamics:    CO:    CI:    CVP:    SVR:   PAP Systolic:    PAP Diastolic:    PVR:    JB10:        Ventilator Settings:      Mode Rate TV Press PEEP FiO2 PIP Min.  Vent   Spontaneous, Pressure support    50 ml 15 cm H2O  8 cm H20 60 %  32 cm H2O  22 l/min        Vital Signs: Telemetry:    normal sinus rhythm Intake/Output:   Visit Vitals  /74 (BP 1 Location: Left upper arm)   Pulse (!) 104   Temp 98 °F (36.7 °C)   Resp 20   Ht 5' 5\" (1.651 m)   Wt 90.7 kg (200 lb)   SpO2 100%   BMI 33.28 kg/m²       Temp (24hrs), Av.2 °F (37.3 °C), Min:98 °F (36.7 °C), Max:100.5 °F (38.1 °C)        O2 Device: BIPAP O2 Flow Rate (L/min): 15 l/min       Wt Readings from Last 4 Encounters:   01/25/21 90.7 kg (200 lb)   01/01/21 90.7 kg (200 lb)   12/31/20 90.7 kg (200 lb)   12/18/20 97.4 kg (214 lb 11.7 oz)          Intake/Output Summary (Last 24 hours) at 1/30/2021 0826  Last data filed at 1/30/2021 0600  Gross per 24 hour   Intake 720 ml   Output 2700 ml   Net -1980 ml       Last shift:      No intake/output data recorded. Last 3 shifts: 01/28 1901 - 01/30 0700  In: 720   Out: 2700 [Urine:2700]       Physical Exam:     General: On noninvasive ventilator BiPAP machine  HEENT: NCAT, poor dentition, lips and mucosa dry  Eyes: anicteric; conjunctiva clear  Neck: no nodes, no cuff leak, trach midline; no accessory MM use. Chest: no deformity,   Cardiac: R regular; no murmur;   Lungs: distant breath sounds; no wheezes  Abd: soft, NT, hypoactive BS  Ext: no edema; no joint swelling;  No clubbing  : NO casas, clear urine  Neuro: sedated;   Psych- no agitation, oriented to person;    Skin: warm, dry, no cyanosis;   Pulses: 1-2+ Bilateral pedal, radial  Capillary: brisk; pale      DATA:    MAR reviewed and pertinent medications noted or modified as needed  MEDS:   Current Facility-Administered Medications   Medication    furosemide (LASIX) injection 40 mg    spironolactone (ALDACTONE) tablet 25 mg    0.9% sodium chloride infusion 250 mL    furosemide (LASIX) injection 40 mg    lisinopriL (PRINIVIL, ZESTRIL) tablet 5 mg    propofol (DIPRIVAN) 10 mg/mL infusion    budesonide (PULMICORT) 500 mcg/2 ml nebulizer suspension    albuterol-ipratropium (DUO-NEB) 2.5 MG-0.5 MG/3 ML    piperacillin-tazobactam (ZOSYN) 3.375 g in 0.9% sodium chloride (MBP/ADV) 100 mL MBP    pantoprazole (PROTONIX) tablet 40 mg    carvediloL (COREG) tablet 3.125 mg    0.9% sodium chloride infusion    LORazepam (ATIVAN) tablet 1 mg    acetaminophen (TYLENOL) tablet 650 mg    ondansetron (ZOFRAN ODT) tablet 4 mg    ondansetron (ZOFRAN) injection 4 mg    insulin lispro (HUMALOG) injection    glucose chewable tablet 16 g    glucagon (GLUCAGEN) injection 1 mg    dextrose (D50W) injection syrg 12.5-25 g        Labs:    Recent Labs     01/30/21  0315 01/29/21  0330 01/28/21  0230   WBC 16.9* 8.2 13.1*   HGB 11.8 6.8* 8.4*   * 428* 590*   INR  --   --  1.1   APTT  --   --  28.6     Recent Labs     01/30/21  0315 01/29/21  1854 01/29/21  0330 01/28/21  0230    137 138  --    K 4.0 3.1* 2.6*  --     100 104  --    CO2 30 30 27  --    * 98 131*  --    BUN 9 11 10  --    CREA 0.59 0.47* 0.47*  --    CA 8.5 8.1* 6.7*  --    MG  --   --   --  1.8   ALB  --   --  2.0*  --    ALT  --   --  16  --      Recent Labs     01/30/21  0305 01/29/21  0348 01/28/21  0215   PH 7.36 7.50* 7.26*   PCO2 50* 38 68*   PO2 81 226* 81   HCO3 27* 30* 27*   FIO2 70.0 65.0 100.0     No results for input(s): CPK, CKNDX, TROIQ in the last 72 hours. No lab exists for component: CPKMB  No results found for: BNPP, BNP   Lab Results   Component Value Date/Time    Culture result: Scant Normal respiratory duane SO FAR 01/28/2021 09:43 AM    Culture result: No Growth (<1000 cfu/mL) 01/28/2021 01:00 AM     Lab Results   Component Value Date/Time    TSH, External 2.420 01/16/2019        Imaging:    Results from Hospital Encounter encounter on 01/25/21   XR CHEST PORT    Narrative XR CHEST PORT    Comparison: Chest radiograph dated January 29, 2021      Impression FINDINGS/IMPRESSION:  Interval extubation and removal of enteric tube. Lungs similarly inflated. Cardiac silhouette stable in size. No radiographically  apparent pneumothorax. Persistent patchy bilateral airspace opacity. This may represent multilobar  pneumonia or edema, or perhaps a combination of each. Results from [place] encounter on 01/25/21   CTA CHEST W OR W WO CONT    Narrative Exam: CTA chest with intravenous contrast    Comparison: Plain radiograph 1/25/2021. CT 1/2/2021.     Technique: During intravenous contrast administration (100 cc [id number]), axial  sections were obtained through the chest. Coronal and sagittal reconstructions  were generated. Maximal intensity projection images were generated. Dose reduction: All CT scans at this facility are performed using dose reduction  optimization techniques as appropriate to perform the exam including the  following: automated exposure control, adjustments of the mA and/or kV according  to patient size, or use of iterative reconstruction technique. Findings: The examination is limited by motion degradation and by adduction of  the patient's upper extremities, which renders scattering and attenuation of the  diagnostic x-ray beam. Nevertheless, there is no demonstrated pulmonary embolus. Endotracheal tube tip in the middle third of the trachea. Distal trachea and  main bronchi poorly expanded. There is calcific atherosclerotic disease of a  nonaneurysmal thoracic aorta, which manifests no evidence of intimal  incompetence. Calcific atherosclerotic coronary arterial disease is present. Prominent bilateral mediastinal and hilar lymph nodes, increased compared to  1/2/2021, likely inflammatory. Small to moderate pericardial effusion of near  water attenuation fluid. It is 2 cm greatest axial thickness. Small bilateral  pleural effusions. Bilateral dependent airspace disease compatible with  pneumonia. Interstitial disease also present, compatible with pulmonary edema. Included abdomen indicates a stable 2 cm left adrenal nodule of water  attenuation, compatible with a benign adenoma. No axillary or supraclavicular  adenopathy or mass. Visualized thoracic bone scaffolding unremarkable. Impression Impression:  1. No evidence of pulmonary embolus. 2. Bilateral dependent airspace disease compatible with pneumonia. 3. Diffuse pulmonary interstitial disease compatible with pulmonary edema. 4. Bilateral pleural effusions.   5. Coronary artery calcific atherosclerotic disease. 6. Small to moderate pericardial effusion. 7. Distal trachea and main bronchi are poorly expanded. This care involved high complexity decision making which includes independently reviewing the patient's past medical records, current laboratory results, medication profiles that were immediately available to me and actual Xray images at the bedside in order to assess, support vital system function, and to treat this degree of vital organ system failure, and to prevent further life threatening deterioration of the patients condition. I was in direct communication with the nursing staff throughout this time.

## 2021-01-30 NOTE — PROGRESS NOTES
Hospitalist Progress Note             Daily Progress Note: 1/30/2021    65F, H/o CHF unknown EF with CAD s/p stent with aortic stenosis with UGIB with ABLA. Last ngiht, patient became obtunded requiring increased amounts of oxygen, was intubated. XR showed, pulmonary edema, started on lasix    Thought process: pulmonary edema s/t IVF in the setting of AS. Aspiration and COVID also a concern    On 1/29 she was extubated  EGD showed gastritis  On bipap continuous      Review of Systems:   Review of Systems   Constitutional: Negative. HENT: Negative. Eyes: Negative. Respiratory: Positive for shortness of breath. Cardiovascular: Negative. Gastrointestinal: Negative. Genitourinary: Negative. Musculoskeletal: Negative. Skin: Negative. Neurological: Positive for weakness. Endo/Heme/Allergies: Negative. Psychiatric/Behavioral: Negative. Objective:   Objective      Vitals:  Patient Vitals for the past 12 hrs:   Temp Pulse Resp BP SpO2   01/30/21 1432  88 22 (!) 87/55 96 %   01/30/21 1340  92 20 (!) 83/50 100 %   01/30/21 1204  (!) 107 22 (!) 89/57 97 %   01/30/21 1152     97 %   01/30/21 1106  97 21 119/70 98 %   01/30/21 1100 97.6 °F (36.4 °C)       01/30/21 1004  94 22 118/67 99 %   01/30/21 0929  81 20 137/78 98 %   01/30/21 0851  94 21 138/80 95 %   01/30/21 0734     100 %   01/30/21 0731 97.7 °F (36.5 °C) (!) 104 20 125/74 100 %   01/30/21 0700 97.7 °F (36.5 °C)       01/30/21 0626  (!) 106 21  99 %   01/30/21 0530  (!) 112 21 113/72 99 %   01/30/21 0417  (!) 115 24 119/68 99 %   01/30/21 0330  (!) 118 24 124/72 99 %        Physical Exam:  Physical Exam  Vitals signs and nursing note reviewed. Constitutional:       Appearance: Normal appearance. HENT:      Head: Normocephalic. Nose: Nose normal.      Mouth/Throat:      Mouth: Mucous membranes are moist.   Eyes:      Extraocular Movements: Extraocular movements intact.    Neck: Musculoskeletal: Normal range of motion. Cardiovascular:      Rate and Rhythm: Tachycardia present. Pulses: Normal pulses. Heart sounds: Normal heart sounds. Pulmonary:      Effort: Pulmonary effort is normal.      Comments: Expiratory wheeze, diminished air entry, 4L NC  Abdominal:      General: Bowel sounds are normal.      Palpations: Abdomen is soft. Musculoskeletal: Normal range of motion. Skin:     General: Skin is warm and dry. Capillary Refill: Capillary refill takes 2 to 3 seconds. Neurological:      Mental Status: She is alert and oriented to person, place, and time. Motor: Weakness present.    Psychiatric:         Mood and Affect: Mood normal.         Behavior: Behavior normal.          Lab Results:  Recent Results (from the past 24 hour(s))   GLUCOSE, POC    Collection Time: 01/29/21  3:03 PM   Result Value Ref Range    Glucose (POC) 75 65 - 100 mg/dL    Performed by [employer], BASIC    Collection Time: 01/29/21  6:54 PM   Result Value Ref Range    Sodium 137 136 - 145 mmol/L    Potassium 3.1 (L) 3.5 - 5.1 mmol/L    Chloride 100 97 - 108 mmol/L    CO2 30 21 - 32 mmol/L    Anion gap 7 5 - 15 mmol/L    Glucose 98 65 - 100 mg/dL    BUN 11 6 - 20 mg/dL    Creatinine 0.47 (L) 0.55 - 1.02 mg/dL    BUN/Creatinine ratio 23 (H) 12 - 20      GFR est AA >60 >60 ml/min/1.73m2    GFR est non-AA >60 >60 ml/min/1.73m2    Calcium 8.1 (L) 8.5 - 10.1 mg/dL   GLUCOSE, POC    Collection Time: 01/29/21  9:54 PM   Result Value Ref Range    Glucose (POC) 130 (H) 65 - 100 mg/dL    Performed by DOREEN HELLER    BLOOD GAS, ARTERIAL    Collection Time: 01/30/21  3:05 AM   Result Value Ref Range    pH 7.36 7.35 - 7.45      PCO2 50 (H) 35 - 45 mmHg    PO2 81 75 - 100 mmHg    O2 SAT 96 >95 %    BICARBONATE 27 (H) 22 - 26 mmol/L    BASE EXCESS 2.6 (H) 0 - 2 mmol/L    O2 METHOD BiPAP      FIO2 70.0 %    SET RATE 16      EPAP/CPAP/PEEP 8      SITE Right Radial      ABDULAZIZ'S TEST PASS     CBC WITH AUTOMATED DIFF    Collection Time: 01/30/21  3:15 AM   Result Value Ref Range    WBC 16.9 (H) 3.6 - 11.0 K/uL    RBC 4.95 3.80 - 5.20 M/uL    HGB 11.8 11.5 - 16.0 g/dL    HCT 38.1 35.0 - 47.0 %    MCV 77.0 (L) 80.0 - 99.0 FL    MCH 23.8 (L) 26.0 - 34.0 PG    MCHC 31.0 30.0 - 36.5 g/dL    RDW 24.6 (H) 11.5 - 14.5 %    PLATELET 193 (H) 431 - 400 K/uL    MPV 8.5 (L) 8.9 - 12.9 FL    NRBC 0.9 (H) 0  WBC    ABSOLUTE NRBC 0.16 (H) 0.00 - 0.01 K/uL    NEUTROPHILS 87 (H) 32 - 75 %    LYMPHOCYTES 5 (L) 12 - 49 %    MONOCYTES 7 5 - 13 %    EOSINOPHILS 0 0 - 7 %    BASOPHILS 0 0 - 1 %    IMMATURE GRANULOCYTES 1 (H) 0.0 - 0.5 %    ABS. NEUTROPHILS 14.9 (H) 1.8 - 8.0 K/UL    ABS. LYMPHOCYTES 0.9 0.8 - 3.5 K/UL    ABS. MONOCYTES 1.1 (H) 0.0 - 1.0 K/UL    ABS. EOSINOPHILS 0.1 0.0 - 0.4 K/UL    ABS. BASOPHILS 0.0 0.0 - 0.1 K/UL    ABS. IMM.  GRANS. 0.1 (H) 0.00 - 0.04 K/UL    DF AUTOMATED     METABOLIC PANEL, BASIC    Collection Time: 01/30/21  3:15 AM   Result Value Ref Range    Sodium 137 136 - 145 mmol/L    Potassium 4.0 3.5 - 5.1 mmol/L    Chloride 100 97 - 108 mmol/L    CO2 30 21 - 32 mmol/L    Anion gap 7 5 - 15 mmol/L    Glucose 181 (H) 65 - 100 mg/dL    BUN 9 6 - 20 mg/dL    Creatinine 0.59 0.55 - 1.02 mg/dL    BUN/Creatinine ratio 15 12 - 20      GFR est AA >60 >60 ml/min/1.73m2    GFR est non-AA >60 >60 ml/min/1.73m2    Calcium 8.5 8.5 - 10.1 mg/dL   GLUCOSE, POC    Collection Time: 01/30/21  7:18 AM   Result Value Ref Range    Glucose (POC) 169 (H) 65 - 100 mg/dL    Performed by [address] [place] [address] Dr Alvarez, POC    Collection Time: 01/30/21 11:11 AM   Result Value Ref Range    Glucose (POC) 130 (H) 65 - 100 mg/dL    Performed by Dale Erickson           Diagnostic Images:  CT Results  (Last 48 hours)    None          Current Medications:    Current Facility-Administered Medications:     spironolactone (ALDACTONE) tablet 25 mg, 25 mg, Oral, BID, Reynaldo Huston MD, 25 mg at 01/30/21 0992    0.9% sodium chloride infusion 250 mL, 250 mL, IntraVENous, PRN, Dario Edwards MD    furosemide (LASIX) injection 40 mg, 40 mg, IntraVENous, Q12H, Mignon Evangelista MD, 40 mg at 01/30/21 1988    lisinopriL (PRINIVIL, ZESTRIL) tablet 5 mg, 5 mg, Oral, DAILY, Mignon Evangelista MD, 5 mg at 01/30/21 0952    budesonide (PULMICORT) 500 mcg/2 ml nebulizer suspension, 500 mcg, Nebulization, BID RT, Dario Edwards MD, 500 mcg at 01/30/21 0734    albuterol-ipratropium (DUO-NEB) 2.5 MG-0.5 MG/3 ML, 3 mL, Nebulization, Q6H RT, Dario Edwards MD, 3 mL at 01/30/21 1340    piperacillin-tazobactam (ZOSYN) 3.375 g in 0.9% sodium chloride (MBP/ADV) 100 mL MBP, 3.375 g, IntraVENous, Q8H, Dario Edwards MD, Last Rate: 25 mL/hr at 01/30/21 1004, 3.375 g at 01/30/21 1004    pantoprazole (PROTONIX) tablet 40 mg, 40 mg, Oral, ACB, Marium Rodriguez MD, 40 mg at 01/30/21 0954    carvediloL (COREG) tablet 3.125 mg, 3.125 mg, Oral, BID WITH MEALS, Zahra Ruffin MD, 3.125 mg at 01/30/21 0954    0.9% sodium chloride infusion, 50 mL/hr, IntraVENous, CONTINUOUS, Ed Fuentes, NP, Last Rate: 50 mL/hr at 01/28/21 0925, 50 mL/hr at 01/28/21 0925    LORazepam (ATIVAN) tablet 1 mg, 1 mg, Oral, QHS, Ed Fuentes, NP, 1 mg at 01/28/21 2233    acetaminophen (TYLENOL) tablet 650 mg, 650 mg, Oral, Q4H PRN, Ed Fuentes, NP, 650 mg at 01/29/21 1649    ondansetron (ZOFRAN ODT) tablet 4 mg, 4 mg, Oral, Q8H PRN, Ed Fuentes NP    ondansetron [facility]) injection 4 mg, 4 mg, IntraVENous, Q6H PRN, Ed Fuentes, JOSE, 4 mg at 01/26/21 2258    insulin lispro (HUMALOG) injection, , SubCUTAneous, AC&HS, Ed Fuentes NP, Stopped at 01/29/21 2200    glucose chewable tablet 16 g, 4 Tab, Oral, PRN, Ed Fuentes NP    glucagon (GLUCAGEN) injection 1 mg, 1 mg, IntraMUSCular, PRN, Ed Fuentes NP    dextrose (D50W) injection syrg 12.5-25 g, 25-50 mL, IntraVENous, PRN, Ed Fuentes, JOSE       ASSESSMENT:    (1) Acute encephalopathy: s/t GCS 10. Septic and pulmonary edea. Treat for sepsis with abx, lasix    (2) Acute hypoxic respiratory failure: EXTUBATED. Lasix for pulmonary edema and zosyn for possible infection, COVID r/o. Daily SBTs     (3) sepsis : same as 2    (4) CAD with aortic stenosis: complicates all aspects of care    (5) UGIB with ABLA: EGD today, hb 8.4, EGD showed gastritic    (6) anemia with ABLA : s/t #5    (7) DMII: SSI, accu < 180    (8) HTN: hold all BP meds    (9) CHF unknwon EF: resume lasix, cardiology        Full Code  Dvt Prophylaxis  GI Prophylaxis  Home medications reviewed and reconciled      Transfer to floor tomorrow. Care Plan discussed with:  Interdisciplinary team      Klaudia Cintron MD

## 2021-01-30 NOTE — PROGRESS NOTES
Progress Note      1/30/2021 10:29 AM  NAME: Ezekiel Fitzpatrikc   MRN:  [id number]   Admit Diagnosis: GI bleed [K92.2]        Assessment/Plan:   Pulmonary edema, improved, on BiPAP, patient would like to have liquids, continue diuresis. Left ventricular systolic dysfunction ejection fraction of 41% 12/20. Known severe coronary artery disease and aortic stenosis, for revascularization and valve surgery, delayed due to Covid crisis? Patient choice    Anemia post transfusion, improved hemoglobin, continue to monitor hemoglobin, GI work-up             []       High complexity decision making was performed in this patient at high risk for decompensation with multiple organ involvement. Subjective: Ezekiel Fitzpatrick denies chest pain, dyspnea. Discussed with RN events overnight. Review of Systems:    Symptom Y/N Comments  Symptom Y/N Comments   Fever/Chills N   Chest Pain N    Poor Appetite N   Edema N    Cough N   Abdominal Pain N    Sputum N   Joint Pain N    SOB/ACEVEDO N   Pruritis/Rash N    Nausea/vomit N   Tolerating PT/OT Y    Diarrhea N   Tolerating Diet Y    Constipation N   Other       Could NOT obtain due to:      Objective:      Physical Exam:    Last 24hrs VS reviewed since prior progress note. Most recent are:    Visit Vitals  /67   Pulse 94   Temp 97.7 °F (36.5 °C)   Resp 22   Ht 5' 5\" (1.651 m)   Wt 90.7 kg (200 lb)   SpO2 99%   BMI 33.28 kg/m²       Intake/Output Summary (Last 24 hours) at 1/30/2021 1029  Last data filed at 1/30/2021 0600  Gross per 24 hour   Intake 720 ml   Output 2700 ml   Net -1980 ml        General Appearance: Well developed, well nourished, alert; on BiPAP  Ears/Nose/Mouth/Throat: Hearing grossly normal; moist mucous membranes  Neck: Supple. Chest: Lungs clear to auscultation bilaterally. Cardiovascular: Regular rate and rhythm, S1S2 normal, 2/6 systolic murmur . Abdomen: Soft, non-tender, bowel sounds are active. Extremities: No edema bilaterally.   Skin: Warm and dry. []         Post-cath site without hematoma, bruit, tenderness, or thrill. Distal pulses intact. PMH/SH reviewed - no change compared to H&P    Data Review    Telemetry:     EKG:   []  No new EKG for review    Lab Data Personally Reviewed:    Recent Labs     01/30/21  0315 01/29/21  0330   WBC 16.9* 8.2   HGB 11.8 6.8*   HCT 38.1 23.2*   * 428*     Recent Labs     01/28/21  0230   INR 1.1   PTP 14.4   APTT 28.6      Recent Labs     01/30/21  0315 01/29/21  1854 01/29/21  0330 01/28/21  0230    137 138  --    K 4.0 3.1* 2.6*  --     100 104  --    CO2 30 30 27  --    BUN 9 11 10  --    CREA 0.59 0.47* 0.47*  --    * 98 131*  --    CA 8.5 8.1* 6.7*  --    MG  --   --   --  1.8     No results for input(s): CPK, CKNDX, TROIQ in the last 72 hours.     No lab exists for component: CPKMB  Lab Results   Component Value Date/Time    Cholesterol, total 123 10/13/2020 10:58 AM    HDL Cholesterol 54 10/13/2020 10:58 AM    LDL, calculated 55 10/13/2020 10:58 AM    Triglyceride 65 10/13/2020 10:58 AM       Recent Labs     01/29/21  0330   AP 44*   TP 4.9*   ALB 2.0*   GLOB 2.9     Recent Labs     01/30/21  0305 01/29/21  0348   PH 7.36 7.50*   PCO2 50* 38   PO2 81 226*       Medications Personally Reviewed:    Current Facility-Administered Medications   Medication Dose Route Frequency    furosemide (LASIX) injection 40 mg  40 mg IntraVENous ONCE    spironolactone (ALDACTONE) tablet 25 mg  25 mg Oral BID    0.9% sodium chloride infusion 250 mL  250 mL IntraVENous PRN    furosemide (LASIX) injection 40 mg  40 mg IntraVENous Q12H    lisinopriL (PRINIVIL, ZESTRIL) tablet 5 mg  5 mg Oral DAILY    propofol (DIPRIVAN) 10 mg/mL infusion  0-50 mcg/kg/min IntraVENous TITRATE    budesonide (PULMICORT) 500 mcg/2 ml nebulizer suspension  500 mcg Nebulization BID RT    albuterol-ipratropium (DUO-NEB) 2.5 MG-0.5 MG/3 ML  3 mL Nebulization Q6H RT    piperacillin-tazobactam (ZOSYN) 3.375 g in 0.9% sodium chloride (MBP/ADV) 100 mL MBP  3.375 g IntraVENous Q8H    pantoprazole (PROTONIX) tablet 40 mg  40 mg Oral ACB    carvediloL (COREG) tablet 3.125 mg  3.125 mg Oral BID WITH MEALS    0.9% sodium chloride infusion  50 mL/hr IntraVENous CONTINUOUS    LORazepam (ATIVAN) tablet 1 mg  1 mg Oral QHS    acetaminophen (TYLENOL) tablet 650 mg  650 mg Oral Q4H PRN    ondansetron (ZOFRAN ODT) tablet 4 mg  4 mg Oral Q8H PRN    ondansetron (ZOFRAN) injection 4 mg  4 mg IntraVENous Q6H PRN    insulin lispro (HUMALOG) injection   SubCUTAneous AC&HS    glucose chewable tablet 16 g  4 Tab Oral PRN    glucagon (GLUCAGEN) injection 1 mg  1 mg IntraMUSCular PRN    dextrose (D50W) injection syrg 12.5-25 g  25-50 mL IntraVENous PRN         Katja Kelly MD

## 2021-01-31 ENCOUNTER — APPOINTMENT (OUTPATIENT)
Dept: GENERAL RADIOLOGY | Age: 66
DRG: 377 | End: 2021-01-31
Attending: INTERNAL MEDICINE
Payer: MEDICARE

## 2021-01-31 LAB
ANION GAP SERPL CALC-SCNC: 6 MMOL/L (ref 5–15)
ARTERIAL PATENCY WRIST A: ABNORMAL
BASE EXCESS BLDA CALC-SCNC: 10.1 MMOL/L (ref 0–2)
BDY SITE: ABNORMAL
BUN SERPL-MCNC: 13 MG/DL (ref 6–20)
BUN/CREAT SERPL: 16 (ref 12–20)
CA-I BLD-MCNC: 8.8 MG/DL (ref 8.5–10.1)
CHLORIDE SERPL-SCNC: 97 MMOL/L (ref 97–108)
CO2 SERPL-SCNC: 33 MMOL/L (ref 21–32)
CREAT SERPL-MCNC: 0.79 MG/DL (ref 0.55–1.02)
EPAP/CPAP/PEEP, PAPEEP: 6
ERYTHROCYTE [DISTWIDTH] IN BLOOD BY AUTOMATED COUNT: 25.1 % (ref 11.5–14.5)
FIO2 ON VENT: 45 %
GAS FLOW.O2 SETTING OXYMISER: 16 L/MIN
GLUCOSE BLD STRIP.AUTO-MCNC: 105 MG/DL (ref 65–100)
GLUCOSE BLD STRIP.AUTO-MCNC: 168 MG/DL (ref 65–100)
GLUCOSE BLD STRIP.AUTO-MCNC: 200 MG/DL (ref 65–100)
GLUCOSE BLD STRIP.AUTO-MCNC: 204 MG/DL (ref 65–100)
GLUCOSE SERPL-MCNC: 194 MG/DL (ref 65–100)
HCO3 BLDA-SCNC: 34 MMOL/L (ref 22–26)
HCT VFR BLD AUTO: 34 % (ref 35–47)
HGB BLD-MCNC: 10.3 G/DL (ref 11.5–16)
MAGNESIUM SERPL-MCNC: 1.7 MG/DL (ref 1.6–2.4)
MCH RBC QN AUTO: 23.4 PG (ref 26–34)
MCHC RBC AUTO-ENTMCNC: 30.3 G/DL (ref 30–36.5)
MCV RBC AUTO: 77.1 FL (ref 80–99)
PCO2 BLDA: 45 MMHG (ref 35–45)
PERFORMED BY, TECHID: ABNORMAL
PH BLDA: 7.5 [PH] (ref 7.35–7.45)
PLATELET # BLD AUTO: 504 K/UL (ref 150–400)
PMV BLD AUTO: 8.7 FL (ref 8.9–12.9)
PO2 BLDA: 58 MMHG (ref 75–100)
POTASSIUM SERPL-SCNC: 3.1 MMOL/L (ref 3.5–5.1)
RBC # BLD AUTO: 4.41 M/UL (ref 3.8–5.2)
SAO2 % BLD: 92 %
SAO2% DEVICE SAO2% SENSOR NAME: ABNORMAL
SODIUM SERPL-SCNC: 136 MMOL/L (ref 136–145)
WBC # BLD AUTO: 10 K/UL (ref 3.6–11)

## 2021-01-31 PROCEDURE — 74011250637 HC RX REV CODE- 250/637: Performed by: INTERNAL MEDICINE

## 2021-01-31 PROCEDURE — 94640 AIRWAY INHALATION TREATMENT: CPT

## 2021-01-31 PROCEDURE — 74011000250 HC RX REV CODE- 250: Performed by: INTERNAL MEDICINE

## 2021-01-31 PROCEDURE — 36415 COLL VENOUS BLD VENIPUNCTURE: CPT

## 2021-01-31 PROCEDURE — 74011250637 HC RX REV CODE- 250/637: Performed by: HOSPITALIST

## 2021-01-31 PROCEDURE — 85027 COMPLETE CBC AUTOMATED: CPT

## 2021-01-31 PROCEDURE — 82803 BLOOD GASES ANY COMBINATION: CPT

## 2021-01-31 PROCEDURE — 82962 GLUCOSE BLOOD TEST: CPT

## 2021-01-31 PROCEDURE — 83735 ASSAY OF MAGNESIUM: CPT

## 2021-01-31 PROCEDURE — 74011000258 HC RX REV CODE- 258: Performed by: INTERNAL MEDICINE

## 2021-01-31 PROCEDURE — 80048 BASIC METABOLIC PNL TOTAL CA: CPT

## 2021-01-31 PROCEDURE — 74011250636 HC RX REV CODE- 250/636: Performed by: INTERNAL MEDICINE

## 2021-01-31 PROCEDURE — 65270000029 HC RM PRIVATE

## 2021-01-31 PROCEDURE — 77010033678 HC OXYGEN DAILY

## 2021-01-31 PROCEDURE — 74011636637 HC RX REV CODE- 636/637: Performed by: NURSE PRACTITIONER

## 2021-01-31 PROCEDURE — 74011250637 HC RX REV CODE- 250/637: Performed by: NURSE PRACTITIONER

## 2021-01-31 PROCEDURE — 94660 CPAP INITIATION&MGMT: CPT

## 2021-01-31 PROCEDURE — 71045 X-RAY EXAM CHEST 1 VIEW: CPT

## 2021-01-31 RX ADMIN — BUDESONIDE 500 MCG: 0.5 INHALANT RESPIRATORY (INHALATION) at 20:44

## 2021-01-31 RX ADMIN — LISINOPRIL 5 MG: 5 TABLET ORAL at 11:08

## 2021-01-31 RX ADMIN — PIPERACILLIN SODIUM AND TAZOBACTAM SODIUM 3.38 G: 3; .375 INJECTION, POWDER, LYOPHILIZED, FOR SOLUTION INTRAVENOUS at 11:08

## 2021-01-31 RX ADMIN — IPRATROPIUM BROMIDE AND ALBUTEROL SULFATE 3 ML: .5; 3 SOLUTION RESPIRATORY (INHALATION) at 07:29

## 2021-01-31 RX ADMIN — INSULIN LISPRO 2 UNITS: 100 INJECTION, SOLUTION INTRAVENOUS; SUBCUTANEOUS at 11:08

## 2021-01-31 RX ADMIN — BENZOCAINE AND MENTHOL 1 LOZENGE: 15; 3.6 LOZENGE ORAL at 18:08

## 2021-01-31 RX ADMIN — INSULIN LISPRO 2 UNITS: 100 INJECTION, SOLUTION INTRAVENOUS; SUBCUTANEOUS at 18:04

## 2021-01-31 RX ADMIN — SPIRONOLACTONE 25 MG: 25 TABLET ORAL at 21:36

## 2021-01-31 RX ADMIN — LORAZEPAM 1 MG: 1 TABLET ORAL at 21:36

## 2021-01-31 RX ADMIN — FUROSEMIDE 40 MG: 10 INJECTION, SOLUTION INTRAMUSCULAR; INTRAVENOUS at 21:36

## 2021-01-31 RX ADMIN — PANTOPRAZOLE SODIUM 40 MG: 40 TABLET, DELAYED RELEASE ORAL at 08:12

## 2021-01-31 RX ADMIN — CARVEDILOL 3.12 MG: 3.12 TABLET, FILM COATED ORAL at 08:12

## 2021-01-31 RX ADMIN — IPRATROPIUM BROMIDE AND ALBUTEROL SULFATE 3 ML: .5; 3 SOLUTION RESPIRATORY (INHALATION) at 20:44

## 2021-01-31 RX ADMIN — CARVEDILOL 3.12 MG: 3.12 TABLET, FILM COATED ORAL at 18:04

## 2021-01-31 RX ADMIN — PIPERACILLIN SODIUM AND TAZOBACTAM SODIUM 3.38 G: 3; .375 INJECTION, POWDER, LYOPHILIZED, FOR SOLUTION INTRAVENOUS at 03:00

## 2021-01-31 RX ADMIN — SPIRONOLACTONE 25 MG: 25 TABLET ORAL at 08:12

## 2021-01-31 RX ADMIN — BUDESONIDE 500 MCG: 0.5 INHALANT RESPIRATORY (INHALATION) at 07:29

## 2021-01-31 RX ADMIN — IPRATROPIUM BROMIDE AND ALBUTEROL SULFATE 3 ML: .5; 3 SOLUTION RESPIRATORY (INHALATION) at 01:03

## 2021-01-31 RX ADMIN — FUROSEMIDE 40 MG: 10 INJECTION, SOLUTION INTRAMUSCULAR; INTRAVENOUS at 08:12

## 2021-01-31 RX ADMIN — PIPERACILLIN SODIUM AND TAZOBACTAM SODIUM 3.38 G: 3; .375 INJECTION, POWDER, LYOPHILIZED, FOR SOLUTION INTRAVENOUS at 18:11

## 2021-01-31 RX ADMIN — INSULIN LISPRO 4 UNITS: 100 INJECTION, SOLUTION INTRAVENOUS; SUBCUTANEOUS at 21:36

## 2021-01-31 RX ADMIN — IPRATROPIUM BROMIDE AND ALBUTEROL SULFATE 3 ML: .5; 3 SOLUTION RESPIRATORY (INHALATION) at 13:20

## 2021-01-31 NOTE — PROGRESS NOTES
Progress Note      1/31/2021 10:29 AM  NAME: Anna Mclaughlin   MRN:  [id number]   Admit Diagnosis: GI bleed [K92.2]        Assessment/Plan:   Pulmonary edema, improved, on Hi Flow Oxygen    Left ventricular systolic dysfunction ejection fraction of 41% 12/20. Continue diuresis    Known severe coronary artery disease and aortic stenosis, for revascularization and valve surgery, delayed due to Covid crisis? Patient choice    Anemia post transfusion, improved hemoglobin, continue to monitor hemoglobin, GI work-up, repeat hemoglobin    Hypokalemia, will repeat electrolytes         []       High complexity decision making was performed in this patient at high risk for decompensation with multiple organ involvement. Subjective: Anna Mclaughlin denies chest pain, dyspnea. She ate a full breakfast  Discussed with RN events overnight. Review of Systems:    Symptom Y/N Comments  Symptom Y/N Comments   Fever/Chills N   Chest Pain N    Poor Appetite N   Edema N    Cough N   Abdominal Pain N    Sputum N   Joint Pain N    SOB/ACEVEDO N   Pruritis/Rash N    Nausea/vomit N   Tolerating PT/OT Y    Diarrhea N   Tolerating Diet Y    Constipation N   Other       Could NOT obtain due to:      Objective:      Physical Exam:    Last 24hrs VS reviewed since prior progress note. Most recent are:    Visit Vitals  BP (!) 117/51 (BP 1 Location: Left upper arm)   Pulse (!) 103   Temp 98.7 °F (37.1 °C)   Resp 21   Ht 5' 5\" (1.651 m)   Wt 90.7 kg (200 lb)   SpO2 95%   BMI 33.28 kg/m²       Intake/Output Summary (Last 24 hours) at 1/31/2021 1216  Last data filed at 1/31/2021 0300  Gross per 24 hour   Intake    Output 2200 ml   Net -2200 ml        General Appearance: Well developed, well nourished, alert; on high flow oxygen  Ears/Nose/Mouth/Throat: Hearing grossly normal; moist mucous membranes  Neck: Supple. Chest: Lungs clear to auscultation bilaterally.   Cardiovascular: Regular rate and rhythm, S1S2 normal, 2/6 systolic murmur . Abdomen: Soft, non-tender, bowel sounds are active. Extremities: No edema bilaterally. Skin: Warm and dry. []         Post-cath site without hematoma, bruit, tenderness, or thrill. Distal pulses intact. PMH/SH reviewed - no change compared to H&P    Data Review    Telemetry:     EKG:   []  No new EKG for review    Lab Data Personally Reviewed:    Recent Labs     01/30/21  0315 01/29/21  0330   WBC 16.9* 8.2   HGB 11.8 6.8*   HCT 38.1 23.2*   * 428*     No results for input(s): INR, PTP, APTT, INREXT, INREXT in the last 72 hours. Recent Labs     01/30/21  0315 01/29/21  1854 01/29/21  0330    137 138   K 4.0 3.1* 2.6*    100 104   CO2 30 30 27   BUN 9 11 10   CREA 0.59 0.47* 0.47*   * 98 131*   CA 8.5 8.1* 6.7*     No results for input(s): CPK, CKNDX, TROIQ in the last 72 hours.     No lab exists for component: CPKMB  Lab Results   Component Value Date/Time    Cholesterol, total 123 10/13/2020 10:58 AM    HDL Cholesterol 54 10/13/2020 10:58 AM    LDL, calculated 55 10/13/2020 10:58 AM    Triglyceride 65 10/13/2020 10:58 AM       Recent Labs     01/29/21  0330   AP 44*   TP 4.9*   ALB 2.0*   GLOB 2.9     Recent Labs     01/31/21  0350 01/30/21  0305   PH 7.50* 7.36   PCO2 45 50*   PO2 58* 81       Medications Personally Reviewed:    Current Facility-Administered Medications   Medication Dose Route Frequency    spironolactone (ALDACTONE) tablet 25 mg  25 mg Oral BID    0.9% sodium chloride infusion 250 mL  250 mL IntraVENous PRN    furosemide (LASIX) injection 40 mg  40 mg IntraVENous Q12H    lisinopriL (PRINIVIL, ZESTRIL) tablet 5 mg  5 mg Oral DAILY    budesonide (PULMICORT) 500 mcg/2 ml nebulizer suspension  500 mcg Nebulization BID RT    albuterol-ipratropium (DUO-NEB) 2.5 MG-0.5 MG/3 ML  3 mL Nebulization Q6H RT    piperacillin-tazobactam (ZOSYN) 3.375 g in 0.9% sodium chloride (MBP/ADV) 100 mL MBP  3.375 g IntraVENous Q8H    pantoprazole (PROTONIX) tablet 40 mg  40 mg Oral ACB    carvediloL (COREG) tablet 3.125 mg  3.125 mg Oral BID WITH MEALS    0.9% sodium chloride infusion  50 mL/hr IntraVENous CONTINUOUS    LORazepam (ATIVAN) tablet 1 mg  1 mg Oral QHS    acetaminophen (TYLENOL) tablet 650 mg  650 mg Oral Q4H PRN    ondansetron (ZOFRAN ODT) tablet 4 mg  4 mg Oral Q8H PRN    ondansetron (ZOFRAN) injection 4 mg  4 mg IntraVENous Q6H PRN    insulin lispro (HUMALOG) injection   SubCUTAneous AC&HS    glucose chewable tablet 16 g  4 Tab Oral PRN    glucagon (GLUCAGEN) injection 1 mg  1 mg IntraMUSCular PRN    dextrose (D50W) injection syrg 12.5-25 g  25-50 mL IntraVENous PRN         Karen Maharaj MD

## 2021-01-31 NOTE — PROGRESS NOTES
IMPRESSION:      1. Acute hypoxic hypercapnic respiratory failure  2. Acute pulmonary edema  3. Acute GI bleed  4. Severe sepsis  5. History of aortic stenosis coronary artery disease  6. Pericardial and pleural effusion  7. History of COVID-19 pneumonia  8. Symptomatic anemia patient hemoglobin was 5.4 today is 11.8 recieved 2 unit packed RBC  9. Additional workup outlined below  10. Pt is at high risk of sudden decline and decompensation with life threatening consequenses and continued end organ dysfunction and failure  11. Pt is critically ill. Time spent with pt and staff actively rendering care, managing pt and coordinating care as stated below;  30 minutes, exclusive of any procedures      RECOMMENDATIONS/PLAN:     1. She was extubated on 1/29 but again late evening she was short of breath hypoxic started on noninvasive ventilator BiPAP machine, blood transfusion and went again into pulmonary edema Lasix given she is now on high flow nasal cannula alert awake talking on 45% FiO2  2. Patient chest x-ray shows CHF pulmonary edema will start patient on Lasix with given history of aortic stenosis and congestive heart failure coronary artery disease three-vessel disease he was awaiting for valve replacement surgery  3. X-ray shows pulmonary edema improved we will continue high-dose Lasix  4. Patient on antibiotic Zosyn panculture  5. Continue with the nebulizer treatment and will change Symbicort to budesonide  6. Symptomatic anemia  7. IV vasopressors for circulatory shock refractory to fluids to maintain SBP> 90   8. Transfuse prn to maintain Hgb > 7  9. Labs to follow electrolytes, renal function and and blood counts  10. Bronchial hygiene with respiratory therapy techniques, bronchodilators  11. Pt needs IV fluids with additives and Drug therapy requiring intensive monitoring for toxicity  12. Prescription drug management with home med reconciliation reviewed  13. DVT, SUP prophylaxis  14.  Will be available to assist in medical management while in the CCU pending disposition     [x] High complexity decision making was performed  [x] See my orders for details  HPI  55-year-old lady came in because of generalized weakness and possible GI bleed hemoglobin was 5.4 on admission and patient has dark stool at home cardiologist and gastroenterologist was seen the patient and patient was hospitalized on January 1 because of COVID-19 infection she also has congestive heart failure ejection fraction of 41% coronary artery disease and aortic stenosis history of aortic valve replacement. Yesterday patient was given enema and he developed shortness of breath hypoxia and wheezing she was put on supplemental oxygen patient became unresponsive tachycardic than patient had a rapid response transferred to ICU now intubated on ventilator. Was in flash pulmonary edema received Lasix she was having pink frothy sputum so not critical care consult was called as she is hemodynamically unstable. PMH:  has a past medical history of Acute gastrointestinal hemorrhage (8/28/2020), Allergic rhinitis (8/28/2020), Anemia (8/28/2020), Benign essential hypertension (6/25/2020), Body mass index 30.0-30.9, adult (6/25/2020), CAD (coronary artery disease) (8/28/2020), Foot callus (8/28/2020), GERD (gastroesophageal reflux disease) (6/25/2020), Heart murmur (6/25/2020), colonoscopy (01/01/2015), Left bundle branch block (8/28/2020), Microalbuminuria due to type 2 diabetes mellitus ([address] (8/28/2020), Mixed hyperlipidemia (6/25/2020), Morbid obesity ([address] (8/28/2020), Tobacco dependence syndrome (6/25/2020), and Type II diabetes mellitus, uncontrolled ([address] (6/25/2020). PSH:   has a past surgical history that includes hx colonoscopy (2015). FHX: family history includes Cancer in her mother and sister; Hypertension in her father. SHX:  reports that she quit smoking about 7 years ago. She smoked 0.50 packs per day.  She has never used smokeless tobacco. She reports previous alcohol use. She reports that she does not use drugs.     ALL:   Allergies   Allergen Reactions    Aspirin Unknown (comments)     hemorraging - ended up in the hospital        MEDS:   [x] Reviewed - As Below   [] Not reviewed    Current Facility-Administered Medications   Medication    spironolactone (ALDACTONE) tablet 25 mg    0.9% sodium chloride infusion 250 mL    furosemide (LASIX) injection 40 mg    lisinopriL (PRINIVIL, ZESTRIL) tablet 5 mg    budesonide (PULMICORT) 500 mcg/2 ml nebulizer suspension    albuterol-ipratropium (DUO-NEB) 2.5 MG-0.5 MG/3 ML    piperacillin-tazobactam (ZOSYN) 3.375 g in 0.9% sodium chloride (MBP/ADV) 100 mL MBP    pantoprazole (PROTONIX) tablet 40 mg    carvediloL (COREG) tablet 3.125 mg    0.9% sodium chloride infusion    LORazepam (ATIVAN) tablet 1 mg    acetaminophen (TYLENOL) tablet 650 mg    ondansetron (ZOFRAN ODT) tablet 4 mg    ondansetron (ZOFRAN) injection 4 mg    insulin lispro (HUMALOG) injection    glucose chewable tablet 16 g    glucagon (GLUCAGEN) injection 1 mg    dextrose (D50W) injection syrg 12.5-25 g      MAR reviewed and pertinent medications noted or modified as needed   Current Facility-Administered Medications   Medication    spironolactone (ALDACTONE) tablet 25 mg    0.9% sodium chloride infusion 250 mL    furosemide (LASIX) injection 40 mg    lisinopriL (PRINIVIL, ZESTRIL) tablet 5 mg    budesonide (PULMICORT) 500 mcg/2 ml nebulizer suspension    albuterol-ipratropium (DUO-NEB) 2.5 MG-0.5 MG/3 ML    piperacillin-tazobactam (ZOSYN) 3.375 g in 0.9% sodium chloride (MBP/ADV) 100 mL MBP    pantoprazole (PROTONIX) tablet 40 mg    carvediloL (COREG) tablet 3.125 mg    0.9% sodium chloride infusion    LORazepam (ATIVAN) tablet 1 mg    acetaminophen (TYLENOL) tablet 650 mg    ondansetron (ZOFRAN ODT) tablet 4 mg    ondansetron (ZOFRAN) injection 4 mg    insulin lispro (HUMALOG) injection    glucose chewable tablet 16 g    glucagon (GLUCAGEN) injection 1 mg    dextrose (D50W) injection syrg 12.5-25 g      PMH:  has a past medical history of Acute gastrointestinal hemorrhage (2020), Allergic rhinitis (2020), Anemia (2020), Benign essential hypertension (2020), Body mass index 30.0-30.9, adult (2020), CAD (coronary artery disease) (2020), Foot callus (2020), GERD (gastroesophageal reflux disease) (2020), Heart murmur (2020), colonoscopy (2015), Left bundle branch block (2020), Microalbuminuria due to type 2 diabetes mellitus ([facility] (2020), Mixed hyperlipidemia (2020), Morbid obesity ([facility] (2020), Tobacco dependence syndrome (2020), and Type II diabetes mellitus, uncontrolled ([facility] (2020). PSH:   has a past surgical history that includes hx colonoscopy (). FHX: family history includes Cancer in her mother and sister; Hypertension in her father. SHX:  reports that she quit smoking about 7 years ago. She smoked 0.50 packs per day. She has never used smokeless tobacco. She reports previous alcohol use. She reports that she does not use drugs. ROS:  Unable to obtain intubated sedated on ventilator    Hemodynamics:    CO:    CI:    CVP:    SVR:   PAP Systolic:    PAP Diastolic:    PVR:    DU60:        Ventilator Settings:      Mode Rate TV Press PEEP FiO2 PIP Min.  Vent   Spontaneous, Pressure support    50 ml 15 cm H2O  8 cm H20 50 %  32 cm H2O  15.9 l/min        Vital Signs: Telemetry:    normal sinus rhythm Intake/Output:   Visit Vitals  /81 (BP 1 Location: Left upper arm)   Pulse (!) 111   Temp 98.7 °F (37.1 °C)   Resp 15   Ht 5' 5\" (1.651 m)   Wt 90.7 kg (200 lb)   SpO2 92%   BMI 33.28 kg/m²       Temp (24hrs), Av.9 °F (36.6 °C), Min:97.5 °F (36.4 °C), Max:98.7 °F (37.1 °C)        O2 Device: Hi flow nasal cannula O2 Flow Rate (L/min): 40 l/min       Wt Readings from Last 4 Encounters:   21 90.7 kg (200 lb) 01/01/21 90.7 kg (200 lb)   12/31/20 90.7 kg (200 lb)   12/18/20 97.4 kg (214 lb 11.7 oz)          Intake/Output Summary (Last 24 hours) at 1/31/2021 0829  Last data filed at 1/31/2021 0300  Gross per 24 hour   Intake    Output 2200 ml   Net -2200 ml       Last shift:      No intake/output data recorded. Last 3 shifts: 01/29 1901 - 01/31 0700  In: 720   Out: 3600 [Urine:3600]       Physical Exam:     General: On noninvasive ventilator BiPAP machine  HEENT: NCAT, poor dentition, lips and mucosa dry  Eyes: anicteric; conjunctiva clear  Neck: no nodes, no cuff leak, trach midline; no accessory MM use. Chest: no deformity,   Cardiac: R regular; no murmur;   Lungs: distant breath sounds; no wheezes  Abd: soft, NT, hypoactive BS  Ext: no edema; no joint swelling;  No clubbing  : NO casas, clear urine  Neuro: sedated;   Psych- no agitation, oriented to person;    Skin: warm, dry, no cyanosis;   Pulses: 1-2+ Bilateral pedal, radial  Capillary: brisk; pale      DATA:    MAR reviewed and pertinent medications noted or modified as needed  MEDS:   Current Facility-Administered Medications   Medication    spironolactone (ALDACTONE) tablet 25 mg    0.9% sodium chloride infusion 250 mL    furosemide (LASIX) injection 40 mg    lisinopriL (PRINIVIL, ZESTRIL) tablet 5 mg    budesonide (PULMICORT) 500 mcg/2 ml nebulizer suspension    albuterol-ipratropium (DUO-NEB) 2.5 MG-0.5 MG/3 ML    piperacillin-tazobactam (ZOSYN) 3.375 g in 0.9% sodium chloride (MBP/ADV) 100 mL MBP    pantoprazole (PROTONIX) tablet 40 mg    carvediloL (COREG) tablet 3.125 mg    0.9% sodium chloride infusion    LORazepam (ATIVAN) tablet 1 mg    acetaminophen (TYLENOL) tablet 650 mg    ondansetron (ZOFRAN ODT) tablet 4 mg    ondansetron (ZOFRAN) injection 4 mg    insulin lispro (HUMALOG) injection    glucose chewable tablet 16 g    glucagon (GLUCAGEN) injection 1 mg    dextrose (D50W) injection syrg 12.5-25 g        Labs:    Recent Labs 01/30/21  0315 01/29/21  0330   WBC 16.9* 8.2   HGB 11.8 6.8*   * 428*     Recent Labs     01/30/21  0315 01/29/21  1854 01/29/21  0330    137 138   K 4.0 3.1* 2.6*    100 104   CO2 30 30 27   * 98 131*   BUN 9 11 10   CREA 0.59 0.47* 0.47*   CA 8.5 8.1* 6.7*   ALB  --   --  2.0*   ALT  --   --  16     Recent Labs     01/31/21  0350 01/30/21  0305 01/29/21  0348   PH 7.50* 7.36 7.50*   PCO2 45 50* 38   PO2 58* 81 226*   HCO3 34* 27* 30*   FIO2 45.0 70.0 65.0     No results for input(s): CPK, CKNDX, TROIQ in the last 72 hours. No lab exists for component: CPKMB  No results found for: BNPP, BNP   Lab Results   Component Value Date/Time    Culture result: Scant Normal respiratory duane 01/28/2021 09:43 AM    Culture result: No Growth (<1000 cfu/mL) 01/28/2021 01:00 AM     Lab Results   Component Value Date/Time    TSH, External 2.420 01/16/2019        Imaging:    Results from Hospital Encounter encounter on 01/25/21   XR CHEST PORT    Narrative XR CHEST PORT    Comparison: Chest radiograph dated January 30, 2021      Impression FINDINGS/IMPRESSION:  Radiograph is limited by patient's body habitus and AP portable technique. Lungs remain hypoinflated with bibasilar atelectasis and probable small  bilateral pleural effusions. Cardiac silhouette stable in size. No radiographically apparent pneumothorax. Mild improvement in bilateral airspace opacity, this is most confluent at the  right lung base. This may represent multilobar pneumonia or edema, or perhaps a  combination of each. Results from [place] encounter on 01/25/21   CTA CHEST W OR W WO CONT    Narrative Exam: CTA chest with intravenous contrast    Comparison: Plain radiograph 1/25/2021. CT 1/2/2021. Technique: During intravenous contrast administration (100 cc [id number]), axial  sections were obtained through the chest. Coronal and sagittal reconstructions  were generated.  Maximal intensity projection images were generated. Dose reduction: All CT scans at this facility are performed using dose reduction  optimization techniques as appropriate to perform the exam including the  following: automated exposure control, adjustments of the mA and/or kV according  to patient size, or use of iterative reconstruction technique. Findings: The examination is limited by motion degradation and by adduction of  the patient's upper extremities, which renders scattering and attenuation of the  diagnostic x-ray beam. Nevertheless, there is no demonstrated pulmonary embolus. Endotracheal tube tip in the middle third of the trachea. Distal trachea and  main bronchi poorly expanded. There is calcific atherosclerotic disease of a  nonaneurysmal thoracic aorta, which manifests no evidence of intimal  incompetence. Calcific atherosclerotic coronary arterial disease is present. Prominent bilateral mediastinal and hilar lymph nodes, increased compared to  1/2/2021, likely inflammatory. Small to moderate pericardial effusion of near  water attenuation fluid. It is 2 cm greatest axial thickness. Small bilateral  pleural effusions. Bilateral dependent airspace disease compatible with  pneumonia. Interstitial disease also present, compatible with pulmonary edema. Included abdomen indicates a stable 2 cm left adrenal nodule of water  attenuation, compatible with a benign adenoma. No axillary or supraclavicular  adenopathy or mass. Visualized thoracic bone scaffolding unremarkable. Impression Impression:  1. No evidence of pulmonary embolus. 2. Bilateral dependent airspace disease compatible with pneumonia. 3. Diffuse pulmonary interstitial disease compatible with pulmonary edema. 4. Bilateral pleural effusions. 5. Coronary artery calcific atherosclerotic disease. 6. Small to moderate pericardial effusion. 7. Distal trachea and main bronchi are poorly expanded.

## 2021-01-31 NOTE — PROGRESS NOTES
Hospitalist Progress Note             Daily Progress Note: 1/31/2021    65F, H/o CHF unknown EF with CAD s/p stent with aortic stenosis with UGIB with ABLA. Last ngiht, patient became obtunded requiring increased amounts of oxygen, was intubated. XR showed, pulmonary edema, started on lasix    Thought process: pulmonary edema s/t IVF in the setting of AS. Aspiration and COVID also a concern    On 1/29 she was extubated  EGD showed gastritis  On NC      Review of Systems:   Review of Systems   Constitutional: Negative. HENT: Negative. Eyes: Negative. Respiratory: Positive for shortness of breath. Cardiovascular: Negative. Gastrointestinal: Negative. Genitourinary: Negative. Musculoskeletal: Negative. Skin: Negative. Neurological: Positive for weakness. Endo/Heme/Allergies: Negative. Psychiatric/Behavioral: Negative. Objective:   Objective      Vitals:  Patient Vitals for the past 12 hrs:   Temp Pulse Resp BP SpO2   01/31/21 1524 97.7 °F (36.5 °C) (!) 107 22 (!) 143/80 98 %   01/31/21 1427  (!) 107 23 120/71 97 %   01/31/21 1329  (!) 106 20 134/72 96 %   01/31/21 1200  (!) 107 19 116/68 99 %   01/31/21 1122  (!) 103 21 (!) 117/51 95 %   01/31/21 1100 98.7 °F (37.1 °C)       01/31/21 0913  93 21 129/71 96 %   01/31/21 0816     92 %   01/31/21 0813     (!) 84 %   01/31/21 0803  (!) 111 15 126/81 90 %   01/31/21 0749     99 %   01/31/21 0729     99 %   01/31/21 0707  88 20 127/75 100 %   01/31/21 0700 98.7 °F (37.1 °C)            Physical Exam:  Physical Exam  Vitals signs and nursing note reviewed. Constitutional:       Appearance: Normal appearance. HENT:      Head: Normocephalic. Nose: Nose normal.      Mouth/Throat:      Mouth: Mucous membranes are moist.   Eyes:      Extraocular Movements: Extraocular movements intact. Neck:      Musculoskeletal: Normal range of motion.    Cardiovascular:      Rate and Rhythm: Tachycardia present. Pulses: Normal pulses. Heart sounds: Normal heart sounds. Pulmonary:      Effort: Pulmonary effort is normal.      Comments: Expiratory wheeze, diminished air entry, 4L NC  Abdominal:      General: Bowel sounds are normal.      Palpations: Abdomen is soft. Musculoskeletal: Normal range of motion. Skin:     General: Skin is warm and dry. Capillary Refill: Capillary refill takes 2 to 3 seconds. Neurological:      Mental Status: She is alert and oriented to person, place, and time. Motor: Weakness present.    Psychiatric:         Mood and Affect: Mood normal.         Behavior: Behavior normal.          Lab Results:  Recent Results (from the past 24 hour(s))   GLUCOSE, POC    Collection Time: 01/30/21  8:28 PM   Result Value Ref Range    Glucose (POC) 226 (H) 65 - 100 mg/dL    Performed by NEAL EATON    BLOOD GAS, ARTERIAL    Collection Time: 01/31/21  3:50 AM   Result Value Ref Range    pH 7.50 (H) 7.35 - 7.45      PCO2 45 35 - 45 mmHg    PO2 58 (L) 75 - 100 mmHg    O2 SAT 92 (L) >95 %    BICARBONATE 34 (H) 22 - 26 mmol/L    BASE EXCESS 10.1 (H) 0 - 2 mmol/L    O2 METHOD BiPAP      FIO2 45.0 %    SET RATE 16      EPAP/CPAP/PEEP 6      SITE Right Radial      ABDULAZIZ'S TEST PASS     GLUCOSE, POC    Collection Time: 01/31/21  7:11 AM   Result Value Ref Range    Glucose (POC) 105 (H) 65 - 100 mg/dL    Performed by [address] [place] [address] Dr Alvarez, POC    Collection Time: 01/31/21 10:46 AM   Result Value Ref Range    Glucose (POC) 200 (H) 65 - 100 mg/dL    Performed by [address] [place] [address] Dr Alvarez, POC    Collection Time: 01/31/21  4:23 PM   Result Value Ref Range    Glucose (POC) 168 (H) 65 - 100 mg/dL    Performed by Kevin Rao           Diagnostic Images:  CT Results  (Last 48 hours)    None          Current Medications:    Current Facility-Administered Medications:     benzocaine-menthoL (CEPACOL) lozenge 1 Lozenge, 1 Lozenge, Mucous Membrane, PRN, Jimmy, Anup Frank MD, 1 Lozenge at 01/31/21 1808    spironolactone (ALDACTONE) tablet 25 mg, 25 mg, Oral, BID, Reyes Mcnair MD, 25 mg at 01/31/21 8270    0.9% sodium chloride infusion 250 mL, 250 mL, IntraVENous, PRN, Dario Edwards MD    furosemide (LASIX) injection 40 mg, 40 mg, IntraVENous, Q12H, Sowmya Burden MD, 40 mg at 01/31/21 9565    lisinopriL (PRINIVIL, ZESTRIL) tablet 5 mg, 5 mg, Oral, DAILY, Sowmya Burden MD, 5 mg at 01/31/21 1108    budesonide (PULMICORT) 500 mcg/2 ml nebulizer suspension, 500 mcg, Nebulization, BID RT, Dario Edwards MD, 500 mcg at 01/31/21 0729    albuterol-ipratropium (DUO-NEB) 2.5 MG-0.5 MG/3 ML, 3 mL, Nebulization, Q6H RT, Dario Edwards MD, 3 mL at 01/31/21 1320    piperacillin-tazobactam (ZOSYN) 3.375 g in 0.9% sodium chloride (MBP/ADV) 100 mL MBP, 3.375 g, IntraVENous, Q8H, Dario Edwards MD, Last Rate: 25 mL/hr at 01/31/21 1811, 3.375 g at 01/31/21 1811    pantoprazole (PROTONIX) tablet 40 mg, 40 mg, Oral, ACB, Kiran Chris MD, 40 mg at 01/31/21 8245    carvediloL (COREG) tablet 3.125 mg, 3.125 mg, Oral, BID WITH MEALS, Farideh Andino MD, 3.125 mg at 01/31/21 1804    0.9% sodium chloride infusion, 50 mL/hr, IntraVENous, CONTINUOUS, Ed Fuentes NP, Last Rate: 50 mL/hr at 01/28/21 0925, 50 mL/hr at 01/28/21 0925    LORazepam (ATIVAN) tablet 1 mg, 1 mg, Oral, QHS, Ed Fuentes NP, Stopped at 01/30/21 2200    acetaminophen (TYLENOL) tablet 650 mg, 650 mg, Oral, Q4H PRN, Ed Fuentes, NP, 650 mg at 01/29/21 1649    ondansetron (ZOFRAN ODT) tablet 4 mg, 4 mg, Oral, Q8H PRN, Ed Fuentes, NP    [facility]etron [facility]) injection 4 mg, 4 mg, IntraVENous, Q6H PRN, Ed Fuentes, NP, 4 mg at 01/26/21 2258    insulin lispro (HUMALOG) injection, , SubCUTAneous, AC&HS, Ed Fuentes, NP, 2 Units at 01/31/21 1804    glucose chewable tablet 16 g, 4 Tab, Oral, PRN, Ed Fuentes, NP    glucagon (GLUCAGEN) injection 1 mg, 1 mg, IntraMUSCular, PRN, Ed Fuentes, NP    dextrose (D50W) injection syrg 12.5-25 g, 25-50 mL, IntraVENous, PRN, Ed Fuentes NP       ASSESSMENT:    (1) Acute encephalopathy: s/t GCS 10. Septic and pulmonary edea. Treat for sepsis with abx, lasix    (2) Acute hypoxic respiratory failure: EXTUBATED. Lasix for pulmonary edema and zosyn for possible infection, COVID r/o. Daily SBTs     (3) sepsis : same as 2    (4) CAD with aortic stenosis: complicates all aspects of care    (5) UGIB with ABLA: EGD today, hb 8.4, EGD showed gastritic    (6) anemia with ABLA : s/t #5    (7) DMII: SSI, accu < 180    (8) HTN: hold all BP meds    (9) CHF unknwon EF: resume lasix, cardiology        Full Code  Dvt Prophylaxis  GI Prophylaxis  Home medications reviewed and reconciled      Transfer to floor tomorrow. Care Plan discussed with:  Interdisciplinary team      Linh See MD

## 2021-01-31 NOTE — PROGRESS NOTES
Received patient from ICU into room 402, VSS, no signs of distress observed. Patient voiced no complaints other than needing a lozenge for her sore throat. Pharmacy notified of need for lozenges.

## 2021-01-31 NOTE — ROUTINE PROCESS
SBAR report given to Kina Shah RN, patient transferred to room 402 on telemetry. Patient chart and belongings transferred with patient. Vitals WNL, patient family notified of transfer.

## 2021-02-01 ENCOUNTER — APPOINTMENT (OUTPATIENT)
Dept: GENERAL RADIOLOGY | Age: 66
DRG: 377 | End: 2021-02-01
Attending: INTERNAL MEDICINE
Payer: MEDICARE

## 2021-02-01 LAB
ARTERIAL PATENCY WRIST A: ABNORMAL
BASE EXCESS BLDA CALC-SCNC: 12.5 MMOL/L (ref 0–2)
BDY SITE: ABNORMAL
EPAP/CPAP/PEEP, PAPEEP: 0
FIO2 ON VENT: 40 %
GAS FLOW.O2 O2 DELIVERY SYS: 40 L/MIN
GLUCOSE BLD STRIP.AUTO-MCNC: 125 MG/DL (ref 65–100)
GLUCOSE BLD STRIP.AUTO-MCNC: 139 MG/DL (ref 65–100)
GLUCOSE BLD STRIP.AUTO-MCNC: 204 MG/DL (ref 65–100)
GLUCOSE BLD STRIP.AUTO-MCNC: 230 MG/DL (ref 65–100)
HCO3 BLDA-SCNC: 36 MMOL/L (ref 22–26)
PCO2 BLDA: 46 MMHG (ref 35–45)
PERFORMED BY, TECHID: ABNORMAL
PH BLDA: 7.51 [PH] (ref 7.35–7.45)
PO2 BLDA: 56 MMHG (ref 75–100)
SAO2 % BLD: 92 %
SAO2% DEVICE SAO2% SENSOR NAME: ABNORMAL
SPECIMEN SITE: ABNORMAL

## 2021-02-01 PROCEDURE — 82803 BLOOD GASES ANY COMBINATION: CPT

## 2021-02-01 PROCEDURE — 74011250636 HC RX REV CODE- 250/636: Performed by: INTERNAL MEDICINE

## 2021-02-01 PROCEDURE — 74011250637 HC RX REV CODE- 250/637: Performed by: INTERNAL MEDICINE

## 2021-02-01 PROCEDURE — 65270000029 HC RM PRIVATE

## 2021-02-01 PROCEDURE — 71045 X-RAY EXAM CHEST 1 VIEW: CPT

## 2021-02-01 PROCEDURE — 94760 N-INVAS EAR/PLS OXIMETRY 1: CPT

## 2021-02-01 PROCEDURE — 74011000250 HC RX REV CODE- 250: Performed by: INTERNAL MEDICINE

## 2021-02-01 PROCEDURE — 74011636637 HC RX REV CODE- 636/637: Performed by: NURSE PRACTITIONER

## 2021-02-01 PROCEDURE — 74011000258 HC RX REV CODE- 258: Performed by: INTERNAL MEDICINE

## 2021-02-01 PROCEDURE — 77010033711 HC HIGH FLOW OXYGEN

## 2021-02-01 PROCEDURE — 74011250637 HC RX REV CODE- 250/637: Performed by: NURSE PRACTITIONER

## 2021-02-01 PROCEDURE — 94640 AIRWAY INHALATION TREATMENT: CPT

## 2021-02-01 PROCEDURE — 82962 GLUCOSE BLOOD TEST: CPT

## 2021-02-01 RX ADMIN — BUDESONIDE 500 MCG: 0.5 INHALANT RESPIRATORY (INHALATION) at 19:09

## 2021-02-01 RX ADMIN — PANTOPRAZOLE SODIUM 40 MG: 40 TABLET, DELAYED RELEASE ORAL at 08:45

## 2021-02-01 RX ADMIN — BUDESONIDE 500 MCG: 0.5 INHALANT RESPIRATORY (INHALATION) at 10:16

## 2021-02-01 RX ADMIN — SPIRONOLACTONE 25 MG: 25 TABLET ORAL at 08:45

## 2021-02-01 RX ADMIN — IPRATROPIUM BROMIDE AND ALBUTEROL SULFATE 3 ML: .5; 3 SOLUTION RESPIRATORY (INHALATION) at 19:09

## 2021-02-01 RX ADMIN — IPRATROPIUM BROMIDE AND ALBUTEROL SULFATE 3 ML: .5; 3 SOLUTION RESPIRATORY (INHALATION) at 01:41

## 2021-02-01 RX ADMIN — INSULIN LISPRO 4 UNITS: 100 INJECTION, SOLUTION INTRAVENOUS; SUBCUTANEOUS at 21:00

## 2021-02-01 RX ADMIN — CARVEDILOL 3.12 MG: 3.12 TABLET, FILM COATED ORAL at 17:46

## 2021-02-01 RX ADMIN — PIPERACILLIN SODIUM AND TAZOBACTAM SODIUM 3.38 G: 3; .375 INJECTION, POWDER, LYOPHILIZED, FOR SOLUTION INTRAVENOUS at 21:00

## 2021-02-01 RX ADMIN — LISINOPRIL 5 MG: 5 TABLET ORAL at 08:45

## 2021-02-01 RX ADMIN — IPRATROPIUM BROMIDE AND ALBUTEROL SULFATE 3 ML: .5; 3 SOLUTION RESPIRATORY (INHALATION) at 10:16

## 2021-02-01 RX ADMIN — PIPERACILLIN SODIUM AND TAZOBACTAM SODIUM 3.38 G: 3; .375 INJECTION, POWDER, LYOPHILIZED, FOR SOLUTION INTRAVENOUS at 03:09

## 2021-02-01 RX ADMIN — FUROSEMIDE 40 MG: 10 INJECTION, SOLUTION INTRAMUSCULAR; INTRAVENOUS at 08:45

## 2021-02-01 RX ADMIN — PIPERACILLIN SODIUM AND TAZOBACTAM SODIUM 3.38 G: 3; .375 INJECTION, POWDER, LYOPHILIZED, FOR SOLUTION INTRAVENOUS at 10:10

## 2021-02-01 RX ADMIN — IPRATROPIUM BROMIDE AND ALBUTEROL SULFATE 3 ML: .5; 3 SOLUTION RESPIRATORY (INHALATION) at 15:14

## 2021-02-01 RX ADMIN — SPIRONOLACTONE 25 MG: 25 TABLET ORAL at 21:01

## 2021-02-01 RX ADMIN — INSULIN LISPRO 4 UNITS: 100 INJECTION, SOLUTION INTRAVENOUS; SUBCUTANEOUS at 17:46

## 2021-02-01 RX ADMIN — CARVEDILOL 3.12 MG: 3.12 TABLET, FILM COATED ORAL at 08:45

## 2021-02-01 RX ADMIN — FUROSEMIDE 40 MG: 10 INJECTION, SOLUTION INTRAMUSCULAR; INTRAVENOUS at 21:00

## 2021-02-01 NOTE — PROGRESS NOTES
IMPRESSION:      1. Acute hypoxic hypercapnic respiratory failure  2. Acute pulmonary edema  3. Acute GI bleed  4. Sepsis  5. History of aortic stenosis coronary artery disease  6. Pericardial and pleural effusion  7. History of COVID-19 pneumonia  8. Symptomatic anemia patient hemoglobin  9. Additional workup outlined below  10. Pt is at high risk of sudden decline and decompensation with life threatening consequenses and continued end organ dysfunction and failure      RECOMMENDATIONS/PLAN:     1. She was extubated on 1/29 but again late evening she was short of breath hypoxic started on noninvasive ventilator BiPAP machine, blood transfusion and went again into pulmonary edema Lasix given she is now on high flow nasal cannula alert awake talking on 45% FiO2  2. Patient chest x-ray shows CHF pulmonary edema will start patient on Lasix with given history of aortic stenosis and congestive heart failure coronary artery disease three-vessel disease he was awaiting for valve replacement surgery  3. X-ray shows pulmonary edema improved we will continue high-dose Lasix  4. Patient on antibiotic Zosyn panculture  5. Continue with the nebulizer treatment and will change Symbicort to budesonide  6. Symptomatic anemia  7. IV vasopressors for circulatory shock refractory to fluids to maintain SBP> 90   8. Transfuse prn to maintain Hgb > 7  9. Labs to follow electrolytes, renal function and and blood counts  10. Bronchial hygiene with respiratory therapy techniques, bronchodilators  11. Pt needs IV fluids with additives and Drug therapy requiring intensive monitoring for toxicity  12. Prescription drug management with home med reconciliation reviewed  13.  DVT, SUP prophylaxis       [x] High complexity decision making was performed  [x] See my orders for details  HPI  55-year-old lady came in because of generalized weakness and possible GI bleed hemoglobin was 5.4 on admission and patient has dark stool at home cardiologist and gastroenterologist was seen the patient and patient was hospitalized on January 1 because of COVID-19 infection she also has congestive heart failure ejection fraction of 41% coronary artery disease and aortic stenosis history of aortic valve replacement. Yesterday patient was given enema and he developed shortness of breath hypoxia and wheezing she was put on supplemental oxygen patient became unresponsive tachycardic than patient had a rapid response transferred to ICU now intubated on ventilator. Was in flash pulmonary edema received Lasix she was having pink frothy sputum so not critical care consult was called as she is hemodynamically unstable. PMH:  has a past medical history of Acute gastrointestinal hemorrhage (8/28/2020), Allergic rhinitis (8/28/2020), Anemia (8/28/2020), Benign essential hypertension (6/25/2020), Body mass index 30.0-30.9, adult (6/25/2020), CAD (coronary artery disease) (8/28/2020), Foot callus (8/28/2020), GERD (gastroesophageal reflux disease) (6/25/2020), Heart murmur (6/25/2020), colonoscopy (01/01/2015), Left bundle branch block (8/28/2020), Microalbuminuria due to type 2 diabetes mellitus ([address] (8/28/2020), Mixed hyperlipidemia (6/25/2020), Morbid obesity ([address] (8/28/2020), Tobacco dependence syndrome (6/25/2020), and Type II diabetes mellitus, uncontrolled ([address] (6/25/2020). PSH:   has a past surgical history that includes hx colonoscopy (2015). FHX: family history includes Cancer in her mother and sister; Hypertension in her father. SHX:  reports that she quit smoking about 7 years ago. She smoked 0.50 packs per day. She has never used smokeless tobacco. She reports previous alcohol use. She reports that she does not use drugs.     ALL:   Allergies   Allergen Reactions    Aspirin Unknown (comments)     hemorraging - ended up in the hospital        MEDS:   [x] Reviewed - As Below   [] Not reviewed    Current Facility-Administered Medications   Medication    benzocaine-menthoL (CEPACOL) lozenge 1 Lozenge    spironolactone (ALDACTONE) tablet 25 mg    0.9% sodium chloride infusion 250 mL    furosemide (LASIX) injection 40 mg    lisinopriL (PRINIVIL, ZESTRIL) tablet 5 mg    budesonide (PULMICORT) 500 mcg/2 ml nebulizer suspension    albuterol-ipratropium (DUO-NEB) 2.5 MG-0.5 MG/3 ML    piperacillin-tazobactam (ZOSYN) 3.375 g in 0.9% sodium chloride (MBP/ADV) 100 mL MBP    pantoprazole (PROTONIX) tablet 40 mg    carvediloL (COREG) tablet 3.125 mg    0.9% sodium chloride infusion    LORazepam (ATIVAN) tablet 1 mg    acetaminophen (TYLENOL) tablet 650 mg    ondansetron (ZOFRAN ODT) tablet 4 mg    ondansetron (ZOFRAN) injection 4 mg    insulin lispro (HUMALOG) injection    glucose chewable tablet 16 g    glucagon (GLUCAGEN) injection 1 mg    dextrose (D50W) injection syrg 12.5-25 g      MAR reviewed and pertinent medications noted or modified as needed   Current Facility-Administered Medications   Medication    benzocaine-menthoL (CEPACOL) lozenge 1 Lozenge    spironolactone (ALDACTONE) tablet 25 mg    0.9% sodium chloride infusion 250 mL    furosemide (LASIX) injection 40 mg    lisinopriL (PRINIVIL, ZESTRIL) tablet 5 mg    budesonide (PULMICORT) 500 mcg/2 ml nebulizer suspension    albuterol-ipratropium (DUO-NEB) 2.5 MG-0.5 MG/3 ML    piperacillin-tazobactam (ZOSYN) 3.375 g in 0.9% sodium chloride (MBP/ADV) 100 mL MBP    pantoprazole (PROTONIX) tablet 40 mg    carvediloL (COREG) tablet 3.125 mg    0.9% sodium chloride infusion    LORazepam (ATIVAN) tablet 1 mg    acetaminophen (TYLENOL) tablet 650 mg    ondansetron (ZOFRAN ODT) tablet 4 mg    ondansetron (ZOFRAN) injection 4 mg    insulin lispro (HUMALOG) injection    glucose chewable tablet 16 g    glucagon (GLUCAGEN) injection 1 mg    dextrose (D50W) injection syrg 12.5-25 g      PMH:  has a past medical history of Acute gastrointestinal hemorrhage (8/28/2020), Allergic rhinitis (2020), Anemia (2020), Benign essential hypertension (2020), Body mass index 30.0-30.9, adult (2020), CAD (coronary artery disease) (2020), Foot callus (2020), GERD (gastroesophageal reflux disease) (2020), Heart murmur (2020), colonoscopy (2015), Left bundle branch block (2020), Microalbuminuria due to type 2 diabetes mellitus ([facility] (2020), Mixed hyperlipidemia (2020), Morbid obesity ([facility] (2020), Tobacco dependence syndrome (2020), and Type II diabetes mellitus, uncontrolled ([facility] (2020). PSH:   has a past surgical history that includes hx colonoscopy (). FHX: family history includes Cancer in her mother and sister; Hypertension in her father. SHX:  reports that she quit smoking about 7 years ago. She smoked 0.50 packs per day. She has never used smokeless tobacco. She reports previous alcohol use. She reports that she does not use drugs. ROS:  Unable to obtain intubated sedated on ventilator    Hemodynamics:    CO:    CI:    CVP:    SVR:   PAP Systolic:    PAP Diastolic:    PVR:    BC33:        Ventilator Settings:      Mode Rate TV Press PEEP FiO2 PIP Min.  Vent   Spontaneous, Pressure support    50 ml 15 cm H2O  8 cm H20 40 %  32 cm H2O  15.9 l/min        Vital Signs: Telemetry:    normal sinus rhythm Intake/Output:   Visit Vitals  BP (!) 148/84   Pulse 91   Temp 97.5 °F (36.4 °C)   Resp 16   Ht 5' 5\" (1.651 m)   Wt 90.7 kg (200 lb)   SpO2 95%   BMI 33.28 kg/m²       Temp (24hrs), Av °F (36.7 °C), Min:97.5 °F (36.4 °C), Max:98.7 °F (37.1 °C)        O2 Device: Hi flow nasal cannula O2 Flow Rate (L/min): 40 l/min       Wt Readings from Last 4 Encounters:   21 90.7 kg (200 lb)   21 90.7 kg (200 lb)   20 90.7 kg (200 lb)   20 97.4 kg (214 lb 11.7 oz)          Intake/Output Summary (Last 24 hours) at 2021 1004  Last data filed at 2021 1427  Gross per 24 hour   Intake    Output 1450 ml   Net -1450 ml       Last shift:      No intake/output data recorded. Last 3 shifts: 01/30 1901 - 02/01 0700  In: -   Out: 2650 [Urine:2650]       Physical Exam:     General: On noninvasive ventilator BiPAP machine  HEENT: NCAT, poor dentition, lips and mucosa dry  Eyes: anicteric; conjunctiva clear  Neck: no nodes, no cuff leak, trach midline; no accessory MM use. Chest: no deformity,   Cardiac: R regular; no murmur;   Lungs: distant breath sounds; no wheezes  Abd: soft, NT, hypoactive BS  Ext: no edema; no joint swelling;  No clubbing  : NO casas, clear urine  Neuro: sedated;   Psych- no agitation, oriented to person;    Skin: warm, dry, no cyanosis;   Pulses: 1-2+ Bilateral pedal, radial  Capillary: brisk; pale      DATA:    MAR reviewed and pertinent medications noted or modified as needed  MEDS:   Current Facility-Administered Medications   Medication    benzocaine-menthoL (CEPACOL) lozenge 1 Lozenge    spironolactone (ALDACTONE) tablet 25 mg    0.9% sodium chloride infusion 250 mL    furosemide (LASIX) injection 40 mg    lisinopriL (PRINIVIL, ZESTRIL) tablet 5 mg    budesonide (PULMICORT) 500 mcg/2 ml nebulizer suspension    albuterol-ipratropium (DUO-NEB) 2.5 MG-0.5 MG/3 ML    piperacillin-tazobactam (ZOSYN) 3.375 g in 0.9% sodium chloride (MBP/ADV) 100 mL MBP    pantoprazole (PROTONIX) tablet 40 mg    carvediloL (COREG) tablet 3.125 mg    0.9% sodium chloride infusion    LORazepam (ATIVAN) tablet 1 mg    acetaminophen (TYLENOL) tablet 650 mg    ondansetron (ZOFRAN ODT) tablet 4 mg    ondansetron (ZOFRAN) injection 4 mg    insulin lispro (HUMALOG) injection    glucose chewable tablet 16 g    glucagon (GLUCAGEN) injection 1 mg    dextrose (D50W) injection syrg 12.5-25 g        Labs:    Recent Labs     01/31/21  1230 01/30/21  0315   WBC 10.0 16.9*   HGB 10.3* 11.8   * 455*     Recent Labs     01/31/21  1230 01/30/21  0315 01/29/21  1854    137 137   K 3.1* 4.0 3.1*   CL 97 100 100 CO2 33* 30 30   * 181* 98   BUN 13 9 11   CREA 0.79 0.59 0.47*   CA 8.8 8.5 8.1*   MG 1.7  --   --      Recent Labs     02/01/21  0400 01/31/21  0350 01/30/21  0305   PH 7.51* 7.50* 7.36   PCO2 46* 45 50*   PO2 56* 58* 81   HCO3 36* 34* 27*   FIO2 40.0 45.0 70.0     No results for input(s): CPK, CKNDX, TROIQ in the last 72 hours. No lab exists for component: CPKMB  No results found for: BNPP, BNP   Lab Results   Component Value Date/Time    Culture result: Scant Normal respiratory duane 01/28/2021 09:43 AM    Culture result: No Growth (<1000 cfu/mL) 01/28/2021 01:00 AM     Lab Results   Component Value Date/Time    TSH, External 2.420 01/16/2019        Imaging:    Results from Hospital Encounter encounter on 01/25/21   XR CHEST PORT    Narrative XR CHEST PORT    Comparison: Chest radiograph dated January 30, 2021      Impression FINDINGS/IMPRESSION:  Radiograph is limited by patient's body habitus and AP portable technique. Lungs remain hypoinflated with bibasilar atelectasis and probable small  bilateral pleural effusions. Cardiac silhouette stable in size. No radiographically apparent pneumothorax. Mild improvement in bilateral airspace opacity, this is most confluent at the  right lung base. This may represent multilobar pneumonia or edema, or perhaps a  combination of each. Results from [place] encounter on 01/25/21   CTA CHEST W OR W WO CONT    Narrative Exam: CTA chest with intravenous contrast    Comparison: Plain radiograph 1/25/2021. CT 1/2/2021. Technique: During intravenous contrast administration (100 cc [id number]), axial  sections were obtained through the chest. Coronal and sagittal reconstructions  were generated. Maximal intensity projection images were generated.     Dose reduction: All CT scans at this facility are performed using dose reduction  optimization techniques as appropriate to perform the exam including the  following: automated exposure control, adjustments of the mA and/or kV according  to patient size, or use of iterative reconstruction technique. Findings: The examination is limited by motion degradation and by adduction of  the patient's upper extremities, which renders scattering and attenuation of the  diagnostic x-ray beam. Nevertheless, there is no demonstrated pulmonary embolus. Endotracheal tube tip in the middle third of the trachea. Distal trachea and  main bronchi poorly expanded. There is calcific atherosclerotic disease of a  nonaneurysmal thoracic aorta, which manifests no evidence of intimal  incompetence. Calcific atherosclerotic coronary arterial disease is present. Prominent bilateral mediastinal and hilar lymph nodes, increased compared to  1/2/2021, likely inflammatory. Small to moderate pericardial effusion of near  water attenuation fluid. It is 2 cm greatest axial thickness. Small bilateral  pleural effusions. Bilateral dependent airspace disease compatible with  pneumonia. Interstitial disease also present, compatible with pulmonary edema. Included abdomen indicates a stable 2 cm left adrenal nodule of water  attenuation, compatible with a benign adenoma. No axillary or supraclavicular  adenopathy or mass. Visualized thoracic bone scaffolding unremarkable. Impression Impression:  1. No evidence of pulmonary embolus. 2. Bilateral dependent airspace disease compatible with pneumonia. 3. Diffuse pulmonary interstitial disease compatible with pulmonary edema. 4. Bilateral pleural effusions. 5. Coronary artery calcific atherosclerotic disease. 6. Small to moderate pericardial effusion. 7. Distal trachea and main bronchi are poorly expanded.

## 2021-02-01 NOTE — PROGRESS NOTES
Hospitalist Progress Note             Daily Progress Note: 2/1/2021    65F, H/o CHF unknown EF with CAD s/p stent with aortic stenosis with UGIB with ABLA. On 12/31 , patient became obtunded requiring increased amounts of oxygen, was intubated. XR showed, pulmonary edema, started on lasix    Thought process: pulmonary edema s/t IVF in the setting of AS. Aspiration and COVID also a concern    On 1/29 she was extubated    EGD showed gastritis    Currently on high flow- given the respiratory failure s/t AS and pulmonary edema        Review of Systems:   Review of Systems   Constitutional: Negative. HENT: Negative. Eyes: Negative. Respiratory: Positive for shortness of breath. Cardiovascular: Negative. Gastrointestinal: Negative. Genitourinary: Negative. Musculoskeletal: Negative. Skin: Negative. Neurological: Positive for weakness. Endo/Heme/Allergies: Negative. Psychiatric/Behavioral: Negative. Objective:   Objective      Vitals:  Patient Vitals for the past 12 hrs:   Temp Pulse Resp BP SpO2   02/01/21 1536 98.4 °F (36.9 °C) 91 19 (!) 142/72 95 %   02/01/21 1514     95 %   02/01/21 1016     95 %   02/01/21 0828 97.5 °F (36.4 °C) 91 16 (!) 148/84 95 %        Physical Exam:  Physical Exam  Vitals signs and nursing note reviewed. Constitutional:       Appearance: Normal appearance. HENT:      Head: Normocephalic. Nose: Nose normal.      Mouth/Throat:      Mouth: Mucous membranes are moist.   Eyes:      Extraocular Movements: Extraocular movements intact. Neck:      Musculoskeletal: Normal range of motion. Cardiovascular:      Rate and Rhythm: Tachycardia present. Pulses: Normal pulses. Heart sounds: Normal heart sounds. Pulmonary:      Effort: Pulmonary effort is normal.      Comments: Expiratory wheeze, diminished air entry, 4L NC  Abdominal:      General: Bowel sounds are normal.      Palpations: Abdomen is soft.    Musculoskeletal: Normal range of motion. Skin:     General: Skin is warm and dry. Capillary Refill: Capillary refill takes 2 to 3 seconds. Neurological:      Mental Status: She is alert and oriented to person, place, and time. Motor: Weakness present.    Psychiatric:         Mood and Affect: Mood normal.         Behavior: Behavior normal.          Lab Results:  Recent Results (from the past 24 hour(s))   GLUCOSE, POC    Collection Time: 01/31/21  8:21 PM   Result Value Ref Range    Glucose (POC) 204 (H) 65 - 100 mg/dL    Performed by Monse Bo    BLOOD GAS, ARTERIAL    Collection Time: 02/01/21  4:00 AM   Result Value Ref Range    pH 7.51 (H) 7.35 - 7.45      PCO2 46 (H) 35 - 45 mmHg    PO2 56 (L) 75 - 100 mmHg    O2 SAT 92 (L) >95 %    BICARBONATE 36 (H) 22 - 26 mmol/L    BASE EXCESS 12.5 (H) 0 - 2 mmol/L    O2 METHOD High Flow O2      O2 FLOW RATE 40 L/min    FIO2 40.0 %    EPAP/CPAP/PEEP 0      Sample source Arterial      SITE Right Radial      ABDULAZIZ'S TEST PASS     GLUCOSE, POC    Collection Time: 02/01/21  8:18 AM   Result Value Ref Range    Glucose (POC) 125 (H) 65 - 100 mg/dL    Performed by [address] Jenny[address] Hassan, POC    Collection Time: 02/01/21 10:54 AM   Result Value Ref Range    Glucose (POC) 139 (H) 65 - 100 mg/dL    Performed by Landen Hassan, POC    Collection Time: 02/01/21  3:33 PM   Result Value Ref Range    Glucose (POC) 204 (H) 65 - 100 mg/dL    Performed by Stuart Hopper           Diagnostic Images:  CT Results  (Last 48 hours)    None          Current Medications:    Current Facility-Administered Medications:     benzocaine-menthoL (CEPACOL) lozenge 1 Lozenge, 1 Lozenge, Mucous Membrane, PRN, Tavon Craig MD, 1 Lozenge at 01/31/21 1808    spironolactone (ALDACTONE) tablet 25 mg, 25 mg, Oral, BID, Jenelle Peter MD, 25 mg at 02/01/21 0845    0.9% sodium chloride infusion 250 mL, 250 mL, IntraVENous, PRN, Dario Edwards MD    furosemide (LASIX) injection 40 mg, 40 mg, IntraVENous, Q12H, Raquel Quigley MD, 40 mg at 02/01/21 0845    lisinopriL (PRINIVIL, ZESTRIL) tablet 5 mg, 5 mg, Oral, DAILY, Raquel Quigley MD, 5 mg at 02/01/21 0845    budesonide (PULMICORT) 500 mcg/2 ml nebulizer suspension, 500 mcg, Nebulization, BID RT, Dario Edwards MD, 500 mcg at 02/01/21 1016    albuterol-ipratropium (DUO-NEB) 2.5 MG-0.5 MG/3 ML, 3 mL, Nebulization, Q6H RT, Dario Edwards MD, 3 mL at 02/01/21 1514    piperacillin-tazobactam (ZOSYN) 3.375 g in 0.9% sodium chloride (MBP/ADV) 100 mL MBP, 3.375 g, IntraVENous, Q8H, Dario Edwards MD, Last Rate: 25 mL/hr at 02/01/21 1010, 3.375 g at 02/01/21 1010    pantoprazole (PROTONIX) tablet 40 mg, 40 mg, Oral, ACB, Adeline Mancia MD, 40 mg at 02/01/21 0845    carvediloL (COREG) tablet 3.125 mg, 3.125 mg, Oral, BID WITH MEALS, Jeanette Alicea MD, 3.125 mg at 02/01/21 0845    0.9% sodium chloride infusion, 50 mL/hr, IntraVENous, CONTINUOUS, Ed Fuentes, NP, Last Rate: 50 mL/hr at 01/28/21 0925, 50 mL/hr at 01/28/21 2760    LORazepam (ATIVAN) tablet 1 mg, 1 mg, Oral, QHS, Ed Fuentes, NP, 1 mg at 01/31/21 2136    acetaminophen (TYLENOL) tablet 650 mg, 650 mg, Oral, Q4H PRN, Ed Fuentes, NP, 650 mg at 01/29/21 1649    ondansetron (ZOFRAN ODT) tablet 4 mg, 4 mg, Oral, Q8H PRN, Ed Fuentes, NP    ondansetron [facility]) injection 4 mg, 4 mg, IntraVENous, Q6H PRN, Ed Fuentes, JOSE, 4 mg at 01/26/21 2258    insulin lispro (HUMALOG) injection, , SubCUTAneous, AC&HS, Ed Fuentes, JOSE, Stopped at 02/01/21 0730    glucose chewable tablet 16 g, 4 Tab, Oral, PRN, Ed Fuentes, NP    glucagon (GLUCAGEN) injection 1 mg, 1 mg, IntraMUSCular, PRN, Ed Fuentes, NP    dextrose (D50W) injection syrg 12.5-25 g, 25-50 mL, IntraVENous, PRN, Ed Fuentes, NP       ASSESSMENT:    (1) Acute encephalopathy: s/t GCS 10. Septic and pulmonary edea.  Treat for sepsis with abx, lasix    (2) Acute hypoxic respiratory failure: EXTUBATED. Lasix for pulmonary edema and zosyn for possible infection, COVID r/o. On high flow    (3) sepsis : same as 2    (4) CAD with aortic stenosis: complicates all aspects of care    (5) UGIB with ABLA: EGD today, hb 8.4, EGD showed gastritic. stable    (6) anemia with ABLA : s/t #5    (7) DMII: SSI, accu < 180    (8) HTN: hold all BP meds    (9) CHFp EF: resume lasix, cardiology        Full Code  Dvt Prophylaxis  GI Prophylaxis  Home medications reviewed and reconciled      On high flow, likely SNF/rehab once off of high flow        Care Plan discussed with:  Interdisciplinary team      Art Verma MD

## 2021-02-01 NOTE — PROGRESS NOTES
Bedside shift change report given to LISANDRO DONALDSON (oncoming nurse) by Jason Merida RN (offgoing nurse). Report included the following information SBAR, Kardex, Intake/Output, MAR and Recent Results.

## 2021-02-02 PROBLEM — I35.0 AORTIC STENOSIS: Status: ACTIVE | Noted: 2021-02-02

## 2021-02-02 LAB
GLUCOSE BLD STRIP.AUTO-MCNC: 124 MG/DL (ref 65–100)
GLUCOSE BLD STRIP.AUTO-MCNC: 166 MG/DL (ref 65–100)
GLUCOSE BLD STRIP.AUTO-MCNC: 183 MG/DL (ref 65–100)
GLUCOSE BLD STRIP.AUTO-MCNC: 277 MG/DL (ref 65–100)
PERFORMED BY, TECHID: ABNORMAL

## 2021-02-02 PROCEDURE — 97161 PT EVAL LOW COMPLEX 20 MIN: CPT

## 2021-02-02 PROCEDURE — 94640 AIRWAY INHALATION TREATMENT: CPT

## 2021-02-02 PROCEDURE — 74011250636 HC RX REV CODE- 250/636: Performed by: NURSE PRACTITIONER

## 2021-02-02 PROCEDURE — 97165 OT EVAL LOW COMPLEX 30 MIN: CPT

## 2021-02-02 PROCEDURE — 65270000029 HC RM PRIVATE

## 2021-02-02 PROCEDURE — 74011250637 HC RX REV CODE- 250/637: Performed by: INTERNAL MEDICINE

## 2021-02-02 PROCEDURE — 94760 N-INVAS EAR/PLS OXIMETRY 1: CPT

## 2021-02-02 PROCEDURE — 74011000250 HC RX REV CODE- 250: Performed by: INTERNAL MEDICINE

## 2021-02-02 PROCEDURE — 82962 GLUCOSE BLOOD TEST: CPT

## 2021-02-02 PROCEDURE — 77010033711 HC HIGH FLOW OXYGEN

## 2021-02-02 PROCEDURE — 74011636637 HC RX REV CODE- 636/637: Performed by: NURSE PRACTITIONER

## 2021-02-02 PROCEDURE — 97530 THERAPEUTIC ACTIVITIES: CPT

## 2021-02-02 PROCEDURE — 74011250637 HC RX REV CODE- 250/637: Performed by: HOSPITALIST

## 2021-02-02 PROCEDURE — 74011250636 HC RX REV CODE- 250/636: Performed by: INTERNAL MEDICINE

## 2021-02-02 PROCEDURE — 74011000258 HC RX REV CODE- 258: Performed by: INTERNAL MEDICINE

## 2021-02-02 RX ORDER — POTASSIUM CHLORIDE 1.5 G/1.77G
40 POWDER, FOR SOLUTION ORAL
Status: COMPLETED | OUTPATIENT
Start: 2021-02-02 | End: 2021-02-02

## 2021-02-02 RX ADMIN — POTASSIUM CHLORIDE 40 MEQ: 1.5 POWDER, FOR SOLUTION ORAL at 14:35

## 2021-02-02 RX ADMIN — SPIRONOLACTONE 25 MG: 25 TABLET ORAL at 20:57

## 2021-02-02 RX ADMIN — IPRATROPIUM BROMIDE AND ALBUTEROL SULFATE 3 ML: .5; 3 SOLUTION RESPIRATORY (INHALATION) at 09:34

## 2021-02-02 RX ADMIN — BUDESONIDE 500 MCG: 0.5 INHALANT RESPIRATORY (INHALATION) at 09:34

## 2021-02-02 RX ADMIN — SPIRONOLACTONE 25 MG: 25 TABLET ORAL at 10:00

## 2021-02-02 RX ADMIN — IPRATROPIUM BROMIDE AND ALBUTEROL SULFATE 3 ML: .5; 3 SOLUTION RESPIRATORY (INHALATION) at 19:47

## 2021-02-02 RX ADMIN — PIPERACILLIN SODIUM AND TAZOBACTAM SODIUM 3.38 G: 3; .375 INJECTION, POWDER, LYOPHILIZED, FOR SOLUTION INTRAVENOUS at 02:39

## 2021-02-02 RX ADMIN — FUROSEMIDE 40 MG: 10 INJECTION, SOLUTION INTRAMUSCULAR; INTRAVENOUS at 20:57

## 2021-02-02 RX ADMIN — IPRATROPIUM BROMIDE AND ALBUTEROL SULFATE 3 ML: .5; 3 SOLUTION RESPIRATORY (INHALATION) at 02:55

## 2021-02-02 RX ADMIN — SODIUM CHLORIDE 50 ML/HR: 9 INJECTION, SOLUTION INTRAVENOUS at 20:57

## 2021-02-02 RX ADMIN — PANTOPRAZOLE SODIUM 40 MG: 40 TABLET, DELAYED RELEASE ORAL at 10:00

## 2021-02-02 RX ADMIN — LISINOPRIL 5 MG: 5 TABLET ORAL at 10:00

## 2021-02-02 RX ADMIN — INSULIN LISPRO 4 UNITS: 100 INJECTION, SOLUTION INTRAVENOUS; SUBCUTANEOUS at 17:16

## 2021-02-02 RX ADMIN — PIPERACILLIN SODIUM AND TAZOBACTAM SODIUM 3.38 G: 3; .375 INJECTION, POWDER, LYOPHILIZED, FOR SOLUTION INTRAVENOUS at 20:57

## 2021-02-02 RX ADMIN — IPRATROPIUM BROMIDE AND ALBUTEROL SULFATE 3 ML: .5; 3 SOLUTION RESPIRATORY (INHALATION) at 15:20

## 2021-02-02 RX ADMIN — PIPERACILLIN SODIUM AND TAZOBACTAM SODIUM 3.38 G: 3; .375 INJECTION, POWDER, LYOPHILIZED, FOR SOLUTION INTRAVENOUS at 13:32

## 2021-02-02 RX ADMIN — INSULIN LISPRO 2 UNITS: 100 INJECTION, SOLUTION INTRAVENOUS; SUBCUTANEOUS at 13:36

## 2021-02-02 RX ADMIN — CARVEDILOL 3.12 MG: 3.12 TABLET, FILM COATED ORAL at 17:15

## 2021-02-02 RX ADMIN — CARVEDILOL 3.12 MG: 3.12 TABLET, FILM COATED ORAL at 10:00

## 2021-02-02 RX ADMIN — FUROSEMIDE 40 MG: 10 INJECTION, SOLUTION INTRAMUSCULAR; INTRAVENOUS at 10:00

## 2021-02-02 RX ADMIN — BUDESONIDE 500 MCG: 0.5 INHALANT RESPIRATORY (INHALATION) at 19:47

## 2021-02-02 RX ADMIN — INSULIN LISPRO 2 UNITS: 100 INJECTION, SOLUTION INTRAVENOUS; SUBCUTANEOUS at 22:09

## 2021-02-02 NOTE — PROGRESS NOTES
Hospitalist Progress Note               Daily Progress Note: 2/2/2021      Subjective:   77year old female with past medical history of systolic heart failure, CAD, and aortic stenosis. She was supposed to have her aortic valve replaced and CABG procedure, but was cancelled due to the current pandemic. admitted on 1/25/2021 for melena. Hemoglobin at that time was 5.4 and given 2 units of PRBCs , on 01/31/2021 hbg was 10.3. Patient was recently hospitalized for COVID-19 infection and discharged home with oxygen. Patient was intubated on 01/28/21 due pulmonary edema and was extubated on 01/29/2021. The patient is seen for follow  up. Today patient sitting up in chair on high-flow NC on FIO2 40%. Patient reports that she feels her breathing and sore throat are much better today.       Medications reviewed  Current Facility-Administered Medications   Medication Dose Route Frequency    benzocaine-menthoL (CEPACOL) lozenge 1 Lozenge  1 Lozenge Mucous Membrane PRN    spironolactone (ALDACTONE) tablet 25 mg  25 mg Oral BID    0.9% sodium chloride infusion 250 mL  250 mL IntraVENous PRN    furosemide (LASIX) injection 40 mg  40 mg IntraVENous Q12H    lisinopriL (PRINIVIL, ZESTRIL) tablet 5 mg  5 mg Oral DAILY    budesonide (PULMICORT) 500 mcg/2 ml nebulizer suspension  500 mcg Nebulization BID RT    albuterol-ipratropium (DUO-NEB) 2.5 MG-0.5 MG/3 ML  3 mL Nebulization Q6H RT    piperacillin-tazobactam (ZOSYN) 3.375 g in 0.9% sodium chloride (MBP/ADV) 100 mL MBP  3.375 g IntraVENous Q8H    pantoprazole (PROTONIX) tablet 40 mg  40 mg Oral ACB    carvediloL (COREG) tablet 3.125 mg  3.125 mg Oral BID WITH MEALS    0.9% sodium chloride infusion  50 mL/hr IntraVENous CONTINUOUS    LORazepam (ATIVAN) tablet 1 mg  1 mg Oral QHS    acetaminophen (TYLENOL) tablet 650 mg  650 mg Oral Q4H PRN    ondansetron (ZOFRAN ODT) tablet 4 mg  4 mg Oral Q8H PRN    ondansetron (ZOFRAN) injection 4 mg  4 mg IntraVENous Q6H PRN  insulin lispro (HUMALOG) injection   SubCUTAneous AC&HS    glucose chewable tablet 16 g  4 Tab Oral PRN    glucagon (GLUCAGEN) injection 1 mg  1 mg IntraMUSCular PRN    dextrose (D50W) injection syrg 12.5-25 g  25-50 mL IntraVENous PRN       Review of Systems:   A comprehensive review of systems was negative except for that written in the HPI. Objective:   Physical Exam:     Visit Vitals  /67   Pulse 72   Temp 98.4 °F (36.9 °C)   Resp 18   Ht 5' 5\" (1.651 m)   Wt 90.7 kg (200 lb)   SpO2 94%   BMI 33.28 kg/m²    O2 Flow Rate (L/min): 40 l/min O2 Device: Heated, Hi flow nasal cannula    Temp (24hrs), Av.2 °F (36.8 °C), Min:97.8 °F (36.6 °C), Max:98.4 °F (36.9 °C)    No intake/output data recorded.  1901 -  0700  In: 240 [P.O.:240]  Out: 4401 [Urine:4400]    PHYSICAL EXAM:  General: Awake and alert  Skin: Extremities and face reveal no rashes. HEENT: Sclerae anicteric. Extra-occular muscles are intact. No oral ulcers. No ENT discharge. The neck is supple. Cardiovascular: Murmur appreciated. Regular rate and rhythm. No gallops, or rubs. PMI nondisplaced. Respiratory: Comfortable breathing with no accessory muscle use. Clear breath sounds with no wheezes, rales, or rhonchi. GI: Abdomen nondistended, soft, and nontender. Normal active bowel sounds. No masses palpable. : Patient has casas, patent, urine clear. Rectal: Deferred   Musculoskeletal: No pitting edema of the lower legs. Extremities have good range of motion. No costovertebral tenderness. Neurological: Gross memory appears intact. Patient is alert and oriented. Power 5/5, Cranial nerves II-XII intact  Psychiatric: Mood appears appropriate with judgement intact.        Data Review:       Recent Days:  Recent Labs     21  1230   WBC 10.0   HGB 10.3*   HCT 34.0*   *     Recent Labs     21  1230      K 3.1*   CL 97   CO2 33*   *   BUN 13   CREA 0.79   CA 8.8   MG 1.7     Recent Labs 02/01/21  0400 01/31/21  0350   PH 7.51* 7.50*   PCO2 46* 45   PO2 56* 58*   HCO3 36* 34*   FIO2 40.0 45.0       CBC:   Lab Results   Component Value Date/Time    WBC 10.0 01/31/2021 12:30 PM    RBC 4.41 01/31/2021 12:30 PM    HGB 10.3 (L) 01/31/2021 12:30 PM    HCT 34.0 (L) 01/31/2021 12:30 PM    PLATELET 997 (H) 18/10/7873 12:30 PM    HGB, EXTERNAL 14.6 11/25/2019    Hct, External 42.2 11/25/2019     BMP:   Lab Results   Component Value Date/Time    Glucose 194 (H) 01/31/2021 12:30 PM    Sodium 136 01/31/2021 12:30 PM    Potassium 3.1 (L) 01/31/2021 12:30 PM    Chloride 97 01/31/2021 12:30 PM    CO2 33 (H) 01/31/2021 12:30 PM    BUN 13 01/31/2021 12:30 PM    Creatinine 0.79 01/31/2021 12:30 PM    Calcium 8.8 01/31/2021 12:30 PM     CMP:   Lab Results   Component Value Date/Time    Glucose 194 (H) 01/31/2021 12:30 PM    Sodium 136 01/31/2021 12:30 PM    Potassium 3.1 (L) 01/31/2021 12:30 PM    Chloride 97 01/31/2021 12:30 PM    CO2 33 (H) 01/31/2021 12:30 PM    BUN 13 01/31/2021 12:30 PM    Creatinine 0.79 01/31/2021 12:30 PM    Calcium 8.8 01/31/2021 12:30 PM    Anion gap 6 01/31/2021 12:30 PM    BUN/Creatinine ratio 16 01/31/2021 12:30 PM    Alk. phosphatase 44 (L) 01/29/2021 03:30 AM    Protein, total 4.9 (L) 01/29/2021 03:30 AM    Albumin 2.0 (L) 01/29/2021 03:30 AM    Globulin 2.9 01/29/2021 03:30 AM    A-G Ratio 0.7 (L) 01/29/2021 03:30 AM     Coagulation:   Lab Results   Component Value Date/Time    Prothrombin time 14.4 01/28/2021 02:30 AM    INR 1.1 01/28/2021 02:30 AM    aPTT 28.6 01/28/2021 02:30 AM     ABGs:   Lab Results   Component Value Date/Time    pH 7.51 (H) 02/01/2021 04:00 AM    PO2 56 (L) 02/01/2021 04:00 AM    PCO2 46 (H) 02/01/2021 04:00 AM    BICARBONATE 36 (H) 02/01/2021 04:00 AM    BASE EXCESS 12.5 (H) 02/01/2021 04:00 AM       XR CHEST PORT   Final Result   No significant interval change.       XR CHEST PORT   Final Result   FINDINGS/IMPRESSION:   Radiograph is limited by patient's body habitus and AP portable technique. Lungs remain hypoinflated with bibasilar atelectasis and probable small   bilateral pleural effusions. Cardiac silhouette stable in size. No radiographically apparent pneumothorax. Mild improvement in bilateral airspace opacity, this is most confluent at the   right lung base. This may represent multilobar pneumonia or edema, or perhaps a   combination of each. XR CHEST PORT   Final Result   FINDINGS/IMPRESSION:   Interval extubation and removal of enteric tube. Lungs similarly inflated. Cardiac silhouette stable in size. No radiographically   apparent pneumothorax. Persistent patchy bilateral airspace opacity. This may represent multilobar   pneumonia or edema, or perhaps a combination of each. XR CHEST PORT   Final Result      CT HEAD WO CONT   Final Result   No acute or active intracranial process. CTA CHEST W OR W WO CONT   Final Result   Impression:   1. No evidence of pulmonary embolus. 2. Bilateral dependent airspace disease compatible with pneumonia. 3. Diffuse pulmonary interstitial disease compatible with pulmonary edema. 4. Bilateral pleural effusions. 5. Coronary artery calcific atherosclerotic disease. 6. Small to moderate pericardial effusion. 7. Distal trachea and main bronchi are poorly expanded. XR CHEST PORT   Final Result   Impression:    1. Apparent satisfactory tracheal intubation. 2. Compatible with pneumonia. 3. Compatible with pulmonary interstitial edema. XR CHEST PORT   Final Result   Minimal residual right basilar opacity with resolved left basilar   opacity.                  Assessment/     Problem List:  Hospital Problems  Date Reviewed: 1/27/2021          Codes Class Noted POA    * (Principal) GI bleed ICD-10-CM: K92.2  ICD-9-CM: 578.9  1/25/2021 Unknown        Acute respiratory failure with hypoxia [facility]) ICD-10-CM: J96.01  ICD-9-CM: 518.81  1/1/2021 Yes Aortic stenosis ICD-10-CM: I35.0  ICD-9-CM: 424.1  2/2/2021 Unknown        COPD exacerbation ([facility] ICD-10-CM: J44.1  ICD-9-CM: 491.21  12/13/2020 Yes        CAD (coronary artery disease) ICD-10-CM: I25.10  ICD-9-CM: 414.00  8/28/2020 Yes        Morbid obesity ([facility] ICD-10-CM: E66.01  ICD-9-CM: 278.01  8/28/2020 Yes        Benign essential hypertension ICD-10-CM: I10  ICD-9-CM: 401.1  6/25/2020 Yes        Type II diabetes mellitus, uncontrolled ([facility] ICD-10-CM: E11.65  ICD-9-CM: 250.02  6/25/2020 Yes        GERD (gastroesophageal reflux disease) ICD-10-CM: K21.9  ICD-9-CM: 530.81  6/25/2020 Yes        Mixed hyperlipidemia ICD-10-CM: E78.2  ICD-9-CM: 272.2  6/25/2020 Yes        Tobacco dependence syndrome ICD-10-CM: Z81.761  ICD-9-CM: 305.1  6/25/2020 Yes                     Plan:  UGIB with ABLA: EGD (01/27/2021) showed acute gastritis without bleeding. Last Hgb (01/31/2021) was 10.3. Will order labs for tomorrow AM and follow up. DMII: SSI, holding home metformin     HTN: home meds: carvedilol and lisinopril    CAD: hx of 3 vessel disease, planned for CABG, cardiology recommendations appreciated     CHFp EF: resume lasix, cardiology recommendations appreciated    Hyperkalemia: Last K on 01/31/2021 3.1. Will order 40 mEq PO. Will order labs for tomorrow AM and follow up.     Acute hypoxic respiratory failure: High-flow NC, FIO2 40%, Lasix for pulmonary edema  COVID negative. Pulmonary recommendations appreciated.     Sepsis: Zoysn for possible infection     Full Code  Dvt Prophylaxis  GI Prophylaxis      Care Plan discussed with: Patient/Family      DAGMAR Stover- Student    Patient was evaluated and examined by me independently. Supervised NP student     Signed:  Guillermo Walton MD

## 2021-02-02 NOTE — PROGRESS NOTES
Comprehensive Nutrition Assessment    Type and Reason for Visit: Reassess(interim)    Nutrition Recommendations/Plan:   Continue Diabetic diet  If PO intakes <75% over 48 hours, add Ensure High Protein daily    Document all PO intakes in EMR    Nutrition Assessment:  Recent dx COVID 1x month ago. COVID negative. Anemia 2/2 GIB pta. S/p multiple units PRBC. EGD (1/27) finding acute gastritis. Enema provided 1/27, patient developed hypoxia and required intubation. Remained intubated, sedated x 2 days. Extubated 1/29. Transferred to [facility] floor 1/31. GI signed off. Overall status improved. OG in place for TF x1 day prior to OG d/c with extubation. Pt also receiving Propofol during this time. Diet advanced to Clear Lq (1/30), then to currently ordered Diabetic diet (1/31). Remains ordered with PO intakes noted as fair. RN reports typically 60-75% of trays. Noted prior to intubation, PO intakes of 100% documented. Labs (1/31): Na 136, BUN 13, Cr 0.79, , BG , K 3.1. Meds: IVF, furosemide, insulin, zosyn, PPI. Malnutrition Assessment:  Malnutrition Status: Moderate malnutrition    Context:  Acute illness       Estimated Daily Nutrient Needs:  Energy (kcal): 1800kcal (20kcal/kg); Weight Used for Energy Requirements: Current  Protein (g): 91g (1g/kg); Weight Used for Protein Requirements: Current  Fluid (ml/day): 1800mL; Method Used for Fluid Requirements: 1 ml/kcal      Nutrition Related Findings:  NFPE finding mild muscle wasting. Daughter at bedside denies dysphagia, n/v, or c/d. Last BM 1/27. No edema per EMR, however pt clearly fluid overloaded. NFPE finding mild muscle wasting. Daughter at bedside denies dysphagia, n/v, or c/d. Last BM 2/1. Abd noted as distended. No edema per EMR, however pt clearly fluid overloaded.       Wounds:    None       Current Nutrition Therapies:  DIET DIABETIC CONSISTENT CARB Regular    Anthropometric Measures:  · Height:  5' 5\" (165.1 cm)  · Current Body Wt:  90.7 kg (199 lb 15.3 oz)(1/28)   · Ideal Body Wt:  125 lbs:  160 %   · BMI Category:  Obese class 1 (BMI 30.0-34. 9)       Nutrition Diagnosis:   · Inadequate oral intake related to impaired respiratory function as evidenced by intubation      Nutrition Interventions:   Food and/or Nutrient Delivery: Continue current diet  Nutrition Education and Counseling: No recommendations at this time  Coordination of Nutrition Care: Continue to monitor while inpatient    Goals:  Meet >75% EENs via TF  Wt maintenance +/- 0.5kg per week  Maintain skin integrity       Nutrition Monitoring and Evaluation:   Behavioral-Environmental Outcomes: None identified  Food/Nutrient Intake Outcomes: Enteral nutrition intake/tolerance  Physical Signs/Symptoms Outcomes: None identified    Discharge Planning:     Too soon to determine     Electronically signed by Gaurav Congress on 2/3/2021 at 5:18 PM    Contact:

## 2021-02-02 NOTE — ROUTINE PROCESS
Bedside and Verbal shift change report given to Deangelo Marin RN (oncoming nurse) by Ana Fenton (offgoing nurse). Report included the following information SBAR, Kardex, Intake/Output, MAR, Accordion and Recent Results.

## 2021-02-02 NOTE — PROGRESS NOTES
Progress Note      2/2/2021 9:24 PM  NAME: Li Zarate   MRN:  [id number]   Admit Diagnosis: GI bleed [K92.2]      Problem List:   Acute Respiratory Failure  Severe Aortic Stenosis  Acute/Chronic CHF decompensation  3-vessel CAD  Acute GIB with Anemia  Recent COVID-19 infection     Assessment/Plan:   Up in chair; responding to diuretics  Keep on anti-CHF therapy  Once well-compensated,  Can refer to  CT surgery for CABG/ AVR at [facility]         [x]       High complexity decision making was performed in this patient at high risk for decompensation with multiple organ involvement. Subjective: Li Zarate denies chest pain, up in the chair off high flow oxygen. Says she feels better today. Discussed with her attending cardiologist and  RN. Review of Systems:      Objective:      Physical Exam:    Last 24hrs VS reviewed since prior progress note. Most recent are:    Visit Vitals  /67   Pulse 72   Temp 98.4 °F (36.9 °C)   Resp 18   Ht 5' 5\" (1.651 m)   Wt 90.7 kg (200 lb)   SpO2 94%   BMI 33.28 kg/m²       Intake/Output Summary (Last 24 hours) at 2/2/2021 1443  Last data filed at 2/2/2021 1318  Gross per 24 hour   Intake    Output 2101 ml   Net -2101 ml        General Appearance: Well developed, alert & oriented x 3, no acute distress. Ears/Nose/Mouth/Throat: Hearing grossly normal.  Neck: Supple. Chest: Lungs clear bilaterally. Cardiovascular: Regular rate and rhythm, S1S2 normal,  Grade 2-3/6 JOSE . Abdomen: Soft, non-tender, bowel sounds are active. Extremities: No edema bilaterally. Skin: Warm and dry. []         Post-cath site without hematoma, bruit, tenderness, or thrill. Distal pulses intact.     PMH/SH reviewed - no change compared to H&P    Telemetry: normal sinus rhythm     EKG:  ST with LBBB    []  No new EKG for review    Lab Data Personally Reviewed:    Recent Labs     01/31/21  1230   WBC 10.0   HGB 10.3*   HCT 34.0*   *     No results for input(s): INR, PTP, APTT, INREXT, INREXT in the last 72 hours. Recent Labs     01/31/21  1230      K 3.1*   CL 97   CO2 33*   BUN 13   CREA 0.79   *   CA 8.8   MG 1.7     No results for input(s): CPK, CKNDX, TROIQ in the last 72 hours. No lab exists for component: CPKMB  Lab Results   Component Value Date/Time    Cholesterol, total 123 10/13/2020 10:58 AM    HDL Cholesterol 54 10/13/2020 10:58 AM    LDL, calculated 55 10/13/2020 10:58 AM    Triglyceride 65 10/13/2020 10:58 AM       No results for input(s): AP, TBIL, TP, ALB, GLOB, GGT, AML, LPSE in the last 72 hours.     No lab exists for component: SGOT, GPT, AMYP, HLPSE  Recent Labs     02/01/21  0400 01/31/21  0350   PH 7.51* 7.50*   PCO2 46* 45   PO2 56* 58*       Medications Personally Reviewed:    Current Facility-Administered Medications   Medication Dose Route Frequency    benzocaine-menthoL (CEPACOL) lozenge 1 Lozenge  1 Lozenge Mucous Membrane PRN    spironolactone (ALDACTONE) tablet 25 mg  25 mg Oral BID    0.9% sodium chloride infusion 250 mL  250 mL IntraVENous PRN    furosemide (LASIX) injection 40 mg  40 mg IntraVENous Q12H    lisinopriL (PRINIVIL, ZESTRIL) tablet 5 mg  5 mg Oral DAILY    budesonide (PULMICORT) 500 mcg/2 ml nebulizer suspension  500 mcg Nebulization BID RT    albuterol-ipratropium (DUO-NEB) 2.5 MG-0.5 MG/3 ML  3 mL Nebulization Q6H RT    piperacillin-tazobactam (ZOSYN) 3.375 g in 0.9% sodium chloride (MBP/ADV) 100 mL MBP  3.375 g IntraVENous Q8H    pantoprazole (PROTONIX) tablet 40 mg  40 mg Oral ACB    carvediloL (COREG) tablet 3.125 mg  3.125 mg Oral BID WITH MEALS    0.9% sodium chloride infusion  50 mL/hr IntraVENous CONTINUOUS    LORazepam (ATIVAN) tablet 1 mg  1 mg Oral QHS    acetaminophen (TYLENOL) tablet 650 mg  650 mg Oral Q4H PRN    ondansetron (ZOFRAN ODT) tablet 4 mg  4 mg Oral Q8H PRN    ondansetron (ZOFRAN) injection 4 mg  4 mg IntraVENous Q6H PRN    insulin lispro (HUMALOG) injection   SubCUTAneous AC&HS    glucose chewable tablet 16 g  4 Tab Oral PRN    glucagon (GLUCAGEN) injection 1 mg  1 mg IntraMUSCular PRN    dextrose (D50W) injection syrg 12.5-25 g  25-50 mL IntraVENous PRN         Trinity Shah MD

## 2021-02-02 NOTE — PROGRESS NOTES
Problem: Self Care Deficits Care Plan (Adult)  Goal: *Acute Goals and Plan of Care (Insert Text)  Description: Pt will be MI sup<->sit in prep for EOB ADL's  Pt will be MI  LB dressing sitting/standing level  Pt will be MI  sit<-> prep for toilet transfer  Pt will be MI toilet transfer with LRAD  Pt will be Independent  toileting/cloth mgmt LRAD  Pt will be MI  grooming standing sink  Pt will be MI bathing sitting/standing sink LRAD      Outcome: Not Met     OCCUPATIONAL THERAPY EVALUATION  Patient: Keo Ferrari (77 y.o. female)  Date: 2/2/2021  Primary Diagnosis: GI bleed [K92.2]  Procedure(s) (LRB):  ESOPHAGOGASTRODUODENOSCOPY (EGD) (N/A) 6 Days Post-Op   Precautions:      ASSESSMENT  Pt is 76 y/o female came to [facility] with  and adm 1/25/2021 for acute anemia 2.2 GI bleed (HGB 5.4 on arrival s/p transfusion), hypotension and pt s/p rapid response on 1/27/2021  dx with acute hypoxic hypercapnic respiratory failure and was intubated and transfered to ICU (extubated 1/29/2021), acute encephalpathy, sepsis, CHF, pulmonary edema. Pt has hx of recent dx of COVID 19 (1/1/21 however now negative on 1/25/2021) on O2 at home, CHF with EF 41%, 3 vessel disease and aortic stenosis awaiting aortic valve replacement (canceled last april 2/2 covid), CAD with 3 vessel disease (awaiting CABG canced 2/2 COVID), anemia, allergic rhinitis, GERD, heart murmur, tabacco dependence syndrome, DM type II,     Pt currently on 40% FIO2 via high flow on 30L/min O2 flow rate. Pt received semi supine in bed A&Ox4 and agreeable for OT/PT eval/tx. Per pt report, pt lives with daughter and grandson (someone home 24/7, moved in with daughter after COVID dx) in 1 story home with 4 steps gali rails to enter and is MI for self care (bathing at sink or daughter assisting at times) and independent for functional transfers/mobility.   Pt currently presents with decreased activity tolerance, generalized weakness, decreased balance, decreased activity tolerance and increased need for assist with self care SBA LB dressing chair level simulated, SBA simple grooming simulated) and functional transfers/mobility (CGA sup->sit, CGA sit<->stand and bed to chair with RW and gait belt with chair aprox 5 feet away in prep for BSC transfers). Pt would benefit from skilled OT services while at [facility] in order to increase safety and independence with self care and functional transfers/mobility. Anticipate discharge to home with Barrington Salgado at this time pending improvement in pulmonary status however if pt does not demonstrate progress may possibly need IRF     Other factors to consider for discharge: time since onset, severity of deficits          PLAN :  Recommendations and Planned Interventions: self care training, functional mobility training, therapeutic exercise, balance training, therapeutic activities, endurance activities, patient education, and home safety training    Frequency/Duration: Patient will be followed by occupational therapy 5 times a week to address goals. Recommendation for discharge: (in order for the patient to meet his/her long term goals)  Leanne Rouse however if pulmonary status does not improve IRF    This discharge recommendation:  Has been made in collaboration with the attending provider and/or case management    IF patient discharges home will need the following DME: TBD       SUBJECTIVE:   Patient stated I can do everything, its my breathing that's the problem.     OBJECTIVE DATA SUMMARY:   HISTORY:   Past Medical History:   Diagnosis Date    Acute gastrointestinal hemorrhage 8/28/2020    Allergic rhinitis 8/28/2020    Anemia 8/28/2020    Benign essential hypertension 6/25/2020    Body mass index 30.0-30.9, adult 6/25/2020    CAD (coronary artery disease) 8/28/2020    Foot callus 8/28/2020    GERD (gastroesophageal reflux disease) 6/25/2020    Heart murmur 6/25/2020    Hx of colonoscopy 01/01/2015    Done for anemia and rectal bleeding    Left bundle branch block 8/28/2020    Microalbuminuria due to type 2 diabetes mellitus ([facility] [address] [address] 8/28/2020    Mixed hyperlipidemia 6/25/2020    Morbid obesity ([facility] [address] 8/28/2020    Tobacco dependence syndrome 6/25/2020    Type II diabetes mellitus, uncontrolled ([facility] [address] 6/25/2020     Past Surgical History:   Procedure Laterality Date    HX COLONOSCOPY  2015    due in [id number]. Done for anemia and rectal bleeding       Expanded or extensive additional review of patient history:     Home Situation  Home Environment: Private residence  # Steps to Enter: 4  Rails to Enter: Yes  Hand Rails : Bilateral  One/Two Story Residence: One story  Living Alone: No  Support Systems: Child(anna marie), Family member(s)(daughter and grandson)  Patient Expects to be Discharged to[de-identified] Private residence  Current DME Used/Available at Home: None    PLOF: Pt MI for ADLS/IADLS, MI with mobility prior to admission. EXAMINATION OF PERFORMANCE DEFICITS:  Cognitive/Behavioral Status:  Neurologic State: Alert  Orientation Level: Oriented X4     Range of Motion:  AROM: Within functional limits     Strength:  Strength: Within functional limits     Balance:  Sitting: Intact  Standing: Intact; With support  Standing - Static: Constant support;Good  Standing - Dynamic : Constant support;Good    Functional Mobility and Transfers for ADLs:  Bed Mobility:  Rolling: Stand-by assistance  Supine to Sit: Stand-by assistance;Contact guard assistance  Scooting: Stand-by assistance    Transfers:  Sit to Stand: Contact guard assistance  Stand to Sit: Contact guard assistance  Bed to Chair: Contact guard assistance  Assistive Device : Gait Belt;Walker, rolling    ADL Assessment:     Oral Facial Hygiene/Grooming: Stand-by assistance(seated in chair simulated)    Lower Body Dressing: Stand-by assistance(seated in chair simulated)    Toileting: Stand by assistance(simulated)     ADL Intervention and task modifications:     Grooming  Grooming Assistance: Stand-by assistance(simulated)  Position Performed: Seated in chair    Lower Body Dressing Assistance  Socks: Stand-by assistance(simulated)    Toileting  Toileting Assistance: Stand-by assistance(simulated)    [address] [place] [address] AM-PACTM \"6 Clicks\"                                                       Daily Activity Inpatient Short Form  How much help from another person does the patient currently need. .. Total; A Lot A Little None   1. Putting on and taking off regular lower body clothing? []  1 []  2 [x]  3 []  4   2. Bathing (including washing, rinsing, drying)? []  1 []  2 [x]  3 []  4   3. Toileting, which includes using toilet, bedpan or urinal? [] 1 []  2 [x]  3 []  4   4. Putting on and taking off regular upper body clothing? []  1 []  2 []  3 [x]  4   5. Taking care of personal grooming such as brushing teeth? []  1 []  2 [x]  3 []  4   6. Eating meals? []  1 []  2 []  3 [x]  4   © 2007, Trustees of [address] [place] [address], under license to [employer]. All rights reserved     Score: 20/24     Interpretation of Tool:  Represents clinically-significant functional categories (i.e. Activities of daily living). Percentage of Impairment CH    0%   CI    1-19% CJ    20-39% CK    40-59% CL    60-79% CM    80-99% CN     100%   [facility]  Score 6-24 24 23 20-22 15-19 10-14 7-9 6        Occupational Therapy Evaluation Charge Determination   History Examination Decision-Making   LOW Complexity : Brief history review  LOW Complexity : 1-3 performance deficits relating to physical, cognitive , or psychosocial skils that result in activity limitations and / or participation restrictions  MEDIUM Complexity : Patient may present with comorbidities that affect occupational performnce.  Miniml to moderate modification of tasks or assistance (eg, physical or verbal ) with assesment(s) is necessary to enable patient to complete evaluation       Based on the above components, the patient evaluation is determined to be of the following complexity level: LOW   Pain Rating:  No pain reported    Activity Tolerance:   Good  Please refer to the flowsheet for vital signs taken during this treatment. After treatment patient left in no apparent distress:    Sitting in chair and Call bell within reach    COMMUNICATION/EDUCATION:   The patients plan of care was discussed with: Physical therapist.     Home safety education was provided and the patient/caregiver indicated understanding. and Patient/family have participated as able in goal setting and plan of care. This patients plan of care is appropriate for delegation to [facility].     Thank you for this referral.  Michael Alarcon  Time Calculation: 27 mins

## 2021-02-02 NOTE — PROGRESS NOTES
Problem: Mobility Impaired (Adult and Pediatric)  Goal: *Acute Goals and Plan of Care (Insert Text)  Description: Mod I with all bed mob x7 days  SBA with all transfers x 7 days  Amb 25-50ft with LRAD and SBAx1 x7 days  I with LE HEP x7 days  Outcome: Not Met    PHYSICAL THERAPY EVALUATION  Patient: Al Torres (88 y.o. female)  Date: 2/2/2021  Primary Diagnosis: GI bleed [K92.2]  Procedure(s) (LRB):  ESOPHAGOGASTRODUODENOSCOPY (EGD) (N/A) 6 Days Post-Op   Precautions: high flow 02, fall      ASSESSMENT  66yo F admitted to hospital with anemia/GI bleed, pt s/p rapid response on 1/27/21 was intubated and then extubated on 1/29/21. Pt presents to PT with decreased bed mob, transfers, LE strength, gt, activity tolerance, and overall functional mobility. Pt on high flow O2 upon PT arrival.  Pt has hx of recent dx of COVID 19 (1/1/21 however now negative on 1/25/2021) on O2 at home, CHF with EF 41%, 3 vessel disease and aortic stenosis awaiting aortic valve replacement (canceled last april 2/2 covid), CAD with 3 vessel disease (awaiting CABG canced 2/2 COVID), anemia, allergic rhinitis, GERD, heart murmur, tabacco dependence syndrome, DM type II,  Pt currently on 40% FIO2 via high flow on 30L/min O2 flow rate. Pt received semi supine in bed A&Ox4 and agreeable for OT/PT eval/tx. PT/OT co-evaluated this session as it was unclear how much pt would be able to tolerate due to pulmonary status. However, going forward pt should be ok for 1 person treat. Per pt report, pt lives with daughter and grandson (someone home 24/7, moved in with daughter after COVID dx) in 1 story home with 4 steps with rails to enter. Pt currently SBA with bed mob, CGA for sit to stand transfers. Pt was able to amb approx 5ft with RW and CGA from bed to chair. Pt would likely be able to amb further as she is weaned off from high flow and has a longer O2 cord. Pt may benefit from skilled PT to address her functional deficits. Recommend HHPT at this time, pt states she did not like previous company she was with. Will defer to CM for d/c planning. Will likely need RW upon d/c. PLAN :  Recommendations and Planned Interventions: bed mobility training, transfer training, gait training, therapeutic exercises, patient and family training/education, and therapeutic activities      Frequency/Duration: Patient will be followed by physical therapy:  5 times a week to address goals. Recommendation for discharge: (in order for the patient to meet his/her long term goals)  HHPT           SUBJECTIVE:   Patient semi supine in bed upon PT arrival, agreeable to work with PT    OBJECTIVE DATA SUMMARY:   HISTORY:    Past Medical History:   Diagnosis Date    Acute gastrointestinal hemorrhage 8/28/2020    Allergic rhinitis 8/28/2020    Anemia 8/28/2020    Benign essential hypertension 6/25/2020    Body mass index 30.0-30.9, adult 6/25/2020    CAD (coronary artery disease) 8/28/2020    Foot callus 8/28/2020    GERD (gastroesophageal reflux disease) 6/25/2020    Heart murmur 6/25/2020    Hx of colonoscopy 01/01/2015    Done for anemia and rectal bleeding    Left bundle branch block 8/28/2020    Microalbuminuria due to type 2 diabetes mellitus ([address] 8/28/2020    Mixed hyperlipidemia 6/25/2020    Morbid obesity ([address] 8/28/2020    Tobacco dependence syndrome 6/25/2020    Type II diabetes mellitus, uncontrolled ([address] 6/25/2020     Past Surgical History:   Procedure Laterality Date    HX COLONOSCOPY  2015    due in [id number].   Done for anemia and rectal bleeding       Personal factors and/or comorbidities impacting plan of care:     Home Situation  Home Environment: Private residence  # Steps to Enter: 4  Rails to Enter: Yes  Hand Rails : Bilateral  One/Two Story Residence: One story  Living Alone: No  Support Systems: Child(anna marie), Family member(s)(daughter and grandson)  Patient Expects to be Discharged to[de-identified] Private residence  Current DME Used/Available at Home: None        EXAMINATION/PRESENTATION/DECISION MAKING:   Critical Behavior:  Neurologic State: Alert  Orientation Level: Oriented X4  Cognition: Appropriate decision making       Range Of Motion:  AROM: Within functional limits B LE                       Strength:    Grossly 4/5 B LE    Tone & Sensation:      Sensation intact to LT B LE    Functional Mobility:  Bed Mobility:  Rolling: Stand-by assistance  Supine to Sit: Stand-by assistance;Contact guard assistance     Scooting: Stand-by assistance  Transfers:  Sit to Stand: Contact guard assistance  Stand to Sit: Contact guard assistance        Bed to Chair: Contact guard assistance              Balance:   Sitting: Intact  Standing: Intact; With support  Standing - Static: Constant support;Good  Standing - Dynamic : Constant support;Good  Ambulation/Gait Training:  Distance (ft): 5 Feet (ft)  Assistive Device: Walker, rolling  Ambulation - Level of Assistance: Contact guard assistance;Stand-by assistance        Functional Measure:    [address] [place] [id number] AM-PAC 6 Clicks         Basic Mobility Inpatient Short Form  How much difficulty does the patient currently have. .. Unable A Lot A Little None   1. Turning over in bed (including adjusting bedclothes, sheets and blankets)? [] 1   [] 2   [x] 3   [] 4   2. Sitting down on and standing up from a chair with arms ( e.g., wheelchair, bedside commode, etc.)   [] 1   [] 2   [x] 3   [] 4   3. Moving from lying on back to sitting on the side of the bed? [] 1   [] 2   [x] 3   [] 4          How much help from another person does the patient currently need. .. Total A Lot A Little None   4. Moving to and from a bed to a chair (including a wheelchair)? [] 1   [] 2   [x] 3   [] 4   5. Need to walk in hospital room? [] 1   [] 2   [x] 3   [] 4   6. Climbing 3-5 steps with a railing? [] 1   [] 2   [x] 3   [] 4   © 2007, Trustees of [address] [place] [address] [address] [address], under license to [employer].  All rights reserved     Score: Initial: 18 Most Recent: X (Date: -- )   Interpretation of Tool:  Represents activities that are increasingly more difficult (i.e. Bed mobility, Transfers, Gait). Score 24 23 22-20 19-15 14-10 9-7 6   Modifier CH CI CJ CK CL CM CN          Physical Therapy Evaluation Charge Determination   History Examination Presentation Decision-Making   MEDIUM  Complexity : 1-2 comorbidities / personal factors will impact the outcome/ POC  MEDIUM Complexity : 3 Standardized tests and measures addressing body structure, function, activity limitation and / or participation in recreation  LOW Complexity : Stable, uncomplicated  Other Functional Measure [facility] 6 MED      Based on the above components, the patient evaluation is determined to be of the following complexity level: LOW     Pain Rating:  No c/o pain during session today    Activity Tolerance:   Good  Please refer to the flowsheet for vital signs taken during this treatment. After treatment patient left in no apparent distress:   Sitting in chair and Call bell within reach    COMMUNICATION/EDUCATION:   The patients plan of care was discussed with: Occupational therapist.     Patient/family agree to work toward stated goals and plan of care.     Thank you for this referral.  Marcos Campa   Time Calculation: 21 mins

## 2021-02-02 NOTE — PROGRESS NOTES
Progress Note      2/1/2021 9:24 PM  NAME: King Mylene   MRN:  [id number]   Admit Diagnosis: GI bleed [K92.2]      Problem List:     1.  three-vessel coronary artery disease, moderate aortic stenosis  2. Acute heart failure  3. Acute GI bleeding     Assessment/Plan:     1.  clinically improving with diuretic. 2. Patient is already on beta-blocker Ace inhibitor spironolactone. 3. Hyperkalemia is getting corrected. 4. Once stabilized will refer CT surgery for coronary artery bypass surgery and aortic valve replacement         []       High complexity decision making was performed in this patient at high risk for decompensation with multiple organ involvement. Subjective: King Mylene denies chest pain, dyspnea. Discussed with RN events overnight. Review of Systems:    Symptom Y/N Comments  Symptom Y/N Comments   Fever/Chills N   Chest Pain N    Poor Appetite N   Edema N    Cough N   Abdominal Pain N    Sputum N   Joint Pain N    SOB/ACEVEDO y   Pruritis/Rash N    Nausea/vomit N   Tolerating PT/OT Y    Diarrhea N   Tolerating Diet Y    Constipation N   Other       Could NOT obtain due to:      Objective:      Physical Exam:    Last 24hrs VS reviewed since prior progress note. Most recent are:    Visit Vitals  /76   Pulse 91   Temp 97.8 °F (36.6 °C)   Resp 20   Ht 5' 5\" (1.651 m)   Wt 90.7 kg (200 lb)   SpO2 95%   BMI 33.28 kg/m²       Intake/Output Summary (Last 24 hours) at 2/1/2021 2124  Last data filed at 2/1/2021 1016  Gross per 24 hour   Intake 240 ml   Output 3100 ml   Net -2860 ml        General Appearance: Well developed, well nourished, alert & oriented x 3,    no acute distress. Ears/Nose/Mouth/Throat: Hearing grossly normal.  Neck: Supple. Chest: Lungs clear to auscultation bilaterally. Cardiovascular: Regular rate and rhythm, S1S2 normal,  Grade 2 x 6 ejection systolic murmur  In aortic area conducted to carotids.   Abdomen: Soft, non-tender, bowel sounds are active. Extremities: No edema bilaterally. Skin: Warm and dry. []         Post-cath site without hematoma, bruit, tenderness, or thrill. Distal pulses intact. PMH/SH reviewed - no change compared to H&P    Data Review    Telemetry: normal sinus rhythm     EKG:   []  No new EKG for review    Lab Data Personally Reviewed:    Recent Labs     01/31/21  1230 01/30/21  0315   WBC 10.0 16.9*   HGB 10.3* 11.8   HCT 34.0* 38.1   * 455*     No results for input(s): INR, PTP, APTT, INREXT in the last 72 hours. Recent Labs     01/31/21  1230 01/30/21  0315    137   K 3.1* 4.0   CL 97 100   CO2 33* 30   BUN 13 9   CREA 0.79 0.59   * 181*   CA 8.8 8.5   MG 1.7  --      No results for input(s): CPK, CKNDX, TROIQ in the last 72 hours. No lab exists for component: CPKMB  Lab Results   Component Value Date/Time    Cholesterol, total 123 10/13/2020 10:58 AM    HDL Cholesterol 54 10/13/2020 10:58 AM    LDL, calculated 55 10/13/2020 10:58 AM    Triglyceride 65 10/13/2020 10:58 AM       No results for input(s): AP, TBIL, TP, ALB, GLOB, GGT, AML, LPSE in the last 72 hours.     No lab exists for component: SGOT, GPT, AMYP, HLPSE  Recent Labs     02/01/21  0400 01/31/21  0350   PH 7.51* 7.50*   PCO2 46* 45   PO2 56* 58*       Medications Personally Reviewed:    Current Facility-Administered Medications   Medication Dose Route Frequency    benzocaine-menthoL (CEPACOL) lozenge 1 Lozenge  1 Lozenge Mucous Membrane PRN    spironolactone (ALDACTONE) tablet 25 mg  25 mg Oral BID    0.9% sodium chloride infusion 250 mL  250 mL IntraVENous PRN    furosemide (LASIX) injection 40 mg  40 mg IntraVENous Q12H    lisinopriL (PRINIVIL, ZESTRIL) tablet 5 mg  5 mg Oral DAILY    budesonide (PULMICORT) 500 mcg/2 ml nebulizer suspension  500 mcg Nebulization BID RT    albuterol-ipratropium (DUO-NEB) 2.5 MG-0.5 MG/3 ML  3 mL Nebulization Q6H RT    piperacillin-tazobactam (ZOSYN) 3.375 g in 0.9% sodium chloride (MBP/ADV) 100 mL MBP  3.375 g IntraVENous Q8H    pantoprazole (PROTONIX) tablet 40 mg  40 mg Oral ACB    carvediloL (COREG) tablet 3.125 mg  3.125 mg Oral BID WITH MEALS    0.9% sodium chloride infusion  50 mL/hr IntraVENous CONTINUOUS    LORazepam (ATIVAN) tablet 1 mg  1 mg Oral QHS    acetaminophen (TYLENOL) tablet 650 mg  650 mg Oral Q4H PRN    ondansetron (ZOFRAN ODT) tablet 4 mg  4 mg Oral Q8H PRN    ondansetron (ZOFRAN) injection 4 mg  4 mg IntraVENous Q6H PRN    insulin lispro (HUMALOG) injection   SubCUTAneous AC&HS    glucose chewable tablet 16 g  4 Tab Oral PRN    glucagon (GLUCAGEN) injection 1 mg  1 mg IntraMUSCular PRN    dextrose (D50W) injection syrg 12.5-25 g  25-50 mL IntraVENous PRN         Renetta Good MD

## 2021-02-03 LAB
ANION GAP SERPL CALC-SCNC: 6 MMOL/L (ref 5–15)
BASOPHILS # BLD: 0 K/UL (ref 0–0.1)
BASOPHILS NFR BLD: 0 % (ref 0–1)
BUN SERPL-MCNC: 12 MG/DL (ref 6–20)
BUN/CREAT SERPL: 24 (ref 12–20)
CA-I BLD-MCNC: 9.2 MG/DL (ref 8.5–10.1)
CHLORIDE SERPL-SCNC: 97 MMOL/L (ref 97–108)
CO2 SERPL-SCNC: 35 MMOL/L (ref 21–32)
CREAT SERPL-MCNC: 0.5 MG/DL (ref 0.55–1.02)
DIFFERENTIAL METHOD BLD: ABNORMAL
EOSINOPHIL # BLD: 0.2 K/UL (ref 0–0.4)
EOSINOPHIL NFR BLD: 2 % (ref 0–7)
ERYTHROCYTE [DISTWIDTH] IN BLOOD BY AUTOMATED COUNT: 25.8 % (ref 11.5–14.5)
GLUCOSE BLD STRIP.AUTO-MCNC: 132 MG/DL (ref 65–100)
GLUCOSE BLD STRIP.AUTO-MCNC: 135 MG/DL (ref 65–100)
GLUCOSE BLD STRIP.AUTO-MCNC: 184 MG/DL (ref 65–100)
GLUCOSE BLD STRIP.AUTO-MCNC: 386 MG/DL (ref 65–100)
GLUCOSE SERPL-MCNC: 107 MG/DL (ref 65–100)
HCT VFR BLD AUTO: 34.4 % (ref 35–47)
HGB BLD-MCNC: 10.6 G/DL (ref 11.5–16)
IMM GRANULOCYTES # BLD AUTO: 0.1 K/UL (ref 0–0.04)
IMM GRANULOCYTES NFR BLD AUTO: 1 % (ref 0–0.5)
LYMPHOCYTES # BLD: 1.6 K/UL (ref 0.8–3.5)
LYMPHOCYTES NFR BLD: 21 % (ref 12–49)
MCH RBC QN AUTO: 23.5 PG (ref 26–34)
MCHC RBC AUTO-ENTMCNC: 30.8 G/DL (ref 30–36.5)
MCV RBC AUTO: 76.3 FL (ref 80–99)
MONOCYTES # BLD: 0.8 K/UL (ref 0–1)
MONOCYTES NFR BLD: 11 % (ref 5–13)
NEUTS SEG # BLD: 4.9 K/UL (ref 1.8–8)
NEUTS SEG NFR BLD: 65 % (ref 32–75)
PERFORMED BY, TECHID: ABNORMAL
PLATELET # BLD AUTO: 505 K/UL (ref 150–400)
PMV BLD AUTO: 8.5 FL (ref 8.9–12.9)
POTASSIUM SERPL-SCNC: 2.6 MMOL/L (ref 3.5–5.1)
RBC # BLD AUTO: 4.51 M/UL (ref 3.8–5.2)
SODIUM SERPL-SCNC: 138 MMOL/L (ref 136–145)
WBC # BLD AUTO: 7.5 K/UL (ref 3.6–11)

## 2021-02-03 PROCEDURE — 94640 AIRWAY INHALATION TREATMENT: CPT

## 2021-02-03 PROCEDURE — 74011250637 HC RX REV CODE- 250/637: Performed by: INTERNAL MEDICINE

## 2021-02-03 PROCEDURE — 82962 GLUCOSE BLOOD TEST: CPT

## 2021-02-03 PROCEDURE — 77010033711 HC HIGH FLOW OXYGEN

## 2021-02-03 PROCEDURE — 74011000250 HC RX REV CODE- 250: Performed by: INTERNAL MEDICINE

## 2021-02-03 PROCEDURE — 97110 THERAPEUTIC EXERCISES: CPT

## 2021-02-03 PROCEDURE — 74011250636 HC RX REV CODE- 250/636: Performed by: INTERNAL MEDICINE

## 2021-02-03 PROCEDURE — 36415 COLL VENOUS BLD VENIPUNCTURE: CPT

## 2021-02-03 PROCEDURE — 94760 N-INVAS EAR/PLS OXIMETRY 1: CPT

## 2021-02-03 PROCEDURE — 80048 BASIC METABOLIC PNL TOTAL CA: CPT

## 2021-02-03 PROCEDURE — 65270000029 HC RM PRIVATE

## 2021-02-03 PROCEDURE — 74011000258 HC RX REV CODE- 258: Performed by: INTERNAL MEDICINE

## 2021-02-03 PROCEDURE — 74011636637 HC RX REV CODE- 636/637: Performed by: NURSE PRACTITIONER

## 2021-02-03 PROCEDURE — 74011250637 HC RX REV CODE- 250/637: Performed by: HOSPITALIST

## 2021-02-03 PROCEDURE — 85025 COMPLETE CBC W/AUTO DIFF WBC: CPT

## 2021-02-03 PROCEDURE — 74011636637 HC RX REV CODE- 636/637: Performed by: HOSPITALIST

## 2021-02-03 RX ORDER — POTASSIUM CHLORIDE 20 MEQ/1
40 TABLET, EXTENDED RELEASE ORAL 2 TIMES DAILY
Status: DISCONTINUED | OUTPATIENT
Start: 2021-02-03 | End: 2021-02-05 | Stop reason: HOSPADM

## 2021-02-03 RX ORDER — INSULIN LISPRO 100 [IU]/ML
8 INJECTION, SOLUTION INTRAVENOUS; SUBCUTANEOUS ONCE
Status: COMPLETED | OUTPATIENT
Start: 2021-02-03 | End: 2021-02-03

## 2021-02-03 RX ADMIN — BUDESONIDE 500 MCG: 0.5 INHALANT RESPIRATORY (INHALATION) at 08:50

## 2021-02-03 RX ADMIN — INSULIN LISPRO 8 UNITS: 100 INJECTION, SOLUTION INTRAVENOUS; SUBCUTANEOUS at 17:11

## 2021-02-03 RX ADMIN — BUDESONIDE 500 MCG: 0.5 INHALANT RESPIRATORY (INHALATION) at 20:33

## 2021-02-03 RX ADMIN — CARVEDILOL 3.12 MG: 3.12 TABLET, FILM COATED ORAL at 09:18

## 2021-02-03 RX ADMIN — LISINOPRIL 5 MG: 5 TABLET ORAL at 09:18

## 2021-02-03 RX ADMIN — CARVEDILOL 3.12 MG: 3.12 TABLET, FILM COATED ORAL at 17:13

## 2021-02-03 RX ADMIN — IPRATROPIUM BROMIDE AND ALBUTEROL SULFATE 3 ML: .5; 3 SOLUTION RESPIRATORY (INHALATION) at 20:33

## 2021-02-03 RX ADMIN — PIPERACILLIN SODIUM AND TAZOBACTAM SODIUM 3.38 G: 3; .375 INJECTION, POWDER, LYOPHILIZED, FOR SOLUTION INTRAVENOUS at 21:44

## 2021-02-03 RX ADMIN — PIPERACILLIN SODIUM AND TAZOBACTAM SODIUM 3.38 G: 3; .375 INJECTION, POWDER, LYOPHILIZED, FOR SOLUTION INTRAVENOUS at 11:45

## 2021-02-03 RX ADMIN — POTASSIUM CHLORIDE 40 MEQ: 1500 TABLET, EXTENDED RELEASE ORAL at 14:34

## 2021-02-03 RX ADMIN — SPIRONOLACTONE 25 MG: 25 TABLET ORAL at 09:18

## 2021-02-03 RX ADMIN — IPRATROPIUM BROMIDE AND ALBUTEROL SULFATE 3 ML: .5; 3 SOLUTION RESPIRATORY (INHALATION) at 02:44

## 2021-02-03 RX ADMIN — FUROSEMIDE 40 MG: 10 INJECTION, SOLUTION INTRAMUSCULAR; INTRAVENOUS at 09:18

## 2021-02-03 RX ADMIN — INSULIN LISPRO 2 UNITS: 100 INJECTION, SOLUTION INTRAVENOUS; SUBCUTANEOUS at 23:44

## 2021-02-03 RX ADMIN — SPIRONOLACTONE 25 MG: 25 TABLET ORAL at 21:45

## 2021-02-03 RX ADMIN — FUROSEMIDE 40 MG: 10 INJECTION, SOLUTION INTRAMUSCULAR; INTRAVENOUS at 21:45

## 2021-02-03 RX ADMIN — IPRATROPIUM BROMIDE AND ALBUTEROL SULFATE 3 ML: .5; 3 SOLUTION RESPIRATORY (INHALATION) at 14:37

## 2021-02-03 RX ADMIN — IPRATROPIUM BROMIDE AND ALBUTEROL SULFATE 3 ML: .5; 3 SOLUTION RESPIRATORY (INHALATION) at 08:50

## 2021-02-03 RX ADMIN — PIPERACILLIN SODIUM AND TAZOBACTAM SODIUM 3.38 G: 3; .375 INJECTION, POWDER, LYOPHILIZED, FOR SOLUTION INTRAVENOUS at 04:08

## 2021-02-03 RX ADMIN — POTASSIUM CHLORIDE 40 MEQ: 1500 TABLET, EXTENDED RELEASE ORAL at 21:45

## 2021-02-03 RX ADMIN — PANTOPRAZOLE SODIUM 40 MG: 40 TABLET, DELAYED RELEASE ORAL at 09:18

## 2021-02-03 NOTE — PROGRESS NOTES
Hospitalist Progress Note               Daily Progress Note: 2/3/2021      Subjective:   77year old female with past medical history of systolic heart failure, CAD, and aortic stenosis. She was supposed to have her aortic valve replaced and CABG procedure, but was cancelled due to the current pandemic. admitted on 1/25/2021 for melena. Hemoglobin at that time was 5.4 and given 2 units of PRBCs , on 01/31/2021 hbg was 10.3. Patient was recently hospitalized for COVID-19 infection and discharged home with oxygen. Patient was intubated on 01/28/21 due pulmonary edema and was extubated on 01/29/2021. The patient is seen for follow  up. Today patient sitting up in chair on supplemental O2, NC, 4L. Patient reports that she feels her breathing is improving.     Medications reviewed  Current Facility-Administered Medications   Medication Dose Route Frequency    potassium chloride (K-DUR, KLOR-CON) SR tablet 40 mEq  40 mEq Oral BID    benzocaine-menthoL (CEPACOL) lozenge 1 Lozenge  1 Lozenge Mucous Membrane PRN    spironolactone (ALDACTONE) tablet 25 mg  25 mg Oral BID    0.9% sodium chloride infusion 250 mL  250 mL IntraVENous PRN    furosemide (LASIX) injection 40 mg  40 mg IntraVENous Q12H    lisinopriL (PRINIVIL, ZESTRIL) tablet 5 mg  5 mg Oral DAILY    budesonide (PULMICORT) 500 mcg/2 ml nebulizer suspension  500 mcg Nebulization BID RT    albuterol-ipratropium (DUO-NEB) 2.5 MG-0.5 MG/3 ML  3 mL Nebulization Q6H RT    piperacillin-tazobactam (ZOSYN) 3.375 g in 0.9% sodium chloride (MBP/ADV) 100 mL MBP  3.375 g IntraVENous Q8H    pantoprazole (PROTONIX) tablet 40 mg  40 mg Oral ACB    carvediloL (COREG) tablet 3.125 mg  3.125 mg Oral BID WITH MEALS    0.9% sodium chloride infusion  50 mL/hr IntraVENous CONTINUOUS    LORazepam (ATIVAN) tablet 1 mg  1 mg Oral QHS    acetaminophen (TYLENOL) tablet 650 mg  650 mg Oral Q4H PRN    ondansetron (ZOFRAN ODT) tablet 4 mg  4 mg Oral Q8H PRN    ondansetron [facility]) injection 4 mg  4 mg IntraVENous Q6H PRN    insulin lispro (HUMALOG) injection   SubCUTAneous AC&HS    glucose chewable tablet 16 g  4 Tab Oral PRN    glucagon (GLUCAGEN) injection 1 mg  1 mg IntraMUSCular PRN    dextrose (D50W) injection syrg 12.5-25 g  25-50 mL IntraVENous PRN       Review of Systems:   A comprehensive review of systems was negative except for that written in the HPI. Objective:   Physical Exam:     Visit Vitals  BP (!) 145/76   Pulse 78   Temp 97.6 °F (36.4 °C)   Resp 20   Ht 5' 5\" (1.651 m)   Wt 90.7 kg (200 lb)   SpO2 93%   BMI 33.28 kg/m²    O2 Flow Rate (L/min): 3 l/min O2 Device: Nasal cannula    Temp (24hrs), Av.5 °F (36.4 °C), Min:97.4 °F (36.3 °C), Max:97.6 °F (36.4 °C)    No intake/output data recorded.  1901 -  0700  In: -   Out: 3801 [Urine:3800]    PHYSICAL EXAM:  General: Awake and alert  Skin: Extremities and face reveal no rashes. HEENT: Sclerae anicteric. Extra-occular muscles are intact. No oral ulcers. No ENT discharge. The neck is supple. Cardiovascular: Murmur appreciated. Regular rate and rhythm. No gallops, or rubs. PMI nondisplaced. Respiratory: Comfortable breathing with no accessory muscle use. Clear breath sounds with no wheezes, rales, or rhonchi. GI: Abdomen nondistended, soft, and nontender. Normal active bowel sounds. No masses palpable. : Patient has casas, patent, urine clear. Rectal: Deferred   Musculoskeletal: No pitting edema of the lower legs. Extremities have good range of motion. No costovertebral tenderness. Neurological: Gross memory appears intact. Patient is alert and oriented. Power 5/5, Cranial nerves II-XII intact  Psychiatric: Mood appears appropriate with judgement intact.        Data Review:       Recent Days:  Recent Labs     21  0815   WBC 7.5   HGB 10.6*   HCT 34.4*   *     Recent Labs     21  0815      K 2.6*   CL 97   CO2 35*   *   BUN 12   CREA 0.50*   CA 9.2 Recent Labs     02/01/21  0400   PH 7.51*   PCO2 46*   PO2 56*   HCO3 36*   FIO2 40.0       CBC:   Lab Results   Component Value Date/Time    WBC 7.5 02/03/2021 08:15 AM    RBC 4.51 02/03/2021 08:15 AM    HGB 10.6 (L) 02/03/2021 08:15 AM    HCT 34.4 (L) 02/03/2021 08:15 AM    PLATELET 868 (H) 38/84/7157 08:15 AM    HGB, EXTERNAL 14.6 11/25/2019    Hct, External 42.2 11/25/2019     BMP:   Lab Results   Component Value Date/Time    Glucose 107 (H) 02/03/2021 08:15 AM    Sodium 138 02/03/2021 08:15 AM    Potassium 2.6 (LL) 02/03/2021 08:15 AM    Chloride 97 02/03/2021 08:15 AM    CO2 35 (H) 02/03/2021 08:15 AM    BUN 12 02/03/2021 08:15 AM    Creatinine 0.50 (L) 02/03/2021 08:15 AM    Calcium 9.2 02/03/2021 08:15 AM     CMP:   Lab Results   Component Value Date/Time    Glucose 107 (H) 02/03/2021 08:15 AM    Sodium 138 02/03/2021 08:15 AM    Potassium 2.6 (LL) 02/03/2021 08:15 AM    Chloride 97 02/03/2021 08:15 AM    CO2 35 (H) 02/03/2021 08:15 AM    BUN 12 02/03/2021 08:15 AM    Creatinine 0.50 (L) 02/03/2021 08:15 AM    Calcium 9.2 02/03/2021 08:15 AM    Anion gap 6 02/03/2021 08:15 AM    BUN/Creatinine ratio 24 (H) 02/03/2021 08:15 AM    Alk. phosphatase 44 (L) 01/29/2021 03:30 AM    Protein, total 4.9 (L) 01/29/2021 03:30 AM    Albumin 2.0 (L) 01/29/2021 03:30 AM    Globulin 2.9 01/29/2021 03:30 AM    A-G Ratio 0.7 (L) 01/29/2021 03:30 AM     Coagulation:   Lab Results   Component Value Date/Time    Prothrombin time 14.4 01/28/2021 02:30 AM    INR 1.1 01/28/2021 02:30 AM    aPTT 28.6 01/28/2021 02:30 AM     ABGs:   Lab Results   Component Value Date/Time    pH 7.51 (H) 02/01/2021 04:00 AM    PO2 56 (L) 02/01/2021 04:00 AM    PCO2 46 (H) 02/01/2021 04:00 AM    BICARBONATE 36 (H) 02/01/2021 04:00 AM    BASE EXCESS 12.5 (H) 02/01/2021 04:00 AM       XR CHEST PORT   Final Result   No significant interval change.       XR CHEST PORT   Final Result   FINDINGS/IMPRESSION:   Radiograph is limited by patient's body habitus and AP portable technique. Lungs remain hypoinflated with bibasilar atelectasis and probable small   bilateral pleural effusions. Cardiac silhouette stable in size. No radiographically apparent pneumothorax. Mild improvement in bilateral airspace opacity, this is most confluent at the   right lung base. This may represent multilobar pneumonia or edema, or perhaps a   combination of each. XR CHEST PORT   Final Result   FINDINGS/IMPRESSION:   Interval extubation and removal of enteric tube. Lungs similarly inflated. Cardiac silhouette stable in size. No radiographically   apparent pneumothorax. Persistent patchy bilateral airspace opacity. This may represent multilobar   pneumonia or edema, or perhaps a combination of each. XR CHEST PORT   Final Result      CT HEAD WO CONT   Final Result   No acute or active intracranial process. CTA CHEST W OR W WO CONT   Final Result   Impression:   1. No evidence of pulmonary embolus. 2. Bilateral dependent airspace disease compatible with pneumonia. 3. Diffuse pulmonary interstitial disease compatible with pulmonary edema. 4. Bilateral pleural effusions. 5. Coronary artery calcific atherosclerotic disease. 6. Small to moderate pericardial effusion. 7. Distal trachea and main bronchi are poorly expanded. XR CHEST PORT   Final Result   Impression:    1. Apparent satisfactory tracheal intubation. 2. Compatible with pneumonia. 3. Compatible with pulmonary interstitial edema. XR CHEST PORT   Final Result   Minimal residual right basilar opacity with resolved left basilar   opacity.                  Assessment/     Problem List:  Hospital Problems  Date Reviewed: 1/27/2021          Codes Class Noted POA    Aortic stenosis ICD-10-CM: I35.0  ICD-9-CM: 424.1  2/2/2021 Unknown        * (Principal) GI bleed ICD-10-CM: K92.2  ICD-9-CM: 578.9  1/25/2021 Unknown        Acute respiratory failure with hypoxia [facility]) ICD-10-CM: J96.01  ICD-9-CM: 518.81  1/1/2021 Yes        COPD exacerbation (RUST [facility].) ICD-10-CM: J44.1  ICD-9-CM: 491.21  12/13/2020 Yes        CAD (coronary artery disease) ICD-10-CM: I25.10  ICD-9-CM: 414.00  8/28/2020 Yes        Morbid obesity ([facility] 75.) ICD-10-CM: E66.01  ICD-9-CM: 278.01  8/28/2020 Yes        Benign essential hypertension ICD-10-CM: I10  ICD-9-CM: 401.1  6/25/2020 Yes        Type II diabetes mellitus, uncontrolled (R[facility] 75.) ICD-10-CM: E11.65  ICD-9-CM: 250.02  6/25/2020 Yes        GERD (gastroesophageal reflux disease) ICD-10-CM: K21.9  ICD-9-CM: 530.81  6/25/2020 Yes        Mixed hyperlipidemia ICD-10-CM: E78.2  ICD-9-CM: 272.2  6/25/2020 Yes        Tobacco dependence syndrome ICD-10-CM: L92.094  ICD-9-CM: 305.1  6/25/2020 Yes                     Plan:  UGIB with ABLA: EGD (01/27/2021) showed acute gastritis without bleeding. Last Hgb (01/31/2021) was 10.3. Hbg stable. DMII: SSI, holding home metformin     HTN: home meds: carvedilol and lisinopril    CAD: hx of 3 vessel disease, planned for CABG, cardiology recommendations appreciated     CHFp EF: resume lasix, cardiology recommendations appreciated    Intake/Output Summary (Last 24 hours) at 2/3/2021 1357  Last data filed at 2/3/2021 0330  Gross per 24 hour   Intake    Output 1700 ml   Net -1700 ml       Hypokalemia: K was 2.6. Will supplement PO. Will plan for labs tomorrow AM to continue monitoring.     Acute hypoxic respiratory failure: Supplemental O2, NC, 4L. Lasix for pulmonary edema  COVID negative. Pulmonary recommendations appreciated.     Sepsis: Zoysn for possible infection    Plan to BELA casas in preparation for discharge home.     Full Code  Dvt Prophylaxis  GI Prophylaxis      Care Plan discussed with: Patient/Family and Nurse      DAGMAR Pa- Student    Patient was evaluated and examined by me independently. Supervised NP student     Signed:  Pavel Swan MD

## 2021-02-03 NOTE — PROGRESS NOTES
PHYSICAL THERAPY TREATMENT  Patient: Catalino Courtney (34 y.o. female)  Date: 2/3/2021  Diagnosis: GI bleed [K92.2] GI bleed  Procedure(s) (LRB):  ESOPHAGOGASTRODUODENOSCOPY (EGD) (N/A) 7 Days Post-Op  Precautions:    Chart, physical therapy assessment, plan of care and goals were reviewed. ASSESSMENT  Patient continues with skilled PT services and is progressing towards goals. Pt was only agreeable to modified session today as pt was concerned about SpO2 desaturation when just recently placed on 3L from high flow. Encouraged pt that we will monitor O2 throughout session and rest when feeling SOB. Pt still declined chair transfer or ambulation today despite encouragement. Pt was agreeable to EOB therex and performed standing at EOB with CGA using RW for support. Stood well with no LOB or difficulty and no worsening SOB. Performed LE therex well. Will cont to progress with mobility towards current PT goals. Rec HHPT at d/c. Current Level of Function Impacting Discharge (mobility/balance): minor weakness, SOB           PLAN :  Patient continues to benefit from skilled intervention to address the above impairments. Continue treatment per established plan of care. to address goals. Recommendation for discharge: (in order for the patient to meet his/her long term goals)  Physical therapy at least 2 days/week in the home     This discharge recommendation:  Has been made in collaboration with the attending provider and/or case management    IF patient discharges home will need the following DME: to be determined (TBD)       SUBJECTIVE:   Patient stated Jane Both do you have to bother me when I just got placed on a new oxygen? Ez Solis    OBJECTIVE DATA SUMMARY:   Critical Behavior:  Neurologic State: Alert  Orientation Level: Oriented X4  Cognition: Appropriate decision making     Functional Mobility Training:  Bed Mobility:    Scooting: Contact guard assistance    Transfers:  Sit to Stand: Contact guard assistance  Stand to Sit: Contact guard assistance    Balance:  Sitting: Intact  Standing: Intact; With support  Standing - Static: Constant support(CGA)  Standing - Dynamic : Not tested    Ambulation/Gait Training:  Declined due to wanting to prevent O2 desaturation    Therapeutic Exercises:   10x hip abd/add, LAQ, marches  Pain Ratin/10    Activity Tolerance:   Good and requires rest breaks  Please refer to the flowsheet for vital signs taken during this treatment. After treatment patient left in no apparent distress:   Call bell within reach and sitting up in EOB    COMMUNICATION/COLLABORATION:   The patients plan of care was discussed with: Registered nurse.      Brittany Taylor   Time Calculation: 24 mins         Problem: Mobility Impaired (Adult and Pediatric)  Goal: *Acute Goals and Plan of Care (Insert Text)  Description: Mod I with all bed mob x7 days  SBA with all transfers x 7 days  Amb 25-50ft with LRAD and SBAx1 x7 days  I with LE HEP x7 days  Outcome: Progressing Towards Goal

## 2021-02-03 NOTE — PROGRESS NOTES
IMPRESSION:      1. Acute hypoxic hypercapnic respiratory failure  2. Acute pulmonary edema  3. Acute GI bleed  4. Sepsis  5. History of aortic stenosis coronary artery disease  6. Pericardial and pleural effusion  7. History of COVID-19 pneumonia  8. Symptomatic anemia patient hemoglobin  9. Additional workup outlined below  10. Pt is at high risk of sudden decline and decompensation with life threatening consequenses and continued end organ dysfunction and failure      RECOMMENDATIONS/PLAN:     1. She was extubated on 1/29 but again late evening she was short of breath hypoxic started on noninvasive ventilator BiPAP machine, received blood transfusion and went again into pulmonary edema Lasix given she is now on high flow nasal cannula alert awake talking on 45% FiO2, will change to regular nasal cannula 3 L         patient on 3lpm nc from 35% HFNC. Spo2=93% on 3lpm nc. Will continue to monitor. 2. Patient chest x-ray shows CHF pulmonary edema  patient on Lasix with given history of aortic stenosis and congestive heart failure coronary artery disease three-vessel disease he was awaiting for aortic valve replacement surgery  3. X-ray shows pulmonary edema improved we will continue high-dose Lasix  4. Patient on antibiotic Zosyn panculture  5. Continue with the nebulizer treatment and will change Symbicort to budesonide  6. Symptomatic anemia  7. IV vasopressors for circulatory shock refractory to fluids to maintain SBP> 90   8. Transfuse prn to maintain Hgb > 7  9. Labs to follow electrolytes, renal function and and blood counts  10. Bronchial hygiene with respiratory therapy techniques, bronchodilators  11. Pt needs IV fluids with additives and Drug therapy requiring intensive monitoring for toxicity  12. Prescription drug management with home med reconciliation reviewed  13.  DVT, SUP prophylaxis       [x] High complexity decision making was performed  [x] See my orders for details  HPI  64-year-old lady came in because of generalized weakness and possible GI bleed hemoglobin was 5.4 on admission and patient has dark stool at home cardiologist and gastroenterologist was seen the patient and patient was hospitalized on January 1 because of COVID-19 infection she also has congestive heart failure ejection fraction of 41% coronary artery disease and aortic stenosis history of aortic valve replacement. Yesterday patient was given enema and he developed shortness of breath hypoxia and wheezing she was put on supplemental oxygen patient became unresponsive tachycardic than patient had a rapid response transferred to ICU now intubated on ventilator. Was in flash pulmonary edema received Lasix she was having pink frothy sputum so not critical care consult was called as she is hemodynamically unstable. PMH:  has a past medical history of Acute gastrointestinal hemorrhage (8/28/2020), Allergic rhinitis (8/28/2020), Anemia (8/28/2020), Benign essential hypertension (6/25/2020), Body mass index 30.0-30.9, adult (6/25/2020), CAD (coronary artery disease) (8/28/2020), Foot callus (8/28/2020), GERD (gastroesophageal reflux disease) (6/25/2020), Heart murmur (6/25/2020), colonoscopy (01/01/2015), Left bundle branch block (8/28/2020), Microalbuminuria due to type 2 diabetes mellitus ([address] (8/28/2020), Mixed hyperlipidemia (6/25/2020), Morbid obesity ([address] (8/28/2020), Tobacco dependence syndrome (6/25/2020), and Type II diabetes mellitus, uncontrolled ([address] (6/25/2020). PSH:   has a past surgical history that includes hx colonoscopy (2015). FHX: family history includes Cancer in her mother and sister; Hypertension in her father. SHX:  reports that she quit smoking about 7 years ago. She smoked 0.50 packs per day. She has never used smokeless tobacco. She reports previous alcohol use. She reports that she does not use drugs.     ALL:   Allergies   Allergen Reactions    Aspirin Unknown (comments)     hemorraging - ended up in the hospital        MEDS:   [x] Reviewed - As Below   [] Not reviewed    Current Facility-Administered Medications   Medication    benzocaine-menthoL (CEPACOL) lozenge 1 Lozenge    spironolactone (ALDACTONE) tablet 25 mg    0.9% sodium chloride infusion 250 mL    furosemide (LASIX) injection 40 mg    lisinopriL (PRINIVIL, ZESTRIL) tablet 5 mg    budesonide (PULMICORT) 500 mcg/2 ml nebulizer suspension    albuterol-ipratropium (DUO-NEB) 2.5 MG-0.5 MG/3 ML    piperacillin-tazobactam (ZOSYN) 3.375 g in 0.9% sodium chloride (MBP/ADV) 100 mL MBP    pantoprazole (PROTONIX) tablet 40 mg    carvediloL (COREG) tablet 3.125 mg    0.9% sodium chloride infusion    LORazepam (ATIVAN) tablet 1 mg    acetaminophen (TYLENOL) tablet 650 mg    ondansetron (ZOFRAN ODT) tablet 4 mg    ondansetron (ZOFRAN) injection 4 mg    insulin lispro (HUMALOG) injection    glucose chewable tablet 16 g    glucagon (GLUCAGEN) injection 1 mg    dextrose (D50W) injection syrg 12.5-25 g      MAR reviewed and pertinent medications noted or modified as needed   Current Facility-Administered Medications   Medication    benzocaine-menthoL (CEPACOL) lozenge 1 Lozenge    spironolactone (ALDACTONE) tablet 25 mg    0.9% sodium chloride infusion 250 mL    furosemide (LASIX) injection 40 mg    lisinopriL (PRINIVIL, ZESTRIL) tablet 5 mg    budesonide (PULMICORT) 500 mcg/2 ml nebulizer suspension    albuterol-ipratropium (DUO-NEB) 2.5 MG-0.5 MG/3 ML    piperacillin-tazobactam (ZOSYN) 3.375 g in 0.9% sodium chloride (MBP/ADV) 100 mL MBP    pantoprazole (PROTONIX) tablet 40 mg    carvediloL (COREG) tablet 3.125 mg    0.9% sodium chloride infusion    LORazepam (ATIVAN) tablet 1 mg    acetaminophen (TYLENOL) tablet 650 mg    ondansetron (ZOFRAN ODT) tablet 4 mg    ondansetron (ZOFRAN) injection 4 mg    insulin lispro (HUMALOG) injection    glucose chewable tablet 16 g    glucagon (GLUCAGEN) injection 1 mg    dextrose (D50W) injection syrg 12.5-25 g      PMH:  has a past medical history of Acute gastrointestinal hemorrhage (2020), Allergic rhinitis (2020), Anemia (2020), Benign essential hypertension (2020), Body mass index 30.0-30.9, adult (2020), CAD (coronary artery disease) (2020), Foot callus (2020), GERD (gastroesophageal reflux disease) (2020), Heart murmur (2020), colonoscopy (2015), Left bundle branch block (2020), Microalbuminuria due to type 2 diabetes mellitus ([facility] (2020), Mixed hyperlipidemia (2020), Morbid obesity ([facility] (2020), Tobacco dependence syndrome (2020), and Type II diabetes mellitus, uncontrolled ([facility] (2020). PSH:   has a past surgical history that includes hx colonoscopy (). FHX: family history includes Cancer in her mother and sister; Hypertension in her father. SHX:  reports that she quit smoking about 7 years ago. She smoked 0.50 packs per day. She has never used smokeless tobacco. She reports previous alcohol use. She reports that she does not use drugs. ROS:  Unable to obtain intubated sedated on ventilator    Hemodynamics:    CO:    CI:    CVP:    SVR:   PAP Systolic:    PAP Diastolic:    PVR:    BR27:        Ventilator Settings:      Mode Rate TV Press PEEP FiO2 PIP Min.  Vent   Spontaneous, Pressure support    50 ml 15 cm H2O  8 cm H20 35 %  32 cm H2O  15.9 l/min        Vital Signs: Telemetry:    normal sinus rhythm Intake/Output:   Visit Vitals  BP (!) 145/76   Pulse 78   Temp 97.6 °F (36.4 °C)   Resp 20   Ht 5' 5\" (1.651 m)   Wt 90.7 kg (200 lb)   SpO2 93%   BMI 33.28 kg/m²       Temp (24hrs), Av.7 °F (36.5 °C), Min:97.4 °F (36.3 °C), Max:98.4 °F (36.9 °C)        O2 Device: Nasal cannula O2 Flow Rate (L/min): 3 l/min       Wt Readings from Last 4 Encounters:   21 90.7 kg (200 lb)   21 90.7 kg (200 lb)   20 90.7 kg (200 lb)   20 97.4 kg (214 lb 11.7 oz)          Intake/Output Summary (Last 24 hours) at 2/3/2021 0931  Last data filed at 2/3/2021 0330  Gross per 24 hour   Intake    Output 2500 ml   Net -2500 ml       Last shift:      No intake/output data recorded. Last 3 shifts: 02/01 1901 - 02/03 0700  In: -   Out: 3801 [Urine:3800]       Physical Exam:     General: On noninvasive ventilator BiPAP machine  HEENT: NCAT, poor dentition, lips and mucosa dry  Eyes: anicteric; conjunctiva clear  Neck: no nodes, no cuff leak, trach midline; no accessory MM use. Chest: no deformity,   Cardiac: R regular; no murmur;   Lungs: distant breath sounds; no wheezes  Abd: soft, NT, hypoactive BS  Ext: no edema; no joint swelling;  No clubbing  : NO casas, clear urine  Neuro: sedated;   Psych- no agitation, oriented to person;    Skin: warm, dry, no cyanosis;   Pulses: 1-2+ Bilateral pedal, radial  Capillary: brisk; pale      DATA:    MAR reviewed and pertinent medications noted or modified as needed  MEDS:   Current Facility-Administered Medications   Medication    benzocaine-menthoL (CEPACOL) lozenge 1 Lozenge    spironolactone (ALDACTONE) tablet 25 mg    0.9% sodium chloride infusion 250 mL    furosemide (LASIX) injection 40 mg    lisinopriL (PRINIVIL, ZESTRIL) tablet 5 mg    budesonide (PULMICORT) 500 mcg/2 ml nebulizer suspension    albuterol-ipratropium (DUO-NEB) 2.5 MG-0.5 MG/3 ML    piperacillin-tazobactam (ZOSYN) 3.375 g in 0.9% sodium chloride (MBP/ADV) 100 mL MBP    pantoprazole (PROTONIX) tablet 40 mg    carvediloL (COREG) tablet 3.125 mg    0.9% sodium chloride infusion    LORazepam (ATIVAN) tablet 1 mg    acetaminophen (TYLENOL) tablet 650 mg    ondansetron (ZOFRAN ODT) tablet 4 mg    ondansetron (ZOFRAN) injection 4 mg    insulin lispro (HUMALOG) injection    glucose chewable tablet 16 g    glucagon (GLUCAGEN) injection 1 mg    dextrose (D50W) injection syrg 12.5-25 g        Labs:    Recent Labs     01/31/21  1230   WBC 10.0   HGB 10.3*   *     Recent Labs 01/31/21  1230      K 3.1*   CL 97   CO2 33*   *   BUN 13   CREA 0.79   CA 8.8   MG 1.7     Recent Labs     02/01/21  0400   PH 7.51*   PCO2 46*   PO2 56*   HCO3 36*   FIO2 40.0     No results for input(s): CPK, CKNDX, TROIQ in the last 72 hours. No lab exists for component: CPKMB  No results found for: BNPP, BNP   Lab Results   Component Value Date/Time    Culture result: Scant Normal respiratory duane 01/28/2021 09:43 AM    Culture result: No Growth (<1000 cfu/mL) 01/28/2021 01:00 AM     Lab Results   Component Value Date/Time    TSH, External 2.420 01/16/2019        Imaging:    Results from Hospital Encounter encounter on 01/25/21   XR CHEST PORT    Narrative Chest, frontal view, 2/1/2021. History: CHF. Comparison: Including chest 1/31/2021. Findings: The cardiac silhouette is stable. The lungs are adequately expanded. Pulmonary vascular congestion persists. Opacities in the lungs most pronounced  at the bases is not significantly changed. Pleural effusions are not  significantly changed. No pneumothorax is identified. The osseous structures  are stable. Impression No significant interval change. Results from [place] encounter on 01/25/21   CTA CHEST W OR W WO CONT    Narrative Exam: CTA chest with intravenous contrast    Comparison: Plain radiograph 1/25/2021. CT 1/2/2021. Technique: During intravenous contrast administration (100 cc [id number]), axial  sections were obtained through the chest. Coronal and sagittal reconstructions  were generated. Maximal intensity projection images were generated. Dose reduction: All CT scans at this facility are performed using dose reduction  optimization techniques as appropriate to perform the exam including the  following: automated exposure control, adjustments of the mA and/or kV according  to patient size, or use of iterative reconstruction technique. Findings:  The examination is limited by motion degradation and by adduction of  the patient's upper extremities, which renders scattering and attenuation of the  diagnostic x-ray beam. Nevertheless, there is no demonstrated pulmonary embolus. Endotracheal tube tip in the middle third of the trachea. Distal trachea and  main bronchi poorly expanded. There is calcific atherosclerotic disease of a  nonaneurysmal thoracic aorta, which manifests no evidence of intimal  incompetence. Calcific atherosclerotic coronary arterial disease is present. Prominent bilateral mediastinal and hilar lymph nodes, increased compared to  1/2/2021, likely inflammatory. Small to moderate pericardial effusion of near  water attenuation fluid. It is 2 cm greatest axial thickness. Small bilateral  pleural effusions. Bilateral dependent airspace disease compatible with  pneumonia. Interstitial disease also present, compatible with pulmonary edema. Included abdomen indicates a stable 2 cm left adrenal nodule of water  attenuation, compatible with a benign adenoma. No axillary or supraclavicular  adenopathy or mass. Visualized thoracic bone scaffolding unremarkable. Impression Impression:  1. No evidence of pulmonary embolus. 2. Bilateral dependent airspace disease compatible with pneumonia. 3. Diffuse pulmonary interstitial disease compatible with pulmonary edema. 4. Bilateral pleural effusions. 5. Coronary artery calcific atherosclerotic disease. 6. Small to moderate pericardial effusion. 7. Distal trachea and main bronchi are poorly expanded.

## 2021-02-03 NOTE — ROUTINE PROCESS
Bedside and Verbal shift change report given to Matheus Wolf (oncoming nurse) by Arlet Antunez (offgoing nurse). Report included the following information SBAR, MAR and Recent Results.

## 2021-02-03 NOTE — ROUTINE PROCESS
Bedside shift change report given to Rafal Waldrop RN (oncoming nurse) by Dellia Hamman, RN (offgoing nurse).  Report included the following information SBAR.

## 2021-02-04 LAB
ANION GAP SERPL CALC-SCNC: 6 MMOL/L (ref 5–15)
BASOPHILS # BLD: 0 K/UL (ref 0–0.1)
BASOPHILS NFR BLD: 0 % (ref 0–1)
BUN SERPL-MCNC: 14 MG/DL (ref 6–20)
BUN/CREAT SERPL: 25 (ref 12–20)
CA-I BLD-MCNC: 9.3 MG/DL (ref 8.5–10.1)
CHLORIDE SERPL-SCNC: 97 MMOL/L (ref 97–108)
CO2 SERPL-SCNC: 35 MMOL/L (ref 21–32)
CREAT SERPL-MCNC: 0.57 MG/DL (ref 0.55–1.02)
DIFFERENTIAL METHOD BLD: ABNORMAL
EOSINOPHIL # BLD: 0.1 K/UL (ref 0–0.4)
EOSINOPHIL NFR BLD: 1 % (ref 0–7)
ERYTHROCYTE [DISTWIDTH] IN BLOOD BY AUTOMATED COUNT: 25.6 % (ref 11.5–14.5)
GLUCOSE BLD STRIP.AUTO-MCNC: 145 MG/DL (ref 65–100)
GLUCOSE BLD STRIP.AUTO-MCNC: 196 MG/DL (ref 65–100)
GLUCOSE BLD STRIP.AUTO-MCNC: 197 MG/DL (ref 65–100)
GLUCOSE BLD STRIP.AUTO-MCNC: 293 MG/DL (ref 65–100)
GLUCOSE SERPL-MCNC: 109 MG/DL (ref 65–100)
HCT VFR BLD AUTO: 34.3 % (ref 35–47)
HGB BLD-MCNC: 10.4 G/DL (ref 11.5–16)
IMM GRANULOCYTES # BLD AUTO: 0.1 K/UL (ref 0–0.04)
IMM GRANULOCYTES NFR BLD AUTO: 1 % (ref 0–0.5)
LYMPHOCYTES # BLD: 1.6 K/UL (ref 0.8–3.5)
LYMPHOCYTES NFR BLD: 18 % (ref 12–49)
MCH RBC QN AUTO: 23.3 PG (ref 26–34)
MCHC RBC AUTO-ENTMCNC: 30.3 G/DL (ref 30–36.5)
MCV RBC AUTO: 76.9 FL (ref 80–99)
MONOCYTES # BLD: 0.7 K/UL (ref 0–1)
MONOCYTES NFR BLD: 8 % (ref 5–13)
NEUTS SEG # BLD: 6.6 K/UL (ref 1.8–8)
NEUTS SEG NFR BLD: 72 % (ref 32–75)
PERFORMED BY, TECHID: ABNORMAL
PLATELET # BLD AUTO: 476 K/UL (ref 150–400)
PMV BLD AUTO: 8.6 FL (ref 8.9–12.9)
POTASSIUM SERPL-SCNC: 3.2 MMOL/L (ref 3.5–5.1)
RBC # BLD AUTO: 4.46 M/UL (ref 3.8–5.2)
SODIUM SERPL-SCNC: 138 MMOL/L (ref 136–145)
WBC # BLD AUTO: 9 K/UL (ref 3.6–11)

## 2021-02-04 PROCEDURE — 74011250636 HC RX REV CODE- 250/636: Performed by: NURSE PRACTITIONER

## 2021-02-04 PROCEDURE — 36415 COLL VENOUS BLD VENIPUNCTURE: CPT

## 2021-02-04 PROCEDURE — 74011250637 HC RX REV CODE- 250/637: Performed by: INTERNAL MEDICINE

## 2021-02-04 PROCEDURE — 74011250636 HC RX REV CODE- 250/636: Performed by: INTERNAL MEDICINE

## 2021-02-04 PROCEDURE — 74011000250 HC RX REV CODE- 250: Performed by: INTERNAL MEDICINE

## 2021-02-04 PROCEDURE — 80048 BASIC METABOLIC PNL TOTAL CA: CPT

## 2021-02-04 PROCEDURE — 82962 GLUCOSE BLOOD TEST: CPT

## 2021-02-04 PROCEDURE — 74011250637 HC RX REV CODE- 250/637: Performed by: HOSPITALIST

## 2021-02-04 PROCEDURE — 85025 COMPLETE CBC W/AUTO DIFF WBC: CPT

## 2021-02-04 PROCEDURE — 94760 N-INVAS EAR/PLS OXIMETRY 1: CPT

## 2021-02-04 PROCEDURE — 74011000258 HC RX REV CODE- 258: Performed by: INTERNAL MEDICINE

## 2021-02-04 PROCEDURE — 77010033678 HC OXYGEN DAILY

## 2021-02-04 PROCEDURE — 74011636637 HC RX REV CODE- 636/637: Performed by: NURSE PRACTITIONER

## 2021-02-04 PROCEDURE — 65270000029 HC RM PRIVATE

## 2021-02-04 PROCEDURE — 94640 AIRWAY INHALATION TREATMENT: CPT

## 2021-02-04 RX ADMIN — INSULIN LISPRO 2 UNITS: 100 INJECTION, SOLUTION INTRAVENOUS; SUBCUTANEOUS at 10:38

## 2021-02-04 RX ADMIN — PANTOPRAZOLE SODIUM 40 MG: 40 TABLET, DELAYED RELEASE ORAL at 10:38

## 2021-02-04 RX ADMIN — POTASSIUM CHLORIDE 40 MEQ: 1500 TABLET, EXTENDED RELEASE ORAL at 10:38

## 2021-02-04 RX ADMIN — BUDESONIDE 500 MCG: 0.5 INHALANT RESPIRATORY (INHALATION) at 19:27

## 2021-02-04 RX ADMIN — CARVEDILOL 3.12 MG: 3.12 TABLET, FILM COATED ORAL at 10:38

## 2021-02-04 RX ADMIN — POTASSIUM CHLORIDE 40 MEQ: 1500 TABLET, EXTENDED RELEASE ORAL at 21:12

## 2021-02-04 RX ADMIN — CARVEDILOL 3.12 MG: 3.12 TABLET, FILM COATED ORAL at 17:50

## 2021-02-04 RX ADMIN — IPRATROPIUM BROMIDE AND ALBUTEROL SULFATE 3 ML: .5; 3 SOLUTION RESPIRATORY (INHALATION) at 08:19

## 2021-02-04 RX ADMIN — IPRATROPIUM BROMIDE AND ALBUTEROL SULFATE 3 ML: .5; 3 SOLUTION RESPIRATORY (INHALATION) at 13:19

## 2021-02-04 RX ADMIN — PIPERACILLIN SODIUM AND TAZOBACTAM SODIUM 3.38 G: 3; .375 INJECTION, POWDER, LYOPHILIZED, FOR SOLUTION INTRAVENOUS at 06:10

## 2021-02-04 RX ADMIN — LISINOPRIL 5 MG: 5 TABLET ORAL at 10:38

## 2021-02-04 RX ADMIN — BUDESONIDE 500 MCG: 0.5 INHALANT RESPIRATORY (INHALATION) at 08:19

## 2021-02-04 RX ADMIN — INSULIN LISPRO 2 UNITS: 100 INJECTION, SOLUTION INTRAVENOUS; SUBCUTANEOUS at 17:51

## 2021-02-04 RX ADMIN — FUROSEMIDE 40 MG: 10 INJECTION, SOLUTION INTRAMUSCULAR; INTRAVENOUS at 21:13

## 2021-02-04 RX ADMIN — SPIRONOLACTONE 25 MG: 25 TABLET ORAL at 21:12

## 2021-02-04 RX ADMIN — FUROSEMIDE 40 MG: 10 INJECTION, SOLUTION INTRAMUSCULAR; INTRAVENOUS at 10:37

## 2021-02-04 RX ADMIN — PIPERACILLIN SODIUM AND TAZOBACTAM SODIUM 3.38 G: 3; .375 INJECTION, POWDER, LYOPHILIZED, FOR SOLUTION INTRAVENOUS at 21:13

## 2021-02-04 RX ADMIN — IPRATROPIUM BROMIDE AND ALBUTEROL SULFATE 3 ML: .5; 3 SOLUTION RESPIRATORY (INHALATION) at 00:04

## 2021-02-04 RX ADMIN — IPRATROPIUM BROMIDE AND ALBUTEROL SULFATE 3 ML: .5; 3 SOLUTION RESPIRATORY (INHALATION) at 19:27

## 2021-02-04 RX ADMIN — SODIUM CHLORIDE 50 ML/HR: 9 INJECTION, SOLUTION INTRAVENOUS at 21:13

## 2021-02-04 RX ADMIN — SPIRONOLACTONE 25 MG: 25 TABLET ORAL at 10:38

## 2021-02-04 RX ADMIN — PIPERACILLIN SODIUM AND TAZOBACTAM SODIUM 3.38 G: 3; .375 INJECTION, POWDER, LYOPHILIZED, FOR SOLUTION INTRAVENOUS at 10:37

## 2021-02-04 RX ADMIN — INSULIN LISPRO 6 UNITS: 100 INJECTION, SOLUTION INTRAVENOUS; SUBCUTANEOUS at 21:13

## 2021-02-04 NOTE — PROGRESS NOTES
IMPRESSION:      1. Acute hypoxic hypercapnic respiratory failure  2. Acute pulmonary edema  3. Acute GI bleed  4. Sepsis  5. History of aortic stenosis coronary artery disease  6. Pericardial and pleural effusion  7. History of COVID-19 pneumonia  8. Symptomatic anemia patient hemoglobin  9. Additional workup outlined below  10. Pt is at high risk of sudden decline and decompensation with life threatening consequenses and continued end organ dysfunction and failure      RECOMMENDATIONS/PLAN:     1. She was extubated on 1/29 now on nasal cannula 3 L         patient on 3lpm nc from 35% HFNC. Spo2=93% on 3lpm nc. Will continue to monitor. 2. Patient chest x-ray shows CHF pulmonary edema  patient on Lasix with given history of aortic stenosis and congestive heart failure coronary artery disease three-vessel disease he was awaiting for aortic valve replacement surgery  3. X-ray shows pulmonary edema improved we will continue high-dose Lasix  4. Patient on antibiotic Zosyn panculture  5. Continue with the nebulizer treatment and will change Symbicort to budesonide  6. Symptomatic anemia  7. Transfuse prn to maintain Hgb > 7  8. Labs to follow electrolytes, renal function and and blood counts  9. Bronchial hygiene with respiratory therapy techniques, bronchodilators  10. Pt needs IV fluids with additives and Drug therapy requiring intensive monitoring for toxicity  11. Prescription drug management with home med reconciliation reviewed  12.  DVT, SUP prophylaxis       [x] High complexity decision making was performed  [x] See my orders for details  HPI  78-year-old lady came in because of generalized weakness and possible GI bleed hemoglobin was 5.4 on admission and patient has dark stool at home cardiologist and gastroenterologist was seen the patient and patient was hospitalized on January 1 because of COVID-19 infection she also has congestive heart failure ejection fraction of 41% coronary artery disease and aortic stenosis history of aortic valve replacement. Yesterday patient was given enema and he developed shortness of breath hypoxia and wheezing she was put on supplemental oxygen patient became unresponsive tachycardic than patient had a rapid response transferred to ICU now intubated on ventilator. Was in flash pulmonary edema received Lasix she was having pink frothy sputum so not critical care consult was called as she is hemodynamically unstable. PMH:  has a past medical history of Acute gastrointestinal hemorrhage (8/28/2020), Allergic rhinitis (8/28/2020), Anemia (8/28/2020), Benign essential hypertension (6/25/2020), Body mass index 30.0-30.9, adult (6/25/2020), CAD (coronary artery disease) (8/28/2020), Foot callus (8/28/2020), GERD (gastroesophageal reflux disease) (6/25/2020), Heart murmur (6/25/2020), colonoscopy (01/01/2015), Left bundle branch block (8/28/2020), Microalbuminuria due to type 2 diabetes mellitus ([facility] [address] (8/28/2020), Mixed hyperlipidemia (6/25/2020), Morbid obesity ([address] (8/28/2020), Tobacco dependence syndrome (6/25/2020), and Type II diabetes mellitus, uncontrolled ([address] (6/25/2020). PSH:   has a past surgical history that includes hx colonoscopy (2015). FHX: family history includes Cancer in her mother and sister; Hypertension in her father. SHX:  reports that she quit smoking about 7 years ago. She smoked 0.50 packs per day. She has never used smokeless tobacco. She reports previous alcohol use. She reports that she does not use drugs.     ALL:   Allergies   Allergen Reactions    Aspirin Unknown (comments)     hemorraging - ended up in the hospital        MEDS:   [x] Reviewed - As Below   [] Not reviewed    Current Facility-Administered Medications   Medication    potassium chloride (K-DUR, KLOR-CON) SR tablet 40 mEq    benzocaine-menthoL (CEPACOL) lozenge 1 Lozenge    spironolactone (ALDACTONE) tablet 25 mg    0.9% sodium chloride infusion 250 mL    furosemide (LASIX) injection 40 mg    lisinopriL (PRINIVIL, ZESTRIL) tablet 5 mg    budesonide (PULMICORT) 500 mcg/2 ml nebulizer suspension    albuterol-ipratropium (DUO-NEB) 2.5 MG-0.5 MG/3 ML    piperacillin-tazobactam (ZOSYN) 3.375 g in 0.9% sodium chloride (MBP/ADV) 100 mL MBP    pantoprazole (PROTONIX) tablet 40 mg    carvediloL (COREG) tablet 3.125 mg    0.9% sodium chloride infusion    LORazepam (ATIVAN) tablet 1 mg    acetaminophen (TYLENOL) tablet 650 mg    ondansetron (ZOFRAN ODT) tablet 4 mg    ondansetron (ZOFRAN) injection 4 mg    insulin lispro (HUMALOG) injection    glucose chewable tablet 16 g    glucagon (GLUCAGEN) injection 1 mg    dextrose (D50W) injection syrg 12.5-25 g      MAR reviewed and pertinent medications noted or modified as needed   Current Facility-Administered Medications   Medication    potassium chloride (K-DUR, KLOR-CON) SR tablet 40 mEq    benzocaine-menthoL (CEPACOL) lozenge 1 Lozenge    spironolactone (ALDACTONE) tablet 25 mg    0.9% sodium chloride infusion 250 mL    furosemide (LASIX) injection 40 mg    lisinopriL (PRINIVIL, ZESTRIL) tablet 5 mg    budesonide (PULMICORT) 500 mcg/2 ml nebulizer suspension    albuterol-ipratropium (DUO-NEB) 2.5 MG-0.5 MG/3 ML    piperacillin-tazobactam (ZOSYN) 3.375 g in 0.9% sodium chloride (MBP/ADV) 100 mL MBP    pantoprazole (PROTONIX) tablet 40 mg    carvediloL (COREG) tablet 3.125 mg    0.9% sodium chloride infusion    LORazepam (ATIVAN) tablet 1 mg    acetaminophen (TYLENOL) tablet 650 mg    ondansetron (ZOFRAN ODT) tablet 4 mg    ondansetron (ZOFRAN) injection 4 mg    insulin lispro (HUMALOG) injection    glucose chewable tablet 16 g    glucagon (GLUCAGEN) injection 1 mg    dextrose (D50W) injection syrg 12.5-25 g      PMH:  has a past medical history of Acute gastrointestinal hemorrhage (8/28/2020), Allergic rhinitis (8/28/2020), Anemia (8/28/2020), Benign essential hypertension (6/25/2020), Body mass index 30.0-30.9, adult (2020), CAD (coronary artery disease) (2020), Foot callus (2020), GERD (gastroesophageal reflux disease) (2020), Heart murmur (2020), colonoscopy (2015), Left bundle branch block (2020), Microalbuminuria due to type 2 diabetes mellitus ([facility] [address] (2020), Mixed hyperlipidemia (2020), Morbid obesity ([facility] [address] (2020), Tobacco dependence syndrome (2020), and Type II diabetes mellitus, uncontrolled ([facility] [address] (2020). PSH:   has a past surgical history that includes hx colonoscopy (). FHX: family history includes Cancer in her mother and sister; Hypertension in her father. SHX:  reports that she quit smoking about 7 years ago. She smoked 0.50 packs per day. She has never used smokeless tobacco. She reports previous alcohol use. She reports that she does not use drugs. ROS:  Unable to obtain intubated sedated on ventilator    Hemodynamics:    CO:    CI:    CVP:    SVR:   PAP Systolic:    PAP Diastolic:    PVR:    JP95:        Ventilator Settings:      Mode Rate TV Press PEEP FiO2 PIP Min.  Vent   Spontaneous, Pressure support    50 ml 15 cm H2O  8 cm H20 35 %  32 cm H2O  15.9 l/min        Vital Signs: Telemetry:    normal sinus rhythm Intake/Output:   Visit Vitals  /67 (BP 1 Location: Left upper arm, BP Patient Position: At rest)   Pulse 83   Temp 97.7 °F (36.5 °C)   Resp 20   Ht 5' 5\" (1.651 m)   Wt 90.7 kg (200 lb)   SpO2 91%   BMI 33.28 kg/m²       Temp (24hrs), Av.7 °F (36.5 °C), Min:97.6 °F (36.4 °C), Max:97.9 °F (36.6 °C)        O2 Device: Nasal cannula O2 Flow Rate (L/min): 3 l/min       Wt Readings from Last 4 Encounters:   21 90.7 kg (200 lb)   21 90.7 kg (200 lb)   20 90.7 kg (200 lb)   20 97.4 kg (214 lb 11.7 oz)          Intake/Output Summary (Last 24 hours) at 2021 1105  Last data filed at 2021 0740  Gross per 24 hour   Intake    Output 2550 ml   Net -2550 ml       Last shift:       07 -  1900  In: -   Out: 550 [Urine:550]  Last 3 shifts: 02/02 1901 - 02/04 0700  In: -   Out: 3532 [Urine:3700]       Physical Exam:     General: On noninvasive ventilator BiPAP machine  HEENT: NCAT, poor dentition, lips and mucosa dry  Eyes: anicteric; conjunctiva clear  Neck: no nodes, no cuff leak, trach midline; no accessory MM use. Chest: no deformity,   Cardiac: R regular; no murmur;   Lungs: distant breath sounds; no wheezes  Abd: soft, NT, hypoactive BS  Ext: no edema; no joint swelling;  No clubbing  : NO casas, clear urine  Neuro: sedated;   Psych- no agitation, oriented to person;    Skin: warm, dry, no cyanosis;   Pulses: 1-2+ Bilateral pedal, radial  Capillary: brisk; pale      DATA:    MAR reviewed and pertinent medications noted or modified as needed  MEDS:   Current Facility-Administered Medications   Medication    potassium chloride (K-DUR, KLOR-CON) SR tablet 40 mEq    benzocaine-menthoL (CEPACOL) lozenge 1 Lozenge    spironolactone (ALDACTONE) tablet 25 mg    0.9% sodium chloride infusion 250 mL    furosemide (LASIX) injection 40 mg    lisinopriL (PRINIVIL, ZESTRIL) tablet 5 mg    budesonide (PULMICORT) 500 mcg/2 ml nebulizer suspension    albuterol-ipratropium (DUO-NEB) 2.5 MG-0.5 MG/3 ML    piperacillin-tazobactam (ZOSYN) 3.375 g in 0.9% sodium chloride (MBP/ADV) 100 mL MBP    pantoprazole (PROTONIX) tablet 40 mg    carvediloL (COREG) tablet 3.125 mg    0.9% sodium chloride infusion    LORazepam (ATIVAN) tablet 1 mg    acetaminophen (TYLENOL) tablet 650 mg    ondansetron (ZOFRAN ODT) tablet 4 mg    ondansetron (ZOFRAN) injection 4 mg    insulin lispro (HUMALOG) injection    glucose chewable tablet 16 g    glucagon (GLUCAGEN) injection 1 mg    dextrose (D50W) injection syrg 12.5-25 g        Labs:    Recent Labs     02/04/21  0224 02/03/21  0815   WBC 9.0 7.5   HGB 10.4* 10.6*   * 505*     Recent Labs     02/04/21  0224 02/03/21  0815    138   K 3.2* 2.6*   CL 97 97 CO2 35* 35*   * 107*   BUN 14 12   CREA 0.57 0.50*   CA 9.3 9.2     No results for input(s): PH, PCO2, PO2, HCO3, FIO2 in the last 72 hours. No results for input(s): CPK, CKNDX, TROIQ in the last 72 hours. No lab exists for component: CPKMB  No results found for: BNPP, BNP   Lab Results   Component Value Date/Time    Culture result: Scant Normal respiratory duane 01/28/2021 09:43 AM    Culture result: No Growth (<1000 cfu/mL) 01/28/2021 01:00 AM     Lab Results   Component Value Date/Time    TSH, External 2.420 01/16/2019        Imaging:    Results from Hospital Encounter encounter on 01/25/21   XR CHEST PORT    Narrative Chest, frontal view, 2/1/2021. History: CHF. Comparison: Including chest 1/31/2021. Findings: The cardiac silhouette is stable. The lungs are adequately expanded. Pulmonary vascular congestion persists. Opacities in the lungs most pronounced  at the bases is not significantly changed. Pleural effusions are not  significantly changed. No pneumothorax is identified. The osseous structures  are stable. Impression No significant interval change. Results from [place] encounter on 01/25/21   CTA CHEST W OR W WO CONT    Narrative Exam: CTA chest with intravenous contrast    Comparison: Plain radiograph 1/25/2021. CT 1/2/2021. Technique: During intravenous contrast administration (100 cc [id number]), axial  sections were obtained through the chest. Coronal and sagittal reconstructions  were generated. Maximal intensity projection images were generated. Dose reduction: All CT scans at this facility are performed using dose reduction  optimization techniques as appropriate to perform the exam including the  following: automated exposure control, adjustments of the mA and/or kV according  to patient size, or use of iterative reconstruction technique. Findings:  The examination is limited by motion degradation and by adduction of  the patient's upper extremities, which renders scattering and attenuation of the  diagnostic x-ray beam. Nevertheless, there is no demonstrated pulmonary embolus. Endotracheal tube tip in the middle third of the trachea. Distal trachea and  main bronchi poorly expanded. There is calcific atherosclerotic disease of a  nonaneurysmal thoracic aorta, which manifests no evidence of intimal  incompetence. Calcific atherosclerotic coronary arterial disease is present. Prominent bilateral mediastinal and hilar lymph nodes, increased compared to  1/2/2021, likely inflammatory. Small to moderate pericardial effusion of near  water attenuation fluid. It is 2 cm greatest axial thickness. Small bilateral  pleural effusions. Bilateral dependent airspace disease compatible with  pneumonia. Interstitial disease also present, compatible with pulmonary edema. Included abdomen indicates a stable 2 cm left adrenal nodule of water  attenuation, compatible with a benign adenoma. No axillary or supraclavicular  adenopathy or mass. Visualized thoracic bone scaffolding unremarkable. Impression Impression:  1. No evidence of pulmonary embolus. 2. Bilateral dependent airspace disease compatible with pneumonia. 3. Diffuse pulmonary interstitial disease compatible with pulmonary edema. 4. Bilateral pleural effusions. 5. Coronary artery calcific atherosclerotic disease. 6. Small to moderate pericardial effusion. 7. Distal trachea and main bronchi are poorly expanded.

## 2021-02-04 NOTE — PROGRESS NOTES
Spiritual Care Assessment/Progress Note  [facility]      NAME: Li Zarate      MRN: [id number]  AGE: 77 y.o.  SEX: female  Yazdanism Affiliation: Mosque   Language: English     2/4/2021     Total Time (in minutes): 15     Spiritual Assessment begun in [address] through conversation with:         [x]Patient        [] Family    [] Friend(s)        Reason for Consult: Initial/Spiritual assessment, patient floor     Spiritual beliefs: (Please include comment if needed)     [x] Identifies with a sherita tradition:         [] Supported by a sherita community:            [] Claims no spiritual orientation:           [] Seeking spiritual identity:                [] Adheres to an individual form of spirituality:           [] Not able to assess:                           Identified resources for coping:      [x] Prayer                               [] Music                  [] Guided Imagery     [] Family/friends                 [] Pet visits     [] Devotional reading                         [] Unknown     [] Other:                                               Interventions offered during this visit: (See comments for more details)    Patient Interventions: Affirmation of emotions/emotional suffering, Affirmation of sherita, Catharsis/review of pertinent events in supportive environment, Coping skills reviewed/reinforced, Iconic (affirming the presence of God/Higher Power), Initial/Spiritual assessment, patient floor, Life review/legacy, Normalization of emotional/spiritual concerns, Prayer (actual), Prayer (assurance of), Yazdanism beliefs/image of God discussed           Plan of Care:     [] Support spiritual and/or cultural needs    [] Support AMD and/or advance care planning process      [] Support grieving process   [] Coordinate Rites and/or Rituals    [] Coordination with community clergy   [] No spiritual needs identified at this time   [] Detailed Plan of Care below (See Comments)  [] Make referral to Music Therapy  [] Make referral to Pet Therapy     [] Make referral to Addiction services  [] Make referral to [facility]  [] Make referral to Spiritual Care Partner  [] No future visits requested        [x] Follow up upon further referrals     Comments:  Visited patient in [facility] med/oncology during rounds. Only the patient was present during the visit. Patient shared about her medical history and support she receives from her family, especially her daughter. She expressed the importance of sherita and prayer in her life and appeared to use her spiritual resources during her sharing. She shared about her upcoming procedure for which she stated she is now ready. I listened with empathy and reflection while facilitating storytelling. I normalized her concern about her health and provided caring presence. I explored with her her needs and hopes. I offered to pray which she gladly accepted and for which she expressed gratitude. I advised of  availability. Chaplains will follow up if further referrals are requested. Chaplain Elda De Guzman M.Div.    can be reached by calling the  at [facility]  [phone]

## 2021-02-04 NOTE — PROGRESS NOTES
Dr Bobbi Hernández called and informing of patient's telemetry reading of 2nd degree type 2 , no orders given , continue monitoring.

## 2021-02-04 NOTE — ROUTINE PROCESS
Bedside shift change report given Tri Mccall RN (oncoming nurse) by Linsey Miles RN (offgoing nurse).  Report included the following information SBAR.

## 2021-02-04 NOTE — ROUTINE PROCESS
Bedside and Verbal shift change report given to [facility] NABIL (oncoming nurse) by Shauna Mccoy (offgoing nurse). Report included the following information SBAR, MAR and Recent Results.

## 2021-02-04 NOTE — ROUTINE PROCESS
Primary Nurse Reyes Begum RN and Mau Mccann RN performed a dual skin assessment on this patient , no impairment noted ; bruise noted to  antecubital region of right arm.

## 2021-02-04 NOTE — PROGRESS NOTES
Hospitalist Progress Note               Daily Progress Note: 2/4/2021      Subjective:   77year old female with past medical history of systolic heart failure, CAD, and aortic stenosis. She was supposed to have her aortic valve replaced and CABG procedure, but was cancelled due to the current pandemic. admitted on 1/25/2021 for melena. Hemoglobin at that time was 5.4 and given 2 units of PRBCs , on 01/31/2021 hbg was 10.3. Patient was recently hospitalized for COVID-19 infection and discharged home with oxygen. Patient was intubated on 01/28/21 due pulmonary edema and was extubated on 01/29/2021. The patient is seen for follow  up. Today patient sitting up in chair on supplemental O2, NC- She was on 4lpm O2 we just decreased to 2lpm This afternoon. Patient reports that she feels her breathing is improving.     Medications reviewed  Current Facility-Administered Medications   Medication Dose Route Frequency    potassium chloride (K-DUR, KLOR-CON) SR tablet 40 mEq  40 mEq Oral BID    benzocaine-menthoL (CEPACOL) lozenge 1 Lozenge  1 Lozenge Mucous Membrane PRN    spironolactone (ALDACTONE) tablet 25 mg  25 mg Oral BID    0.9% sodium chloride infusion 250 mL  250 mL IntraVENous PRN    furosemide (LASIX) injection 40 mg  40 mg IntraVENous Q12H    lisinopriL (PRINIVIL, ZESTRIL) tablet 5 mg  5 mg Oral DAILY    budesonide (PULMICORT) 500 mcg/2 ml nebulizer suspension  500 mcg Nebulization BID RT    albuterol-ipratropium (DUO-NEB) 2.5 MG-0.5 MG/3 ML  3 mL Nebulization Q6H RT    piperacillin-tazobactam (ZOSYN) 3.375 g in 0.9% sodium chloride (MBP/ADV) 100 mL MBP  3.375 g IntraVENous Q8H    pantoprazole (PROTONIX) tablet 40 mg  40 mg Oral ACB    carvediloL (COREG) tablet 3.125 mg  3.125 mg Oral BID WITH MEALS    0.9% sodium chloride infusion  50 mL/hr IntraVENous CONTINUOUS    LORazepam (ATIVAN) tablet 1 mg  1 mg Oral QHS    acetaminophen (TYLENOL) tablet 650 mg  650 mg Oral Q4H PRN    ondansetron (ZOFRAN ODT) tablet 4 mg  4 mg Oral Q8H PRN    ondansetron (ZOFRAN) injection 4 mg  4 mg IntraVENous Q6H PRN    insulin lispro (HUMALOG) injection   SubCUTAneous AC&HS    glucose chewable tablet 16 g  4 Tab Oral PRN    glucagon (GLUCAGEN) injection 1 mg  1 mg IntraMUSCular PRN    dextrose (D50W) injection syrg 12.5-25 g  25-50 mL IntraVENous PRN       Review of Systems:   A comprehensive review of systems was negative except for that written in the HPI. Objective:   Physical Exam:     Visit Vitals  /67 (BP 1 Location: Left upper arm, BP Patient Position: At rest)   Pulse 83   Temp 97.7 °F (36.5 °C)   Resp 20   Ht 5' 5\" (1.651 m)   Wt 90.7 kg (200 lb)   SpO2 95%   BMI 33.28 kg/m²    O2 Flow Rate (L/min): 2 l/min O2 Device: Nasal cannula    Temp (24hrs), Av.7 °F (36.5 °C), Min:97.6 °F (36.4 °C), Max:97.9 °F (36.6 °C)    701 -  1900  In: -   Out: 550 [Urine:550]   1901 -  0700  In: -   Out: 5813 [Urine:3700]    PHYSICAL EXAM:  General: Awake and alert  Skin: Extremities and face reveal no rashes. HEENT: Sclerae anicteric. Extra-occular muscles are intact. No oral ulcers. No ENT discharge. The neck is supple. Cardiovascular: Murmur appreciated. Regular rate and rhythm. No gallops, or rubs. PMI nondisplaced. Respiratory: Comfortable breathing with no accessory muscle use. Clear breath sounds with no wheezes, rales, or rhonchi. GI: Abdomen nondistended, soft, and nontender. Normal active bowel sounds. No masses palpable. : Patient has casas, patent, urine clear. Rectal: Deferred   Musculoskeletal: No pitting edema of the lower legs. Extremities have good range of motion. No costovertebral tenderness. Neurological: Gross memory appears intact. Patient is alert and oriented. Power 5/5, Cranial nerves II-XII intact  Psychiatric: Mood appears appropriate with judgement intact.        Data Review:       Recent Days:  Recent Labs     21  0224 21  0815   WBC 9.0 7.5 HGB 10.4* 10.6*   HCT 34.3* 34.4*   * 505*     Recent Labs     02/04/21  0224 02/03/21  0815    138   K 3.2* 2.6*   CL 97 97   CO2 35* 35*   * 107*   BUN 14 12   CREA 0.57 0.50*   CA 9.3 9.2     No results for input(s): PH, PCO2, PO2, HCO3, FIO2 in the last 72 hours. CBC:   Lab Results   Component Value Date/Time    WBC 9.0 02/04/2021 02:24 AM    RBC 4.46 02/04/2021 02:24 AM    HGB 10.4 (L) 02/04/2021 02:24 AM    HCT 34.3 (L) 02/04/2021 02:24 AM    PLATELET 986 (H) 39/51/9846 02:24 AM    HGB, EXTERNAL 14.6 11/25/2019    Hct, External 42.2 11/25/2019     BMP:   Lab Results   Component Value Date/Time    Glucose 109 (H) 02/04/2021 02:24 AM    Sodium 138 02/04/2021 02:24 AM    Potassium 3.2 (L) 02/04/2021 02:24 AM    Chloride 97 02/04/2021 02:24 AM    CO2 35 (H) 02/04/2021 02:24 AM    BUN 14 02/04/2021 02:24 AM    Creatinine 0.57 02/04/2021 02:24 AM    Calcium 9.3 02/04/2021 02:24 AM     CMP:   Lab Results   Component Value Date/Time    Glucose 109 (H) 02/04/2021 02:24 AM    Sodium 138 02/04/2021 02:24 AM    Potassium 3.2 (L) 02/04/2021 02:24 AM    Chloride 97 02/04/2021 02:24 AM    CO2 35 (H) 02/04/2021 02:24 AM    BUN 14 02/04/2021 02:24 AM    Creatinine 0.57 02/04/2021 02:24 AM    Calcium 9.3 02/04/2021 02:24 AM    Anion gap 6 02/04/2021 02:24 AM    BUN/Creatinine ratio 25 (H) 02/04/2021 02:24 AM    Alk.  phosphatase 44 (L) 01/29/2021 03:30 AM    Protein, total 4.9 (L) 01/29/2021 03:30 AM    Albumin 2.0 (L) 01/29/2021 03:30 AM    Globulin 2.9 01/29/2021 03:30 AM    A-G Ratio 0.7 (L) 01/29/2021 03:30 AM     Coagulation:   Lab Results   Component Value Date/Time    Prothrombin time 14.4 01/28/2021 02:30 AM    INR 1.1 01/28/2021 02:30 AM    aPTT 28.6 01/28/2021 02:30 AM     ABGs:   Lab Results   Component Value Date/Time    pH 7.51 (H) 02/01/2021 04:00 AM    PO2 56 (L) 02/01/2021 04:00 AM    PCO2 46 (H) 02/01/2021 04:00 AM    BICARBONATE 36 (H) 02/01/2021 04:00 AM    BASE EXCESS 12.5 (H) 02/01/2021 04:00 AM       XR CHEST PORT   Final Result   No significant interval change. XR CHEST PORT   Final Result   FINDINGS/IMPRESSION:   Radiograph is limited by patient's body habitus and AP portable technique. Lungs remain hypoinflated with bibasilar atelectasis and probable small   bilateral pleural effusions. Cardiac silhouette stable in size. No radiographically apparent pneumothorax. Mild improvement in bilateral airspace opacity, this is most confluent at the   right lung base. This may represent multilobar pneumonia or edema, or perhaps a   combination of each. XR CHEST PORT   Final Result   FINDINGS/IMPRESSION:   Interval extubation and removal of enteric tube. Lungs similarly inflated. Cardiac silhouette stable in size. No radiographically   apparent pneumothorax. Persistent patchy bilateral airspace opacity. This may represent multilobar   pneumonia or edema, or perhaps a combination of each. XR CHEST PORT   Final Result      CT HEAD WO CONT   Final Result   No acute or active intracranial process. CTA CHEST W OR W WO CONT   Final Result   Impression:   1. No evidence of pulmonary embolus. 2. Bilateral dependent airspace disease compatible with pneumonia. 3. Diffuse pulmonary interstitial disease compatible with pulmonary edema. 4. Bilateral pleural effusions. 5. Coronary artery calcific atherosclerotic disease. 6. Small to moderate pericardial effusion. 7. Distal trachea and main bronchi are poorly expanded. XR CHEST PORT   Final Result   Impression:    1. Apparent satisfactory tracheal intubation. 2. Compatible with pneumonia. 3. Compatible with pulmonary interstitial edema. XR CHEST PORT   Final Result   Minimal residual right basilar opacity with resolved left basilar   opacity.                  Assessment/     Problem List:  Hospital Problems  Date Reviewed: 1/27/2021          Codes Class Noted POA * (Principal) GI bleed ICD-10-CM: K92.2  ICD-9-CM: 578.9  1/25/2021 Unknown        Acute respiratory failure with hypoxia [facility]) ICD-10-CM: J96.01  ICD-9-CM: 518.81  1/1/2021 Yes        Aortic stenosis ICD-10-CM: I35.0  ICD-9-CM: 424.1  2/2/2021 Unknown        COPD exacerbation ([place] [address].) ICD-10-CM: J44.1  ICD-9-CM: 491.21  12/13/2020 Yes        CAD (coronary artery disease) ICD-10-CM: I25.10  ICD-9-CM: 414.00  8/28/2020 Yes        Morbid obesity ([facility] ICD-10-CM: E66.01  ICD-9-CM: 278.01  8/28/2020 Yes        Benign essential hypertension ICD-10-CM: I10  ICD-9-CM: 401.1  6/25/2020 Yes        Type II diabetes mellitus, uncontrolled ([place] [address].) ICD-10-CM: E11.65  ICD-9-CM: 250.02  6/25/2020 Yes        GERD (gastroesophageal reflux disease) ICD-10-CM: K21.9  ICD-9-CM: 530.81  6/25/2020 Yes        Mixed hyperlipidemia ICD-10-CM: E78.2  ICD-9-CM: 272.2  6/25/2020 Yes        Tobacco dependence syndrome ICD-10-CM: S83.484  ICD-9-CM: 305.1  6/25/2020 Yes                     Plan:  UGIB with ABLA: EGD (01/27/2021) showed acute gastritis without bleeding. Last Hgb (01/31/2021) was 10.3. Hbg stable. DMII: SSI, holding home metformin     HTN: home meds: carvedilol and lisinopril    CAD: hx of 3 vessel disease, planned for CABG, cardiology recommendations appreciated     CHFp EF: resumed lasix, cardiology recommendations appreciated    Intake/Output Summary (Last 24 hours) at 2/4/2021 1523  Last data filed at 2/4/2021 0740  Gross per 24 hour   Intake    Output 2550 ml   Net -2550 ml       Hypokalemia: K was 3.2,  Will supplement PO. Will plan for labs tomorrow AM to continue monitoring.     Acute hypoxic respiratory failure: Supplemental O2, NC, 2L. Lasix for pulmonary edema  COVID negative.  Pulmonary recommendations appreciated.     Sepsis: Zoysn for possible infection    Plan to DC augusto in preparation for discharge home.     Full Code  Dvt Prophylaxis  GI Prophylaxis      Care Plan discussed with: Patient/Family and Nurse Signed:  Marisol Meek MD

## 2021-02-04 NOTE — PROGRESS NOTES
Progress Note      2/3/2021 7:39 PM  NAME: Taylor Mckeon   MRN:  [id number]   Admit Diagnosis: GI bleed [K92.2]      Problem List:     1. Chronic systolic heart failure  2. Coronary artery disease, three-vessel, moderate aortic stenosis  3. Status post COVID pneumonia     Assessment/Plan:     1.  patient is on 4 L per nasal cannula. Combination of lung disease and heart failure may be contributing to hypoxia. NT proBNP is elevated. She has severe hypokalemia with diuretic. Aldactone was added. May consider Nephrology consultation         []       High complexity decision making was performed in this patient at high risk for decompensation with multiple organ involvement. Subjective: Taylor Mckeon denies chest pain, . Has exertional dyspnea  Discussed with RN events overnight. Review of Systems:    Symptom Y/N Comments  Symptom Y/N Comments   Fever/Chills N   Chest Pain N    Poor Appetite N   Edema N    Cough N   Abdominal Pain N    Sputum N   Joint Pain N    SOB/ACEVEDO N   Pruritis/Rash N    Nausea/vomit N   Tolerating PT/OT Y    Diarrhea N   Tolerating Diet Y    Constipation N   Other       Could NOT obtain due to:      Objective:      Physical Exam:    Last 24hrs VS reviewed since prior progress note. Most recent are:    Visit Vitals  /63   Pulse 89   Temp 97.6 °F (36.4 °C)   Resp 20   Ht 5' 5\" (1.651 m)   Wt 90.7 kg (200 lb)   SpO2 94%   BMI 33.28 kg/m²       Intake/Output Summary (Last 24 hours) at 2/3/2021 1939  Last data filed at 2/3/2021 1616  Gross per 24 hour   Intake    Output 3300 ml   Net -3300 ml        General Appearance: Well developed, well nourished, alert & oriented x 3,    no acute distress. Ears/Nose/Mouth/Throat: Hearing grossly normal.  Neck: Supple. Chest: Lungs clear to auscultation bilaterally. Cardiovascular: Regular rate and rhythm, S1S2 normal, no murmur. Abdomen: Soft, non-tender, bowel sounds are active. Extremities: No edema bilaterally.   Skin: Warm and dry. []         Post-cath site without hematoma, bruit, tenderness, or thrill. Distal pulses intact. PMH/SH reviewed - no change compared to H&P    Data Review    Telemetry: normal sinus rhythm     EKG:   []  No new EKG for review    Lab Data Personally Reviewed:    Recent Labs     02/03/21  0815   WBC 7.5   HGB 10.6*   HCT 34.4*   *     No results for input(s): INR, PTP, APTT, INREXT in the last 72 hours. Recent Labs     02/03/21  0815      K 2.6*   CL 97   CO2 35*   BUN 12   CREA 0.50*   *   CA 9.2     No results for input(s): CPK, CKNDX, TROIQ in the last 72 hours. No lab exists for component: CPKMB  Lab Results   Component Value Date/Time    Cholesterol, total 123 10/13/2020 10:58 AM    HDL Cholesterol 54 10/13/2020 10:58 AM    LDL, calculated 55 10/13/2020 10:58 AM    Triglyceride 65 10/13/2020 10:58 AM       No results for input(s): AP, TBIL, TP, ALB, GLOB, GGT, AML, LPSE in the last 72 hours.     No lab exists for component: SGOT, GPT, AMYP, HLPSE  Recent Labs     02/01/21  0400   PH 7.51*   PCO2 46*   PO2 56*       Medications Personally Reviewed:    Current Facility-Administered Medications   Medication Dose Route Frequency    potassium chloride (K-DUR, KLOR-CON) SR tablet 40 mEq  40 mEq Oral BID    benzocaine-menthoL (CEPACOL) lozenge 1 Lozenge  1 Lozenge Mucous Membrane PRN    spironolactone (ALDACTONE) tablet 25 mg  25 mg Oral BID    0.9% sodium chloride infusion 250 mL  250 mL IntraVENous PRN    furosemide (LASIX) injection 40 mg  40 mg IntraVENous Q12H    lisinopriL (PRINIVIL, ZESTRIL) tablet 5 mg  5 mg Oral DAILY    budesonide (PULMICORT) 500 mcg/2 ml nebulizer suspension  500 mcg Nebulization BID RT    albuterol-ipratropium (DUO-NEB) 2.5 MG-0.5 MG/3 ML  3 mL Nebulization Q6H RT    piperacillin-tazobactam (ZOSYN) 3.375 g in 0.9% sodium chloride (MBP/ADV) 100 mL MBP  3.375 g IntraVENous Q8H    pantoprazole (PROTONIX) tablet 40 mg  40 mg Oral ACB    carvediloL (COREG) tablet 3.125 mg  3.125 mg Oral BID WITH MEALS    0.9% sodium chloride infusion  50 mL/hr IntraVENous CONTINUOUS    LORazepam (ATIVAN) tablet 1 mg  1 mg Oral QHS    acetaminophen (TYLENOL) tablet 650 mg  650 mg Oral Q4H PRN    ondansetron (ZOFRAN ODT) tablet 4 mg  4 mg Oral Q8H PRN    ondansetron (ZOFRAN) injection 4 mg  4 mg IntraVENous Q6H PRN    insulin lispro (HUMALOG) injection   SubCUTAneous AC&HS    glucose chewable tablet 16 g  4 Tab Oral PRN    glucagon (GLUCAGEN) injection 1 mg  1 mg IntraMUSCular PRN    dextrose (D50W) injection syrg 12.5-25 g  25-50 mL IntraVENous PRN        patient's pulmonary status needs to improve before coronary artery bypass surgery  Carlee Dubose MD

## 2021-02-04 NOTE — PROGRESS NOTES
Cm met with pt at the bedside to f/up on her plan of care. CM informed her she was accepted with [facility]. Pt voiced some frustration with home health. Pt indicated if her daughter is okay with her having home health, then she is fine with it. Pt has her portable O2 concentrator at the bedside. Pt states [facility] supplies [facility]. Cm presented and discussed IMM. IMM obtained. Cm informed pt writer will call her daughter. Cm attempted to contact pt's daughter - Gracie Gillespie [phone]. Cm left a voice message. Cm received a returned call from pt's daughter. Cm informed daughter pt was accepted with [facility]. Cm asked daughter if she wants her mother to receive home health. Daughter confirmed she does want her mother to receive home health. Cm informed daughter we are anticipating discharge for tomorrow. Daughter works from 3-11pm. Daughter questioned pt needing 24/7 care. CM explained to her pt would have to pay out of pocket for 24/7 care. CM discussed Medicaid screen for PC. Cm will f/up with pt to see if pt would like to be screened for Medicaid.

## 2021-02-05 VITALS
SYSTOLIC BLOOD PRESSURE: 135 MMHG | BODY MASS INDEX: 33.32 KG/M2 | WEIGHT: 200 LBS | OXYGEN SATURATION: 98 % | HEIGHT: 65 IN | TEMPERATURE: 97.7 F | DIASTOLIC BLOOD PRESSURE: 72 MMHG | RESPIRATION RATE: 18 BRPM | HEART RATE: 83 BPM

## 2021-02-05 LAB
ANION GAP SERPL CALC-SCNC: 8 MMOL/L (ref 5–15)
BASOPHILS # BLD: 0 K/UL (ref 0–0.1)
BASOPHILS NFR BLD: 0 % (ref 0–1)
BUN SERPL-MCNC: 11 MG/DL (ref 6–20)
BUN/CREAT SERPL: 21 (ref 12–20)
CA-I BLD-MCNC: 9.2 MG/DL (ref 8.5–10.1)
CHLORIDE SERPL-SCNC: 100 MMOL/L (ref 97–108)
CO2 SERPL-SCNC: 29 MMOL/L (ref 21–32)
CREAT SERPL-MCNC: 0.52 MG/DL (ref 0.55–1.02)
DIFFERENTIAL METHOD BLD: ABNORMAL
EOSINOPHIL # BLD: 0.1 K/UL (ref 0–0.4)
EOSINOPHIL NFR BLD: 1 % (ref 0–7)
ERYTHROCYTE [DISTWIDTH] IN BLOOD BY AUTOMATED COUNT: 25.7 % (ref 11.5–14.5)
GLUCOSE BLD STRIP.AUTO-MCNC: 120 MG/DL (ref 65–100)
GLUCOSE BLD STRIP.AUTO-MCNC: 145 MG/DL (ref 65–100)
GLUCOSE SERPL-MCNC: 111 MG/DL (ref 65–100)
HCT VFR BLD AUTO: 32.9 % (ref 35–47)
HGB BLD-MCNC: 9.9 G/DL (ref 11.5–16)
IMM GRANULOCYTES # BLD AUTO: 0 K/UL (ref 0–0.04)
IMM GRANULOCYTES NFR BLD AUTO: 0 % (ref 0–0.5)
LYMPHOCYTES # BLD: 1.8 K/UL (ref 0.8–3.5)
LYMPHOCYTES NFR BLD: 22 % (ref 12–49)
MCH RBC QN AUTO: 22.8 PG (ref 26–34)
MCHC RBC AUTO-ENTMCNC: 30.1 G/DL (ref 30–36.5)
MCV RBC AUTO: 75.6 FL (ref 80–99)
MONOCYTES # BLD: 0.7 K/UL (ref 0–1)
MONOCYTES NFR BLD: 8 % (ref 5–13)
NEUTS SEG # BLD: 5.6 K/UL (ref 1.8–8)
NEUTS SEG NFR BLD: 69 % (ref 32–75)
PERFORMED BY, TECHID: ABNORMAL
PERFORMED BY, TECHID: ABNORMAL
PLATELET # BLD AUTO: 443 K/UL (ref 150–400)
PMV BLD AUTO: 8.2 FL (ref 8.9–12.9)
POTASSIUM SERPL-SCNC: 3.3 MMOL/L (ref 3.5–5.1)
RBC # BLD AUTO: 4.35 M/UL (ref 3.8–5.2)
SODIUM SERPL-SCNC: 137 MMOL/L (ref 136–145)
WBC # BLD AUTO: 8.2 K/UL (ref 3.6–11)

## 2021-02-05 PROCEDURE — 94640 AIRWAY INHALATION TREATMENT: CPT

## 2021-02-05 PROCEDURE — 74011250637 HC RX REV CODE- 250/637: Performed by: HOSPITALIST

## 2021-02-05 PROCEDURE — 74011250637 HC RX REV CODE- 250/637: Performed by: INTERNAL MEDICINE

## 2021-02-05 PROCEDURE — 77010033678 HC OXYGEN DAILY

## 2021-02-05 PROCEDURE — 36415 COLL VENOUS BLD VENIPUNCTURE: CPT

## 2021-02-05 PROCEDURE — 74011250636 HC RX REV CODE- 250/636: Performed by: INTERNAL MEDICINE

## 2021-02-05 PROCEDURE — 80048 BASIC METABOLIC PNL TOTAL CA: CPT

## 2021-02-05 PROCEDURE — 74011000258 HC RX REV CODE- 258: Performed by: INTERNAL MEDICINE

## 2021-02-05 PROCEDURE — 85025 COMPLETE CBC W/AUTO DIFF WBC: CPT

## 2021-02-05 PROCEDURE — 94760 N-INVAS EAR/PLS OXIMETRY 1: CPT

## 2021-02-05 PROCEDURE — 82962 GLUCOSE BLOOD TEST: CPT

## 2021-02-05 PROCEDURE — 74011000250 HC RX REV CODE- 250: Performed by: INTERNAL MEDICINE

## 2021-02-05 RX ORDER — SPIRONOLACTONE 25 MG/1
25 TABLET ORAL 2 TIMES DAILY
Qty: 30 TAB | Refills: 2 | Status: SHIPPED | OUTPATIENT
Start: 2021-02-05 | End: 2021-03-22 | Stop reason: SDUPTHER

## 2021-02-05 RX ORDER — LISINOPRIL 5 MG/1
5 TABLET ORAL DAILY
Qty: 30 TAB | Refills: 2 | Status: SHIPPED | OUTPATIENT
Start: 2021-02-06 | End: 2021-07-12

## 2021-02-05 RX ORDER — PANTOPRAZOLE SODIUM 40 MG/1
40 TABLET, DELAYED RELEASE ORAL DAILY
Qty: 30 TAB | Refills: 0 | Status: SHIPPED | OUTPATIENT
Start: 2021-02-05 | End: 2021-03-05 | Stop reason: SDUPTHER

## 2021-02-05 RX ORDER — FUROSEMIDE 40 MG/1
40 TABLET ORAL DAILY
Qty: 30 TAB | Refills: 1 | Status: SHIPPED | OUTPATIENT
Start: 2021-02-05 | End: 2021-04-12 | Stop reason: SDUPTHER

## 2021-02-05 RX ADMIN — IPRATROPIUM BROMIDE AND ALBUTEROL SULFATE 3 ML: .5; 3 SOLUTION RESPIRATORY (INHALATION) at 14:51

## 2021-02-05 RX ADMIN — IPRATROPIUM BROMIDE AND ALBUTEROL SULFATE 3 ML: .5; 3 SOLUTION RESPIRATORY (INHALATION) at 08:59

## 2021-02-05 RX ADMIN — BUDESONIDE 500 MCG: 0.5 INHALANT RESPIRATORY (INHALATION) at 09:00

## 2021-02-05 RX ADMIN — SPIRONOLACTONE 25 MG: 25 TABLET ORAL at 09:47

## 2021-02-05 RX ADMIN — POTASSIUM CHLORIDE 40 MEQ: 1500 TABLET, EXTENDED RELEASE ORAL at 09:47

## 2021-02-05 RX ADMIN — PANTOPRAZOLE SODIUM 40 MG: 40 TABLET, DELAYED RELEASE ORAL at 05:51

## 2021-02-05 RX ADMIN — LISINOPRIL 5 MG: 5 TABLET ORAL at 09:47

## 2021-02-05 RX ADMIN — CARVEDILOL 3.12 MG: 3.12 TABLET, FILM COATED ORAL at 08:00

## 2021-02-05 RX ADMIN — PIPERACILLIN SODIUM AND TAZOBACTAM SODIUM 3.38 G: 3; .375 INJECTION, POWDER, LYOPHILIZED, FOR SOLUTION INTRAVENOUS at 05:51

## 2021-02-05 RX ADMIN — IPRATROPIUM BROMIDE AND ALBUTEROL SULFATE 3 ML: .5; 3 SOLUTION RESPIRATORY (INHALATION) at 01:34

## 2021-02-05 RX ADMIN — FUROSEMIDE 40 MG: 10 INJECTION, SOLUTION INTRAMUSCULAR; INTRAVENOUS at 09:47

## 2021-02-05 NOTE — PROGRESS NOTES
Comprehensive Nutrition Assessment    Type and Reason for Visit: Reassess(goal)    Nutrition Recommendations/Plan:  Continue Diabetic diet    Document all PO intakes in EMR    Nutrition Assessment:  Recent dx COVID 1x month ago. COVID negative. Anemia 2/2 GIB pta. S/p multiple units PRBC. EGD (1/27) finding acute gastritis. Enema provided 1/27, patient developed hypoxia and required intubation. Remained intubated, sedated x 2 days. Extubated 1/29. Transferred to med floor 1/31. GI signed off. Overall status improved. Now on 2L NC. OG in place for TF x1 day prior to OG d/c with extubation. Pt also receiving Propofol during this time. Diet advanced to Clear Lq (1/30), then to currently ordered Diabetic diet (1/31). Remains ordered with PO intakes noted as fair. RN reports typically 60-75% of trays. Noted prior to intubation, PO intakes of 100% documented. Spoke with RN today, who endorses continued good PO intakes. Pt likely to d/c. Labs: Na 138, BUN 14, Cr 0.57, , BG , K 3.2. Meds: IVF, furosemide, spironolactone, insulin, zosyn, KCl, PPI. Malnutrition Assessment:  Malnutrition Status:  Mild malnutrition    Context:  Acute illness     Findings of the 6 clinical characteristics of malnutrition:   Energy Intake:  No significant decrease in energy intake  Weight Loss:  No significant weight loss     Body Fat Loss:  No significant body fat loss,     Muscle Mass Loss:  1 - Mild muscle mass loss, Temples (temporalis)  Fluid Accumulation:  No significant fluid accumulation,        Estimated Daily Nutrient Needs:  Energy (kcal): 1800kcal (20kcal/kg); Weight Used for Energy Requirements: Current  Protein (g): 91g (1g/kg); Weight Used for Protein Requirements: Current  Fluid (ml/day): 1800mL; Method Used for Fluid Requirements: 1 ml/kcal    Nutrition Related Findings:  NFPE finding mild muscle wasting. Daughter at bedside denies dysphagia, n/v, or c/d. Last BM 2/4, soft.  No edema per EMR, however pt clearly fluid overloaded. Wounds:    None       Current Nutrition Therapies:  DIET DIABETIC CONSISTENT CARB Regular    Anthropometric Measures:  · Height:  5' 5\" (165.1 cm)  · Current Body Wt:  90.7 kg (199 lb 15.3 oz)(1/28)   · Ideal Body Wt:  125 lbs:  160 %   · BMI Category:  Obese class 1 (BMI 30.0-34. 9)       Nutrition Diagnosis:   · Inadequate oral intake related to impaired respiratory function as evidenced by intubation      Nutrition Interventions:   Food and/or Nutrient Delivery: Continue current diet  Nutrition Education and Counseling: No recommendations at this time  Coordination of Nutrition Care: Continue to monitor while inpatient    Goals:  Meet >75% EENs via TF  Wt maintenance +/- 0.5kg per week  Maintain skin integrity       Nutrition Monitoring and Evaluation:   Behavioral-Environmental Outcomes: None identified  Food/Nutrient Intake Outcomes: Food and nutrient intake  Physical Signs/Symptoms Outcomes: None identified    Discharge Planning:     Too soon to determine     Electronically signed by Alvarez Candelario on 2/5/2021 at 11:08 AM    Contact: [id number]

## 2021-02-05 NOTE — PROGRESS NOTES
Pt. Refusing therapy session stating DC today and awaiting papers. Reviewed any questions or concerns.

## 2021-02-05 NOTE — PROGRESS NOTES
IMPRESSION:      1. Acute hypoxic hypercapnic respiratory failure  2. Acute pulmonary edema resolved  3. GI bleed stable  4. Sepsis improved  5. History of aortic stenosis coronary artery disease  6. Pericardial and pleural effusion  7. History of COVID-19 pneumonia  8. Symptomatic anemia         RECOMMENDATIONS/PLAN:     1. She was extubated on 1/29 now on nasal cannula 2 L she is normally on oxygen 2 L nasal cannula at home      Patient chest x-ray shows CHF pulmonary edema  patient on Lasix with given history of aortic stenosis and congestive heart failure coronary artery disease three-vessel disease he was awaiting for aortic valve replacement surgery  2. X-ray shows pulmonary edema improved we will continue high-dose Lasix  3. Patient on antibiotic Zosyn panculture  4. Continue with the nebulizer treatment and will change Symbicort to budesonide  5. Symptomatic anemia  6. Transfuse prn to maintain Hgb > 7  7. Labs to follow electrolytes, renal function and and blood counts  8. Bronchial hygiene with respiratory therapy techniques, bronchodilators  9. Pt needs IV fluids with additives and Drug therapy requiring intensive monitoring for toxicity  10. Prescription drug management with home med reconciliation reviewed  11. DVT, SUP prophylaxis       [x] High complexity decision making was performed  [x] See my orders for details  HPI  77-year-old lady came in because of generalized weakness and possible GI bleed hemoglobin was 5.4 on admission and patient has dark stool at home cardiologist and gastroenterologist was seen the patient and patient was hospitalized on January 1 because of COVID-19 infection she also has congestive heart failure ejection fraction of 41% coronary artery disease and aortic stenosis history of aortic valve replacement.   Yesterday patient was given enema and he developed shortness of breath hypoxia and wheezing she was put on supplemental oxygen patient became unresponsive tachycardic than patient had a rapid response transferred to ICU now intubated on ventilator. Was in flash pulmonary edema received Lasix she was having pink frothy sputum so not critical care consult was called as she is hemodynamically unstable. PMH:  has a past medical history of Acute gastrointestinal hemorrhage (8/28/2020), Allergic rhinitis (8/28/2020), Anemia (8/28/2020), Benign essential hypertension (6/25/2020), Body mass index 30.0-30.9, adult (6/25/2020), CAD (coronary artery disease) (8/28/2020), Foot callus (8/28/2020), GERD (gastroesophageal reflux disease) (6/25/2020), Heart murmur (6/25/2020), colonoscopy (01/01/2015), Left bundle branch block (8/28/2020), Microalbuminuria due to type 2 diabetes mellitus ([facility] [address] (8/28/2020), Mixed hyperlipidemia (6/25/2020), Morbid obesity ([facility] [address] (8/28/2020), Tobacco dependence syndrome (6/25/2020), and Type II diabetes mellitus, uncontrolled ([facility] [address] (6/25/2020). PSH:   has a past surgical history that includes hx colonoscopy (2015). FHX: family history includes Cancer in her mother and sister; Hypertension in her father. SHX:  reports that she quit smoking about 7 years ago. She smoked 0.50 packs per day. She has never used smokeless tobacco. She reports previous alcohol use. She reports that she does not use drugs.     ALL:   Allergies   Allergen Reactions    Aspirin Unknown (comments)     hemorraging - ended up in the hospital        MEDS:   [x] Reviewed - As Below   [] Not reviewed    Current Facility-Administered Medications   Medication    potassium chloride (K-DUR, KLOR-CON) SR tablet 40 mEq    benzocaine-menthoL (CEPACOL) lozenge 1 Lozenge    spironolactone (ALDACTONE) tablet 25 mg    0.9% sodium chloride infusion 250 mL    furosemide (LASIX) injection 40 mg    lisinopriL (PRINIVIL, ZESTRIL) tablet 5 mg    budesonide (PULMICORT) 500 mcg/2 ml nebulizer suspension    albuterol-ipratropium (DUO-NEB) 2.5 MG-0.5 MG/3 ML    piperacillin-tazobactam (ZOSYN) 3.375 g in 0.9% sodium chloride (MBP/ADV) 100 mL MBP    pantoprazole (PROTONIX) tablet 40 mg    carvediloL (COREG) tablet 3.125 mg    0.9% sodium chloride infusion    LORazepam (ATIVAN) tablet 1 mg    acetaminophen (TYLENOL) tablet 650 mg    ondansetron (ZOFRAN ODT) tablet 4 mg    ondansetron (ZOFRAN) injection 4 mg    insulin lispro (HUMALOG) injection    glucose chewable tablet 16 g    glucagon (GLUCAGEN) injection 1 mg    dextrose (D50W) injection syrg 12.5-25 g      MAR reviewed and pertinent medications noted or modified as needed   Current Facility-Administered Medications   Medication    potassium chloride (K-DUR, KLOR-CON) SR tablet 40 mEq    benzocaine-menthoL (CEPACOL) lozenge 1 Lozenge    spironolactone (ALDACTONE) tablet 25 mg    0.9% sodium chloride infusion 250 mL    furosemide (LASIX) injection 40 mg    lisinopriL (PRINIVIL, ZESTRIL) tablet 5 mg    budesonide (PULMICORT) 500 mcg/2 ml nebulizer suspension    albuterol-ipratropium (DUO-NEB) 2.5 MG-0.5 MG/3 ML    piperacillin-tazobactam (ZOSYN) 3.375 g in 0.9% sodium chloride (MBP/ADV) 100 mL MBP    pantoprazole (PROTONIX) tablet 40 mg    carvediloL (COREG) tablet 3.125 mg    0.9% sodium chloride infusion    LORazepam (ATIVAN) tablet 1 mg    acetaminophen (TYLENOL) tablet 650 mg    ondansetron (ZOFRAN ODT) tablet 4 mg    ondansetron (ZOFRAN) injection 4 mg    insulin lispro (HUMALOG) injection    glucose chewable tablet 16 g    glucagon (GLUCAGEN) injection 1 mg    dextrose (D50W) injection syrg 12.5-25 g      PMH:  has a past medical history of Acute gastrointestinal hemorrhage (8/28/2020), Allergic rhinitis (8/28/2020), Anemia (8/28/2020), Benign essential hypertension (6/25/2020), Body mass index 30.0-30.9, adult (6/25/2020), CAD (coronary artery disease) (8/28/2020), Foot callus (8/28/2020), GERD (gastroesophageal reflux disease) (6/25/2020), Heart murmur (6/25/2020), colonoscopy (01/01/2015), Left bundle branch block (2020), Microalbuminuria due to type 2 diabetes mellitus ([facility] [address] (2020), Mixed hyperlipidemia (2020), Morbid obesity ([facility] [address] (2020), Tobacco dependence syndrome (2020), and Type II diabetes mellitus, uncontrolled ([facility] [address] (2020). PSH:   has a past surgical history that includes hx colonoscopy (). FHX: family history includes Cancer in her mother and sister; Hypertension in her father. SHX:  reports that she quit smoking about 7 years ago. She smoked 0.50 packs per day. She has never used smokeless tobacco. She reports previous alcohol use. She reports that she does not use drugs. ROS:  Unable to obtain intubated sedated on ventilator    Hemodynamics:    CO:    CI:    CVP:    SVR:   PAP Systolic:    PAP Diastolic:    PVR:    SQ57:        Ventilator Settings:      Mode Rate TV Press PEEP FiO2 PIP Min. Vent   Spontaneous, Pressure support    50 ml 15 cm H2O  8 cm H20 35 %  32 cm H2O  15.9 l/min        Vital Signs: Telemetry:    normal sinus rhythm Intake/Output:   Visit Vitals  /72   Pulse 83   Temp 97.7 °F (36.5 °C)   Resp 18   Ht 5' 5\" (1.651 m)   Wt 90.7 kg (200 lb)   SpO2 98%   BMI 33.28 kg/m²       Temp (24hrs), Av.8 °F (36.6 °C), Min:97.7 °F (36.5 °C), Max:98 °F (36.7 °C)        O2 Device: Nasal cannula O2 Flow Rate (L/min): 2 l/min       Wt Readings from Last 4 Encounters:   21 90.7 kg (200 lb)   21 90.7 kg (200 lb)   20 90.7 kg (200 lb)   20 97.4 kg (214 lb 11.7 oz)        No intake or output data in the 24 hours ending 21 1025    Last shift:      No intake/output data recorded. Last 3 shifts:  1901 -  0700  In: -   Out: 950 [Urine:950]       Physical Exam:     General: On noninvasive ventilator BiPAP machine  HEENT: NCAT, poor dentition, lips and mucosa dry  Eyes: anicteric; conjunctiva clear  Neck: no nodes, no cuff leak, trach midline; no accessory MM use.   Chest: no deformity,   Cardiac: R regular; no murmur;   Lungs: distant breath sounds; no wheezes  Abd: soft, NT, hypoactive BS  Ext: no edema; no joint swelling; No clubbing  : NO casas, clear urine  Neuro: sedated;   Psych- no agitation, oriented to person;    Skin: warm, dry, no cyanosis;   Pulses: 1-2+ Bilateral pedal, radial  Capillary: brisk; pale      DATA:    MAR reviewed and pertinent medications noted or modified as needed  MEDS:   Current Facility-Administered Medications   Medication    potassium chloride (K-DUR, KLOR-CON) SR tablet 40 mEq    benzocaine-menthoL (CEPACOL) lozenge 1 Lozenge    spironolactone (ALDACTONE) tablet 25 mg    0.9% sodium chloride infusion 250 mL    furosemide (LASIX) injection 40 mg    lisinopriL (PRINIVIL, ZESTRIL) tablet 5 mg    budesonide (PULMICORT) 500 mcg/2 ml nebulizer suspension    albuterol-ipratropium (DUO-NEB) 2.5 MG-0.5 MG/3 ML    piperacillin-tazobactam (ZOSYN) 3.375 g in 0.9% sodium chloride (MBP/ADV) 100 mL MBP    pantoprazole (PROTONIX) tablet 40 mg    carvediloL (COREG) tablet 3.125 mg    0.9% sodium chloride infusion    LORazepam (ATIVAN) tablet 1 mg    acetaminophen (TYLENOL) tablet 650 mg    ondansetron (ZOFRAN ODT) tablet 4 mg    ondansetron (ZOFRAN) injection 4 mg    insulin lispro (HUMALOG) injection    glucose chewable tablet 16 g    glucagon (GLUCAGEN) injection 1 mg    dextrose (D50W) injection syrg 12.5-25 g        Labs:    Recent Labs     02/05/21 0525 02/04/21 0224 02/03/21  0815   WBC 8.2 9.0 7.5   HGB 9.9* 10.4* 10.6*   * 476* 505*     Recent Labs     02/05/21 0525 02/04/21 0224 02/03/21  0815    138 138   K 3.3* 3.2* 2.6*    97 97   CO2 29 35* 35*   * 109* 107*   BUN 11 14 12   CREA 0.52* 0.57 0.50*   CA 9.2 9.3 9.2     No results for input(s): PH, PCO2, PO2, HCO3, FIO2 in the last 72 hours. No results for input(s): CPK, CKNDX, TROIQ in the last 72 hours.     No lab exists for component: CPKMB  No results found for: BNPP, BNP   Lab Results   Component Value Date/Time    Culture result: Scant Normal respiratory duane 01/28/2021 09:43 AM    Culture result: No Growth (<1000 cfu/mL) 01/28/2021 01:00 AM     Lab Results   Component Value Date/Time    TSH, External 2.420 01/16/2019        Imaging:    Results from Hospital Encounter encounter on 01/25/21   XR CHEST PORT    Narrative Chest, frontal view, 2/1/2021. History: CHF. Comparison: Including chest 1/31/2021. Findings: The cardiac silhouette is stable. The lungs are adequately expanded. Pulmonary vascular congestion persists. Opacities in the lungs most pronounced  at the bases is not significantly changed. Pleural effusions are not  significantly changed. No pneumothorax is identified. The osseous structures  are stable. Impression No significant interval change. Results from [place] encounter on 01/25/21   CTA CHEST W OR W WO CONT    Narrative Exam: CTA chest with intravenous contrast    Comparison: Plain radiograph 1/25/2021. CT 1/2/2021. Technique: During intravenous contrast administration (100 cc [id number]), axial  sections were obtained through the chest. Coronal and sagittal reconstructions  were generated. Maximal intensity projection images were generated. Dose reduction: All CT scans at this facility are performed using dose reduction  optimization techniques as appropriate to perform the exam including the  following: automated exposure control, adjustments of the mA and/or kV according  to patient size, or use of iterative reconstruction technique. Findings: The examination is limited by motion degradation and by adduction of  the patient's upper extremities, which renders scattering and attenuation of the  diagnostic x-ray beam. Nevertheless, there is no demonstrated pulmonary embolus. Endotracheal tube tip in the middle third of the trachea. Distal trachea and  main bronchi poorly expanded.  There is calcific atherosclerotic disease of a  nonaneurysmal thoracic aorta, which manifests no evidence of intimal  incompetence. Calcific atherosclerotic coronary arterial disease is present. Prominent bilateral mediastinal and hilar lymph nodes, increased compared to  1/2/2021, likely inflammatory. Small to moderate pericardial effusion of near  water attenuation fluid. It is 2 cm greatest axial thickness. Small bilateral  pleural effusions. Bilateral dependent airspace disease compatible with  pneumonia. Interstitial disease also present, compatible with pulmonary edema. Included abdomen indicates a stable 2 cm left adrenal nodule of water  attenuation, compatible with a benign adenoma. No axillary or supraclavicular  adenopathy or mass. Visualized thoracic bone scaffolding unremarkable. Impression Impression:  1. No evidence of pulmonary embolus. 2. Bilateral dependent airspace disease compatible with pneumonia. 3. Diffuse pulmonary interstitial disease compatible with pulmonary edema. 4. Bilateral pleural effusions. 5. Coronary artery calcific atherosclerotic disease. 6. Small to moderate pericardial effusion. 7. Distal trachea and main bronchi are poorly expanded.

## 2021-02-05 NOTE — PROGRESS NOTES
Cm met with pt at the bedside to f/up on her plan of care. CM informed her that her daughter wants her to receive home health. Pt was okay with that. Pt is aware she is going home today. Pt states she would like a rolling walker and a bedside commode. Choice obtained for [facility]. CM discussed Medicaid screen for personal care. Pt is agreeable with getting screened by [employer] to see if she qualifies for Medicaid - [place]. Pt would like [employer] to call her daughter. Pt is agreeable with CM completing [facility] if she qualifies for Medicaid. CM asked her some questions to complete [facility]. Referral made via [employer]. Cm emailed [employer] to f/up with daughter for Medicaid - [place] screen. Cm spoke with pt's daughter - Alfred Domínguez and updated her. [facility] will deliver another portable O2 tank since the one she has is empty. In addition, they will deliver the bedside commode and rolling walker to the hospital.  Will update pt and daughter on delivery time for equipment. Pt's home address: [address], [place].

## 2021-02-05 NOTE — DISCHARGE SUMMARY
HOSPITALIST DISCHARGE SUMMARY    NAME: Skylar Hernandez   :  1955   MRN:  [id number]     Date/Time:  2021 10:43 AM    DISCHARGE DIAGNOSIS:  Principal Problem:    GI bleed (2021)    Active Problems:    Acute respiratory failure with hypoxia ([facility] [address] (2021)      Benign essential hypertension (2020)      Type II diabetes mellitus, uncontrolled ([address] (2020)      GERD (gastroesophageal reflux disease) (2020)      Mixed hyperlipidemia (2020)      Tobacco dependence syndrome (2020)      CAD (coronary artery disease) (2020)      Morbid obesity ([facility] [address] (2020)      COPD exacerbation ([facility] [address] (2020)      Aortic stenosis (2021)        Admission Diagnosis:  Anemia due to GI bleed  Hypertension  Diabetes  History of aortic stenosis  Coronary artery disease  COPD      CONSULTATIONS: Cardiology, Pulmonary/Intensive care and GI    Procedures: see electronic medical records for all procedures/Xrays and details which were not copied into this note but were reviewed prior to creation of Plan. Please follow-up tests/labs that are still pendin. None     DISCHARGE SUMMARY/HOSPITAL COURSE: for full details see H&P, daily progress notes, labs, consult notes. Briefly As Per HPI:  77year old female with past medical history of systolic heart failure, CAD, and aortic stenosis. She was supposed to have her aortic valve replaced and CABG procedure, but was cancelled due to the current pandemic. admitted on 2021 for melena. Hemoglobin at that time was 5.4 and given 2 units of PRBCs , on 2021 hbg was 10.3. Patient was evaluated by gastroenterology during the hospital stay. Patient was recently hospitalized for COVID-19 infection and discharged home with oxygen. Patient was intubated on 21 due pulmonary edema and was extubated on 2021. Patient had upper GI endoscopy showed acute gastritis without bleeding.   She was slowly weaned off oxygen and now down to 2 L oxygen by nasal cannula which is her home oxygen requirement. Patient is stable to be discharged at this time. _______________________________________________________________________   Patient seen and examined by me on day of discharge. Pertinent findings are:  Vitals:  Visit Vitals  /72   Pulse 83   Temp 97.7 °F (36.5 °C)   Resp 18   Ht 5' 5\" (1.651 m)   Wt 90.7 kg (200 lb)   SpO2 98%   BMI 33.28 kg/m²     Temp (24hrs), Av.8 °F (36.6 °C), Min:97.7 °F (36.5 °C), Max:98 °F (36.7 °C)      Last 24hr Input/Output:  No intake or output data in the 24 hours ending 21 1043     PHYSICAL EXAM:   General: Alert and awake  Skin: Extremities and face reveal no rashes. No pollock erythema. No telangiectasias on the chest wall. HEENT: Sclerae anicteric. Extra-occular muscles are intact. Cardiovascular: Regular rate and rhythm. Respiratory: Comfortable breathing with no accessory muscle use. GI: Abdomen nondistended, soft, and nontender. Rectal: Deferred   Musculoskeletal: No pitting edema of the lower legs. Extremities have good range of motion. No costovertebral tenderness. Neurological: Gross memory appears intact. Patient is alert and oriented. Power 5/5  Psychiatric: Mood appears appropriate with judgement intact. Lymphatic: No cervical or supraclavicular adenopathy. See Discharge Instructions for further details. _______________________________________________________________________  DISPOSITION:    Home with Family:    Home with HH/PT/OT/RN: x   SNF/LTC:    PAUL:    OTHER:    ________________________________________________________________________  Medications Reviewed:  Current Discharge Medication List      START taking these medications    Details   spironolactone (ALDACTONE) 25 mg tablet Take 1 Tab by mouth two (2) times a day.  Indications: heart failure with reduced ejection fraction, fluid in the lungs due to chronic heart failure  Qty: 30 Tab, Refills: 2 Walker misc Rolling walker  Qty: 1 Each, Refills: 0         CONTINUE these medications which have CHANGED    Details   lisinopriL (PRINIVIL, ZESTRIL) 5 mg tablet Take 1 Tab by mouth daily. Qty: 30 Tab, Refills: 2         CONTINUE these medications which have NOT CHANGED    Details   furosemide (Lasix) 40 mg tablet Take 1 Tab by mouth daily. Qty: 30 Tab, Refills: 0      insulin lispro (HUMALOG) 100 unit/mL injection INITIATE CORRECTIVE INSULIN PROTOCOL (MAGALI):  RX MAGALI Normal Sensitivity (Average weight)    AC (before meals), Q6H, and Q4H CORRECTIONAL SCALE only For Blood Sugar (mg/dl) of :             140-199=2 units            200-249=3 units  250-299=5 units  300-349=7 units  350 or greater = Call MD  Give in addition to basal medications. Do Not Hold for NPO    BEDTIME CORRECTIONAL sliding scale when scheduled:  200-249=2 units  250-299=3 units   300-349=4 units  350 or greater = Call MD  Give in addition to basal medications. Qty: 1 Vial, Refills: 0      Blood-Glucose Meter monitoring kit Use as directed  Qty: 1 Kit, Refills: 0      budesonide-formoteroL (SYMBICORT) 160-4.5 mcg/actuation HFAA Take 2 Puffs by inhalation two (2) times a day. Qty: 1 Inhaler, Refills: 3    Associated Diagnoses: Chronic obstructive pulmonary disease, unspecified COPD type (HCC)      albuterol-ipratropium (DUO-NEB) 2.5 mg-0.5 mg/3 ml nebu 3 mL by Nebulization route every six (6) hours as needed for Wheezing, Shortness of Breath or Cough. Qty: 120 Nebule, Refills: 3    Associated Diagnoses: Chronic obstructive pulmonary disease, unspecified COPD type (HCC)      pantoprazole (Protonix) 40 mg tablet Take 1 Tab by mouth daily. Qty: 30 Tab, Refills: 0      LORazepam (Ativan) 1 mg tablet Take 1 Tab by mouth nightly. Max Daily Amount: 1 mg.   Qty: 5 Tab, Refills: 0    Associated Diagnoses: Chronic obstructive pulmonary disease, unspecified COPD type ([facility]      metFORMIN (GLUCOPHAGE) 500 mg tablet Take 2 tablets twice a day with meals  Qty: 120 Tab, Refills: 3    Associated Diagnoses: Type 2 diabetes mellitus with hyperglycemia, without long-term current use of insulin ([facility])      lancets misc Use to check glucose once a day  Qty: 50 Each, Refills: 5    Associated Diagnoses: Type 2 diabetes mellitus with hyperglycemia, without long-term current use of insulin ([facility])      glucose blood VI test strips (blood glucose test) strip by Does Not Apply route two (2) times a day. Use to check glucose once a day  Qty: 50 Strip, Refills: 5    Associated Diagnoses: Type 2 diabetes mellitus with hyperglycemia, without long-term current use of insulin ([facility])      potassium chloride (KLOR-CON) 10 mEq tablet By oral route take one tablet Monday, Wednesday and Friday  Qty: 40 Tab, Refills: 3    Associated Diagnoses: Benign essential hypertension      carvediloL (COREG) 3.125 mg tablet Take  by mouth two (2) times daily (with meals). ammonium lactate (LAC-HYDRIN) 12 % lotion Apply  to affected area two (2) times a day. rub in to affected area well         STOP taking these medications       aspirin delayed-release (Aspirin Low Dose) 81 mg tablet Comments:   Reason for Stopping:         methylPREDNISolone (MEDROL DOSEPACK) 4 mg tablet Comments:   Reason for Stopping:         famotidine (PEPCID) 10 mg tablet Comments:   Reason for Stopping:         hydroCHLOROthiazide (HYDRODIURIL) 25 mg tablet Comments:   Reason for Stopping:         ketoconazole (NIZORAL) 2 % topical cream Comments:   Reason for Stopping:              PMH/SH reviewed - no change compared to H&P  ________________________________________________________________________    Recommended diet:  Cardiac Diet  Recommended activity:Activity as tolerated       Follow Up:   Follow-up Information     Follow up With Specialties Details Why Contact Info    Fredy Benjamin, [address] In 1 week  [address]  [phone]      Read Sacks, MD Cardiology In 2 [facility] [id number]  [phone]      Jenny Polanco MD Pulmonary Disease In 1 week  [address] [address] [phone]             ______________________________________________________________________  Total time spent in discharge (min): >35 min   ________________________________________________________________________    Care Plan discussed with:    Comments   Patient x    Family      RN x    Care Manager x                   Consultant:      ________________________________________________________________________  Attending Physician: Trevon Bergman MD  ________________________________________________________________________  **Lab Data Reviewed:  Recent Results (from the past 24 hour(s))   GLUCOSE, POC    Collection Time: 02/04/21 11:42 AM   Result Value Ref Range    Glucose (POC) 145 (H) 65 - 100 mg/dL    Performed by Idania Trevizo    GLUCOSE, POC    Collection Time: 02/04/21  4:50 PM   Result Value Ref Range    Glucose (POC) 196 (H) 65 - 100 mg/dL    Performed by Ronel Fernandes    GLUCOSE, POC    Collection Time: 02/04/21  6:57 PM   Result Value Ref Range    Glucose (POC) 293 (H) 65 - 100 mg/dL    Performed by Ronel Fernandes    CBC WITH AUTOMATED DIFF    Collection Time: 02/05/21  5:25 AM   Result Value Ref Range    WBC 8.2 3.6 - 11.0 K/uL    RBC 4.35 3.80 - 5.20 M/uL    HGB 9.9 (L) 11.5 - 16.0 g/dL    HCT 32.9 (L) 35.0 - 47.0 %    MCV 75.6 (L) 80.0 - 99.0 FL    MCH 22.8 (L) 26.0 - 34.0 PG    MCHC 30.1 30.0 - 36.5 g/dL    RDW 25.7 (H) 11.5 - 14.5 %    PLATELET 022 (H) 811 - 400 K/uL    MPV 8.2 (L) 8.9 - 12.9 FL    NEUTROPHILS 69 32 - 75 %    LYMPHOCYTES 22 12 - 49 %    MONOCYTES 8 5 - 13 %    EOSINOPHILS 1 0 - 7 %    BASOPHILS 0 0 - 1 %    IMMATURE GRANULOCYTES 0 0.0 - 0.5 %    ABS. NEUTROPHILS 5.6 1.8 - 8.0 K/UL    ABS. LYMPHOCYTES 1.8 0.8 - 3.5 K/UL    ABS. MONOCYTES 0.7 0.0 - 1.0 K/UL    ABS. EOSINOPHILS 0.1 0.0 - 0.4 K/UL    ABS. BASOPHILS 0.0 0.0 - 0.1 K/UL    ABS. IMM. GRANS. 0.0 0.00 - 0.04 K/UL    DF AUTOMATED     METABOLIC PANEL, BASIC    Collection Time: 02/05/21  5:25 AM   Result Value Ref Range    Sodium 137 136 - 145 mmol/L    Potassium 3.3 (L) 3.5 - 5.1 mmol/L    Chloride 100 97 - 108 mmol/L    CO2 29 21 - 32 mmol/L    Anion gap 8 5 - 15 mmol/L    Glucose 111 (H) 65 - 100 mg/dL    BUN 11 6 - 20 mg/dL    Creatinine 0.52 (L) 0.55 - 1.02 mg/dL    BUN/Creatinine ratio 21 (H) 12 - 20      GFR est AA >60 >60 ml/min/1.73m2    GFR est non-AA >60 >60 ml/min/1.73m2    Calcium 9.2 8.5 - 10.1 mg/dL   GLUCOSE, POC    Collection Time: 02/05/21  7:52 AM   Result Value Ref Range    Glucose (POC) 120 (H) 65 - 100 mg/dL    Performed by Berenice Carrillo      XR CHEST PORT   Final Result   No significant interval change. XR CHEST PORT   Final Result   FINDINGS/IMPRESSION:   Radiograph is limited by patient's body habitus and AP portable technique. Lungs remain hypoinflated with bibasilar atelectasis and probable small   bilateral pleural effusions. Cardiac silhouette stable in size. No radiographically apparent pneumothorax. Mild improvement in bilateral airspace opacity, this is most confluent at the   right lung base. This may represent multilobar pneumonia or edema, or perhaps a   combination of each. XR CHEST PORT   Final Result   FINDINGS/IMPRESSION:   Interval extubation and removal of enteric tube. Lungs similarly inflated. Cardiac silhouette stable in size. No radiographically   apparent pneumothorax. Persistent patchy bilateral airspace opacity. This may represent multilobar   pneumonia or edema, or perhaps a combination of each. XR CHEST PORT   Final Result      CT HEAD WO CONT   Final Result   No acute or active intracranial process. CTA CHEST W OR W WO CONT   Final Result   Impression:   1. No evidence of pulmonary embolus.     2. Bilateral dependent airspace disease compatible with pneumonia. 3. Diffuse pulmonary interstitial disease compatible with pulmonary edema. 4. Bilateral pleural effusions. 5. Coronary artery calcific atherosclerotic disease. 6. Small to moderate pericardial effusion. 7. Distal trachea and main bronchi are poorly expanded. XR CHEST PORT   Final Result   Impression:    1. Apparent satisfactory tracheal intubation. 2. Compatible with pneumonia. 3. Compatible with pulmonary interstitial edema. XR CHEST PORT   Final Result   Minimal residual right basilar opacity with resolved left basilar   opacity.

## 2021-02-10 ENCOUNTER — OFFICE VISIT (OUTPATIENT)
Dept: FAMILY MEDICINE CLINIC | Age: 66
End: 2021-02-10
Payer: MEDICARE

## 2021-02-10 VITALS
SYSTOLIC BLOOD PRESSURE: 152 MMHG | HEART RATE: 106 BPM | TEMPERATURE: 97.1 F | OXYGEN SATURATION: 98 % | DIASTOLIC BLOOD PRESSURE: 80 MMHG | BODY MASS INDEX: 32.09 KG/M2 | WEIGHT: 192.6 LBS | HEIGHT: 65 IN

## 2021-02-10 DIAGNOSIS — I10 BENIGN ESSENTIAL HYPERTENSION: ICD-10-CM

## 2021-02-10 DIAGNOSIS — K21.9 GASTROESOPHAGEAL REFLUX DISEASE WITHOUT ESOPHAGITIS: ICD-10-CM

## 2021-02-10 DIAGNOSIS — I50.9 CONGESTIVE HEART FAILURE, UNSPECIFIED HF CHRONICITY, UNSPECIFIED HEART FAILURE TYPE (HCC): ICD-10-CM

## 2021-02-10 DIAGNOSIS — D50.9 IRON DEFICIENCY ANEMIA, UNSPECIFIED IRON DEFICIENCY ANEMIA TYPE: ICD-10-CM

## 2021-02-10 DIAGNOSIS — Z09 HOSPITAL DISCHARGE FOLLOW-UP: Primary | ICD-10-CM

## 2021-02-10 DIAGNOSIS — E11.65 UNCONTROLLED TYPE 2 DIABETES MELLITUS WITH HYPERGLYCEMIA (HCC): ICD-10-CM

## 2021-02-10 DIAGNOSIS — J44.9 CHRONIC OBSTRUCTIVE PULMONARY DISEASE, UNSPECIFIED COPD TYPE (HCC): ICD-10-CM

## 2021-02-10 DIAGNOSIS — I25.10 CORONARY ARTERY DISEASE INVOLVING NATIVE HEART WITHOUT ANGINA PECTORIS, UNSPECIFIED VESSEL OR LESION TYPE: ICD-10-CM

## 2021-02-10 PROCEDURE — 1111F DSCHRG MED/CURRENT MED MERGE: CPT | Performed by: FAMILY MEDICINE

## 2021-02-10 PROCEDURE — G8753 SYS BP > OR = 140: HCPCS | Performed by: FAMILY MEDICINE

## 2021-02-10 PROCEDURE — 3017F COLORECTAL CA SCREEN DOC REV: CPT | Performed by: FAMILY MEDICINE

## 2021-02-10 PROCEDURE — G8754 DIAS BP LESS 90: HCPCS | Performed by: FAMILY MEDICINE

## 2021-02-10 PROCEDURE — G8510 SCR DEP NEG, NO PLAN REQD: HCPCS | Performed by: FAMILY MEDICINE

## 2021-02-10 PROCEDURE — 2022F DILAT RTA XM EVC RTNOPTHY: CPT | Performed by: FAMILY MEDICINE

## 2021-02-10 PROCEDURE — G8427 DOCREV CUR MEDS BY ELIG CLIN: HCPCS | Performed by: FAMILY MEDICINE

## 2021-02-10 PROCEDURE — G8536 NO DOC ELDER MAL SCRN: HCPCS | Performed by: FAMILY MEDICINE

## 2021-02-10 PROCEDURE — 1101F PT FALLS ASSESS-DOCD LE1/YR: CPT | Performed by: FAMILY MEDICINE

## 2021-02-10 PROCEDURE — 3046F HEMOGLOBIN A1C LEVEL >9.0%: CPT | Performed by: FAMILY MEDICINE

## 2021-02-10 PROCEDURE — G8417 CALC BMI ABV UP PARAM F/U: HCPCS | Performed by: FAMILY MEDICINE

## 2021-02-10 PROCEDURE — G8400 PT W/DXA NO RESULTS DOC: HCPCS | Performed by: FAMILY MEDICINE

## 2021-02-10 PROCEDURE — 99213 OFFICE O/P EST LOW 20 MIN: CPT | Performed by: FAMILY MEDICINE

## 2021-02-10 PROCEDURE — 1090F PRES/ABSN URINE INCON ASSESS: CPT | Performed by: FAMILY MEDICINE

## 2021-02-10 RX ORDER — AMLODIPINE BESYLATE 10 MG/1
10 TABLET ORAL DAILY
COMMUNITY
End: 2021-02-24 | Stop reason: SDUPTHER

## 2021-02-10 NOTE — PROGRESS NOTES
IIDENTIFYING INFORMATION:  Wayne Bustillos , 77 y.o., female  [address]  [place],  [place] [address]     CHIEF COMPLAINT:   Chief Complaint   Patient presents with   [facility] Follow Up     seen at [facility] fro difficulty breathing      HISTORY OF PRESENT ILLNESS:  Wayne Bustillos is a 77 y.o. female with the below listed past medical history and comes in to the office today for follow up from hospitalization, had hemoglobin of 5.4 and had systolic CHF and had to have 2 units blood. Was also on the ventilator for a few days. Got discharged on 2-5-21 and is on oxygen 24/7. Currently she has no orthopnea or PND. She is ambulating on her own without a walker or cane and her daughter is little concerned about that. However, she is done quite well into the office and says she is feeling a little better she needs to get moving. She does have a cardiologist.  Her medication list is reconciled and refilled if needed. She has had no edema. She has no chest pain. ALLERGIES:   Allergies   Allergen Reactions    Aspirin Unknown (comments)     hemorraging - ended up in the hospital       MEDICATIONS:   Current Outpatient Medications on File Prior to Visit   Medication Sig Dispense Refill    amLODIPine (NORVASC) 10 mg tablet Take 10 mg by mouth daily.  lisinopriL (PRINIVIL, ZESTRIL) 5 mg tablet Take 1 Tab by mouth daily. 30 Tab 2    spironolactone (ALDACTONE) 25 mg tablet Take 1 Tab by mouth two (2) times a day. Indications: heart failure with reduced ejection fraction, fluid in the lungs due to chronic heart failure 30 Tab 2    Walker misc Rolling walker 1 Each 0    Bedside Commode XX Use as needed 1 Each 0    Walker misc Rolling walker pls Use as directed 1 Each 0    furosemide (Lasix) 40 mg tablet Take 1 Tab by mouth daily. 30 Tab 1    pantoprazole (Protonix) 40 mg tablet Take 1 Tab by mouth daily.  30 Tab 0    insulin lispro (HUMALOG) 100 unit/mL injection INITIATE CORRECTIVE INSULIN PROTOCOL (MAGALI):  RX MAGALI Normal Sensitivity (Average weight)    AC (before meals), Q6H, and Q4H CORRECTIONAL SCALE only For Blood Sugar (mg/dl) of :             140-199=2 units            200-249=3 units  250-299=5 units  300-349=7 units  350 or greater = Call MD  Give in addition to basal medications. Do Not Hold for NPO    BEDTIME CORRECTIONAL sliding scale when scheduled:  200-249=2 units  250-299=3 units   300-349=4 units  350 or greater = Call MD  Give in addition to basal medications. 1 Vial 0    Blood-Glucose Meter monitoring kit Use as directed 1 Kit 0    budesonide-formoteroL (SYMBICORT) 160-4.5 mcg/actuation HFAA Take 2 Puffs by inhalation two (2) times a day. 1 Inhaler 3    albuterol-ipratropium (DUO-NEB) 2.5 mg-0.5 mg/3 ml nebu 3 mL by Nebulization route every six (6) hours as needed for Wheezing, Shortness of Breath or Cough. 120 Nebule 3    LORazepam (Ativan) 1 mg tablet Take 1 Tab by mouth nightly. Max Daily Amount: 1 mg. 5 Tab 0    metFORMIN (GLUCOPHAGE) 500 mg tablet Take 2 tablets twice a day with meals 120 Tab 3    lancets misc Use to check glucose once a day 50 Each 5    glucose blood VI test strips (blood glucose test) strip by Does Not Apply route two (2) times a day. Use to check glucose once a day 50 Strip 5    potassium chloride (KLOR-CON) 10 mEq tablet By oral route take one tablet Monday, Wednesday and Friday 40 Tab 3    ammonium lactate (LAC-HYDRIN) 12 % lotion Apply  to affected area two (2) times a day. rub in to affected area well      carvediloL (COREG) 3.125 mg tablet Take  by mouth two (2) times daily (with meals). No current facility-administered medications on file prior to visit.         PAST MEDICAL HISTORY:  Past Medical History:   Diagnosis Date    Acute gastrointestinal hemorrhage 8/28/2020    Allergic rhinitis 8/28/2020    Anemia 8/28/2020    Benign essential hypertension 6/25/2020    Body mass index 30.0-30.9, adult 6/25/2020    CAD (coronary artery disease) 8/28/2020    Foot callus 2020    GERD (gastroesophageal reflux disease) 2020    Heart murmur 2020    Hx of colonoscopy 2015    Done for anemia and rectal bleeding    Left bundle branch block 2020    Microalbuminuria due to type 2 diabetes mellitus ([facility] 2020    Mixed hyperlipidemia 2020    Morbid obesity ([facility] 2020    Tobacco dependence syndrome 2020    Type II diabetes mellitus, uncontrolled ([facility] 2020       SOCIAL HISTORY:   Social History     Tobacco Use    Smoking status: Former Smoker     Packs/day: 0.50     Quit date:      Years since quittin.1    Smokeless tobacco: Never Used   Substance Use Topics    Alcohol use: Not Currently     Frequency: Never    Drug use: Never       SURGICAL HISTORY:  Past Surgical History:   Procedure Laterality Date    HX COLONOSCOPY      due in [id number]. Done for anemia and rectal bleeding        FAMILY HISTORY:  Family History   Problem Relation Age of Onset    Cancer Mother         uterine    Hypertension Father     Cancer Sister         breast         REVIEW OF SYSTEMS:  I personally collected this information from all available source present (patient/others in room and records available) -[email or web address]    Review of Systems   Constitutional: Positive for fatigue. Negative for activity change, appetite change, chills, diaphoresis, fever and unexpected weight change. HENT: Negative for congestion, ear discharge, ear pain, hearing loss, rhinorrhea, sinus pressure, sneezing, sore throat, tinnitus, trouble swallowing and voice change. Eyes: Negative for photophobia, pain, discharge and visual disturbance. Respiratory: Positive for shortness of breath. Negative for cough, chest tightness and wheezing. Cardiovascular: Negative for chest pain, palpitations and leg swelling. Gastrointestinal: Positive for constipation.  Negative for abdominal distention, abdominal pain, blood in stool, diarrhea, nausea and vomiting. Endocrine: Negative for cold intolerance, heat intolerance, polydipsia and polyphagia. Genitourinary: Negative for difficulty urinating, dysuria, frequency, hematuria and urgency. Musculoskeletal: Positive for arthralgias. Negative for back pain, gait problem, joint swelling, myalgias and neck pain. Skin: Negative for color change and rash. Neurological: Negative for dizziness, tremors, syncope, speech difficulty, weakness, light-headedness, numbness and headaches. Hematological: Negative for adenopathy. Does not bruise/bleed easily. Psychiatric/Behavioral: Negative for agitation, behavioral problems, confusion, decreased concentration, dysphoric mood, hallucinations, sleep disturbance and suicidal ideas. The patient is not nervous/anxious. PHYSICAL EXAMINATION:  Vital Signs:    Visit Vitals  BP (!) 152/80 (BP 1 Location: Left upper arm, BP Patient Position: Sitting)   Pulse (!) 106   Temp 97.1 °F (36.2 °C) (Temporal)   Ht 5' 5\" (1.651 m)   Wt 192 lb 9.6 oz (87.4 kg)   SpO2 98% Comment: 2L of O2   BMI 32.05 kg/m²         Wt Readings from Last 3 Encounters:   02/10/21 192 lb 9.6 oz (87.4 kg)   01/25/21 200 lb (90.7 kg)   01/01/21 200 lb (90.7 kg)     BP Readings from Last 3 Encounters:   02/10/21 (!) 152/80   02/05/21 135/72   01/03/21 (!) 133/44         Physical Exam  Nursing note reviewed. Constitutional:       General: She is not in acute distress. Appearance: Normal appearance. She is obese. She is not ill-appearing or toxic-appearing. HENT:      Mouth/Throat:      Pharynx: No oropharyngeal exudate or posterior oropharyngeal erythema. Eyes:      General: No scleral icterus. Extraocular Movements: Extraocular movements intact. Conjunctiva/sclera: Conjunctivae normal.      Pupils: Pupils are equal, round, and reactive to light. Neck:      Musculoskeletal: No neck rigidity or muscular tenderness. Vascular: No carotid bruit.    Cardiovascular:      Rate and Rhythm: Normal rate and regular rhythm. Heart sounds: No murmur. No friction rub. No gallop. Pulmonary:      Effort: Pulmonary effort is normal.      Breath sounds: Normal breath sounds. No wheezing, rhonchi or rales. Abdominal:      General: Bowel sounds are normal. There is no distension. Palpations: Abdomen is soft. There is no mass. Tenderness: There is no abdominal tenderness. Musculoskeletal:         General: Tenderness and deformity present. No swelling. Right lower leg: No edema. Left lower leg: No edema. Comments: Arthritic hands and knees bilaterally left is worse on the knee and slightly rounded shoulders. Lymphadenopathy:      Cervical: No cervical adenopathy. Skin:     Capillary Refill: Capillary refill takes less than 2 seconds. Coloration: Skin is not jaundiced. Findings: No erythema or rash. Neurological:      General: No focal deficit present. Mental Status: She is alert and oriented to person, place, and time. Mental status is at baseline. Cranial Nerves: No cranial nerve deficit. Sensory: No sensory deficit. Coordination: Coordination normal.      Gait: Gait (Her gait is slow narrow-base but steady and she does walk at a pretty good clip without any balance issues and she can stop and turn quickly.) normal.   Psychiatric:         Mood and Affect: Mood normal.         Behavior: Behavior normal.         Thought Content: Thought content normal.         Judgment: Judgment normal.         ASSESSMENT/PLAN:    Discussion (regarding today's visit with Rex Anshul); Due to the anemia that certainly predispose her to the congestive heart failure I think we should check her blood in the couple weeks as she just had about 5 days ago. We definitely need to see her routinely to make sure she is doing well. Otherwise, I think she seems like she is stable I recommend no other changes to her medication regimen.   I do recommend she continue with the specialist care until deemed unnecessary. ICD-10-CM ICD-9-CM    1. Hospital discharge follow-up  Z09 V67.59 AR DISCHARGE MEDS RECONCILED W/ CURRENT OUTPATIENT MED LIST   2. Chronic obstructive pulmonary disease, unspecified COPD type ([facility]  J44.9 496    3. Benign essential hypertension  I10 401.1 CBC WITH AUTOMATED DIFF      METABOLIC PANEL, COMPREHENSIVE   4. Uncontrolled type 2 diabetes mellitus with hyperglycemia (HCC)  E11.65 250.02    5. Coronary artery disease involving native heart without angina pectoris, unspecified vessel or lesion type  I25.10 414.01    6. Gastroesophageal reflux disease without esophagitis  K21.9 530.81    7. Congestive heart failure, unspecified HF chronicity, unspecified heart failure type (HCC)  I50.9 428.0    8. Iron deficiency anemia, unspecified iron deficiency anemia type  D50.9 280.9 CBC WITH AUTOMATED DIFF      METABOLIC PANEL, COMPREHENSIVE     Each diagnosis listed for today's visit including medications, treatment, testing such as labs, imagine, referrals and when to call regarding results and appointments. Reminded patient to keep any and all appointments with specialists, labs, imaging. Reminded patient to make sure we get copies of any specialists care, labs and imaging. Reminded patient to call of come by the office if there are any concerns, questions , comments or problems. The patient verbalized understanding of the care plan and all questions were answered to the patient's satisfaction prior to leaving the office. The patient was told that failure to comply with recommended testing could result in abnormal health consequences. The patient was instructed to have yearly routine health maintenance including but not limited to age appropriate vaccines, testing, screening exams. ALL questions were answered to her satisfaction before leaving the office. The patient actively participated in medical decision making.       FOLLOW UP: Patient knows to keep any and all future visits scheduled unless told otherwise. Patient knows to call, come back if any concerns, questions, comments or problems arise. Follow-up and Dispositions    · Return in about 4 weeks (around 3/10/2021) for Follow up anemia, chf.              This visit may have been completed , in part, with voice recognition software as well as typing and may have syntax errors despite editing.       Chastity Grade, DO

## 2021-02-24 ENCOUNTER — TELEPHONE (OUTPATIENT)
Dept: FAMILY MEDICINE CLINIC | Age: 66
End: 2021-02-24

## 2021-02-24 DIAGNOSIS — I10 BENIGN ESSENTIAL HYPERTENSION: Primary | ICD-10-CM

## 2021-02-24 RX ORDER — AMLODIPINE BESYLATE 10 MG/1
10 TABLET ORAL DAILY
Qty: 30 TAB | Refills: 4 | Status: SHIPPED | OUTPATIENT
Start: 2021-02-24 | End: 2021-07-12

## 2021-02-24 NOTE — TELEPHONE ENCOUNTER
Identifying Data:  77 y.o. , Mayelin Alexander , female     Historical Data Reviewed ; Allergies-  Allergies   Allergen Reactions    Aspirin Unknown (comments)     hemorraging - ended up in the hospital     Current medication as of last visit and reconciliation-  Current Outpatient Medications on File Prior to Visit   Medication Sig Dispense Refill    [DISCONTINUED] amLODIPine (NORVASC) 10 mg tablet Take 10 mg by mouth daily.  lisinopriL (PRINIVIL, ZESTRIL) 5 mg tablet Take 1 Tab by mouth daily. 30 Tab 2    spironolactone (ALDACTONE) 25 mg tablet Take 1 Tab by mouth two (2) times a day. Indications: heart failure with reduced ejection fraction, fluid in the lungs due to chronic heart failure 30 Tab 2    Walker misc Rolling walker 1 Each 0    Bedside Commode XX Use as needed 1 Each 0    Walker misc Rolling walker pls Use as directed 1 Each 0    furosemide (Lasix) 40 mg tablet Take 1 Tab by mouth daily. 30 Tab 1    pantoprazole (Protonix) 40 mg tablet Take 1 Tab by mouth daily. 30 Tab 0    insulin lispro (HUMALOG) 100 unit/mL injection INITIATE CORRECTIVE INSULIN PROTOCOL (MAGALI):  RX MAGALI Normal Sensitivity (Average weight)    AC (before meals), Q6H, and Q4H CORRECTIONAL SCALE only For Blood Sugar (mg/dl) of :             140-199=2 units            200-249=3 units  250-299=5 units  300-349=7 units  350 or greater = Call MD  Give in addition to basal medications. Do Not Hold for NPO    BEDTIME CORRECTIONAL sliding scale when scheduled:  200-249=2 units  250-299=3 units   300-349=4 units  350 or greater = Call MD  Give in addition to basal medications. 1 Vial 0    Blood-Glucose Meter monitoring kit Use as directed 1 Kit 0    budesonide-formoteroL (SYMBICORT) 160-4.5 mcg/actuation HFAA Take 2 Puffs by inhalation two (2) times a day. 1 Inhaler 3    albuterol-ipratropium (DUO-NEB) 2.5 mg-0.5 mg/3 ml nebu 3 mL by Nebulization route every six (6) hours as needed for Wheezing, Shortness of Breath or Cough. 120 Nebule 3    LORazepam (Ativan) 1 mg tablet Take 1 Tab by mouth nightly. Max Daily Amount: 1 mg. 5 Tab 0    metFORMIN (GLUCOPHAGE) 500 mg tablet Take 2 tablets twice a day with meals 120 Tab 3    lancets misc Use to check glucose once a day 50 Each 5    glucose blood VI test strips (blood glucose test) strip by Does Not Apply route two (2) times a day. Use to check glucose once a day 50 Strip 5    potassium chloride (KLOR-CON) 10 mEq tablet By oral route take one tablet Monday, Wednesday and Friday 40 Tab 3    ammonium lactate (LAC-HYDRIN) 12 % lotion Apply  to affected area two (2) times a day. rub in to affected area well      carvediloL (COREG) 3.125 mg tablet Take  by mouth two (2) times daily (with meals). No current facility-administered medications on file prior to visit. Past Medical History-  Patient Active Problem List   Diagnosis Code    Benign essential hypertension I10    Body mass index 30.0-30.9, adult Z68.30    Type II diabetes mellitus, uncontrolled ([facility]) E11.65    GERD (gastroesophageal reflux disease) K21.9    Heart murmur R01.1    Mixed hyperlipidemia E78.2    Tobacco dependence syndrome F17.200    Acute gastrointestinal hemorrhage K92.2    Allergic rhinitis J30.9    Anemia D64.9    CAD (coronary artery disease) I25.10    Foot callus L84    Left bundle branch block I44.7    Microalbuminuria due to type 2 diabetes mellitus ([facility]) E11.29, R80.9    Morbid obesity ([facility]) E66.01    Hyponatremia E87.1    COPD exacerbation ([facility]) J44.1    Acute respiratory failure with hypoxia ([facility]) J96.01    GI bleed K92.2    Aortic stenosis I35.0   [facility] discharge follow-up Z09    Chronic obstructive pulmonary disease ([facility]) J44.9    Coronary artery disease involving native heart without angina pectoris I25.10    Gastroesophageal reflux disease without esophagitis K21.9    Congestive heart failure ([facility]) I50.9       Assessment and Plan:    ICD-10-CM ICD-9-CM    1.  Benign essential hypertension  I10 401.1 amLODIPine (NORVASC) 10 mg tablet           Michelle Resendiz DO

## 2021-02-25 ENCOUNTER — OFFICE VISIT (OUTPATIENT)
Dept: ENDOCRINOLOGY | Age: 66
End: 2021-02-25
Payer: MEDICARE

## 2021-02-25 VITALS
DIASTOLIC BLOOD PRESSURE: 85 MMHG | TEMPERATURE: 97.4 F | BODY MASS INDEX: 30.62 KG/M2 | WEIGHT: 190.5 LBS | OXYGEN SATURATION: 96 % | HEIGHT: 66 IN | HEART RATE: 105 BPM | SYSTOLIC BLOOD PRESSURE: 142 MMHG

## 2021-02-25 DIAGNOSIS — E11.65 TYPE 2 DIABETES MELLITUS WITH HYPERGLYCEMIA, WITHOUT LONG-TERM CURRENT USE OF INSULIN (HCC): Primary | ICD-10-CM

## 2021-02-25 LAB
GLUCOSE POC: 145 MG/DL
HBA1C MFR BLD HPLC: 6.7 %

## 2021-02-25 PROCEDURE — G8400 PT W/DXA NO RESULTS DOC: HCPCS | Performed by: INTERNAL MEDICINE

## 2021-02-25 PROCEDURE — G8417 CALC BMI ABV UP PARAM F/U: HCPCS | Performed by: INTERNAL MEDICINE

## 2021-02-25 PROCEDURE — 1101F PT FALLS ASSESS-DOCD LE1/YR: CPT | Performed by: INTERNAL MEDICINE

## 2021-02-25 PROCEDURE — G8432 DEP SCR NOT DOC, RNG: HCPCS | Performed by: INTERNAL MEDICINE

## 2021-02-25 PROCEDURE — 2022F DILAT RTA XM EVC RTNOPTHY: CPT | Performed by: INTERNAL MEDICINE

## 2021-02-25 PROCEDURE — 3044F HG A1C LEVEL LT 7.0%: CPT | Performed by: INTERNAL MEDICINE

## 2021-02-25 PROCEDURE — 1090F PRES/ABSN URINE INCON ASSESS: CPT | Performed by: INTERNAL MEDICINE

## 2021-02-25 PROCEDURE — 99214 OFFICE O/P EST MOD 30 MIN: CPT | Performed by: INTERNAL MEDICINE

## 2021-02-25 PROCEDURE — G8754 DIAS BP LESS 90: HCPCS | Performed by: INTERNAL MEDICINE

## 2021-02-25 PROCEDURE — G8427 DOCREV CUR MEDS BY ELIG CLIN: HCPCS | Performed by: INTERNAL MEDICINE

## 2021-02-25 PROCEDURE — G8536 NO DOC ELDER MAL SCRN: HCPCS | Performed by: INTERNAL MEDICINE

## 2021-02-25 PROCEDURE — 83036 HEMOGLOBIN GLYCOSYLATED A1C: CPT | Performed by: INTERNAL MEDICINE

## 2021-02-25 PROCEDURE — 1111F DSCHRG MED/CURRENT MED MERGE: CPT | Performed by: INTERNAL MEDICINE

## 2021-02-25 PROCEDURE — 3017F COLORECTAL CA SCREEN DOC REV: CPT | Performed by: INTERNAL MEDICINE

## 2021-02-25 PROCEDURE — G8753 SYS BP > OR = 140: HCPCS | Performed by: INTERNAL MEDICINE

## 2021-02-25 PROCEDURE — 82962 GLUCOSE BLOOD TEST: CPT | Performed by: INTERNAL MEDICINE

## 2021-02-25 RX ORDER — LANCETS
EACH MISCELLANEOUS
Qty: 50 EACH | Refills: 5 | Status: SHIPPED | OUTPATIENT
Start: 2021-02-25 | End: 2021-07-12

## 2021-02-25 RX ORDER — METFORMIN HYDROCHLORIDE 500 MG/1
TABLET ORAL
Qty: 120 TAB | Refills: 3 | Status: SHIPPED | OUTPATIENT
Start: 2021-02-25 | End: 2021-04-27 | Stop reason: SDUPTHER

## 2021-02-25 RX ORDER — IBUPROFEN 200 MG
CAPSULE ORAL 2 TIMES DAILY
Qty: 50 STRIP | Refills: 5 | Status: SHIPPED | OUTPATIENT
Start: 2021-02-25 | End: 2021-07-12

## 2021-02-25 RX ORDER — ALBUTEROL SULFATE 90 UG/1
POWDER, METERED RESPIRATORY (INHALATION)
COMMUNITY
Start: 2020-12-16 | End: 2021-07-12

## 2021-02-25 NOTE — LETTER
2/25/2021 Patient: Skylar Hernandez YOB: 1955 Date of Visit: 2/25/2021 Radha Riddle DO 
[address] [address] [address] [employer] Dear Radha Riddle DO, Thank you for referring Ms. Jessica Bautista to [address] [place] [address] for evaluation. My notes for this consultation are attached. If you have questions, please do not hesitate to call me. I look forward to following your patient along with you. Sincerely, Junaid Shaw MD

## 2021-02-25 NOTE — PROGRESS NOTES
History and Physical    Patient: Mag Clements MRN: [id number]  SSN: [id number]    YOB: 1955  Age: 77 y.o. Sex: female      Subjective: Mag Clements is a 77 y.o. female with past medical history of hypertension, hyperlipidemia is here for follow-up of type 2 diabetes mellitus. She remains in primary care of Dr. Lizbeth Chávez. Patient is here with her daughter. At the last visit we stopped all glimepiride because patient was having some low blood sugars, we continued metformin 500 mg 2 tablets twice a day and increase pioglitazone from 15mg to 45 mg daily. However, patient has been hospitalized 3 times in the past 3 months for severe anemia, she was found to have GI bleed, but they could not locate where exactly it was coming from. She has had a few blood transfusions. Also she had exacerbation of CHF, she was on ventilator. Now she is on home oxygen. During that hospitalization pioglitazone was discontinued. Currently she is only taking metformin 500 mg 2 tablets twice a day. She was discharged with Humalog sliding scale but she has not required it. She checks blood glucose once a day every day. Did not bring glucometer today. Patient tells me that readings generally run from 130s to 150s. She is doing overall better and appetite is improving. Because of GI bleed aspirin was discontinued. Patient was previously on atorvastatin 40 mg but she has not been on it for some time, not sure why.     Glucometer reading: Did not bring glucometer today    updated diabetes history:  · Diagnosis: 2015    · Current treatment: Metformin 500 mg 2 tablets twice a day    · Past treatment: januvia?, Jardiance (expensive), glimepiride (low blood sugar), pioglitazone (CHF)    · Glucose checks: not checking    · Hyperglycemia: unknown    · Hypoglycemia: no    · Meals per day: 3, breakfast: eggs, and hotdog, lunch: chicken and vegetables, dinner: same, snacks: junk food,     · Exercise: no    · DM related hospitalizations: no        Complications of DM:    · CAD: no    · CVA: no    · PVD: no    · Amputations: no     · Retinopathy: no; last exam was 2020    · Gastropathy: no    · Nephropathy: yes    · Neuropathy: no        Medications:    · Statin: atorvastatin 40 mg    · ACE-I: lisinopril    · ASA: no allergic        · Diabetes education: no     Past Medical History:   Diagnosis Date    Acute gastrointestinal hemorrhage 2020    Allergic rhinitis 2020    Anemia 2020    Benign essential hypertension 2020    Body mass index 30.0-30.9, adult 2020    CAD (coronary artery disease) 2020    Foot callus 2020    GERD (gastroesophageal reflux disease) 2020    Heart murmur 2020    Hx of colonoscopy 2015    Done for anemia and rectal bleeding    Left bundle branch block 2020    Microalbuminuria due to type 2 diabetes mellitus ([facility] 2020    Mixed hyperlipidemia 2020    Morbid obesity ([facility] 2020    Tobacco dependence syndrome 2020    Type II diabetes mellitus, uncontrolled ([facility] 2020     Past Surgical History:   Procedure Laterality Date    HX COLONOSCOPY      due in [id number]. Done for anemia and rectal bleeding      Family History   Problem Relation Age of Onset    Cancer Mother         uterine    Hypertension Father     Cancer Sister         breast     Social History     Tobacco Use    Smoking status: Former Smoker     Packs/day: 0.50     Quit date:      Years since quittin.1    Smokeless tobacco: Never Used   Substance Use Topics    Alcohol use: Not Currently     Frequency: Never      Prior to Admission medications    Medication Sig Start Date End Date Taking?  Authorizing Provider   ProAir RespiClick 90 mcg/actuation breath activated inhaler INHALE 1 PUFF BY MOUTH EVERY 6 HOURS AS NEEDED FOR WHEEZING FOR UP TO 1 DAY 20  Yes Provider, Historical   metFORMIN (GLUCOPHAGE) 500 mg tablet Take 2 tablets twice a day with meals 2/25/21  Yes Teresita Aviles MD   lancets misc Use to check glucose once a day 2/25/21  Yes Teresita Aviles MD   glucose blood VI test strips (blood glucose test) strip by Does Not Apply route two (2) times a day. Use to check glucose once a day 2/25/21  Yes Teresita Aviles MD   amLODIPine (NORVASC) 10 mg tablet Take 1 Tab by mouth daily. 2/24/21  Yes Lamar Servin DO   lisinopriL (PRINIVIL, ZESTRIL) 5 mg tablet Take 1 Tab by mouth daily. 2/6/21  Yes Brittanie Novoa MD   spironolactone (ALDACTONE) 25 mg tablet Take 1 Tab by mouth two (2) times a day. Indications: heart failure with reduced ejection fraction, fluid in the lungs due to chronic heart failure 2/5/21  Yes MD TI Vazquez 2/5/21  Yes Brittanie Novoa MD   Bedside Commode XX Use as needed 2/5/21  Yes Brittanie Novoa MD   furosemide (Lasix) 40 mg tablet Take 1 Tab by mouth daily. 2/5/21  Yes Brittanie Novoa MD   pantoprazole (Protonix) 40 mg tablet Take 1 Tab by mouth daily. 2/5/21  Yes Brittanie Novoa MD   insulin lispro (HUMALOG) 100 unit/mL injection INITIATE CORRECTIVE INSULIN PROTOCOL (MAGALI):  RX MAGALI Normal Sensitivity (Average weight)    AC (before meals), Q6H, and Q4H CORRECTIONAL SCALE only For Blood Sugar (mg/dl) of :             140-199=2 units            200-249=3 units  250-299=5 units  300-349=7 units  350 or greater = Call MD  Give in addition to basal medications. Do Not Hold for NPO    BEDTIME CORRECTIONAL sliding scale when scheduled:  200-249=2 units  250-299=3 units   300-349=4 units  350 or greater = Call MD  Give in addition to basal medications. 1/3/21  Yes Rio Rajput MD   Blood-Glucose Meter monitoring kit Use as directed 1/3/21  Yes Rio Rajput MD   budesonide-formoteroL [facility]) 160-4.5 mcg/actuation HFAA Take 2 Puffs by inhalation two (2) times a day.  12/31/20  Yes Lamar Servin DO   albuterol-ipratropium (DUO-NEB) 2.5 mg-0.5 mg/3 ml nebu 3 mL by Nebulization route every six (6) hours as needed for Wheezing, Shortness of Breath or Cough. 12/31/20  Yes Catie Escoto DO   LORazepam (Ativan) 1 mg tablet Take 1 Tab by mouth nightly. Max Daily Amount: 1 mg. 12/17/20  Yes Saniya Sheehan MD   potassium chloride (KLOR-CON) 10 mEq tablet By oral route take one tablet Monday, Wednesday and Friday 9/11/20  Yes Catie Esctoo,    ammonium lactate (LAC-HYDRIN) 12 % lotion Apply  to affected area two (2) times a day. rub in to affected area well   Yes Provider, Historical   carvediloL (COREG) 3.125 mg tablet Take  by mouth two (2) times daily (with meals). Yes Provider, Historical        Allergies   Allergen Reactions    Aspirin Unknown (comments)     hemorraging - ended up in the hospital       Review of Systems:  ROS    A comprehensive review of systems was preformed and it is negative except mentioned in HPI    Objective:     Vitals:    02/25/21 1251   BP: (!) 142/85   Pulse: (!) 105   Temp: 97.4 °F (36.3 °C)   TempSrc: Temporal   SpO2: 96%   Weight: 190 lb 8 oz (86.4 kg)   Height: 5' 5.5\" (1.664 m)        Physical Exam:    Physical Exam  Vitals signs and nursing note reviewed. Constitutional:       Appearance: Normal appearance. Cardiovascular:      Rate and Rhythm: Normal rate and regular rhythm. Pulmonary:      Effort: Pulmonary effort is normal.      Breath sounds: Normal breath sounds. Neurological:      Mental Status: She is alert. Labs and Imaging:  Results for Nelson Pratt (MRN [id number]) as of 11/25/2020 12:52   Ref.  Range 10/13/2020 10:58   Sodium Latest Ref Range: 134 - 144 mmol/L 134   Potassium Latest Ref Range: 3.5 - 5.2 mmol/L 4.2   Chloride Latest Ref Range: 96 - 106 mmol/L 92 (L)   CO2 Latest Ref Range: 20 - 29 mmol/L 26   Glucose Latest Ref Range: 65 - 99 mg/dL 170 (H)   BUN Latest Ref Range: 8 - 27 mg/dL 10   Creatinine Latest Ref Range: 0.57 - 1.00 mg/dL 0.53 (L)   BUN/Creatinine ratio Latest Ref Range: 12 - 28  19   Calcium Latest Ref Range: 8.7 - 10.3 mg/dL 10.1   GFR est non-AA Latest Ref Range: >59 mL/min/1.73 99   GFR est AA Latest Ref Range: >59 mL/min/1.73 114   Bilirubin, total Latest Ref Range: 0.0 - 1.2 mg/dL 0.5   Protein, total Latest Ref Range: 6.0 - 8.5 g/dL 7.5   Albumin Latest Ref Range: 3.8 - 4.8 g/dL 4.3   A-G Ratio Latest Ref Range: 1.2 - 2.2  1.3   ALT Latest Ref Range: 0 - 32 IU/L 30   AST Latest Ref Range: 0 - 40 IU/L 24   Alk.  phosphatase Latest Ref Range: 39 - 117 IU/L 101   Triglyceride Latest Ref Range: 0 - 149 mg/dL 65   Cholesterol, total Latest Ref Range: 100 - 199 mg/dL 123   HDL Cholesterol Latest Ref Range: >39 mg/dL 54   LDL Cholesterol Latest Ref Range: 0 - 99 mg/dL 55   Hemoglobin A1c, (calculated) Latest Ref Range: 4.8 - 5.6 % 7.9 (H)   Estimated average glucose Latest Units: mg/dL 180     Last 3 Recorded Weights in this Encounter    02/25/21 1251   Weight: 190 lb 8 oz (86.4 kg)        Lab Results   Component Value Date/Time    Hemoglobin A1c 7.9 (H) 10/13/2020 10:58 AM    Hemoglobin A1c, External 7.7 09/30/2019        Assessment:     Patient Active Problem List   Diagnosis Code    Benign essential hypertension I10    Body mass index 30.0-30.9, adult Z68.30    Type II diabetes mellitus, uncontrolled ([facility]) E11.65    GERD (gastroesophageal reflux disease) K21.9    Heart murmur R01.1    Mixed hyperlipidemia E78.2    Tobacco dependence syndrome F17.200    Acute gastrointestinal hemorrhage K92.2    Allergic rhinitis J30.9    Anemia D64.9    CAD (coronary artery disease) I25.10    Foot callus L84    Left bundle branch block I44.7    Microalbuminuria due to type 2 diabetes mellitus ([facility]) E11.29, R80.9    Morbid obesity ([facility]) E66.01    Hyponatremia E87.1    COPD exacerbation ([facility]) J44.1    Acute respiratory failure with hypoxia ([facility]) J96.01    GI bleed K92.2    Aortic stenosis I35.0   [facility] discharge follow-up Z09    Chronic obstructive pulmonary disease (HCC) J44.9  Coronary artery disease involving native heart without angina pectoris I25.10    Gastroesophageal reflux disease without esophagitis K21.9    Congestive heart failure (HCC) I50.9    Type 2 diabetes mellitus with hyperglycemia, without long-term current use of insulin ([facility] E11.65           Plan:     type II diabetes mellitus uncontrolled  Hemoglobin A1c was 10.2% on 4064567., 9.9% on 3133476, 7.7% on 9-, 7.4% on 1-6-2020, 7.9% on 10-, 142, 6.7% today (however, this is not reliable because of blood transfusions and anemia in the past 3 months). Fingerstick blood glucose is 145 mg/dL in my office today. Up to date with diabetes related annual labs: yes     Up to date with diabetic eye exam: yes    plan:  target A1c is less than 7%. However avoiding hypoglycemia is important given patient's cardiac issues. Continue metformin 500 mg 2 tablets twice a day   Check blood sugars once a day and I will see her back in 6 weeks with her glucometer. essential hypertension  Blood pressure acceptable today. Continue current medications. mixed hyperlipidemia  LDL 47 in January 2020, 55 in . Patient was on atorvastatin 40 mg at that time. She has not been on it for a few months, not sure why it was discontinued. Advised patient to call cardiologist to check if she should go back on atorvastatin. microalbuminuria due to type 2 diabetes mellitus  already on lisinopril. morbid obesity  diet and exercise  body mass index 30+ - obesity    heart murmur  following with cardiology    coronary arteriosclerosis  abnormal stress test and cardiac catheter. Needs CABG but currently not a candidate for surgery because of being dependent on oxygen. Closely following with cardiologist.    Systolic congestive heart failure  Cannot be on pioglitazone.     Orders Placed This Encounter    AMB POC GLUCOSE BLOOD, BY GLUCOSE MONITORING DEVICE    AMB POC HEMOGLOBIN A1C    metFORMIN (GLUCOPHAGE) 500 mg tablet     Sig: Take 2 tablets twice a day with meals     Dispense:  120 Tab     Refill:  3    lancets misc     Sig: Use to check glucose once a day     Dispense:  50 Each     Refill:  5    glucose blood VI test strips (blood glucose test) strip     Sig: by Does Not Apply route two (2) times a day.  Use to check glucose once a day     Dispense:  50 Strip     Refill:  5        Signed By: Kwame Pete MD     February 25, 2021      Return to clinic 6 weeks

## 2021-02-26 DIAGNOSIS — E78.2 MIXED HYPERLIPIDEMIA: ICD-10-CM

## 2021-02-26 DIAGNOSIS — I25.10 CORONARY ARTERY DISEASE INVOLVING NATIVE HEART WITHOUT ANGINA PECTORIS, UNSPECIFIED VESSEL OR LESION TYPE: Primary | ICD-10-CM

## 2021-02-26 RX ORDER — ATORVASTATIN CALCIUM 40 MG/1
40 TABLET, FILM COATED ORAL DAILY
Qty: 30 TAB | Refills: 3 | Status: SHIPPED | OUTPATIENT
Start: 2021-02-26 | End: 2021-03-28

## 2021-03-05 ENCOUNTER — TELEPHONE (OUTPATIENT)
Dept: FAMILY MEDICINE CLINIC | Age: 66
End: 2021-03-05

## 2021-03-05 DIAGNOSIS — K21.9 GASTROESOPHAGEAL REFLUX DISEASE WITHOUT ESOPHAGITIS: Primary | ICD-10-CM

## 2021-03-05 RX ORDER — PANTOPRAZOLE SODIUM 40 MG/1
40 TABLET, DELAYED RELEASE ORAL DAILY
Qty: 90 TAB | Refills: 1 | Status: SHIPPED | OUTPATIENT
Start: 2021-03-05 | End: 2021-09-07 | Stop reason: SDUPTHER

## 2021-03-05 NOTE — TELEPHONE ENCOUNTER
Identifying Data:  77 y.o. , José Muhammad , female     Historical Data Reviewed ; Allergies-  Allergies   Allergen Reactions    Aspirin Unknown (comments)     hemorraging - ended up in the hospital     Current medication as of last visit and reconciliation-  Current Outpatient Medications on File Prior to Visit   Medication Sig Dispense Refill    atorvastatin (LIPITOR) 40 mg tablet Take 1 Tab by mouth daily for 30 days. 30 Tab 3    ProAir RespiClick 90 mcg/actuation breath activated inhaler INHALE 1 PUFF BY MOUTH EVERY 6 HOURS AS NEEDED FOR WHEEZING FOR UP TO 1 DAY      metFORMIN (GLUCOPHAGE) 500 mg tablet Take 2 tablets twice a day with meals 120 Tab 3    lancets misc Use to check glucose once a day 50 Each 5    glucose blood VI test strips (blood glucose test) strip by Does Not Apply route two (2) times a day. Use to check glucose once a day 50 Strip 5    amLODIPine (NORVASC) 10 mg tablet Take 1 Tab by mouth daily. 30 Tab 4    lisinopriL (PRINIVIL, ZESTRIL) 5 mg tablet Take 1 Tab by mouth daily. 30 Tab 2    spironolactone (ALDACTONE) 25 mg tablet Take 1 Tab by mouth two (2) times a day. Indications: heart failure with reduced ejection fraction, fluid in the lungs due to chronic heart failure 30 Tab 2    Walker misc Rolling walker 1 Each 0    Bedside Commode XX Use as needed 1 Each 0    furosemide (Lasix) 40 mg tablet Take 1 Tab by mouth daily. 30 Tab 1    [DISCONTINUED] pantoprazole (Protonix) 40 mg tablet Take 1 Tab by mouth daily. 30 Tab 0    insulin lispro (HUMALOG) 100 unit/mL injection INITIATE CORRECTIVE INSULIN PROTOCOL (MAGALI):  RX MAGALI Normal Sensitivity (Average weight)    AC (before meals), Q6H, and Q4H CORRECTIONAL SCALE only For Blood Sugar (mg/dl) of :             140-199=2 units            200-249=3 units  250-299=5 units  300-349=7 units  350 or greater = Call MD  Give in addition to basal medications.   Do Not Hold for NPO    BEDTIME CORRECTIONAL sliding scale when scheduled:  200-249=2 units  250-299=3 units   300-349=4 units  350 or greater = Call MD  Give in addition to basal medications. 1 Vial 0    Blood-Glucose Meter monitoring kit Use as directed 1 Kit 0    budesonide-formoteroL (SYMBICORT) 160-4.5 mcg/actuation HFAA Take 2 Puffs by inhalation two (2) times a day. 1 Inhaler 3    albuterol-ipratropium (DUO-NEB) 2.5 mg-0.5 mg/3 ml nebu 3 mL by Nebulization route every six (6) hours as needed for Wheezing, Shortness of Breath or Cough. 120 Nebule 3    LORazepam (Ativan) 1 mg tablet Take 1 Tab by mouth nightly. Max Daily Amount: 1 mg. 5 Tab 0    potassium chloride (KLOR-CON) 10 mEq tablet By oral route take one tablet Monday, Wednesday and Friday 40 Tab 3    ammonium lactate (LAC-HYDRIN) 12 % lotion Apply  to affected area two (2) times a day. rub in to affected area well      carvediloL (COREG) 3.125 mg tablet Take  by mouth two (2) times daily (with meals). No current facility-administered medications on file prior to visit.       Past Medical History-  Patient Active Problem List   Diagnosis Code    Benign essential hypertension I10    Body mass index 30.0-30.9, adult Z68.30    Type II diabetes mellitus, uncontrolled ([facility]) E11.65    GERD (gastroesophageal reflux disease) K21.9    Heart murmur R01.1    Mixed hyperlipidemia E78.2    Tobacco dependence syndrome F17.200    Acute gastrointestinal hemorrhage K92.2    Allergic rhinitis J30.9    Anemia D64.9    CAD (coronary artery disease) I25.10    Foot callus L84    Left bundle branch block I44.7    Microalbuminuria due to type 2 diabetes mellitus ([facility]) E11.29, R80.9    Morbid obesity ([facility]) E66.01    Hyponatremia E87.1    COPD exacerbation ([facility]) J44.1    Acute respiratory failure with hypoxia ([facility]) J96.01    GI bleed K92.2    Aortic stenosis I35.0   [facility] discharge follow-up Z09    Chronic obstructive pulmonary disease ([facility]) J44.9    Coronary artery disease involving native heart without angina pectoris I25.10    Gastroesophageal reflux disease without esophagitis K21.9    Congestive heart failure (HCC) I50.9    Type 2 diabetes mellitus with hyperglycemia, without long-term current use of insulin (HCC) E11.65       Assessment and Plan:    ICD-10-CM ICD-9-CM    1.  Gastroesophageal reflux disease without esophagitis  K21.9 530.81 pantoprazole (Protonix) 40 mg tablet           Memory Noel, DO

## 2021-03-10 LAB
ALBUMIN SERPL-MCNC: 4.2 G/DL (ref 3.8–4.8)
ALBUMIN/GLOB SERPL: 1.4 {RATIO} (ref 1.2–2.2)
ALP SERPL-CCNC: 72 IU/L (ref 39–117)
ALT SERPL-CCNC: 15 IU/L (ref 0–32)
AST SERPL-CCNC: 22 IU/L (ref 0–40)
BASOPHILS # BLD AUTO: 0 X10E3/UL (ref 0–0.2)
BASOPHILS NFR BLD AUTO: 0 %
BILIRUB SERPL-MCNC: 0.7 MG/DL (ref 0–1.2)
BUN SERPL-MCNC: 8 MG/DL (ref 8–27)
BUN/CREAT SERPL: 15 (ref 12–28)
CALCIUM SERPL-MCNC: 9.9 MG/DL (ref 8.7–10.3)
CHLORIDE SERPL-SCNC: 97 MMOL/L (ref 96–106)
CO2 SERPL-SCNC: 25 MMOL/L (ref 20–29)
CREAT SERPL-MCNC: 0.54 MG/DL (ref 0.57–1)
EOSINOPHIL # BLD AUTO: 0.1 X10E3/UL (ref 0–0.4)
EOSINOPHIL NFR BLD AUTO: 1 %
ERYTHROCYTE [DISTWIDTH] IN BLOOD BY AUTOMATED COUNT: 24.9 % (ref 11.7–15.4)
GLOBULIN SER CALC-MCNC: 2.9 G/DL (ref 1.5–4.5)
GLUCOSE SERPL-MCNC: 157 MG/DL (ref 65–99)
HCT VFR BLD AUTO: 37.6 % (ref 34–46.6)
HGB BLD-MCNC: 11.5 G/DL (ref 11.1–15.9)
IMM GRANULOCYTES # BLD AUTO: 0 X10E3/UL (ref 0–0.1)
IMM GRANULOCYTES NFR BLD AUTO: 0 %
LYMPHOCYTES # BLD AUTO: 1.8 X10E3/UL (ref 0.7–3.1)
LYMPHOCYTES NFR BLD AUTO: 18 %
MCH RBC QN AUTO: 22.3 PG (ref 26.6–33)
MCHC RBC AUTO-ENTMCNC: 30.6 G/DL (ref 31.5–35.7)
MCV RBC AUTO: 73 FL (ref 79–97)
MONOCYTES # BLD AUTO: 0.7 X10E3/UL (ref 0.1–0.9)
MONOCYTES NFR BLD AUTO: 7 %
MORPHOLOGY BLD-IMP: ABNORMAL
NEUTROPHILS # BLD AUTO: 7.3 X10E3/UL (ref 1.4–7)
NEUTROPHILS NFR BLD AUTO: 74 %
PLATELET # BLD AUTO: 506 X10E3/UL (ref 150–450)
POTASSIUM SERPL-SCNC: 4 MMOL/L (ref 3.5–5.2)
PROT SERPL-MCNC: 7.1 G/DL (ref 6–8.5)
RBC # BLD AUTO: 5.16 X10E6/UL (ref 3.77–5.28)
SODIUM SERPL-SCNC: 139 MMOL/L (ref 134–144)
WBC # BLD AUTO: 10 X10E3/UL (ref 3.4–10.8)

## 2021-03-10 NOTE — PROGRESS NOTES
His blood count is normal with no anemia. However, there still could be a little bit of iron deficiency. Platelet count is a little up but not severe. We could send her to hematology if she does not have one already.   The liver function is normal.  Kidney function is normal.

## 2021-03-15 DIAGNOSIS — D64.9 ANEMIA, UNSPECIFIED TYPE: Primary | ICD-10-CM

## 2021-03-15 NOTE — PROGRESS NOTES
Identifying Data:  77 y.o. , Sancho Pedroza , female     Historical Data Reviewed ; Allergies-  Allergies   Allergen Reactions    Aspirin Unknown (comments)     hemorraging - ended up in the hospital     Current medication as of last visit and reconciliation-  Current Outpatient Medications on File Prior to Visit   Medication Sig Dispense Refill    pantoprazole (Protonix) 40 mg tablet Take 1 Tab by mouth daily. 90 Tab 1    atorvastatin (LIPITOR) 40 mg tablet Take 1 Tab by mouth daily for 30 days. 30 Tab 3    ProAir RespiClick 90 mcg/actuation breath activated inhaler INHALE 1 PUFF BY MOUTH EVERY 6 HOURS AS NEEDED FOR WHEEZING FOR UP TO 1 DAY      metFORMIN (GLUCOPHAGE) 500 mg tablet Take 2 tablets twice a day with meals 120 Tab 3    lancets misc Use to check glucose once a day 50 Each 5    glucose blood VI test strips (blood glucose test) strip by Does Not Apply route two (2) times a day. Use to check glucose once a day 50 Strip 5    amLODIPine (NORVASC) 10 mg tablet Take 1 Tab by mouth daily. 30 Tab 4    lisinopriL (PRINIVIL, ZESTRIL) 5 mg tablet Take 1 Tab by mouth daily. 30 Tab 2    spironolactone (ALDACTONE) 25 mg tablet Take 1 Tab by mouth two (2) times a day. Indications: heart failure with reduced ejection fraction, fluid in the lungs due to chronic heart failure 30 Tab 2    Walker misc Rolling walker 1 Each 0    Bedside Commode XX Use as needed 1 Each 0    furosemide (Lasix) 40 mg tablet Take 1 Tab by mouth daily. 30 Tab 1    insulin lispro (HUMALOG) 100 unit/mL injection INITIATE CORRECTIVE INSULIN PROTOCOL (MAGALI):  RX MAGALI Normal Sensitivity (Average weight)    AC (before meals), Q6H, and Q4H CORRECTIONAL SCALE only For Blood Sugar (mg/dl) of :             140-199=2 units            200-249=3 units  250-299=5 units  300-349=7 units  350 or greater = Call MD  Give in addition to basal medications.   Do Not Hold for NPO    BEDTIME CORRECTIONAL sliding scale when scheduled:  200-249=2 units  250-299=3 units   300-349=4 units  350 or greater = Call MD  Give in addition to basal medications. 1 Vial 0    Blood-Glucose Meter monitoring kit Use as directed 1 Kit 0    budesonide-formoteroL (SYMBICORT) 160-4.5 mcg/actuation HFAA Take 2 Puffs by inhalation two (2) times a day. 1 Inhaler 3    albuterol-ipratropium (DUO-NEB) 2.5 mg-0.5 mg/3 ml nebu 3 mL by Nebulization route every six (6) hours as needed for Wheezing, Shortness of Breath or Cough. 120 Nebule 3    LORazepam (Ativan) 1 mg tablet Take 1 Tab by mouth nightly. Max Daily Amount: 1 mg. 5 Tab 0    potassium chloride (KLOR-CON) 10 mEq tablet By oral route take one tablet Monday, Wednesday and Friday 40 Tab 3    ammonium lactate (LAC-HYDRIN) 12 % lotion Apply  to affected area two (2) times a day. rub in to affected area well      carvediloL (COREG) 3.125 mg tablet Take  by mouth two (2) times daily (with meals). No current facility-administered medications on file prior to visit.       Past Medical History-  Patient Active Problem List   Diagnosis Code    Benign essential hypertension I10    Body mass index 30.0-30.9, adult Z68.30    Type II diabetes mellitus, uncontrolled ([facility]) E11.65    GERD (gastroesophageal reflux disease) K21.9    Heart murmur R01.1    Mixed hyperlipidemia E78.2    Tobacco dependence syndrome F17.200    Acute gastrointestinal hemorrhage K92.2    Allergic rhinitis J30.9    Anemia D64.9    CAD (coronary artery disease) I25.10    Foot callus L84    Left bundle branch block I44.7    Microalbuminuria due to type 2 diabetes mellitus ([facility]) E11.29, R80.9    Morbid obesity ([facility]) E66.01    Hyponatremia E87.1    COPD exacerbation ([facility]) J44.1    Acute respiratory failure with hypoxia ([facility]) J96.01    GI bleed K92.2    Aortic stenosis I35.0   [facility] discharge follow-up Z09    Chronic obstructive pulmonary disease ([facility]) J44.9    Coronary artery disease involving native heart without angina pectoris I25.10    Gastroesophageal reflux disease without esophagitis K21.9    Congestive heart failure (HCC) I50.9    Type 2 diabetes mellitus with hyperglycemia, without long-term current use of insulin (HCC) E11.65       Assessment and Plan:    ICD-10-CM ICD-9-CM    1.  Anemia, unspecified type  D64.9 285.9 REFERRAL TO HEMATOLOGY           Luana Chanel DO

## 2021-03-22 RX ORDER — SPIRONOLACTONE 25 MG/1
25 TABLET ORAL 2 TIMES DAILY
Qty: 30 TAB | Refills: 2 | Status: SHIPPED | OUTPATIENT
Start: 2021-03-22 | End: 2021-04-12 | Stop reason: SDUPTHER

## 2021-03-22 NOTE — TELEPHONE ENCOUNTER
Patient called requesting refill on RX-spironolactone (ALDACTONE) 25 mg tablet be sent to [facility] on [address], patient is completely out.

## 2021-04-12 ENCOUNTER — OFFICE VISIT (OUTPATIENT)
Dept: FAMILY MEDICINE CLINIC | Age: 66
End: 2021-04-12
Payer: MEDICARE

## 2021-04-12 VITALS
BODY MASS INDEX: 30.34 KG/M2 | TEMPERATURE: 97.8 F | WEIGHT: 188.8 LBS | OXYGEN SATURATION: 90 % | SYSTOLIC BLOOD PRESSURE: 161 MMHG | HEIGHT: 66 IN | HEART RATE: 98 BPM | DIASTOLIC BLOOD PRESSURE: 87 MMHG

## 2021-04-12 DIAGNOSIS — K21.9 GASTROESOPHAGEAL REFLUX DISEASE WITHOUT ESOPHAGITIS: Primary | ICD-10-CM

## 2021-04-12 DIAGNOSIS — Z91.09 ENVIRONMENTAL ALLERGIES: ICD-10-CM

## 2021-04-12 DIAGNOSIS — E11.65 UNCONTROLLED TYPE 2 DIABETES MELLITUS WITH HYPERGLYCEMIA (HCC): ICD-10-CM

## 2021-04-12 DIAGNOSIS — I25.10 CORONARY ARTERY DISEASE INVOLVING NATIVE HEART WITHOUT ANGINA PECTORIS, UNSPECIFIED VESSEL OR LESION TYPE: ICD-10-CM

## 2021-04-12 DIAGNOSIS — I10 BENIGN ESSENTIAL HYPERTENSION: ICD-10-CM

## 2021-04-12 DIAGNOSIS — I50.9 CONGESTIVE HEART FAILURE, UNSPECIFIED HF CHRONICITY, UNSPECIFIED HEART FAILURE TYPE (HCC): ICD-10-CM

## 2021-04-12 DIAGNOSIS — J44.9 CHRONIC OBSTRUCTIVE PULMONARY DISEASE, UNSPECIFIED COPD TYPE (HCC): ICD-10-CM

## 2021-04-12 DIAGNOSIS — D50.9 IRON DEFICIENCY ANEMIA, UNSPECIFIED IRON DEFICIENCY ANEMIA TYPE: ICD-10-CM

## 2021-04-12 PROCEDURE — G8427 DOCREV CUR MEDS BY ELIG CLIN: HCPCS | Performed by: FAMILY MEDICINE

## 2021-04-12 PROCEDURE — G8510 SCR DEP NEG, NO PLAN REQD: HCPCS | Performed by: FAMILY MEDICINE

## 2021-04-12 PROCEDURE — G8753 SYS BP > OR = 140: HCPCS | Performed by: FAMILY MEDICINE

## 2021-04-12 PROCEDURE — 3017F COLORECTAL CA SCREEN DOC REV: CPT | Performed by: FAMILY MEDICINE

## 2021-04-12 PROCEDURE — 2022F DILAT RTA XM EVC RTNOPTHY: CPT | Performed by: FAMILY MEDICINE

## 2021-04-12 PROCEDURE — 3044F HG A1C LEVEL LT 7.0%: CPT | Performed by: FAMILY MEDICINE

## 2021-04-12 PROCEDURE — G8417 CALC BMI ABV UP PARAM F/U: HCPCS | Performed by: FAMILY MEDICINE

## 2021-04-12 PROCEDURE — G8400 PT W/DXA NO RESULTS DOC: HCPCS | Performed by: FAMILY MEDICINE

## 2021-04-12 PROCEDURE — G8536 NO DOC ELDER MAL SCRN: HCPCS | Performed by: FAMILY MEDICINE

## 2021-04-12 PROCEDURE — G8754 DIAS BP LESS 90: HCPCS | Performed by: FAMILY MEDICINE

## 2021-04-12 PROCEDURE — 99214 OFFICE O/P EST MOD 30 MIN: CPT | Performed by: FAMILY MEDICINE

## 2021-04-12 PROCEDURE — 1101F PT FALLS ASSESS-DOCD LE1/YR: CPT | Performed by: FAMILY MEDICINE

## 2021-04-12 PROCEDURE — 1090F PRES/ABSN URINE INCON ASSESS: CPT | Performed by: FAMILY MEDICINE

## 2021-04-12 RX ORDER — CARVEDILOL 3.12 MG/1
3.12 TABLET ORAL 2 TIMES DAILY WITH MEALS
Qty: 180 TAB | Refills: 3 | Status: SHIPPED | OUTPATIENT
Start: 2021-04-12 | End: 2022-04-18 | Stop reason: SDUPTHER

## 2021-04-12 RX ORDER — SPIRONOLACTONE 25 MG/1
25 TABLET ORAL 2 TIMES DAILY
Qty: 30 TAB | Refills: 2 | Status: SHIPPED | OUTPATIENT
Start: 2021-04-12 | End: 2021-06-18 | Stop reason: SDUPTHER

## 2021-04-12 RX ORDER — ATORVASTATIN CALCIUM 40 MG/1
TABLET, FILM COATED ORAL
COMMUNITY
Start: 2021-04-09 | End: 2021-07-07

## 2021-04-12 RX ORDER — LORATADINE 10 MG/1
10 TABLET ORAL DAILY
Qty: 30 TAB | Refills: 3 | Status: SHIPPED | OUTPATIENT
Start: 2021-04-12 | End: 2021-07-12

## 2021-04-12 RX ORDER — FUROSEMIDE 40 MG/1
40 TABLET ORAL DAILY
Qty: 30 TAB | Refills: 1 | Status: SHIPPED | OUTPATIENT
Start: 2021-04-12 | End: 2021-05-11 | Stop reason: SDUPTHER

## 2021-04-12 RX ORDER — LANOLIN ALCOHOL/MO/W.PET/CERES
325 CREAM (GRAM) TOPICAL
Qty: 30 TAB | Refills: 3 | Status: SHIPPED | OUTPATIENT
Start: 2021-04-12 | End: 2021-07-12

## 2021-04-12 NOTE — PROGRESS NOTES
1. Have you been to the ER, urgent care clinic since your last visit? Hospitalized since your last visit? No    2. Have you seen or consulted any other health care providers outside of the [address] [place] [address] since your last visit? Include any pap smears or colon screening. No    Chief Complaint   Patient presents with    Hypertension    Diabetes    Allergies     asking for a rx for allergies.  Medication Question     asking if she still needs to be taking Amlodipine. Asking if you can put her on Iron meds.  Immunization/Injection     asking about getting a COVID vaccine.        Visit Vitals  BP (!) 150/89 (BP 1 Location: Right upper arm, BP Patient Position: Sitting)   Pulse (!) 120   Temp 97.8 °F (36.6 °C) (Temporal)   Ht 5' 5.5\" (1.664 m)   Wt 188 lb 12.8 oz (85.6 kg)   SpO2 90%   BMI 30.94 kg/m²

## 2021-04-12 NOTE — PATIENT INSTRUCTIONS
General Health and concerns: HEART HEALTHY DIET: 
A heart healthy diet is one that is low in cholesterol (less than 300 mg daily), fat (less than 80 g daily) . You should also minimize carbohydrates / sugars (less amounts of breads, pastas, potato and potato products and sugary foods/snacks, cookies, cakes, etc) . Try to eat whole wheat/multigrain breads and pastas and eat more vegetables. Cook with olive oil (or no oil) and grill, bake, broil or boil foods. Less red meat and more chicken , fish and lean cuts of beef (limited). 2923-1959 calories per day is sufficient 6480-0391 is acceptable for weight loss. EXERCISE: 
You should do exercise 3-5 days per week (minimum) to include increasing your heart rate for 30 to 45 minutes. At least a pace of a brisk walk should do that. This build up your heart and lung endurance and muscles and helps many function of the body. OTHER: 
     
Routine Health maintenance: You need to get a yearly follow up/physical exam to review, discuss age and gender appropriate exams, labs, vaccines and screening tests. This includes cardiovascular health risk, cancer screens and other abi related topics. Medications-Take all medications as directed. Please do not stop unless you talk to your doctor or health care provider first. Report any problems immediately. Referrals: if you have been given a referral, please call the office if you do not hear from provider in one week. You may make the appointment yourself. Please keep all appointments with specialists and ask them to send their notes, thoughts, recommendations to us , as your PCP. Imaging/Labs:  Be sure to get these images in a timely manner. IF your test must be scheduled, let us know if you need help getting this done and if you do not hear from that provider in a week , call us or them.   BE SURE to call the office if you do not hear regarding the results in one week after the test is performed Image or lab). It is our intention to inform you of the results ALWAYS, even if normal you should get a notification (Call, portal message). PLEASE jalil if you do not get the results. PLEASE follow all recommendations and call/come in /ask questions if you do not understand of if problems develop after or in between visits. Failure to comply with recommended health care advise could result in serious health consequences. Thank you for choosing our practice and please let us know how we can help you feel better and stay well!

## 2021-04-12 NOTE — PROGRESS NOTES
IDENTIFYING INFORMATION:  Nara Ridley , 77 y.o., female  [address]  [place],  [address]     CHIEF COMPLAINT:   Chief Complaint   Patient presents with    Hypertension    Diabetes    Allergies     asking for a rx for allergies.  Medication Question     asking if she still needs to be taking Amlodipine. Asking if you can put her on Iron meds.  Immunization/Injection     asking about getting a COVID vaccine. HISTORY OF PRESENT ILLNESS:  Nara Ridley is a 77 y.o. female  has a past medical history of Acute gastrointestinal hemorrhage (8/28/2020), Allergic rhinitis (8/28/2020), Anemia (8/28/2020), Benign essential hypertension (6/25/2020), Body mass index 30.0-30.9, adult (6/25/2020), CAD (coronary artery disease) (8/28/2020), Foot callus (8/28/2020), GERD (gastroesophageal reflux disease) (6/25/2020), Heart murmur (6/25/2020), colonoscopy (01/01/2015), Left bundle branch block (8/28/2020), Microalbuminuria due to type 2 diabetes mellitus ([address] (8/28/2020), Mixed hyperlipidemia (6/25/2020), Morbid obesity ([address] (8/28/2020), Tobacco dependence syndrome (6/25/2020), and Type II diabetes mellitus, uncontrolled ([address] (6/25/2020). .  she comes in for 1 month follow up and has some questions about anemia, allergies and blood pressure medications.       PAST MEDICAL HISTORY:   Past Medical History:   Diagnosis Date    Acute gastrointestinal hemorrhage 8/28/2020    Allergic rhinitis 8/28/2020    Anemia 8/28/2020    Benign essential hypertension 6/25/2020    Body mass index 30.0-30.9, adult 6/25/2020    CAD (coronary artery disease) 8/28/2020    Foot callus 8/28/2020    GERD (gastroesophageal reflux disease) 6/25/2020    Heart murmur 6/25/2020    Hx of colonoscopy 01/01/2015    Done for anemia and rectal bleeding    Left bundle branch block 8/28/2020    Microalbuminuria due to type 2 diabetes mellitus ([address] 8/28/2020    Mixed hyperlipidemia 6/25/2020    Morbid obesity ([address] 8/28/2020  Tobacco dependence syndrome 6/25/2020    Type II diabetes mellitus, uncontrolled ([facility] 6/25/2020       MEDICATIONS:   Current Outpatient Medications on File Prior to Visit   Medication Sig Dispense Refill    atorvastatin (LIPITOR) 40 mg tablet TAKE 1 TABLET BY MOUTH ONCE DAILY FOR 30 DAYS      pantoprazole (Protonix) 40 mg tablet Take 1 Tab by mouth daily. 90 Tab 1    ProAir RespiClick 90 mcg/actuation breath activated inhaler INHALE 1 PUFF BY MOUTH EVERY 6 HOURS AS NEEDED FOR WHEEZING FOR UP TO 1 DAY      metFORMIN (GLUCOPHAGE) 500 mg tablet Take 2 tablets twice a day with meals 120 Tab 3    lancets misc Use to check glucose once a day 50 Each 5    glucose blood VI test strips (blood glucose test) strip by Does Not Apply route two (2) times a day. Use to check glucose once a day 50 Strip 5    amLODIPine (NORVASC) 10 mg tablet Take 1 Tab by mouth daily. 30 Tab 4    lisinopriL (PRINIVIL, ZESTRIL) 5 mg tablet Take 1 Tab by mouth daily. (Patient taking differently: Take 20 mg by mouth two (2) times a day.) 30 Tab 2    Walker misc Rolling walker 1 Each 0    Bedside Commode XX Use as needed 1 Each 0    Blood-Glucose Meter monitoring kit Use as directed 1 Kit 0    budesonide-formoteroL (SYMBICORT) 160-4.5 mcg/actuation HFAA Take 2 Puffs by inhalation two (2) times a day. 1 Inhaler 3    albuterol-ipratropium (DUO-NEB) 2.5 mg-0.5 mg/3 ml nebu 3 mL by Nebulization route every six (6) hours as needed for Wheezing, Shortness of Breath or Cough. 120 Nebule 3    potassium chloride (KLOR-CON) 10 mEq tablet By oral route take one tablet Monday, Wednesday and Friday 40 Tab 3    [DISCONTINUED] spironolactone (ALDACTONE) 25 mg tablet Take 1 Tab by mouth two (2) times a day. Indications: heart failure with reduced ejection fraction, fluid in the lungs due to chronic heart failure 30 Tab 2    [DISCONTINUED] furosemide (Lasix) 40 mg tablet Take 1 Tab by mouth daily.  30 Tab 1    insulin lispro (HUMALOG) 100 unit/mL injection INITIATE CORRECTIVE INSULIN PROTOCOL (MAGALI):  RX MAGALI Normal Sensitivity (Average weight)    AC (before meals), Q6H, and Q4H CORRECTIONAL SCALE only For Blood Sugar (mg/dl) of :             140-199=2 units            200-249=3 units  250-299=5 units  300-349=7 units  350 or greater = Call MD  Give in addition to basal medications. Do Not Hold for NPO    BEDTIME CORRECTIONAL sliding scale when scheduled:  200-249=2 units  250-299=3 units   300-349=4 units  350 or greater = Call MD  Give in addition to basal medications. 1 Vial 0    LORazepam (Ativan) 1 mg tablet Take 1 Tab by mouth nightly. Max Daily Amount: 1 mg. 5 Tab 0    ammonium lactate (LAC-HYDRIN) 12 % lotion Apply  to affected area two (2) times a day. rub in to affected area well      [DISCONTINUED] carvediloL (COREG) 3.125 mg tablet Take  by mouth two (2) times daily (with meals). No current facility-administered medications on file prior to visit. ALLERGIES:  Allergies   Allergen Reactions    Aspirin Unknown (comments)     hemorraging - ended up in the hospital         SOCIAL HISTORY:   Social History     Tobacco Use    Smoking status: Former Smoker     Packs/day: 0.50     Quit date:      Years since quittin.2    Smokeless tobacco: Never Used   Substance Use Topics    Alcohol use: Not Currently     Frequency: Never    Drug use: Never       SURGICAL HISTORY:  Past Surgical History:   Procedure Laterality Date    HX COLONOSCOPY      due in 3648-8725.   Done for anemia and rectal bleeding        FAMILY HISTORY:  Family History   Problem Relation Age of Onset   Melodie Ni Cancer Mother         uterine    Hypertension Father     Cancer Sister         breast         REVIEW OF SYSTEMS:  I personally collected this information from all available source present (patient/others in room and records available) -[email or web address]    Review of Systems   Constitutional: Negative for activity change, appetite change, chills, diaphoresis, fever and unexpected weight change. HENT: Negative for congestion, ear discharge, ear pain, hearing loss, rhinorrhea, sinus pressure, sneezing, sore throat, tinnitus, trouble swallowing and voice change. Eyes: Negative for photophobia, pain, discharge and visual disturbance. Respiratory: Negative for cough, chest tightness, shortness of breath and wheezing. Cardiovascular: Negative for chest pain, palpitations and leg swelling. Gastrointestinal: Negative for abdominal distention, abdominal pain, blood in stool, constipation, diarrhea, nausea and vomiting. Endocrine: Negative for cold intolerance, heat intolerance, polydipsia and polyphagia. Genitourinary: Negative for difficulty urinating, dysuria, frequency, hematuria and urgency. Musculoskeletal: Negative for arthralgias, back pain, gait problem, joint swelling, myalgias and neck pain. Skin: Negative for color change and rash. Neurological: Negative for dizziness, tremors, syncope, speech difficulty, weakness, light-headedness, numbness and headaches. Hematological: Negative for adenopathy. Does not bruise/bleed easily. Psychiatric/Behavioral: Negative for agitation, behavioral problems, confusion, decreased concentration, dysphoric mood, hallucinations, sleep disturbance and suicidal ideas. The patient is not nervous/anxious. PHYSICAL EXAMINATION:  Vital Signs:    Visit Vitals  BP (!) 161/87 (BP 1 Location: Left upper arm, BP Patient Position: Sitting)   Pulse 98   Temp 97.8 °F (36.6 °C) (Temporal)   Ht 5' 5.5\" (1.664 m)   Wt 188 lb 12.8 oz (85.6 kg)   SpO2 90%   BMI 30.94 kg/m²         Wt Readings from Last 3 Encounters:   04/12/21 188 lb 12.8 oz (85.6 kg)   02/25/21 190 lb 8 oz (86.4 kg)   02/10/21 192 lb 9.6 oz (87.4 kg)     BP Readings from Last 3 Encounters:   04/12/21 (!) 161/87   02/25/21 (!) 142/85   02/10/21 (!) 152/80         Physical Exam  Nursing note reviewed.    Constitutional:       General: She is not in acute distress. Appearance: Normal appearance. She is obese. She is not ill-appearing or toxic-appearing. HENT:      Nose: Congestion present. No rhinorrhea. Mouth/Throat:      Pharynx: Posterior oropharyngeal erythema present. No oropharyngeal exudate. Eyes:      General: No scleral icterus. Extraocular Movements: Extraocular movements intact. Conjunctiva/sclera: Conjunctivae normal.      Pupils: Pupils are equal, round, and reactive to light. Neck:      Musculoskeletal: No neck rigidity or muscular tenderness. Vascular: No carotid bruit. Cardiovascular:      Rate and Rhythm: Normal rate and regular rhythm. Heart sounds: No murmur. No friction rub. No gallop. Pulmonary:      Effort: Pulmonary effort is normal.      Breath sounds: Normal breath sounds. No wheezing, rhonchi or rales. Abdominal:      General: Bowel sounds are normal. There is no distension. Palpations: Abdomen is soft. There is no mass. Tenderness: There is no abdominal tenderness. Musculoskeletal:         General: Tenderness and deformity present. No swelling. Right lower leg: No edema. Left lower leg: No edema. Comments: Slightly arthritic hands and knees particularly right side. Tight, tender, swollen sacroiliac's bilaterally. Lymphadenopathy:      Cervical: No cervical adenopathy. Skin:     Capillary Refill: Capillary refill takes less than 2 seconds. Coloration: Skin is not jaundiced. Findings: No erythema or rash. Neurological:      General: No focal deficit present. Mental Status: She is alert and oriented to person, place, and time. Mental status is at baseline. Cranial Nerves: No cranial nerve deficit. Sensory: No sensory deficit. Coordination: Coordination normal.      Gait: Gait normal.   Psychiatric:         Mood and Affect: Mood normal.         Behavior: Behavior normal.         Thought Content:  Thought content normal.         Judgment: Judgment normal.         ASSESSMENT/PLAN:    Discussion (regarding today's visit with Jevon Mccall); Seems stable. Blood pressure is better although is up a little today. I will go ahead and restart her amlodipine 10 mg daily. She can try to half a cup pills initially until she can take it for about a week unfortunately, they do crumble easily so if needed we can just do the 5 mg dose. She wants her blood pressure at least twice weekly at home and let me know if it starts to go low or cause any dizziness. Like less than 988 systolic or 60 diastolic. Lab work has been done recently and she has fairly adequate control of her sugar though it is improving it is not below 7 yet. She does have some COPD and history of heart failure and will continue to see the cardiologist and I did start on low but iron as her MCV was a little low with borderline but normal hemoglobin. I do think she should continue the lisinopril and spironolactone due to her heart failure history. ICD-10-CM ICD-9-CM    1. Gastroesophageal reflux disease without esophagitis  K21.9 530.81    2. Benign essential hypertension  I10 401.1 furosemide (Lasix) 40 mg tablet      spironolactone (ALDACTONE) 25 mg tablet      carvediloL (COREG) 3.125 mg tablet   3. Chronic obstructive pulmonary disease, unspecified COPD type ([facility]  J44.9 496    4. Uncontrolled type 2 diabetes mellitus with hyperglycemia ([facility])  E11.65 250.02    5. Congestive heart failure, unspecified HF chronicity, unspecified heart failure type ([facility])  I50.9 428.0 furosemide (Lasix) 40 mg tablet      spironolactone (ALDACTONE) 25 mg tablet      carvediloL (COREG) 3.125 mg tablet   6. Coronary artery disease involving native heart without angina pectoris, unspecified vessel or lesion type  I25.10 414.01    7. Iron deficiency anemia, unspecified iron deficiency anemia type  D50.9 280.9 ferrous sulfate 325 mg (65 mg iron) tablet   8.  Environmental allergies  Z91.09 V15.09 loratadine (CLARITIN) 10 mg tablet     WE reviewed each diagnosis listed for today's visit including medications, treatment, testing such as labs, imagine, referrals and when to call regarding results and appointments. Reminded patient to keep any and all appointments with specialists, labs, imaging. Reminded patient to make sure we get copies of any specialists care, labs and imaging. Reminded patient to call of come by the office if there are any concerns, questions , comments or problems. The patient verbalized understanding of the care plan and all questions were answered to the patient's satisfaction prior to leaving the office. The patient was told that failure to comply with recommended testing could result in abnormal health consequences. The patient was instructed to have yearly routine health maintenance including but not limited to age appropriate vaccines, testing, screening exams. ALL questions were answered to her satisfaction before leaving the office. The patient actively participated in medical decision making. FOLLOW UP:   Patient knows to keep any and all future visits scheduled unless told otherwise. Patient knows to call, come back if any concerns, questions, comments or problems arise. Follow-up and Dispositions    · Return in about 4 weeks (around 5/10/2021) for Follow up bp, allergies. This visit may have been completed , in part, with voice recognition software as well as typing and may have syntax errors despite editing.       Elo Brady, DO

## 2021-04-27 ENCOUNTER — TELEPHONE (OUTPATIENT)
Dept: ENDOCRINOLOGY | Age: 66
End: 2021-04-27

## 2021-04-27 DIAGNOSIS — E11.65 TYPE 2 DIABETES MELLITUS WITH HYPERGLYCEMIA, WITHOUT LONG-TERM CURRENT USE OF INSULIN (HCC): ICD-10-CM

## 2021-04-27 RX ORDER — METFORMIN HYDROCHLORIDE 500 MG/1
TABLET ORAL
Qty: 120 TAB | Refills: 3 | Status: SHIPPED | OUTPATIENT
Start: 2021-04-27 | End: 2021-11-24

## 2021-04-27 NOTE — TELEPHONE ENCOUNTER
Patient called in stated that she has only 2 pill left of her Metformin and if you could send in a refill prescription for her to [facility]

## 2021-05-10 ENCOUNTER — OFFICE VISIT (OUTPATIENT)
Dept: FAMILY MEDICINE CLINIC | Age: 66
End: 2021-05-10
Payer: MEDICARE

## 2021-05-10 VITALS
WEIGHT: 188 LBS | HEIGHT: 65 IN | BODY MASS INDEX: 31.32 KG/M2 | RESPIRATION RATE: 18 BRPM | DIASTOLIC BLOOD PRESSURE: 74 MMHG | OXYGEN SATURATION: 98 % | HEART RATE: 94 BPM | TEMPERATURE: 96.9 F | SYSTOLIC BLOOD PRESSURE: 120 MMHG

## 2021-05-10 DIAGNOSIS — I10 BENIGN ESSENTIAL HYPERTENSION: ICD-10-CM

## 2021-05-10 DIAGNOSIS — D50.9 IRON DEFICIENCY ANEMIA, UNSPECIFIED IRON DEFICIENCY ANEMIA TYPE: ICD-10-CM

## 2021-05-10 DIAGNOSIS — Z91.09 ENVIRONMENTAL ALLERGIES: ICD-10-CM

## 2021-05-10 DIAGNOSIS — I25.10 CORONARY ARTERY DISEASE INVOLVING NATIVE HEART WITHOUT ANGINA PECTORIS, UNSPECIFIED VESSEL OR LESION TYPE: ICD-10-CM

## 2021-05-10 DIAGNOSIS — J44.9 CHRONIC OBSTRUCTIVE PULMONARY DISEASE, UNSPECIFIED COPD TYPE (HCC): ICD-10-CM

## 2021-05-10 DIAGNOSIS — I50.9 CONGESTIVE HEART FAILURE, UNSPECIFIED HF CHRONICITY, UNSPECIFIED HEART FAILURE TYPE (HCC): ICD-10-CM

## 2021-05-10 DIAGNOSIS — K21.9 GASTROESOPHAGEAL REFLUX DISEASE WITHOUT ESOPHAGITIS: Primary | ICD-10-CM

## 2021-05-10 DIAGNOSIS — E11.65 UNCONTROLLED TYPE 2 DIABETES MELLITUS WITH HYPERGLYCEMIA (HCC): ICD-10-CM

## 2021-05-10 PROCEDURE — 3044F HG A1C LEVEL LT 7.0%: CPT | Performed by: FAMILY MEDICINE

## 2021-05-10 PROCEDURE — G8752 SYS BP LESS 140: HCPCS | Performed by: FAMILY MEDICINE

## 2021-05-10 PROCEDURE — G8510 SCR DEP NEG, NO PLAN REQD: HCPCS | Performed by: FAMILY MEDICINE

## 2021-05-10 PROCEDURE — G8417 CALC BMI ABV UP PARAM F/U: HCPCS | Performed by: FAMILY MEDICINE

## 2021-05-10 PROCEDURE — 1090F PRES/ABSN URINE INCON ASSESS: CPT | Performed by: FAMILY MEDICINE

## 2021-05-10 PROCEDURE — G8427 DOCREV CUR MEDS BY ELIG CLIN: HCPCS | Performed by: FAMILY MEDICINE

## 2021-05-10 PROCEDURE — 2022F DILAT RTA XM EVC RTNOPTHY: CPT | Performed by: FAMILY MEDICINE

## 2021-05-10 PROCEDURE — G8400 PT W/DXA NO RESULTS DOC: HCPCS | Performed by: FAMILY MEDICINE

## 2021-05-10 PROCEDURE — G8536 NO DOC ELDER MAL SCRN: HCPCS | Performed by: FAMILY MEDICINE

## 2021-05-10 PROCEDURE — 3017F COLORECTAL CA SCREEN DOC REV: CPT | Performed by: FAMILY MEDICINE

## 2021-05-10 PROCEDURE — G8754 DIAS BP LESS 90: HCPCS | Performed by: FAMILY MEDICINE

## 2021-05-10 PROCEDURE — 99214 OFFICE O/P EST MOD 30 MIN: CPT | Performed by: FAMILY MEDICINE

## 2021-05-10 PROCEDURE — 1101F PT FALLS ASSESS-DOCD LE1/YR: CPT | Performed by: FAMILY MEDICINE

## 2021-05-10 RX ORDER — FLUTICASONE PROPIONATE 50 MCG
SPRAY, SUSPENSION (ML) NASAL
Qty: 1 BOTTLE | Refills: 3 | Status: SHIPPED | OUTPATIENT
Start: 2021-05-10 | End: 2021-07-12

## 2021-05-10 RX ORDER — MINERAL OIL
180 ENEMA (ML) RECTAL DAILY
Qty: 30 TAB | Refills: 3 | Status: SHIPPED | OUTPATIENT
Start: 2021-05-10

## 2021-05-10 RX ORDER — CLOPIDOGREL BISULFATE 75 MG/1
75 TABLET ORAL DAILY
COMMUNITY
End: 2021-07-16

## 2021-05-10 NOTE — PROGRESS NOTES
IDENTIFYING INFORMATION:  Agnieszka Quintero , 74 y.o., female  [address]  [place],  [address]         CHIEF COMPLAINT:     Chief Complaint   Patient presents with    Follow-up     Return in about 4 weeks (around 5/10/2021) for Follow up bp, allergies    Hypertension    Allergies    Alopecia     \"Hair is coming out\" x 2 months - She gets a \"hand full of hair\" when she juarez her hair. HISTORY OF PRESENT ILLNESS:    Agnieszka Quintero is a 74 y.o. female  has a past medical history of Acute gastrointestinal hemorrhage (8/28/2020), Allergic rhinitis (8/28/2020), Anemia (8/28/2020), Benign essential hypertension (6/25/2020), Body mass index 30.0-30.9, adult (6/25/2020), CAD (coronary artery disease) (8/28/2020), Foot callus (8/28/2020), GERD (gastroesophageal reflux disease) (6/25/2020), Hair loss, Heart murmur (6/25/2020), colonoscopy (01/01/2015), Left bundle branch block (8/28/2020), Microalbuminuria due to type 2 diabetes mellitus ([address] (8/28/2020), Mixed hyperlipidemia (6/25/2020), Morbid obesity ([address] (8/28/2020), Tobacco dependence syndrome (6/25/2020), and Type II diabetes mellitus, uncontrolled ([address] (6/25/2020). .  she comes in for 1 month follow up> Blood pressure is better. Has felt short of breath this morning and she sees cardio tomorrow. Is taking loratadine for her allergies. Was in an outside service for a couple hours day prior and had a lot of pollen blowing around. Has some hair loss. Fatigue per her normal butnot severe. No dry skin and does have some constipation. Worried aout her hair.       PAST MEDICAL HISTORY:     Past Medical History:   Diagnosis Date    Acute gastrointestinal hemorrhage 8/28/2020    Allergic rhinitis 8/28/2020    Anemia 8/28/2020    Benign essential hypertension 6/25/2020    Body mass index 30.0-30.9, adult 6/25/2020    CAD (coronary artery disease) 8/28/2020    Foot callus 8/28/2020    GERD (gastroesophageal reflux disease) 6/25/2020    Hair loss  Heart murmur 6/25/2020    Hx of colonoscopy 01/01/2015    Done for anemia and rectal bleeding    Left bundle branch block 8/28/2020    Microalbuminuria due to type 2 diabetes mellitus ([facility] 8/28/2020    Mixed hyperlipidemia 6/25/2020    Morbid obesity ([facility] 8/28/2020    Tobacco dependence syndrome 6/25/2020    Type II diabetes mellitus, uncontrolled ([facility] 6/25/2020       MEDICATIONS:     Current Outpatient Medications on File Prior to Visit   Medication Sig Dispense Refill    clopidogreL (PLAVIX) 75 mg tab Take 75 mg by mouth daily.  metFORMIN (GLUCOPHAGE) 500 mg tablet Take 2 tablets twice a day with meals 120 Tab 3    atorvastatin (LIPITOR) 40 mg tablet TAKE 1 TABLET BY MOUTH ONCE DAILY FOR 30 DAYS      furosemide (Lasix) 40 mg tablet Take 1 Tab by mouth daily. 30 Tab 1    spironolactone (ALDACTONE) 25 mg tablet Take 1 Tab by mouth two (2) times a day. Indications: heart failure with reduced ejection fraction, fluid in the lungs due to chronic heart failure 30 Tab 2    carvediloL (COREG) 3.125 mg tablet Take 1 Tab by mouth two (2) times daily (with meals). 180 Tab 3    loratadine (CLARITIN) 10 mg tablet Take 1 Tab by mouth daily. 30 Tab 3    ferrous sulfate 325 mg (65 mg iron) tablet Take 1 Tab by mouth Daily (before breakfast). 30 Tab 3    pantoprazole (Protonix) 40 mg tablet Take 1 Tab by mouth daily. 90 Tab 1    ProAir RespiClick 90 mcg/actuation breath activated inhaler INHALE 1 PUFF BY MOUTH EVERY 6 HOURS AS NEEDED FOR WHEEZING FOR UP TO 1 DAY      lancets misc Use to check glucose once a day 50 Each 5    glucose blood VI test strips (blood glucose test) strip by Does Not Apply route two (2) times a day. Use to check glucose once a day 50 Strip 5    amLODIPine (NORVASC) 10 mg tablet Take 1 Tab by mouth daily. 30 Tab 4    lisinopriL (PRINIVIL, ZESTRIL) 5 mg tablet Take 1 Tab by mouth daily.  (Patient taking differently: Take 20 mg by mouth two (2) times a day.) 30 Tab 2    Bedside Commode XX Use as needed 1 Each 0    Blood-Glucose Meter monitoring kit Use as directed 1 Kit 0    budesonide-formoteroL (SYMBICORT) 160-4.5 mcg/actuation HFAA Take 2 Puffs by inhalation two (2) times a day. 1 Inhaler 3    albuterol-ipratropium (DUO-NEB) 2.5 mg-0.5 mg/3 ml nebu 3 mL by Nebulization route every six (6) hours as needed for Wheezing, Shortness of Breath or Cough. 120 Nebule 3    potassium chloride (KLOR-CON) 10 mEq tablet By oral route take one tablet Monday, Wednesday and Friday 40 Tab 3    Walker [employer] walker 1 Each 0    [DISCONTINUED] insulin lispro (HUMALOG) 100 unit/mL injection INITIATE CORRECTIVE INSULIN PROTOCOL (MAGALI):  RX MAGALI Normal Sensitivity (Average weight)    AC (before meals), Q6H, and Q4H CORRECTIONAL SCALE only For Blood Sugar (mg/dl) of :             140-199=2 units            200-249=3 units  250-299=5 units  300-349=7 units  350 or greater = Call MD  Give in addition to basal medications. Do Not Hold for NPO    BEDTIME CORRECTIONAL sliding scale when scheduled:  200-249=2 units  250-299=3 units   300-349=4 units  350 or greater = Call MD  Give in addition to basal medications. 1 Vial 0    LORazepam (Ativan) 1 mg tablet Take 1 Tab by mouth nightly. Max Daily Amount: 1 mg. 5 Tab 0    [DISCONTINUED] ammonium lactate (LAC-HYDRIN) 12 % lotion Apply  to affected area two (2) times a day. rub in to affected area well       No current facility-administered medications on file prior to visit.         ALLERGIES:    Allergies   Allergen Reactions    Aspirin Unknown (comments)     hemorraging - ended up in the hospital         SOCIAL HISTORY:     Social History     Tobacco Use    Smoking status: Former Smoker     Packs/day: 0.50     Quit date:      Years since quittin.3    Smokeless tobacco: Never Used   Substance Use Topics    Alcohol use: Not Currently     Frequency: Never    Drug use: Never       SURGICAL HISTORY:    Past Surgical History:   Procedure Laterality Date    HX COLONOSCOPY  2015    due in [id number]. Done for anemia and rectal bleeding        FAMILY HISTORY:    Family History   Problem Relation Age of Onset    Cancer Mother         uterine    Hypertension Father     Cancer Sister         breast         REVIEW OF SYSTEMS:    I personally collected this information from all available source present (patient/others in room and records available) -[email or web address]    Review of Systems   Constitutional: Positive for malaise/fatigue. Negative for chills, diaphoresis, fever and weight loss. HENT: Positive for congestion and sinus pain. Negative for ear pain, hearing loss, nosebleeds, sore throat and tinnitus. Eyes: Negative for blurred vision, double vision, photophobia and pain. Respiratory: Positive for sputum production. Negative for cough and shortness of breath. Cardiovascular: Negative for chest pain, palpitations, orthopnea, claudication, leg swelling and PND. Gastrointestinal: Negative for abdominal pain, blood in stool, constipation, diarrhea, heartburn, melena, nausea and vomiting. Genitourinary: Negative for dysuria, frequency and urgency. Musculoskeletal: Negative for back pain, falls, joint pain, myalgias and neck pain. Skin: Negative for itching and rash. Neurological: Negative for dizziness, tingling, tremors, sensory change, focal weakness and headaches. Psychiatric/Behavioral: Negative for depression and suicidal ideas. The patient is not nervous/anxious and does not have insomnia.           PHYSICAL EXAMINATION:    Vital Signs:    Visit Vitals  /74 (BP 1 Location: Left arm, BP Patient Position: Sitting, BP Cuff Size: Adult)   Pulse 94   Temp 96.9 °F (36.1 °C) (Temporal)   Resp 18   Ht 5' 5\" (1.651 m)   Wt 188 lb (85.3 kg)   SpO2 98%   BMI 31.28 kg/m²         Wt Readings from Last 3 Encounters:   05/10/21 188 lb (85.3 kg)   04/12/21 188 lb 12.8 oz (85.6 kg)   02/25/21 190 lb 8 oz (86.4 kg)     BP Readings from Last 3 Encounters: 05/10/21 120/74   04/12/21 (!) 161/87   02/25/21 (!) 142/85         Physical Exam  Nursing note reviewed. Constitutional:       General: She is not in acute distress. Appearance: Normal appearance. She is obese. She is not ill-appearing or toxic-appearing. HENT:      Right Ear: Tympanic membrane, ear canal and external ear normal.      Left Ear: Tympanic membrane, ear canal and external ear normal.      Nose: Congestion present. No rhinorrhea. Mouth/Throat:      Pharynx: Posterior oropharyngeal erythema present. No oropharyngeal exudate. Eyes:      General: No scleral icterus. Extraocular Movements: Extraocular movements intact. Conjunctiva/sclera: Conjunctivae normal.      Pupils: Pupils are equal, round, and reactive to light. Neck:      Musculoskeletal: No neck rigidity or muscular tenderness. Vascular: No carotid bruit. Cardiovascular:      Rate and Rhythm: Normal rate and regular rhythm. Heart sounds: No murmur. No friction rub. No gallop. Pulmonary:      Effort: Pulmonary effort is normal.      Breath sounds: Normal breath sounds. No wheezing, rhonchi or rales. Abdominal:      General: Bowel sounds are normal. There is no distension. Palpations: Abdomen is soft. There is no mass. Tenderness: There is no abdominal tenderness. Musculoskeletal:         General: Tenderness and deformity present. No swelling. Right lower leg: No edema. Left lower leg: No edema. Lymphadenopathy:      Cervical: No cervical adenopathy. Skin:     Capillary Refill: Capillary refill takes less than 2 seconds. Coloration: Skin is not jaundiced. Findings: No erythema or rash. Neurological:      General: No focal deficit present. Mental Status: She is alert and oriented to person, place, and time. Mental status is at baseline. Cranial Nerves: No cranial nerve deficit. Sensory: No sensory deficit.       Coordination: Coordination normal. Gait: Gait normal.   Psychiatric:         Mood and Affect: Mood normal.         Behavior: Behavior normal.         Thought Content: Thought content normal.         Judgment: Judgment normal.           ASSESSMENT/PLAN:      Discussion (regarding today's visit with Caitlin Tucker); [place] [employer] feels a little short of breath today.   I think it may be her allergies because her chest is clear   I do think we should get some lab work as it has been a while.  We will follow up and treat as indicated. She does have a history of anemia and heart failure   I did send him to medicine for allergies.  Cardiovascular system seems stable with no decompensation        ICD-10-CM ICD-9-CM    1. Gastroesophageal reflux disease without esophagitis  K21.9 530.81    2. Benign essential hypertension  I10 401.1 LIPID PANEL      TSH 3RD GENERATION      CBC WITH AUTOMATED DIFF   3. Chronic obstructive pulmonary disease, unspecified COPD type ([facility]  J44.9 496    4. Uncontrolled type 2 diabetes mellitus with hyperglycemia ([facility])  E11.65 250.02 HEMOGLOBIN A1C WITH EAG   5. Congestive heart failure, unspecified HF chronicity, unspecified heart failure type ([facility])  I50.9 428.0    6. Coronary artery disease involving native heart without angina pectoris, unspecified vessel or lesion type  I25.10 414.01 LIPID PANEL      TSH 3RD GENERATION      CBC WITH AUTOMATED DIFF   7. Iron deficiency anemia, unspecified iron deficiency anemia type  D50.9 280.9 CBC WITH AUTOMATED DIFF   8. Environmental allergies  Z91.09 V15.09 fluticasone propionate (FLONASE) 50 mcg/actuation nasal spray      fexofenadine (ALLEGRA) 180 mg tablet       WE reviewed each diagnosis listed for today's visit including medications, treatment, testing such as labs, imagine, referrals and when to call regarding results and appointments. Reminded patient to keep any and all appointments with specialists, labs, imaging.    Reminded patient to make sure we get copies of any specialists care, labs and imaging. Reminded patient to call of come by the office if there are any concerns, questions , comments or problems. The patient verbalized understanding of the care plan and all questions were answered to the patient's satisfaction prior to leaving the office. The patient was told that failure to comply with recommended testing could result in abnormal health consequences. The patient was instructed to have yearly routine health maintenance including but not limited to age appropriate vaccines, testing, screening exams. ALL questions were answered to her satisfaction before leaving the office. The patient actively participated in medical decision making. FOLLOW UP:     Patient knows to keep any and all future visits scheduled unless told otherwise. Patient knows to call, come back if any concerns, questions, comments or problems arise. Follow-up and Dispositions    · Return in about 2 months (around 7/10/2021) for Follow up short of breath, htn, anemia, hair loss. This visit may have been completed , in part, with voice recognition software as well as typing and may have syntax errors despite editing.       Josefina Auguste, DO

## 2021-05-10 NOTE — PROGRESS NOTES
Chief Complaint   Patient presents with    Follow-up     Return in about 4 weeks (around 5/10/2021) for Follow up bp, allergies    Hypertension    Allergies    Alopecia     \"Hair is coming out\" x 2 months - She gets a \"hand full of hair\" when she juarez her hair. 1. Have you been to the ER, urgent care clinic since your last visit? Hospitalized since your last visit? No    2. Have you seen or consulted any other health care providers outside of the [address] [place] [address] since your last visit? Include any pap smears or colon screening.  YES, Dr. Samuel Corado, Cardiologist.  She has an appointment on 05/11/2021 for an echo    Visit Vitals  /74 (BP 1 Location: Left arm, BP Patient Position: Sitting, BP Cuff Size: Adult)   Pulse 94   Temp 96.9 °F (36.1 °C) (Temporal)   Resp 18   Ht 5' 5\" (1.651 m)   Wt 188 lb (85.3 kg)   SpO2 98%   BMI 31.28 kg/m²

## 2021-05-11 DIAGNOSIS — I50.9 CONGESTIVE HEART FAILURE, UNSPECIFIED HF CHRONICITY, UNSPECIFIED HEART FAILURE TYPE (HCC): ICD-10-CM

## 2021-05-11 DIAGNOSIS — I10 BENIGN ESSENTIAL HYPERTENSION: ICD-10-CM

## 2021-05-11 LAB
BASOPHILS # BLD AUTO: 0.1 X10E3/UL (ref 0–0.2)
BASOPHILS NFR BLD AUTO: 0 %
CHOLEST SERPL-MCNC: 134 MG/DL (ref 100–199)
EOSINOPHIL # BLD AUTO: 0.2 X10E3/UL (ref 0–0.4)
EOSINOPHIL NFR BLD AUTO: 2 %
ERYTHROCYTE [DISTWIDTH] IN BLOOD BY AUTOMATED COUNT: 20.7 % (ref 11.7–15.4)
EST. AVERAGE GLUCOSE BLD GHB EST-MCNC: 171 MG/DL
HBA1C MFR BLD: 7.6 % (ref 4.8–5.6)
HCT VFR BLD AUTO: 38.7 % (ref 34–46.6)
HDLC SERPL-MCNC: 49 MG/DL
HGB BLD-MCNC: 12.2 G/DL (ref 11.1–15.9)
IMM GRANULOCYTES # BLD AUTO: 0 X10E3/UL (ref 0–0.1)
IMM GRANULOCYTES NFR BLD AUTO: 0 %
LDLC SERPL CALC-MCNC: 69 MG/DL (ref 0–99)
LYMPHOCYTES # BLD AUTO: 2.4 X10E3/UL (ref 0.7–3.1)
LYMPHOCYTES NFR BLD AUTO: 21 %
MCH RBC QN AUTO: 22.7 PG (ref 26.6–33)
MCHC RBC AUTO-ENTMCNC: 31.5 G/DL (ref 31.5–35.7)
MCV RBC AUTO: 72 FL (ref 79–97)
MONOCYTES # BLD AUTO: 0.8 X10E3/UL (ref 0.1–0.9)
MONOCYTES NFR BLD AUTO: 7 %
NEUTROPHILS # BLD AUTO: 7.8 X10E3/UL (ref 1.4–7)
NEUTROPHILS NFR BLD AUTO: 70 %
PLATELET # BLD AUTO: 586 X10E3/UL (ref 150–450)
RBC # BLD AUTO: 5.37 X10E6/UL (ref 3.77–5.28)
TRIGL SERPL-MCNC: 81 MG/DL (ref 0–149)
TSH SERPL DL<=0.005 MIU/L-ACNC: 2.59 UIU/ML (ref 0.45–4.5)
VLDLC SERPL CALC-MCNC: 16 MG/DL (ref 5–40)
WBC # BLD AUTO: 11.3 X10E3/UL (ref 3.4–10.8)

## 2021-05-11 RX ORDER — FUROSEMIDE 40 MG/1
40 TABLET ORAL DAILY
Qty: 90 TAB | Refills: 1 | Status: SHIPPED | OUTPATIENT
Start: 2021-05-11 | End: 2021-12-13 | Stop reason: SDUPTHER

## 2021-05-17 NOTE — PROGRESS NOTES
Cholesterol and thyroid are normal.  A1c is 7.6 which is a little better so continue to limit the amount of sugar in diet. Blood count looks stable. The hemoglobin is actually better and the white count is up just a little but is not a major infection. Platelet counts are higher than usual which have been stable so we will keep an eye on this.

## 2021-06-18 ENCOUNTER — TELEPHONE (OUTPATIENT)
Dept: FAMILY MEDICINE CLINIC | Age: 66
End: 2021-06-18

## 2021-06-18 DIAGNOSIS — I50.9 CONGESTIVE HEART FAILURE, UNSPECIFIED HF CHRONICITY, UNSPECIFIED HEART FAILURE TYPE (HCC): ICD-10-CM

## 2021-06-18 DIAGNOSIS — I10 BENIGN ESSENTIAL HYPERTENSION: ICD-10-CM

## 2021-06-18 RX ORDER — SPIRONOLACTONE 25 MG/1
25 TABLET ORAL 2 TIMES DAILY
Qty: 30 TABLET | Refills: 2 | Status: SHIPPED | OUTPATIENT
Start: 2021-06-18 | End: 2021-08-03 | Stop reason: SDUPTHER

## 2021-06-18 NOTE — TELEPHONE ENCOUNTER
Identifying Data:  74 y.o. , Gabriela Ovalle , female     HISTORICAL DATA (REVIEWED TODAY):  ALLERGIES-    Allergies   Allergen Reactions    Aspirin Unknown (comments)     hemorraging - ended up in the hospital       MEDICATION AS OF LAST RECONCILIATION (NOT INCLUDING CHANGES MADE TODAY):    Current Outpatient Medications on File Prior to Visit   Medication Sig Dispense Refill    furosemide (Lasix) 40 mg tablet Take 1 Tab by mouth daily. 90 Tab 1    clopidogreL (PLAVIX) 75 mg tab Take 75 mg by mouth daily.  fluticasone propionate (FLONASE) 50 mcg/actuation nasal spray 2 sprays to each nostril once daily 1 Bottle 3    fexofenadine (ALLEGRA) 180 mg tablet Take 1 Tab by mouth daily. STOP loratadine 30 Tab 3    metFORMIN (GLUCOPHAGE) 500 mg tablet Take 2 tablets twice a day with meals 120 Tab 3    atorvastatin (LIPITOR) 40 mg tablet TAKE 1 TABLET BY MOUTH ONCE DAILY FOR 30 DAYS      carvediloL (COREG) 3.125 mg tablet Take 1 Tab by mouth two (2) times daily (with meals). 180 Tab 3    loratadine (CLARITIN) 10 mg tablet Take 1 Tab by mouth daily. 30 Tab 3    ferrous sulfate 325 mg (65 mg iron) tablet Take 1 Tab by mouth Daily (before breakfast). 30 Tab 3    [DISCONTINUED] spironolactone (ALDACTONE) 25 mg tablet Take 1 Tab by mouth two (2) times a day. Indications: heart failure with reduced ejection fraction, fluid in the lungs due to chronic heart failure 30 Tab 2    pantoprazole (Protonix) 40 mg tablet Take 1 Tab by mouth daily. 90 Tab 1    ProAir RespiClick 90 mcg/actuation breath activated inhaler INHALE 1 PUFF BY MOUTH EVERY 6 HOURS AS NEEDED FOR WHEEZING FOR UP TO 1 DAY      lancets misc Use to check glucose once a day 50 Each 5    glucose blood VI test strips (blood glucose test) strip by Does Not Apply route two (2) times a day. Use to check glucose once a day 50 Strip 5    amLODIPine (NORVASC) 10 mg tablet Take 1 Tab by mouth daily.  30 Tab 4    lisinopriL (PRINIVIL, ZESTRIL) 5 mg tablet Take 1 Tab by mouth daily. (Patient taking differently: Take 20 mg by mouth two (2) times a day.) 30 Tab 2    Walker misc Rolling walker 1 Each 0    Bedside Commode XX Use as needed 1 Each 0    Blood-Glucose Meter monitoring kit Use as directed 1 Kit 0    budesonide-formoteroL (SYMBICORT) 160-4.5 mcg/actuation HFAA Take 2 Puffs by inhalation two (2) times a day. 1 Inhaler 3    albuterol-ipratropium (DUO-NEB) 2.5 mg-0.5 mg/3 ml nebu 3 mL by Nebulization route every six (6) hours as needed for Wheezing, Shortness of Breath or Cough. 120 Nebule 3    LORazepam (Ativan) 1 mg tablet Take 1 Tab by mouth nightly. Max Daily Amount: 1 mg. 5 Tab 0    potassium chloride (KLOR-CON) 10 mEq tablet By oral route take one tablet Monday, Wednesday and Friday 40 Tab 3     No current facility-administered medications on file prior to visit.        PAST MEDICAL HISTORY:    Patient Active Problem List   Diagnosis Code    Benign essential hypertension I10    Body mass index 30.0-30.9, adult Z68.30    Type II diabetes mellitus, uncontrolled ([facility]) E11.65    GERD (gastroesophageal reflux disease) K21.9    Heart murmur R01.1    Mixed hyperlipidemia E78.2    Tobacco dependence syndrome F17.200    Acute gastrointestinal hemorrhage K92.2    Allergic rhinitis J30.9    Anemia D64.9    CAD (coronary artery disease) I25.10    Foot callus L84    Left bundle branch block I44.7    Microalbuminuria due to type 2 diabetes mellitus ([facility]) E11.29, R80.9    Morbid obesity ([facility]) E66.01    Hyponatremia E87.1    COPD exacerbation ([facility]) J44.1    Acute respiratory failure with hypoxia ([facility]) J96.01    GI bleed K92.2    Aortic stenosis I35.0   [facility] discharge follow-up Z09    Chronic obstructive pulmonary disease ([facility]) J44.9    Coronary artery disease involving native heart without angina pectoris I25.10    Gastroesophageal reflux disease without esophagitis K21.9    Congestive heart failure ([facility]) I50.9    Type 2 diabetes mellitus with hyperglycemia, without long-term current use of insulin ([facility]) E11.65       ASSESSMENT AND PLAN:      ICD-10-CM ICD-9-CM    1. Benign essential hypertension  I10 401.1 spironolactone (ALDACTONE) 25 mg tablet   2.  Congestive heart failure, unspecified HF chronicity, unspecified heart failure type ([facility]  I50.9 428.0 spironolactone (ALDACTONE) 25 mg tablet             Kaykay Mansfield DO

## 2021-07-12 ENCOUNTER — APPOINTMENT (OUTPATIENT)
Dept: NON INVASIVE DIAGNOSTICS | Age: 66
DRG: 291 | End: 2021-07-12
Attending: INTERNAL MEDICINE
Payer: MEDICARE

## 2021-07-12 ENCOUNTER — HOSPITAL ENCOUNTER (INPATIENT)
Age: 66
LOS: 4 days | Discharge: HOME HEALTH | DRG: 291 | End: 2021-07-16
Attending: EMERGENCY MEDICINE | Admitting: INTERNAL MEDICINE
Payer: MEDICARE

## 2021-07-12 ENCOUNTER — APPOINTMENT (OUTPATIENT)
Dept: GENERAL RADIOLOGY | Age: 66
DRG: 291 | End: 2021-07-12
Attending: EMERGENCY MEDICINE
Payer: MEDICARE

## 2021-07-12 DIAGNOSIS — D72.829 LEUKOCYTOSIS, UNSPECIFIED TYPE: ICD-10-CM

## 2021-07-12 DIAGNOSIS — J44.9 CHRONIC OBSTRUCTIVE PULMONARY DISEASE, UNSPECIFIED COPD TYPE (HCC): ICD-10-CM

## 2021-07-12 DIAGNOSIS — D64.9 ANEMIA, UNSPECIFIED TYPE: ICD-10-CM

## 2021-07-12 DIAGNOSIS — Z86.79 HISTORY OF CHF (CONGESTIVE HEART FAILURE): ICD-10-CM

## 2021-07-12 DIAGNOSIS — R06.00 ACUTE DYSPNEA: Primary | ICD-10-CM

## 2021-07-12 DIAGNOSIS — I50.9 OTHER CONGESTIVE HEART FAILURE (HCC): ICD-10-CM

## 2021-07-12 PROBLEM — I50.23 ACUTE ON CHRONIC SYSTOLIC (CONGESTIVE) HEART FAILURE (HCC): Status: ACTIVE | Noted: 2021-07-12

## 2021-07-12 PROBLEM — J96.21 ACUTE AND CHRONIC RESPIRATORY FAILURE WITH HYPOXIA (HCC): Status: ACTIVE | Noted: 2021-07-12

## 2021-07-12 PROBLEM — E87.6 HYPOKALEMIA: Status: ACTIVE | Noted: 2021-07-12

## 2021-07-12 LAB
ALBUMIN SERPL-MCNC: 3.2 G/DL (ref 3.5–5)
ALBUMIN/GLOB SERPL: 0.7 {RATIO} (ref 1.1–2.2)
ALP SERPL-CCNC: 83 U/L (ref 45–117)
ALT SERPL-CCNC: 34 U/L (ref 12–78)
ANION GAP SERPL CALC-SCNC: 11 MMOL/L (ref 5–15)
AST SERPL W P-5'-P-CCNC: 34 U/L (ref 15–37)
ATRIAL RATE: 106 BPM
BASOPHILS # BLD: 0 K/UL (ref 0–0.1)
BASOPHILS NFR BLD: 0 % (ref 0–1)
BILIRUB SERPL-MCNC: 0.7 MG/DL (ref 0.2–1)
BNP SERPL-MCNC: 693 PG/ML
BUN SERPL-MCNC: 14 MG/DL (ref 6–20)
BUN/CREAT SERPL: 17 (ref 12–20)
CA-I BLD-MCNC: 8.8 MG/DL (ref 8.5–10.1)
CALCULATED P AXIS, ECG09: 49 DEGREES
CALCULATED R AXIS, ECG10: 85 DEGREES
CALCULATED T AXIS, ECG11: 74 DEGREES
CHLORIDE SERPL-SCNC: 98 MMOL/L (ref 97–108)
CK SERPL-CCNC: 87 NG/ML (ref 26–192)
CO2 SERPL-SCNC: 27 MMOL/L (ref 21–32)
CREAT SERPL-MCNC: 0.84 MG/DL (ref 0.55–1.02)
D DIMER PPP FEU-MCNC: 1.65 UG/ML(FEU)
DIAGNOSIS, 93000: NORMAL
DIFFERENTIAL METHOD BLD: ABNORMAL
ECHO AO ROOT DIAM: 3.2 CM
ECHO AR MAX VEL PISA: 347 CM/S
ECHO AV AREA PEAK VELOCITY: 1.22 CM2
ECHO AV AREA VTI: 1.3 CM2
ECHO AV AREA/BSA PEAK VELOCITY: 0.6 CM2/M2
ECHO AV AREA/BSA VTI: 0.7 CM2/M2
ECHO AV MEAN GRADIENT: 21 MMHG
ECHO AV MEAN VELOCITY: 207 CM/S
ECHO AV PEAK GRADIENT: 46 MMHG
ECHO AV PEAK VELOCITY: 338 CM/S
ECHO AV REGURGITANT PHT: 461 MS
ECHO AV VTI: 59.8 CM
ECHO EST RA PRESSURE: 15 MMHG
ECHO LA AREA 4C: 18.04 CM2
ECHO LA MAJOR AXIS: 4 CM
ECHO LA MINOR AXIS: 2.07 CM
ECHO LV E' SEPTAL VELOCITY: 4.68 CM/S
ECHO LV EDV A2C: 86.4 CM3
ECHO LV EDV A4C: 87 CM3
ECHO LV EJECTION FRACTION A4C: 39 %
ECHO LV EJECTION FRACTION BIPLANE: 41.8 % (ref 55–100)
ECHO LV ESV A2C: 43.6 CM3
ECHO LV ESV A4C: 53 CM3
ECHO LV INTERNAL DIMENSION DIASTOLIC: 4.42 CM (ref 3.9–5.3)
ECHO LV INTERNAL DIMENSION SYSTOLIC: 3.52 CM
ECHO LV IVSD: 1.42 CM (ref 0.6–0.9)
ECHO LV MASS 2D: 244.5 G (ref 67–162)
ECHO LV MASS INDEX 2D: 126.7 G/M2 (ref 43–95)
ECHO LV POSTERIOR WALL DIASTOLIC: 1.4 CM (ref 0.6–0.9)
ECHO LVOT DIAM: 2.1 CM
ECHO LVOT PEAK GRADIENT: 6 MMHG
ECHO LVOT PEAK VELOCITY: 119 CM/S
ECHO LVOT SV: 78 CM3
ECHO LVOT VTI: 22.5 CM
ECHO LVOT VTI: 22.5 CM
ECHO MV A VELOCITY: 54.3 CM/S
ECHO MV AREA PHT: 3.55 CM2
ECHO MV E DECELERATION TIME (DT): 116 MS
ECHO MV E VELOCITY: 157 CM/S
ECHO MV E/A RATIO: 2.89
ECHO MV E/E' SEPTAL: 33.55
ECHO MV PRESSURE HALF TIME (PHT): 62 MS
ECHO PV MAX VELOCITY: 107 CM/S
ECHO PV PEAK INSTANTANEOUS GRADIENT SYSTOLIC: 5 MMHG
ECHO PVEIN A DURATION: 151 MS
ECHO PVEIN A VELOCITY: 51.5 CM/S
ECHO RA AREA 4C: 12.24 CM2
ECHO RIGHT VENTRICULAR SYSTOLIC PRESSURE (RVSP): 66 MMHG
ECHO RV INTERNAL DIMENSION: 2.68 CM
ECHO TV MAX VELOCITY: 356 CM/S
ECHO TV REGURGITANT PEAK GRADIENT: 51 MMHG
EOSINOPHIL # BLD: 0 K/UL (ref 0–0.4)
EOSINOPHIL NFR BLD: 0 % (ref 0–7)
ERYTHROCYTE [DISTWIDTH] IN BLOOD BY AUTOMATED COUNT: 17.5 % (ref 11.5–14.5)
GLOBULIN SER CALC-MCNC: 4.4 G/DL (ref 2–4)
GLUCOSE BLD STRIP.AUTO-MCNC: 251 MG/DL (ref 65–117)
GLUCOSE BLD STRIP.AUTO-MCNC: 270 MG/DL (ref 65–117)
GLUCOSE BLD STRIP.AUTO-MCNC: 279 MG/DL (ref 65–117)
GLUCOSE SERPL-MCNC: 184 MG/DL (ref 65–100)
HCT VFR BLD AUTO: 31.6 % (ref 35–47)
HGB BLD-MCNC: 9.8 G/DL (ref 11.5–16)
IMM GRANULOCYTES # BLD AUTO: 0.1 K/UL (ref 0–0.04)
IMM GRANULOCYTES NFR BLD AUTO: 1 % (ref 0–0.5)
LVOT MG: 3 MMHG
LYMPHOCYTES # BLD: 0.9 K/UL (ref 0.8–3.5)
LYMPHOCYTES NFR BLD: 6 % (ref 12–49)
MCH RBC QN AUTO: 22.8 PG (ref 26–34)
MCHC RBC AUTO-ENTMCNC: 31 G/DL (ref 30–36.5)
MCV RBC AUTO: 73.7 FL (ref 80–99)
MONOCYTES # BLD: 0.3 K/UL (ref 0–1)
MONOCYTES NFR BLD: 2 % (ref 5–13)
MV DEC SLOPE: 7810 MM/S2
MV DEC SLOPE: 7810 MM/S2
NEUTS SEG # BLD: 13.1 K/UL (ref 1.8–8)
NEUTS SEG NFR BLD: 91 % (ref 32–75)
NRBC # BLD: 0.04 K/UL (ref 0–0.01)
NRBC BLD-RTO: 0.3 PER 100 WBC
P-R INTERVAL, ECG05: 136 MS
PERFORMED BY, TECHID: ABNORMAL
PLATELET # BLD AUTO: 460 K/UL (ref 150–400)
PMV BLD AUTO: 9.3 FL (ref 8.9–12.9)
POTASSIUM SERPL-SCNC: 3 MMOL/L (ref 3.5–5.1)
PROCALCITONIN SERPL-MCNC: 0.17 NG/ML
PROT SERPL-MCNC: 7.6 G/DL (ref 6.4–8.2)
Q-T INTERVAL, ECG07: 390 MS
QRS DURATION, ECG06: 138 MS
QTC CALCULATION (BEZET), ECG08: 518 MS
RBC # BLD AUTO: 4.29 M/UL (ref 3.8–5.2)
SODIUM SERPL-SCNC: 136 MMOL/L (ref 136–145)
TROPONIN I SERPL-MCNC: 0.05 NG/ML
TROPONIN I SERPL-MCNC: 0.05 NG/ML
TROPONIN I SERPL-MCNC: 0.07 NG/ML
TSH SERPL DL<=0.05 MIU/L-ACNC: 0.83 UIU/ML (ref 0.36–3.74)
VENTRICULAR RATE, ECG03: 106 BPM
WBC # BLD AUTO: 14.4 K/UL (ref 3.6–11)

## 2021-07-12 PROCEDURE — 85379 FIBRIN DEGRADATION QUANT: CPT

## 2021-07-12 PROCEDURE — 85025 COMPLETE CBC W/AUTO DIFF WBC: CPT

## 2021-07-12 PROCEDURE — 74011000250 HC RX REV CODE- 250: Performed by: INTERNAL MEDICINE

## 2021-07-12 PROCEDURE — 74011636637 HC RX REV CODE- 636/637: Performed by: INTERNAL MEDICINE

## 2021-07-12 PROCEDURE — 93005 ELECTROCARDIOGRAM TRACING: CPT

## 2021-07-12 PROCEDURE — 83880 ASSAY OF NATRIURETIC PEPTIDE: CPT

## 2021-07-12 PROCEDURE — 93306 TTE W/DOPPLER COMPLETE: CPT

## 2021-07-12 PROCEDURE — 94640 AIRWAY INHALATION TREATMENT: CPT

## 2021-07-12 PROCEDURE — 74011250637 HC RX REV CODE- 250/637: Performed by: EMERGENCY MEDICINE

## 2021-07-12 PROCEDURE — 99285 EMERGENCY DEPT VISIT HI MDM: CPT

## 2021-07-12 PROCEDURE — 74011636637 HC RX REV CODE- 636/637: Performed by: HOSPITALIST

## 2021-07-12 PROCEDURE — 82550 ASSAY OF CK (CPK): CPT

## 2021-07-12 PROCEDURE — 84484 ASSAY OF TROPONIN QUANT: CPT

## 2021-07-12 PROCEDURE — 71045 X-RAY EXAM CHEST 1 VIEW: CPT

## 2021-07-12 PROCEDURE — 96375 TX/PRO/DX INJ NEW DRUG ADDON: CPT

## 2021-07-12 PROCEDURE — 65270000032 HC RM SEMIPRIVATE

## 2021-07-12 PROCEDURE — 80053 COMPREHEN METABOLIC PANEL: CPT

## 2021-07-12 PROCEDURE — 74011000250 HC RX REV CODE- 250: Performed by: EMERGENCY MEDICINE

## 2021-07-12 PROCEDURE — 74011250636 HC RX REV CODE- 250/636: Performed by: EMERGENCY MEDICINE

## 2021-07-12 PROCEDURE — 74011250637 HC RX REV CODE- 250/637: Performed by: INTERNAL MEDICINE

## 2021-07-12 PROCEDURE — 74011250636 HC RX REV CODE- 250/636: Performed by: INTERNAL MEDICINE

## 2021-07-12 PROCEDURE — 36415 COLL VENOUS BLD VENIPUNCTURE: CPT

## 2021-07-12 PROCEDURE — 82962 GLUCOSE BLOOD TEST: CPT

## 2021-07-12 PROCEDURE — 96374 THER/PROPH/DIAG INJ IV PUSH: CPT

## 2021-07-12 PROCEDURE — 84443 ASSAY THYROID STIM HORMONE: CPT

## 2021-07-12 PROCEDURE — 84145 PROCALCITONIN (PCT): CPT

## 2021-07-12 RX ORDER — LISINOPRIL 10 MG/1
5 TABLET ORAL
Status: COMPLETED | OUTPATIENT
Start: 2021-07-12 | End: 2021-07-12

## 2021-07-12 RX ORDER — POTASSIUM CHLORIDE 750 MG/1
10 TABLET, EXTENDED RELEASE ORAL
Status: DISPENSED | OUTPATIENT
Start: 2021-07-12 | End: 2021-07-13

## 2021-07-12 RX ORDER — POTASSIUM CHLORIDE 20 MEQ/1
40 TABLET, EXTENDED RELEASE ORAL 2 TIMES DAILY
Status: DISCONTINUED | OUTPATIENT
Start: 2021-07-12 | End: 2021-07-16 | Stop reason: HOSPADM

## 2021-07-12 RX ORDER — ACETAMINOPHEN 325 MG/1
650 TABLET ORAL
Status: DISCONTINUED | OUTPATIENT
Start: 2021-07-12 | End: 2021-07-16 | Stop reason: HOSPADM

## 2021-07-12 RX ORDER — BUDESONIDE AND FORMOTEROL FUMARATE DIHYDRATE 160; 4.5 UG/1; UG/1
2 AEROSOL RESPIRATORY (INHALATION)
Status: DISCONTINUED | OUTPATIENT
Start: 2021-07-12 | End: 2021-07-16 | Stop reason: HOSPADM

## 2021-07-12 RX ORDER — ONDANSETRON 2 MG/ML
4 INJECTION INTRAMUSCULAR; INTRAVENOUS
Status: DISCONTINUED | OUTPATIENT
Start: 2021-07-12 | End: 2021-07-16 | Stop reason: HOSPADM

## 2021-07-12 RX ORDER — LANOLIN ALCOHOL/MO/W.PET/CERES
1 CREAM (GRAM) TOPICAL
Status: COMPLETED | OUTPATIENT
Start: 2021-07-12 | End: 2021-07-12

## 2021-07-12 RX ORDER — METFORMIN HYDROCHLORIDE 500 MG/1
500 TABLET ORAL
Status: DISCONTINUED | OUTPATIENT
Start: 2021-07-13 | End: 2021-07-16 | Stop reason: HOSPADM

## 2021-07-12 RX ORDER — GUAIFENESIN 600 MG/1
600 TABLET, EXTENDED RELEASE ORAL 2 TIMES DAILY
Status: DISCONTINUED | OUTPATIENT
Start: 2021-07-12 | End: 2021-07-16 | Stop reason: HOSPADM

## 2021-07-12 RX ORDER — MAG HYDROX/ALUMINUM HYD/SIMETH 200-200-20
15 SUSPENSION, ORAL (FINAL DOSE FORM) ORAL
Status: DISCONTINUED | OUTPATIENT
Start: 2021-07-12 | End: 2021-07-16 | Stop reason: HOSPADM

## 2021-07-12 RX ORDER — METFORMIN HYDROCHLORIDE 500 MG/1
500 TABLET ORAL
Status: COMPLETED | OUTPATIENT
Start: 2021-07-12 | End: 2021-07-12

## 2021-07-12 RX ORDER — ENOXAPARIN SODIUM 100 MG/ML
40 INJECTION SUBCUTANEOUS DAILY
Status: DISCONTINUED | OUTPATIENT
Start: 2021-07-12 | End: 2021-07-16 | Stop reason: HOSPADM

## 2021-07-12 RX ORDER — PREDNISONE 20 MG/1
40 TABLET ORAL
Status: DISCONTINUED | OUTPATIENT
Start: 2021-07-12 | End: 2021-07-13

## 2021-07-12 RX ORDER — MAGNESIUM SULFATE 100 %
4 CRYSTALS MISCELLANEOUS AS NEEDED
Status: DISCONTINUED | OUTPATIENT
Start: 2021-07-12 | End: 2021-07-16 | Stop reason: HOSPADM

## 2021-07-12 RX ORDER — CLOPIDOGREL BISULFATE 75 MG/1
75 TABLET ORAL
Status: COMPLETED | OUTPATIENT
Start: 2021-07-12 | End: 2021-07-12

## 2021-07-12 RX ORDER — LANOLIN ALCOHOL/MO/W.PET/CERES
325 CREAM (GRAM) TOPICAL
Status: DISCONTINUED | OUTPATIENT
Start: 2021-07-13 | End: 2021-07-16 | Stop reason: HOSPADM

## 2021-07-12 RX ORDER — ALBUTEROL SULFATE 90 UG/1
2 AEROSOL, METERED RESPIRATORY (INHALATION)
Status: DISCONTINUED | OUTPATIENT
Start: 2021-07-12 | End: 2021-07-16 | Stop reason: HOSPADM

## 2021-07-12 RX ORDER — FUROSEMIDE 10 MG/ML
40 INJECTION INTRAMUSCULAR; INTRAVENOUS 2 TIMES DAILY
Status: DISPENSED | OUTPATIENT
Start: 2021-07-12 | End: 2021-07-13

## 2021-07-12 RX ORDER — FUROSEMIDE 10 MG/ML
40 INJECTION INTRAMUSCULAR; INTRAVENOUS
Status: COMPLETED | OUTPATIENT
Start: 2021-07-12 | End: 2021-07-12

## 2021-07-12 RX ORDER — SODIUM CHLORIDE 0.9 % (FLUSH) 0.9 %
5-40 SYRINGE (ML) INJECTION AS NEEDED
Status: DISCONTINUED | OUTPATIENT
Start: 2021-07-12 | End: 2021-07-16 | Stop reason: HOSPADM

## 2021-07-12 RX ORDER — IPRATROPIUM BROMIDE AND ALBUTEROL SULFATE 2.5; .5 MG/3ML; MG/3ML
3 SOLUTION RESPIRATORY (INHALATION)
Status: COMPLETED | OUTPATIENT
Start: 2021-07-12 | End: 2021-07-12

## 2021-07-12 RX ORDER — CLOPIDOGREL BISULFATE 75 MG/1
75 TABLET ORAL DAILY
Status: DISCONTINUED | OUTPATIENT
Start: 2021-07-13 | End: 2021-07-16 | Stop reason: HOSPADM

## 2021-07-12 RX ORDER — BUDESONIDE AND FORMOTEROL FUMARATE DIHYDRATE 160; 4.5 UG/1; UG/1
2 AEROSOL RESPIRATORY (INHALATION) 2 TIMES DAILY
Status: DISCONTINUED | OUTPATIENT
Start: 2021-07-12 | End: 2021-07-12 | Stop reason: SDUPTHER

## 2021-07-12 RX ORDER — LEVOFLOXACIN 5 MG/ML
500 INJECTION, SOLUTION INTRAVENOUS ONCE
Status: COMPLETED | OUTPATIENT
Start: 2021-07-12 | End: 2021-07-12

## 2021-07-12 RX ORDER — IPRATROPIUM BROMIDE AND ALBUTEROL SULFATE 2.5; .5 MG/3ML; MG/3ML
3 SOLUTION RESPIRATORY (INHALATION)
Status: CANCELLED | OUTPATIENT
Start: 2021-07-12

## 2021-07-12 RX ORDER — SODIUM CHLORIDE 0.9 % (FLUSH) 0.9 %
5-40 SYRINGE (ML) INJECTION EVERY 8 HOURS
Status: DISCONTINUED | OUTPATIENT
Start: 2021-07-12 | End: 2021-07-16 | Stop reason: HOSPADM

## 2021-07-12 RX ORDER — POLYETHYLENE GLYCOL 3350 17 G/17G
17 POWDER, FOR SOLUTION ORAL DAILY PRN
Status: DISCONTINUED | OUTPATIENT
Start: 2021-07-12 | End: 2021-07-16 | Stop reason: HOSPADM

## 2021-07-12 RX ORDER — ACETAMINOPHEN 650 MG/1
650 SUPPOSITORY RECTAL
Status: DISCONTINUED | OUTPATIENT
Start: 2021-07-12 | End: 2021-07-16 | Stop reason: HOSPADM

## 2021-07-12 RX ORDER — SPIRONOLACTONE 25 MG/1
25 TABLET ORAL 2 TIMES DAILY
Status: DISCONTINUED | OUTPATIENT
Start: 2021-07-12 | End: 2021-07-16 | Stop reason: HOSPADM

## 2021-07-12 RX ORDER — PANTOPRAZOLE SODIUM 40 MG/1
40 TABLET, DELAYED RELEASE ORAL DAILY
Status: DISCONTINUED | OUTPATIENT
Start: 2021-07-13 | End: 2021-07-16 | Stop reason: HOSPADM

## 2021-07-12 RX ORDER — DEXTROSE 50 % IN WATER (D50W) INTRAVENOUS SYRINGE
25-50 AS NEEDED
Status: DISCONTINUED | OUTPATIENT
Start: 2021-07-12 | End: 2021-07-16 | Stop reason: HOSPADM

## 2021-07-12 RX ORDER — IPRATROPIUM BROMIDE AND ALBUTEROL SULFATE 2.5; .5 MG/3ML; MG/3ML
3 SOLUTION RESPIRATORY (INHALATION)
Status: DISCONTINUED | OUTPATIENT
Start: 2021-07-12 | End: 2021-07-16 | Stop reason: HOSPADM

## 2021-07-12 RX ORDER — LEVOFLOXACIN 500 MG/1
500 TABLET, FILM COATED ORAL EVERY 24 HOURS
Status: COMPLETED | OUTPATIENT
Start: 2021-07-13 | End: 2021-07-16

## 2021-07-12 RX ORDER — INSULIN LISPRO 100 [IU]/ML
INJECTION, SOLUTION INTRAVENOUS; SUBCUTANEOUS
Status: DISCONTINUED | OUTPATIENT
Start: 2021-07-12 | End: 2021-07-16 | Stop reason: HOSPADM

## 2021-07-12 RX ORDER — LISINOPRIL 5 MG/1
5 TABLET ORAL DAILY
Status: DISCONTINUED | OUTPATIENT
Start: 2021-07-13 | End: 2021-07-16 | Stop reason: HOSPADM

## 2021-07-12 RX ORDER — FLUTICASONE PROPIONATE 50 MCG
2 SPRAY, SUSPENSION (ML) NASAL DAILY
Status: DISCONTINUED | OUTPATIENT
Start: 2021-07-13 | End: 2021-07-16 | Stop reason: HOSPADM

## 2021-07-12 RX ORDER — FAMOTIDINE 20 MG/1
20 TABLET, FILM COATED ORAL 2 TIMES DAILY
Status: DISCONTINUED | OUTPATIENT
Start: 2021-07-12 | End: 2021-07-12

## 2021-07-12 RX ORDER — ATORVASTATIN CALCIUM 40 MG/1
40 TABLET, FILM COATED ORAL
Status: DISCONTINUED | OUTPATIENT
Start: 2021-07-12 | End: 2021-07-16 | Stop reason: HOSPADM

## 2021-07-12 RX ORDER — ONDANSETRON 4 MG/1
4 TABLET, ORALLY DISINTEGRATING ORAL
Status: DISCONTINUED | OUTPATIENT
Start: 2021-07-12 | End: 2021-07-16 | Stop reason: HOSPADM

## 2021-07-12 RX ORDER — LORATADINE 10 MG/1
10 TABLET ORAL DAILY
Status: DISCONTINUED | OUTPATIENT
Start: 2021-07-12 | End: 2021-07-16 | Stop reason: HOSPADM

## 2021-07-12 RX ORDER — CARVEDILOL 3.12 MG/1
3.12 TABLET ORAL 2 TIMES DAILY WITH MEALS
Status: DISCONTINUED | OUTPATIENT
Start: 2021-07-12 | End: 2021-07-16 | Stop reason: HOSPADM

## 2021-07-12 RX ORDER — FUROSEMIDE 40 MG/1
40 TABLET ORAL
Status: DISCONTINUED | OUTPATIENT
Start: 2021-07-13 | End: 2021-07-16 | Stop reason: HOSPADM

## 2021-07-12 RX ADMIN — IPRATROPIUM BROMIDE AND ALBUTEROL SULFATE 3 ML: .5; 2.5 SOLUTION RESPIRATORY (INHALATION) at 21:30

## 2021-07-12 RX ADMIN — INSULIN LISPRO 6 UNITS: 100 INJECTION, SOLUTION INTRAVENOUS; SUBCUTANEOUS at 21:52

## 2021-07-12 RX ADMIN — SPIRONOLACTONE 25 MG: 25 TABLET ORAL at 21:37

## 2021-07-12 RX ADMIN — ATORVASTATIN CALCIUM 40 MG: 40 TABLET, FILM COATED ORAL at 21:37

## 2021-07-12 RX ADMIN — FUROSEMIDE 40 MG: 10 INJECTION, SOLUTION INTRAMUSCULAR; INTRAVENOUS at 21:37

## 2021-07-12 RX ADMIN — POTASSIUM CHLORIDE 40 MEQ: 1500 TABLET, EXTENDED RELEASE ORAL at 21:36

## 2021-07-12 RX ADMIN — FAMOTIDINE 20 MG: 20 TABLET, FILM COATED ORAL at 09:05

## 2021-07-12 RX ADMIN — BUDESONIDE AND FORMOTEROL FUMARATE DIHYDRATE 2 PUFF: 160; 4.5 AEROSOL RESPIRATORY (INHALATION) at 21:30

## 2021-07-12 RX ADMIN — LISINOPRIL 5 MG: 10 TABLET ORAL at 14:37

## 2021-07-12 RX ADMIN — IPRATROPIUM BROMIDE AND ALBUTEROL SULFATE 3 ML: .5; 3 SOLUTION RESPIRATORY (INHALATION) at 05:16

## 2021-07-12 RX ADMIN — IPRATROPIUM BROMIDE AND ALBUTEROL SULFATE 3 ML: .5; 2.5 SOLUTION RESPIRATORY (INHALATION) at 13:56

## 2021-07-12 RX ADMIN — GUAIFENESIN 600 MG: 600 TABLET, EXTENDED RELEASE ORAL at 21:37

## 2021-07-12 RX ADMIN — METHYLPREDNISOLONE SODIUM SUCCINATE 125 MG: 125 INJECTION, POWDER, FOR SOLUTION INTRAMUSCULAR; INTRAVENOUS at 05:19

## 2021-07-12 RX ADMIN — ACETAMINOPHEN 650 MG: 325 TABLET ORAL at 18:17

## 2021-07-12 RX ADMIN — PREDNISONE 40 MG: 20 TABLET ORAL at 10:48

## 2021-07-12 RX ADMIN — LEVOFLOXACIN 500 MG: 500 INJECTION, SOLUTION INTRAVENOUS at 08:51

## 2021-07-12 RX ADMIN — Medication 10 ML: at 09:07

## 2021-07-12 RX ADMIN — FERROUS SULFATE TAB 325 MG (65 MG ELEMENTAL FE) 325 MG: 325 (65 FE) TAB at 14:37

## 2021-07-12 RX ADMIN — SPIRONOLACTONE 25 MG: 25 TABLET ORAL at 14:38

## 2021-07-12 RX ADMIN — CARVEDILOL 3.12 MG: 3.12 TABLET, FILM COATED ORAL at 18:22

## 2021-07-12 RX ADMIN — BUDESONIDE AND FORMOTEROL FUMARATE DIHYDRATE 2 PUFF: 160; 4.5 AEROSOL RESPIRATORY (INHALATION) at 10:52

## 2021-07-12 RX ADMIN — Medication 10 ML: at 21:39

## 2021-07-12 RX ADMIN — GUAIFENESIN 600 MG: 600 TABLET, EXTENDED RELEASE ORAL at 09:05

## 2021-07-12 RX ADMIN — POTASSIUM CHLORIDE 40 MEQ: 1500 TABLET, EXTENDED RELEASE ORAL at 08:50

## 2021-07-12 RX ADMIN — METFORMIN HYDROCHLORIDE 500 MG: 500 TABLET ORAL at 14:38

## 2021-07-12 RX ADMIN — CLOPIDOGREL BISULFATE 75 MG: 75 TABLET ORAL at 14:38

## 2021-07-12 RX ADMIN — FUROSEMIDE 40 MG: 10 INJECTION, SOLUTION INTRAMUSCULAR; INTRAVENOUS at 05:20

## 2021-07-12 RX ADMIN — ENOXAPARIN SODIUM 40 MG: 40 INJECTION SUBCUTANEOUS at 10:48

## 2021-07-12 RX ADMIN — POTASSIUM CHLORIDE 40 MEQ: 1500 TABLET, EXTENDED RELEASE ORAL at 09:05

## 2021-07-12 NOTE — PROGRESS NOTES
7/12/21. PCP is Dr. Kapil Pool. D/C Plan is home with daughter - until better than pt will go to her home. Daughter Sterling Courts @ [phone]) pr pt's sister will transport pt home upon discharge. Pt declined home health @ this time, but signed Choice :demond to continue home O2 via [facility]. Referral sent via [place].

## 2021-07-12 NOTE — LETTER
[address] EMERGENCY DEPT  [address] [facility] [address]  [phone]    Work/School Note    Date: 7/12/2021    To Whom It May concern:    Taylor Hernandez was in the emergency room with her family  by the following provider(s):  Attending Provider: Darrius Lock MD.      Please excuse      Sincerely,          Damien Trujillo RN

## 2021-07-12 NOTE — ED NOTES
Past Medical History:   Diagnosis Date    Acute gastrointestinal hemorrhage 8/28/2020    Allergic rhinitis 8/28/2020    Anemia 8/28/2020    Benign essential hypertension 6/25/2020    Body mass index 30.0-30.9, adult 6/25/2020    CAD (coronary artery disease) 8/28/2020    Foot callus 8/28/2020    GERD (gastroesophageal reflux disease) 6/25/2020    Hair loss     Heart murmur 6/25/2020    Hx of colonoscopy 01/01/2015    Done for anemia and rectal bleeding    Left bundle branch block 8/28/2020    Microalbuminuria due to type 2 diabetes mellitus ([facility] 8/28/2020    Mixed hyperlipidemia 6/25/2020    Morbid obesity ([facility] 8/28/2020    Tobacco dependence syndrome 6/25/2020    Type II diabetes mellitus, uncontrolled ([facility] 6/25/2020     Past Surgical History:   Procedure Laterality Date    HX COLONOSCOPY  2015    due in [id number]. Done for anemia and rectal bleeding   65yo female pt with significant PMH of CAD on home O2 presents to ED for intractable SOB with low O2 sats, uses home O2 2L at HS and PRN during day, oriented to room and call bell, cardiac and continuous spO2 monitoring initiated, IV started, blood samples collected and sent to lab for analysis, EKG and chest Xray completed, medicated with nebs, IV steroids and Lasix as ordered, OOB to bedside commode to void, plan of care reviewed, call bell in reach, side rails up x2, will continue to monitor.

## 2021-07-12 NOTE — ED PROVIDER NOTES
EMERGENCY DEPARTMENT HISTORY AND PHYSICAL EXAM      Date: 7/12/2021  Patient Name: Justin Olsen      History of Presenting Illness     Chief Complaint   Patient presents with    Shortness of Breath       History Provided By: Patient    HPI: Justin Olsen, 74 y.o. female with a past medical history significant diabetes, hypertension, COPD and CHF presents to the ED with cc of shortness of breath for several days that has been worsened tonight. Patient denies fever. No vomiting. No cough. No chest pain. No other complaints at this time. There are no other complaints, changes, or physical findings at this time. PCP: Bárbara Edwards,     Current Facility-Administered Medications   Medication Dose Route Frequency Provider Last Rate Last Admin    potassium chloride (K-DUR, KLOR-CON) SR tablet 40 mEq  40 mEq Oral BID Deni Quintero,         levoFLOXacin (LEVAQUIN) 500 mg in D5W IVPB  500 mg IntraVENous ONCE Josefina Green,          Current Outpatient Medications   Medication Sig Dispense Refill    atorvastatin (LIPITOR) 40 mg tablet Take 1 Tablet by mouth nightly for 90 days. 90 Tablet 1    spironolactone (ALDACTONE) 25 mg tablet Take 1 Tablet by mouth two (2) times a day. Indications: heart failure with reduced ejection fraction, fluid in the lungs due to chronic heart failure 30 Tablet 2    furosemide (Lasix) 40 mg tablet Take 1 Tab by mouth daily. 90 Tab 1    clopidogreL (PLAVIX) 75 mg tab Take 75 mg by mouth daily.  fluticasone propionate (FLONASE) 50 mcg/actuation nasal spray 2 sprays to each nostril once daily 1 Bottle 3    fexofenadine (ALLEGRA) 180 mg tablet Take 1 Tab by mouth daily. STOP loratadine 30 Tab 3    metFORMIN (GLUCOPHAGE) 500 mg tablet Take 2 tablets twice a day with meals 120 Tab 3    carvediloL (COREG) 3.125 mg tablet Take 1 Tab by mouth two (2) times daily (with meals). 180 Tab 3    loratadine (CLARITIN) 10 mg tablet Take 1 Tab by mouth daily.  27 Tab 3    ferrous sulfate 325 mg (65 mg iron) tablet Take 1 Tab by mouth Daily (before breakfast). 30 Tab 3    pantoprazole (Protonix) 40 mg tablet Take 1 Tab by mouth daily. 90 Tab 1    ProAir RespiClick 90 mcg/actuation breath activated inhaler INHALE 1 PUFF BY MOUTH EVERY 6 HOURS AS NEEDED FOR WHEEZING FOR UP TO 1 DAY      lancets misc Use to check glucose once a day 50 Each 5    glucose blood VI test strips (blood glucose test) strip by Does Not Apply route two (2) times a day. Use to check glucose once a day 50 Strip 5    amLODIPine (NORVASC) 10 mg tablet Take 1 Tab by mouth daily. 30 Tab 4    lisinopriL (PRINIVIL, ZESTRIL) 5 mg tablet Take 1 Tab by mouth daily. (Patient taking differently: Take 20 mg by mouth two (2) times a day.) 30 Tab 2    Walker misc Rolling walker 1 Each 0    Bedside Commode XX Use as needed 1 Each 0    Blood-Glucose Meter monitoring kit Use as directed 1 Kit 0    budesonide-formoteroL (SYMBICORT) 160-4.5 mcg/actuation HFAA Take 2 Puffs by inhalation two (2) times a day. 1 Inhaler 3    albuterol-ipratropium (DUO-NEB) 2.5 mg-0.5 mg/3 ml nebu 3 mL by Nebulization route every six (6) hours as needed for Wheezing, Shortness of Breath or Cough. 120 Nebule 3    LORazepam (Ativan) 1 mg tablet Take 1 Tab by mouth nightly.  Max Daily Amount: 1 mg. 5 Tab 0    potassium chloride (KLOR-CON) 10 mEq tablet By oral route take one tablet Monday, Wednesday and Friday 40 Tab 3       Past History     Past Medical History:  Past Medical History:   Diagnosis Date    Acute gastrointestinal hemorrhage 8/28/2020    Allergic rhinitis 8/28/2020    Anemia 8/28/2020    Benign essential hypertension 6/25/2020    Body mass index 30.0-30.9, adult 6/25/2020    CAD (coronary artery disease) 8/28/2020    Foot callus 8/28/2020    GERD (gastroesophageal reflux disease) 6/25/2020    Hair loss     Heart murmur 6/25/2020    Hx of colonoscopy 01/01/2015    Done for anemia and rectal bleeding    Left bundle branch block 2020    Microalbuminuria due to type 2 diabetes mellitus ([facility] [address] 2020    Mixed hyperlipidemia 2020    Morbid obesity ([facility] [address] 2020    Tobacco dependence syndrome 2020    Type II diabetes mellitus, uncontrolled ([facility] [address] 2020       Past Surgical History:  Past Surgical History:   Procedure Laterality Date    HX COLONOSCOPY  2015    due in [id number]. Done for anemia and rectal bleeding       Family History:  Family History   Problem Relation Age of Onset    Cancer Mother         uterine    Hypertension Father     Cancer Sister         breast       Social History:  Social History     Tobacco Use    Smoking status: Former Smoker     Packs/day: 0.50     Quit date:      Years since quittin.5    Smokeless tobacco: Never Used   Vaping Use    Vaping Use: Never used   Substance Use Topics    Alcohol use: Not Currently    Drug use: Never       Allergies: Allergies   Allergen Reactions    Aspirin Unknown (comments)     hemorraging - ended up in the hospital         Review of Systems     Review of Systems   Constitutional: Negative. HENT: Negative. Eyes: Negative. Respiratory: Positive for shortness of breath. Cardiovascular: Negative. Gastrointestinal: Negative. Endocrine: Negative. Genitourinary: Negative. Musculoskeletal: Negative. Skin: Negative. Allergic/Immunologic: Negative. Neurological: Negative. Hematological: Negative. Psychiatric/Behavioral: Negative. All other systems reviewed and are negative. Physical Exam     Physical Exam  Vitals and nursing note reviewed. Constitutional:       General: She is not in acute distress. Appearance: Normal appearance. She is normal weight. She is not ill-appearing, toxic-appearing or diaphoretic. HENT:      Head: Normocephalic and atraumatic.       Right Ear: Tympanic membrane and ear canal normal.      Left Ear: Tympanic membrane and ear canal normal.      Nose: Nose normal. No congestion or rhinorrhea. Mouth/Throat:      Pharynx: Oropharynx is clear. No oropharyngeal exudate or posterior oropharyngeal erythema. Eyes:      General: No scleral icterus. Right eye: No discharge. Left eye: No discharge. Extraocular Movements: Extraocular movements intact. Conjunctiva/sclera: Conjunctivae normal.      Pupils: Pupils are equal, round, and reactive to light. Cardiovascular:      Rate and Rhythm: Normal rate and regular rhythm. Pulses: Normal pulses. Heart sounds: Normal heart sounds. Pulmonary:      Effort: Pulmonary effort is normal. No respiratory distress. Breath sounds: No stridor. Decreased breath sounds, wheezing and rales present. No rhonchi. Chest:      Chest wall: No tenderness. Abdominal:      General: Abdomen is flat. Bowel sounds are normal. There is no distension. Palpations: Abdomen is soft. Tenderness: There is no abdominal tenderness. There is no right CVA tenderness, left CVA tenderness, guarding or rebound. Musculoskeletal:         General: No swelling, tenderness, deformity or signs of injury. Normal range of motion. Cervical back: Normal range of motion and neck supple. No rigidity or tenderness. Right lower leg: No edema. Left lower leg: No edema. Lymphadenopathy:      Cervical: No cervical adenopathy. Skin:     General: Skin is warm and dry. Coloration: Skin is not jaundiced or pale. Findings: No bruising, erythema, lesion or rash. Neurological:      General: No focal deficit present. Mental Status: She is alert and oriented to person, place, and time. Mental status is at baseline. Cranial Nerves: No cranial nerve deficit. Sensory: No sensory deficit. Motor: No weakness.       Coordination: Coordination normal.      Gait: Gait normal.   Psychiatric:         Mood and Affect: Mood normal.         Behavior: Behavior normal.         Thought Content: Thought content normal.         Judgment: Judgment normal.         Lab and Diagnostic Study Results     Labs -     Recent Results (from the past 12 hour(s))   CBC WITH AUTOMATED DIFF    Collection Time: 07/12/21  4:30 AM   Result Value Ref Range    WBC 14.4 (H) 3.6 - 11.0 K/uL    RBC 4.29 3.80 - 5.20 M/uL    HGB 9.8 (L) 11.5 - 16.0 g/dL    HCT 31.6 (L) 35.0 - 47.0 %    MCV 73.7 (L) 80.0 - 99.0 FL    MCH 22.8 (L) 26.0 - 34.0 PG    MCHC 31.0 30.0 - 36.5 g/dL    RDW 17.5 (H) 11.5 - 14.5 %    PLATELET 726 (H) 902 - 400 K/uL    MPV 9.3 8.9 - 12.9 FL    NRBC 0.3 (H) 0.0  WBC    ABSOLUTE NRBC 0.04 (H) 0.00 - 0.01 K/uL    NEUTROPHILS 91 (H) 32 - 75 %    LYMPHOCYTES 6 (L) 12 - 49 %    MONOCYTES 2 (L) 5 - 13 %    EOSINOPHILS 0 0 - 7 %    BASOPHILS 0 0 - 1 %    IMMATURE GRANULOCYTES 1 (H) 0 - 0.5 %    ABS. NEUTROPHILS 13.1 (H) 1.8 - 8.0 K/UL    ABS. LYMPHOCYTES 0.9 0.8 - 3.5 K/UL    ABS. MONOCYTES 0.3 0.0 - 1.0 K/UL    ABS. EOSINOPHILS 0.0 0.0 - 0.4 K/UL    ABS. BASOPHILS 0.0 0.0 - 0.1 K/UL    ABS. IMM. GRANS. 0.1 (H) 0.00 - 0.04 K/UL    DF AUTOMATED     METABOLIC PANEL, COMPREHENSIVE    Collection Time: 07/12/21  4:30 AM   Result Value Ref Range    Sodium 136 136 - 145 mmol/L    Potassium 3.0 (L) 3.5 - 5.1 mmol/L    Chloride 98 97 - 108 mmol/L    CO2 27 21 - 32 mmol/L    Anion gap 11 5 - 15 mmol/L    Glucose 184 (H) 65 - 100 mg/dL    BUN 14 6 - 20 mg/dL    Creatinine 0.84 0.55 - 1.02 mg/dL    BUN/Creatinine ratio 17 12 - 20      GFR est AA >60 >60 ml/min/1.73m2    GFR est non-AA >60 >60 ml/min/1.73m2    Calcium 8.8 8.5 - 10.1 mg/dL    Bilirubin, total 0.7 0.2 - 1.0 mg/dL    AST (SGOT) 34 15 - 37 U/L    ALT (SGPT) 34 12 - 78 U/L    Alk.  phosphatase 83 45 - 117 U/L    Protein, total 7.6 6.4 - 8.2 g/dL    Albumin 3.2 (L) 3.5 - 5.0 g/dL    Globulin 4.4 (H) 2.0 - 4.0 g/dL    A-G Ratio 0.7 (L) 1.1 - 2.2     CK W/ REFLX CKMB    Collection Time: 07/12/21  4:30 AM   Result Value Ref Range    CK 87.0 26 - 192 ng/mL BNP    Collection Time: 07/12/21  4:30 AM   Result Value Ref Range    NT pro- (H) <125 pg/mL       Radiologic Studies -   [unfilled]  CT Results  (Last 48 hours)    None        CXR Results  (Last 48 hours)               07/12/21 0327  XR CHEST PORT Final result    Impression:  Findings/impression:       No consolidative airspace disease, pleural effusion or pneumothorax. Cardiac silhouette is enlarged. Trace interstitial edema. No acute osseous abnormality. Narrative:  Study: XR CHEST PORT       Clinical indication: Shortness of breath       Comparison: Chest x-ray 2/1/2021. Medical Decision Making and ED Course   - I am the first and primary provider for this patient AND AM THE PRIMARY PROVIDER OF RECORD. - I reviewed the vital signs, available nursing notes, past medical history, past surgical history, family history and social history. - Initial assessment performed. The patients presenting problems have been discussed, and the staff are in agreement with the care plan formulated and outlined with them. I have encouraged them to ask questions as they arise throughout their visit. Vital Signs-Reviewed the patient's vital signs. Patient Vitals for the past 12 hrs:   Temp Pulse Resp BP SpO2   07/12/21 0539     97 %   07/12/21 0517     99 %   07/12/21 0254 97.7 °F (36.5 °C)       07/12/21 0250  (!) 112 17 122/68 100 %       EKG interpretation: (Preliminary): EKG was done at 2:54 AM.  Sinus tachycardia. Rate of 106. Normal axis. No acute ST-T elevation. No STEMI. Questionable LVH. Questionable LVH with a strain pattern. No old EKG available for comparison at this time.         Records Reviewed: Nursing Notes      ED Course:              Provider Notes (Medical Decision Making):     MDM  Number of Diagnoses or Management Options  Acute dyspnea: new, needed workup  Anemia, unspecified type: new, needed workup  Chronic obstructive pulmonary disease, unspecified COPD type ([facility] [address]): established, worsening  History of CHF (congestive heart failure): established, worsening  Leukocytosis, unspecified type: new, needed workup  Diagnosis management comments: Differential diagnosis includes COPD, exacerbation of COPD, CHF, exacerbation of CHF, cardiac event, pneumonia, pneumothorax. Amount and/or Complexity of Data Reviewed  Clinical lab tests: ordered and reviewed  Tests in the radiology section of CPT®: ordered and reviewed  Tests in the medicine section of CPT®: ordered and reviewed  Discuss the patient with other providers: yes (Case discussed with hospitalist on call, Dr. Gifty Diaz. Admit patient to his service.)  Independent visualization of images, tracings, or specimens: yes (EKG was done at 2:54 AM.  Sinus tachycardia. Rate of 106. Normal axis. No acute ST-T elevation. No STEMI. Questionable LVH. Questionable LVH with a strain pattern. No old EKG available for comparison at this time.)    Risk of Complications, Morbidity, and/or Mortality  Presenting problems: high  Diagnostic procedures: high  Management options: high  General comments: Patient remained stable throughout the course of treatment. Labs were positive for leukocytosis of 14,000, BNP of 600. Troponin was negative. Chest x-ray shows trace of edema. However patient's hemoglobin has dropped by 2 g since May. Decision was made to admit the patient. Case discussed with admitting physician. Will admit to service. Case also discussed with patient family. They understood and agree with her management. Consultations:       Consultations:         Procedures and Critical Care       NOTES   :  I have spent critical care time involved in lab review, consultations with specialist, family decision- making, bedside attention and documentation. This time excludes time spent in any separate billed procedures.   During this entire length of time I was immediately available to the patient . Julián Workman DO        Disposition     Disposition: Condition stable    Admitted        Diagnosis     Clinical Impression:   1. Acute dyspnea    2. Chronic obstructive pulmonary disease, unspecified COPD type ([address]    3. History of CHF (congestive heart failure)    4. Leukocytosis, unspecified type    5. Anemia, unspecified type        Attestations:    Julián Workman DO    Please note that this dictation was completed with [employer], the computer voice recognition software. Quite often unanticipated grammatical, syntax, homophones, and other interpretive errors are inadvertently transcribed by the computer software. Please disregard these errors. Please excuse any errors that have escaped final proofreading. Thank you.

## 2021-07-12 NOTE — ACP (ADVANCE CARE PLANNING)
Advance Care Planning   Healthcare Decision Maker:       Primary Decision Maker: Osiris Castillo - Daughter, Legal Guardian - [phone]    111

## 2021-07-12 NOTE — CONSULTS
Consult    NAME: Ryder Hollins   :  1955   MRN:  [id number]     Date/Time:  2021 12:45 PM    Patient PCP: Stephanie Hernández, DO  ________________________________________________________________________     Assessment:     1. Acute systolic heart failure, ischemic cardiomyopathy, three-vessel coronary artery disease, moderate aortic stenosis  2. COPD with acute exacerbation  3. Type 2 diabetes mellitus  4. Status post Covid pneumonia and oxygen dependency        Plan:     1. Patient said that she ran out of her bronchodilators and started to have significant wheezing and hypoxia. She was brought to the emergency room and noted to have acute over chronic systolic heart failure and COPD with exacerbation. After bronchodilators she feels better. She received additional diuretics also. She has a significant three-vessel coronary artery disease with a moderate aortic stenosis and is awaiting surgery after cardiac MRI she had severe left ventricular systolic dysfunction by echocardiogram few months ago. Left ventricular ejection fraction was in the high 20s. 2. Agree with stabilization of pulmonary status followed by cardiac catheterization at the request of cardiac surgeon to see the extent of coronary artery disease. She had a cardiac catheterization more than 2 years ago. If left ventricular is viable revascularization and aortic valve replacement are planned even though she is a high risk candidate for surgery. []        High complexity decision making was performed        Subjective:   CHIEF COMPLAINT:   Shortness of breath. This is an inpatient consultation requested by Dr. Ibrahima Dillon for evaluation of coronary artery disease and heart failure   F PRESENT ILLNESS:     Essie Oneal is a 74 y.o. -American who is known to have three-vessel coronary artery disease with a moderate aortic stenosis detected by cardiac catheterization more than 2 years ago.   She was awaiting cardiac surgery meanwhile she developed a Covid pneumonia with progression of heart failure and oxygen dependency. She is a gradually improving and was reevaluated by cardiac surgeon. She is awaiting to get cardiac MRI to assess the viability and also to reassess the aortic stenosis. She had moderate aortic stenosis in the past.  Patient has been having shortness of breath for the last 2 weeks and it got worse. Patient stated that she could not afford bronchodilators as it was very expensive. She has not taken any bronchodilators for 2 weeks. Patient denies any palpitation, pedal edema, dizziness or syncope. She has no chest pain. She does not smoke. She is an ex-smoker. She has a type 2 diabetes mellitus and is controlled on medication. She has hypertension and is controlled. She has hypercholesterolemia and is on statin. She denies fever chills or cough. She had a Covid vaccination, both doses recently. She had an episode of severe GI bleeding several months ago but denies any further episodes. She denies alcoholic addiction. She is very compliant with her medication and diet. She has chronic left bundle branch block on EKG. We were asked to consult for work up and evaluation of the above problems.      Past Medical History:   Diagnosis Date    Acute gastrointestinal hemorrhage 8/28/2020    Allergic rhinitis 8/28/2020    Anemia 8/28/2020    Benign essential hypertension 6/25/2020    Body mass index 30.0-30.9, adult 6/25/2020    CAD (coronary artery disease) 8/28/2020    Foot callus 8/28/2020    GERD (gastroesophageal reflux disease) 6/25/2020    Hair loss     Heart murmur 6/25/2020    Hx of colonoscopy 01/01/2015    Done for anemia and rectal bleeding    Left bundle branch block 8/28/2020    Microalbuminuria due to type 2 diabetes mellitus ([address] 8/28/2020    Mixed hyperlipidemia 6/25/2020    Morbid obesity ([address] 8/28/2020    Tobacco dependence syndrome 6/25/2020    Type II diabetes mellitus, uncontrolled ([email or web address] 2020      Past Surgical History:   Procedure Laterality Date    HX COLONOSCOPY  2015    due in [id number]. Done for anemia and rectal bleeding     Allergies   Allergen Reactions    Aspirin Unknown (comments)     hemorraging - ended up in the hospital      Meds:  See below  Social History     Tobacco Use    Smoking status: Former Smoker     Packs/day: 0.50     Quit date:      Years since quittin.5    Smokeless tobacco: Never Used   Substance Use Topics    Alcohol use: Not Currently      Family History   Problem Relation Age of Onset    Cancer Mother         uterine    Hypertension Father     Cancer Sister         breast       REVIEW OF SYSTEMS:     []         Unable to obtain  ROS due to ---   [x]         Total of 12 systems reviewed as follows:    Constitutional: negative fever, negative chills, negative weight loss  Eyes:   negative visual changes  ENT:   negative sore throat, tongue or lip swelling  Respiratory:  negative cough, negative dyspnea  Cards:  negative for chest pain, palpitations, lower extremity edema  GI:   negative for nausea, vomiting, diarrhea, and abdominal pain  Genitourinary: negative for frequency, dysuria  Integument:  negative for rash   Hematologic:  negative for easy bruising and gum/nose bleeding  Musculoskel: negative for myalgias,  back pain  Neurological:  negative for headaches, dizziness, vertigo, weakness  Behavl/Psych: negative for feelings of anxiety, depression     Pertinent Positives include :    Objective:      Physical Exam:    Last 24hrs VS reviewed since prior progress note.  Most recent are:    Visit Vitals  BP (!) 152/83 (BP 1 Location: Left upper arm, BP Patient Position: At rest)   Pulse (!) 108   Temp 98.3 °F (36.8 °C)   Resp 16   Ht 5' 5\" (1.651 m)   Wt 85.3 kg (188 lb)   SpO2 94%   BMI 31.28 kg/m²       Intake/Output Summary (Last 24 hours) at 2021 1246  Last data filed at 2021 0951  Gross per 24 hour   Intake 100 ml Output    Net 100 ml        General Appearance: Well developed, well nourished, alert & oriented x 3,    no acute distress. Ears/Nose/Mouth/Throat: Pupils equal and round, Hearing grossly normal.  Neck: Supple. JVP within normal limits. Carotids good upstrokes, with no bruit. Chest: Lungs clear to auscultation bilaterally. Cardiovascular: Regular rate and rhythm, S1S2 normal, no murmur, rubs, gallops. Abdomen: Soft, non-tender, bowel sounds are active. No organomegaly. Extremities: No edema bilaterally  Skin: Warm and dry. Neuro: No localizing neurological deficit  []         Post-cath site without hematoma, bruit, tenderness, or thrill. Distal pulses intact. Data:      I have reviewed vital signs,labs and ekg independently    Telemetry:    EKG:     EKG RESULTS     Procedure Component Value Units Date/Time    EKG, 12 LEAD, INITIAL [[id number]] Collected: 07/12/21 0254    Order Status: Completed Updated: 07/12/21 0807     Ventricular Rate 106 BPM      Atrial Rate 106 BPM      P-R Interval 136 ms      QRS Duration 138 ms      Q-T Interval 390 ms      QTC Calculation (Bezet) 518 ms      Calculated P Axis 49 degrees      Calculated R Axis 85 degrees      Calculated T Axis 74 degrees      Diagnosis --     Sinus tachycardia  Non-specific intra-ventricular conduction block  Cannot rule out Anterior infarct , age undetermined  Abnormal ECG  No previous ECGs available  Confirmed by Markus Avila ([id number]) on 7/12/2021 8:07:05 AM             XR CHEST PORT   Final Result   Findings/impression:      No consolidative airspace disease, pleural effusion or pneumothorax. Cardiac silhouette is enlarged. Trace interstitial edema. No acute osseous abnormality.            Echo Results  (Last 48 hours)    None        Cardiolite (Tc-99m Sestamibi) stress test    XR Results (most recent):     Results from Hospital Encounter encounter on 07/12/21    XR CHEST PORT    Narrative  Study: XR CHEST PORT    Clinical indication: Shortness of breath    Comparison: Chest x-ray 2/1/2021. Impression  Findings/impression:    No consolidative airspace disease, pleural effusion or pneumothorax. Cardiac silhouette is enlarged. Trace interstitial edema. No acute osseous abnormality. Prior to Admission medications    Medication Sig Start Date End Date Taking? Authorizing Provider   atorvastatin (LIPITOR) 40 mg tablet Take 1 Tablet by mouth nightly for 90 days. 7/7/21 10/5/21 Yes Merna Chun MD   spironolactone (ALDACTONE) 25 mg tablet Take 1 Tablet by mouth two (2) times a day. Indications: heart failure with reduced ejection fraction, fluid in the lungs due to chronic heart failure 6/18/21  Yes Yumiko Peralta DO   furosemide (Lasix) 40 mg tablet Take 1 Tab by mouth daily. 5/11/21  Yes Yumiko Peralta DO   clopidogreL (PLAVIX) 75 mg tab Take 75 mg by mouth daily. Yes Provider, Historical   fluticasone propionate (FLONASE) 50 mcg/actuation nasal spray 2 sprays to each nostril once daily 5/10/21  Yes Yumiko Peralta DO   fexofenadine (ALLEGRA) 180 mg tablet Take 1 Tab by mouth daily. STOP loratadine 5/10/21  Yes Yumiko Peralta DO   metFORMIN (GLUCOPHAGE) 500 mg tablet Take 2 tablets twice a day with meals 4/27/21  Yes Merna Chun MD   carvediloL (COREG) 3.125 mg tablet Take 1 Tab by mouth two (2) times daily (with meals). 4/12/21  Yes Ymuiko Peralta DO   ferrous sulfate 325 mg (65 mg iron) tablet Take 1 Tab by mouth Daily (before breakfast). 4/12/21  Yes Yumiko Peralta DO   pantoprazole (Protonix) 40 mg tablet Take 1 Tab by mouth daily. 3/5/21  Yes Yumiko Peralta DO   ProAir RespiClick 90 mcg/actuation breath activated inhaler INHALE 1 PUFF BY MOUTH EVERY 6 HOURS AS NEEDED FOR WHEEZING FOR UP TO 1 DAY 12/16/20  Yes Provider, Historical   lisinopriL (PRINIVIL, ZESTRIL) 5 mg tablet Take 1 Tab by mouth daily. Patient taking differently: Take 20 mg by mouth two (2) times a day.  2/6/21  Yes Audrey Schaumann, MD   potassium chloride (KLOR-CON) 10 mEq tablet By oral route take one tablet Monday, Wednesday and Friday 9/11/20  Yes Bárbara Edwards DO   lancets misc Use to check glucose once a day 2/25/21   Dianne Musa MD   glucose blood VI test strips (blood glucose test) strip by Does Not Apply route two (2) times a day. Use to check glucose once a day 2/25/21   MD TI Castillo JOSSELINROMEL silva 2/5/21   Isac Goldstein MD   Bedside Commode XX Use as needed 2/5/21   Isac Goldstein MD   Blood-Glucose Meter monitoring kit Use as directed 1/3/21   Marlena Horton MD   budesonide-formoteroL [facility]) 160-4.5 mcg/actuation HFAA Take 2 Puffs by inhalation two (2) times a day. Patient not taking: Reported on 7/12/2021 12/31/20   Bárbara Edwards DO   albuterol-ipratropium (DUO-NEB) 2.5 mg-0.5 mg/3 ml nebu 3 mL by Nebulization route every six (6) hours as needed for Wheezing, Shortness of Breath or Cough.   Patient not taking: Reported on 7/12/2021 12/31/20   Bárbara Edwards DO       Recent Results (from the past 24 hour(s))   EKG, 12 LEAD, INITIAL    Collection Time: 07/12/21  2:54 AM   Result Value Ref Range    Ventricular Rate 106 BPM    Atrial Rate 106 BPM    P-R Interval 136 ms    QRS Duration 138 ms    Q-T Interval 390 ms    QTC Calculation (Bezet) 518 ms    Calculated P Axis 49 degrees    Calculated R Axis 85 degrees    Calculated T Axis 74 degrees    Diagnosis       Sinus tachycardia  Non-specific intra-ventricular conduction block  Cannot rule out Anterior infarct , age undetermined  Abnormal ECG  No previous ECGs available  Confirmed by Guy Coulter ([id number]) on 7/12/2021 8:07:05 AM     CBC WITH AUTOMATED DIFF    Collection Time: 07/12/21  4:30 AM   Result Value Ref Range    WBC 14.4 (H) 3.6 - 11.0 K/uL    RBC 4.29 3.80 - 5.20 M/uL    HGB 9.8 (L) 11.5 - 16.0 g/dL    HCT 31.6 (L) 35.0 - 47.0 %    MCV 73.7 (L) 80.0 - 99.0 FL    MCH 22.8 (L) 26.0 - 34.0 PG    MCHC 31.0 30.0 - 36.5 g/dL    RDW 17.5 (H) 11.5 - 14.5 %    PLATELET 823 (H) 365 - 400 K/uL MPV 9.3 8.9 - 12.9 FL    NRBC 0.3 (H) 0.0  WBC    ABSOLUTE NRBC 0.04 (H) 0.00 - 0.01 K/uL    NEUTROPHILS 91 (H) 32 - 75 %    LYMPHOCYTES 6 (L) 12 - 49 %    MONOCYTES 2 (L) 5 - 13 %    EOSINOPHILS 0 0 - 7 %    BASOPHILS 0 0 - 1 %    IMMATURE GRANULOCYTES 1 (H) 0 - 0.5 %    ABS. NEUTROPHILS 13.1 (H) 1.8 - 8.0 K/UL    ABS. LYMPHOCYTES 0.9 0.8 - 3.5 K/UL    ABS. MONOCYTES 0.3 0.0 - 1.0 K/UL    ABS. EOSINOPHILS 0.0 0.0 - 0.4 K/UL    ABS. BASOPHILS 0.0 0.0 - 0.1 K/UL    ABS. IMM. GRANS. 0.1 (H) 0.00 - 0.04 K/UL    DF AUTOMATED     METABOLIC PANEL, COMPREHENSIVE    Collection Time: 07/12/21  4:30 AM   Result Value Ref Range    Sodium 136 136 - 145 mmol/L    Potassium 3.0 (L) 3.5 - 5.1 mmol/L    Chloride 98 97 - 108 mmol/L    CO2 27 21 - 32 mmol/L    Anion gap 11 5 - 15 mmol/L    Glucose 184 (H) 65 - 100 mg/dL    BUN 14 6 - 20 mg/dL    Creatinine 0.84 0.55 - 1.02 mg/dL    BUN/Creatinine ratio 17 12 - 20      GFR est AA >60 >60 ml/min/1.73m2    GFR est non-AA >60 >60 ml/min/1.73m2    Calcium 8.8 8.5 - 10.1 mg/dL    Bilirubin, total 0.7 0.2 - 1.0 mg/dL    AST (SGOT) 34 15 - 37 U/L    ALT (SGPT) 34 12 - 78 U/L    Alk.  phosphatase 83 45 - 117 U/L    Protein, total 7.6 6.4 - 8.2 g/dL    Albumin 3.2 (L) 3.5 - 5.0 g/dL    Globulin 4.4 (H) 2.0 - 4.0 g/dL    A-G Ratio 0.7 (L) 1.1 - 2.2     CK W/ REFLX CKMB    Collection Time: 07/12/21  4:30 AM   Result Value Ref Range    CK 87.0 26 - 192 ng/mL   BNP    Collection Time: 07/12/21  4:30 AM   Result Value Ref Range    NT pro- (H) <125 pg/mL   TROPONIN I    Collection Time: 07/12/21  9:40 AM   Result Value Ref Range    Troponin-I, Qt. 0.07 (H) <0.05 ng/mL   D DIMER    Collection Time: 07/12/21  9:40 AM   Result Value Ref Range    D DIMER 1.65 (H) <0.50 ug/ml(FEU)   PROCALCITONIN    Collection Time: 07/12/21  9:40 AM   Result Value Ref Range    Procalcitonin 0.17 (H) 0 ng/mL   TSH 3RD GENERATION    Collection Time: 07/12/21  9:40 AM   Result Value Ref Range    TSH 0.83 0.36 - 3.74 uIU/mL        Martin Branham MD    Patient PCP: Catie Metz MD

## 2021-07-12 NOTE — CONSULTS
PULMONARY CONSULT  [facility]    Name: Kat Loya MRN: [id number]   : 1955 Hospital: [address] [place] [address]   Date: 2021  Admission date: 2021 Hospital Day: 1       HPI:     Hospital Problems  Date Reviewed: 2021        Codes Class Noted POA    Acute and chronic respiratory failure with hypoxia [facility]) ICD-10-CM: J96.21  ICD-9-CM: 518.84, 799.02  2021 Unknown        Acute on chronic systolic (congestive) heart failure (HCC) ICD-10-CM: I50.23  ICD-9-CM: 428.23, 428.0  2021 Unknown        Hypokalemia ICD-10-CM: E87.6  ICD-9-CM: 276.8  2021 Yes        Type 2 diabetes mellitus with hyperglycemia, without long-term current use of insulin (HCC) ICD-10-CM: E11.65  ICD-9-CM: 250.00, 790.29  2021 Yes        COPD exacerbation ([address] ICD-10-CM: J44.1  ICD-9-CM: 491.21  2020 Unknown        Anemia ICD-10-CM: D64.9  ICD-9-CM: 285.9  2020 Yes        CAD (coronary artery disease) ICD-10-CM: I25.10  ICD-9-CM: 414.00  2020 Yes        GERD (gastroesophageal reflux disease) ICD-10-CM: K21.9  ICD-9-CM: 530.81  2020 Yes        Mixed hyperlipidemia ICD-10-CM: E78.2  ICD-9-CM: 272.2  2020 Yes                   [x] High complexity decision making was performed  [x] See my orders for details      Subjective/Initial History:     I was asked by Wyatt Andres MD to see Kat Loya  a 74 y.o.    female in consultation     Excerpts from admission 2021 or consult notes as follows:   41-year-old lady came in because of shortness of breath dyspnea she has significant past medical history of COPD she ran out of her inhaler for the past 2 months also has cardiomyopathy ejection fraction is about 41% with moderate aortic stenosis history of CABG aortic valve repair in the past.  Now came in because of dyspnea shortness of breath she cannot afford the inhalers it was too expensive not even albuterol she is chronically on home oxygen yesterday she went to the grocery store because of the heat she was feeling weak and tired but she did not take her oxygen so came to the ER saturation was 60% she was put on oxygen 4 L nasal cannula and pulmonary consult was called. Allergies   Allergen Reactions    Aspirin Unknown (comments)     hemorraging - ended up in the hospital        [facility] reviewed and pertinent medications noted or modified as needed     Current Facility-Administered Medications   Medication    potassium chloride (K-DUR, KLOR-CON) SR tablet 40 mEq    levoFLOXacin (LEVAQUIN) 500 mg in D5W IVPB    sodium chloride (NS) flush 5-40 mL    sodium chloride (NS) flush 5-40 mL    acetaminophen (TYLENOL) tablet 650 mg    Or    acetaminophen (TYLENOL) suppository 650 mg    polyethylene glycol (MIRALAX) packet 17 g    ondansetron (ZOFRAN ODT) tablet 4 mg    Or    ondansetron (ZOFRAN) injection 4 mg    enoxaparin (LOVENOX) injection 40 mg    famotidine (PEPCID) tablet 20 mg    alum-mag hydroxide-simeth (MYLANTA) oral suspension 15 mL    furosemide (LASIX) injection 40 mg    [START ON 7/13/2021] furosemide (LASIX) tablet 40 mg    predniSONE (DELTASONE) tablet 40 mg    guaiFENesin ER (MUCINEX) tablet 600 mg    budesonide-formoteroL (SYMBICORT) 160-4.5 mcg/actuation HFA inhaler 2 Puff    [START ON 7/13/2021] levoFLOXacin (LEVAQUIN) tablet 500 mg     Current Outpatient Medications   Medication Sig    atorvastatin (LIPITOR) 40 mg tablet Take 1 Tablet by mouth nightly for 90 days.  spironolactone (ALDACTONE) 25 mg tablet Take 1 Tablet by mouth two (2) times a day. Indications: heart failure with reduced ejection fraction, fluid in the lungs due to chronic heart failure    furosemide (Lasix) 40 mg tablet Take 1 Tab by mouth daily.  clopidogreL (PLAVIX) 75 mg tab Take 75 mg by mouth daily.     fluticasone propionate (FLONASE) 50 mcg/actuation nasal spray 2 sprays to each nostril once daily    fexofenadine (ALLEGRA) 180 mg tablet Take 1 Tab by mouth daily. STOP loratadine    metFORMIN (GLUCOPHAGE) 500 mg tablet Take 2 tablets twice a day with meals    carvediloL (COREG) 3.125 mg tablet Take 1 Tab by mouth two (2) times daily (with meals).  ferrous sulfate 325 mg (65 mg iron) tablet Take 1 Tab by mouth Daily (before breakfast).  pantoprazole (Protonix) 40 mg tablet Take 1 Tab by mouth daily.  ProAir RespiClick 90 mcg/actuation breath activated inhaler INHALE 1 PUFF BY MOUTH EVERY 6 HOURS AS NEEDED FOR WHEEZING FOR UP TO 1 DAY    lisinopriL (PRINIVIL, ZESTRIL) 5 mg tablet Take 1 Tab by mouth daily. (Patient taking differently: Take 20 mg by mouth two (2) times a day.)    potassium chloride (KLOR-CON) 10 mEq tablet By oral route take one tablet Monday, Wednesday and Friday    lancets misc Use to check glucose once a day    glucose blood VI test strips (blood glucose test) strip by Does Not Apply route two (2) times a day. Use to check glucose once a day    Walker [facility]    Bedside Commode XX Use as needed    Blood-Glucose Meter monitoring kit Use as directed    budesonide-formoteroL (SYMBICORT) 160-4.5 mcg/actuation HFAA Take 2 Puffs by inhalation two (2) times a day. (Patient not taking: Reported on 7/12/2021)    albuterol-ipratropium (DUO-NEB) 2.5 mg-0.5 mg/3 ml nebu 3 mL by Nebulization route every six (6) hours as needed for Wheezing, Shortness of Breath or Cough.  (Patient not taking: Reported on 7/12/2021)      Patient PCP: Dory Joiner DO  PMH:  has a past medical history of Acute gastrointestinal hemorrhage (8/28/2020), Allergic rhinitis (8/28/2020), Anemia (8/28/2020), Benign essential hypertension (6/25/2020), Body mass index 30.0-30.9, adult (6/25/2020), CAD (coronary artery disease) (8/28/2020), Foot callus (8/28/2020), GERD (gastroesophageal reflux disease) (6/25/2020), Hair loss, Heart murmur (6/25/2020), colonoscopy (01/01/2015), Left bundle branch block (8/28/2020), Microalbuminuria due to type 2 diabetes mellitus ([facility] (2020), Mixed hyperlipidemia (2020), Morbid obesity ([facility] (2020), Tobacco dependence syndrome (2020), and Type II diabetes mellitus, uncontrolled ([facility] (2020). PSH:   has a past surgical history that includes hx colonoscopy (). FHX: family history includes Cancer in her mother and sister; Hypertension in her father. SHX:  reports that she quit smoking about 7 years ago. She smoked 0.50 packs per day. She has never used smokeless tobacco. She reports previous alcohol use. She reports that she does not use drugs. ROS:    Review of Systems   Constitutional: Positive for malaise/fatigue. HENT: Negative. Eyes: Negative. Respiratory: Positive for shortness of breath and wheezing. Cardiovascular: Positive for orthopnea. Gastrointestinal: Negative. Genitourinary: Negative. Musculoskeletal: Negative. Skin: Negative. Neurological: Negative. Psychiatric/Behavioral: Negative. Objective:     Vital Signs: Telemetry:    normal sinus rhythm Intake/Output:   Visit Vitals  BP (!) 152/83 (BP 1 Location: Left upper arm, BP Patient Position: At rest)   Pulse (!) 108   Temp 98.3 °F (36.8 °C)   Resp 16   Ht 5' 5\" (1.651 m)   Wt 85.3 kg (188 lb)   SpO2 93%   BMI 31.28 kg/m²       Temp (24hrs), Av °F (36.7 °C), Min:97.7 °F (36.5 °C), Max:98.3 °F (36.8 °C)        O2 Device: Nasal cannula O2 Flow Rate (L/min): 2 l/min       Wt Readings from Last 4 Encounters:   21 85.3 kg (188 lb)   05/10/21 85.3 kg (188 lb)   21 85.6 kg (188 lb 12.8 oz)   21 86.4 kg (190 lb 8 oz)        No intake or output data in the 24 hours ending 21 0928    Last shift:      No intake/output data recorded. Last 3 shifts: No intake/output data recorded. Physical Exam:     Physical Exam  Constitutional:       Appearance: Normal appearance. HENT:      Head: Normocephalic and atraumatic.       Nose: Nose normal.   Eyes:      Pupils: Pupils are equal, round, and reactive to light. Cardiovascular:      Rate and Rhythm: Normal rate and regular rhythm. Pulses: Normal pulses. Heart sounds: Normal heart sounds. Pulmonary:      Effort: Pulmonary effort is normal.      Breath sounds: Wheezing present. Abdominal:      General: Abdomen is flat. Bowel sounds are normal.      Palpations: Abdomen is soft. Musculoskeletal:         General: Normal range of motion. Cervical back: Normal range of motion and neck supple. Skin:     General: Skin is warm. Neurological:      General: No focal deficit present. Mental Status: She is alert. Psychiatric:         Mood and Affect: Mood normal.          Labs:    Recent Labs     07/12/21  0430   WBC 14.4*   HGB 9.8*   *     Recent Labs     07/12/21  0430      K 3.0*   CL 98   CO2 27   *   BUN 14   CREA 0.84   CA 8.8   ALB 3.2*   ALT 34     No results for input(s): PH, PCO2, PO2, HCO3, FIO2 in the last 72 hours. No results for input(s): CPK, CKNDX, TROIQ in the last 72 hours. No lab exists for component: CPKMB  No results found for: BNPP, BNP   Lab Results   Component Value Date/Time    Culture result: Scant Normal respiratory duane 01/28/2021 09:43 AM    Culture result: No Growth (<1000 cfu/mL) 01/28/2021 01:00 AM     Lab Results   Component Value Date/Time    TSH 2.590 05/10/2021 04:15 PM    TSH, External 2.420 01/16/2019 12:00 AM       Imaging:    CXR Results  (Last 48 hours)               07/12/21 0327  XR CHEST PORT Final result    Impression:  Findings/impression:       No consolidative airspace disease, pleural effusion or pneumothorax. Cardiac silhouette is enlarged. Trace interstitial edema. No acute osseous abnormality. Narrative:  Study: XR CHEST PORT       Clinical indication: Shortness of breath       Comparison: Chest x-ray 2/1/2021.                Results from [place] encounter on 07/12/21    XR CHEST PORT    Narrative  Study: XR CHEST PORT    Clinical indication: Shortness of breath    Comparison: Chest x-ray 2/1/2021. Impression  Findings/impression:    No consolidative airspace disease, pleural effusion or pneumothorax. Cardiac silhouette is enlarged. Trace interstitial edema. No acute osseous abnormality. Results from [place] encounter on 01/25/21    XR CHEST PORT    Narrative  Chest, frontal view, 2/1/2021. History: CHF. Comparison: Including chest 1/31/2021. Findings: The cardiac silhouette is stable. The lungs are adequately expanded. Pulmonary vascular congestion persists. Opacities in the lungs most pronounced  at the bases is not significantly changed. Pleural effusions are not  significantly changed. No pneumothorax is identified. The osseous structures  are stable. Impression  No significant interval change. XR CHEST PORT    Narrative  XR CHEST PORT    Comparison: Chest radiograph dated January 30, 2021    Impression  FINDINGS/IMPRESSION:  Radiograph is limited by patient's body habitus and AP portable technique. Lungs remain hypoinflated with bibasilar atelectasis and probable small  bilateral pleural effusions. Cardiac silhouette stable in size. No radiographically apparent pneumothorax. Mild improvement in bilateral airspace opacity, this is most confluent at the  right lung base. This may represent multilobar pneumonia or edema, or perhaps a  combination of each. Results from [place] encounter on 01/25/21    CTA CHEST W OR W WO CONT    Narrative  Exam: CTA chest with intravenous contrast    Comparison: Plain radiograph 1/25/2021. CT 1/2/2021. Technique: During intravenous contrast administration (100 cc [id number]), axial  sections were obtained through the chest. Coronal and sagittal reconstructions  were generated. Maximal intensity projection images were generated.     Dose reduction: All CT scans at this facility are performed using dose reduction  optimization techniques as appropriate to perform the exam including the  following: automated exposure control, adjustments of the mA and/or kV according  to patient size, or use of iterative reconstruction technique. Findings: The examination is limited by motion degradation and by adduction of  the patient's upper extremities, which renders scattering and attenuation of the  diagnostic x-ray beam. Nevertheless, there is no demonstrated pulmonary embolus. Endotracheal tube tip in the middle third of the trachea. Distal trachea and  main bronchi poorly expanded. There is calcific atherosclerotic disease of a  nonaneurysmal thoracic aorta, which manifests no evidence of intimal  incompetence. Calcific atherosclerotic coronary arterial disease is present. Prominent bilateral mediastinal and hilar lymph nodes, increased compared to  1/2/2021, likely inflammatory. Small to moderate pericardial effusion of near  water attenuation fluid. It is 2 cm greatest axial thickness. Small bilateral  pleural effusions. Bilateral dependent airspace disease compatible with  pneumonia. Interstitial disease also present, compatible with pulmonary edema. Included abdomen indicates a stable 2 cm left adrenal nodule of water  attenuation, compatible with a benign adenoma. No axillary or supraclavicular  adenopathy or mass. Visualized thoracic bone scaffolding unremarkable. Impression  Impression:  1. No evidence of pulmonary embolus. 2. Bilateral dependent airspace disease compatible with pneumonia. 3. Diffuse pulmonary interstitial disease compatible with pulmonary edema. 4. Bilateral pleural effusions. 5. Coronary artery calcific atherosclerotic disease. 6. Small to moderate pericardial effusion. 7. Distal trachea and main bronchi are poorly expanded. IMPRESSION:   1. Acute on chronic hypoxic respiratory failure  2.  Chronic Obstructive Pulmonary Disease with Severe Acute Exacerbation requiring inpatient hospitalization and management; has very poor airway clearance. Increased work of breathing  3. Body mass index is 31.28 kg/m². 4. Congestive heart failure cardiomyopathy ejection fraction 41%  5. Moderate aortic stenosis  6. Hypokalemia  7. Prognosis guarded       RECOMMENDATIONS/PLAN:     1. On nasal Cannula oxygen as salvage oxygen delivery device to provide high concentration of oxygen to overcome refractory hypoxia; she is chronically on home oxygen 2 L nasal cannula right now she is on 4 L we will continue to wean  2. She is on prednisone nebulizer treatment  3. Agree with Lasix and Aldactone  4. Intubate and place on vent if NIV fails  5. Agree with Empiric IV antibiotics pending culture results   6. Follow culture results  7. Patient has difficulty in getting the inhalers because of co-pay will get  and case management  8. Supplemental O2 to keep sats > 93%  9. Aspiration precautions  10. Labs to follow electrolytes, renal function and and blood counts  11. Glucose monitoring and SSI  12. Bronchial hygiene with respiratory therapy techniques, bronchodilators  13.  DVT, SUP prophylaxis             Bonnie Vincent MD

## 2021-07-12 NOTE — ED NOTES
8702  Patient resting in room. Daughter at bedside. Pending admission. 0930  Pulmonology at bedside at this time. 1000  Lab results sent as ordered. Dr. Jose Miguel Soares (Cardiology at bedside at this time. 1343  Troponin drawn. 1743  Troponin drawn. Result to Dr. Ibrahima Dillon. 1853  TRANSFER - OUT REPORT:    Verbal report given to [facility] (name) on Ryder Hollins  being transferred to [facility] (unit) for routine progression of care       Report consisted of patients Situation, Background, Assessment and   Recommendations(SBAR). Information from the following report(s) SBAR, Kardex, ED Summary, MAR, Recent Results and Cardiac Rhythm NSR was reviewed with the receiving nurse. Lines:   Peripheral IV 07/12/21 Posterior;Right Hand (Active)        Opportunity for questions and clarification was provided.       Patient transported with:   Monitor  O2 @ 2 liters   Tech

## 2021-07-12 NOTE — PROGRESS NOTES
Reason for Admission: Respiratory Failure                      RUR Score:24%                  PCP: First and Last name:   Ana Guzman DO     Name of Practice:    Are you a current patient: Yes/No: Yes   Approximate date of last visit: May 11th   Can you participate in a virtual visit if needed:   Yes/Call  Do you (patient/family) have any concerns for transition/discharge? No              Plan for utilizing home health:   Declined home health, but sighed Choice Letter to continue home O2 with [facility]. Referral sent via [facility]. Pt uses no DME - has a walker. Current Advanced Directive/Advance Care Plan:  Full Code      Healthcare Decision Maker:   Primary HCDM is (daughter ) Ilir Sutton @ [phone]. Transition of Care Plan:   D/C Plan is home with daughter & home O2. Daughter Sia Leyva) or  pt's sister will transport home upon discharge.  tramaine

## 2021-07-12 NOTE — Clinical Note
Status[de-identified] INPATIENT [101]   Type of Bed: Remote Telemetry [29]   Cardiac Monitoring Required?: Yes   Inpatient Hospitalization Certified Necessary for the Following Reasons: 3.  Patient receiving treatment that can only be provided in an inpatient setting (further clarification in H&P documentation)   Admitting Diagnosis: Acute and chronic respiratory failure with hypoxia ([facility] [[id number]]   Admitting Diagnosis: Acute on chronic systolic (congestive) heart failure [facility]) [[id number]]   Admitting Physician: Dennis Negrete   Attending Physician: Dennis Negrete   Estimated Length of Stay: 2 Midnights   Discharge Plan[de-identified] Other (Specify)   Comments: tbd

## 2021-07-12 NOTE — ED TRIAGE NOTES
EMS called for SOB on arrival pt was 50%s on her home 02 ems reports diminished breath sounds she has been off of her medications for her breathing for a month, 1 atrovent, 4 albuterol, 10 dec, 4 zofran en route, patient is now on n4 l NC on arrival to ED

## 2021-07-12 NOTE — H&P
History & Physical    Primary Care Provider: Melinda Xavier DO  Source of Information: Patient , daughter, records    History of Presenting Illness:   Keenan Kirkland is a 74 y.o. female who presents with history of cardiomyopathy, EF in February of this year was 41% with moderate aortic stenosis, history of coronary artery disease and has been talking with [facility] cardiologist regarding previously recommended CABG and aortic valve repair and she follows with Dr. Stanley Reddy, history of diabetes mellitus type 2, essential hypertension, hyperlipidemia, obesity, Hermon Goldstein with shortness of breath. She states she has had some shortness of breath starting about 2 weeks ago, has been slowly progressive, she feels she has been using her oxygen more often, of note she is on 2 L per nasal cannula nightly but she has been using it more here recently throughout the day. She went yesterday to the grocery store about 4 PM without her oxygen came back at 5, subsequently her shortness of breath worsened significantly. About 1 in the morning she called her daughter, EMS was summoned, reportedly her O2 saturation was down around 60% initially. She arrives here on 4 L per nasal cannula saturating at 100%. Work-up here shows an elevated BNP, mild edema on chest x-ray, surmised acute/chronic systolic CHF exacerbation as well as COPD exacerbation and she was given Lasix, Solu-Medrol, nebulizers and she is actually been weaned to her usual 2 L per nasal cannula. Will admit for the above-mentioned problems for further management. Review of Systems:  Review of Systems   Constitutional: Positive for malaise/fatigue. Negative for chills and fever. HENT: Negative. Eyes: Negative. Respiratory: Positive for shortness of breath and wheezing. Negative for cough, hemoptysis and sputum production. Cardiovascular: Positive for orthopnea and PND. Negative for chest pain, palpitations and leg swelling. Gastrointestinal: Negative. Genitourinary: Negative. Musculoskeletal: Negative. Skin: Negative. Endo/Heme/Allergies: Negative. Psychiatric/Behavioral: Negative. Past Medical History:   Diagnosis Date    Acute gastrointestinal hemorrhage 8/28/2020    Allergic rhinitis 8/28/2020    Anemia 8/28/2020    Benign essential hypertension 6/25/2020    Body mass index 30.0-30.9, adult 6/25/2020    CAD (coronary artery disease) 8/28/2020    Foot callus 8/28/2020    GERD (gastroesophageal reflux disease) 6/25/2020    Hair loss     Heart murmur 6/25/2020    Hx of colonoscopy 01/01/2015    Done for anemia and rectal bleeding    Left bundle branch block 8/28/2020    Microalbuminuria due to type 2 diabetes mellitus ([facility] [address] 8/28/2020    Mixed hyperlipidemia 6/25/2020    Morbid obesity ([facility] [address] 8/28/2020    Tobacco dependence syndrome 6/25/2020    Type II diabetes mellitus, uncontrolled ([facility] [address] 6/25/2020      Past Surgical History:   Procedure Laterality Date    HX COLONOSCOPY  2015    due in [id number]. Done for anemia and rectal bleeding     Prior to Admission medications    Medication Sig Start Date End Date Taking? Authorizing Provider   atorvastatin (LIPITOR) 40 mg tablet Take 1 Tablet by mouth nightly for 90 days. 7/7/21 10/5/21  Gilford Ngo, MD   spironolactone (ALDACTONE) 25 mg tablet Take 1 Tablet by mouth two (2) times a day. Indications: heart failure with reduced ejection fraction, fluid in the lungs due to chronic heart failure 6/18/21   Bernarda Mays DO   furosemide (Lasix) 40 mg tablet Take 1 Tab by mouth daily. 5/11/21   Bernarda Mays DO   clopidogreL (PLAVIX) 75 mg tab Take 75 mg by mouth daily. Provider, Historical   fluticasone propionate (FLONASE) 50 mcg/actuation nasal spray 2 sprays to each nostril once daily 5/10/21   Bernarda Mays DO   fexofenadine (ALLEGRA) 180 mg tablet Take 1 Tab by mouth daily.  STOP loratadine 5/10/21   Bernarda Mays DO   metFORMIN (GLUCOPHAGE) 500 mg tablet Take 2 tablets twice a day with meals 4/27/21   Marcus Cochran MD   carvediloL (COREG) 3.125 mg tablet Take 1 Tab by mouth two (2) times daily (with meals). 4/12/21   Daralene Rho, DO   loratadine (CLARITIN) 10 mg tablet Take 1 Tab by mouth daily. 4/12/21   Daralene Rho, DO   ferrous sulfate 325 mg (65 mg iron) tablet Take 1 Tab by mouth Daily (before breakfast). 4/12/21   Daralene Rho, DO   pantoprazole (Protonix) 40 mg tablet Take 1 Tab by mouth daily. 3/5/21   Daralene Rho, DO   ProAir RespiClick 90 mcg/actuation breath activated inhaler INHALE 1 PUFF BY MOUTH EVERY 6 HOURS AS NEEDED FOR WHEEZING FOR UP TO 1 DAY 12/16/20   Provider, Historical   lancets misc Use to check glucose once a day 2/25/21   Marcus Cochran MD   glucose blood VI test strips (blood glucose test) strip by Does Not Apply route two (2) times a day. Use to check glucose once a day 2/25/21   Marcus Cochran MD   amLODIPine (NORVASC) 10 mg tablet Take 1 Tab by mouth daily. 2/24/21   Daralene Rho, DO   lisinopriL (PRINIVIL, ZESTRIL) 5 mg tablet Take 1 Tab by mouth daily. Patient taking differently: Take 20 mg by mouth two (2) times a day. 2/6/21   MD TI Samuels 2/5/21   Jaya Woodard MD   Bedside Commode XX Use as needed 2/5/21   Jaya Woodard MD   Blood-Glucose Meter monitoring kit Use as directed 1/3/21   Ciaran Marin MD   budesonide-formoteroL [facility]) 160-4.5 mcg/actuation HFAA Take 2 Puffs by inhalation two (2) times a day. 12/31/20   Daralene Rho, DO   albuterol-ipratropium (DUO-NEB) 2.5 mg-0.5 mg/3 ml nebu 3 mL by Nebulization route every six (6) hours as needed for Wheezing, Shortness of Breath or Cough. 12/31/20   Daralene Rho, DO   LORazepam (Ativan) 1 mg tablet Take 1 Tab by mouth nightly.  Max Daily Amount: 1 mg. 12/17/20   Christopher Andres MD   potassium chloride (KLOR-CON) 10 mEq tablet By oral route take one tablet Monday, Wednesday and Friday 9/11/20   Shahab Kaba DO Allergies   Allergen Reactions    Aspirin Unknown (comments)     hemorraging - ended up in the hospital      Family History   Problem Relation Age of Onset    Cancer Mother         uterine    Hypertension Father     Cancer Sister         breast        SOCIAL HISTORY:  Patient resides:  Independently Pt resides alone, daughter in the [facility]    SNF    With family care       Smoking history:   None    Former x   Chronic      Alcohol history:   None x   Social    Chronic      Ambulates:   Independently x   w/cane    w/walker    w/wc    CODE STATUS:  DNR    Full x   Other      Objective:     Physical Exam:     Visit Vitals  /68 (BP 1 Location: Left upper arm, BP Patient Position: At rest)   Pulse (!) 112   Temp 97.7 °F (36.5 °C)   Resp 17   Ht 5' 5\" (1.651 m)   Wt 85.3 kg (188 lb)   SpO2 97%   BMI 31.28 kg/m²    O2 Flow Rate (L/min): 2 l/min O2 Device: Nasal cannula    General:  Alert, cooperative, no distress but does seem to get a little winded in conversation. She maintains O2 saturations during my visit above 94%. Head:  Normocephalic, without obvious abnormality, atraumatic. Eyes:  Conjunctivae/corneas clear. EOMs intact. Throat: Lips, mucosa, and tongue normal. Teeth and gums normal.   Neck: Supple, symmetrical, trachea midline, no adenopathy, thyroid: no enlargement/tenderness/nodules, no carotid bruit and no JVD. Back:   Symmetric, no curvature. ROM normal. No CVA tenderness. Lungs:    Fair air entry, no wheezing currently, faint crackles at bases. Not labored. Chest wall:  No tenderness or deformity. Heart:  Regular rate and rhythm, S1, S2 normal, no murmur, click, rub or gallop. Abdomen:   Soft, non-tender. Bowel sounds normal. No masses,  No organomegaly. Extremities: Extremities normal, atraumatic, no cyanosis or edema. Pulses: 2+ and symmetric all extremities.    Skin: Skin color, texture, turgor normal. No rashes or lesions   Neurologic: CNII-XII intact. No motor or sensory deficits. Data Review:     Recent Days:  Recent Labs     07/12/21  0430   WBC 14.4*   HGB 9.8*   HCT 31.6*   *     Recent Labs     07/12/21  0430      K 3.0*   CL 98   CO2 27   *   BUN 14   CREA 0.84   CA 8.8   ALB 3.2*   TBILI 0.7   ALT 34     No results for input(s): PH, PCO2, PO2, HCO3, FIO2 in the last 72 hours. 24 Hour Results:  Recent Results (from the past 24 hour(s))   EKG, 12 LEAD, INITIAL    Collection Time: 07/12/21  2:54 AM   Result Value Ref Range    Ventricular Rate 106 BPM    Atrial Rate 106 BPM    P-R Interval 136 ms    QRS Duration 138 ms    Q-T Interval 390 ms    QTC Calculation (Bezet) 518 ms    Calculated P Axis 49 degrees    Calculated R Axis 85 degrees    Calculated T Axis 74 degrees    Diagnosis       Sinus tachycardia  Non-specific intra-ventricular conduction block  Cannot rule out Anterior infarct , age undetermined  Abnormal ECG  No previous ECGs available  Confirmed by Marcelo Jenkins ([id number]) on 7/12/2021 8:07:05 AM     CBC WITH AUTOMATED DIFF    Collection Time: 07/12/21  4:30 AM   Result Value Ref Range    WBC 14.4 (H) 3.6 - 11.0 K/uL    RBC 4.29 3.80 - 5.20 M/uL    HGB 9.8 (L) 11.5 - 16.0 g/dL    HCT 31.6 (L) 35.0 - 47.0 %    MCV 73.7 (L) 80.0 - 99.0 FL    MCH 22.8 (L) 26.0 - 34.0 PG    MCHC 31.0 30.0 - 36.5 g/dL    RDW 17.5 (H) 11.5 - 14.5 %    PLATELET 889 (H) 594 - 400 K/uL    MPV 9.3 8.9 - 12.9 FL    NRBC 0.3 (H) 0.0  WBC    ABSOLUTE NRBC 0.04 (H) 0.00 - 0.01 K/uL    NEUTROPHILS 91 (H) 32 - 75 %    LYMPHOCYTES 6 (L) 12 - 49 %    MONOCYTES 2 (L) 5 - 13 %    EOSINOPHILS 0 0 - 7 %    BASOPHILS 0 0 - 1 %    IMMATURE GRANULOCYTES 1 (H) 0 - 0.5 %    ABS. NEUTROPHILS 13.1 (H) 1.8 - 8.0 K/UL    ABS. LYMPHOCYTES 0.9 0.8 - 3.5 K/UL    ABS. MONOCYTES 0.3 0.0 - 1.0 K/UL    ABS. EOSINOPHILS 0.0 0.0 - 0.4 K/UL    ABS. BASOPHILS 0.0 0.0 - 0.1 K/UL    ABS. IMM.  GRANS. 0.1 (H) 0.00 - 0.04 K/UL    DF AUTOMATED     METABOLIC PANEL, COMPREHENSIVE    Collection Time: 07/12/21  4:30 AM   Result Value Ref Range    Sodium 136 136 - 145 mmol/L    Potassium 3.0 (L) 3.5 - 5.1 mmol/L    Chloride 98 97 - 108 mmol/L    CO2 27 21 - 32 mmol/L    Anion gap 11 5 - 15 mmol/L    Glucose 184 (H) 65 - 100 mg/dL    BUN 14 6 - 20 mg/dL    Creatinine 0.84 0.55 - 1.02 mg/dL    BUN/Creatinine ratio 17 12 - 20      GFR est AA >60 >60 ml/min/1.73m2    GFR est non-AA >60 >60 ml/min/1.73m2    Calcium 8.8 8.5 - 10.1 mg/dL    Bilirubin, total 0.7 0.2 - 1.0 mg/dL    AST (SGOT) 34 15 - 37 U/L    ALT (SGPT) 34 12 - 78 U/L    Alk.  phosphatase 83 45 - 117 U/L    Protein, total 7.6 6.4 - 8.2 g/dL    Albumin 3.2 (L) 3.5 - 5.0 g/dL    Globulin 4.4 (H) 2.0 - 4.0 g/dL    A-G Ratio 0.7 (L) 1.1 - 2.2     CK W/ REFLX CKMB    Collection Time: 07/12/21  4:30 AM   Result Value Ref Range    CK 87.0 26 - 192 ng/mL   BNP    Collection Time: 07/12/21  4:30 AM   Result Value Ref Range    NT pro- (H) <125 pg/mL         Imaging:     Assessment:     Active Problems:    GERD (gastroesophageal reflux disease) (6/25/2020)      Mixed hyperlipidemia (6/25/2020)      Anemia (8/28/2020)      CAD (coronary artery disease) (8/28/2020)      COPD exacerbation (HCC) (12/13/2020)      Type 2 diabetes mellitus with hyperglycemia, without long-term current use of insulin (HCC) (2/25/2021)      Acute and chronic respiratory failure with hypoxia (HCC) (7/12/2021)      Acute on chronic systolic (congestive) heart failure ([facility] [address] (7/12/2021)         75-year-old female with above mentioned comorbidities presents with sudden onset worsening shortness of breath, appears likely secondary to COPD exacerbation, mild CHF exacerbation in the setting of cardiomyopathy (EF 41% in February) and moderate aortic stenosis, CAD history three-vessel and reportedly has been in discussion for CABG for more than 2 years recently [place] [facility] [place] cardiac pending to determine course as per daughter.    -Acute/chronic hypoxic respiratory failure, secondary to: Per patient reportedly on room air at home her oxygen saturation dipped below 60% when EMS arrived. She states that she has been requiring increased O2 supplementation from her usual only nightly. -COPD exacerbation, follows with Dr. Baron Russ  (incidentally patient is not taking Symbicort as it is too costly)  -CHF exacerbation, systolic acute on chronic, diuresed in ED,  -No evidence of pneumonia  -White blood cell count 14 K, tachycardic, meets SIRS criteria-no definite infectious source is seen though trace of pyuria. Levaquin given empirically in the emergency department.  -History of CAD, CABG ongoing evaluation  -Moderate aortic stenosis, [place] [facility] [place] Subhash Bear  -Diabetes mellitus type 2 on oral medications she says historically well controlled  -Hypokalemia    Plan:     -IP admission  -Remote telemetry  -Diuresis, follow I and O's, daily weights-she is usually on Lasix and spironolactone, will give her 2 doses of IV Lasix reevaluate in the morning - ordered lasix 40 mg po bid to start in am 7/13) Spironolactone is continued. -Replete and follow lites  -Continue prednisone 40 mg daily x 5d  -Continue nebs scheduled and as needed  -Levaquin 500 mg po x 5 d  -Consult cardiology and pulmonology  -Follow D-dimer and pro-Jovon  -Monitor renal function closely with diuresis  -We will follow serial troponins  -Metformin held, continue SSI/hypoglycemic protocol  -Reviewed, reconciled home medications  -VTEP: Lovenox  -GIP: ppi continued(pta)  -Full CODE STATUS  -POA daughter Matilde Moyer [phone]  Disposition: Pending course, anticipate at least 2 midnights, home possibly Clyde Garcia.        Signed By: Stephanie Jamison MD     July 12, 2021

## 2021-07-13 LAB
ANION GAP SERPL CALC-SCNC: 6 MMOL/L (ref 5–15)
BASOPHILS # BLD: 0 K/UL (ref 0–0.1)
BASOPHILS NFR BLD: 0 % (ref 0–1)
BUN SERPL-MCNC: 18 MG/DL (ref 6–20)
BUN/CREAT SERPL: 21 (ref 12–20)
CA-I BLD-MCNC: 9 MG/DL (ref 8.5–10.1)
CHLORIDE SERPL-SCNC: 98 MMOL/L (ref 97–108)
CO2 SERPL-SCNC: 31 MMOL/L (ref 21–32)
CREAT SERPL-MCNC: 0.85 MG/DL (ref 0.55–1.02)
DIFFERENTIAL METHOD BLD: ABNORMAL
EOSINOPHIL # BLD: 0 K/UL (ref 0–0.4)
EOSINOPHIL NFR BLD: 0 % (ref 0–7)
ERYTHROCYTE [DISTWIDTH] IN BLOOD BY AUTOMATED COUNT: 17.3 % (ref 11.5–14.5)
GLUCOSE BLD STRIP.AUTO-MCNC: 184 MG/DL (ref 65–117)
GLUCOSE BLD STRIP.AUTO-MCNC: 191 MG/DL (ref 65–117)
GLUCOSE BLD STRIP.AUTO-MCNC: 193 MG/DL (ref 65–117)
GLUCOSE BLD STRIP.AUTO-MCNC: 277 MG/DL (ref 65–117)
GLUCOSE SERPL-MCNC: 147 MG/DL (ref 65–100)
HCT VFR BLD AUTO: 30.1 % (ref 35–47)
HGB BLD-MCNC: 9.3 G/DL (ref 11.5–16)
IMM GRANULOCYTES # BLD AUTO: 0.1 K/UL (ref 0–0.04)
IMM GRANULOCYTES NFR BLD AUTO: 1 % (ref 0–0.5)
LYMPHOCYTES # BLD: 1 K/UL (ref 0.8–3.5)
LYMPHOCYTES NFR BLD: 8 % (ref 12–49)
MAGNESIUM SERPL-MCNC: 1.4 MG/DL (ref 1.6–2.4)
MCH RBC QN AUTO: 22.4 PG (ref 26–34)
MCHC RBC AUTO-ENTMCNC: 30.9 G/DL (ref 30–36.5)
MCV RBC AUTO: 72.4 FL (ref 80–99)
MONOCYTES # BLD: 0.9 K/UL (ref 0–1)
MONOCYTES NFR BLD: 7 % (ref 5–13)
NEUTS SEG # BLD: 10.9 K/UL (ref 1.8–8)
NEUTS SEG NFR BLD: 84 % (ref 32–75)
NRBC # BLD: 0.03 K/UL (ref 0–0.01)
NRBC BLD-RTO: 0.2 PER 100 WBC
PERFORMED BY, TECHID: ABNORMAL
PLATELET # BLD AUTO: 455 K/UL (ref 150–400)
PMV BLD AUTO: 9.4 FL (ref 8.9–12.9)
POTASSIUM SERPL-SCNC: 3.6 MMOL/L (ref 3.5–5.1)
RBC # BLD AUTO: 4.16 M/UL (ref 3.8–5.2)
SODIUM SERPL-SCNC: 135 MMOL/L (ref 136–145)
WBC # BLD AUTO: 12.9 K/UL (ref 3.6–11)

## 2021-07-13 PROCEDURE — 85025 COMPLETE CBC W/AUTO DIFF WBC: CPT

## 2021-07-13 PROCEDURE — 74011636637 HC RX REV CODE- 636/637: Performed by: PHYSICIAN ASSISTANT

## 2021-07-13 PROCEDURE — 94640 AIRWAY INHALATION TREATMENT: CPT

## 2021-07-13 PROCEDURE — 80048 BASIC METABOLIC PNL TOTAL CA: CPT

## 2021-07-13 PROCEDURE — 74011000250 HC RX REV CODE- 250: Performed by: PHYSICIAN ASSISTANT

## 2021-07-13 PROCEDURE — 74011000250 HC RX REV CODE- 250: Performed by: INTERNAL MEDICINE

## 2021-07-13 PROCEDURE — 74011250637 HC RX REV CODE- 250/637: Performed by: INTERNAL MEDICINE

## 2021-07-13 PROCEDURE — 36415 COLL VENOUS BLD VENIPUNCTURE: CPT

## 2021-07-13 PROCEDURE — 74011636637 HC RX REV CODE- 636/637: Performed by: HOSPITALIST

## 2021-07-13 PROCEDURE — 74011250636 HC RX REV CODE- 250/636: Performed by: INTERNAL MEDICINE

## 2021-07-13 PROCEDURE — 74011250636 HC RX REV CODE- 250/636: Performed by: PHYSICIAN ASSISTANT

## 2021-07-13 PROCEDURE — 65270000032 HC RM SEMIPRIVATE

## 2021-07-13 PROCEDURE — 77010033678 HC OXYGEN DAILY

## 2021-07-13 PROCEDURE — 74011250637 HC RX REV CODE- 250/637: Performed by: EMERGENCY MEDICINE

## 2021-07-13 PROCEDURE — 83735 ASSAY OF MAGNESIUM: CPT

## 2021-07-13 PROCEDURE — 82962 GLUCOSE BLOOD TEST: CPT

## 2021-07-13 RX ORDER — ACETYLCYSTEINE 200 MG/ML
200 SOLUTION ORAL; RESPIRATORY (INHALATION) EVERY 6 HOURS
Status: DISCONTINUED | OUTPATIENT
Start: 2021-07-13 | End: 2021-07-13

## 2021-07-13 RX ORDER — INSULIN GLARGINE 100 [IU]/ML
8 INJECTION, SOLUTION SUBCUTANEOUS
Status: DISCONTINUED | OUTPATIENT
Start: 2021-07-13 | End: 2021-07-16 | Stop reason: HOSPADM

## 2021-07-13 RX ORDER — ACETYLCYSTEINE 200 MG/ML
200 SOLUTION ORAL; RESPIRATORY (INHALATION)
Status: DISCONTINUED | OUTPATIENT
Start: 2021-07-13 | End: 2021-07-16 | Stop reason: HOSPADM

## 2021-07-13 RX ADMIN — LEVOFLOXACIN 500 MG: 500 TABLET, FILM COATED ORAL at 08:43

## 2021-07-13 RX ADMIN — IPRATROPIUM BROMIDE AND ALBUTEROL SULFATE 3 ML: .5; 2.5 SOLUTION RESPIRATORY (INHALATION) at 01:07

## 2021-07-13 RX ADMIN — ENOXAPARIN SODIUM 40 MG: 40 INJECTION SUBCUTANEOUS at 08:45

## 2021-07-13 RX ADMIN — ATORVASTATIN CALCIUM 40 MG: 40 TABLET, FILM COATED ORAL at 22:14

## 2021-07-13 RX ADMIN — IPRATROPIUM BROMIDE AND ALBUTEROL SULFATE 3 ML: .5; 2.5 SOLUTION RESPIRATORY (INHALATION) at 09:28

## 2021-07-13 RX ADMIN — LORATADINE 10 MG: 10 TABLET ORAL at 08:44

## 2021-07-13 RX ADMIN — FUROSEMIDE 40 MG: 40 TABLET ORAL at 08:44

## 2021-07-13 RX ADMIN — CARVEDILOL 3.12 MG: 3.12 TABLET, FILM COATED ORAL at 16:34

## 2021-07-13 RX ADMIN — INSULIN GLARGINE 8 UNITS: 100 INJECTION, SOLUTION SUBCUTANEOUS at 22:15

## 2021-07-13 RX ADMIN — INSULIN LISPRO 2 UNITS: 100 INJECTION, SOLUTION INTRAVENOUS; SUBCUTANEOUS at 12:50

## 2021-07-13 RX ADMIN — SPIRONOLACTONE 25 MG: 25 TABLET ORAL at 08:43

## 2021-07-13 RX ADMIN — LISINOPRIL 5 MG: 5 TABLET ORAL at 08:45

## 2021-07-13 RX ADMIN — METHYLPREDNISOLONE SODIUM SUCCINATE 40 MG: 40 INJECTION, POWDER, FOR SOLUTION INTRAMUSCULAR; INTRAVENOUS at 15:12

## 2021-07-13 RX ADMIN — IPRATROPIUM BROMIDE AND ALBUTEROL SULFATE 3 ML: .5; 2.5 SOLUTION RESPIRATORY (INHALATION) at 13:37

## 2021-07-13 RX ADMIN — Medication 10 ML: at 13:57

## 2021-07-13 RX ADMIN — ACETYLCYSTEINE 200 MG: 200 SOLUTION ORAL; RESPIRATORY (INHALATION) at 09:28

## 2021-07-13 RX ADMIN — ACETYLCYSTEINE 200 MG: 200 SOLUTION ORAL; RESPIRATORY (INHALATION) at 19:12

## 2021-07-13 RX ADMIN — POTASSIUM CHLORIDE 40 MEQ: 1500 TABLET, EXTENDED RELEASE ORAL at 08:43

## 2021-07-13 RX ADMIN — METFORMIN HYDROCHLORIDE 500 MG: 500 TABLET ORAL at 08:44

## 2021-07-13 RX ADMIN — BUDESONIDE AND FORMOTEROL FUMARATE DIHYDRATE 2 PUFF: 160; 4.5 AEROSOL RESPIRATORY (INHALATION) at 19:13

## 2021-07-13 RX ADMIN — INSULIN LISPRO 6 UNITS: 100 INJECTION, SOLUTION INTRAVENOUS; SUBCUTANEOUS at 22:13

## 2021-07-13 RX ADMIN — FLUTICASONE PROPIONATE 2 SPRAY: 50 SPRAY, METERED NASAL at 12:51

## 2021-07-13 RX ADMIN — FUROSEMIDE 40 MG: 40 TABLET ORAL at 16:34

## 2021-07-13 RX ADMIN — PANTOPRAZOLE SODIUM 40 MG: 40 TABLET, DELAYED RELEASE ORAL at 08:44

## 2021-07-13 RX ADMIN — IPRATROPIUM BROMIDE AND ALBUTEROL SULFATE 3 ML: .5; 2.5 SOLUTION RESPIRATORY (INHALATION) at 19:12

## 2021-07-13 RX ADMIN — METHYLPREDNISOLONE SODIUM SUCCINATE 40 MG: 40 INJECTION, POWDER, FOR SOLUTION INTRAMUSCULAR; INTRAVENOUS at 22:46

## 2021-07-13 RX ADMIN — BUDESONIDE AND FORMOTEROL FUMARATE DIHYDRATE 2 PUFF: 160; 4.5 AEROSOL RESPIRATORY (INHALATION) at 09:28

## 2021-07-13 RX ADMIN — INSULIN LISPRO 3 UNITS: 100 INJECTION, SOLUTION INTRAVENOUS; SUBCUTANEOUS at 08:42

## 2021-07-13 RX ADMIN — Medication 10 ML: at 05:14

## 2021-07-13 RX ADMIN — Medication 10 ML: at 22:46

## 2021-07-13 RX ADMIN — METHYLPREDNISOLONE SODIUM SUCCINATE 40 MG: 40 INJECTION, POWDER, FOR SOLUTION INTRAMUSCULAR; INTRAVENOUS at 08:43

## 2021-07-13 RX ADMIN — GUAIFENESIN 600 MG: 600 TABLET, EXTENDED RELEASE ORAL at 22:14

## 2021-07-13 RX ADMIN — SPIRONOLACTONE 25 MG: 25 TABLET ORAL at 22:14

## 2021-07-13 RX ADMIN — CLOPIDOGREL BISULFATE 75 MG: 75 TABLET ORAL at 08:43

## 2021-07-13 RX ADMIN — GUAIFENESIN 600 MG: 600 TABLET, EXTENDED RELEASE ORAL at 08:43

## 2021-07-13 RX ADMIN — FERROUS SULFATE TAB 325 MG (65 MG ELEMENTAL FE) 325 MG: 325 (65 FE) TAB at 08:43

## 2021-07-13 RX ADMIN — CARVEDILOL 3.12 MG: 3.12 TABLET, FILM COATED ORAL at 08:44

## 2021-07-13 RX ADMIN — POTASSIUM CHLORIDE 40 MEQ: 1500 TABLET, EXTENDED RELEASE ORAL at 22:14

## 2021-07-13 RX ADMIN — INSULIN LISPRO 2 UNITS: 100 INJECTION, SOLUTION INTRAVENOUS; SUBCUTANEOUS at 16:34

## 2021-07-13 NOTE — PROGRESS NOTES
Admission skin assessment performed by Matthew Mujica and Timothy Huston RN. Skin is dry and intact.

## 2021-07-13 NOTE — PROGRESS NOTES
PULMONARY NOTE  [facility]    Name: Micaela Kate MRN: [id number]   : 1955 Hospital: [address] [place] [address]   Date: 2021  Admission date: 2021 Hospital Day: 2       HPI:     Hospital Problems  Date Reviewed: 2021        Codes Class Noted POA    Acute and chronic respiratory failure with hypoxia [facility]) ICD-10-CM: J96.21  ICD-9-CM: 518.84, 799.02  2021 Unknown        Acute on chronic systolic (congestive) heart failure (HCC) ICD-10-CM: I50.23  ICD-9-CM: 428.23, 428.0  2021 Unknown        Hypokalemia ICD-10-CM: E87.6  ICD-9-CM: 276.8  2021 Yes        Type 2 diabetes mellitus with hyperglycemia, without long-term current use of insulin (HCC) ICD-10-CM: E11.65  ICD-9-CM: 250.00, 790.29  2021 Yes        COPD exacerbation ([address] ICD-10-CM: J44.1  ICD-9-CM: 491.21  2020 Unknown        Anemia ICD-10-CM: D64.9  ICD-9-CM: 285.9  2020 Yes        CAD (coronary artery disease) ICD-10-CM: I25.10  ICD-9-CM: 414.00  2020 Yes        GERD (gastroesophageal reflux disease) ICD-10-CM: K21.9  ICD-9-CM: 530.81  2020 Yes        Mixed hyperlipidemia ICD-10-CM: E78.2  ICD-9-CM: 272.2  2020 Yes                   [x] High complexity decision making was performed  [x] See my orders for details      Subjective/Initial History:     I was asked by Wille Gaucher, MD to see Micaela Kate  a 74 y.o.    female in consultation     Excerpts from admission 2021 or consult notes as follows:   68-year-old lady came in because of shortness of breath dyspnea she has significant past medical history of COPD she ran out of her inhaler for the past 2 months also has cardiomyopathy ejection fraction is about 41% with moderate aortic stenosis history of CABG aortic valve repair in the past.  Now came in because of dyspnea shortness of breath she cannot afford the inhalers it was too expensive not even albuterol she is chronically on home oxygen yesterday she went to the grocery store because of the heat she was feeling weak and tired but she did not take her oxygen so came to the ER saturation was 60% she was put on oxygen 4 L nasal cannula and pulmonary consult was called.       Allergies   Allergen Reactions    Aspirin Unknown (comments)     hemorraging - ended up in the hospital        [facility] reviewed and pertinent medications noted or modified as needed     Current Facility-Administered Medications   Medication    methylPREDNISolone (PF) (SOLU-MEDROL) injection 40 mg    insulin glargine (LANTUS) injection 8 Units    acetylcysteine (MUCOMYST) 200 mg/mL (20 %) solution 200 mg    potassium chloride (K-DUR, KLOR-CON) SR tablet 40 mEq    sodium chloride (NS) flush 5-40 mL    sodium chloride (NS) flush 5-40 mL    acetaminophen (TYLENOL) tablet 650 mg    Or    acetaminophen (TYLENOL) suppository 650 mg    polyethylene glycol (MIRALAX) packet 17 g    ondansetron (ZOFRAN ODT) tablet 4 mg    Or    ondansetron (ZOFRAN) injection 4 mg    enoxaparin (LOVENOX) injection 40 mg    alum-mag hydroxide-simeth (MYLANTA) oral suspension 15 mL    furosemide (LASIX) tablet 40 mg    guaiFENesin ER (MUCINEX) tablet 600 mg    budesonide-formoteroL (SYMBICORT) 160-4.5 mcg/actuation HFA inhaler 2 Puff    levoFLOXacin (LEVAQUIN) tablet 500 mg    albuterol-ipratropium (DUO-NEB) 2.5 MG-0.5 MG/3 ML    atorvastatin (LIPITOR) tablet 40 mg    carvediloL (COREG) tablet 3.125 mg    clopidogreL (PLAVIX) tablet 75 mg    ferrous sulfate tablet 325 mg    loratadine (CLARITIN) tablet 10 mg    fluticasone propionate (FLONASE) 50 mcg/actuation nasal spray 2 Spray    lisinopriL (PRINIVIL, ZESTRIL) tablet 5 mg    metFORMIN (GLUCOPHAGE) tablet 500 mg    pantoprazole (PROTONIX) tablet 40 mg    albuterol (PROVENTIL HFA, VENTOLIN HFA, PROAIR HFA) inhaler 2 Puff    spironolactone (ALDACTONE) tablet 25 mg    insulin lispro (HUMALOG) injection    glucose chewable tablet 16 g  glucagon (GLUCAGEN) injection 1 mg    dextrose (D50W) injection syrg 12.5-25 g      Patient PCP: Bárbara Edwards DO  PMH:  has a past medical history of Acute gastrointestinal hemorrhage (2020), Allergic rhinitis (2020), Anemia (2020), Benign essential hypertension (2020), Body mass index 30.0-30.9, adult (2020), CAD (coronary artery disease) (2020), Foot callus (2020), GERD (gastroesophageal reflux disease) (2020), Hair loss, Heart murmur (2020), colonoscopy (2015), Left bundle branch block (2020), Microalbuminuria due to type 2 diabetes mellitus ([facility] (2020), Mixed hyperlipidemia (2020), Morbid obesity ([facility] (2020), Tobacco dependence syndrome (2020), and Type II diabetes mellitus, uncontrolled ([facility] (2020). PSH:   has a past surgical history that includes hx colonoscopy (). FHX: family history includes Cancer in her mother and sister; Hypertension in her father. SHX:  reports that she quit smoking about 7 years ago. She smoked 0.50 packs per day. She has never used smokeless tobacco. She reports previous alcohol use. She reports that she does not use drugs. ROS:    Review of Systems   Constitutional: Positive for malaise/fatigue. HENT: Negative. Eyes: Negative. Respiratory: Positive for shortness of breath and wheezing. Cardiovascular: Positive for orthopnea. Gastrointestinal: Negative. Genitourinary: Negative. Musculoskeletal: Negative. Skin: Negative. Neurological: Negative. Psychiatric/Behavioral: Negative.          Objective:     Vital Signs: Telemetry:    normal sinus rhythm Intake/Output:   Visit Vitals  /69 (BP 1 Location: Left upper arm)   Pulse 84   Temp 97.9 °F (36.6 °C)   Resp 18   Ht 5' 5\" (1.651 m)   Wt 85.3 kg (188 lb)   SpO2 96%   Breastfeeding No   BMI 31.28 kg/m²       Temp (24hrs), Av °F (36.7 °C), Min:97.4 °F (36.3 °C), Max:98.3 °F (36.8 °C)        O2 Device: Nasal cannula O2 Flow Rate (L/min): 2 l/min       Wt Readings from Last 4 Encounters:   07/12/21 85.3 kg (188 lb)   05/10/21 85.3 kg (188 lb)   04/12/21 85.6 kg (188 lb 12.8 oz)   02/25/21 86.4 kg (190 lb 8 oz)        No intake or output data in the 24 hours ending 07/13/21 1004    Last shift:      No intake/output data recorded. Last 3 shifts: 07/11 1901 - 07/13 0700  In: 100 [I.V.:100]  Out: -        Physical Exam:     Physical Exam  Constitutional:       Appearance: Normal appearance. HENT:      Head: Normocephalic and atraumatic. Nose: Nose normal.   Eyes:      Pupils: Pupils are equal, round, and reactive to light. Cardiovascular:      Rate and Rhythm: Normal rate and regular rhythm. Pulses: Normal pulses. Heart sounds: Normal heart sounds. Pulmonary:      Effort: Pulmonary effort is normal.      Breath sounds: Wheezing present. Abdominal:      General: Abdomen is flat. Bowel sounds are normal.      Palpations: Abdomen is soft. Musculoskeletal:         General: Normal range of motion. Cervical back: Normal range of motion and neck supple. Skin:     General: Skin is warm. Neurological:      General: No focal deficit present. Mental Status: She is alert. Psychiatric:         Mood and Affect: Mood normal.          Labs:    Recent Labs     07/13/21  0302 07/12/21  0430   WBC 12.9* 14.4*   HGB 9.3* 9.8*   * 460*     Recent Labs     07/13/21  0302 07/12/21  0430   * 136   K 3.6 3.0*   CL 98 98   CO2 31 27   * 184*   BUN 18 14   CREA 0.85 0.84   CA 9.0 8.8   MG 1.4*  --    ALB  --  3.2*   ALT  --  34     No results for input(s): PH, PCO2, PO2, HCO3, FIO2 in the last 72 hours.   Recent Labs     07/12/21  1745 07/12/21  1343 07/12/21  0940   TROIQ 0.05* 0.05* 0.07*     No results found for: BNPP, BNP   Lab Results   Component Value Date/Time    Culture result: Scant Normal respiratory [place] 01/28/2021 09:43 AM    Culture result: No Growth (<1000 cfu/mL) 01/28/2021 01:00 AM     Lab Results   Component Value Date/Time    TSH 0.83 07/12/2021 09:40 AM    TSH, External 2.420 01/16/2019 12:00 AM       Imaging:    CXR Results  (Last 48 hours)               07/12/21 0327  XR CHEST PORT Final result    Impression:  Findings/impression:       No consolidative airspace disease, pleural effusion or pneumothorax. Cardiac silhouette is enlarged. Trace interstitial edema. No acute osseous abnormality. Narrative:  Study: XR CHEST PORT       Clinical indication: Shortness of breath       Comparison: Chest x-ray 2/1/2021. Results from Hospital Encounter encounter on 07/12/21    XR CHEST PORT    Narrative  Study: XR CHEST PORT    Clinical indication: Shortness of breath    Comparison: Chest x-ray 2/1/2021. Impression  Findings/impression:    No consolidative airspace disease, pleural effusion or pneumothorax. Cardiac silhouette is enlarged. Trace interstitial edema. No acute osseous abnormality. Results from [place] encounter on 01/25/21    XR CHEST PORT    Narrative  Chest, frontal view, 2/1/2021. History: CHF. Comparison: Including chest 1/31/2021. Findings: The cardiac silhouette is stable. The lungs are adequately expanded. Pulmonary vascular congestion persists. Opacities in the lungs most pronounced  at the bases is not significantly changed. Pleural effusions are not  significantly changed. No pneumothorax is identified. The osseous structures  are stable. Impression  No significant interval change. XR CHEST PORT    Narrative  XR CHEST PORT    Comparison: Chest radiograph dated January 30, 2021    Impression  FINDINGS/IMPRESSION:  Radiograph is limited by patient's body habitus and AP portable technique. Lungs remain hypoinflated with bibasilar atelectasis and probable small  bilateral pleural effusions. Cardiac silhouette stable in size.     No radiographically apparent pneumothorax. Mild improvement in bilateral airspace opacity, this is most confluent at the  right lung base. This may represent multilobar pneumonia or edema, or perhaps a  combination of each. Results from [facility] encounter on 01/25/21    CTA CHEST W OR W WO CONT    Narrative  Exam: CTA chest with intravenous contrast    Comparison: Plain radiograph 1/25/2021. CT 1/2/2021. Technique: During intravenous contrast administration (100 cc [id number]), axial  sections were obtained through the chest. Coronal and sagittal reconstructions  were generated. Maximal intensity projection images were generated. Dose reduction: All CT scans at this facility are performed using dose reduction  optimization techniques as appropriate to perform the exam including the  following: automated exposure control, adjustments of the mA and/or kV according  to patient size, or use of iterative reconstruction technique. Findings: The examination is limited by motion degradation and by adduction of  the patient's upper extremities, which renders scattering and attenuation of the  diagnostic x-ray beam. Nevertheless, there is no demonstrated pulmonary embolus. Endotracheal tube tip in the middle third of the trachea. Distal trachea and  main bronchi poorly expanded. There is calcific atherosclerotic disease of a  nonaneurysmal thoracic aorta, which manifests no evidence of intimal  incompetence. Calcific atherosclerotic coronary arterial disease is present. Prominent bilateral mediastinal and hilar lymph nodes, increased compared to  1/2/2021, likely inflammatory. Small to moderate pericardial effusion of near  water attenuation fluid. It is 2 cm greatest axial thickness. Small bilateral  pleural effusions. Bilateral dependent airspace disease compatible with  pneumonia. Interstitial disease also present, compatible with pulmonary edema.   Included abdomen indicates a stable 2 cm left adrenal nodule of water  attenuation, compatible with a benign adenoma. No axillary or supraclavicular  adenopathy or mass. Visualized thoracic bone scaffolding unremarkable. Impression  Impression:  1. No evidence of pulmonary embolus. 2. Bilateral dependent airspace disease compatible with pneumonia. 3. Diffuse pulmonary interstitial disease compatible with pulmonary edema. 4. Bilateral pleural effusions. 5. Coronary artery calcific atherosclerotic disease. 6. Small to moderate pericardial effusion. 7. Distal trachea and main bronchi are poorly expanded. IMPRESSION:   1. Acute on chronic hypoxic respiratory failure  2. Chronic Obstructive Pulmonary Disease  3. Congestive heart failure cardiomyopathy ejection fraction 41%  4. Moderate aortic stenosis  5. Hypokalemia  6. Prognosis guarded       RECOMMENDATIONS/PLAN:     1. On nasal Cannula oxygen as salvage oxygen delivery device to provide high concentration of oxygen to overcome refractory hypoxia; she is chronically on home oxygen 2 L nasal cannula   2. She is on prednisone nebulizer treatment  3. Agree with Lasix and Aldactone  4.  Patient has difficulty in getting the inhalers because of co-pay will get  and case management             Moy Wilson MD

## 2021-07-13 NOTE — PROGRESS NOTES
Hospitalist Progress Note    Subjective:   Daily Progress Note: 7/13/2021 7:43 AM    Hospital Course: Patient is a 57-year-old female with a history of cardiomyopathy with an EF of 41%, moderate aortic stenosis, hypertension, hyperlipidemia, type 2 diabetes that presented to the emergency room on 7/12/2021 for 2-week history of shortness of breath. It was progressively getting worse. The night of admission patient was extremely short of breath at rest and EMS was called. Oxygen saturations were 60% initially. She arrived to the emergency room on 4 L nasal cannula saturating at 100%. Work-up in the emergency room showed elevated BNP, mild edema on the chest x-ray, CHF exacerbation complicated with a COPD exacerbation. She was given IV Lasix, Solu-Medrol, nebulizers and was able to wean down to 2 L oxygen nasal cannula. Patient's cardiologist and pulmonologist were consulted. She was started on prednisone, duo nebs, Levaquin, Lasix. Subjective: Patient says that she is feeling better however still short of breath with exertion. Requiring oxygen supplementation. She has some wheezing and a cough. Says she has green sputum production.     Current Facility-Administered Medications   Medication Dose Route Frequency    potassium chloride (K-DUR, KLOR-CON) SR tablet 40 mEq  40 mEq Oral BID    sodium chloride (NS) flush 5-40 mL  5-40 mL IntraVENous Q8H    sodium chloride (NS) flush 5-40 mL  5-40 mL IntraVENous PRN    acetaminophen (TYLENOL) tablet 650 mg  650 mg Oral Q6H PRN    Or    acetaminophen (TYLENOL) suppository 650 mg  650 mg Rectal Q6H PRN    polyethylene glycol (MIRALAX) packet 17 g  17 g Oral DAILY PRN    ondansetron (ZOFRAN ODT) tablet 4 mg  4 mg Oral Q8H PRN    Or    ondansetron (ZOFRAN) injection 4 mg  4 mg IntraVENous Q6H PRN    enoxaparin (LOVENOX) injection 40 mg  40 mg SubCUTAneous DAILY    alum-mag hydroxide-simeth (MYLANTA) oral suspension 15 mL  15 mL Oral Q6H PRN    furosemide (LASIX) injection 40 mg  40 mg IntraVENous BID    furosemide (LASIX) tablet 40 mg  40 mg Oral ACB&D    predniSONE (DELTASONE) tablet 40 mg  40 mg Oral DAILY WITH BREAKFAST    guaiFENesin ER (MUCINEX) tablet 600 mg  600 mg Oral BID    budesonide-formoteroL (SYMBICORT) 160-4.5 mcg/actuation HFA inhaler 2 Puff  2 Puff Inhalation BID RT    levoFLOXacin (LEVAQUIN) tablet 500 mg  500 mg Oral Q24H    albuterol-ipratropium (DUO-NEB) 2.5 MG-0.5 MG/3 ML  3 mL Nebulization Q6H RT    atorvastatin (LIPITOR) tablet 40 mg  40 mg Oral QHS    carvediloL (COREG) tablet 3.125 mg  3.125 mg Oral BID WITH MEALS    clopidogreL (PLAVIX) tablet 75 mg  75 mg Oral DAILY    ferrous sulfate tablet 325 mg  325 mg Oral ACB    loratadine (CLARITIN) tablet 10 mg  10 mg Oral DAILY    fluticasone propionate (FLONASE) 50 mcg/actuation nasal spray 2 Spray  2 Spray Both Nostrils DAILY    lisinopriL (PRINIVIL, ZESTRIL) tablet 5 mg  5 mg Oral DAILY    metFORMIN (GLUCOPHAGE) tablet 500 mg  500 mg Oral DAILY WITH BREAKFAST    pantoprazole (PROTONIX) tablet 40 mg  40 mg Oral DAILY    albuterol (PROVENTIL HFA, VENTOLIN HFA, PROAIR HFA) inhaler 2 Puff  2 Puff Inhalation Q6H PRN    spironolactone (ALDACTONE) tablet 25 mg  25 mg Oral BID    insulin lispro (HUMALOG) injection   SubCUTAneous AC&HS    glucose chewable tablet 16 g  4 Tablet Oral PRN    glucagon (GLUCAGEN) injection 1 mg  1 mg IntraMUSCular PRN    dextrose (D50W) injection syrg 12.5-25 g  25-50 mL IntraVENous PRN        Review of Systems  Constitutional: No fevers, No chills, No sweats, No fatigue, No Weakness  Eyes: No redness  Ears, nose, mouth, throat, and face: No nasal congestion, No sore throat, No voice change  Respiratory: ++ Shortness of Breath,++ cough, ++ wheezing  Cardiovascular: No chest pain, No palpitations, No extremity edema  Gastrointestinal: No nausea, No vomiting, No diarrhea, No abdominal pain  Genitourinary: No frequency, No dysuria, No hematuria  Integument/breast: No skin lesion(s)   Neurological: No Confusion, No headaches, No dizziness      Objective:     Visit Vitals  /89   Pulse 100   Temp 98.1 °F (36.7 °C)   Resp 18   Ht 5' 5\" (1.651 m)   Wt 85.3 kg (188 lb)   SpO2 98%   Breastfeeding No   BMI 31.28 kg/m²    O2 Flow Rate (L/min): 2 l/min O2 Device: Nasal cannula    Temp (24hrs), Av.1 °F (36.7 °C), Min:97.4 °F (36.3 °C), Max:98.3 °F (36.8 °C)      No intake/output data recorded.  1901 -  0700  In: 100 [I.V.:100]  Out: -     PHYSICAL EXAM:  Constitutional: No acute distress  Skin: Extremities and face reveal no rashes. HEENT: Sclerae anicteric. Extra-occular muscles are intact. No oral ulcers. The neck is supple and no masses. Cardiovascular: Regular rate and rhythm. Respiratory: Nonlabored, diminished, faint wheezing in the lower lung fields posteriorly  GI: Abdomen nondistended, soft, and nontender. Normal active bowel sounds. Musculoskeletal: No pitting edema of the lower legs. Able to move all ext  Neurological:  Patient is alert and oriented.  Cranial nerves II-XII grossly intact  Psychiatric: Mood appears appropriate       Data Review    Recent Results (from the past 24 hour(s))   TROPONIN I    Collection Time: 21  9:40 AM   Result Value Ref Range    Troponin-I, Qt. 0.07 (H) <0.05 ng/mL   D DIMER    Collection Time: 21  9:40 AM   Result Value Ref Range    D DIMER 1.65 (H) <0.50 ug/ml(FEU)   PROCALCITONIN    Collection Time: 21  9:40 AM   Result Value Ref Range    Procalcitonin 0.17 (H) 0 ng/mL   TSH 3RD GENERATION    Collection Time: 21  9:40 AM   Result Value Ref Range    TSH 0.83 0.36 - 3.74 uIU/mL   GLUCOSE, POC    Collection Time: 21 12:50 PM   Result Value Ref Range    Glucose (POC) 270 (H) 65 - 117 mg/dL    Performed by [facility]    TROPONIN I    Collection Time: 21  1:43 PM   Result Value Ref Range    Troponin-I, Qt. 0.05 (H) <0.05 ng/mL   ECHO ADULT COMPLETE Collection Time: 07/12/21  2:00 PM   Result Value Ref Range    LV ED Vol A4C 87.00 cm3    LV ED Vol A2C 86.40 cm3    LV ES Vol A4C 53.00 cm3    LV ES Vol A2C 43.60 cm3    IVSd 1.42 (A) 0.60 - 0.90 cm    LVIDd 4.42 3.90 - 5.30 cm    LVIDs 3.52 cm    LVOT d 2.10 cm    Left Ventricular Outflow Tract Mean Gradient 3.00 mmHg    LVOT VTI 22.50 cm    LVOT VTI 22.50 cm    LVOT Peak Velocity 119.00 cm/s    LVOT Peak Gradient 6.00 mmHg    LVPWd 1.40 (A) 0.60 - 0.90 cm    LV E' Septal Velocity 4.68 cm/s    E/E' septal 33.55     LVOT SV 78.0 cm3    Aortic Regurgitant Pressure Half-time 461.00 ms    AR Max Lior 347.00 cm/s    Aortic Valve Systolic Peak Velocity 075.67 cm/s    AoV PG 46.00 mmHg    Aortic Valve Systolic Mean Gradient 97.47 mmHg    AoV VTI 59.80 cm    Aortic valve mean velocity 207.00 cm/s    Aortic Valve Area by Continuity of VTI 1.30 cm2    Aortic Valve Area by Continuity of Peak Velocity 1.22 cm2    Ao Root D 3.20 cm    Left Atrium Major Axis 4.00 cm    Mitral Valve Deceleration Nolan 7,810.00 mm/s2    Mitral Valve Deceleration Nolan 7,810.00 mm/s2    Mitral Valve E Wave Deceleration Time 116.00 ms    Mitral Valve Pressure Half-time 62.00 ms    MV A Lior 54.30 cm/s    MV E Lior 157.00 cm/s    MVA (PHT) 3.55 cm2    MV E/A 2.89     Pulmonic Valve Max Velocity 107.00 cm/s    Pulmonic Valve Systolic Peak Instantaneous Gradient 5.00 mmHg    P Vein A Dur 151.00 ms    Pulmonary Vein \"A\" Wave Velocity 51.50 cm/s    Est. RA Pressure 15.00 mmHg    RVIDd 2.68 cm    RVSP 66.00 mmHg    Tricuspid Valve Max Velocity 356.00 cm/s    Triscuspid Valve Regurgitation Peak Gradient 51.00 mmHg    Right Atrial Area 4C 12.24 cm2    LA Area 4C 18.04 cm2    BP EF 41.8 55.0 - 100.0 %    LV Ejection Fraction MOD 4C 39.0 %    LV Mass .5 67.0 - 162.0 g    LV Mass AL Index 126.7 43.0 - 95.0 g/m2    Left Atrium Minor Axis 2.07 cm    SHARON/BSA Pk Lior 0.6 cm2/m2    SHARON/BSA VTI 0.7 cm2/m2   TROPONIN I    Collection Time: 07/12/21  5:45 PM Result Value Ref Range    Troponin-I, Qt. 0.05 (H) <0.05 ng/mL   GLUCOSE, POC    Collection Time: 07/12/21  6:13 PM   Result Value Ref Range    Glucose (POC) 251 (H) 65 - 117 mg/dL    Performed by [id number]", POC    Collection Time: 07/12/21  9:22 PM   Result Value Ref Range    Glucose (POC) 279 (H) 65 - 117 mg/dL    Performed by [id number] [employer], BASIC    Collection Time: 07/13/21  3:02 AM   Result Value Ref Range    Sodium 135 (L) 136 - 145 mmol/L    Potassium 3.6 3.5 - 5.1 mmol/L    Chloride 98 97 - 108 mmol/L    CO2 31 21 - 32 mmol/L    Anion gap 6 5 - 15 mmol/L    Glucose 147 (H) 65 - 100 mg/dL    BUN 18 6 - 20 mg/dL    Creatinine 0.85 0.55 - 1.02 mg/dL    BUN/Creatinine ratio 21 (H) 12 - 20      GFR est AA >60 >60 ml/min/1.73m2    GFR est non-AA >60 >60 ml/min/1.73m2    Calcium 9.0 8.5 - 10.1 mg/dL   MAGNESIUM    Collection Time: 07/13/21  3:02 AM   Result Value Ref Range    Magnesium 1.4 (L) 1.6 - 2.4 mg/dL   CBC WITH AUTOMATED DIFF    Collection Time: 07/13/21  3:02 AM   Result Value Ref Range    WBC 12.9 (H) 3.6 - 11.0 K/uL    RBC 4.16 3.80 - 5.20 M/uL    HGB 9.3 (L) 11.5 - 16.0 g/dL    HCT 30.1 (L) 35.0 - 47.0 %    MCV 72.4 (L) 80.0 - 99.0 FL    MCH 22.4 (L) 26.0 - 34.0 PG    MCHC 30.9 30.0 - 36.5 g/dL    RDW 17.3 (H) 11.5 - 14.5 %    PLATELET 817 (H) 886 - 400 K/uL    MPV 9.4 8.9 - 12.9 FL    NRBC 0.2 (H) 0.0  WBC    ABSOLUTE NRBC 0.03 (H) 0.00 - 0.01 K/uL    NEUTROPHILS 84 (H) 32 - 75 %    LYMPHOCYTES 8 (L) 12 - 49 %    MONOCYTES 7 5 - 13 %    EOSINOPHILS 0 0 - 7 %    BASOPHILS 0 0 - 1 %    IMMATURE GRANULOCYTES 1 (H) 0 - 0.5 %    ABS. NEUTROPHILS 10.9 (H) 1.8 - 8.0 K/UL    ABS. LYMPHOCYTES 1.0 0.8 - 3.5 K/UL    ABS. MONOCYTES 0.9 0.0 - 1.0 K/UL    ABS. EOSINOPHILS 0.0 0.0 - 0.4 K/UL    ABS. BASOPHILS 0.0 0.0 - 0.1 K/UL    ABS. IMM. GRANS. 0.1 (H) 0.00 - 0.04 K/UL    DF AUTOMATED         Radiology review: Chest xray    Assessment:   1.   Acute on chronic systolic CHF  2. COPD exacerbation  3. Mild aortic stenosis  4. Hypertension  5. Hyperlipidemia  6. Type 2 diabetes    Plan:    1. . Troponins indeterminately elevated. Patient was given IV Lasix. Currently on p.o. Lasix and spironolactone. .  Chest x-ray showed cardiac silhouette enlarged with trace interstitial edema. Cardiology consulted. 2.  Pulmonary consulted. She is on Symbicort, Flonase, Mucinex, Levaquin, Claritin, prednisone --> switch to IV steriods. Add Mucomyst.  Oxygen saturation 98% on 2 L. We will continue to wean oxygen as she only uses this at night  3. We will need to be cautious with diuresing be due to her valvular disease. May proceed with a cardiac catheterization after stabilization of pulmonary status  4. Blood pressure stable. Continue to monitor per unit protocol. She is on Coreg, Lasix, lisinopril, spironolactone  5. Continue with Lipitor  7. Patient on insulin sliding scale. And Metformin glucose levels are elevated. Most likely secondary to steroid use. Will start long-acting insulin 8 units at bedtime  8. CBC BMP in a.m.  9.  PT OT    Dispo: Pending improvement in pulmonary status and possible cardiac catheterization. Suspect greater than 48 hours with home health. CODE STATUS full code     DVT prophylaxis: Lovenox  Ulcer prophylaxis: Protonix    Care Plan discussed with: Patient/Family, Nurse and     Total time spent with patient: 34 minutes.

## 2021-07-14 LAB
ANION GAP SERPL CALC-SCNC: 5 MMOL/L (ref 5–15)
BASOPHILS # BLD: 0 K/UL (ref 0–0.1)
BASOPHILS NFR BLD: 0 % (ref 0–1)
BUN SERPL-MCNC: 24 MG/DL (ref 6–20)
BUN/CREAT SERPL: 27 (ref 12–20)
CA-I BLD-MCNC: 8.6 MG/DL (ref 8.5–10.1)
CHLORIDE SERPL-SCNC: 100 MMOL/L (ref 97–108)
CO2 SERPL-SCNC: 30 MMOL/L (ref 21–32)
CREAT SERPL-MCNC: 0.88 MG/DL (ref 0.55–1.02)
DIFFERENTIAL METHOD BLD: ABNORMAL
EOSINOPHIL # BLD: 0 K/UL (ref 0–0.4)
EOSINOPHIL NFR BLD: 0 % (ref 0–7)
ERYTHROCYTE [DISTWIDTH] IN BLOOD BY AUTOMATED COUNT: 17.3 % (ref 11.5–14.5)
GLUCOSE BLD STRIP.AUTO-MCNC: 236 MG/DL (ref 65–117)
GLUCOSE BLD STRIP.AUTO-MCNC: 286 MG/DL (ref 65–117)
GLUCOSE BLD STRIP.AUTO-MCNC: 292 MG/DL (ref 65–117)
GLUCOSE BLD STRIP.AUTO-MCNC: 339 MG/DL (ref 65–117)
GLUCOSE SERPL-MCNC: 304 MG/DL (ref 65–100)
HCT VFR BLD AUTO: 28.7 % (ref 35–47)
HGB BLD-MCNC: 8.9 G/DL (ref 11.5–16)
IMM GRANULOCYTES # BLD AUTO: 0.1 K/UL (ref 0–0.04)
IMM GRANULOCYTES NFR BLD AUTO: 1 % (ref 0–0.5)
LYMPHOCYTES # BLD: 0.7 K/UL (ref 0.8–3.5)
LYMPHOCYTES NFR BLD: 6 % (ref 12–49)
MCH RBC QN AUTO: 22.9 PG (ref 26–34)
MCHC RBC AUTO-ENTMCNC: 31 G/DL (ref 30–36.5)
MCV RBC AUTO: 74 FL (ref 80–99)
MONOCYTES # BLD: 0.3 K/UL (ref 0–1)
MONOCYTES NFR BLD: 2 % (ref 5–13)
NEUTS SEG # BLD: 11.8 K/UL (ref 1.8–8)
NEUTS SEG NFR BLD: 91 % (ref 32–75)
NRBC # BLD: 0.02 K/UL (ref 0–0.01)
NRBC BLD-RTO: 0.2 PER 100 WBC
PERFORMED BY, TECHID: ABNORMAL
PLATELET # BLD AUTO: 416 K/UL (ref 150–400)
PMV BLD AUTO: 9.4 FL (ref 8.9–12.9)
POTASSIUM SERPL-SCNC: 4.6 MMOL/L (ref 3.5–5.1)
RBC # BLD AUTO: 3.88 M/UL (ref 3.8–5.2)
SODIUM SERPL-SCNC: 135 MMOL/L (ref 136–145)
WBC # BLD AUTO: 12.9 K/UL (ref 3.6–11)

## 2021-07-14 PROCEDURE — 82962 GLUCOSE BLOOD TEST: CPT

## 2021-07-14 PROCEDURE — 97530 THERAPEUTIC ACTIVITIES: CPT

## 2021-07-14 PROCEDURE — 74011000250 HC RX REV CODE- 250: Performed by: INTERNAL MEDICINE

## 2021-07-14 PROCEDURE — 77010033678 HC OXYGEN DAILY

## 2021-07-14 PROCEDURE — 74011250637 HC RX REV CODE- 250/637: Performed by: INTERNAL MEDICINE

## 2021-07-14 PROCEDURE — 97165 OT EVAL LOW COMPLEX 30 MIN: CPT

## 2021-07-14 PROCEDURE — 74011636637 HC RX REV CODE- 636/637: Performed by: PHYSICIAN ASSISTANT

## 2021-07-14 PROCEDURE — 94667 MNPJ CHEST WALL 1ST: CPT

## 2021-07-14 PROCEDURE — 74011250637 HC RX REV CODE- 250/637: Performed by: EMERGENCY MEDICINE

## 2021-07-14 PROCEDURE — 97161 PT EVAL LOW COMPLEX 20 MIN: CPT | Performed by: PHYSICAL THERAPIST

## 2021-07-14 PROCEDURE — 74011250636 HC RX REV CODE- 250/636: Performed by: PHYSICIAN ASSISTANT

## 2021-07-14 PROCEDURE — 94640 AIRWAY INHALATION TREATMENT: CPT

## 2021-07-14 PROCEDURE — 36415 COLL VENOUS BLD VENIPUNCTURE: CPT

## 2021-07-14 PROCEDURE — 74011636637 HC RX REV CODE- 636/637: Performed by: HOSPITALIST

## 2021-07-14 PROCEDURE — 97116 GAIT TRAINING THERAPY: CPT | Performed by: PHYSICAL THERAPIST

## 2021-07-14 PROCEDURE — 85025 COMPLETE CBC W/AUTO DIFF WBC: CPT

## 2021-07-14 PROCEDURE — 65270000032 HC RM SEMIPRIVATE

## 2021-07-14 PROCEDURE — 80048 BASIC METABOLIC PNL TOTAL CA: CPT

## 2021-07-14 PROCEDURE — 74011250636 HC RX REV CODE- 250/636: Performed by: INTERNAL MEDICINE

## 2021-07-14 RX ADMIN — POTASSIUM CHLORIDE 40 MEQ: 1500 TABLET, EXTENDED RELEASE ORAL at 09:01

## 2021-07-14 RX ADMIN — POTASSIUM CHLORIDE 40 MEQ: 1500 TABLET, EXTENDED RELEASE ORAL at 21:12

## 2021-07-14 RX ADMIN — BUDESONIDE AND FORMOTEROL FUMARATE DIHYDRATE 2 PUFF: 160; 4.5 AEROSOL RESPIRATORY (INHALATION) at 07:24

## 2021-07-14 RX ADMIN — IPRATROPIUM BROMIDE AND ALBUTEROL SULFATE 3 ML: .5; 2.5 SOLUTION RESPIRATORY (INHALATION) at 20:10

## 2021-07-14 RX ADMIN — METHYLPREDNISOLONE SODIUM SUCCINATE 40 MG: 40 INJECTION, POWDER, FOR SOLUTION INTRAMUSCULAR; INTRAVENOUS at 06:46

## 2021-07-14 RX ADMIN — INSULIN LISPRO 6 UNITS: 100 INJECTION, SOLUTION INTRAVENOUS; SUBCUTANEOUS at 07:30

## 2021-07-14 RX ADMIN — INSULIN LISPRO 8 UNITS: 100 INJECTION, SOLUTION INTRAVENOUS; SUBCUTANEOUS at 21:11

## 2021-07-14 RX ADMIN — CARVEDILOL 3.12 MG: 3.12 TABLET, FILM COATED ORAL at 09:00

## 2021-07-14 RX ADMIN — LEVOFLOXACIN 500 MG: 500 TABLET, FILM COATED ORAL at 09:01

## 2021-07-14 RX ADMIN — ENOXAPARIN SODIUM 40 MG: 40 INJECTION SUBCUTANEOUS at 09:02

## 2021-07-14 RX ADMIN — BUDESONIDE AND FORMOTEROL FUMARATE DIHYDRATE 2 PUFF: 160; 4.5 AEROSOL RESPIRATORY (INHALATION) at 20:10

## 2021-07-14 RX ADMIN — CARVEDILOL 3.12 MG: 3.12 TABLET, FILM COATED ORAL at 17:47

## 2021-07-14 RX ADMIN — LISINOPRIL 5 MG: 5 TABLET ORAL at 09:01

## 2021-07-14 RX ADMIN — Medication 10 ML: at 14:49

## 2021-07-14 RX ADMIN — INSULIN LISPRO 6 UNITS: 100 INJECTION, SOLUTION INTRAVENOUS; SUBCUTANEOUS at 11:30

## 2021-07-14 RX ADMIN — CLOPIDOGREL BISULFATE 75 MG: 75 TABLET ORAL at 09:01

## 2021-07-14 RX ADMIN — FERROUS SULFATE TAB 325 MG (65 MG ELEMENTAL FE) 325 MG: 325 (65 FE) TAB at 09:01

## 2021-07-14 RX ADMIN — SPIRONOLACTONE 25 MG: 25 TABLET ORAL at 21:12

## 2021-07-14 RX ADMIN — Medication 10 ML: at 21:12

## 2021-07-14 RX ADMIN — PANTOPRAZOLE SODIUM 40 MG: 40 TABLET, DELAYED RELEASE ORAL at 09:01

## 2021-07-14 RX ADMIN — ACETYLCYSTEINE 200 MG: 200 SOLUTION ORAL; RESPIRATORY (INHALATION) at 07:24

## 2021-07-14 RX ADMIN — IPRATROPIUM BROMIDE AND ALBUTEROL SULFATE 3 ML: .5; 2.5 SOLUTION RESPIRATORY (INHALATION) at 13:55

## 2021-07-14 RX ADMIN — METFORMIN HYDROCHLORIDE 500 MG: 500 TABLET ORAL at 09:01

## 2021-07-14 RX ADMIN — INSULIN GLARGINE 8 UNITS: 100 INJECTION, SOLUTION SUBCUTANEOUS at 21:11

## 2021-07-14 RX ADMIN — Medication 10 ML: at 06:46

## 2021-07-14 RX ADMIN — GUAIFENESIN 600 MG: 600 TABLET, EXTENDED RELEASE ORAL at 21:12

## 2021-07-14 RX ADMIN — ATORVASTATIN CALCIUM 40 MG: 40 TABLET, FILM COATED ORAL at 21:12

## 2021-07-14 RX ADMIN — METHYLPREDNISOLONE SODIUM SUCCINATE 40 MG: 40 INJECTION, POWDER, FOR SOLUTION INTRAMUSCULAR; INTRAVENOUS at 21:12

## 2021-07-14 RX ADMIN — SPIRONOLACTONE 25 MG: 25 TABLET ORAL at 09:01

## 2021-07-14 RX ADMIN — ACETYLCYSTEINE 200 MG: 200 SOLUTION ORAL; RESPIRATORY (INHALATION) at 01:03

## 2021-07-14 RX ADMIN — FLUTICASONE PROPIONATE 2 SPRAY: 50 SPRAY, METERED NASAL at 09:03

## 2021-07-14 RX ADMIN — FUROSEMIDE 40 MG: 40 TABLET ORAL at 09:01

## 2021-07-14 RX ADMIN — IPRATROPIUM BROMIDE AND ALBUTEROL SULFATE 3 ML: .5; 2.5 SOLUTION RESPIRATORY (INHALATION) at 01:03

## 2021-07-14 RX ADMIN — FUROSEMIDE 40 MG: 40 TABLET ORAL at 17:47

## 2021-07-14 RX ADMIN — ACETYLCYSTEINE 200 MG: 200 SOLUTION ORAL; RESPIRATORY (INHALATION) at 20:00

## 2021-07-14 RX ADMIN — INSULIN LISPRO 4 UNITS: 100 INJECTION, SOLUTION INTRAVENOUS; SUBCUTANEOUS at 17:46

## 2021-07-14 RX ADMIN — LORATADINE 10 MG: 10 TABLET ORAL at 09:01

## 2021-07-14 RX ADMIN — ACETYLCYSTEINE 200 MG: 200 SOLUTION ORAL; RESPIRATORY (INHALATION) at 14:04

## 2021-07-14 RX ADMIN — GUAIFENESIN 600 MG: 600 TABLET, EXTENDED RELEASE ORAL at 09:00

## 2021-07-14 RX ADMIN — IPRATROPIUM BROMIDE AND ALBUTEROL SULFATE 3 ML: .5; 2.5 SOLUTION RESPIRATORY (INHALATION) at 07:24

## 2021-07-14 RX ADMIN — METHYLPREDNISOLONE SODIUM SUCCINATE 40 MG: 40 INJECTION, POWDER, FOR SOLUTION INTRAMUSCULAR; INTRAVENOUS at 13:32

## 2021-07-14 NOTE — PROGRESS NOTES
PULMONARY NOTE  [facility]    Name: Vane Uribe MRN: [id number]   : 1955 Hospital: [facility]   Date: 2021  Admission date: 2021 Hospital Day: 3       HPI:     Hospital Problems  Date Reviewed: 2021        Codes Class Noted POA    Acute and chronic respiratory failure with hypoxia [facility]) ICD-10-CM: J96.21  ICD-9-CM: 518.84, 799.02  2021 Unknown        Acute on chronic systolic (congestive) heart failure (HCC) ICD-10-CM: I50.23  ICD-9-CM: 428.23, 428.0  2021 Unknown        Hypokalemia ICD-10-CM: E87.6  ICD-9-CM: 276.8  2021 Yes        Type 2 diabetes mellitus with hyperglycemia, without long-term current use of insulin (HCC) ICD-10-CM: E11.65  ICD-9-CM: 250.00, 790.29  2021 Yes        COPD exacerbation ([address] ICD-10-CM: J44.1  ICD-9-CM: 491.21  2020 Unknown        Anemia ICD-10-CM: D64.9  ICD-9-CM: 285.9  2020 Yes        CAD (coronary artery disease) ICD-10-CM: I25.10  ICD-9-CM: 414.00  2020 Yes        GERD (gastroesophageal reflux disease) ICD-10-CM: K21.9  ICD-9-CM: 530.81  2020 Yes        Mixed hyperlipidemia ICD-10-CM: E78.2  ICD-9-CM: 272.2  2020 Yes                   [x] High complexity decision making was performed  [x] See my orders for details      Subjective/Initial History:     I was asked by Maria Solis MD to see Vane Uribe  a 74 y.o.    female in consultation     Excerpts from admission 2021 or consult notes as follows:   25-year-old lady came in because of shortness of breath dyspnea she has significant past medical history of COPD she ran out of her inhaler for the past 2 months also has cardiomyopathy ejection fraction is about 41% with moderate aortic stenosis history of CABG aortic valve repair in the past.  Now came in because of dyspnea shortness of breath she cannot afford the inhalers it was too expensive not even albuterol she is chronically on home oxygen yesterday she went to the grocery store because of the heat she was feeling weak and tired but she did not take her oxygen so came to the ER saturation was 60% she was put on oxygen 4 L nasal cannula and pulmonary consult was called.       Allergies   Allergen Reactions    Aspirin Unknown (comments)     hemorraging - ended up in the hospital        [facility] reviewed and pertinent medications noted or modified as needed     Current Facility-Administered Medications   Medication    methylPREDNISolone (PF) (SOLU-MEDROL) injection 40 mg    insulin glargine (LANTUS) injection 8 Units    acetylcysteine (MUCOMYST) 200 mg/mL (20 %) solution 200 mg    potassium chloride (K-DUR, KLOR-CON) SR tablet 40 mEq    sodium chloride (NS) flush 5-40 mL    sodium chloride (NS) flush 5-40 mL    acetaminophen (TYLENOL) tablet 650 mg    Or    acetaminophen (TYLENOL) suppository 650 mg    polyethylene glycol (MIRALAX) packet 17 g    ondansetron (ZOFRAN ODT) tablet 4 mg    Or    ondansetron (ZOFRAN) injection 4 mg    enoxaparin (LOVENOX) injection 40 mg    alum-mag hydroxide-simeth (MYLANTA) oral suspension 15 mL    furosemide (LASIX) tablet 40 mg    guaiFENesin ER (MUCINEX) tablet 600 mg    budesonide-formoteroL (SYMBICORT) 160-4.5 mcg/actuation HFA inhaler 2 Puff    levoFLOXacin (LEVAQUIN) tablet 500 mg    albuterol-ipratropium (DUO-NEB) 2.5 MG-0.5 MG/3 ML    atorvastatin (LIPITOR) tablet 40 mg    carvediloL (COREG) tablet 3.125 mg    clopidogreL (PLAVIX) tablet 75 mg    ferrous sulfate tablet 325 mg    loratadine (CLARITIN) tablet 10 mg    fluticasone propionate (FLONASE) 50 mcg/actuation nasal spray 2 Spray    lisinopriL (PRINIVIL, ZESTRIL) tablet 5 mg    metFORMIN (GLUCOPHAGE) tablet 500 mg    pantoprazole (PROTONIX) tablet 40 mg    albuterol (PROVENTIL HFA, VENTOLIN HFA, PROAIR HFA) inhaler 2 Puff    spironolactone (ALDACTONE) tablet 25 mg    insulin lispro (HUMALOG) injection    glucose chewable tablet 16 g  glucagon (GLUCAGEN) injection 1 mg    dextrose (D50W) injection syrg 12.5-25 g      Patient PCP: Malini Best DO  PMH:  has a past medical history of Acute gastrointestinal hemorrhage (2020), Allergic rhinitis (2020), Anemia (2020), Benign essential hypertension (2020), Body mass index 30.0-30.9, adult (2020), CAD (coronary artery disease) (2020), Foot callus (2020), GERD (gastroesophageal reflux disease) (2020), Hair loss, Heart murmur (2020), colonoscopy (2015), Left bundle branch block (2020), Microalbuminuria due to type 2 diabetes mellitus ([facility] (2020), Mixed hyperlipidemia (2020), Morbid obesity ([facility] (2020), Tobacco dependence syndrome (2020), and Type II diabetes mellitus, uncontrolled ([facility] (2020). PSH:   has a past surgical history that includes hx colonoscopy (). FHX: family history includes Cancer in her mother and sister; Hypertension in her father. SHX:  reports that she quit smoking about 7 years ago. She smoked 0.50 packs per day. She has never used smokeless tobacco. She reports previous alcohol use. She reports that she does not use drugs. ROS:    Review of Systems   Constitutional: Positive for malaise/fatigue. HENT: Negative. Eyes: Negative. Respiratory: Positive for shortness of breath and wheezing. Cardiovascular: Positive for orthopnea. Gastrointestinal: Negative. Genitourinary: Negative. Musculoskeletal: Negative. Skin: Negative. Neurological: Negative. Psychiatric/Behavioral: Negative.          Objective:     Vital Signs: Telemetry:    normal sinus rhythm Intake/Output:   Visit Vitals  /78 (BP 1 Location: Left upper arm)   Pulse 95   Temp 97.8 °F (36.6 °C)   Resp 18   Ht 5' 5\" (1.651 m)   Wt 88.6 kg (195 lb 5.2 oz)   SpO2 99%   Breastfeeding No   BMI 32.50 kg/m²       Temp (24hrs), Av.5 °F (36.4 °C), Min:97.2 °F (36.2 °C), Max:97.8 °F (36.6 °C)        O2 Device: Nasal cannula O2 Flow Rate (L/min): 2 l/min       Wt Readings from Last 4 Encounters:   07/14/21 88.6 kg (195 lb 5.2 oz)   05/10/21 85.3 kg (188 lb)   04/12/21 85.6 kg (188 lb 12.8 oz)   02/25/21 86.4 kg (190 lb 8 oz)          Intake/Output Summary (Last 24 hours) at 7/14/2021 0938  Last data filed at 7/13/2021 2215  Gross per 24 hour   Intake 800 ml   Output    Net 800 ml       Last shift:      No intake/output data recorded. Last 3 shifts: 07/12 1901 - 07/14 0700  In: 800 [P.O.:800]  Out: -        Physical Exam:     Physical Exam  Constitutional:       Appearance: Normal appearance. HENT:      Head: Normocephalic and atraumatic. Nose: Nose normal.   Eyes:      Pupils: Pupils are equal, round, and reactive to light. Cardiovascular:      Rate and Rhythm: Normal rate and regular rhythm. Pulses: Normal pulses. Heart sounds: Normal heart sounds. Pulmonary:      Effort: Pulmonary effort is normal.      Breath sounds: Wheezing present. Abdominal:      General: Abdomen is flat. Bowel sounds are normal.      Palpations: Abdomen is soft. Musculoskeletal:         General: Normal range of motion. Cervical back: Normal range of motion and neck supple. Skin:     General: Skin is warm. Neurological:      General: No focal deficit present. Mental Status: She is alert. Psychiatric:         Mood and Affect: Mood normal.          Labs:    Recent Labs     07/14/21 0426 07/13/21  0302 07/12/21  0430   WBC 12.9* 12.9* 14.4*   HGB 8.9* 9.3* 9.8*   * 455* 460*     Recent Labs     07/14/21  0426 07/13/21  0302 07/12/21  0430   * 135* 136   K 4.6 3.6 3.0*    98 98   CO2 30 31 27   * 147* 184*   BUN 24* 18 14   CREA 0.88 0.85 0.84   CA 8.6 9.0 8.8   MG  --  1.4*  --    ALB  --   --  3.2*   ALT  --   --  34     No results for input(s): PH, PCO2, PO2, HCO3, FIO2 in the last 72 hours.   Recent Labs     07/12/21  1745 07/12/21  1343 07/12/21  0940   TROIQ 0.05* 0.05* 0.07*     No results found for: BNPP, BNP   Lab Results   Component Value Date/Time    Culture result: Scant Normal respiratory duane 01/28/2021 09:43 AM    Culture result: No Growth (<1000 cfu/mL) 01/28/2021 01:00 AM     Lab Results   Component Value Date/Time    TSH 0.83 07/12/2021 09:40 AM    TSH, External 2.420 01/16/2019 12:00 AM       Imaging:    CXR Results  (Last 48 hours)    None        Results from Hospital Encounter encounter on 07/12/21    XR CHEST PORT    Narrative  Study: XR CHEST PORT    Clinical indication: Shortness of breath    Comparison: Chest x-ray 2/1/2021. Impression  Findings/impression:    No consolidative airspace disease, pleural effusion or pneumothorax. Cardiac silhouette is enlarged. Trace interstitial edema. No acute osseous abnormality. Results from [place] encounter on 01/25/21    XR CHEST PORT    Narrative  Chest, frontal view, 2/1/2021. History: CHF. Comparison: Including chest 1/31/2021. Findings: The cardiac silhouette is stable. The lungs are adequately expanded. Pulmonary vascular congestion persists. Opacities in the lungs most pronounced  at the bases is not significantly changed. Pleural effusions are not  significantly changed. No pneumothorax is identified. The osseous structures  are stable. Impression  No significant interval change. XR CHEST PORT    Narrative  XR CHEST PORT    Comparison: Chest radiograph dated January 30, 2021    Impression  FINDINGS/IMPRESSION:  Radiograph is limited by patient's body habitus and AP portable technique. Lungs remain hypoinflated with bibasilar atelectasis and probable small  bilateral pleural effusions. Cardiac silhouette stable in size. No radiographically apparent pneumothorax. Mild improvement in bilateral airspace opacity, this is most confluent at the  right lung base. This may represent multilobar pneumonia or edema, or perhaps a  combination of each.     Results from Hospital Encounter encounter on 01/25/21    CTA CHEST W OR W WO CONT    Narrative  Exam: CTA chest with intravenous contrast    Comparison: Plain radiograph 1/25/2021. CT 1/2/2021. Technique: During intravenous contrast administration (100 cc [id number]), axial  sections were obtained through the chest. Coronal and sagittal reconstructions  were generated. Maximal intensity projection images were generated. Dose reduction: All CT scans at this facility are performed using dose reduction  optimization techniques as appropriate to perform the exam including the  following: automated exposure control, adjustments of the mA and/or kV according  to patient size, or use of iterative reconstruction technique. Findings: The examination is limited by motion degradation and by adduction of  the patient's upper extremities, which renders scattering and attenuation of the  diagnostic x-ray beam. Nevertheless, there is no demonstrated pulmonary embolus. Endotracheal tube tip in the middle third of the trachea. Distal trachea and  main bronchi poorly expanded. There is calcific atherosclerotic disease of a  nonaneurysmal thoracic aorta, which manifests no evidence of intimal  incompetence. Calcific atherosclerotic coronary arterial disease is present. Prominent bilateral mediastinal and hilar lymph nodes, increased compared to  1/2/2021, likely inflammatory. Small to moderate pericardial effusion of near  water attenuation fluid. It is 2 cm greatest axial thickness. Small bilateral  pleural effusions. Bilateral dependent airspace disease compatible with  pneumonia. Interstitial disease also present, compatible with pulmonary edema. Included abdomen indicates a stable 2 cm left adrenal nodule of water  attenuation, compatible with a benign adenoma. No axillary or supraclavicular  adenopathy or mass. Visualized thoracic bone scaffolding unremarkable. Impression  Impression:  1. No evidence of pulmonary embolus.   2. Bilateral dependent airspace disease compatible with pneumonia. 3. Diffuse pulmonary interstitial disease compatible with pulmonary edema. 4. Bilateral pleural effusions. 5. Coronary artery calcific atherosclerotic disease. 6. Small to moderate pericardial effusion. 7. Distal trachea and main bronchi are poorly expanded. IMPRESSION:   1. Acute on chronic hypoxic respiratory failure  2. Chronic Obstructive Pulmonary Disease  3. Congestive heart failure cardiomyopathy ejection fraction 41%  4. Moderate aortic stenosis  5. Hypokalemia  6. Prognosis guarded       RECOMMENDATIONS/PLAN:     1. On nasal Cannula oxygen as salvage oxygen delivery device to provide high concentration of oxygen to overcome refractory hypoxia; she is chronically on home oxygen 2 L nasal cannula   2. She is on IV Solu-Medrol and Symbicort and nebulizer treatment  3. Agree with Lasix and Aldactone  4. Patient has difficulty in getting the inhalers because of co-pay will get  and case management  5.  On Levaquin we will repeat chest x-ray to see resolution of congestion and infiltrate             Tristan Canales MD

## 2021-07-14 NOTE — ROUTINE PROCESS
Bedside shift report given to LISANDRO Alegre  (oncoming nurse) by Afia Campbell RN (off going nurse). Report to include SBAR, plan of care, recent results, discharge planning, and patient's questions and concerns.

## 2021-07-14 NOTE — PROGRESS NOTES
Problem: Mobility Impaired (Adult and Pediatric)  Goal: *Acute Goals and Plan of Care (Insert Text)  Description: Pt will be I with LE HEP in 7 days. Pt will perform transfers with IND in 7 days. Pt will amb 150 feet with LRAD safely with mod I in 7 days. Outcome: Not Met     Problem: Patient Education: Go to Patient Education Activity  Goal: Patient/Family Education  Outcome: Not Met     PHYSICAL THERAPY EVALUATION  Patient: Reginaldo Thompson (89 y.o. female)  Date: 7/14/2021  Primary Diagnosis: Acute and chronic respiratory failure with hypoxia (HCC) [J96.21]  Acute on chronic systolic (congestive) heart failure (HCC) [I50.23]  COPD exacerbation ([facility] [address] [J44.1]        Precautions:      ASSESSMENT  Patient is 73 y/o female came to [facility] with SOB worsening after grocery store visit with EMS called at 1am with pt SPO2 of 60% initially placed on 4L with SPO2 100%) and adm 7/12/2021 for COPD exacerbation, acute on chornic respiratory failure (currently on 2L O2 via NC in room). Pt has hx of cardiomyopathy (EF 41% in 2/2021), moderate aortic stenosis, CAD, DM type II, HTN, HLD, obesity, SOB (on 2L O2 via NC at home at night only per pt report), COPD, CHF, GERD. Per pt report, pt lives alone in one story home with 5 steps gali rails to enter and is independent for self care (uses shower chair at times and others bathes at sink) and functional transfers/mobility (has shower chair and RW if needed). Pt received sitting EOB with OT present. A&Ox4. Pt was agitated when PT entered the room. Pt currently presents with decreased activity tolerance 2/2 SOB (pt requiring cues to focus on breathing as pt is a \"talker\"), generalized weakness and increased need for assist functional transfers/mobility (IND sup->sit and scooting EOB, supervision sit<->Stand, supervision toilet transfer simulated with gait belt).  PT agitated during PT eval, PT provided pt edu to not talk while amb to conserve her  energey to prevetn SOB. Pt anxious and agitated after working with therapy thus further energy conservation education held at this time. Pt would benefit from acute PT services while at [facility] in order to increase safety and independence with functional transfers/mobility. Recommend discharge to home with HHPT when medically appropriate. Patient will benefit from skilled therapy intervention to address the above noted impairments. PLAN :  Recommendations and Planned Interventions: transfer training, gait training, therapeutic exercises, neuromuscular re-education, and therapeutic activities      Frequency/Duration: Patient will be followed by physical therapy:  2 times a week to address goals. Recommendation for discharge: (in order for the patient to meet his/her long term goals)  HH PT    This discharge recommendation:  Has not yet been discussed the attending provider and/or case management    IF patient discharges home will need the following DME: none         SUBJECTIVE:   Patient stated You guys are rushing me. Last time I was rushed, I got sent to the ICU. I know my limitations.     OBJECTIVE DATA SUMMARY:   HISTORY:    Past Medical History:   Diagnosis Date    Acute gastrointestinal hemorrhage 8/28/2020    Allergic rhinitis 8/28/2020    Anemia 8/28/2020    Benign essential hypertension 6/25/2020    Body mass index 30.0-30.9, adult 6/25/2020    CAD (coronary artery disease) 8/28/2020    Foot callus 8/28/2020    GERD (gastroesophageal reflux disease) 6/25/2020    Hair loss     Heart murmur 6/25/2020    Hx of colonoscopy 01/01/2015    Done for anemia and rectal bleeding    Left bundle branch block 8/28/2020    Microalbuminuria due to type 2 diabetes mellitus ([address] 8/28/2020    Mixed hyperlipidemia 6/25/2020    Morbid obesity ([address] 8/28/2020    Tobacco dependence syndrome 6/25/2020    Type II diabetes mellitus, uncontrolled ([address] 6/25/2020     Past Surgical History:   Procedure Laterality Date    HX COLONOSCOPY  2015 due in [id number]. Done for anemia and rectal bleeding       Personal factors and/or comorbidities impacting plan of care: self limiting    Home Situation  Home Environment: Private residence  # Steps to Enter: 5  Rails to Enter: Yes  Hand Rails : Bilateral  One/Two Story Residence: One story  Living Alone: Yes  Support Systems: Child(anna marie) (sister lives close by)  Patient Expects to be Discharged to[de-identified] House  Current DME Used/Available at Home: Oxygen, portable, Shower chair, Walker, rolling    PLOF: Pt IND for ADLS/IADLS, IND with mobility prior to admission. EXAMINATION/PRESENTATION/DECISION MAKING:   Critical Behavior:     Orientation Level: Oriented X4        Hearing: Auditory  Auditory Impairment: None    Range Of Motion:  AROM: Generally decreased, functional           PROM: Generally decreased, functional           Strength:    Strength: Generally decreased, functional           Functional Mobility:  Bed Mobility:  Rolling: Modified independent  Supine to Sit: Modified independent     Scooting: Modified independent  Transfers:  Sit to Stand: Independent  Stand to Sit: Independent        Bed to Chair: Supervision              Balance:   Sitting: Intact; Without support  Standing: Intact; Without support  Standing - Static: Good  Standing - Dynamic : Good  Ambulation/Gait Training:  Distance (ft): 20 Feet (ft)  Assistive Device: Gait belt  Ambulation - Level of Assistance: Supervision     Gait Description (WDL): Exceptions to [facility] 6 Clicks         Basic Mobility Inpatient Short Form  How much difficulty does the patient currently have. .. Unable A Lot A Little None   1. Turning over in bed (including adjusting bedclothes, sheets and blankets)? [] 1   [] 2   [] 3   [x] 4   2. Sitting down on and standing up from a chair with arms ( e.g., wheelchair, bedside commode, etc.)   [] 1   [] 2   [x] 3   [] 4   3. Moving from lying on back to sitting on the side of the bed? [] 1   [] 2   [] 3   [x] 4          How much help from another person does the patient currently need. .. Total A Lot A Little None   4. Moving to and from a bed to a chair (including a wheelchair)? [] 1   [] 2   [x] 3   [] 4   5. Need to walk in hospital room? [] 1   [] 2   [x] 3   [] 4   6. Climbing 3-5 steps with a railing? [] 1   [] 2   [x] 3   [] 4   © , Trustees of [address] [place] [address] [address] [address], under license to [employer]. All rights reserved     Score:  Initial:  Most Recent: X (Date: -- )   Interpretation of Tool:  Represents activities that are increasingly more difficult (i.e. Bed mobility, Transfers, Gait). Score 24 23 22-20 19-15 14-10 9-7 6   Modifier CH CI CJ CK CL CM CN          Physical Therapy Evaluation Charge Determination   History Examination Presentation Decision-Making   LOW Complexity : Zero comorbidities / personal factors that will impact the outcome / POC LOW Complexity : 1-2 Standardized tests and measures addressing body structure, function, activity limitation and / or participation in recreation  LOW Complexity : Stable, uncomplicated  Other Functional Measure AMPAC 6       Based on the above components, the patient evaluation is determined to be of the following complexity level: LOW     Pain Ratin/10    Activity Tolerance:   Fair  Please refer to the flowsheet for vital signs taken during this treatment. After treatment patient left in no apparent distress:   Sitting in chair and Call bell within reach    COMMUNICATION/EDUCATION:   The patients plan of care was discussed with: Occupational therapist and Registered nurse. Patient understands intent and goals of therapy, but is neutral about his/her participation.     Thank you for this referral.  Lashaun Song, PT, DPT   Time Calculation: 19 mins

## 2021-07-14 NOTE — PROGRESS NOTES
Progress Note      7/14/2021 11:05 AM  NAME: Aung Baum   MRN:  [id number]   Admit Diagnosis: Acute and chronic respiratory failure with hypoxia (HCC) [J96.21]  Acute on chronic systolic (congestive) heart failure (HCC) [I50.23]  COPD exacerbation ([facility] [address] [J44.1]      Problem List:     1. Acute systolic heart failure  2. Three-vessel coronary artery disease with moderate aortic stenosis  3. COPD with exacerbation     Assessment/Plan:     1. Discussed with . Pulmonary status is improving. Schedule her for cardiac catheterization for tomorrow. Left ventricular ejection fraction has improved to 42%. []       High complexity decision making was performed in this patient at high risk for decompensation with multiple organ involvement. Subjective: Aung Baum denies chest pain, dyspnea. Discussed with RN events overnight. Review of Systems:    Symptom Y/N Comments  Symptom Y/N Comments   Fever/Chills N   Chest Pain N    Poor Appetite N   Edema N    Cough N   Abdominal Pain N    Sputum N   Joint Pain N    SOB/ACEVEDO N   Pruritis/Rash N    Nausea/vomit N   Tolerating PT/OT Y    Diarrhea N   Tolerating Diet Y    Constipation N   Other       Could NOT obtain due to:      Objective:      Physical Exam:    Last 24hrs VS reviewed since prior progress note. Most recent are:    Visit Vitals  /78 (BP 1 Location: Left upper arm)   Pulse 95   Temp 97.8 °F (36.6 °C)   Resp 18   Ht 5' 5\" (1.651 m)   Wt 88.6 kg (195 lb 5.2 oz)   SpO2 99%   Breastfeeding No   BMI 32.50 kg/m²       Intake/Output Summary (Last 24 hours) at 7/14/2021 1105  Last data filed at 7/13/2021 2215  Gross per 24 hour   Intake 800 ml   Output    Net 800 ml        General Appearance: Well developed, well nourished, alert & oriented x 3,    no acute distress. Ears/Nose/Mouth/Throat: Hearing grossly normal.  Neck: Supple. Chest: Lungs clear to auscultation bilaterally.   Cardiovascular: Regular rate and rhythm, S1S2 normal, no murmur. Abdomen: Soft, non-tender, bowel sounds are active. Extremities: No edema bilaterally. Skin: Warm and dry. []        PMH/SH reviewed - no change compared to H&P    Data Review    Telemetry: normal sinus rhythm     EKG:   []  No new EKG for review    Lab Data Personally Reviewed:    Recent Labs     07/14/21 0426 07/13/21  0302   WBC 12.9* 12.9*   HGB 8.9* 9.3*   HCT 28.7* 30.1*   * 455*     No results for input(s): INR, PTP, APTT, INREXT in the last 72 hours. Recent Labs     07/14/21 0426 07/13/21  0302 07/12/21  0430   * 135* 136   K 4.6 3.6 3.0*    98 98   CO2 30 31 27   BUN 24* 18 14   CREA 0.88 0.85 0.84   * 147* 184*   CA 8.6 9.0 8.8   MG  --  1.4*  --      Recent Labs     07/12/21  1745 07/12/21  1343 07/12/21  0940   TROIQ 0.05* 0.05* 0.07*     Lab Results   Component Value Date/Time    Cholesterol, total 134 05/10/2021 04:15 PM    HDL Cholesterol 49 05/10/2021 04:15 PM    LDL, calculated 69 05/10/2021 04:15 PM    Triglyceride 81 05/10/2021 04:15 PM       Recent Labs     07/12/21  0430   AP 83   TP 7.6   ALB 3.2*   GLOB 4.4*     No results for input(s): PH, PCO2, PO2 in the last 72 hours.     Medications Personally Reviewed:    Current Facility-Administered Medications   Medication Dose Route Frequency    methylPREDNISolone (PF) (SOLU-MEDROL) injection 40 mg  40 mg IntraVENous Q8H    insulin glargine (LANTUS) injection 8 Units  8 Units SubCUTAneous QHS    acetylcysteine (MUCOMYST) 200 mg/mL (20 %) solution 200 mg  200 mg Nebulization Q6H RT    potassium chloride (K-DUR, KLOR-CON) SR tablet 40 mEq  40 mEq Oral BID    sodium chloride (NS) flush 5-40 mL  5-40 mL IntraVENous Q8H    sodium chloride (NS) flush 5-40 mL  5-40 mL IntraVENous PRN    acetaminophen (TYLENOL) tablet 650 mg  650 mg Oral Q6H PRN    Or    acetaminophen (TYLENOL) suppository 650 mg  650 mg Rectal Q6H PRN    polyethylene glycol (MIRALAX) packet 17 g  17 g Oral DAILY PRN    ondansetron (ZOFRAN ODT) tablet 4 mg  4 mg Oral Q8H PRN    Or    ondansetron (ZOFRAN) injection 4 mg  4 mg IntraVENous Q6H PRN    enoxaparin (LOVENOX) injection 40 mg  40 mg SubCUTAneous DAILY    alum-mag hydroxide-simeth (MYLANTA) oral suspension 15 mL  15 mL Oral Q6H PRN    furosemide (LASIX) tablet 40 mg  40 mg Oral ACB&D    guaiFENesin ER (MUCINEX) tablet 600 mg  600 mg Oral BID    budesonide-formoteroL (SYMBICORT) 160-4.5 mcg/actuation HFA inhaler 2 Puff  2 Puff Inhalation BID RT    levoFLOXacin (LEVAQUIN) tablet 500 mg  500 mg Oral Q24H    albuterol-ipratropium (DUO-NEB) 2.5 MG-0.5 MG/3 ML  3 mL Nebulization Q6H RT    atorvastatin (LIPITOR) tablet 40 mg  40 mg Oral QHS    carvediloL (COREG) tablet 3.125 mg  3.125 mg Oral BID WITH MEALS    clopidogreL (PLAVIX) tablet 75 mg  75 mg Oral DAILY    ferrous sulfate tablet 325 mg  325 mg Oral ACB    loratadine (CLARITIN) tablet 10 mg  10 mg Oral DAILY    fluticasone propionate (FLONASE) 50 mcg/actuation nasal spray 2 Spray  2 Spray Both Nostrils DAILY    lisinopriL (PRINIVIL, ZESTRIL) tablet 5 mg  5 mg Oral DAILY    metFORMIN (GLUCOPHAGE) tablet 500 mg  500 mg Oral DAILY WITH BREAKFAST    pantoprazole (PROTONIX) tablet 40 mg  40 mg Oral DAILY    albuterol (PROVENTIL HFA, VENTOLIN HFA, PROAIR HFA) inhaler 2 Puff  2 Puff Inhalation Q6H PRN    spironolactone (ALDACTONE) tablet 25 mg  25 mg Oral BID    insulin lispro (HUMALOG) injection   SubCUTAneous AC&HS    glucose chewable tablet 16 g  4 Tablet Oral PRN    glucagon (GLUCAGEN) injection 1 mg  1 mg IntraMUSCular PRN    dextrose (D50W) injection syrg 12.5-25 g  25-50 mL IntraVENous PRN         Mar Dean MD

## 2021-07-14 NOTE — PROGRESS NOTES
Problem: Self Care Deficits Care Plan (Adult)  Goal: *Acute Goals and Plan of Care (Insert Text)  Description: Pt will be independent sup<->sit in prep for EOB ADL's  Pt will be independent  LB dressing EOB level  Pt will be independent  sit<-> prep for toilet transfer  Pt will be independent  toilet transfer with LRAD  Pt will be independent  toileting/cloth mgmt LRAD  Pt will be independent  grooming standing sink  Pt will be independent bathing sitting/standing sink LRAD      Outcome: Not Met     OCCUPATIONAL THERAPY EVALUATION  Patient: Vane Uribe (7 y.o. female)  Date: 7/14/2021  Primary Diagnosis: Acute and chronic respiratory failure with hypoxia (HCC) [J96.21]  Acute on chronic systolic (congestive) heart failure (HCC) [I50.23]  COPD exacerbation ([facility] [address] [J44.1]        Precautions:        ASSESSMENT  Patient is 75 y/o female came [facility] with SOB worsening after grocery store visit with EMS called at 1am with pt SPO2 of 60% initially placed on 4L with SPO2 100%) and adm 7/12/2021 for COPD exacerbation, acute on chornic respiratory failure (currently on 2L O2 via NC in room). Pt has hx of cardiomyopathy (EF 41% in 2/2021), moderate aortic stenosis, CAD, DM type II, HTN, HLD, obesity, SOB (on 2L O2 via NC at home at night only per pt report), COPD, CHF, GERD. Pt received semi supine in bed A&Ox4 and agreeable for OT eval/tx (PT present towards end of eval). Per pt report, pt lives alone in one story home with 5 steps gali rails to enter and is independent for self care (uses shower chair at times and others bathes at sink) and functional transfers/mobility (has shower chair and RW if needed).      Pt currently presents with decreased activity tolerance 2/2 SOB (pt requiring cues to focus on breathing as pt is a \"talker\"), generalized weakness and increased need for assist with self care (supervision toileting/cloth mgmt simulated, MI LB dressing, MI simple grooming simulated seated EOB) and functional transfers/mobility (MI sup->sit and scooting EOB, supervision sit<->Stand, supervision toilet transfer simulated with gait belt). Pt initially agreeable however towards end of eval pt appears anxious and agitated after working with therapy thus further energy conservation education held at this time. Pt would benefit from skilled OT services while at [facility] in order to increase safety and independence with self care and unctional transfers/mobility. Recommend discharge to home with [facility] when medically appropriate. Other factors to consider for discharge: time since onset, close to baseline functional status        PLAN :  Recommendations and Planned Interventions: self care training, functional mobility training, therapeutic exercise, balance training, therapeutic activities, endurance activities, patient education, and home safety training    Frequency/Duration: Patient will be followed by occupational therapy 1 time a week to address goals. Recommendation for discharge: (in order for the patient to meet his/her long term goals)  HHOT    This discharge recommendation:  Has been made in collaboration with the attending provider and/or case management    IF patient discharges home will need the following DME: none       SUBJECTIVE:   Patient stated I know my limits I am not going to let you push me.     OBJECTIVE DATA SUMMARY:   HISTORY:   Past Medical History:   Diagnosis Date    Acute gastrointestinal hemorrhage 8/28/2020    Allergic rhinitis 8/28/2020    Anemia 8/28/2020    Benign essential hypertension 6/25/2020    Body mass index 30.0-30.9, adult 6/25/2020    CAD (coronary artery disease) 8/28/2020    Foot callus 8/28/2020    GERD (gastroesophageal reflux disease) 6/25/2020    Hair loss     Heart murmur 6/25/2020    Hx of colonoscopy 01/01/2015    Done for anemia and rectal bleeding    Left bundle branch block 8/28/2020    Microalbuminuria due to type 2 diabetes mellitus ([facility] [address] 8/28/2020 Mixed hyperlipidemia 6/25/2020    Morbid obesity ([facility] [address] 8/28/2020    Tobacco dependence syndrome 6/25/2020    Type II diabetes mellitus, uncontrolled ([facility] [address] 6/25/2020     Past Surgical History:   Procedure Laterality Date    HX COLONOSCOPY  2015    due in [id number]. Done for anemia and rectal bleeding       Expanded or extensive additional review of patient history:     Home Situation  Home Environment: Private residence  # Steps to Enter: 5  Rails to Enter: Yes  Hand Rails : Bilateral  One/Two Story Residence: One story  Living Alone: Yes  Support Systems: Child(anna marie) (sister lives close by)  Patient Expects to be Discharged to[de-identified] House  Current DME Used/Available at Home: Oxygen, portable, Shower chair, Walker, rolling    PLOF: Pt MI for ADLS/IADLS, MI with mobility prior to admission. EXAMINATION OF PERFORMANCE DEFICITS:  Cognitive/Behavioral Status:     Hearing: Auditory  Auditory Impairment: None    Range of Motion:  AROM: Generally decreased, functional  PROM: Generally decreased, functional     Strength:  Strength: Generally decreased, functional     Balance:  Sitting: Intact; Without support  Standing: Intact; Without support  Standing - Static: Good  Standing - Dynamic : Good    Functional Mobility and Transfers for ADLs:  Bed Mobility:  Rolling: Modified independent  Supine to Sit: Modified independent  Scooting: Modified independent    Transfers:  Sit to Stand: Modified independent  Stand to Sit: Modified independent  Bed to Chair: Supervision  Bathroom Mobility: Supervision/set up  Toilet Transfer : Supervision    ADL Assessment:     Oral Facial Hygiene/Grooming: Supervision (standing simulated)    Lower Body Dressing: Supervision (unsupported long sitting in bed)    Toileting: Supervision (on BS)     ADL Intervention and task modifications:  Feeding  Feeding Assistance: Independent  Container Management: Independent  Cutting Food: Independent  Utensil Management: Independent  Food to Mouth: Independent  Drink to Mouth: Independent    Grooming  Grooming Assistance: Supervision (simulated)  Position Performed: Standing    Lower Body Dressing Assistance  Socks: Supervision  Position Performed: Long sitting on bed    Toileting  Toileting Assistance: Supervision  Bladder Hygiene: Supervision  Bowel Hygiene: Supervision  Clothing Management: [facility] \"6 Clicks\"                                                       Daily Activity Inpatient Short Form  How much help from another person does the patient currently need. .. Total; A Lot A Little None   1. Putting on and taking off regular lower body clothing? []  1 []  2 [x]  3 []  4   2. Bathing (including washing, rinsing, drying)? []  1 []  2 [x]  3 []  4   3. Toileting, which includes using toilet, bedpan or urinal? [] 1 []  2 [x]  3 []  4   4. Putting on and taking off regular upper body clothing? []  1 []  2 []  3 [x]  4   5. Taking care of personal grooming such as brushing teeth? []  1 []  2 [x]  3 []  4   6. Eating meals? []  1 []  2 []  3 [x]  4   © 2007, Trustees [address] [address], under license to [employer]. All rights reserved     Score: 20/24     Interpretation of Tool:  Represents clinically-significant functional categories (i.e. Activities of daily living). Percentage of Impairment CH    0%   CI    1-19% CJ    20-39% CK    40-59% CL    60-79% CM    80-99% CN     100%   [facility]  Score 6-24 24 23 20-22 15-19 10-14 7-9 6        Occupational Therapy Evaluation Charge Determination   History Examination Decision-Making   LOW Complexity : Brief history review  LOW Complexity : 1-3 performance deficits relating to physical, cognitive , or psychosocial skils that result in activity limitations and / or participation restrictions  MEDIUM Complexity : Patient may present with comorbidities that affect occupational performnce.  Miniml to moderate modification of tasks or assistance (eg, physical or verbal ) with assesment(s) is necessary to enable patient to complete evaluation       Based on the above components, the patient evaluation is determined to be of the following complexity level: LOW   Pain Rating:  No pain reported    Activity Tolerance:   Fair and requires rest breaks  Please refer to the flowsheet for vital signs taken during this treatment. After treatment patient left in no apparent distress:    Sitting in chair and Call bell within reach    COMMUNICATION/EDUCATION:   The patients plan of care was discussed with: Physical therapist and Registered nurse. PT/OT sessions occurred together for increased safety of pt and clinician. Home safety education was provided and the patient/caregiver indicated understanding. and Patient/family have participated as able in goal setting and plan of care. This patients plan of care is appropriate for delegation to [facility].     Thank you for this referral.  Lanita Peabody  Time Calculation: 28 mins

## 2021-07-14 NOTE — PROGRESS NOTES
Hospitalist Progress Note    Subjective:   Daily Progress Note: 7/14/2021 7:43 AM    Hospital Course: Patient is a 28-year-old female with a history of cardiomyopathy with an EF of 41%, moderate aortic stenosis, hypertension, hyperlipidemia, type 2 diabetes that presented to the emergency room on 7/12/2021 for 2-week history of shortness of breath. It was progressively getting worse. The night of admission patient was extremely short of breath at rest and EMS was called. Oxygen saturations were 60% initially. She arrived to the emergency room on 4 L nasal cannula saturating at 100%. Work-up in the emergency room showed elevated BNP, mild edema on the chest x-ray, CHF exacerbation complicated with a COPD exacerbation. She was given IV Lasix, Solu-Medrol, nebulizers and was able to wean down to 2 L oxygen nasal cannula. Patient's cardiologist and pulmonologist were consulted. She was started on prednisone, duo nebs, Levaquin, Lasix. Subjective: Patient says she is feeling much better after the IV steriods.  Continues to have SOB with extertion     Current Facility-Administered Medications   Medication Dose Route Frequency    methylPREDNISolone (PF) (SOLU-MEDROL) injection 40 mg  40 mg IntraVENous Q8H    insulin glargine (LANTUS) injection 8 Units  8 Units SubCUTAneous QHS    acetylcysteine (MUCOMYST) 200 mg/mL (20 %) solution 200 mg  200 mg Nebulization Q6H RT    potassium chloride (K-DUR, KLOR-CON) SR tablet 40 mEq  40 mEq Oral BID    sodium chloride (NS) flush 5-40 mL  5-40 mL IntraVENous Q8H    sodium chloride (NS) flush 5-40 mL  5-40 mL IntraVENous PRN    acetaminophen (TYLENOL) tablet 650 mg  650 mg Oral Q6H PRN    Or    acetaminophen (TYLENOL) suppository 650 mg  650 mg Rectal Q6H PRN    polyethylene glycol (MIRALAX) packet 17 g  17 g Oral DAILY PRN    ondansetron (ZOFRAN ODT) tablet 4 mg  4 mg Oral Q8H PRN    Or    ondansetron (ZOFRAN) injection 4 mg  4 mg IntraVENous Q6H PRN    enoxaparin (LOVENOX) injection 40 mg  40 mg SubCUTAneous DAILY    alum-mag hydroxide-simeth (MYLANTA) oral suspension 15 mL  15 mL Oral Q6H PRN    furosemide (LASIX) tablet 40 mg  40 mg Oral ACB&D    guaiFENesin ER (MUCINEX) tablet 600 mg  600 mg Oral BID    budesonide-formoteroL (SYMBICORT) 160-4.5 mcg/actuation HFA inhaler 2 Puff  2 Puff Inhalation BID RT    levoFLOXacin (LEVAQUIN) tablet 500 mg  500 mg Oral Q24H    albuterol-ipratropium (DUO-NEB) 2.5 MG-0.5 MG/3 ML  3 mL Nebulization Q6H RT    atorvastatin (LIPITOR) tablet 40 mg  40 mg Oral QHS    carvediloL (COREG) tablet 3.125 mg  3.125 mg Oral BID WITH MEALS    clopidogreL (PLAVIX) tablet 75 mg  75 mg Oral DAILY    ferrous sulfate tablet 325 mg  325 mg Oral ACB    loratadine (CLARITIN) tablet 10 mg  10 mg Oral DAILY    fluticasone propionate (FLONASE) 50 mcg/actuation nasal spray 2 Spray  2 Spray Both Nostrils DAILY    lisinopriL (PRINIVIL, ZESTRIL) tablet 5 mg  5 mg Oral DAILY    metFORMIN (GLUCOPHAGE) tablet 500 mg  500 mg Oral DAILY WITH BREAKFAST    pantoprazole (PROTONIX) tablet 40 mg  40 mg Oral DAILY    albuterol (PROVENTIL HFA, VENTOLIN HFA, PROAIR HFA) inhaler 2 Puff  2 Puff Inhalation Q6H PRN    spironolactone (ALDACTONE) tablet 25 mg  25 mg Oral BID    insulin lispro (HUMALOG) injection   SubCUTAneous AC&HS    glucose chewable tablet 16 g  4 Tablet Oral PRN    glucagon (GLUCAGEN) injection 1 mg  1 mg IntraMUSCular PRN    dextrose (D50W) injection syrg 12.5-25 g  25-50 mL IntraVENous PRN        Review of Systems  Constitutional: No fevers, No chills, No sweats, No fatigue, No Weakness  Eyes: No redness  Ears, nose, mouth, throat, and face: No nasal congestion, No sore throat, No voice change  Respiratory: ++ Shortness of Breath,++ cough, ++ wheezing  Cardiovascular: No chest pain, No palpitations, No extremity edema  Gastrointestinal: No nausea, No vomiting, No diarrhea, No abdominal pain  Genitourinary: No frequency, No dysuria, No hematuria  Integument/breast: No skin lesion(s)   Neurological: No Confusion, No headaches, No dizziness      Objective:     Visit Vitals  /78 (BP 1 Location: Left upper arm)   Pulse 95   Temp 97.8 °F (36.6 °C)   Resp 18   Ht 5' 5\" (1.651 m)   Wt 88.6 kg (195 lb 5.2 oz)   SpO2 99%   Breastfeeding No   BMI 32.50 kg/m²    O2 Flow Rate (L/min): 2 l/min O2 Device: Nasal cannula    Temp (24hrs), Av.6 °F (36.4 °C), Min:97.2 °F (36.2 °C), Max:97.9 °F (36.6 °C)      No intake/output data recorded.  1901 -  0700  In: 800 [P.O.:800]  Out: -     PHYSICAL EXAM:  Constitutional: No acute distress  Skin: Extremities and face reveal no rashes. HEENT: Sclerae anicteric. Extra-occular muscles are intact. No oral ulcers. The neck is supple and no masses. Cardiovascular: Regular rate and rhythm. Respiratory: nonlabored, diminished, improvement in exp wheezing  GI: Abdomen nondistended, soft, and nontender. Normal active bowel sounds. Musculoskeletal: No pitting edema of the lower legs. Able to move all ext  Neurological:  Patient is alert and oriented.  Cranial nerves II-XII grossly intact  Psychiatric: Mood appears appropriate       Data Review    Recent Results (from the past 24 hour(s))   GLUCOSE, POC    Collection Time: 21  7:57 AM   Result Value Ref Range    Glucose (POC) 191 (H) 65 - 117 mg/dL    Performed by Jaime Silva (PCT)    GLUCOSE, POC    Collection Time: 21 11:08 AM   Result Value Ref Range    Glucose (POC) 193 (H) 65 - 117 mg/dL    Performed by Jaime Silva (PCT)    GLUCOSE, POC    Collection Time: 21  3:06 PM   Result Value Ref Range    Glucose (POC) 184 (H) 65 - 117 mg/dL    Performed by Jaime Silva (PCT)    GLUCOSE, POC    Collection Time: 21  9:29 PM   Result Value Ref Range    Glucose (POC) 277 (H) 65 - 117 mg/dL    Performed by [address] [place] [address]    Collection Time: 21  4:26 AM   Result Value Ref Range    Sodium 135 (L) 136 - 145 mmol/L    Potassium 4.6 3.5 - 5.1 mmol/L    Chloride 100 97 - 108 mmol/L    CO2 30 21 - 32 mmol/L    Anion gap 5 5 - 15 mmol/L    Glucose 304 (H) 65 - 100 mg/dL    BUN 24 (H) 6 - 20 mg/dL    Creatinine 0.88 0.55 - 1.02 mg/dL    BUN/Creatinine ratio 27 (H) 12 - 20      GFR est AA >60 >60 ml/min/1.73m2    GFR est non-AA >60 >60 ml/min/1.73m2    Calcium 8.6 8.5 - 10.1 mg/dL   CBC WITH AUTOMATED DIFF    Collection Time: 07/14/21  4:26 AM   Result Value Ref Range    WBC 12.9 (H) 3.6 - 11.0 K/uL    RBC 3.88 3.80 - 5.20 M/uL    HGB 8.9 (L) 11.5 - 16.0 g/dL    HCT 28.7 (L) 35.0 - 47.0 %    MCV 74.0 (L) 80.0 - 99.0 FL    MCH 22.9 (L) 26.0 - 34.0 PG    MCHC 31.0 30.0 - 36.5 g/dL    RDW 17.3 (H) 11.5 - 14.5 %    PLATELET 784 (H) 982 - 400 K/uL    MPV 9.4 8.9 - 12.9 FL    NRBC 0.2 (H) 0.0  WBC    ABSOLUTE NRBC 0.02 (H) 0.00 - 0.01 K/uL    NEUTROPHILS 91 (H) 32 - 75 %    LYMPHOCYTES 6 (L) 12 - 49 %    MONOCYTES 2 (L) 5 - 13 %    EOSINOPHILS 0 0 - 7 %    BASOPHILS 0 0 - 1 %    IMMATURE GRANULOCYTES 1 (H) 0 - 0.5 %    ABS. NEUTROPHILS 11.8 (H) 1.8 - 8.0 K/UL    ABS. LYMPHOCYTES 0.7 (L) 0.8 - 3.5 K/UL    ABS. MONOCYTES 0.3 0.0 - 1.0 K/UL    ABS. EOSINOPHILS 0.0 0.0 - 0.4 K/UL    ABS. BASOPHILS 0.0 0.0 - 0.1 K/UL    ABS. IMM. GRANS. 0.1 (H) 0.00 - 0.04 K/UL    DF AUTOMATED         Radiology review: Chest xray    Assessment:   1. Acute on chronic systolic CHF  2. COPD exacerbation  3. Mild aortic stenosis  4. Hypertension  5. Hyperlipidemia  6. Type 2 diabetes    Plan:    1. . Troponins indeterminately elevated. Patient was given IV Lasix. Currently on p.o. Lasix and spironolactone. .  Chest x-ray showed cardiac silhouette enlarged with trace interstitial edema. Cardiology consulted. 2.  Pulmonary consulted. She is on Symbicort, Flonase, Mucinex, Levaquin, Claritin, prednisone --> switch to IV steroids --> we will continue for another day. On Mucomyst.  Oxygen saturation 98% on 2 L.   We will continue to wean oxygen as she only uses this at night  3. We will need to be cautious with diuresing be due to her valvular disease. May proceed with a cardiac catheterization after stabilization of pulmonary status. We will reach out to cardiology to reevaluate. 4.  Blood pressure stable. Continue to monitor per unit protocol. She is on Coreg, Lasix, lisinopril, spironolactone  5. Continue with Lipitor  7. Patient on insulin sliding scale. And Metformin glucose levels are elevated. Most likely secondary to steroid use. Started on long-acting insulin 8 units at bedtime  8. CBC BMP in a.m.  9.  PT OT    Dispo: Pending improvement in pulmonary status and possible cardiac catheterization. Suspect greater than 48 hours with home health. CODE STATUS full code     DVT prophylaxis: Lovenox  Ulcer prophylaxis: Protonix    Care Plan discussed with: Patient/Family, Nurse and     Total time spent with patient: 33 minutes.

## 2021-07-15 LAB
ANION GAP SERPL CALC-SCNC: 7 MMOL/L (ref 5–15)
BASOPHILS # BLD: 0 K/UL (ref 0–0.1)
BASOPHILS NFR BLD: 0 % (ref 0–1)
BUN SERPL-MCNC: 26 MG/DL (ref 6–20)
BUN/CREAT SERPL: 31 (ref 12–20)
CA-I BLD-MCNC: 8.9 MG/DL (ref 8.5–10.1)
CHLORIDE SERPL-SCNC: 100 MMOL/L (ref 97–108)
CO2 SERPL-SCNC: 27 MMOL/L (ref 21–32)
CREAT SERPL-MCNC: 0.84 MG/DL (ref 0.55–1.02)
DIFFERENTIAL METHOD BLD: ABNORMAL
EOSINOPHIL # BLD: 0 K/UL (ref 0–0.4)
EOSINOPHIL NFR BLD: 0 % (ref 0–7)
ERYTHROCYTE [DISTWIDTH] IN BLOOD BY AUTOMATED COUNT: 17.5 % (ref 11.5–14.5)
GLUCOSE BLD STRIP.AUTO-MCNC: 150 MG/DL (ref 65–117)
GLUCOSE BLD STRIP.AUTO-MCNC: 236 MG/DL (ref 65–117)
GLUCOSE BLD STRIP.AUTO-MCNC: 360 MG/DL (ref 65–117)
GLUCOSE BLD STRIP.AUTO-MCNC: 413 MG/DL (ref 65–117)
GLUCOSE SERPL-MCNC: 222 MG/DL (ref 65–100)
HCT VFR BLD AUTO: 31.8 % (ref 35–47)
HGB BLD-MCNC: 9.7 G/DL (ref 11.5–16)
IMM GRANULOCYTES # BLD AUTO: 0.1 K/UL (ref 0–0.04)
IMM GRANULOCYTES NFR BLD AUTO: 1 % (ref 0–0.5)
LYMPHOCYTES # BLD: 0.9 K/UL (ref 0.8–3.5)
LYMPHOCYTES NFR BLD: 6 % (ref 12–49)
MCH RBC QN AUTO: 22.2 PG (ref 26–34)
MCHC RBC AUTO-ENTMCNC: 30.5 G/DL (ref 30–36.5)
MCV RBC AUTO: 72.8 FL (ref 80–99)
MONOCYTES # BLD: 0.5 K/UL (ref 0–1)
MONOCYTES NFR BLD: 3 % (ref 5–13)
NEUTS SEG # BLD: 12.7 K/UL (ref 1.8–8)
NEUTS SEG NFR BLD: 90 % (ref 32–75)
NRBC # BLD: 0.04 K/UL (ref 0–0.01)
NRBC BLD-RTO: 0.3 PER 100 WBC
PERFORMED BY, TECHID: ABNORMAL
PLATELET # BLD AUTO: 468 K/UL (ref 150–400)
PMV BLD AUTO: 9.1 FL (ref 8.9–12.9)
POTASSIUM SERPL-SCNC: 4.5 MMOL/L (ref 3.5–5.1)
RBC # BLD AUTO: 4.37 M/UL (ref 3.8–5.2)
SODIUM SERPL-SCNC: 134 MMOL/L (ref 136–145)
WBC # BLD AUTO: 14.1 K/UL (ref 3.6–11)

## 2021-07-15 PROCEDURE — 74011250636 HC RX REV CODE- 250/636: Performed by: INTERNAL MEDICINE

## 2021-07-15 PROCEDURE — 74011250636 HC RX REV CODE- 250/636: Performed by: PHYSICIAN ASSISTANT

## 2021-07-15 PROCEDURE — 94668 MNPJ CHEST WALL SBSQ: CPT

## 2021-07-15 PROCEDURE — 74011636637 HC RX REV CODE- 636/637: Performed by: INTERNAL MEDICINE

## 2021-07-15 PROCEDURE — 94760 N-INVAS EAR/PLS OXIMETRY 1: CPT

## 2021-07-15 PROCEDURE — 94640 AIRWAY INHALATION TREATMENT: CPT

## 2021-07-15 PROCEDURE — 36415 COLL VENOUS BLD VENIPUNCTURE: CPT

## 2021-07-15 PROCEDURE — 74011250637 HC RX REV CODE- 250/637: Performed by: EMERGENCY MEDICINE

## 2021-07-15 PROCEDURE — 74011000250 HC RX REV CODE- 250: Performed by: INTERNAL MEDICINE

## 2021-07-15 PROCEDURE — 85025 COMPLETE CBC W/AUTO DIFF WBC: CPT

## 2021-07-15 PROCEDURE — 74011250637 HC RX REV CODE- 250/637: Performed by: INTERNAL MEDICINE

## 2021-07-15 PROCEDURE — 80048 BASIC METABOLIC PNL TOTAL CA: CPT

## 2021-07-15 PROCEDURE — 65270000032 HC RM SEMIPRIVATE

## 2021-07-15 PROCEDURE — 82962 GLUCOSE BLOOD TEST: CPT

## 2021-07-15 PROCEDURE — 74011636637 HC RX REV CODE- 636/637: Performed by: HOSPITALIST

## 2021-07-15 PROCEDURE — 74011636637 HC RX REV CODE- 636/637: Performed by: PHYSICIAN ASSISTANT

## 2021-07-15 PROCEDURE — 77010033678 HC OXYGEN DAILY

## 2021-07-15 RX ORDER — FUROSEMIDE 40 MG/1
80 TABLET ORAL
Status: CANCELLED | OUTPATIENT
Start: 2021-07-15

## 2021-07-15 RX ORDER — PREDNISONE 20 MG/1
20 TABLET ORAL 2 TIMES DAILY WITH MEALS
Status: DISCONTINUED | OUTPATIENT
Start: 2021-07-15 | End: 2021-07-16 | Stop reason: HOSPADM

## 2021-07-15 RX ORDER — INSULIN LISPRO 100 [IU]/ML
5 INJECTION, SOLUTION INTRAVENOUS; SUBCUTANEOUS ONCE
Status: COMPLETED | OUTPATIENT
Start: 2021-07-15 | End: 2021-07-15

## 2021-07-15 RX ADMIN — METHYLPREDNISOLONE SODIUM SUCCINATE 40 MG: 40 INJECTION, POWDER, FOR SOLUTION INTRAMUSCULAR; INTRAVENOUS at 05:53

## 2021-07-15 RX ADMIN — CARVEDILOL 3.12 MG: 3.12 TABLET, FILM COATED ORAL at 09:10

## 2021-07-15 RX ADMIN — POTASSIUM CHLORIDE 40 MEQ: 1500 TABLET, EXTENDED RELEASE ORAL at 21:04

## 2021-07-15 RX ADMIN — ACETYLCYSTEINE 200 MG: 200 SOLUTION ORAL; RESPIRATORY (INHALATION) at 20:00

## 2021-07-15 RX ADMIN — GUAIFENESIN 600 MG: 600 TABLET, EXTENDED RELEASE ORAL at 09:09

## 2021-07-15 RX ADMIN — FUROSEMIDE 40 MG: 40 TABLET ORAL at 09:09

## 2021-07-15 RX ADMIN — IPRATROPIUM BROMIDE AND ALBUTEROL SULFATE 3 ML: .5; 2.5 SOLUTION RESPIRATORY (INHALATION) at 07:52

## 2021-07-15 RX ADMIN — Medication 10 ML: at 21:06

## 2021-07-15 RX ADMIN — IPRATROPIUM BROMIDE AND ALBUTEROL SULFATE 3 ML: .5; 2.5 SOLUTION RESPIRATORY (INHALATION) at 02:16

## 2021-07-15 RX ADMIN — BUDESONIDE AND FORMOTEROL FUMARATE DIHYDRATE 2 PUFF: 160; 4.5 AEROSOL RESPIRATORY (INHALATION) at 19:44

## 2021-07-15 RX ADMIN — BUDESONIDE AND FORMOTEROL FUMARATE DIHYDRATE 2 PUFF: 160; 4.5 AEROSOL RESPIRATORY (INHALATION) at 07:52

## 2021-07-15 RX ADMIN — INSULIN LISPRO 4 UNITS: 100 INJECTION, SOLUTION INTRAVENOUS; SUBCUTANEOUS at 09:11

## 2021-07-15 RX ADMIN — FERROUS SULFATE TAB 325 MG (65 MG ELEMENTAL FE) 325 MG: 325 (65 FE) TAB at 09:09

## 2021-07-15 RX ADMIN — PANTOPRAZOLE SODIUM 40 MG: 40 TABLET, DELAYED RELEASE ORAL at 09:10

## 2021-07-15 RX ADMIN — IPRATROPIUM BROMIDE AND ALBUTEROL SULFATE 3 ML: .5; 2.5 SOLUTION RESPIRATORY (INHALATION) at 19:44

## 2021-07-15 RX ADMIN — CARVEDILOL 3.12 MG: 3.12 TABLET, FILM COATED ORAL at 16:51

## 2021-07-15 RX ADMIN — INSULIN LISPRO 5 UNITS: 100 INJECTION, SOLUTION INTRAVENOUS; SUBCUTANEOUS at 11:33

## 2021-07-15 RX ADMIN — PREDNISONE 20 MG: 20 TABLET ORAL at 11:32

## 2021-07-15 RX ADMIN — IPRATROPIUM BROMIDE AND ALBUTEROL SULFATE 3 ML: .5; 2.5 SOLUTION RESPIRATORY (INHALATION) at 13:56

## 2021-07-15 RX ADMIN — SPIRONOLACTONE 25 MG: 25 TABLET ORAL at 09:09

## 2021-07-15 RX ADMIN — FLUTICASONE PROPIONATE 2 SPRAY: 50 SPRAY, METERED NASAL at 09:11

## 2021-07-15 RX ADMIN — Medication 10 ML: at 06:08

## 2021-07-15 RX ADMIN — FUROSEMIDE 40 MG: 40 TABLET ORAL at 16:51

## 2021-07-15 RX ADMIN — ACETYLCYSTEINE 200 MG: 200 SOLUTION ORAL; RESPIRATORY (INHALATION) at 13:57

## 2021-07-15 RX ADMIN — INSULIN LISPRO 2 UNITS: 100 INJECTION, SOLUTION INTRAVENOUS; SUBCUTANEOUS at 16:50

## 2021-07-15 RX ADMIN — PREDNISONE 20 MG: 20 TABLET ORAL at 16:51

## 2021-07-15 RX ADMIN — LISINOPRIL 5 MG: 5 TABLET ORAL at 09:10

## 2021-07-15 RX ADMIN — INSULIN LISPRO 10 UNITS: 100 INJECTION, SOLUTION INTRAVENOUS; SUBCUTANEOUS at 21:04

## 2021-07-15 RX ADMIN — INSULIN GLARGINE 8 UNITS: 100 INJECTION, SOLUTION SUBCUTANEOUS at 21:04

## 2021-07-15 RX ADMIN — LEVOFLOXACIN 500 MG: 500 TABLET, FILM COATED ORAL at 09:09

## 2021-07-15 RX ADMIN — ENOXAPARIN SODIUM 40 MG: 40 INJECTION SUBCUTANEOUS at 09:10

## 2021-07-15 RX ADMIN — ATORVASTATIN CALCIUM 40 MG: 40 TABLET, FILM COATED ORAL at 21:04

## 2021-07-15 RX ADMIN — SPIRONOLACTONE 25 MG: 25 TABLET ORAL at 21:04

## 2021-07-15 RX ADMIN — INSULIN LISPRO 10 UNITS: 100 INJECTION, SOLUTION INTRAVENOUS; SUBCUTANEOUS at 11:32

## 2021-07-15 RX ADMIN — POTASSIUM CHLORIDE 40 MEQ: 1500 TABLET, EXTENDED RELEASE ORAL at 09:10

## 2021-07-15 RX ADMIN — CLOPIDOGREL BISULFATE 75 MG: 75 TABLET ORAL at 09:09

## 2021-07-15 RX ADMIN — GUAIFENESIN 600 MG: 600 TABLET, EXTENDED RELEASE ORAL at 21:04

## 2021-07-15 RX ADMIN — ACETYLCYSTEINE 200 MG: 200 SOLUTION ORAL; RESPIRATORY (INHALATION) at 02:00

## 2021-07-15 RX ADMIN — ACETYLCYSTEINE 200 MG: 200 SOLUTION ORAL; RESPIRATORY (INHALATION) at 07:52

## 2021-07-15 RX ADMIN — METFORMIN HYDROCHLORIDE 500 MG: 500 TABLET ORAL at 09:09

## 2021-07-15 RX ADMIN — LORATADINE 10 MG: 10 TABLET ORAL at 09:09

## 2021-07-15 NOTE — PROGRESS NOTES
Hospitalist Progress Note    Subjective:   Daily Progress Note: 7/15/2021 7:43 AM    Hospital Course: Patient is a 73-year-old female with a history of cardiomyopathy with an EF of 41%, moderate aortic stenosis, hypertension, hyperlipidemia, type 2 diabetes that presented to the emergency room on 7/12/2021 for 2-week history of shortness of breath. It was progressively getting worse. The night of admission patient was extremely short of breath at rest and EMS was called. Oxygen saturations were 60% initially. She arrived to the emergency room on 4 L nasal cannula saturating at 100%. Work-up in the emergency room showed elevated BNP, mild edema on the chest x-ray, CHF exacerbation complicated with a COPD exacerbation. She was given IV Lasix, Solu-Medrol, nebulizers and was able to wean down to 2 L oxygen nasal cannula. Patient's cardiologist and pulmonologist were consulted. She was started on prednisone, duo nebs, Levaquin, Lasix. Subjective: Patient says she is feeling much better but still feels short winded.  Declines cath today as she says she is coughing too much and does not think she can lay flat    Current Facility-Administered Medications   Medication Dose Route Frequency    methylPREDNISolone (PF) (SOLU-MEDROL) injection 40 mg  40 mg IntraVENous Q8H    insulin glargine (LANTUS) injection 8 Units  8 Units SubCUTAneous QHS    acetylcysteine (MUCOMYST) 200 mg/mL (20 %) solution 200 mg  200 mg Nebulization Q6H RT    potassium chloride (K-DUR, KLOR-CON) SR tablet 40 mEq  40 mEq Oral BID    sodium chloride (NS) flush 5-40 mL  5-40 mL IntraVENous Q8H    sodium chloride (NS) flush 5-40 mL  5-40 mL IntraVENous PRN    acetaminophen (TYLENOL) tablet 650 mg  650 mg Oral Q6H PRN    Or    acetaminophen (TYLENOL) suppository 650 mg  650 mg Rectal Q6H PRN    polyethylene glycol (MIRALAX) packet 17 g  17 g Oral DAILY PRN    ondansetron (ZOFRAN ODT) tablet 4 mg  4 mg Oral Q8H PRN    Or    ondansetron [facility]) injection 4 mg  4 mg IntraVENous Q6H PRN    enoxaparin (LOVENOX) injection 40 mg  40 mg SubCUTAneous DAILY    alum-mag hydroxide-simeth (MYLANTA) oral suspension 15 mL  15 mL Oral Q6H PRN    furosemide (LASIX) tablet 40 mg  40 mg Oral ACB&D    guaiFENesin ER (MUCINEX) tablet 600 mg  600 mg Oral BID    budesonide-formoteroL (SYMBICORT) 160-4.5 mcg/actuation HFA inhaler 2 Puff  2 Puff Inhalation BID RT    levoFLOXacin (LEVAQUIN) tablet 500 mg  500 mg Oral Q24H    albuterol-ipratropium (DUO-NEB) 2.5 MG-0.5 MG/3 ML  3 mL Nebulization Q6H RT    atorvastatin (LIPITOR) tablet 40 mg  40 mg Oral QHS    carvediloL (COREG) tablet 3.125 mg  3.125 mg Oral BID WITH MEALS    clopidogreL (PLAVIX) tablet 75 mg  75 mg Oral DAILY    ferrous sulfate tablet 325 mg  325 mg Oral ACB    loratadine (CLARITIN) tablet 10 mg  10 mg Oral DAILY    fluticasone propionate (FLONASE) 50 mcg/actuation nasal spray 2 Spray  2 Spray Both Nostrils DAILY    lisinopriL (PRINIVIL, ZESTRIL) tablet 5 mg  5 mg Oral DAILY    metFORMIN (GLUCOPHAGE) tablet 500 mg  500 mg Oral DAILY WITH BREAKFAST    pantoprazole (PROTONIX) tablet 40 mg  40 mg Oral DAILY    albuterol (PROVENTIL HFA, VENTOLIN HFA, PROAIR HFA) inhaler 2 Puff  2 Puff Inhalation Q6H PRN    spironolactone (ALDACTONE) tablet 25 mg  25 mg Oral BID    insulin lispro (HUMALOG) injection   SubCUTAneous AC&HS    glucose chewable tablet 16 g  4 Tablet Oral PRN    glucagon (GLUCAGEN) injection 1 mg  1 mg IntraMUSCular PRN    dextrose (D50W) injection syrg 12.5-25 g  25-50 mL IntraVENous PRN        Review of Systems  Constitutional: No fevers, No chills, No sweats, No fatigue, No Weakness  Eyes: No redness  Ears, nose, mouth, throat, and face: No nasal congestion, No sore throat, No voice change  Respiratory: ++ Shortness of Breath,++ cough, ++ wheezing  Cardiovascular: No chest pain, No palpitations, No extremity edema  Gastrointestinal: No nausea, No vomiting, No diarrhea, No abdominal pain  Genitourinary: No frequency, No dysuria, No hematuria  Integument/breast: No skin lesion(s)   Neurological: No Confusion, No headaches, No dizziness      Objective:     Visit Vitals  /74 (BP 1 Location: Right upper arm, BP Patient Position: At rest;Sitting)   Pulse 77   Temp 97.5 °F (36.4 °C)   Resp 18   Ht 5' 5\" (1.651 m)   Wt 88.6 kg (195 lb 5.2 oz)   SpO2 97%   Breastfeeding No   BMI 32.50 kg/m²    O2 Flow Rate (L/min): 1 l/min O2 Device: Nasal cannula    Temp (24hrs), Av.8 °F (36.6 °C), Min:97.5 °F (36.4 °C), Max:98.1 °F (36.7 °C)      No intake/output data recorded.  1901 - 07/15 0700  In: 800 [P.O.:800]  Out: 2400 [Urine:2400]    PHYSICAL EXAM:  Constitutional: No acute distress  Skin: Extremities and face reveal no rashes. HEENT: Sclerae anicteric. Extra-occular muscles are intact. No oral ulcers. The neck is supple and no masses. Cardiovascular: Regular rate and rhythm. Respiratory: nonlabored, diminished, no wheezing  GI: Abdomen nondistended, soft, and nontender. Normal active bowel sounds. Musculoskeletal: No pitting edema of the lower legs. Able to move all ext  Neurological:  Patient is alert and oriented.  Cranial nerves II-XII grossly intact  Psychiatric: Mood appears appropriate       Data Review    Recent Results (from the past 24 hour(s))   GLUCOSE, POC    Collection Time: 21 11:47 AM   Result Value Ref Range    Glucose (POC) 292 (H) 65 - 117 mg/dL    Performed by Brenna Shankar (PCT)    GLUCOSE, POC    Collection Time: 21  3:12 PM   Result Value Ref Range    Glucose (POC) 236 (H) 65 - 117 mg/dL    Performed by Brenna Shankar (PCT)    GLUCOSE, POC    Collection Time: 21  8:13 PM   Result Value Ref Range    Glucose (POC) 339 (H) 65 - 117 mg/dL    Performed by [facility]    METABOLIC PANEL, BASIC    Collection Time: 07/15/21  4:34 AM   Result Value Ref Range    Sodium 134 (L) 136 - 145 mmol/L    Potassium 4.5 3.5 - 5.1 mmol/L    Chloride 100 97 - 108 mmol/L    CO2 27 21 - 32 mmol/L    Anion gap 7 5 - 15 mmol/L    Glucose 222 (H) 65 - 100 mg/dL    BUN 26 (H) 6 - 20 mg/dL    Creatinine 0.84 0.55 - 1.02 mg/dL    BUN/Creatinine ratio 31 (H) 12 - 20      GFR est AA >60 >60 ml/min/1.73m2    GFR est non-AA >60 >60 ml/min/1.73m2    Calcium 8.9 8.5 - 10.1 mg/dL   CBC WITH AUTOMATED DIFF    Collection Time: 07/15/21  4:34 AM   Result Value Ref Range    WBC 14.1 (H) 3.6 - 11.0 K/uL    RBC 4.37 3.80 - 5.20 M/uL    HGB 9.7 (L) 11.5 - 16.0 g/dL    HCT 31.8 (L) 35.0 - 47.0 %    MCV 72.8 (L) 80.0 - 99.0 FL    MCH 22.2 (L) 26.0 - 34.0 PG    MCHC 30.5 30.0 - 36.5 g/dL    RDW 17.5 (H) 11.5 - 14.5 %    PLATELET 693 (H) 568 - 400 K/uL    MPV 9.1 8.9 - 12.9 FL    NRBC 0.3 (H) 0.0  WBC    ABSOLUTE NRBC 0.04 (H) 0.00 - 0.01 K/uL    NEUTROPHILS 90 (H) 32 - 75 %    LYMPHOCYTES 6 (L) 12 - 49 %    MONOCYTES 3 (L) 5 - 13 %    EOSINOPHILS 0 0 - 7 %    BASOPHILS 0 0 - 1 %    IMMATURE GRANULOCYTES 1 (H) 0 - 0.5 %    ABS. NEUTROPHILS 12.7 (H) 1.8 - 8.0 K/UL    ABS. LYMPHOCYTES 0.9 0.8 - 3.5 K/UL    ABS. MONOCYTES 0.5 0.0 - 1.0 K/UL    ABS. EOSINOPHILS 0.0 0.0 - 0.4 K/UL    ABS. BASOPHILS 0.0 0.0 - 0.1 K/UL    ABS. IMM. GRANS. 0.1 (H) 0.00 - 0.04 K/UL    DF AUTOMATED     GLUCOSE, POC    Collection Time: 07/15/21  7:55 AM   Result Value Ref Range    Glucose (POC) 236 (H) 65 - 117 mg/dL    Performed by Yony Randolph        Radiology review: Chest xray    Assessment:   1. Acute on chronic systolic CHF  2. COPD exacerbation  3. Mild aortic stenosis  4. Hypertension  5. Hyperlipidemia  6. Type 2 diabetes    Plan:    1. . Troponins indeterminately elevated. Patient was given IV Lasix. Currently on p.o. Lasix and spironolactone. .  Chest x-ray showed cardiac silhouette enlarged with trace interstitial edema. Cardiology consulted. 2.  Pulmonary consulted.   She is on Symbicort, Flonase, Mucinex, Levaquin, Claritin, prednisone --> switch to IV steroids --> we will continue for 1 more day. On Mucomyst.  Oxygen saturation 98% on 1 L. We will continue to wean oxygen as she only uses this at night  3. We will need to be cautious with diuresing be due to her valvular disease. May proceed with a cardiac catheterization after stabilization of pulmonary status. Plan was to do cath on 7/15/2021 but patient declines and she feels she cannot lay flat. 4.  Blood pressure stable. Continue to monitor per unit protocol. She is on Coreg, Lasix, lisinopril, spironolactone  5. Continue with Lipitor  7. Patient on insulin sliding scale And Metformin. glucose levels are elevated. Most likely secondary to steroid use. Started on long-acting insulin 8 units at bedtime  8. CBC BMP in a.m.  9.  PT OT    Dispo: Pending improvement in pulmonary status and possible cardiac catheterization. Suspect greater than 48 hours with home health. CODE STATUS full code     DVT prophylaxis: Lovenox  Ulcer prophylaxis: Protonix    Care Plan discussed with: Patient/Family, Nurse and     Total time spent with patient: 34 minutes.

## 2021-07-15 NOTE — PROGRESS NOTES
PULMONARY NOTE  [facility]    Name: Emanuel Luna MRN: [id number]   : 1955 Hospital: [address] [place] [address]   Date: 7/15/2021  Admission date: 2021 Hospital Day: 4       HPI:     Hospital Problems  Date Reviewed: 2021        Codes Class Noted POA    Acute and chronic respiratory failure with hypoxia [facility]) ICD-10-CM: J96.21  ICD-9-CM: 518.84, 799.02  2021 Unknown        Acute on chronic systolic (congestive) heart failure (HCC) ICD-10-CM: I50.23  ICD-9-CM: 428.23, 428.0  2021 Unknown        Hypokalemia ICD-10-CM: E87.6  ICD-9-CM: 276.8  2021 Yes        Type 2 diabetes mellitus with hyperglycemia, without long-term current use of insulin (HCC) ICD-10-CM: E11.65  ICD-9-CM: 250.00, 790.29  2021 Yes        COPD exacerbation ([address] ICD-10-CM: J44.1  ICD-9-CM: 491.21  2020 Unknown        Anemia ICD-10-CM: D64.9  ICD-9-CM: 285.9  2020 Yes        CAD (coronary artery disease) ICD-10-CM: I25.10  ICD-9-CM: 414.00  2020 Yes        GERD (gastroesophageal reflux disease) ICD-10-CM: K21.9  ICD-9-CM: 530.81  2020 Yes        Mixed hyperlipidemia ICD-10-CM: E78.2  ICD-9-CM: 272.2  2020 Yes                   [x] High complexity decision making was performed  [x] See my orders for details      Subjective/Initial History:     I was asked by Clarence Whitley MD to see Emanuel Luna  a 74 y.o.    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  Now came in because of dyspnea shortness of breath she cannot afford the inhalers it was too expensive not even albuterol she is chronically on home oxygen yesterday she went to the grocery store because of the heat she was feeling weak and tired but she did not take her oxygen so came to the ER saturation was 60% she was put on oxygen 4 L nasal cannula and pulmonary consult was called.       Allergies   Allergen Reactions    Aspirin Unknown (comments)     hemorraging - ended up in the hospital        [facility] reviewed and pertinent medications noted or modified as needed     Current Facility-Administered Medications   Medication    methylPREDNISolone (PF) (SOLU-MEDROL) injection 40 mg    insulin glargine (LANTUS) injection 8 Units    acetylcysteine (MUCOMYST) 200 mg/mL (20 %) solution 200 mg    potassium chloride (K-DUR, KLOR-CON) SR tablet 40 mEq    sodium chloride (NS) flush 5-40 mL    sodium chloride (NS) flush 5-40 mL    acetaminophen (TYLENOL) tablet 650 mg    Or    acetaminophen (TYLENOL) suppository 650 mg    polyethylene glycol (MIRALAX) packet 17 g    ondansetron (ZOFRAN ODT) tablet 4 mg    Or    ondansetron (ZOFRAN) injection 4 mg    enoxaparin (LOVENOX) injection 40 mg    alum-mag hydroxide-simeth (MYLANTA) oral suspension 15 mL    furosemide (LASIX) tablet 40 mg    guaiFENesin ER (MUCINEX) tablet 600 mg    budesonide-formoteroL (SYMBICORT) 160-4.5 mcg/actuation HFA inhaler 2 Puff    levoFLOXacin (LEVAQUIN) tablet 500 mg    albuterol-ipratropium (DUO-NEB) 2.5 MG-0.5 MG/3 ML    atorvastatin (LIPITOR) tablet 40 mg    carvediloL (COREG) tablet 3.125 mg    clopidogreL (PLAVIX) tablet 75 mg    ferrous sulfate tablet 325 mg    loratadine (CLARITIN) tablet 10 mg    fluticasone propionate (FLONASE) 50 mcg/actuation nasal spray 2 Spray    lisinopriL (PRINIVIL, ZESTRIL) tablet 5 mg    metFORMIN (GLUCOPHAGE) tablet 500 mg    pantoprazole (PROTONIX) tablet 40 mg    albuterol (PROVENTIL HFA, VENTOLIN HFA, PROAIR HFA) inhaler 2 Puff    spironolactone (ALDACTONE) tablet 25 mg    insulin lispro (HUMALOG) injection    glucose chewable tablet 16 g  glucagon (GLUCAGEN) injection 1 mg    dextrose (D50W) injection syrg 12.5-25 g      Patient PCP: Mikala Gomez DO  PMH:  has a past medical history of Acute gastrointestinal hemorrhage (2020), Allergic rhinitis (2020), Anemia (2020), Benign essential hypertension (2020), Body mass index 30.0-30.9, adult (2020), CAD (coronary artery disease) (2020), Foot callus (2020), GERD (gastroesophageal reflux disease) (2020), Hair loss, Heart murmur (2020), colonoscopy (2015), Left bundle branch block (2020), Microalbuminuria due to type 2 diabetes mellitus ([facility] (2020), Mixed hyperlipidemia (2020), Morbid obesity ([facility] (2020), Tobacco dependence syndrome (2020), and Type II diabetes mellitus, uncontrolled ([facility] (2020). PSH:   has a past surgical history that includes hx colonoscopy (). FHX: family history includes Cancer in her mother and sister; Hypertension in her father. SHX:  reports that she quit smoking about 7 years ago. She smoked 0.50 packs per day. She has never used smokeless tobacco. She reports previous alcohol use. She reports that she does not use drugs. ROS:    Review of Systems   Constitutional: Positive for malaise/fatigue. HENT: Negative. Eyes: Negative. Respiratory: Positive for shortness of breath and wheezing. Cardiovascular: Positive for orthopnea. Gastrointestinal: Negative. Genitourinary: Negative. Musculoskeletal: Negative. Skin: Negative. Neurological: Negative. Psychiatric/Behavioral: Negative.          Objective:     Vital Signs: Telemetry:    normal sinus rhythm Intake/Output:   Visit Vitals  /74 (BP 1 Location: Right upper arm, BP Patient Position: At rest;Sitting)   Pulse 77   Temp 97.5 °F (36.4 °C)   Resp 18   Ht 5' 5\" (1.651 m)   Wt 88.6 kg (195 lb 5.2 oz)   SpO2 97%   Breastfeeding No   BMI 32.50 kg/m²       Temp (24hrs), Av.8 °F (36.6 °C), Min:97.5 °F (36.4 °C), Max:98.1 °F (36.7 °C)        O2 Device: Nasal cannula O2 Flow Rate (L/min): 2 l/min (patient uses 2 lpm nc at home)       Wt Readings from Last 4 Encounters:   07/14/21 88.6 kg (195 lb 5.2 oz)   05/10/21 85.3 kg (188 lb)   04/12/21 85.6 kg (188 lb 12.8 oz)   02/25/21 86.4 kg (190 lb 8 oz)          Intake/Output Summary (Last 24 hours) at 7/15/2021 0905  Last data filed at 7/15/2021 3076  Gross per 24 hour   Intake    Output 2400 ml   Net -2400 ml       Last shift:      No intake/output data recorded. Last 3 shifts: 07/13 1901 - 07/15 0700  In: 800 [P.O.:800]  Out: 2400 [Urine:2400]       Physical Exam:     Physical Exam  Constitutional:       Appearance: Normal appearance. HENT:      Head: Normocephalic and atraumatic. Nose: Nose normal.   Eyes:      Pupils: Pupils are equal, round, and reactive to light. Cardiovascular:      Rate and Rhythm: Normal rate and regular rhythm. Pulses: Normal pulses. Heart sounds: Normal heart sounds. Pulmonary:      Effort: Pulmonary effort is normal.      Breath sounds: Wheezing present. Abdominal:      General: Abdomen is flat. Bowel sounds are normal.      Palpations: Abdomen is soft. Musculoskeletal:         General: Normal range of motion. Cervical back: Normal range of motion and neck supple. Skin:     General: Skin is warm. Neurological:      General: No focal deficit present. Mental Status: She is alert. Psychiatric:         Mood and Affect: Mood normal.          Labs:    Recent Labs     07/15/21  0434 07/14/21  0426 07/13/21  0302   WBC 14.1* 12.9* 12.9*   HGB 9.7* 8.9* 9.3*   * 416* 455*     Recent Labs     07/15/21  0434 07/14/21  0426 07/13/21  0302   * 135* 135*   K 4.5 4.6 3.6    100 98   CO2 27 30 31   * 304* 147*   BUN 26* 24* 18   CREA 0.84 0.88 0.85   CA 8.9 8.6 9.0   MG  --   --  1.4*     No results for input(s): PH, PCO2, PO2, HCO3, FIO2 in the last 72 hours.   Recent Labs     07/12/21  5781 07/12/21  1343 07/12/21  0940   TROIQ 0.05* 0.05* 0.07*     No results found for: BNPP, BNP   Lab Results   Component Value Date/Time    Culture result: Scant Normal respiratory duane 01/28/2021 09:43 AM    Culture result: No Growth (<1000 cfu/mL) 01/28/2021 01:00 AM     Lab Results   Component Value Date/Time    TSH 0.83 07/12/2021 09:40 AM    TSH, External 2.420 01/16/2019 12:00 AM       Imaging:    CXR Results  (Last 48 hours)    None        Results from Hospital Encounter encounter on 07/12/21    XR CHEST PORT    Narrative  Study: XR CHEST PORT    Clinical indication: Shortness of breath    Comparison: Chest x-ray 2/1/2021. Impression  Findings/impression:    No consolidative airspace disease, pleural effusion or pneumothorax. Cardiac silhouette is enlarged. Trace interstitial edema. No acute osseous abnormality. Results from [place] encounter on 01/25/21    XR CHEST PORT    Narrative  Chest, frontal view, 2/1/2021. History: CHF. Comparison: Including chest 1/31/2021. Findings: The cardiac silhouette is stable. The lungs are adequately expanded. Pulmonary vascular congestion persists. Opacities in the lungs most pronounced  at the bases is not significantly changed. Pleural effusions are not  significantly changed. No pneumothorax is identified. The osseous structures  are stable. Impression  No significant interval change. XR CHEST PORT    Narrative  XR CHEST PORT    Comparison: Chest radiograph dated January 30, 2021    Impression  FINDINGS/IMPRESSION:  Radiograph is limited by patient's body habitus and AP portable technique. Lungs remain hypoinflated with bibasilar atelectasis and probable small  bilateral pleural effusions. Cardiac silhouette stable in size. No radiographically apparent pneumothorax. Mild improvement in bilateral airspace opacity, this is most confluent at the  right lung base.  This may represent multilobar pneumonia or edema, or perhaps a  combination of each. Results from [place] encounter on 01/25/21    CTA CHEST W OR W WO CONT    Narrative  Exam: CTA chest with intravenous contrast    Comparison: Plain radiograph 1/25/2021. CT 1/2/2021. Technique: During intravenous contrast administration (100 cc [id number]), axial  sections were obtained through the chest. Coronal and sagittal reconstructions  were generated. Maximal intensity projection images were generated. Dose reduction: All CT scans at this facility are performed using dose reduction  optimization techniques as appropriate to perform the exam including the  following: automated exposure control, adjustments of the mA and/or kV according  to patient size, or use of iterative reconstruction technique. Findings: The examination is limited by motion degradation and by adduction of  the patient's upper extremities, which renders scattering and attenuation of the  diagnostic x-ray beam. Nevertheless, there is no demonstrated pulmonary embolus. Endotracheal tube tip in the middle third of the trachea. Distal trachea and  main bronchi poorly expanded. There is calcific atherosclerotic disease of a  nonaneurysmal thoracic aorta, which manifests no evidence of intimal  incompetence. Calcific atherosclerotic coronary arterial disease is present. Prominent bilateral mediastinal and hilar lymph nodes, increased compared to  1/2/2021, likely inflammatory. Small to moderate pericardial effusion of near  water attenuation fluid. It is 2 cm greatest axial thickness. Small bilateral  pleural effusions. Bilateral dependent airspace disease compatible with  pneumonia. Interstitial disease also present, compatible with pulmonary edema. Included abdomen indicates a stable 2 cm left adrenal nodule of water  attenuation, compatible with a benign adenoma. No axillary or supraclavicular  adenopathy or mass.  Visualized thoracic bone scaffolding unremarkable. Impression  Impression:  1. No evidence of pulmonary embolus. 2. Bilateral dependent airspace disease compatible with pneumonia. 3. Diffuse pulmonary interstitial disease compatible with pulmonary edema. 4. Bilateral pleural effusions. 5. Coronary artery calcific atherosclerotic disease. 6. Small to moderate pericardial effusion. 7. Distal trachea and main bronchi are poorly expanded. IMPRESSION:   1. Acute on chronic hypoxic respiratory failure  2. Chronic Obstructive Pulmonary Disease  3. Congestive heart failure cardiomyopathy ejection fraction 41%  4. Moderate aortic stenosis  5. Hypokalemia  6. Prognosis guarded       RECOMMENDATIONS/PLAN:     1. On nasal Cannula oxygen as salvage oxygen delivery device to provide high concentration of oxygen to overcome refractory hypoxia; she is chronically on home oxygen 2 L nasal cannula   2. She is on IV Solu-Medrol and Symbicort and nebulizer treatment discontinue Solu-Medrol start patient on prednisone  3. For cardiac catheterization today discussed with Dr. Sapna Dan  4. Agree with Lasix and Aldactone  5.  On Levaquin chest x-ray shows significant improvement             Stephanie Love MD

## 2021-07-16 VITALS
TEMPERATURE: 97.7 F | RESPIRATION RATE: 18 BRPM | HEIGHT: 65 IN | SYSTOLIC BLOOD PRESSURE: 136 MMHG | WEIGHT: 195.33 LBS | OXYGEN SATURATION: 98 % | DIASTOLIC BLOOD PRESSURE: 76 MMHG | BODY MASS INDEX: 32.54 KG/M2 | HEART RATE: 74 BPM

## 2021-07-16 LAB
ANION GAP SERPL CALC-SCNC: 5 MMOL/L (ref 5–15)
BASOPHILS # BLD: 0 K/UL (ref 0–0.1)
BASOPHILS NFR BLD: 0 % (ref 0–1)
BUN SERPL-MCNC: 28 MG/DL (ref 6–20)
BUN/CREAT SERPL: 31 (ref 12–20)
CA-I BLD-MCNC: 9.2 MG/DL (ref 8.5–10.1)
CHLORIDE SERPL-SCNC: 99 MMOL/L (ref 97–108)
CO2 SERPL-SCNC: 30 MMOL/L (ref 21–32)
CREAT SERPL-MCNC: 0.89 MG/DL (ref 0.55–1.02)
DIFFERENTIAL METHOD BLD: ABNORMAL
EOSINOPHIL # BLD: 0 K/UL (ref 0–0.4)
EOSINOPHIL NFR BLD: 0 % (ref 0–7)
ERYTHROCYTE [DISTWIDTH] IN BLOOD BY AUTOMATED COUNT: 17.2 % (ref 11.5–14.5)
GLUCOSE BLD STRIP.AUTO-MCNC: 216 MG/DL (ref 65–117)
GLUCOSE BLD STRIP.AUTO-MCNC: 242 MG/DL (ref 65–117)
GLUCOSE SERPL-MCNC: 224 MG/DL (ref 65–100)
HCT VFR BLD AUTO: 32.7 % (ref 35–47)
HGB BLD-MCNC: 10 G/DL (ref 11.5–16)
IMM GRANULOCYTES # BLD AUTO: 0.1 K/UL (ref 0–0.04)
IMM GRANULOCYTES NFR BLD AUTO: 1 % (ref 0–0.5)
LYMPHOCYTES # BLD: 1.7 K/UL (ref 0.8–3.5)
LYMPHOCYTES NFR BLD: 13 % (ref 12–49)
MCH RBC QN AUTO: 22.4 PG (ref 26–34)
MCHC RBC AUTO-ENTMCNC: 30.6 G/DL (ref 30–36.5)
MCV RBC AUTO: 73.2 FL (ref 80–99)
MONOCYTES # BLD: 1.1 K/UL (ref 0–1)
MONOCYTES NFR BLD: 8 % (ref 5–13)
NEUTS SEG # BLD: 10.2 K/UL (ref 1.8–8)
NEUTS SEG NFR BLD: 78 % (ref 32–75)
NRBC # BLD: 0.07 K/UL (ref 0–0.01)
NRBC BLD-RTO: 0.5 PER 100 WBC
PERFORMED BY, TECHID: ABNORMAL
PERFORMED BY, TECHID: ABNORMAL
PLATELET # BLD AUTO: 485 K/UL (ref 150–400)
PMV BLD AUTO: 9.5 FL (ref 8.9–12.9)
POTASSIUM SERPL-SCNC: 5 MMOL/L (ref 3.5–5.1)
RBC # BLD AUTO: 4.47 M/UL (ref 3.8–5.2)
SODIUM SERPL-SCNC: 134 MMOL/L (ref 136–145)
WBC # BLD AUTO: 13 K/UL (ref 3.6–11)

## 2021-07-16 PROCEDURE — 74011250636 HC RX REV CODE- 250/636: Performed by: INTERNAL MEDICINE

## 2021-07-16 PROCEDURE — 85025 COMPLETE CBC W/AUTO DIFF WBC: CPT

## 2021-07-16 PROCEDURE — 94668 MNPJ CHEST WALL SBSQ: CPT

## 2021-07-16 PROCEDURE — 80048 BASIC METABOLIC PNL TOTAL CA: CPT

## 2021-07-16 PROCEDURE — 36415 COLL VENOUS BLD VENIPUNCTURE: CPT

## 2021-07-16 PROCEDURE — 82962 GLUCOSE BLOOD TEST: CPT

## 2021-07-16 PROCEDURE — 74011000250 HC RX REV CODE- 250: Performed by: INTERNAL MEDICINE

## 2021-07-16 PROCEDURE — 77010033678 HC OXYGEN DAILY

## 2021-07-16 PROCEDURE — 74011636637 HC RX REV CODE- 636/637: Performed by: HOSPITALIST

## 2021-07-16 PROCEDURE — 74011250637 HC RX REV CODE- 250/637: Performed by: EMERGENCY MEDICINE

## 2021-07-16 PROCEDURE — 94760 N-INVAS EAR/PLS OXIMETRY 1: CPT

## 2021-07-16 PROCEDURE — 74011636637 HC RX REV CODE- 636/637: Performed by: INTERNAL MEDICINE

## 2021-07-16 PROCEDURE — 94640 AIRWAY INHALATION TREATMENT: CPT

## 2021-07-16 PROCEDURE — 74011250637 HC RX REV CODE- 250/637: Performed by: INTERNAL MEDICINE

## 2021-07-16 RX ORDER — GUAIFENESIN 600 MG/1
600 TABLET, EXTENDED RELEASE ORAL 2 TIMES DAILY
Qty: 10 TABLET | Refills: 0 | Status: SHIPPED | OUTPATIENT
Start: 2021-07-16 | End: 2021-07-21

## 2021-07-16 RX ORDER — BUDESONIDE AND FORMOTEROL FUMARATE DIHYDRATE 160; 4.5 UG/1; UG/1
2 AEROSOL RESPIRATORY (INHALATION) 2 TIMES DAILY
Qty: 1 INHALER | Refills: 1 | Status: SHIPPED | OUTPATIENT
Start: 2021-07-16 | End: 2021-08-03 | Stop reason: ALTCHOICE

## 2021-07-16 RX ORDER — LEVOFLOXACIN 500 MG/1
500 TABLET, FILM COATED ORAL EVERY 24 HOURS
Qty: 3 TABLET | Refills: 0 | Status: SHIPPED | OUTPATIENT
Start: 2021-07-16 | End: 2021-07-19

## 2021-07-16 RX ORDER — PREDNISONE 20 MG/1
TABLET ORAL
Qty: 11 TABLET | Refills: 0 | Status: SHIPPED | OUTPATIENT
Start: 2021-07-16 | End: 2021-08-03 | Stop reason: ALTCHOICE

## 2021-07-16 RX ORDER — ALBUTEROL SULFATE 90 UG/1
2 AEROSOL, METERED RESPIRATORY (INHALATION)
Qty: 1 INHALER | Refills: 1 | Status: SHIPPED | OUTPATIENT
Start: 2021-07-16 | End: 2021-08-03 | Stop reason: ALTCHOICE

## 2021-07-16 RX ADMIN — INSULIN LISPRO 4 UNITS: 100 INJECTION, SOLUTION INTRAVENOUS; SUBCUTANEOUS at 08:08

## 2021-07-16 RX ADMIN — ACETYLCYSTEINE 200 MG: 200 SOLUTION ORAL; RESPIRATORY (INHALATION) at 08:40

## 2021-07-16 RX ADMIN — LISINOPRIL 5 MG: 5 TABLET ORAL at 08:09

## 2021-07-16 RX ADMIN — LORATADINE 10 MG: 10 TABLET ORAL at 08:10

## 2021-07-16 RX ADMIN — IPRATROPIUM BROMIDE AND ALBUTEROL SULFATE 3 ML: .5; 2.5 SOLUTION RESPIRATORY (INHALATION) at 01:14

## 2021-07-16 RX ADMIN — ENOXAPARIN SODIUM 40 MG: 40 INJECTION SUBCUTANEOUS at 08:08

## 2021-07-16 RX ADMIN — ACETYLCYSTEINE 200 MG: 200 SOLUTION ORAL; RESPIRATORY (INHALATION) at 02:00

## 2021-07-16 RX ADMIN — GUAIFENESIN 600 MG: 600 TABLET, EXTENDED RELEASE ORAL at 08:08

## 2021-07-16 RX ADMIN — POTASSIUM CHLORIDE 40 MEQ: 1500 TABLET, EXTENDED RELEASE ORAL at 08:09

## 2021-07-16 RX ADMIN — CARVEDILOL 3.12 MG: 3.12 TABLET, FILM COATED ORAL at 08:09

## 2021-07-16 RX ADMIN — PANTOPRAZOLE SODIUM 40 MG: 40 TABLET, DELAYED RELEASE ORAL at 08:09

## 2021-07-16 RX ADMIN — FUROSEMIDE 40 MG: 40 TABLET ORAL at 08:09

## 2021-07-16 RX ADMIN — CLOPIDOGREL BISULFATE 75 MG: 75 TABLET ORAL at 08:08

## 2021-07-16 RX ADMIN — FERROUS SULFATE TAB 325 MG (65 MG ELEMENTAL FE) 325 MG: 325 (65 FE) TAB at 08:10

## 2021-07-16 RX ADMIN — LEVOFLOXACIN 500 MG: 500 TABLET, FILM COATED ORAL at 08:08

## 2021-07-16 RX ADMIN — PREDNISONE 20 MG: 20 TABLET ORAL at 08:09

## 2021-07-16 RX ADMIN — BUDESONIDE AND FORMOTEROL FUMARATE DIHYDRATE 2 PUFF: 160; 4.5 AEROSOL RESPIRATORY (INHALATION) at 08:34

## 2021-07-16 RX ADMIN — IPRATROPIUM BROMIDE AND ALBUTEROL SULFATE 3 ML: .5; 2.5 SOLUTION RESPIRATORY (INHALATION) at 08:34

## 2021-07-16 RX ADMIN — Medication 10 ML: at 05:26

## 2021-07-16 RX ADMIN — SPIRONOLACTONE 25 MG: 25 TABLET ORAL at 08:10

## 2021-07-16 RX ADMIN — METFORMIN HYDROCHLORIDE 500 MG: 500 TABLET ORAL at 08:08

## 2021-07-16 NOTE — PROGRESS NOTES
Patient has orders to discharge today with home health. CM met with the patient at the bedside to discuss. She is agreeable. No preference given. Referral has been sent to [facility] [address] [place] [address], awaiting acceptance. Medicare pt has received, reviewed, and signed 2nd IM letter informing them of their right to appeal the discharge. Signed copied has been placed on pt bedside chart. Patient has been accepted with [facility] [address] [place] [address] with [facility] 7/19 at the request of the patient.

## 2021-07-16 NOTE — PROGRESS NOTES
I have reviewed discharge instructions with Ms. Chantelle Medina. Patient medications were reviewed, patient will receive samples of her inhalers provided by Dr. Jolynn Garcia on 07/17/21 and will be left at the  for . All other medications patient will  from her local pharmacy. Patient IV access was removed, patient follow up appointments were reviewed, patient belongings reviewed and packed up with the patient. Patient also obtained her oxygen from home and went home to home self care on 2 liters. All patient discharge instructions were discussed and patient verbalized understanding. Transportation is being provided by patient family member, daughter by private vehicle to home self care. Discharge plan of care/case management plan validated with provider discharge order.

## 2021-07-16 NOTE — DISCHARGE SUMMARY
Hospitalist Discharge Summary     Patient ID:    Reginaldo Thompson  [id number]  65 y.o.  1955    Admit date: 7/12/2021    Discharge date : 7/16/2021    Chronic Diagnoses:    Problem List as of 7/16/2021 Date Reviewed: 7/12/2021        Codes Class Noted - Resolved    Acute and chronic respiratory failure with hypoxia [facility]) ICD-10-CM: J96.21  ICD-9-CM: 518.84, 799.02  7/12/2021 - Present        Acute on chronic systolic (congestive) heart failure (HCC) ICD-10-CM: I50.23  ICD-9-CM: 428.23, 428.0  7/12/2021 - Present        Hypokalemia ICD-10-CM: E87.6  ICD-9-CM: 276.8  7/12/2021 - Present        Type 2 diabetes mellitus with hyperglycemia, without long-term current use of insulin ([facility]) ICD-10-CM: E11.65  ICD-9-CM: 250.00, 790.29  2/25/2021 - Present        Hospital discharge follow-up ICD-10-CM: Z09  ICD-9-CM: V67.59  2/10/2021 - Present        Chronic obstructive pulmonary disease ([facility] ICD-10-CM: J44.9  ICD-9-CM: 882  2/10/2021 - Present        Coronary artery disease involving native heart without angina pectoris ICD-10-CM: I25.10  ICD-9-CM: 414.01  2/10/2021 - Present        Gastroesophageal reflux disease without esophagitis ICD-10-CM: K21.9  ICD-9-CM: 530.81  2/10/2021 - Present        Congestive heart failure ([facility] ICD-10-CM: I50.9  ICD-9-CM: 428.0  2/10/2021 - Present        Aortic stenosis ICD-10-CM: I35.0  ICD-9-CM: 424.1  2/2/2021 - Present        GI bleed ICD-10-CM: K92.2  ICD-9-CM: 578.9  1/25/2021 - Present        Acute respiratory failure with hypoxia ([facility] ICD-10-CM: J96.01  ICD-9-CM: 518.81  1/1/2021 - Present        Hyponatremia ICD-10-CM: E87.1  ICD-9-CM: 276.1  12/13/2020 - Present        COPD exacerbation ([facility] ICD-10-CM: J44.1  ICD-9-CM: 491.21  12/13/2020 - Present        Acute gastrointestinal hemorrhage ICD-10-CM: K92.2  ICD-9-CM: 578.9  8/28/2020 - Present        Allergic rhinitis ICD-10-CM: J30.9  ICD-9-CM: 477.9  8/28/2020 - Present        Anemia ICD-10-CM: D64.9  ICD-9-CM: 285.9  8/28/2020 - Present        CAD (coronary artery disease) ICD-10-CM: I25.10  ICD-9-CM: 414.00  8/28/2020 - Present        Foot callus ICD-10-CM: L84  ICD-9-CM: 700  8/28/2020 - Present        Left bundle branch block ICD-10-CM: I44.7  ICD-9-CM: 426.3  8/28/2020 - Present        Microalbuminuria due to type 2 diabetes mellitus ([facility] ICD-10-CM: E11.29, R80.9  ICD-9-CM: 250.40, 791.0  8/28/2020 - Present        Morbid obesity ([facility] ICD-10-CM: E66.01  ICD-9-CM: 278.01  8/28/2020 - Present        Benign essential hypertension ICD-10-CM: I10  ICD-9-CM: 401.1  6/25/2020 - Present        Body mass index 30.0-30.9, adult ICD-10-CM: Z68.30  ICD-9-CM: V85.30  6/25/2020 - Present        Type II diabetes mellitus, uncontrolled ([facility] ICD-10-CM: E11.65  ICD-9-CM: 250.02  6/25/2020 - Present        GERD (gastroesophageal reflux disease) ICD-10-CM: K21.9  ICD-9-CM: 530.81  6/25/2020 - Present        Heart murmur ICD-10-CM: R01.1  ICD-9-CM: 785.2  6/25/2020 - Present        Mixed hyperlipidemia ICD-10-CM: E78.2  ICD-9-CM: 272.2  6/25/2020 - Present        Tobacco dependence syndrome ICD-10-CM: F17.200  ICD-9-CM: 305.1  6/25/2020 - Present              Final Diagnoses:   1. Acute on chronic systolic CHF  2. COPD exacerbation  3. Mild aortic stenosis  4. Hypertension  5. Hyperlipidemia  6. Type 2 diabetes    Reason for Hospitalization: SOB      Hospital Course: Patient is a 59-year-old female with a history of cardiomyopathy with an EF of 41%, moderate aortic stenosis, hypertension, hyperlipidemia, type 2 diabetes that presented to the emergency room on 7/12/2021 for 2-week history of shortness of breath. It was progressively getting worse. The night of admission patient was extremely short of breath at rest and EMS was called. Oxygen saturations were 60% initially. She arrived to the emergency room on 4 L nasal cannula saturating at 100%.   Work-up in the emergency room showed elevated BNP, mild edema on the chest x-ray, CHF exacerbation complicated with a COPD exacerbation. She was given IV Lasix, Solu-Medrol, nebulizers and was able to wean down to 2 L oxygen nasal cannula. Patient's cardiologist and pulmonologist were consulted. She was started on IV solu-medrol, duo nebs, Levaquin, Lasix. Patient weaned off IV steroids and transition to oral steroids. She declined inpatient cardiac cath as she wanted to give her lungs more time to heal. Today on 7/16/2021 patient states she feels ready for discharge. She will have slow tapering steroid dose, Levaquin for 3 more days to complete a 7 day course of antibiotics. Discharge Medications:   Current Discharge Medication List      START taking these medications    Details   albuterol (PROVENTIL HFA, VENTOLIN HFA, PROAIR HFA) 90 mcg/actuation inhaler Take 2 Puffs by inhalation every six (6) hours as needed for Wheezing. Qty: 1 Inhaler, Refills: 1  Start date: 7/16/2021      budesonide-formoteroL (SYMBICORT) 160-4.5 mcg/actuation HFAA Take 2 Puffs by inhalation two (2) times a day. Qty: 1 Inhaler, Refills: 1  Start date: 7/16/2021      guaiFENesin ER (MUCINEX) 600 mg ER tablet Take 1 Tablet by mouth two (2) times a day for 5 days. Qty: 10 Tablet, Refills: 0  Start date: 7/16/2021, End date: 7/21/2021      levoFLOXacin (LEVAQUIN) 500 mg tablet Take 1 Tablet by mouth every twenty-four (24) hours for 3 days. Qty: 3 Tablet, Refills: 0  Start date: 7/16/2021, End date: 7/19/2021      predniSONE (DELTASONE) 20 mg tablet 1 tab PO BID x 3 days 1 tab PO daily x 3 days 1/2 tab PO daily x 4 days  Qty: 11 Tablet, Refills: 0  Start date: 7/16/2021         CONTINUE these medications which have NOT CHANGED    Details   atorvastatin (LIPITOR) 40 mg tablet Take 1 Tablet by mouth nightly for 90 days. Qty: 90 Tablet, Refills: 1      spironolactone (ALDACTONE) 25 mg tablet Take 1 Tablet by mouth two (2) times a day.  Indications: heart failure with reduced ejection fraction, fluid in the lungs due to chronic heart failure  Qty: 30 Tablet, Refills: 2    Associated Diagnoses: Benign essential hypertension; Congestive heart failure, unspecified HF chronicity, unspecified heart failure type (HCC)      furosemide (Lasix) 40 mg tablet Take 1 Tab by mouth daily. Qty: 90 Tab, Refills: 1    Associated Diagnoses: Benign essential hypertension; Congestive heart failure, unspecified HF chronicity, unspecified heart failure type (HCC)      fexofenadine (ALLEGRA) 180 mg tablet Take 1 Tab by mouth daily. STOP loratadine  Qty: 30 Tab, Refills: 3    Associated Diagnoses: Environmental allergies      metFORMIN (GLUCOPHAGE) 500 mg tablet Take 2 tablets twice a day with meals  Qty: 120 Tab, Refills: 3    Associated Diagnoses: Type 2 diabetes mellitus with hyperglycemia, without long-term current use of insulin (HCC)      carvediloL (COREG) 3.125 mg tablet Take 1 Tab by mouth two (2) times daily (with meals). Qty: 180 Tab, Refills: 3    Associated Diagnoses: Benign essential hypertension; Congestive heart failure, unspecified HF chronicity, unspecified heart failure type (HCC)      pantoprazole (Protonix) 40 mg tablet Take 1 Tab by mouth daily.   Qty: 90 Tab, Refills: 1    Associated Diagnoses: Gastroesophageal reflux disease without esophagitis      potassium chloride (KLOR-CON) 10 mEq tablet By oral route take one tablet Monday, Wednesday and Friday  Qty: 40 Tab, Refills: 3    Associated Diagnoses: Benign essential hypertension         STOP taking these medications       clopidogreL (PLAVIX) 75 mg tab Comments:   Reason for Stopping:         fluticasone propionate (FLONASE) 50 mcg/actuation nasal spray Comments:   Reason for Stopping:         ferrous sulfate 325 mg (65 mg iron) tablet Comments:   Reason for Stopping:         ProAir RespiClick 90 mcg/actuation breath activated inhaler Comments:   Reason for Stopping:         lisinopriL (PRINIVIL, ZESTRIL) 5 mg tablet Comments:   Reason for Stopping: albuterol-ipratropium (DUO-NEB) 2.5 mg-0.5 mg/3 ml nebu Comments:   Reason for Stopping: Follow up Care:    1. Shahab Kaba DO in 1-2 weeks. Follow-up Information     Follow up With Specialties Details Why Contact Info    Shahab Kaba DO Family Medicine In 3 weeks  [facility]  [address] [facility]  [phone]      Tavon Bravo MD Pulmonary Disease In 2 weeks  [address] [facility] [address] [facility]      Rockford Duverney, MD Cardiology In 2 weeks  [place] [address]  [phone]              Patient Follow Up Instructions: Activity: Activity as tolerated  Diet:  Diabetic Diet    Condition at Discharge:  Stable  __________________________________________________________________    Disposition  Home with family and home health services  ____________________________________________________________________    Code Status: Full Code  ___________________________________________________________________    Discharge Exam:  Patient seen and examined by me on discharge day. Pertinent Findings:  Gen:    Not in distress  Chest: Clear lungs  CVS:   Regular rhythm. No edema  Abd:  Soft, not distended, not tender  Neuro:  Alert    CONSULTATIONS: Cardiology and Pulmonary/Intensive care    Significant Diagnostic Studies:   7/12/2021: BUN 14 mg/dL (Ref range: 6 - 20 mg/dL); Calcium 8.8 mg/dL (Ref range: 8.5 - 10.1 mg/dL); CO2 27 mmol/L (Ref range: 21 - 32 mmol/L); Creatinine 0.84 mg/dL (Ref range: 0.55 - 1.02 mg/dL); Glucose 184 mg/dL* (Ref range: 65 - 100 mg/dL); HCT 31.6 %* (Ref range: 35.0 - 47.0 %); HGB 9.8 g/dL* (Ref range: 11.5 - 16.0 g/dL); Potassium 3.0 mmol/L* (Ref range: 3.5 - 5.1 mmol/L); Sodium 136 mmol/L (Ref range: 136 - 145 mmol/L)  7/13/2021: BUN 18 mg/dL (Ref range: 6 - 20 mg/dL); Calcium 9.0 mg/dL (Ref range: 8.5 - 10.1 mg/dL); CO2 31 mmol/L (Ref range: 21 - 32 mmol/L);  Creatinine 0.85 mg/dL (Ref range: 0.55 - 1.02 mg/dL); Glucose 147 mg/dL* (Ref range: 65 - 100 mg/dL); HCT 30.1 %* (Ref range: 35.0 - 47.0 %); HGB 9.3 g/dL* (Ref range: 11.5 - 16.0 g/dL); Potassium 3.6 mmol/L (Ref range: 3.5 - 5.1 mmol/L); Sodium 135 mmol/L* (Ref range: 136 - 145 mmol/L)  Recent Labs     07/15/21  0434 07/14/21  0426   WBC 14.1* 12.9*   HGB 9.7* 8.9*   HCT 31.8* 28.7*   * 416*     Recent Labs     07/16/21  0625 07/15/21  0434 07/14/21  0426   * 134* 135*   K 5.0 4.5 4.6   CL 99 100 100   CO2 30 27 30   BUN 28* 26* 24*   CREA 0.89 0.84 0.88   * 222* 304*   CA 9.2 8.9 8.6     No results for input(s): ALT, AP, TBIL, TBILI, TP, ALB, GLOB, GGT, AML, LPSE in the last 72 hours. No lab exists for component: SGOT, GPT, AMYP, HLPSE  No results for input(s): INR, PTP, APTT, INREXT in the last 72 hours. No results for input(s): FE, TIBC, PSAT, FERR in the last 72 hours. No results for input(s): PH, PCO2, PO2 in the last 72 hours. No results for input(s): CPK, CKMB in the last 72 hours.     No lab exists for component: TROPONINI  Lab Results   Component Value Date/Time    Glucose (POC) 216 (H) 07/16/2021 07:28 AM    Glucose (POC) 242 (H) 07/16/2021 02:28 AM    Glucose (POC) 360 (H) 07/15/2021 08:31 PM    Glucose (POC) 150 (H) 07/15/2021 03:49 PM    Glucose (POC) 413 (H) 07/15/2021 10:57 AM         Total Time: >30 min     Signed:  Alok Goodwin PA-C  7/16/2021  7:39 AM

## 2021-07-16 NOTE — PROGRESS NOTES
PULMONARY NOTE  [facility]    Name: Ralf Nguyen MRN: [id number]   : 1955 Hospital: [address] [place] [address]   Date: 2021  Admission date: 2021 Hospital Day: 5       HPI:     Hospital Problems  Date Reviewed: 2021        Codes Class Noted POA    Acute and chronic respiratory failure with hypoxia [facility]) ICD-10-CM: J96.21  ICD-9-CM: 518.84, 799.02  2021 Unknown        Acute on chronic systolic (congestive) heart failure (HCC) ICD-10-CM: I50.23  ICD-9-CM: 428.23, 428.0  2021 Unknown        Hypokalemia ICD-10-CM: E87.6  ICD-9-CM: 276.8  2021 Yes        Type 2 diabetes mellitus with hyperglycemia, without long-term current use of insulin (HCC) ICD-10-CM: E11.65  ICD-9-CM: 250.00, 790.29  2021 Yes        COPD exacerbation ([address] ICD-10-CM: J44.1  ICD-9-CM: 491.21  2020 Unknown        Anemia ICD-10-CM: D64.9  ICD-9-CM: 285.9  2020 Yes        CAD (coronary artery disease) ICD-10-CM: I25.10  ICD-9-CM: 414.00  2020 Yes        GERD (gastroesophageal reflux disease) ICD-10-CM: K21.9  ICD-9-CM: 530.81  2020 Yes        Mixed hyperlipidemia ICD-10-CM: E78.2  ICD-9-CM: 272.2  2020 Yes                   [x] High complexity decision making was performed  [x] See my orders for details      Subjective/Initial History:     I was asked by Mercy Bartlett MD to see Ralf Nguyen  a 74 y.o.    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  Now came in because of dyspnea shortness of breath she cannot afford the inhalers it was too expensive not even albuterol she is chronically on home oxygen yesterday she went to the grocery store because of the heat she was feeling weak and tired but she did not take her oxygen so came to the ER saturation was 60% she was put on oxygen 4 L nasal cannula and pulmonary consult was called.       Allergies   Allergen Reactions    Aspirin Unknown (comments)     hemorraging - ended up in the hospital        [facility] reviewed and pertinent medications noted or modified as needed     Current Facility-Administered Medications   Medication    predniSONE (DELTASONE) tablet 20 mg    insulin glargine (LANTUS) injection 8 Units    acetylcysteine (MUCOMYST) 200 mg/mL (20 %) solution 200 mg    potassium chloride (K-DUR, KLOR-CON) SR tablet 40 mEq    sodium chloride (NS) flush 5-40 mL    sodium chloride (NS) flush 5-40 mL    acetaminophen (TYLENOL) tablet 650 mg    Or    acetaminophen (TYLENOL) suppository 650 mg    polyethylene glycol (MIRALAX) packet 17 g    ondansetron (ZOFRAN ODT) tablet 4 mg    Or    ondansetron (ZOFRAN) injection 4 mg    enoxaparin (LOVENOX) injection 40 mg    alum-mag hydroxide-simeth (MYLANTA) oral suspension 15 mL    furosemide (LASIX) tablet 40 mg    guaiFENesin ER (MUCINEX) tablet 600 mg    budesonide-formoteroL (SYMBICORT) 160-4.5 mcg/actuation HFA inhaler 2 Puff    albuterol-ipratropium (DUO-NEB) 2.5 MG-0.5 MG/3 ML    atorvastatin (LIPITOR) tablet 40 mg    carvediloL (COREG) tablet 3.125 mg    clopidogreL (PLAVIX) tablet 75 mg    ferrous sulfate tablet 325 mg    loratadine (CLARITIN) tablet 10 mg    fluticasone propionate (FLONASE) 50 mcg/actuation nasal spray 2 Spray    lisinopriL (PRINIVIL, ZESTRIL) tablet 5 mg    metFORMIN (GLUCOPHAGE) tablet 500 mg    pantoprazole (PROTONIX) tablet 40 mg    albuterol (PROVENTIL HFA, VENTOLIN HFA, PROAIR HFA) inhaler 2 Puff    spironolactone (ALDACTONE) tablet 25 mg    insulin lispro (HUMALOG) injection    glucose chewable tablet 16 g    glucagon (GLUCAGEN) injection 1 mg    dextrose (D50W) injection syrg 12.5-25 g      Patient PCP: Alesha Caro DO  PMH:  has a past medical history of Acute gastrointestinal hemorrhage (2020), Allergic rhinitis (2020), Anemia (2020), Benign essential hypertension (2020), Body mass index 30.0-30.9, adult (2020), CAD (coronary artery disease) (2020), Foot callus (2020), GERD (gastroesophageal reflux disease) (2020), Hair loss, Heart murmur (2020), colonoscopy (2015), Left bundle branch block (2020), Microalbuminuria due to type 2 diabetes mellitus ([address] (2020), Mixed hyperlipidemia (2020), Morbid obesity ([address] (2020), Tobacco dependence syndrome (2020), and Type II diabetes mellitus, uncontrolled ([address] (2020). PSH:   has a past surgical history that includes hx colonoscopy (). FHX: family history includes Cancer in her mother and sister; Hypertension in her father. SHX:  reports that she quit smoking about 7 years ago. She smoked 0.50 packs per day. She has never used smokeless tobacco. She reports previous alcohol use. She reports that she does not use drugs. ROS:    Review of Systems   Constitutional: Positive for malaise/fatigue. HENT: Negative. Eyes: Negative. Respiratory: Positive for shortness of breath and wheezing. Cardiovascular: Positive for orthopnea. Gastrointestinal: Negative. Genitourinary: Negative. Musculoskeletal: Negative. Skin: Negative. Neurological: Negative. Psychiatric/Behavioral: Negative.          Objective:     Vital Signs: Telemetry:    normal sinus rhythm Intake/Output:   Visit Vitals  /76   Pulse 74   Temp 97.7 °F (36.5 °C)   Resp 18   Ht 5' 5\" (1.651 m)   Wt 88.6 kg (195 lb 5.2 oz)   SpO2 98%   Breastfeeding No   BMI 32.50 kg/m²       Temp (24hrs), Av.1 °F (36.7 °C), Min:97.7 °F (36.5 °C), Max:98.3 °F (36.8 °C)        O2 Device: Nasal cannula O2 Flow Rate (L/min): 2 l/min       Wt Readings from Last 4 Encounters: 07/14/21 88.6 kg (195 lb 5.2 oz)   05/10/21 85.3 kg (188 lb)   04/12/21 85.6 kg (188 lb 12.8 oz)   02/25/21 86.4 kg (190 lb 8 oz)        No intake or output data in the 24 hours ending 07/16/21 1116    Last shift:      No intake/output data recorded. Last 3 shifts: 07/14 1901 - 07/16 0700  In: -   Out: 2400 [Urine:2400]       Physical Exam:     Physical Exam  Constitutional:       Appearance: Normal appearance. HENT:      Head: Normocephalic and atraumatic. Nose: Nose normal.   Eyes:      Pupils: Pupils are equal, round, and reactive to light. Cardiovascular:      Rate and Rhythm: Normal rate and regular rhythm. Pulses: Normal pulses. Heart sounds: Normal heart sounds. Pulmonary:      Effort: Pulmonary effort is normal.      Breath sounds: Wheezing present. Abdominal:      General: Abdomen is flat. Bowel sounds are normal.      Palpations: Abdomen is soft. Musculoskeletal:         General: Normal range of motion. Cervical back: Normal range of motion and neck supple. Skin:     General: Skin is warm. Neurological:      General: No focal deficit present. Mental Status: She is alert. Psychiatric:         Mood and Affect: Mood normal.          Labs:    Recent Labs     07/16/21  0625 07/15/21  0434 07/14/21  0426   WBC 13.0* 14.1* 12.9*   HGB 10.0* 9.7* 8.9*   * 468* 416*     Recent Labs     07/16/21  0625 07/15/21  0434 07/14/21  0426   * 134* 135*   K 5.0 4.5 4.6   CL 99 100 100   CO2 30 27 30   * 222* 304*   BUN 28* 26* 24*   CREA 0.89 0.84 0.88   CA 9.2 8.9 8.6     No results for input(s): PH, PCO2, PO2, HCO3, FIO2 in the last 72 hours. No results for input(s): CPK, CKNDX, TROIQ in the last 72 hours.     No lab exists for component: CPKMB  No results found for: BNPP, BNP   Lab Results   Component Value Date/Time    Culture result: Scant Normal respiratory duane 01/28/2021 09:43 AM    Culture result: No Growth (<1000 cfu/mL) 01/28/2021 01:00 AM     Lab Results   Component Value Date/Time    TSH 0.83 07/12/2021 09:40 AM    TSH, External 2.420 01/16/2019 12:00 AM       Imaging:    CXR Results  (Last 48 hours)    None        Results from Hospital Encounter encounter on 07/12/21    XR CHEST PORT    Narrative  Study: XR CHEST PORT    Clinical indication: Shortness of breath    Comparison: Chest x-ray 2/1/2021. Impression  Findings/impression:    No consolidative airspace disease, pleural effusion or pneumothorax. Cardiac silhouette is enlarged. Trace interstitial edema. No acute osseous abnormality. Results from [place] encounter on 01/25/21    XR CHEST PORT    Narrative  Chest, frontal view, 2/1/2021. History: CHF. Comparison: Including chest 1/31/2021. Findings: The cardiac silhouette is stable. The lungs are adequately expanded. Pulmonary vascular congestion persists. Opacities in the lungs most pronounced  at the bases is not significantly changed. Pleural effusions are not  significantly changed. No pneumothorax is identified. The osseous structures  are stable. Impression  No significant interval change. XR CHEST PORT    Narrative  XR CHEST PORT    Comparison: Chest radiograph dated January 30, 2021    Impression  FINDINGS/IMPRESSION:  Radiograph is limited by patient's body habitus and AP portable technique. Lungs remain hypoinflated with bibasilar atelectasis and probable small  bilateral pleural effusions. Cardiac silhouette stable in size. No radiographically apparent pneumothorax. Mild improvement in bilateral airspace opacity, this is most confluent at the  right lung base. This may represent multilobar pneumonia or edema, or perhaps a  combination of each. Results from [place] encounter on 01/25/21    CTA CHEST W OR W WO CONT    Narrative  Exam: CTA chest with intravenous contrast    Comparison: Plain radiograph 1/25/2021. CT 1/2/2021.     Technique: During intravenous contrast administration (100 cc [id number]), axial  sections were obtained through the chest. Coronal and sagittal reconstructions  were generated. Maximal intensity projection images were generated. Dose reduction: All CT scans at this facility are performed using dose reduction  optimization techniques as appropriate to perform the exam including the  following: automated exposure control, adjustments of the mA and/or kV according  to patient size, or use of iterative reconstruction technique. Findings: The examination is limited by motion degradation and by adduction of  the patient's upper extremities, which renders scattering and attenuation of the  diagnostic x-ray beam. Nevertheless, there is no demonstrated pulmonary embolus. Endotracheal tube tip in the middle third of the trachea. Distal trachea and  main bronchi poorly expanded. There is calcific atherosclerotic disease of a  nonaneurysmal thoracic aorta, which manifests no evidence of intimal  incompetence. Calcific atherosclerotic coronary arterial disease is present. Prominent bilateral mediastinal and hilar lymph nodes, increased compared to  1/2/2021, likely inflammatory. Small to moderate pericardial effusion of near  water attenuation fluid. It is 2 cm greatest axial thickness. Small bilateral  pleural effusions. Bilateral dependent airspace disease compatible with  pneumonia. Interstitial disease also present, compatible with pulmonary edema. Included abdomen indicates a stable 2 cm left adrenal nodule of water  attenuation, compatible with a benign adenoma. No axillary or supraclavicular  adenopathy or mass. Visualized thoracic bone scaffolding unremarkable. Impression  Impression:  1. No evidence of pulmonary embolus. 2. Bilateral dependent airspace disease compatible with pneumonia. 3. Diffuse pulmonary interstitial disease compatible with pulmonary edema. 4. Bilateral pleural effusions.   5. Coronary artery calcific atherosclerotic disease. 6. Small to moderate pericardial effusion. 7. Distal trachea and main bronchi are poorly expanded. IMPRESSION:   1. Acute on chronic hypoxic respiratory failure  2. Chronic Obstructive Pulmonary Disease  3. Congestive heart failure cardiomyopathy ejection fraction 41%  4. Moderate aortic stenosis  5. Hypokalemia  6. Prognosis guarded       RECOMMENDATIONS/PLAN:     1. He is chronically on home oxygen  2. She is on prednisone  3. Cardiac catheterization was postponed and and can be done as an outpatient  4. Agree with Lasix and Aldactone  5. On Levaquin chest x-ray shows significant improvement  6.  She needs inhalers at home as she cannot afford it             Judy Adam MD

## 2021-07-23 ENCOUNTER — TELEPHONE (OUTPATIENT)
Dept: FAMILY MEDICINE CLINIC | Age: 66
End: 2021-07-23

## 2021-07-23 NOTE — TELEPHONE ENCOUNTER
Sandip Jacobs OT with [employer] left message. States that pt was discharged from [facility] with order to see them. She has not been able to reach pt or daughter. They will try again next week.

## 2021-08-03 ENCOUNTER — OFFICE VISIT (OUTPATIENT)
Dept: FAMILY MEDICINE CLINIC | Age: 66
End: 2021-08-03
Payer: MEDICARE

## 2021-08-03 VITALS
HEART RATE: 88 BPM | TEMPERATURE: 97.2 F | SYSTOLIC BLOOD PRESSURE: 105 MMHG | BODY MASS INDEX: 31.45 KG/M2 | RESPIRATION RATE: 16 BRPM | DIASTOLIC BLOOD PRESSURE: 72 MMHG | OXYGEN SATURATION: 98 % | WEIGHT: 189 LBS

## 2021-08-03 DIAGNOSIS — I25.10 CORONARY ARTERY DISEASE INVOLVING NATIVE HEART WITHOUT ANGINA PECTORIS, UNSPECIFIED VESSEL OR LESION TYPE: ICD-10-CM

## 2021-08-03 DIAGNOSIS — Z09 HOSPITAL DISCHARGE FOLLOW-UP: Primary | ICD-10-CM

## 2021-08-03 DIAGNOSIS — J44.9 CHRONIC OBSTRUCTIVE PULMONARY DISEASE, UNSPECIFIED COPD TYPE (HCC): ICD-10-CM

## 2021-08-03 DIAGNOSIS — E11.65 UNCONTROLLED TYPE 2 DIABETES MELLITUS WITH HYPERGLYCEMIA (HCC): ICD-10-CM

## 2021-08-03 DIAGNOSIS — I10 BENIGN ESSENTIAL HYPERTENSION: ICD-10-CM

## 2021-08-03 DIAGNOSIS — I50.9 CONGESTIVE HEART FAILURE, UNSPECIFIED HF CHRONICITY, UNSPECIFIED HEART FAILURE TYPE (HCC): ICD-10-CM

## 2021-08-03 PROBLEM — K92.2 GI BLEED: Status: RESOLVED | Noted: 2021-01-25 | Resolved: 2021-08-03

## 2021-08-03 PROBLEM — K21.9 GERD (GASTROESOPHAGEAL REFLUX DISEASE): Status: RESOLVED | Noted: 2020-06-25 | Resolved: 2021-08-03

## 2021-08-03 PROCEDURE — 2022F DILAT RTA XM EVC RTNOPTHY: CPT | Performed by: FAMILY MEDICINE

## 2021-08-03 PROCEDURE — G8427 DOCREV CUR MEDS BY ELIG CLIN: HCPCS | Performed by: FAMILY MEDICINE

## 2021-08-03 PROCEDURE — G8400 PT W/DXA NO RESULTS DOC: HCPCS | Performed by: FAMILY MEDICINE

## 2021-08-03 PROCEDURE — 1090F PRES/ABSN URINE INCON ASSESS: CPT | Performed by: FAMILY MEDICINE

## 2021-08-03 PROCEDURE — 1111F DSCHRG MED/CURRENT MED MERGE: CPT | Performed by: FAMILY MEDICINE

## 2021-08-03 PROCEDURE — 99213 OFFICE O/P EST LOW 20 MIN: CPT | Performed by: FAMILY MEDICINE

## 2021-08-03 PROCEDURE — G8432 DEP SCR NOT DOC, RNG: HCPCS | Performed by: FAMILY MEDICINE

## 2021-08-03 PROCEDURE — G8417 CALC BMI ABV UP PARAM F/U: HCPCS | Performed by: FAMILY MEDICINE

## 2021-08-03 PROCEDURE — G8754 DIAS BP LESS 90: HCPCS | Performed by: FAMILY MEDICINE

## 2021-08-03 PROCEDURE — 1101F PT FALLS ASSESS-DOCD LE1/YR: CPT | Performed by: FAMILY MEDICINE

## 2021-08-03 PROCEDURE — G8536 NO DOC ELDER MAL SCRN: HCPCS | Performed by: FAMILY MEDICINE

## 2021-08-03 PROCEDURE — 3017F COLORECTAL CA SCREEN DOC REV: CPT | Performed by: FAMILY MEDICINE

## 2021-08-03 PROCEDURE — G8752 SYS BP LESS 140: HCPCS | Performed by: FAMILY MEDICINE

## 2021-08-03 PROCEDURE — 3051F HG A1C>EQUAL 7.0%<8.0%: CPT | Performed by: FAMILY MEDICINE

## 2021-08-03 RX ORDER — ALBUTEROL SULFATE 90 UG/1
AEROSOL, METERED RESPIRATORY (INHALATION)
Qty: 1 INHALER | Refills: 3 | Status: SHIPPED | OUTPATIENT
Start: 2021-08-03 | End: 2022-07-22 | Stop reason: SDUPTHER

## 2021-08-03 RX ORDER — LISINOPRIL 20 MG/1
TABLET ORAL
COMMUNITY
Start: 2021-07-28 | End: 2022-01-26 | Stop reason: SDUPTHER

## 2021-08-03 RX ORDER — SPIRONOLACTONE 25 MG/1
25 TABLET ORAL 2 TIMES DAILY
Qty: 30 TABLET | Refills: 3 | Status: SHIPPED | OUTPATIENT
Start: 2021-08-03 | End: 2021-09-07 | Stop reason: SDUPTHER

## 2021-08-03 NOTE — PATIENT INSTRUCTIONS
General Health and concerns:  HEART HEALTHY DIET:  A heart healthy diet is one that is low in cholesterol (less than 300 mg daily), fat (less than 80 g daily) . You should also minimize carbohydrates / sugars (less amounts of breads, pastas, potato and potato products and sugary foods/snacks, cookies, cakes, etc) . Try to eat whole wheat/multigrain breads and pastas and eat more vegetables. Cook with olive oil (or no oil) and grill, bake, broil or boil foods. Less red meat and more chicken , fish and lean cuts of beef (limited). 3625-2316 calories per day is sufficient 2860-6104 is acceptable for weight loss. EXERCISE:  You should do exercise 3-5 days per week (minimum) to include increasing your heart rate for 30 to 45 minutes. At least a pace of a brisk walk should do that. This build up your heart and lung endurance and muscles and helps many function of the body. OTHER:    IF your condition(s) do not improve, get worse and/or if any concerns arise, please call or come by the office. Routine Health maintenance: You need to get a yearly follow up/physical exam to review, discuss age and gender appropriate exams, labs, vaccines and screening tests. This includes cardiovascular health risk, cancer screens and other abi related topics. Medications-Take all medications as directed. Please do not stop unless you talk to your doctor or health care provider first. Report any problems immediately. Referrals: if you have been given a referral, please call the office if you do not hear from provider in one week. You may make the appointment yourself. Please keep all appointments with specialists and ask them to send their notes, thoughts, recommendations to us , as your PCP. KEEP all upcoming appointments with our office UNLESS otherwise and specifically told not to.     CHECK your diagnosis/problem list for today and that orders and prescriptions are what we discussed as well as MAKE sure all information is accurate and has been discussed to your satisfaction. PLEASE make sure all your questions have been answered and feel free to call or come back should any concerns arise. Imaging/Labs:  Be sure to get these images in a timely manner. IF your test must be scheduled, let us know if you need help getting this done and if you do not hear from that provider in a week , call us or them. BE SURE to call the office if you do not hear regarding the results in one week after the test is performed Image or lab). It is our intention to inform you of the results ALWAYS, even if normal you should get a notification (Call, portal message). PLEASE jalil if you do not get the results. PLEASE follow all recommendations and call/come in /ask questions if you do not understand of if problems develop after or in between visits. Failure to comply with recommended health care advise could result in serious health consequences. Thank you for choosing our practice and please let us know how we can help you feel better and stay well!

## 2021-08-03 NOTE — PROGRESS NOTES
IDENTIFYING INFORMATION:      Simone Luis , 74 y.o., female  [address]     Medical Record Number: [id number]          CHIEF COMPLAINT:     Chief Complaint   Patient presents with   [facility] Follow Up     Patient in clinic for hospital follow up, would like to discuss COVID vaccine and would like to discuss hair loss         HISTORY OF PRESENT ILLNESS:    Simone Luis is a 74 y.o. female  has a past medical history of Acute gastrointestinal hemorrhage (8/28/2020), Allergic rhinitis (8/28/2020), Anemia (8/28/2020), Benign essential hypertension (6/25/2020), Body mass index 30.0-30.9, adult (6/25/2020), CAD (coronary artery disease) (8/28/2020), Foot callus (8/28/2020), GERD (gastroesophageal reflux disease) (6/25/2020), Hair loss, Heart murmur (6/25/2020), colonoscopy (01/01/2015), Left bundle branch block (8/28/2020), Microalbuminuria due to type 2 diabetes mellitus ([address] (8/28/2020), Mixed hyperlipidemia (6/25/2020), Morbid obesity ([address] (8/28/2020), Tobacco dependence syndrome (6/25/2020), and Type II diabetes mellitus, uncontrolled ([address] (6/25/2020). .  she comes in for follow up from [facility] and she is accompanied by daughter. She was told she had respiratory failure and pneumonia that it wasn't coming from heart but it does say, on the discharge summary, that she had chf , acute on chronic respiratory failure and no mention of pneumonia, she had no infiltrate on cxr but it was a 1 view AP. AND she was given an antibiotic for 7 days post discharge. Needs spironolactone.      PAST MEDICAL HISTORY:     Past Medical History:   Diagnosis Date    Acute gastrointestinal hemorrhage 8/28/2020    Allergic rhinitis 8/28/2020    Anemia 8/28/2020    Benign essential hypertension 6/25/2020    Body mass index 30.0-30.9, adult 6/25/2020    CAD (coronary artery disease) 8/28/2020    Foot callus 8/28/2020    GERD (gastroesophageal reflux disease) 6/25/2020    Hair loss     Heart murmur 6/25/2020    Hx of colonoscopy 01/01/2015    Done for anemia and rectal bleeding    Left bundle branch block 8/28/2020    Microalbuminuria due to type 2 diabetes mellitus ([facility] 8/28/2020    Mixed hyperlipidemia 6/25/2020    Morbid obesity ([facility] 8/28/2020    Tobacco dependence syndrome 6/25/2020    Type II diabetes mellitus, uncontrolled ([facility] 6/25/2020       MEDICATIONS:     Current Outpatient Medications on File Prior to Visit   Medication Sig Dispense Refill    lisinopriL (PRINIVIL, ZESTRIL) 20 mg tablet TAKE 1 TABLET BY MOUTH TWICE DAILY      fluticasone-umeclidin-vilanter 200-62.5-25 mcg dsdv Take  by inhalation.  atorvastatin (LIPITOR) 40 mg tablet Take 1 Tablet by mouth nightly for 90 days. 90 Tablet 1    spironolactone (ALDACTONE) 25 mg tablet Take 1 Tablet by mouth two (2) times a day. Indications: heart failure with reduced ejection fraction, fluid in the lungs due to chronic heart failure 30 Tablet 2    furosemide (Lasix) 40 mg tablet Take 1 Tab by mouth daily. 90 Tab 1    fexofenadine (ALLEGRA) 180 mg tablet Take 1 Tab by mouth daily. STOP loratadine 30 Tab 3    metFORMIN (GLUCOPHAGE) 500 mg tablet Take 2 tablets twice a day with meals 120 Tab 3    carvediloL (COREG) 3.125 mg tablet Take 1 Tab by mouth two (2) times daily (with meals). 180 Tab 3    pantoprazole (Protonix) 40 mg tablet Take 1 Tab by mouth daily. 90 Tab 1    potassium chloride (KLOR-CON) 10 mEq tablet By oral route take one tablet Monday, Wednesday and Friday 40 Tab 3    albuterol (PROVENTIL HFA, VENTOLIN HFA, PROAIR HFA) 90 mcg/actuation inhaler Take 2 Puffs by inhalation every six (6) hours as needed for Wheezing. (Patient not taking: Reported on 8/3/2021) 1 Inhaler 1    budesonide-formoteroL (SYMBICORT) 160-4.5 mcg/actuation HFAA Take 2 Puffs by inhalation two (2) times a day.  (Patient not taking: Reported on 8/3/2021) 1 Inhaler 1    predniSONE (DELTASONE) 20 mg tablet 1 tab PO BID x 3 days 1 tab PO daily x 3 days 1/2 tab PO daily x 4 days (Patient not taking: Reported on 8/3/2021) 11 Tablet 0     No current facility-administered medications on file prior to visit. ALLERGIES:    Allergies   Allergen Reactions    Aspirin Unknown (comments)     hemorraging - ended up in the hospital         SOCIAL HISTORY:     Social History     Tobacco Use    Smoking status: Former Smoker     Packs/day: 0.50     Quit date:      Years since quittin.5    Smokeless tobacco: Never Used   Vaping Use    Vaping Use: Never used   Substance Use Topics    Alcohol use: Not Currently    Drug use: Never       SURGICAL HISTORY:    Past Surgical History:   Procedure Laterality Date    HX COLONOSCOPY      due in 7507-3436. Done for anemia and rectal bleeding        FAMILY HISTORY:    Family History   Problem Relation Age of Onset    Cancer Mother         uterine    Hypertension Father     Cancer Sister         breast         REVIEW OF SYSTEMS:    I personally collected this information from all available source present (patient/others in room and records available) -[email or web address]    Review of Systems   Constitutional: Negative for chills, diaphoresis and fever. Respiratory: Negative for cough, sputum production, shortness of breath and wheezing. Cardiovascular: Negative for chest pain, palpitations and leg swelling. PHYSICAL EXAMINATION:    Vital Signs:    Visit Vitals  /72 (BP 1 Location: Left upper arm, BP Patient Position: Sitting, BP Cuff Size: Adult)   Pulse 88   Temp 97.2 °F (36.2 °C) (Skin)   Resp 16   Wt 189 lb (85.7 kg)   SpO2 98%   BMI 31.45 kg/m²         Wt Readings from Last 3 Encounters:   21 189 lb (85.7 kg)   21 195 lb 5.2 oz (88.6 kg)   05/10/21 188 lb (85.3 kg)     BP Readings from Last 3 Encounters:   21 105/72   21 136/76   05/10/21 120/74         Physical Exam  Constitutional:       Appearance: She is obese. She is not ill-appearing or toxic-appearing.    Eyes:      General: No scleral icterus. Extraocular Movements: Extraocular movements intact. Conjunctiva/sclera: Conjunctivae normal.   Neck:      Vascular: No carotid bruit. Cardiovascular:      Rate and Rhythm: Normal rate and regular rhythm. Heart sounds: No murmur heard. No friction rub. No gallop. Pulmonary:      Effort: Pulmonary effort is normal.      Breath sounds: Normal breath sounds. No wheezing, rhonchi or rales. Abdominal:      Palpations: Abdomen is soft. Tenderness: There is no abdominal tenderness. There is no guarding. Musculoskeletal:         General: Deformity (In hands and knees) present. Cervical back: No tenderness. Right lower leg: No edema. Left lower leg: No edema. Lymphadenopathy:      Cervical: No cervical adenopathy. Skin:     Coloration: Skin is not jaundiced. Findings: No erythema or rash. Neurological:      General: No focal deficit present. Mental Status: She is alert and oriented to person, place, and time. Mental status is at baseline. Psychiatric:         Mood and Affect: Mood normal.         Behavior: Behavior normal.         Thought Content: Thought content normal.         Judgment: Judgment normal.           ASSESSMENT/PLAN:      Discussion (regarding today's visit with Ralf Nguyen);  Patient I reviewed her hospital visit along with daughter.  I did review and refill her medicines for spironolactone and albuterol   Cardiovascular daily is benign   Continue follow-up with cardiology and pulmonology    ICD-10-CM ICD-9-CM    1. Hospital discharge follow-up  Z09 V67.59 MS DISCHARGE MEDS RECONCILED W/ CURRENT OUTPATIENT MED LIST   2. Benign essential hypertension  I10 401.1 spironolactone (ALDACTONE) 25 mg tablet   3. Congestive heart failure, unspecified HF chronicity, unspecified heart failure type (HCC)  I50.9 428.0 spironolactone (ALDACTONE) 25 mg tablet   4.  Chronic obstructive pulmonary disease, unspecified COPD type ([facility] J44.9 496 albuterol (PROVENTIL HFA, VENTOLIN HFA, PROAIR HFA) 90 mcg/actuation inhaler   5. Uncontrolled type 2 diabetes mellitus with hyperglycemia (HCC)  E11.65 250.02    6. Coronary artery disease involving native heart without angina pectoris, unspecified vessel or lesion type  I25.10 414.01       WE reviewed each diagnosis listed for today's visit.  WE reviewed medications, treatment, testing such as labs, imagine, referrals and when to call regarding results and appointments.  Reminded patient to keep any and all appointments with specialists, labs, imaging.  Reminded patient to make sure we get copies of any specialists care, labs and imaging.  Reminded patient to call of come by the office if there are any concerns, questions , comments or problems.  The patient verbalized understanding of the care plan and all questions were answered to the patient's satisfaction prior to leaving the office.  The patient was told that failure to comply with recommended testing could result in abnormal health consequences.  The patient was instructed to have yearly routine health maintenance including but not limited to age appropriate vaccines, testing, screening exams.  ALL questions were answered to her satisfaction before leaving the office. The patient actively participated in medical decision making. FOLLOW UP:     Patient knows to keep any and all future visits scheduled unless told otherwise. Patient knows to call, come back if any concerns, questions, comments or problems arise. Follow-up and Dispositions    · Return in about 3 weeks (around 8/24/2021) for Follow up copd, cad, anemia, may need lab. Eloisa Last DO    This visit was reviewed and signed electronically. It was been completed with voice recognition software and hand typing. It may have syntax and spelling errors despite editing.

## 2021-09-07 ENCOUNTER — OFFICE VISIT (OUTPATIENT)
Dept: FAMILY MEDICINE CLINIC | Age: 66
End: 2021-09-07
Payer: MEDICARE

## 2021-09-07 VITALS
SYSTOLIC BLOOD PRESSURE: 145 MMHG | DIASTOLIC BLOOD PRESSURE: 82 MMHG | OXYGEN SATURATION: 90 % | HEIGHT: 65 IN | HEART RATE: 87 BPM | WEIGHT: 190 LBS | TEMPERATURE: 97.3 F | BODY MASS INDEX: 31.65 KG/M2

## 2021-09-07 DIAGNOSIS — I10 BENIGN ESSENTIAL HYPERTENSION: Primary | ICD-10-CM

## 2021-09-07 DIAGNOSIS — I50.9 CONGESTIVE HEART FAILURE, UNSPECIFIED HF CHRONICITY, UNSPECIFIED HEART FAILURE TYPE (HCC): ICD-10-CM

## 2021-09-07 DIAGNOSIS — K21.9 GASTROESOPHAGEAL REFLUX DISEASE WITHOUT ESOPHAGITIS: ICD-10-CM

## 2021-09-07 PROCEDURE — G8417 CALC BMI ABV UP PARAM F/U: HCPCS | Performed by: FAMILY MEDICINE

## 2021-09-07 PROCEDURE — G8536 NO DOC ELDER MAL SCRN: HCPCS | Performed by: FAMILY MEDICINE

## 2021-09-07 PROCEDURE — G8427 DOCREV CUR MEDS BY ELIG CLIN: HCPCS | Performed by: FAMILY MEDICINE

## 2021-09-07 PROCEDURE — 1101F PT FALLS ASSESS-DOCD LE1/YR: CPT | Performed by: FAMILY MEDICINE

## 2021-09-07 PROCEDURE — G8400 PT W/DXA NO RESULTS DOC: HCPCS | Performed by: FAMILY MEDICINE

## 2021-09-07 PROCEDURE — G8754 DIAS BP LESS 90: HCPCS | Performed by: FAMILY MEDICINE

## 2021-09-07 PROCEDURE — G8510 SCR DEP NEG, NO PLAN REQD: HCPCS | Performed by: FAMILY MEDICINE

## 2021-09-07 PROCEDURE — 1090F PRES/ABSN URINE INCON ASSESS: CPT | Performed by: FAMILY MEDICINE

## 2021-09-07 PROCEDURE — G8753 SYS BP > OR = 140: HCPCS | Performed by: FAMILY MEDICINE

## 2021-09-07 PROCEDURE — 3017F COLORECTAL CA SCREEN DOC REV: CPT | Performed by: FAMILY MEDICINE

## 2021-09-07 PROCEDURE — 99214 OFFICE O/P EST MOD 30 MIN: CPT | Performed by: FAMILY MEDICINE

## 2021-09-07 RX ORDER — BUDESONIDE, GLYCOPYRROLATE, AND FORMOTEROL FUMARATE 160; 9; 4.8 UG/1; UG/1; UG/1
2 AEROSOL, METERED RESPIRATORY (INHALATION) 2 TIMES DAILY
COMMUNITY
End: 2022-09-21 | Stop reason: SDUPTHER

## 2021-09-07 RX ORDER — FERROUS SULFATE TAB 325 MG (65 MG ELEMENTAL FE) 325 (65 FE) MG
325 TAB ORAL
COMMUNITY
Start: 2021-08-02 | End: 2022-01-18

## 2021-09-07 RX ORDER — PANTOPRAZOLE SODIUM 40 MG/1
40 TABLET, DELAYED RELEASE ORAL DAILY
Qty: 90 TABLET | Refills: 1 | Status: SHIPPED | OUTPATIENT
Start: 2021-09-07 | End: 2022-03-13

## 2021-09-07 RX ORDER — GUAIFENESIN 1200 MG/1
1200 TABLET, EXTENDED RELEASE ORAL 2 TIMES DAILY
COMMUNITY
End: 2022-06-27 | Stop reason: DRUGHIGH

## 2021-09-07 RX ORDER — SPIRONOLACTONE 25 MG/1
25 TABLET ORAL 2 TIMES DAILY
Qty: 180 TABLET | Refills: 3 | Status: SHIPPED | OUTPATIENT
Start: 2021-09-07 | End: 2021-10-06 | Stop reason: SDUPTHER

## 2021-09-07 NOTE — PATIENT INSTRUCTIONS
General Health and concerns:  HEART HEALTHY DIET:  A heart healthy diet is one that is low in cholesterol (less than 300 mg daily), fat (less than 80 g daily) . You should also minimize carbohydrates / sugars (less amounts of breads, pastas, potato and potato products and sugary foods/snacks, cookies, cakes, etc) . Try to eat whole wheat/multigrain breads and pastas and eat more vegetables. Cook with olive oil (or no oil) and grill, bake, broil or boil foods. Less red meat and more chicken , fish and lean cuts of beef (limited). 6014-8038 calories per day is sufficient 7343-0691 is acceptable for weight loss. EXERCISE:  You should do exercise 3-5 days per week (minimum) to include increasing your heart rate for 30 to 45 minutes. At least a pace of a brisk walk should do that. This build up your heart and lung endurance and muscles and helps many function of the body. OTHER:    IF your condition(s) do not improve, get worse and/or if any concerns arise, please call or come by the office. Routine Health maintenance: You need to get a yearly follow up/physical exam to review, discuss age and gender appropriate exams, labs, vaccines and screening tests. This includes cardiovascular health risk, cancer screens and other abi related topics. Medications-Take all medications as directed. Please do not stop unless you talk to your doctor or health care provider first. Report any problems immediately. Referrals: if you have been given a referral, please call the office if you do not hear from provider in one week. You may make the appointment yourself. Please keep all appointments with specialists and ask them to send their notes, thoughts, recommendations to us , as your PCP. KEEP all upcoming appointments with our office UNLESS otherwise and specifically told not to.     CHECK your diagnosis/problem list for today and that orders and prescriptions are what we discussed as well as MAKE sure all information is accurate and has been discussed to your satisfaction. PLEASE make sure all your questions have been answered and feel free to call or come back should any concerns arise. Imaging/Labs:  Be sure to get these images in a timely manner. IF your test must be scheduled, let us know if you need help getting this done and if you do not hear from that provider in a week , call us or them. BE SURE to call the office if you do not hear regarding the results in one week after the test is performed Image or lab). It is our intention to inform you of the results ALWAYS, even if normal you should get a notification (Call, portal message). PLEASE jalil if you do not get the results. PLEASE follow all recommendations and call/come in /ask questions if you do not understand of if problems develop after or in between visits. Failure to comply with recommended health care advise could result in serious health consequences. Thank you for choosing our practice and please let us know how we can help you feel better and stay well!

## 2021-09-07 NOTE — PROGRESS NOTES
*ATTENTION:  This note has been created by a medical student for educational purposes only. Please do not refer to the content of this note for clinical decision-making, billing, or other purposes. Please see attending physicians note to obtain clinical information on this patient. *     IDENTIFYING INFORMATION:      Will Hopper , 74 y.o., female  [address]     Medical Record Number: [id number]          CHIEF COMPLAINT:     Chief Complaint   Patient presents with    COPD    Anemia    Coronary Artery Disease         HISTORY OF PRESENT ILLNESS:    Will Hopper is a 74 y.o. female  has a past medical history of Acute gastrointestinal hemorrhage (8/28/2020), Allergic rhinitis (8/28/2020), Anemia (8/28/2020), Benign essential hypertension (6/25/2020), Body mass index 30.0-30.9, adult (6/25/2020), CAD (coronary artery disease) (8/28/2020), Foot callus (8/28/2020), GERD (gastroesophageal reflux disease) (6/25/2020), Hair loss, Heart murmur (6/25/2020), colonoscopy (01/01/2015), Left bundle branch block (8/28/2020), Microalbuminuria due to type 2 diabetes mellitus ([address] (8/28/2020), Mixed hyperlipidemia (6/25/2020), Morbid obesity ([address] (8/28/2020), Tobacco dependence syndrome (6/25/2020), and Type II diabetes mellitus, uncontrolled ([address] (6/25/2020). .  she comes in for follow-up. Got bad news over the weekend that her nephew committed suicide. Presently has no physical concerns and is overall recovering very well. Requesting refill of pantoprazole 40 mg for GERD. Requesting refill of spironolactone 25 mg - with longer supply so that she doesn't have to make frequent refills.      PAST MEDICAL HISTORY:     Past Medical History:   Diagnosis Date    Acute gastrointestinal hemorrhage 8/28/2020    Allergic rhinitis 8/28/2020    Anemia 8/28/2020    Benign essential hypertension 6/25/2020    Body mass index 30.0-30.9, adult 6/25/2020    CAD (coronary artery disease) 8/28/2020    Foot callus 8/28/2020    GERD (gastroesophageal reflux disease) 6/25/2020    Hair loss     Heart murmur 6/25/2020    Hx of colonoscopy 01/01/2015    Done for anemia and rectal bleeding    Left bundle branch block 8/28/2020    Microalbuminuria due to type 2 diabetes mellitus ([facility] 8/28/2020    Mixed hyperlipidemia 6/25/2020    Morbid obesity ([facility] 8/28/2020    Tobacco dependence syndrome 6/25/2020    Type II diabetes mellitus, uncontrolled ([facility] 6/25/2020       MEDICATIONS:     Current Outpatient Medications on File Prior to Visit   Medication Sig Dispense Refill    budesonide-glycopyr-formoterol (Breztri Aerosphere) 160-9-4.8 mcg/actuation HFAA Take 2 Puffs by inhalation two (2) times a day.  FeroSuL 325 mg (65 mg iron) tablet Take 325 mg by mouth Daily (before breakfast).  guaiFENesin (Mucinex) 1,200 mg Ta12 ER tablet Take 1,200 mg by mouth two (2) times a day.  lisinopriL (PRINIVIL, ZESTRIL) 20 mg tablet TAKE 1 TABLET BY MOUTH TWICE DAILY      albuterol (PROVENTIL HFA, VENTOLIN HFA, PROAIR HFA) 90 mcg/actuation inhaler 2 puffs every 4 hours as needed for cough, wheezing , short of breath. Insurance brand of choice 1 Inhaler 3    atorvastatin (LIPITOR) 40 mg tablet Take 1 Tablet by mouth nightly for 90 days. 90 Tablet 1    furosemide (Lasix) 40 mg tablet Take 1 Tab by mouth daily. 90 Tab 1    fexofenadine (ALLEGRA) 180 mg tablet Take 1 Tab by mouth daily. STOP loratadine 30 Tab 3    metFORMIN (GLUCOPHAGE) 500 mg tablet Take 2 tablets twice a day with meals 120 Tab 3    carvediloL (COREG) 3.125 mg tablet Take 1 Tab by mouth two (2) times daily (with meals). 180 Tab 3    potassium chloride (KLOR-CON) 10 mEq tablet By oral route take one tablet Monday, Wednesday and Friday 40 Tab 3     No current facility-administered medications on file prior to visit.        ALLERGIES:    Allergies   Allergen Reactions    Aspirin Unknown (comments)     hemorraging - ended up in the hospital SOCIAL HISTORY:     Social History     Tobacco Use    Smoking status: Former Smoker     Packs/day: 0.50     Quit date:      Years since quittin.6    Smokeless tobacco: Never Used   Vaping Use    Vaping Use: Never used   Substance Use Topics    Alcohol use: Not Currently    Drug use: Never       SURGICAL HISTORY:    Past Surgical History:   Procedure Laterality Date    HX COLONOSCOPY  2015    due in 0230-0772. Done for anemia and rectal bleeding        FAMILY HISTORY:    Family History   Problem Relation Age of Onset    Cancer Mother         uterine    Hypertension Father     Cancer Sister         breast         REVIEW OF SYSTEMS:    I personally collected this information from all available source present (patient/others in room and records available) [facility]    Review of Systems   Constitutional: Negative for chills, fever and weight loss. HENT: Negative for congestion and sore throat. Eyes: Negative for blurred vision and double vision. Respiratory: Positive for shortness of breath (on exertion). Negative for cough. Cardiovascular: Negative for chest pain, palpitations and orthopnea. Gastrointestinal: Negative for abdominal pain, blood in stool, constipation, diarrhea, heartburn, melena, nausea and vomiting. Genitourinary: Negative for dysuria, frequency and urgency. Musculoskeletal: Negative for joint pain and myalgias. Skin: Negative for itching and rash. Neurological: Negative for dizziness, tingling, weakness and headaches.            PHYSICAL EXAMINATION:    Vital Signs:    Visit Vitals  BP (!) 145/82 (BP 1 Location: Left upper arm, BP Patient Position: Sitting)   Pulse 87   Temp 97.3 °F (36.3 °C) (Temporal)   Ht 5' 5\" (1.651 m)   Wt 190 lb (86.2 kg)   SpO2 90%   BMI 31.62 kg/m²         Wt Readings from Last 3 Encounters:   21 190 lb (86.2 kg)   21 189 lb (85.7 kg)   21 195 lb 5.2 oz (88.6 kg)     BP Readings from Last 3 Encounters:   21 (!) 145/82   08/03/21 105/72   07/16/21 136/76         Physical Exam  Vitals and nursing note reviewed. Constitutional:       General: She is not in acute distress. Appearance: Normal appearance. She is not toxic-appearing. HENT:      Head: Normocephalic and atraumatic. Right Ear: Tympanic membrane, ear canal and external ear normal.      Left Ear: Tympanic membrane, ear canal and external ear normal.      Nose: Nose normal.      Mouth/Throat:      Mouth: Mucous membranes are moist.      Pharynx: Oropharynx is clear. No oropharyngeal exudate or posterior oropharyngeal erythema. Eyes:      General: No scleral icterus. Conjunctiva/sclera: Conjunctivae normal.      Pupils: Pupils are equal, round, and reactive to light. Neck:      Vascular: No carotid bruit. Cardiovascular:      Rate and Rhythm: Normal rate and regular rhythm. Pulses: Normal pulses. Heart sounds: Normal heart sounds. No murmur heard. No friction rub. No gallop. Pulmonary:      Effort: Pulmonary effort is normal. No respiratory distress. Breath sounds: Normal breath sounds. No stridor. No wheezing, rhonchi or rales. Abdominal:      General: Abdomen is flat. Bowel sounds are normal. There is no distension. Palpations: Abdomen is soft. There is no mass. Tenderness: There is no abdominal tenderness. There is no right CVA tenderness, left CVA tenderness, guarding or rebound. Hernia: No hernia is present. Musculoskeletal:         General: No swelling, tenderness or deformity. Normal range of motion. Cervical back: Normal range of motion and neck supple. Lymphadenopathy:      Cervical: No cervical adenopathy. Skin:     General: Skin is warm and dry. Coloration: Skin is not jaundiced or pale. Findings: No bruising, erythema, lesion or rash. Neurological:      General: No focal deficit present. Mental Status: She is alert and oriented to person, place, and time.       Cranial Nerves: No cranial nerve deficit. Sensory: No sensory deficit. Psychiatric:         Mood and Affect: Mood normal.         Behavior: Behavior normal.         Thought Content: Thought content normal.         Judgment: Judgment normal.           ASSESSMENT/PLAN:    1. Essential hypertension, which is stable on spironolactone 25 mg 2x/day. Refilled 90-day supply. 2. GERD, which is stable on pantoprazole 40 mg. Refilled. 3. CHF, which is stable. The patient actively participated in medical decision making. FOLLOW UP:     Patient knows to keep any and all future visits scheduled unless told otherwise. Patient knows to call, come back if any concerns, questions, comments or problems arise. Follow-up and Dispositions    · Return in about 2 months (around 11/7/2021) for follow up htn, diabetes, needs cmp, cbc, lipids. Shari Freshwater    This visit was reviewed and signed electronically. It was been completed with voice recognition software and hand typing. It may have syntax and spelling errors despite editing.

## 2021-09-07 NOTE — PROGRESS NOTES
IDENTIFYING INFORMATION:      Priya Ochoa , 74 y.o., female  [address]     Medical Record Number: [id number]          CHIEF COMPLAINT:     Chief Complaint   Patient presents with    COPD    Anemia    Coronary Artery Disease         HISTORY OF PRESENT ILLNESS:    Priya Ochoa is a 74 y.o. female  has a past medical history of Acute gastrointestinal hemorrhage (8/28/2020), Allergic rhinitis (8/28/2020), Anemia (8/28/2020), Benign essential hypertension (6/25/2020), Body mass index 30.0-30.9, adult (6/25/2020), CAD (coronary artery disease) (8/28/2020), Foot callus (8/28/2020), GERD (gastroesophageal reflux disease) (6/25/2020), Hair loss, Heart murmur (6/25/2020), colonoscopy (01/01/2015), Left bundle branch block (8/28/2020), Microalbuminuria due to type 2 diabetes mellitus ([address] (8/28/2020), Mixed hyperlipidemia (6/25/2020), Morbid obesity ([address] (8/28/2020), Tobacco dependence syndrome (6/25/2020), and Type II diabetes mellitus, uncontrolled ([address] (6/25/2020). .  she comes in for follow up. CBC  Was stable in July,  Sees cardiology and pulmonology for chf and history of respiratory failure. NO orthopnea, no PND. Some ACEVEDO. Uses portable oxygen. Takes pantoprazole. NO hematochezia or melena. NO abdominal pain.       PAST MEDICAL HISTORY:     Past Medical History:   Diagnosis Date    Acute gastrointestinal hemorrhage 8/28/2020    Allergic rhinitis 8/28/2020    Anemia 8/28/2020    Benign essential hypertension 6/25/2020    Body mass index 30.0-30.9, adult 6/25/2020    CAD (coronary artery disease) 8/28/2020    Foot callus 8/28/2020    GERD (gastroesophageal reflux disease) 6/25/2020    Hair loss     Heart murmur 6/25/2020    Hx of colonoscopy 01/01/2015    Done for anemia and rectal bleeding    Left bundle branch block 8/28/2020    Microalbuminuria due to type 2 diabetes mellitus ([address] 8/28/2020    Mixed hyperlipidemia 6/25/2020    Morbid obesity ([address] 8/28/2020  Tobacco dependence syndrome 6/25/2020    Type II diabetes mellitus, uncontrolled ([facility] 6/25/2020       MEDICATIONS:     Current Outpatient Medications on File Prior to Visit   Medication Sig Dispense Refill    budesonide-glycopyr-formoterol (Breztri Aerosphere) 160-9-4.8 mcg/actuation HFAA Take 2 Puffs by inhalation two (2) times a day.  FeroSuL 325 mg (65 mg iron) tablet Take 325 mg by mouth Daily (before breakfast).  guaiFENesin (Mucinex) 1,200 mg Ta12 ER tablet Take 1,200 mg by mouth two (2) times a day.  lisinopriL (PRINIVIL, ZESTRIL) 20 mg tablet TAKE 1 TABLET BY MOUTH TWICE DAILY      spironolactone (ALDACTONE) 25 mg tablet Take 1 Tablet by mouth two (2) times a day. Indications: heart failure with reduced ejection fraction 30 Tablet 3    albuterol (PROVENTIL HFA, VENTOLIN HFA, PROAIR HFA) 90 mcg/actuation inhaler 2 puffs every 4 hours as needed for cough, wheezing , short of breath. Insurance brand of choice 1 Inhaler 3    atorvastatin (LIPITOR) 40 mg tablet Take 1 Tablet by mouth nightly for 90 days. 90 Tablet 1    furosemide (Lasix) 40 mg tablet Take 1 Tab by mouth daily. 90 Tab 1    fexofenadine (ALLEGRA) 180 mg tablet Take 1 Tab by mouth daily. STOP loratadine 30 Tab 3    metFORMIN (GLUCOPHAGE) 500 mg tablet Take 2 tablets twice a day with meals 120 Tab 3    carvediloL (COREG) 3.125 mg tablet Take 1 Tab by mouth two (2) times daily (with meals). 180 Tab 3    pantoprazole (Protonix) 40 mg tablet Take 1 Tab by mouth daily. 90 Tab 1    potassium chloride (KLOR-CON) 10 mEq tablet By oral route take one tablet Monday, Wednesday and Friday 40 Tab 3    fluticasone-umeclidin-vilanter 200-62.5-25 mcg dsdv Take  by inhalation. (Patient not taking: Reported on 9/7/2021)       No current facility-administered medications on file prior to visit.        ALLERGIES:    Allergies   Allergen Reactions    Aspirin Unknown (comments)     hemorraging - ended up in the hospital         SOCIAL HISTORY:     Social History     Tobacco Use    Smoking status: Former Smoker     Packs/day: 0.50     Quit date:      Years since quittin.6    Smokeless tobacco: Never Used   Vaping Use    Vaping Use: Never used   Substance Use Topics    Alcohol use: Not Currently    Drug use: Never       SURGICAL HISTORY:    Past Surgical History:   Procedure Laterality Date    HX COLONOSCOPY  2015    due in 2534-0528. Done for anemia and rectal bleeding        FAMILY HISTORY:    Family History   Problem Relation Age of Onset    Cancer Mother         uterine    Hypertension Father     Cancer Sister         breast         REVIEW OF SYSTEMS:    I personally collected this information from all available source present (patient/others in room and records available) -JLEWISDO    Review of Systems   Constitutional: Negative for chills, diaphoresis and fever. HENT: Negative for sinus pain and sore throat. Eyes: Negative for blurred vision, double vision and photophobia. Respiratory: Positive for shortness of breath (with exertion). Negative for cough, sputum production and wheezing. Cardiovascular: Negative for chest pain, palpitations, orthopnea, leg swelling and PND. Gastrointestinal: Negative for abdominal pain, constipation, diarrhea, heartburn, nausea and vomiting. Genitourinary: Negative for dysuria and urgency. Skin: Negative for rash. Endo/Heme/Allergies: Does not bruise/bleed easily.            PHYSICAL EXAMINATION:    Vital Signs:    Visit Vitals  BP (!) 145/82 (BP 1 Location: Left upper arm, BP Patient Position: Sitting)   Pulse 87   Temp 97.3 °F (36.3 °C) (Temporal)   Ht 5' 5\" (1.651 m)   Wt 190 lb (86.2 kg)   SpO2 90%   BMI 31.62 kg/m²         Wt Readings from Last 3 Encounters:   21 190 lb (86.2 kg)   21 189 lb (85.7 kg)   21 195 lb 5.2 oz (88.6 kg)     BP Readings from Last 3 Encounters:   21 (!) 145/82   21 105/72   21 136/76         Physical Exam  Constitutional:       Appearance: She is obese. She is not ill-appearing or toxic-appearing. Eyes:      General: No scleral icterus. Extraocular Movements: Extraocular movements intact. Conjunctiva/sclera: Conjunctivae normal.   Cardiovascular:      Rate and Rhythm: Normal rate and regular rhythm. Heart sounds: No murmur heard. No friction rub. No gallop. Pulmonary:      Effort: Pulmonary effort is normal.      Breath sounds: Normal breath sounds. No wheezing, rhonchi or rales. Abdominal:      Palpations: Abdomen is soft. Tenderness: There is no abdominal tenderness. There is no guarding. Musculoskeletal:         General: Deformity (Arthritic deformities hands knees bilaterally.) present. Cervical back: No tenderness. Right lower leg: No edema. Left lower leg: No edema. Lymphadenopathy:      Cervical: No cervical adenopathy. Skin:     Coloration: Skin is not jaundiced. Findings: No erythema or rash. Neurological:      General: No focal deficit present. Mental Status: She is alert and oriented to person, place, and time. Mental status is at baseline. Psychiatric:         Mood and Affect: Mood normal.         Behavior: Behavior normal.         Thought Content: Thought content normal.         Judgment: Judgment normal.           ASSESSMENT/PLAN:      Discussion (regarding today's visit with Celso Will);  Refilled medication as requested   Follow-up 2 months with labs to monitor renal function electrolytes and blood count   She is doing well overall and I will make no changes today. ICD-10-CM ICD-9-CM    1. Benign essential hypertension  I10 401.1 spironolactone (ALDACTONE) 25 mg tablet   2. Gastroesophageal reflux disease without esophagitis  K21.9 530.81 pantoprazole (Protonix) 40 mg tablet   3.  Congestive heart failure, unspecified HF chronicity, unspecified heart failure type (HCC)  I50.9 428.0 spironolactone (ALDACTONE) 25 mg tablet  WE reviewed medications, treatment, testing such as labs, imagine, referrals and when to call regarding results and appointments.  Reminded patient to keep any and all appointments with specialists, labs, imaging.  Reminded patient to make sure we get copies of any specialists care, labs and imaging.  Reminded patient to call of come by the office if there are any concerns, questions , comments or problems.  The patient verbalized understanding of the care plan and all questions were answered to the patient's satisfaction prior to leaving the office.  The patient was told that failure to comply with recommended testing could result in abnormal health consequences.  The patient was instructed to have yearly routine health maintenance including but not limited to age appropriate vaccines, testing, screening exams.  ALL questions were answered to her satisfaction before leaving the office. The patient actively participated in medical decision making. FOLLOW UP:     Patient knows to keep any and all future visits scheduled unless told otherwise. Patient knows to call, come back if any concerns, questions, comments or problems arise. Follow-up and Dispositions    · Return in about 2 months (around 11/7/2021) for follow up htn, diabetes, needs cmp, cbc, lipids. Laura Fajardo,     This visit was reviewed and signed electronically. It was been completed with voice recognition software and hand typing. It may have syntax and spelling errors despite editing. (3) slightly limited

## 2021-09-07 NOTE — PROGRESS NOTES
1. Have you been to the ER, urgent care clinic since your last visit? Hospitalized since your last visit? No    2. Have you seen or consulted any other health care providers outside of the [address] [place] [address] since your last visit? Include any pap smears or colon screening.  No    Chief Complaint   Patient presents with    COPD    Anemia    Coronary Artery Disease     Visit Vitals  BP (!) 154/83 (BP 1 Location: Left upper arm, BP Patient Position: Sitting)   Pulse (!) 112   Temp 97.3 °F (36.3 °C) (Temporal)   Ht 5' 5\" (1.651 m)   Wt 190 lb (86.2 kg)   SpO2 90%   BMI 31.62 kg/m²

## 2021-10-06 ENCOUNTER — TELEPHONE (OUTPATIENT)
Dept: FAMILY MEDICINE CLINIC | Age: 66
End: 2021-10-06

## 2021-10-06 DIAGNOSIS — I50.9 CONGESTIVE HEART FAILURE, UNSPECIFIED HF CHRONICITY, UNSPECIFIED HEART FAILURE TYPE (HCC): ICD-10-CM

## 2021-10-06 DIAGNOSIS — I10 BENIGN ESSENTIAL HYPERTENSION: ICD-10-CM

## 2021-10-06 RX ORDER — SPIRONOLACTONE 25 MG/1
25 TABLET ORAL 2 TIMES DAILY
Qty: 180 TABLET | Refills: 3 | Status: SHIPPED | OUTPATIENT
Start: 2021-10-06 | End: 2022-10-17 | Stop reason: SDUPTHER

## 2021-10-06 NOTE — TELEPHONE ENCOUNTER
Identifying Data:  74 y.o. , [place] Born , female     HISTORICAL DATA (REVIEWED TODAY):  ALLERGIES-    Allergies   Allergen Reactions    Aspirin Unknown (comments)     hemorraging - ended up in the hospital       MEDICATION AS OF LAST RECONCILIATION (NOT INCLUDING CHANGES MADE TODAY):    Current Outpatient Medications on File Prior to Visit   Medication Sig Dispense Refill    budesonide-glycopyr-formoterol (Breztri Aerosphere) 160-9-4.8 mcg/actuation HFAA Take 2 Puffs by inhalation two (2) times a day.  FeroSuL 325 mg (65 mg iron) tablet Take 325 mg by mouth Daily (before breakfast).  pantoprazole (Protonix) 40 mg tablet Take 1 Tablet by mouth daily. 90 Tablet 1    guaiFENesin (Mucinex) 1,200 mg Ta12 ER tablet Take 1,200 mg by mouth two (2) times a day.  [DISCONTINUED] spironolactone (ALDACTONE) 25 mg tablet Take 1 Tablet by mouth two (2) times a day. Indications: heart failure with reduced ejection fraction 180 Tablet 3    lisinopriL (PRINIVIL, ZESTRIL) 20 mg tablet TAKE 1 TABLET BY MOUTH TWICE DAILY      albuterol (PROVENTIL HFA, VENTOLIN HFA, PROAIR HFA) 90 mcg/actuation inhaler 2 puffs every 4 hours as needed for cough, wheezing , short of breath. Insurance brand of choice 1 Inhaler 3    [] atorvastatin (LIPITOR) 40 mg tablet Take 1 Tablet by mouth nightly for 90 days. 90 Tablet 1    furosemide (Lasix) 40 mg tablet Take 1 Tab by mouth daily. 90 Tab 1    fexofenadine (ALLEGRA) 180 mg tablet Take 1 Tab by mouth daily. STOP loratadine 30 Tab 3    metFORMIN (GLUCOPHAGE) 500 mg tablet Take 2 tablets twice a day with meals 120 Tab 3    carvediloL (COREG) 3.125 mg tablet Take 1 Tab by mouth two (2) times daily (with meals). 180 Tab 3    potassium chloride (KLOR-CON) 10 mEq tablet By oral route take one tablet Monday, Wednesday and Friday 40 Tab 3     No current facility-administered medications on file prior to visit.        PAST MEDICAL HISTORY:    Patient Active Problem List Diagnosis Code    Benign essential hypertension I10    Body mass index 30.0-30.9, adult Z68.30    Type II diabetes mellitus, uncontrolled ([facility]) E11.65    Heart murmur R01.1    Mixed hyperlipidemia E78.2    Tobacco dependence syndrome F17.200    Acute gastrointestinal hemorrhage K92.2    Allergic rhinitis J30.9    Anemia D64.9    Foot callus L84    Left bundle branch block I44.7    Microalbuminuria due to type 2 diabetes mellitus ([facility]) E11.29, R80.9    Morbid obesity ([facility]) E66.01    Hyponatremia E87.1    COPD exacerbation ([facility]) J44.1    Acute respiratory failure with hypoxia ([facility]) J96.01    Aortic stenosis I35.0   [facility] discharge follow-up Z09    Chronic obstructive pulmonary disease ([facility]) J44.9    Coronary artery disease involving native heart without angina pectoris I25.10    Gastroesophageal reflux disease without esophagitis K21.9    Congestive heart failure ([facility]) I50.9    Type 2 diabetes mellitus with hyperglycemia, without long-term current use of insulin ([facility]) E11.65    Acute and chronic respiratory failure with hypoxia ([facility]) J96.21    Acute on chronic systolic (congestive) heart failure ([facility]) I50.23    Hypokalemia E87.6       ASSESSMENT AND PLAN:      ICD-10-CM ICD-9-CM    1. Benign essential hypertension  I10 401.1 spironolactone (ALDACTONE) 25 mg tablet   2.  Congestive heart failure, unspecified HF chronicity, unspecified heart failure type ([facility]  I50.9 428.0 spironolactone (ALDACTONE) 25 mg tablet             Ria Doran DO

## 2021-11-09 ENCOUNTER — OFFICE VISIT (OUTPATIENT)
Dept: FAMILY MEDICINE CLINIC | Age: 66
End: 2021-11-09
Payer: MEDICARE

## 2021-11-09 VITALS
TEMPERATURE: 96.2 F | OXYGEN SATURATION: 92 % | HEIGHT: 65 IN | WEIGHT: 193 LBS | DIASTOLIC BLOOD PRESSURE: 77 MMHG | SYSTOLIC BLOOD PRESSURE: 137 MMHG | HEART RATE: 86 BPM | BODY MASS INDEX: 32.15 KG/M2

## 2021-11-09 DIAGNOSIS — D50.9 IRON DEFICIENCY ANEMIA, UNSPECIFIED IRON DEFICIENCY ANEMIA TYPE: ICD-10-CM

## 2021-11-09 DIAGNOSIS — J44.9 CHRONIC OBSTRUCTIVE PULMONARY DISEASE, UNSPECIFIED COPD TYPE (HCC): ICD-10-CM

## 2021-11-09 DIAGNOSIS — I50.9 CONGESTIVE HEART FAILURE, UNSPECIFIED HF CHRONICITY, UNSPECIFIED HEART FAILURE TYPE (HCC): ICD-10-CM

## 2021-11-09 DIAGNOSIS — I10 BENIGN ESSENTIAL HYPERTENSION: Primary | ICD-10-CM

## 2021-11-09 DIAGNOSIS — E11.65 UNCONTROLLED TYPE 2 DIABETES MELLITUS WITH HYPERGLYCEMIA (HCC): ICD-10-CM

## 2021-11-09 DIAGNOSIS — K21.9 GASTROESOPHAGEAL REFLUX DISEASE WITHOUT ESOPHAGITIS: ICD-10-CM

## 2021-11-09 DIAGNOSIS — I25.10 CORONARY ARTERY DISEASE INVOLVING NATIVE HEART WITHOUT ANGINA PECTORIS, UNSPECIFIED VESSEL OR LESION TYPE: ICD-10-CM

## 2021-11-09 PROCEDURE — G8536 NO DOC ELDER MAL SCRN: HCPCS | Performed by: FAMILY MEDICINE

## 2021-11-09 PROCEDURE — G8510 SCR DEP NEG, NO PLAN REQD: HCPCS | Performed by: FAMILY MEDICINE

## 2021-11-09 PROCEDURE — 3051F HG A1C>EQUAL 7.0%<8.0%: CPT | Performed by: FAMILY MEDICINE

## 2021-11-09 PROCEDURE — G8427 DOCREV CUR MEDS BY ELIG CLIN: HCPCS | Performed by: FAMILY MEDICINE

## 2021-11-09 PROCEDURE — 99213 OFFICE O/P EST LOW 20 MIN: CPT | Performed by: FAMILY MEDICINE

## 2021-11-09 PROCEDURE — 1090F PRES/ABSN URINE INCON ASSESS: CPT | Performed by: FAMILY MEDICINE

## 2021-11-09 PROCEDURE — G8754 DIAS BP LESS 90: HCPCS | Performed by: FAMILY MEDICINE

## 2021-11-09 PROCEDURE — G8752 SYS BP LESS 140: HCPCS | Performed by: FAMILY MEDICINE

## 2021-11-09 PROCEDURE — 1101F PT FALLS ASSESS-DOCD LE1/YR: CPT | Performed by: FAMILY MEDICINE

## 2021-11-09 PROCEDURE — G8417 CALC BMI ABV UP PARAM F/U: HCPCS | Performed by: FAMILY MEDICINE

## 2021-11-09 PROCEDURE — 3017F COLORECTAL CA SCREEN DOC REV: CPT | Performed by: FAMILY MEDICINE

## 2021-11-09 PROCEDURE — 2022F DILAT RTA XM EVC RTNOPTHY: CPT | Performed by: FAMILY MEDICINE

## 2021-11-09 PROCEDURE — G8400 PT W/DXA NO RESULTS DOC: HCPCS | Performed by: FAMILY MEDICINE

## 2021-11-09 NOTE — PROGRESS NOTES
IDENTIFYING INFORMATION:      Peter Nicole , 74 y.o., female  [place]      Medical Record Number: [id number]          Patient Active Problem List   Diagnosis Code    Benign essential hypertension I10    Body mass index 30.0-30.9, adult Z68.30    Type II diabetes mellitus, uncontrolled ([facility] E11.65    Heart murmur R01.1    Mixed hyperlipidemia E78.2    Tobacco dependence syndrome F17.200    Acute gastrointestinal hemorrhage K92.2    Allergic rhinitis J30.9    Anemia D64.9    Foot callus L84    Left bundle branch block I44.7    Microalbuminuria due to type 2 diabetes mellitus (HCC) E11.29, R80.9    Morbid obesity ([facility]) E66.01    Hyponatremia E87.1    COPD exacerbation ([facility]) J44.1    Acute respiratory failure with hypoxia ([facility]) J96.01    Aortic stenosis I35.0   [facility] discharge follow-up Z09    Chronic obstructive pulmonary disease ([facility]) J44.9    Coronary artery disease involving native heart without angina pectoris I25.10    Gastroesophageal reflux disease without esophagitis K21.9    Congestive heart failure ([facility]) I50.9    Type 2 diabetes mellitus with hyperglycemia, without long-term current use of insulin ([facility]) E11.65    Acute and chronic respiratory failure with hypoxia ([facility]) J96.21    Acute on chronic systolic (congestive) heart failure ([facility]) I50.23    Hypokalemia E87.6           CHIEF COMPLAINT:  Follow up with dr Carmina De Guzman. Chief Complaint   Patient presents with    Hypertension    Diabetes         HISTORY OF PRESENT ILLNESS:    Peter Nicole is a 74 y.o. female . she comes in for comes in for a follow up. Was prescribed flonase when she was in the hospital, but it is causing her nose bleeds once in a while. Explained to her how flonase dries up the nasal mucosa which makes her prone to more nose bleeds.      PAST MEDICAL HISTORY:     Past Medical History:   Diagnosis Date    Acute gastrointestinal hemorrhage 8/28/2020    Allergic rhinitis 8/28/2020    Anemia 8/28/2020    Benign essential hypertension 6/25/2020    Body mass index 30.0-30.9, adult 6/25/2020    CAD (coronary artery disease) 8/28/2020    Foot callus 8/28/2020    GERD (gastroesophageal reflux disease) 6/25/2020    Hair loss     Heart murmur 6/25/2020    Hx of colonoscopy 01/01/2015    Done for anemia and rectal bleeding    Left bundle branch block 8/28/2020    Microalbuminuria due to type 2 diabetes mellitus ([facility] 8/28/2020    Mixed hyperlipidemia 6/25/2020    Morbid obesity ([facility] 8/28/2020    Tobacco dependence syndrome 6/25/2020    Type II diabetes mellitus, uncontrolled ([facility] 6/25/2020       MEDICATIONS:     Current Outpatient Medications on File Prior to Visit   Medication Sig Dispense Refill    spironolactone (ALDACTONE) 25 mg tablet Take 1 Tablet by mouth two (2) times a day. Indications: heart failure with reduced ejection fraction 180 Tablet 3    budesonide-glycopyr-formoterol (Breztri Aerosphere) 160-9-4.8 mcg/actuation HFAA Take 2 Puffs by inhalation two (2) times a day.  FeroSuL 325 mg (65 mg iron) tablet Take 325 mg by mouth Daily (before breakfast).  pantoprazole (Protonix) 40 mg tablet Take 1 Tablet by mouth daily. 90 Tablet 1    guaiFENesin (Mucinex) 1,200 mg Ta12 ER tablet Take 1,200 mg by mouth two (2) times a day.  lisinopriL (PRINIVIL, ZESTRIL) 20 mg tablet TAKE 1 TABLET BY MOUTH TWICE DAILY      albuterol (PROVENTIL HFA, VENTOLIN HFA, PROAIR HFA) 90 mcg/actuation inhaler 2 puffs every 4 hours as needed for cough, wheezing , short of breath. Insurance brand of choice 1 Inhaler 3    furosemide (Lasix) 40 mg tablet Take 1 Tab by mouth daily. 90 Tab 1    fexofenadine (ALLEGRA) 180 mg tablet Take 1 Tab by mouth daily. STOP loratadine 30 Tab 3    metFORMIN (GLUCOPHAGE) 500 mg tablet Take 2 tablets twice a day with meals 120 Tab 3    carvediloL (COREG) 3.125 mg tablet Take 1 Tab by mouth two (2) times daily (with meals).  180 Tab 3    potassium chloride (KLOR-CON) 10 mEq tablet By oral route take one tablet Monday, Wednesday and Friday 40 Tab 3     No current facility-administered medications on file prior to visit. ALLERGIES:    Allergies   Allergen Reactions    Aspirin Unknown (comments)     hemorraging - ended up in the hospital         SOCIAL HISTORY:     Social History     Tobacco Use    Smoking status: Former Smoker     Packs/day: 0.50     Quit date:      Years since quittin.8    Smokeless tobacco: Never Used   Vaping Use    Vaping Use: Never used   Substance Use Topics    Alcohol use: Not Currently    Drug use: Never       SURGICAL HISTORY:    Past Surgical History:   Procedure Laterality Date    HX COLONOSCOPY  2015    due in 0508-2971. Done for anemia and rectal bleeding        FAMILY HISTORY:    Family History   Problem Relation Age of Onset    Cancer Mother         uterine    Hypertension Father     Cancer Sister         breast         REVIEW OF SYSTEMS:  Review of Systems   Constitutional: Negative for chills, fever and weight loss. HENT: Positive for congestion (happens intermittently, uses flonase prn) and nosebleeds (states she only gets nosebleeds when she uses flonase). Negative for ear discharge, ear pain, hearing loss, sore throat and tinnitus. Eyes: Negative. Respiratory: Negative for cough, sputum production, shortness of breath, wheezing and stridor. Cardiovascular: Negative for chest pain, palpitations, orthopnea, claudication and PND. Gastrointestinal: Positive for heartburn. Negative for abdominal pain, blood in stool, constipation, diarrhea, melena, nausea and vomiting. Genitourinary: Negative. Musculoskeletal: Negative. Skin: Negative. Neurological: Negative. Endo/Heme/Allergies: Negative. Psychiatric/Behavioral: Negative.         I personally collected this information from all available source present (patient/others in room and records available) RADHA      PHYSICAL EXAMINATION:    Vital Signs:    Visit Vitals  /77 (BP 1 Location: Left upper arm, BP Patient Position: Sitting)   Pulse 86   Temp (!) 96.2 °F (35.7 °C) (Temporal)   Ht 5' 5\" (1.651 m)   Wt 193 lb (87.5 kg)   SpO2 92%   BMI 32.12 kg/m²         Wt Readings from Last 3 Encounters:   11/09/21 193 lb (87.5 kg)   09/07/21 190 lb (86.2 kg)   08/03/21 189 lb (85.7 kg)     BP Readings from Last 3 Encounters:   11/09/21 137/77   09/07/21 (!) 145/82   08/03/21 105/72         Physical Exam  Vitals reviewed. ASSESSMENT/PLAN:      1. Blood work  2. Follow up 3 months, continue follow up with cardiology and pulmonogy         FOLLOW UP:     Patient knows to keep any and all future visits scheduled unless told otherwise. Patient knows to call, come back if any concerns, questions, comments or problems arise. Signed By: Zay Wang     November 9, 2021         This visit was reviewed and signed electronically. It was been completed with voice recognition software and hand typing. It may have syntax and spelling errors despite editing. *ATTENTION:  This note has been created by a medical student for educational purposes only. Please do not refer to the content of this note for clinical decision-making, billing, or other purposes. Please see attending physicians note to obtain clinical information on this patient. *

## 2021-11-09 NOTE — PROGRESS NOTES
1. Have you been to the ER, urgent care clinic since your last visit? Hospitalized since your last visit? No    2. Have you seen or consulted any other health care providers outside of the [address] [place] [address] since your last visit? Include any pap smears or colon screening.  No    Chief Complaint   Patient presents with    Hypertension    Diabetes     Visit Vitals  BP (!) 154/81 (BP 1 Location: Left upper arm, BP Patient Position: Sitting)   Pulse (!) 103   Temp (!) 96.2 °F (35.7 °C) (Temporal)   Ht 5' 5\" (1.651 m)   Wt 193 lb (87.5 kg)   SpO2 92%   BMI 32.12 kg/m²

## 2021-11-09 NOTE — PROGRESS NOTES
IDENTIFYING INFORMATION:      Raul Wellington , 74 y.o., female  [place] [address]     Medical Record Number: [id number]    E and M CODING:  Established      Patient Active Problem List   Diagnosis Code    Benign essential hypertension I10    Body mass index 30.0-30.9, adult Z68.30    Type II diabetes mellitus, uncontrolled ([facility] E11.65    Heart murmur R01.1    Mixed hyperlipidemia E78.2    Tobacco dependence syndrome F17.200    Acute gastrointestinal hemorrhage K92.2    Allergic rhinitis J30.9    Anemia D64.9    Foot callus L84    Left bundle branch block I44.7    Microalbuminuria due to type 2 diabetes mellitus (HCC) E11.29, R80.9    Morbid obesity ([facility]) E66.01    Hyponatremia E87.1    COPD exacerbation ([facility]) J44.1    Acute respiratory failure with hypoxia ([facility]) J96.01    Aortic stenosis I35.0   [facility] discharge follow-up Z09    Chronic obstructive pulmonary disease (HCC) J44.9    Coronary artery disease involving native heart without angina pectoris I25.10    Gastroesophageal reflux disease without esophagitis K21.9    Congestive heart failure (HCC) I50.9    Type 2 diabetes mellitus with hyperglycemia, without long-term current use of insulin (HCC) E11.65    Acute and chronic respiratory failure with hypoxia ([facility]) J96.21    Acute on chronic systolic (congestive) heart failure ([facility]) I50.23    Hypokalemia E87.6           CHIEF COMPLAINT:   Chief Complaint   Patient presents with    Hypertension    Diabetes         HISTORY OF PRESENT ILLNESS:    Raul Wellington is a 74 y.o. female . she comes in for 3 month follow up. Needs labs and medications. She has no major complaints today and little sinus pressure perhaps. Is not severe. She thinks the Allegra is not helping anymore. However, she also says the Flonase caused her nose to bleed. She has no fever, chills, sweats. She has no nausea, vomiting, diarrhea. She does need some lab work.   That has not been ordered lately. Last labs have been reviewed. She has no chest pain, shortness breath, orthopnea or PND. She has no leg edema. She does have a history of congestive heart failure but has been feeling \"okay lately \". Fortunately, she is not been to the hospital for about 4 months. She does have a cardiologist.    PAST MEDICAL HISTORY:     Past Medical History:   Diagnosis Date    Acute gastrointestinal hemorrhage 8/28/2020    Allergic rhinitis 8/28/2020    Anemia 8/28/2020    Benign essential hypertension 6/25/2020    Body mass index 30.0-30.9, adult 6/25/2020    CAD (coronary artery disease) 8/28/2020    Foot callus 8/28/2020    GERD (gastroesophageal reflux disease) 6/25/2020    Hair loss     Heart murmur 6/25/2020    Hx of colonoscopy 01/01/2015    Done for anemia and rectal bleeding    Left bundle branch block 8/28/2020    Microalbuminuria due to type 2 diabetes mellitus ([facility] 8/28/2020    Mixed hyperlipidemia 6/25/2020    Morbid obesity ([facility] 8/28/2020    Tobacco dependence syndrome 6/25/2020    Type II diabetes mellitus, uncontrolled ([facility] 6/25/2020       MEDICATIONS:     Current Outpatient Medications on File Prior to Visit   Medication Sig Dispense Refill    spironolactone (ALDACTONE) 25 mg tablet Take 1 Tablet by mouth two (2) times a day. Indications: heart failure with reduced ejection fraction 180 Tablet 3    budesonide-glycopyr-formoterol (Breztri Aerosphere) 160-9-4.8 mcg/actuation HFAA Take 2 Puffs by inhalation two (2) times a day.  FeroSuL 325 mg (65 mg iron) tablet Take 325 mg by mouth Daily (before breakfast).  pantoprazole (Protonix) 40 mg tablet Take 1 Tablet by mouth daily. 90 Tablet 1    guaiFENesin (Mucinex) 1,200 mg Ta12 ER tablet Take 1,200 mg by mouth two (2) times a day.       lisinopriL (PRINIVIL, ZESTRIL) 20 mg tablet TAKE 1 TABLET BY MOUTH TWICE DAILY      albuterol (PROVENTIL HFA, VENTOLIN HFA, PROAIR HFA) 90 mcg/actuation inhaler 2 puffs every 4 hours as needed for cough, wheezing , short of breath. Insurance brand of choice 1 Inhaler 3    furosemide (Lasix) 40 mg tablet Take 1 Tab by mouth daily. 90 Tab 1    fexofenadine (ALLEGRA) 180 mg tablet Take 1 Tab by mouth daily. STOP loratadine 30 Tab 3    metFORMIN (GLUCOPHAGE) 500 mg tablet Take 2 tablets twice a day with meals 120 Tab 3    carvediloL (COREG) 3.125 mg tablet Take 1 Tab by mouth two (2) times daily (with meals). 180 Tab 3    potassium chloride (KLOR-CON) 10 mEq tablet By oral route take one tablet Monday, Wednesday and Friday 40 Tab 3     No current facility-administered medications on file prior to visit. ALLERGIES:    Allergies   Allergen Reactions    Aspirin Unknown (comments)     hemorraging - ended up in the hospital         SOCIAL HISTORY:     Social History     Tobacco Use    Smoking status: Former Smoker     Packs/day: 0.50     Quit date:      Years since quittin.8    Smokeless tobacco: Never Used   Vaping Use    Vaping Use: Never used   Substance Use Topics    Alcohol use: Not Currently    Drug use: Never       SURGICAL HISTORY:    Past Surgical History:   Procedure Laterality Date    HX COLONOSCOPY      due in [id number]. Done for anemia and rectal bleeding        FAMILY HISTORY:    Family History   Problem Relation Age of Onset    Cancer Mother         uterine    Hypertension Father     Cancer Sister         breast         REVIEW OF SYSTEMS:    I personally collected this information from all available source present (patient/others in room and records available) -[email or web address]  Review of Systems   Constitutional: Negative for chills, diaphoresis, fever and malaise/fatigue. HENT: Positive for nosebleeds. Negative for congestion. Eyes: Negative for blurred vision and double vision. Respiratory: Positive for cough, sputum production and shortness of breath (mostly with exertion). Negative for wheezing.     Cardiovascular: Negative for chest pain, palpitations, orthopnea, leg swelling and PND. Gastrointestinal: Negative for abdominal pain, blood in stool, constipation, diarrhea, heartburn, melena, nausea and vomiting. Genitourinary: Negative for dysuria, frequency and urgency. Neurological: Negative for dizziness, tingling, sensory change and headaches. PHYSICAL EXAMINATION:    Vital Signs:    Visit Vitals  /77 (BP 1 Location: Left upper arm, BP Patient Position: Sitting)   Pulse 86   Temp (!) 96.2 °F (35.7 °C) (Temporal)   Ht 5' 5\" (1.651 m)   Wt 193 lb (87.5 kg)   SpO2 92%   BMI 32.12 kg/m²         Wt Readings from Last 3 Encounters:   11/09/21 193 lb (87.5 kg)   09/07/21 190 lb (86.2 kg)   08/03/21 189 lb (85.7 kg)     BP Readings from Last 3 Encounters:   11/09/21 137/77   09/07/21 (!) 145/82   08/03/21 105/72         Physical Exam  Nursing note reviewed. Constitutional:       General: She is not in acute distress. Appearance: Normal appearance. She is not ill-appearing or toxic-appearing. HENT:      Right Ear: Tympanic membrane, ear canal and external ear normal.      Left Ear: Tympanic membrane, ear canal and external ear normal.      Nose: Congestion present. No rhinorrhea. Mouth/Throat:      Pharynx: Posterior oropharyngeal erythema present. No oropharyngeal exudate. Comments: Posterior pharynx is hyperemic with copious , clear pnd. Eyes:      General: No scleral icterus. Extraocular Movements: Extraocular movements intact. Conjunctiva/sclera: Conjunctivae normal.      Pupils: Pupils are equal, round, and reactive to light. Neck:      Vascular: No carotid bruit. Cardiovascular:      Rate and Rhythm: Normal rate and regular rhythm. Heart sounds: No murmur heard. No friction rub. No gallop. Pulmonary:      Effort: Pulmonary effort is normal.      Breath sounds: Normal breath sounds. No wheezing, rhonchi or rales. Abdominal:      General: Bowel sounds are normal. There is no distension. Palpations: Abdomen is soft. There is no mass. Tenderness: There is no abdominal tenderness. Musculoskeletal:      Cervical back: No rigidity. No muscular tenderness. Right lower leg: No edema. Left lower leg: No edema. Lymphadenopathy:      Cervical: No cervical adenopathy. Skin:     Capillary Refill: Capillary refill takes less than 2 seconds. Coloration: Skin is not jaundiced. Findings: No erythema or rash. Neurological:      General: No focal deficit present. Mental Status: She is alert and oriented to person, place, and time. Mental status is at baseline. Cranial Nerves: No cranial nerve deficit. Sensory: No sensory deficit. Coordination: Coordination normal.      Gait: Gait normal.   Psychiatric:         Mood and Affect: Mood normal.         Behavior: Behavior normal.         Thought Content: Thought content normal.         Judgment: Judgment normal.           ASSESSMENT/PLAN:      Minor Dunlap seems to be doing well with her blood pressure medicine.  She may continue the Aldactone, lisinopril, furosemide.  She may continue the carvedilol which also helps with congestive heart failure.  Since she is on a diuretic she needs to continue the potassium.  She has diabetes but does not check her sugar. Continue Metformin for now.  Follow-up with lab work and treat as indicated.  She says she will go ahead and over-the-counter antihistamine like Zyrtec. Do recommend taking it at night.  I have reviewed the medical student third year notes. I have repeated the history, physical, review of systems personally and its entirety. Please refer to my note for full details. ICD-10-CM ICD-9-CM    1. Benign essential hypertension  I10 401.1 CBC WITH AUTOMATED DIFF      METABOLIC PANEL, COMPREHENSIVE      LIPID PANEL      TSH 3RD GENERATION   2.  Congestive heart failure, unspecified HF chronicity, unspecified heart failure type (HCC)  I50.9 428.0    3. Gastroesophageal reflux disease without esophagitis  K21.9 530.81    4. Chronic obstructive pulmonary disease, unspecified COPD type ([facility]  J44.9 496    5. Uncontrolled type 2 diabetes mellitus with hyperglycemia ([facility])  E11.65 250.02 HEMOGLOBIN A1C WITH EAG   6. Coronary artery disease involving native heart without angina pectoris, unspecified vessel or lesion type  I25.10 414.01    7. Iron deficiency anemia, unspecified iron deficiency anemia type  D50.9 280.9      Discussion (regarding today's visit with Al Torres);  [facility] gave the patient a review of medications, treatment, testing such as labs, imagine, referrals and when to call regarding results and appointments.  Reminded patient to keep any and all appointments with specialists, labs, imaging.  Reminded patient to make sure we get copies of any specialists care, labs and imaging.  Reminded patient to call of come by the office if there are any concerns, questions , comments or problems.  The patient verbalized understanding of the care plan and all questions were answered to the patient's satisfaction prior to leaving the office.  The patient was told that failure to comply with recommended testing could result in abnormal health consequences.  The patient was instructed to have yearly routine health maintenance including but not limited to age appropriate vaccines, testing, screening exams.  ALL questions were answered to her satisfaction before leaving the office. The patient actively participated in medical decision making. This date I spent  20 minutes reviewing chart, previous notes, tests and interviewing and examining patients answering questions providing follow up as well as ordering tests. FOLLOW UP:     Patient knows to keep any and all future visits scheduled unless told otherwise. Patient knows to call, come back if any concerns, questions, comments or problems arise.             Signed By: Gwyn Corbin Kunal Anderson,      November 9, 2021     TIME: 5:25 pm    This visit was reviewed and signed electronically. It was been completed with voice recognition software and hand typing. It may have syntax and spelling errors despite editing.

## 2021-12-07 LAB
ALBUMIN SERPL-MCNC: 4.1 G/DL (ref 3.8–4.8)
ALBUMIN/GLOB SERPL: 1.3 {RATIO} (ref 1.2–2.2)
ALP SERPL-CCNC: 84 IU/L (ref 44–121)
ALT SERPL-CCNC: 45 IU/L (ref 0–32)
AST SERPL-CCNC: 40 IU/L (ref 0–40)
BASOPHILS # BLD AUTO: 0.1 X10E3/UL (ref 0–0.2)
BASOPHILS NFR BLD AUTO: 1 %
BILIRUB SERPL-MCNC: 0.4 MG/DL (ref 0–1.2)
BUN SERPL-MCNC: 19 MG/DL (ref 8–27)
BUN/CREAT SERPL: 20 (ref 12–28)
CALCIUM SERPL-MCNC: 9.9 MG/DL (ref 8.7–10.3)
CHLORIDE SERPL-SCNC: 95 MMOL/L (ref 96–106)
CHOLEST SERPL-MCNC: 132 MG/DL (ref 100–199)
CO2 SERPL-SCNC: 25 MMOL/L (ref 20–29)
CREAT SERPL-MCNC: 0.95 MG/DL (ref 0.57–1)
EOSINOPHIL # BLD AUTO: 0.1 X10E3/UL (ref 0–0.4)
EOSINOPHIL NFR BLD AUTO: 1 %
ERYTHROCYTE [DISTWIDTH] IN BLOOD BY AUTOMATED COUNT: 19.7 % (ref 11.7–15.4)
EST. AVERAGE GLUCOSE BLD GHB EST-MCNC: 214 MG/DL
GLOBULIN SER CALC-MCNC: 3.1 G/DL (ref 1.5–4.5)
GLUCOSE SERPL-MCNC: 130 MG/DL (ref 65–99)
HBA1C MFR BLD: 9.1 % (ref 4.8–5.6)
HCT VFR BLD AUTO: 30.5 % (ref 34–46.6)
HDLC SERPL-MCNC: 52 MG/DL
HGB BLD-MCNC: 9.5 G/DL (ref 11.1–15.9)
IMM GRANULOCYTES # BLD AUTO: 0 X10E3/UL (ref 0–0.1)
IMM GRANULOCYTES NFR BLD AUTO: 0 %
LDLC SERPL CALC-MCNC: 65 MG/DL (ref 0–99)
LYMPHOCYTES # BLD AUTO: 2.4 X10E3/UL (ref 0.7–3.1)
LYMPHOCYTES NFR BLD AUTO: 22 %
MCH RBC QN AUTO: 20.3 PG (ref 26.6–33)
MCHC RBC AUTO-ENTMCNC: 31.1 G/DL (ref 31.5–35.7)
MCV RBC AUTO: 65 FL (ref 79–97)
MONOCYTES # BLD AUTO: 0.9 X10E3/UL (ref 0.1–0.9)
MONOCYTES NFR BLD AUTO: 9 %
NEUTROPHILS # BLD AUTO: 7.4 X10E3/UL (ref 1.4–7)
NEUTROPHILS NFR BLD AUTO: 67 %
PLATELET # BLD AUTO: 421 X10E3/UL (ref 150–450)
POTASSIUM SERPL-SCNC: 4.8 MMOL/L (ref 3.5–5.2)
PROT SERPL-MCNC: 7.2 G/DL (ref 6–8.5)
RBC # BLD AUTO: 4.68 X10E6/UL (ref 3.77–5.28)
SODIUM SERPL-SCNC: 136 MMOL/L (ref 134–144)
TRIGL SERPL-MCNC: 77 MG/DL (ref 0–149)
TSH SERPL DL<=0.005 MIU/L-ACNC: 2.37 UIU/ML (ref 0.45–4.5)
VLDLC SERPL CALC-MCNC: 15 MG/DL (ref 5–40)
WBC # BLD AUTO: 11 X10E3/UL (ref 3.4–10.8)

## 2021-12-10 DIAGNOSIS — I10 BENIGN ESSENTIAL HYPERTENSION: ICD-10-CM

## 2021-12-10 DIAGNOSIS — I50.9 CONGESTIVE HEART FAILURE, UNSPECIFIED HF CHRONICITY, UNSPECIFIED HEART FAILURE TYPE (HCC): ICD-10-CM

## 2021-12-13 ENCOUNTER — TELEPHONE (OUTPATIENT)
Dept: FAMILY MEDICINE CLINIC | Age: 66
End: 2021-12-13

## 2021-12-13 DIAGNOSIS — I10 BENIGN ESSENTIAL HYPERTENSION: ICD-10-CM

## 2021-12-13 DIAGNOSIS — I50.9 CONGESTIVE HEART FAILURE, UNSPECIFIED HF CHRONICITY, UNSPECIFIED HEART FAILURE TYPE (HCC): ICD-10-CM

## 2021-12-13 RX ORDER — POTASSIUM CHLORIDE 750 MG/1
TABLET, EXTENDED RELEASE ORAL
Qty: 40 TABLET | Refills: 3 | Status: SHIPPED | OUTPATIENT
Start: 2021-12-13 | End: 2022-09-21 | Stop reason: SDUPTHER

## 2021-12-13 RX ORDER — FUROSEMIDE 40 MG/1
TABLET ORAL
Qty: 90 TABLET | Refills: 1 | Status: SHIPPED | OUTPATIENT
Start: 2021-12-13 | End: 2022-06-21 | Stop reason: SDUPTHER

## 2021-12-13 RX ORDER — FUROSEMIDE 40 MG/1
40 TABLET ORAL DAILY
Qty: 90 TABLET | Refills: 1 | Status: SHIPPED | OUTPATIENT
Start: 2021-12-13 | End: 2022-03-24 | Stop reason: SDUPTHER

## 2021-12-13 NOTE — TELEPHONE ENCOUNTER
Identifying Data:  74 y.o. , [place] Born , female     HISTORICAL DATA (REVIEWED TODAY):  ALLERGIES-    Allergies   Allergen Reactions    Aspirin Unknown (comments)     hemorraging - ended up in the hospital       MEDICATION AS OF LAST RECONCILIATION (NOT INCLUDING CHANGES MADE TODAY):    Current Outpatient Medications on File Prior to Visit   Medication Sig Dispense Refill    metFORMIN (GLUCOPHAGE) 500 mg tablet TAKE 2 TABLETS BY MOUTH TWICE DAILY WITH MEALS 120 Tablet 1    spironolactone (ALDACTONE) 25 mg tablet Take 1 Tablet by mouth two (2) times a day. Indications: heart failure with reduced ejection fraction 180 Tablet 3    budesonide-glycopyr-formoterol (Breztri Aerosphere) 160-9-4.8 mcg/actuation HFAA Take 2 Puffs by inhalation two (2) times a day.  FeroSuL 325 mg (65 mg iron) tablet Take 325 mg by mouth Daily (before breakfast).  pantoprazole (Protonix) 40 mg tablet Take 1 Tablet by mouth daily. 90 Tablet 1    guaiFENesin (Mucinex) 1,200 mg Ta12 ER tablet Take 1,200 mg by mouth two (2) times a day.  lisinopriL (PRINIVIL, ZESTRIL) 20 mg tablet TAKE 1 TABLET BY MOUTH TWICE DAILY      albuterol (PROVENTIL HFA, VENTOLIN HFA, PROAIR HFA) 90 mcg/actuation inhaler 2 puffs every 4 hours as needed for cough, wheezing , short of breath. Insurance brand of choice 1 Inhaler 3    [DISCONTINUED] furosemide (Lasix) 40 mg tablet Take 1 Tab by mouth daily. 90 Tab 1    fexofenadine (ALLEGRA) 180 mg tablet Take 1 Tab by mouth daily. STOP loratadine 30 Tab 3    carvediloL (COREG) 3.125 mg tablet Take 1 Tab by mouth two (2) times daily (with meals). 180 Tab 3    potassium chloride (KLOR-CON) 10 mEq tablet By oral route take one tablet Monday, Wednesday and Friday 40 Tab 3     No current facility-administered medications on file prior to visit.        PAST MEDICAL HISTORY:    Patient Active Problem List   Diagnosis Code    Benign essential hypertension I10    Body mass index 30.0-30.9, adult Z68.30    Type II diabetes mellitus, uncontrolled ([facility] E11.65    Heart murmur R01.1    Mixed hyperlipidemia E78.2    Tobacco dependence syndrome F17.200    Acute gastrointestinal hemorrhage K92.2    Allergic rhinitis J30.9    Anemia D64.9    Foot callus L84    Left bundle branch block I44.7    Microalbuminuria due to type 2 diabetes mellitus (HCC) E11.29, R80.9    Morbid obesity ([facility]) E66.01    Hyponatremia E87.1    COPD exacerbation ([facility]) J44.1    Acute respiratory failure with hypoxia ([facility]) J96.01    Aortic stenosis I35.0   [facility] discharge follow-up Z09    Chronic obstructive pulmonary disease ([facility]) J44.9    Coronary artery disease involving native heart without angina pectoris I25.10    Gastroesophageal reflux disease without esophagitis K21.9    Congestive heart failure ([facility]) I50.9    Type 2 diabetes mellitus with hyperglycemia, without long-term current use of insulin (HCC) E11.65    Acute and chronic respiratory failure with hypoxia ([facility]) J96.21    Acute on chronic systolic (congestive) heart failure ([facility]) I50.23    Hypokalemia E87.6       ASSESSMENT AND PLAN:      ICD-10-CM ICD-9-CM    1. Benign essential hypertension  I10 401.1 furosemide (Lasix) 40 mg tablet   2.  Congestive heart failure, unspecified HF chronicity, unspecified heart failure type ([facility])  I50.9 428.0 furosemide (Lasix) 40 mg tablet             Daisy Costa DO

## 2021-12-13 NOTE — TELEPHONE ENCOUNTER
----- Message from Flor sent at 12/13/2021 11:10 AM EST -----  Subject: Refill Request    QUESTIONS  Name of Medication? furosemide (Lasix) 40 mg tablet  Patient-reported dosage and instructions? 40 MG Once daily  How many days do you have left? 0  Preferred [facility]acy? [id number]  Pharmacy phone number (if available)? [phone]  Additional Information for Provider? She stated its very important that   she gets it.   ---------------------------------------------------------------------------  --------------  CALL BACK INFO  What is the best way for the office to contact you? Do not leave any   message, patient will call back for answer  Preferred Call Back Phone Number?  [phone]

## 2021-12-13 NOTE — TELEPHONE ENCOUNTER
----- Message from Dee Dee Box sent at 12/13/2021  4:17 PM EST -----  Subject: Message to Provider    QUESTIONS  Information for Provider? Pharmacy said pt's prescription for potassium   chloride was put on hold. Pt needs an rx sent by Friday because that's   when she'll be completely out of that medication.  ---------------------------------------------------------------------------  --------------  CALL BACK INFO  What is the best way for the office to contact you? Do not leave any   message, patient will call back for answer  Preferred Call Back Phone Number? [phone]  ---------------------------------------------------------------------------  --------------  SCRIPT ANSWERS  Relationship to Patient?  Self

## 2021-12-14 NOTE — PROGRESS NOTES
The blood is very close to normal.  However, there is anemia, it is stable down just a little bit essentially stable. Looks like it still iron deficiency. Is patient still taking iron? Mingo Bhat The sugar is 130. The liver test one is a little elevated so we will recheck that next visit.   The kidney function is normal.  Cholesterol and thyroid are normal.

## 2021-12-15 NOTE — PROGRESS NOTES
Pt notified of results. States that she has not been taking iron for while. Asking if she needs to restart it?

## 2022-01-11 ENCOUNTER — TELEPHONE (OUTPATIENT)
Dept: ENDOCRINOLOGY | Age: 67
End: 2022-01-11

## 2022-01-11 NOTE — TELEPHONE ENCOUNTER
Patient is due for follow-up appointment. I last saw her in February 2021. Please call patient to make follow-up appointment. Bring glucometer and all medications to the visit.

## 2022-01-12 ENCOUNTER — OFFICE VISIT (OUTPATIENT)
Dept: ENDOCRINOLOGY | Age: 67
End: 2022-01-12
Payer: MEDICARE

## 2022-01-12 VITALS
OXYGEN SATURATION: 97 % | DIASTOLIC BLOOD PRESSURE: 77 MMHG | TEMPERATURE: 97.8 F | BODY MASS INDEX: 32.32 KG/M2 | WEIGHT: 194 LBS | HEIGHT: 65 IN | SYSTOLIC BLOOD PRESSURE: 132 MMHG | HEART RATE: 85 BPM

## 2022-01-12 DIAGNOSIS — E11.65 TYPE 2 DIABETES MELLITUS WITH HYPERGLYCEMIA, WITHOUT LONG-TERM CURRENT USE OF INSULIN (HCC): Primary | ICD-10-CM

## 2022-01-12 DIAGNOSIS — E78.2 MIXED HYPERLIPIDEMIA: ICD-10-CM

## 2022-01-12 LAB — GLUCOSE POC: 107 MG/DL

## 2022-01-12 PROCEDURE — G8427 DOCREV CUR MEDS BY ELIG CLIN: HCPCS | Performed by: INTERNAL MEDICINE

## 2022-01-12 PROCEDURE — G8510 SCR DEP NEG, NO PLAN REQD: HCPCS | Performed by: INTERNAL MEDICINE

## 2022-01-12 PROCEDURE — G8417 CALC BMI ABV UP PARAM F/U: HCPCS | Performed by: INTERNAL MEDICINE

## 2022-01-12 PROCEDURE — G8752 SYS BP LESS 140: HCPCS | Performed by: INTERNAL MEDICINE

## 2022-01-12 PROCEDURE — 82962 GLUCOSE BLOOD TEST: CPT | Performed by: INTERNAL MEDICINE

## 2022-01-12 PROCEDURE — 1090F PRES/ABSN URINE INCON ASSESS: CPT | Performed by: INTERNAL MEDICINE

## 2022-01-12 PROCEDURE — 99214 OFFICE O/P EST MOD 30 MIN: CPT | Performed by: INTERNAL MEDICINE

## 2022-01-12 PROCEDURE — G8754 DIAS BP LESS 90: HCPCS | Performed by: INTERNAL MEDICINE

## 2022-01-12 PROCEDURE — 1101F PT FALLS ASSESS-DOCD LE1/YR: CPT | Performed by: INTERNAL MEDICINE

## 2022-01-12 PROCEDURE — 3046F HEMOGLOBIN A1C LEVEL >9.0%: CPT | Performed by: INTERNAL MEDICINE

## 2022-01-12 PROCEDURE — G8536 NO DOC ELDER MAL SCRN: HCPCS | Performed by: INTERNAL MEDICINE

## 2022-01-12 PROCEDURE — 2022F DILAT RTA XM EVC RTNOPTHY: CPT | Performed by: INTERNAL MEDICINE

## 2022-01-12 PROCEDURE — 3017F COLORECTAL CA SCREEN DOC REV: CPT | Performed by: INTERNAL MEDICINE

## 2022-01-12 PROCEDURE — G8400 PT W/DXA NO RESULTS DOC: HCPCS | Performed by: INTERNAL MEDICINE

## 2022-01-12 RX ORDER — BLOOD SUGAR DIAGNOSTIC
STRIP MISCELLANEOUS
Qty: 50 STRIP | Refills: 5 | Status: SHIPPED | OUTPATIENT
Start: 2022-01-12 | End: 2022-09-21

## 2022-01-12 RX ORDER — LORATADINE 10 MG/1
TABLET ORAL
COMMUNITY
Start: 2021-04-20 | End: 2022-03-24 | Stop reason: SDUPTHER

## 2022-01-12 RX ORDER — ATORVASTATIN CALCIUM 40 MG/1
40 TABLET, FILM COATED ORAL
Qty: 90 TABLET | Refills: 2 | Status: SHIPPED | OUTPATIENT
Start: 2022-01-12 | End: 2022-03-24 | Stop reason: SDUPTHER

## 2022-01-12 RX ORDER — BLOOD-GLUCOSE METER
EACH MISCELLANEOUS
Qty: 1 EACH | Refills: 0 | Status: SHIPPED | OUTPATIENT
Start: 2022-01-12 | End: 2022-06-21 | Stop reason: ALTCHOICE

## 2022-01-12 RX ORDER — METFORMIN HYDROCHLORIDE 500 MG/1
TABLET ORAL
Qty: 120 TABLET | Refills: 1 | Status: SHIPPED | OUTPATIENT
Start: 2022-01-12 | End: 2022-03-23

## 2022-01-12 RX ORDER — DULAGLUTIDE 0.75 MG/.5ML
0.75 INJECTION, SOLUTION SUBCUTANEOUS
Qty: 2 ML | Refills: 4 | Status: SHIPPED | OUTPATIENT
Start: 2022-01-12 | End: 2022-02-11

## 2022-01-12 RX ORDER — LANCETS 33 GAUGE
EACH MISCELLANEOUS
Qty: 50 EACH | Refills: 5 | Status: SHIPPED | OUTPATIENT
Start: 2022-01-12 | End: 2022-06-21 | Stop reason: ALTCHOICE

## 2022-01-12 NOTE — LETTER
1/12/2022    Patient: Sen Muhamamd   YOB: 1955   Date of Visit: 1/12/2022     Kristen Zavala DO  [address]  [address] [facility]  [place] [place]    Dear Kristen Zavala DO,      Thank you for referring Ms. Pauly Bell to [address] [place] [address] for evaluation. My notes for this consultation are attached. If you have questions, please do not hesitate to call me. I look forward to following your patient along with you.       Sincerely,    Ernestine Grullon MD

## 2022-01-12 NOTE — PROGRESS NOTES
History and Physical    Patient: Blanca Catalan MRN: [id number]  SSN: [id number]    YOB: 1955  Age: 74 y.o. Sex: female      Subjective: Blanca Catalan is a 74 y.o. female with past medical history of hypertension, hyperlipidemia is here for follow-up of type 2 diabetes mellitus. She remains in primary care of Dr. Orvan Koyanagi. Patient is here with her daughter. I am seeing this patient after 11 months. Since last visit patient had 1 hospitalization for acute hypoxic respiratory failure, CHF exacerbation. She has not had any more admissions for GI bleed or blood transfusions. She is taking metformin 500 mg 2 tablets twice a day. She was doing very well with diabetic diet until her brother passed away and then she was eating way too much, but now she is trying to get back on track. Last appointment with cardiologist was more than 6 months back. She is overdue for diabetic eye exam.  She is checking blood glucose once a day every day. Still uses oxygen but only as needed. Weight has been stable since the last visit. Glucometer reading: Checking blood glucose once a day every day.   Readings are ranging from 128 to 246 mg/dL    updated diabetes history:  · Diagnosis: 2015    · Current treatment: Metformin 500 mg 2 tablets twice a day    · Past treatment: januvia?, Jardiance (expensive), glimepiride (low blood sugar), pioglitazone (CHF)    · Glucose checks: not checking    · Hyperglycemia: unknown    · Hypoglycemia: no    · Meals per day: 3, breakfast: eggs, and hotdog, lunch: chicken and vegetables, dinner: same, snacks: junk food,     · Exercise: no    · DM related hospitalizations: no        Complications of DM:    · CAD: no    · CVA: no    · PVD: no    · Amputations: no     · Retinopathy: no; last exam was 7/2020    · Gastropathy: no    · Nephropathy: yes    · Neuropathy: no        Medications:    · Statin: atorvastatin 40 mg    · ACE-I: lisinopril    · ASA: no allergic        · Diabetes education: no     Past Medical History:   Diagnosis Date    Acute gastrointestinal hemorrhage 2020    Allergic rhinitis 2020    Anemia 2020    Benign essential hypertension 2020    Body mass index 30.0-30.9, adult 2020    CAD (coronary artery disease) 2020    Foot callus 2020    GERD (gastroesophageal reflux disease) 2020    Hair loss     Heart murmur 2020    Hx of colonoscopy 2015    Done for anemia and rectal bleeding    Left bundle branch block 2020    Microalbuminuria due to type 2 diabetes mellitus ([facility] 2020    Mixed hyperlipidemia 2020    Morbid obesity ([facility] 2020    Tobacco dependence syndrome 2020    Type II diabetes mellitus, uncontrolled ([facility] 2020     Past Surgical History:   Procedure Laterality Date    HX COLONOSCOPY  2015    due in 3075-8593. Done for anemia and rectal bleeding      Family History   Problem Relation Age of Onset    Cancer Mother         uterine    Hypertension Father     Cancer Sister         breast     Social History     Tobacco Use    Smoking status: Former Smoker     Packs/day: 0.50     Quit date:      Years since quittin.0    Smokeless tobacco: Never Used   Substance Use Topics    Alcohol use: Not Currently      Prior to Admission medications    Medication Sig Start Date End Date Taking? Authorizing Provider   loratadine (CLARITIN) 10 mg tablet 10 mg = 1 tab each dose, PO, daily, # 30 tab, 0 Refills 21  Yes Provider, Historical   dulaglutide (Trulicity) 8.10 YM/0.4 mL sub-q pen 0.5 mL by SubCUTAneous route every seven (7) days for 30 days. 22 Yes Shirley Mistry MD   atorvastatin (LIPITOR) 40 mg tablet Take 1 Tablet by mouth nightly for 90 days.  22 Yes Shirley Mistry MD   metFORMIN (GLUCOPHAGE) 500 mg tablet TAKE 2 TABLETS BY MOUTH TWICE DAILY WITH MEALS 22  Yes Shirley Mistry MD   lancets (One Touch Delica) 33 gauge misc Check glucose once a day 1/12/22  Yes Vidal Garcia MD   glucose blood VI test strips (OneTouch Verio test strips) strip Check glucose once a day 1/12/22  Yes Vidal Garcia MD   Blood-Glucose Meter (OneTouch Verio Meter) misc Check glucose once a day 1/12/22  Yes Vidal Garcia MD   atorvastatin (LIPITOR) 40 mg tablet Take 1 tablet by mouth nightly 1/11/22  Yes Vidal Garcia MD   furosemide (LASIX) 40 mg tablet TAKE 1 TABLET BY MOUTH DAILY 12/13/21  Yes Juan Deng, DO   potassium chloride (KLOR-CON M10) 10 mEq tablet TAKE 1 TABLET BY MOUTH EVERY MONDAY, WEDNESDAY, AND FRIDAY 12/13/21  Yes Juan Deng DO   furosemide (Lasix) 40 mg tablet Take 1 Tablet by mouth daily. 12/13/21  Yes Juan Deng DO   spironolactone (ALDACTONE) 25 mg tablet Take 1 Tablet by mouth two (2) times a day. Indications: heart failure with reduced ejection fraction 10/6/21  Yes Juan Deng, DO   budesonide-glycopyr-formoterol (Breztri Aerosphere) 160-9-4.8 mcg/actuation HFAA Take 2 Puffs by inhalation two (2) times a day. Yes Provider, Historical   FeroSuL 325 mg (65 mg iron) tablet Take 325 mg by mouth Daily (before breakfast). 8/2/21  Yes Provider, Historical   pantoprazole (Protonix) 40 mg tablet Take 1 Tablet by mouth daily. 9/7/21  Yes Juan Deng, DO   guaiFENesin (Mucinex) 1,200 mg Ta12 ER tablet Take 1,200 mg by mouth two (2) times a day. Yes Provider, Historical   lisinopriL (PRINIVIL, ZESTRIL) 20 mg tablet TAKE 1 TABLET BY MOUTH TWICE DAILY 7/28/21  Yes Provider, Historical   albuterol (PROVENTIL HFA, VENTOLIN HFA, PROAIR HFA) 90 mcg/actuation inhaler 2 puffs every 4 hours as needed for cough, wheezing , short of breath. Insurance brand of choice 8/3/21  Yes Juan Deng DO   fexofenadine (ALLEGRA) 180 mg tablet Take 1 Tab by mouth daily. STOP loratadine 5/10/21  Yes Oneda Sowmya, DO   carvediloL (COREG) 3.125 mg tablet Take 1 Tab by mouth two (2) times daily (with meals).  4/12/21  Yes Oneda Sowmya, DO        Allergies Allergen Reactions    Aspirin Unknown (comments)     hemorraging - ended up in the hospital       Review of Systems:  ROS    A comprehensive review of systems was preformed and it is negative except mentioned in HPI    Objective:     Vitals:    01/12/22 1131   BP: 132/77   Pulse: 85   Temp: 97.8 °F (36.6 °C)   TempSrc: Temporal   SpO2: 97%   Weight: 194 lb (88 kg)   Height: 5' 5\" (1.651 m)        Physical Exam:    Physical Exam  Vitals and nursing note reviewed. Constitutional:       Appearance: Normal appearance. Cardiovascular:      Rate and Rhythm: Normal rate and regular rhythm. Pulmonary:      Effort: Pulmonary effort is normal.      Breath sounds: Normal breath sounds. Neurological:      Mental Status: She is alert. Labs and Imaging:  Results for Chad Diaz (MRN [id number]) as of 11/25/2020 12:52   Ref. Range 10/13/2020 10:58   Sodium Latest Ref Range: 134 - 144 mmol/L 134   Potassium Latest Ref Range: 3.5 - 5.2 mmol/L 4.2   Chloride Latest Ref Range: 96 - 106 mmol/L 92 (L)   CO2 Latest Ref Range: 20 - 29 mmol/L 26   Glucose Latest Ref Range: 65 - 99 mg/dL 170 (H)   BUN Latest Ref Range: 8 - 27 mg/dL 10   Creatinine Latest Ref Range: 0.57 - 1.00 mg/dL 0.53 (L)   BUN/Creatinine ratio Latest Ref Range: 12 - 28  19   Calcium Latest Ref Range: 8.7 - 10.3 mg/dL 10.1   GFR est non-AA Latest Ref Range: >59 mL/min/1.73 99   GFR est AA Latest Ref Range: >59 mL/min/1.73 114   Bilirubin, total Latest Ref Range: 0.0 - 1.2 mg/dL 0.5   Protein, total Latest Ref Range: 6.0 - 8.5 g/dL 7.5   Albumin Latest Ref Range: 3.8 - 4.8 g/dL 4.3   A-G Ratio Latest Ref Range: 1.2 - 2.2  1.3   ALT Latest Ref Range: 0 - 32 IU/L 30   AST Latest Ref Range: 0 - 40 IU/L 24   Alk.  phosphatase Latest Ref Range: 39 - 117 IU/L 101   Triglyceride Latest Ref Range: 0 - 149 mg/dL 65   Cholesterol, total Latest Ref Range: 100 - 199 mg/dL 123   HDL Cholesterol Latest Ref Range: >39 mg/dL 54   LDL Cholesterol Latest Ref Range: 0 - 99 mg/dL 55   Hemoglobin A1c, (calculated) Latest Ref Range: 4.8 - 5.6 % 7.9 (H)   Estimated average glucose Latest Units: mg/dL 180     Last 3 Recorded Weights in this Encounter    01/12/22 1131   Weight: 194 lb (88 kg)        Lab Results   Component Value Date/Time    Hemoglobin A1c 9.1 (H) 12/06/2021 08:50 AM    Hemoglobin A1c 7.6 (H) 05/10/2021 04:15 PM    Hemoglobin A1c 7.9 (H) 10/13/2020 10:58 AM    Hemoglobin A1c, External 7.7 09/30/2019 12:00 AM        Assessment:     Patient Active Problem List   Diagnosis Code    Benign essential hypertension I10    Body mass index 30.0-30.9, adult Z68.30    Type II diabetes mellitus, uncontrolled ([facility] E11.65    Heart murmur R01.1    Mixed hyperlipidemia E78.2    Tobacco dependence syndrome F17.200    Acute gastrointestinal hemorrhage K92.2    Allergic rhinitis J30.9    Anemia D64.9    Foot callus L84    Left bundle branch block I44.7    Microalbuminuria due to type 2 diabetes mellitus ([facility] E11.29, R80.9    Morbid obesity ([facility]) E66.01    Hyponatremia E87.1    COPD exacerbation ([facility]) J44.1    Acute respiratory failure with hypoxia ([facility]) J96.01    Aortic stenosis I35.0   [facility] discharge follow-up Z09    Chronic obstructive pulmonary disease (HCC) J44.9    Coronary artery disease involving native heart without angina pectoris I25.10    Gastroesophageal reflux disease without esophagitis K21.9    Congestive heart failure ([facility]) I50.9    Type 2 diabetes mellitus with hyperglycemia, without long-term current use of insulin ([facility]) E11.65    Acute and chronic respiratory failure with hypoxia ([facility]) J96.21    Acute on chronic systolic (congestive) heart failure ([facility]) I50.23    Hypokalemia E87.6           Plan:     type II diabetes mellitus uncontrolled  Hemoglobin A1c was 10.2% on 1-., 9.9% on 6-, 7.7% on 9-, 7.4% on 1-6-2020, 7.9% on 10-, 142, 6.7% 2- (however, this is not reliable because of blood transfusions and anemia in the past 3 months), 9.1% on . Fingerstick blood glucose is 107 mg/dL in my office today. Up to date with diabetes related annual labs: yes  except urine microalbumin/creatinine ratio    Up to date with diabetic eye exam: no    plan:  target A1c is less than 7%. However avoiding hypoglycemia is important given patient's cardiac issues. Continue metformin 500 mg 2 tablets twice a day and start Trulicity 5.54 mg weekly. Discussed with patient about following diabetic diet. Check blood glucose once a day every day and bring glucometer to next visit in 3 months. Encourage patient to call [address] [place] [address] to make her appointment for diabetic eye exam.  I will do diabetic foot exam at next visit.    essential hypertension  Blood pressure acceptable today. Continue current medications. mixed hyperlipidemia  LDL 47 in 2020, 55 in .  : Total cholesterol 132, triglycerides 77, LDL 65. Continue atorvastatin 40 mg at bedtime. microalbuminuria due to type 2 diabetes mellitus  already on lisinopril. morbid obesity  diet and exercise  No significant change in weight since the last visit. heart murmur  following with cardiology    coronary arteriosclerosis  abnormal stress test and cardiac catheter. Needs CABG but currently not a candidate for surgery because of being dependent on oxygen. Advised patient to call cardiologist to make follow-up appointment as she saw them more than 6 months back. Systolic congestive heart failure  Cannot be on pioglitazone. Orders Placed This Encounter    AMB POC GLUCOSE BLOOD, BY GLUCOSE MONITORING DEVICE    dulaglutide (Trulicity) 0.12 Swedish/8.5 mL sub-q pen     Si.5 mL by SubCUTAneous route every seven (7) days for 30 days. Dispense:  2 mL     Refill:  4    atorvastatin (LIPITOR) 40 mg tablet     Sig: Take 1 Tablet by mouth nightly for 90 days.      Dispense:  90 Tablet     Refill:  2    metFORMIN (GLUCOPHAGE) 500 mg tablet     Sig: TAKE 2 TABLETS BY MOUTH TWICE DAILY WITH MEALS     Dispense:  120 Tablet     Refill:  1    lancets (One Touch Delica) 33 gauge misc     Sig: Check glucose once a day     Dispense:  50 Each     Refill:  5    glucose blood VI test strips (OneTouch Verio test strips) strip     Sig: Check glucose once a day     Dispense:  50 Strip     Refill:  5    Blood-Glucose Meter (OneTouch Verio Meter) misc     Sig: Check glucose once a day     Dispense:  1 Each     Refill:  0        Signed By: Chioma Canas MD     January 12, 2022      Return to clinic 3 months

## 2022-01-18 RX ORDER — FERROUS SULFATE TAB 325 MG (65 MG ELEMENTAL FE) 325 (65 FE) MG
TAB ORAL
Qty: 30 TABLET | Refills: 5 | Status: SHIPPED | OUTPATIENT
Start: 2022-01-18 | End: 2022-06-21 | Stop reason: SDUPTHER

## 2022-01-26 RX ORDER — LISINOPRIL 20 MG/1
20 TABLET ORAL 2 TIMES DAILY
Qty: 90 TABLET | Refills: 0 | Status: SHIPPED | OUTPATIENT
Start: 2022-01-26 | End: 2022-03-16 | Stop reason: SDUPTHER

## 2022-03-10 DIAGNOSIS — K21.9 GASTROESOPHAGEAL REFLUX DISEASE WITHOUT ESOPHAGITIS: ICD-10-CM

## 2022-03-11 DIAGNOSIS — K21.9 GASTROESOPHAGEAL REFLUX DISEASE WITHOUT ESOPHAGITIS: ICD-10-CM

## 2022-03-11 DIAGNOSIS — I50.9 CONGESTIVE HEART FAILURE, UNSPECIFIED HF CHRONICITY, UNSPECIFIED HEART FAILURE TYPE (HCC): Primary | ICD-10-CM

## 2022-03-11 RX ORDER — LISINOPRIL 20 MG/1
20 TABLET ORAL 2 TIMES DAILY
Qty: 90 TABLET | Refills: 0 | Status: CANCELLED | OUTPATIENT
Start: 2022-03-11

## 2022-03-13 RX ORDER — PANTOPRAZOLE SODIUM 40 MG/1
40 TABLET, DELAYED RELEASE ORAL DAILY
Qty: 90 TABLET | Refills: 1 | OUTPATIENT
Start: 2022-03-13

## 2022-03-13 RX ORDER — PANTOPRAZOLE SODIUM 40 MG/1
40 TABLET, DELAYED RELEASE ORAL DAILY
Qty: 90 TABLET | Refills: 1 | Status: SHIPPED | OUTPATIENT
Start: 2022-03-13 | End: 2022-06-06 | Stop reason: SDUPTHER

## 2022-03-16 ENCOUNTER — TELEPHONE (OUTPATIENT)
Dept: ENDOCRINOLOGY | Age: 67
End: 2022-03-16

## 2022-03-16 DIAGNOSIS — I10 BENIGN ESSENTIAL HYPERTENSION: Primary | ICD-10-CM

## 2022-03-16 RX ORDER — LISINOPRIL 20 MG/1
20 TABLET ORAL 2 TIMES DAILY
Qty: 180 TABLET | Refills: 1 | Status: SHIPPED | OUTPATIENT
Start: 2022-03-16 | End: 2022-06-14

## 2022-03-16 NOTE — TELEPHONE ENCOUNTER
Patient called in stated that she is going to run out of her blood pressure medication Lisinopril.  She is asking if you could send in a refill for her medication because her current PCP that prescribed the medication just up and left the practice and she doesn't see her new PCP until March 24 and she does not have enough medication to last her until she sees her new PCP

## 2022-03-18 PROBLEM — E11.65 TYPE 2 DIABETES MELLITUS WITH HYPERGLYCEMIA, WITHOUT LONG-TERM CURRENT USE OF INSULIN (HCC): Status: ACTIVE | Noted: 2021-02-25

## 2022-03-18 PROBLEM — Z09 HOSPITAL DISCHARGE FOLLOW-UP: Status: ACTIVE | Noted: 2021-02-10

## 2022-03-18 PROBLEM — D64.9 ANEMIA: Status: ACTIVE | Noted: 2020-08-28

## 2022-03-18 PROBLEM — E66.01 MORBID OBESITY (HCC): Status: ACTIVE | Noted: 2020-08-28

## 2022-03-18 PROBLEM — I25.10 CORONARY ARTERY DISEASE INVOLVING NATIVE HEART WITHOUT ANGINA PECTORIS: Status: ACTIVE | Noted: 2021-02-10

## 2022-03-19 PROBLEM — I10 BENIGN ESSENTIAL HYPERTENSION: Status: ACTIVE | Noted: 2020-06-25

## 2022-03-19 PROBLEM — I50.9 CONGESTIVE HEART FAILURE (HCC): Status: ACTIVE | Noted: 2021-02-10

## 2022-03-19 PROBLEM — R01.1 HEART MURMUR: Status: ACTIVE | Noted: 2020-06-25

## 2022-03-19 PROBLEM — I44.7 LEFT BUNDLE BRANCH BLOCK: Status: ACTIVE | Noted: 2020-08-28

## 2022-03-19 PROBLEM — I35.0 AORTIC STENOSIS: Status: ACTIVE | Noted: 2021-02-02

## 2022-03-19 PROBLEM — L84 FOOT CALLUS: Status: ACTIVE | Noted: 2020-08-28

## 2022-03-19 PROBLEM — I50.23 ACUTE ON CHRONIC SYSTOLIC (CONGESTIVE) HEART FAILURE (HCC): Status: ACTIVE | Noted: 2021-07-12

## 2022-03-19 PROBLEM — J96.21 ACUTE AND CHRONIC RESPIRATORY FAILURE WITH HYPOXIA (HCC): Status: ACTIVE | Noted: 2021-07-12

## 2022-03-19 PROBLEM — E11.29 MICROALBUMINURIA DUE TO TYPE 2 DIABETES MELLITUS (HCC): Status: ACTIVE | Noted: 2020-08-28

## 2022-03-19 PROBLEM — R80.9 MICROALBUMINURIA DUE TO TYPE 2 DIABETES MELLITUS (HCC): Status: ACTIVE | Noted: 2020-08-28

## 2022-03-19 PROBLEM — J30.9 ALLERGIC RHINITIS: Status: ACTIVE | Noted: 2020-08-28

## 2022-03-19 PROBLEM — J44.1 COPD EXACERBATION (HCC): Status: ACTIVE | Noted: 2020-12-13

## 2022-03-19 PROBLEM — K92.2 ACUTE GASTROINTESTINAL HEMORRHAGE: Status: ACTIVE | Noted: 2020-08-28

## 2022-03-19 PROBLEM — F17.200 TOBACCO DEPENDENCE SYNDROME: Status: ACTIVE | Noted: 2020-06-25

## 2022-03-20 PROBLEM — E87.1 HYPONATREMIA: Status: ACTIVE | Noted: 2020-12-13

## 2022-03-20 PROBLEM — E78.2 MIXED HYPERLIPIDEMIA: Status: ACTIVE | Noted: 2020-06-25

## 2022-03-20 PROBLEM — E87.6 HYPOKALEMIA: Status: ACTIVE | Noted: 2021-07-12

## 2022-03-20 PROBLEM — J44.9 CHRONIC OBSTRUCTIVE PULMONARY DISEASE (HCC): Status: ACTIVE | Noted: 2021-02-10

## 2022-03-20 PROBLEM — J96.01 ACUTE RESPIRATORY FAILURE WITH HYPOXIA (HCC): Status: ACTIVE | Noted: 2021-01-01

## 2022-03-20 PROBLEM — K21.9 GASTROESOPHAGEAL REFLUX DISEASE WITHOUT ESOPHAGITIS: Status: ACTIVE | Noted: 2021-02-10

## 2022-03-23 ENCOUNTER — TELEPHONE (OUTPATIENT)
Dept: ENDOCRINOLOGY | Age: 67
End: 2022-03-23

## 2022-03-24 ENCOUNTER — OFFICE VISIT (OUTPATIENT)
Dept: FAMILY MEDICINE CLINIC | Age: 67
End: 2022-03-24
Payer: MEDICARE

## 2022-03-24 VITALS
RESPIRATION RATE: 18 BRPM | OXYGEN SATURATION: 97 % | DIASTOLIC BLOOD PRESSURE: 71 MMHG | TEMPERATURE: 98.2 F | HEART RATE: 67 BPM | WEIGHT: 195 LBS | BODY MASS INDEX: 32.49 KG/M2 | HEIGHT: 65 IN | SYSTOLIC BLOOD PRESSURE: 132 MMHG

## 2022-03-24 DIAGNOSIS — J44.9 CHRONIC OBSTRUCTIVE PULMONARY DISEASE, UNSPECIFIED COPD TYPE (HCC): Primary | ICD-10-CM

## 2022-03-24 DIAGNOSIS — I50.9 CONGESTIVE HEART FAILURE, UNSPECIFIED HF CHRONICITY, UNSPECIFIED HEART FAILURE TYPE (HCC): ICD-10-CM

## 2022-03-24 DIAGNOSIS — D64.9 ANEMIA, UNSPECIFIED TYPE: ICD-10-CM

## 2022-03-24 DIAGNOSIS — I10 BENIGN ESSENTIAL HYPERTENSION: ICD-10-CM

## 2022-03-24 DIAGNOSIS — R79.89 ELEVATED LFTS: ICD-10-CM

## 2022-03-24 DIAGNOSIS — Z12.31 ENCOUNTER FOR SCREENING MAMMOGRAM FOR MALIGNANT NEOPLASM OF BREAST: ICD-10-CM

## 2022-03-24 PROCEDURE — G9899 SCRN MAM PERF RSLTS DOC: HCPCS | Performed by: NURSE PRACTITIONER

## 2022-03-24 PROCEDURE — G8536 NO DOC ELDER MAL SCRN: HCPCS | Performed by: NURSE PRACTITIONER

## 2022-03-24 PROCEDURE — G8432 DEP SCR NOT DOC, RNG: HCPCS | Performed by: NURSE PRACTITIONER

## 2022-03-24 PROCEDURE — 1123F ACP DISCUSS/DSCN MKR DOCD: CPT | Performed by: NURSE PRACTITIONER

## 2022-03-24 PROCEDURE — G8427 DOCREV CUR MEDS BY ELIG CLIN: HCPCS | Performed by: NURSE PRACTITIONER

## 2022-03-24 PROCEDURE — 1090F PRES/ABSN URINE INCON ASSESS: CPT | Performed by: NURSE PRACTITIONER

## 2022-03-24 PROCEDURE — G8754 DIAS BP LESS 90: HCPCS | Performed by: NURSE PRACTITIONER

## 2022-03-24 PROCEDURE — 1101F PT FALLS ASSESS-DOCD LE1/YR: CPT | Performed by: NURSE PRACTITIONER

## 2022-03-24 PROCEDURE — G8400 PT W/DXA NO RESULTS DOC: HCPCS | Performed by: NURSE PRACTITIONER

## 2022-03-24 PROCEDURE — 3017F COLORECTAL CA SCREEN DOC REV: CPT | Performed by: NURSE PRACTITIONER

## 2022-03-24 PROCEDURE — G8417 CALC BMI ABV UP PARAM F/U: HCPCS | Performed by: NURSE PRACTITIONER

## 2022-03-24 PROCEDURE — G8752 SYS BP LESS 140: HCPCS | Performed by: NURSE PRACTITIONER

## 2022-03-24 PROCEDURE — 99214 OFFICE O/P EST MOD 30 MIN: CPT | Performed by: NURSE PRACTITIONER

## 2022-03-24 NOTE — PROGRESS NOTES
1. \"Have you been to the ER, urgent care clinic since your last visit? Hospitalized since your last visit? \"  no    2. \"Have you seen or consulted any other health care providers outside of the [address] [place] [address] since your last visit? \" {no    3. For patients aged 39-70: Has the patient had a colonoscopy / FIT/ Cologuard? Cant recall    If the patient is female:    4. For patients aged 41-77: Has the patient had a mammogram within the past 2 years? Wants one ordered       5.  For patients aged 21-65: Has the patient had a pap smear? na        Kenny Slade is a 74 y.o. female is coming in for with the following:     Chief Complaint   Patient presents with    Medication Refill    Establish Care          With the following vitals:    Visit Vitals  /71 (BP 1 Location: Right upper arm, BP Patient Position: Sitting, BP Cuff Size: Adult)   Pulse 67   Temp 98.2 °F (36.8 °C) (Temporal)   Resp 18   Ht 5' 5\" (1.651 m)   Wt 195 lb (88.5 kg)   SpO2 97%   BMI 32.45 kg/m²

## 2022-03-24 NOTE — PROGRESS NOTES
Chief Complaint   Patient presents with    Medication Refill    Establish Care     Visit Vitals  /71 (BP 1 Location: Right upper arm, BP Patient Position: Sitting, BP Cuff Size: Adult)   Pulse 67   Temp 98.2 °F (36.8 °C) (Temporal)   Resp 18   Ht 5' 5\" (1.651 m)   Wt 195 lb (88.5 kg)   SpO2 97%   BMI 32.45 kg/m²     Luisa Melara is a 69 y.o. female. HPI   Patient presented to establish with me as new PCP. She was accompanied by her daughter who is listed as her guardian. Previous PCP has left the practice. Last office visit with former PCP was 11/9/2021. Medical history significant for uncontrolled type 2 diabetes, microalbuminuria CHF, COPD, hypertension, hyperlipidemia, aortic stenosis, GERD, CAD, anemia, and elevated LFTs,  COPD- Patient requires use of oxygen at home and has a concentrator. Uses at night. SPO2 was 97% in office. Current inhalers are Brester he and rescue inhaler albuterol. She is doing well on this medication regime. T2DM-patient has been under the care of endocrinologist Dr. Laura Chen. Review of endo note indicated that she generally is not compliant with  diabetic diet. Last A1c in December 2021 was 9.1. Patient stated that she has an eye exam scheduled for 3/29/2022. Hypertension- BP in office acceptable at 132/71. Checking occasionally at home and generally runs in the same range. Current medication regime is carvedilol 3.125 mg twice a day, spironolactone 25 mg twice a day, and furosemide 40 mg daily. Will order a CMP to check the status of her potassium level. CHF- patient is under the care of [facility] cardiologist Jay Steele. He is ordering her cardiac medications. She has regular follow-up with him. she stated that she has an echocardiogram due in the near future.       Patient Active Problem List   Diagnosis Code    Body mass index 30.0-30.9, adult Z68.30    Poorly controlled diabetes mellitus ([facility] E11.65    Heart murmur R01.1    Mixed hyperlipidemia E78.2    Tobacco dependence syndrome F17.200    Allergic rhinitis J30.9    Anemia D64.9    Foot callus L84    Left bundle branch block I44.7    Microalbuminuria due to type 2 diabetes mellitus ([facility]) E11.29, R80.9    Morbid obesity ([facility]) E66.01    Hyponatremia E87.1    Aortic stenosis I35.0    Chronic obstructive pulmonary disease ([facility]) J44.9    Coronary artery disease involving native heart without angina pectoris I25.10    Gastroesophageal reflux disease without esophagitis K21.9    Congestive heart failure ([facility]) I50.9    Type 2 diabetes mellitus with hyperglycemia, without long-term current use of insulin ([facility]) E11.65    Hypokalemia E87.6    Cataract, nuclear sclerotic, both eyes H25.13    Diabetes mellitus type 2 without retinopathy ([facility] E11.9    Disorder of hyperalimentation R63.2    Hyperchloremia E87.8    Essential hypertension I10     Past Medical History:   Diagnosis Date    Acute gastrointestinal hemorrhage 8/28/2020    Acute respiratory failure with hypoxia ([facility]) 1/1/2021    Allergic rhinitis 8/28/2020    Anemia 8/28/2020    Benign essential hypertension 6/25/2020    Body mass index 30.0-30.9, adult 6/25/2020    CAD (coronary artery disease) 8/28/2020    Foot callus 8/28/2020    GERD (gastroesophageal reflux disease) 6/25/2020    Hair loss     Heart murmur 6/25/2020    Hx of colonoscopy 01/01/2015    Done for anemia and rectal bleeding    Left bundle branch block 8/28/2020    Microalbuminuria due to type 2 diabetes mellitus ([facility] 8/28/2020    Mixed hyperlipidemia 6/25/2020    Morbid obesity ([facility] 8/28/2020    Tobacco dependence syndrome 6/25/2020    Type II diabetes mellitus, uncontrolled 6/25/2020     Past Surgical History:   Procedure Laterality Date    HX COLONOSCOPY  2015    due in [id number].   Done for anemia and rectal bleeding     Current Outpatient Medications   Medication Instructions    albuterol (PROVENTIL HFA, VENTOLIN HFA, PROAIR HFA) 90 mcg/actuation inhaler 2 puffs every 4 hours as needed for cough, wheezing , short of breath. Insurance brand of choice    ammonium lactate (AMLACTIN) 12 % topical cream Topical, 2 TIMES DAILY, rub in to affected area well    atorvastatin (LIPITOR) 40 mg, Oral, EVERY BEDTIME    budesonide-glycopyr-formoterol (Breztri Aerosphere) 160-9-4.8 mcg/actuation HFAA 2 Puffs, Inhalation, 2 TIMES DAILY    carvediloL (COREG) 3.125 mg, Oral, 2 TIMES DAILY WITH MEALS    empagliflozin (JARDIANCE) 25 mg, Oral, DAILY    ferrous sulfate (FEROSUL) 325 mg, Oral, 2 TIMES DAILY BEFORE MEALS    fexofenadine (ALLEGRA) 180 mg, Oral, DAILY, STOP loratadine    fluticasone propionate (FLONASE) 50 mcg/actuation nasal spray Instill 2 sprays into each nostril once daily as needed for nasal congestion.     furosemide (LASIX) 40 mg, Oral, DAILY    glucose blood VI test strips (OneTouch Verio test strips) strip Check glucose once a day    lisinopriL (PRINIVIL, ZESTRIL) 20 mg, Oral, 2 TIMES DAILY    metFORMIN (GLUCOPHAGE) 500 mg tablet 2 tabs twice a day    Mucinex 1,200 mg, Oral, 2 TIMES DAILY    pantoprazole (PROTONIX) 40 mg, Oral, DAILY    potassium chloride (KLOR-CON M10) 10 mEq tablet TAKE 1 TABLET BY MOUTH EVERY MONDAY, WEDNESDAY, AND FRIDAY    spironolactone (ALDACTONE) 25 mg, Oral, 2 TIMES DAILY     Allergies   Allergen Reactions    Aspirin Unknown (comments)     hemorraging - ended up in the hospital    Diphenhydramine Hcl Other (comments)     Social History     Tobacco Use    Smoking status: Former Smoker     Packs/day: 0.50     Quit date:      Years since quittin.4    Smokeless tobacco: Never Used   Vaping Use    Vaping Use: Never used   Substance Use Topics    Alcohol use: Not Currently    Drug use: Never     Family History   Problem Relation Age of Onset    Cancer Mother         uterine    Hypertension Father     Cancer Sister         breast    Breast Cancer Sister        Review of Systems Constitutional: Negative for chills, fever and malaise/fatigue. HENT: Negative for congestion, ear pain and sore throat. Respiratory: Positive for shortness of breath. Negative for cough and wheezing. Cardiovascular: Negative for chest pain, palpitations and leg swelling. Gastrointestinal: Positive for heartburn. Negative for abdominal pain, constipation, diarrhea, nausea and vomiting. Genitourinary: Negative for dysuria. Musculoskeletal: Negative for back pain, falls, joint pain, myalgias and neck pain. Neurological: Negative for dizziness, tingling, sensory change, weakness and headaches. Psychiatric/Behavioral: Negative for depression. The patient is not nervous/anxious and does not have insomnia. Objective  Physical Exam  Vitals reviewed. Constitutional:       General: She is not in acute distress. Appearance: Normal appearance. HENT:      Head: Normocephalic. Right Ear: External ear normal.      Left Ear: External ear normal.      Nose: Nose normal.   Eyes:      Conjunctiva/sclera: Conjunctivae normal.   Neck:      Vascular: No carotid bruit. Cardiovascular:      Rate and Rhythm: Normal rate and regular rhythm. Heart sounds: Normal heart sounds. No murmur heard. Pulmonary:      Effort: Pulmonary effort is normal. No respiratory distress. Breath sounds: Normal breath sounds. Musculoskeletal:         General: No swelling or tenderness. Normal range of motion. Cervical back: Neck supple. Right lower leg: No edema. Left lower leg: No edema. Skin:     General: Skin is warm and dry. Coloration: Skin is not jaundiced. Findings: No lesion or rash. Neurological:      Mental Status: She is alert and oriented to person, place, and time. Motor: No weakness. Gait: Gait normal.   Psychiatric:         Mood and Affect: Mood normal.         Behavior: Behavior normal.         Thought Content:  Thought content normal.         Judgment: Judgment normal.       Assessment & Plan      ICD-10-CM ICD-9-CM    1. Chronic obstructive pulmonary disease, unspecified COPD type ([facility]  J44.9 496    2. Congestive heart failure, unspecified HF chronicity, unspecified heart failure type (HCC)  I50.9 428.0    3. Benign essential hypertension  I10 401.1    4. Anemia, unspecified type  D64.9 285.9 IRON      FERRITIN   5. Elevated LFTs  V08.08 772.0 METABOLIC PANEL, COMPREHENSIVE   6. Encounter for screening mammogram for malignant neoplasm of breast  Z12.31 V76.12 CANCELED: S[facility]NA MAMMO BI SCREENING INCL CAD       1. Chronic obstructive pulmonary disease, unspecified COPD type ([facility]  Currently stable. 2. Congestive heart failure, unspecified HF chronicity, unspecified heart failure type ([facility]  Continues under the care of her cardiologist.  Currently stable and will continue on her current cardiac medications. 3. Benign essential hypertension  BP acceptable in office at 132/71. Patient will continue on same medications which seems to be effective for BP control. 4. Anemia, unspecified type  Continues on iron once a day. No recent labs to check her iron levels. Might need to increase her iron levels if not in normal range. - IRON  - FERRITIN    5. Elevated LFTs  Continue to monitor.    - METABOLIC PANEL, COMPREHENSIVE    6. Encounter for screening mammogram for malignant neoplasm of breast  Patient was advised that she should be hearing from central scheduling to schedule her mammogram.  However if she does not hear from them she was given the phone number of central scheduling to call for an appointment herself. I have discussed the diagnosis with the patient and the intended plan as seen in the above orders. Pt/caretaker has expressed understanding. Questions were answered concerning future plans. I have discussed medication side effects and warnings as indicated with the patient as well.     Frandy Ocampo NP

## 2022-04-01 ENCOUNTER — PATIENT OUTREACH (OUTPATIENT)
Dept: CASE MANAGEMENT | Age: 67
End: 2022-04-01

## 2022-04-01 NOTE — PROGRESS NOTES
Ambulatory Care Management Note    Date/Time:  4/1/2022 1:30 PM    This patient was received as a referral from 1 [facility]. Ambulatory Care Manager outreached to patient today to offer care management services. [facility] today.

## 2022-04-04 NOTE — PROGRESS NOTES
4/4/2022  5:09 PM    Patient outreach by this [facility] today to offer care management services. Two patient identifiers verified. Patient requests ACM outreach on 4/5/22.

## 2022-04-05 ENCOUNTER — PATIENT OUTREACH (OUTPATIENT)
Dept: CASE MANAGEMENT | Age: 67
End: 2022-04-05

## 2022-04-05 NOTE — PROGRESS NOTES
Ambulatory Care Management Note    Date/Time:  4/5/2022 1:39 PM    This patient was received as a referral from [facility]. Ambulatory Care Manager outreached to patient today to offer care management services. Introduction to self and role of care manager provided. Patient declined care management services at this time. No follow up call scheduled at this time. Patient has Ambulatory Care Manager's contact number for for any questions or concerns.

## 2022-04-12 ENCOUNTER — TELEPHONE (OUTPATIENT)
Dept: ENDOCRINOLOGY | Age: 67
End: 2022-04-12

## 2022-04-12 ENCOUNTER — OFFICE VISIT (OUTPATIENT)
Dept: ENDOCRINOLOGY | Age: 67
End: 2022-04-12
Payer: MEDICARE

## 2022-04-12 VITALS
HEART RATE: 83 BPM | HEIGHT: 65 IN | BODY MASS INDEX: 32.59 KG/M2 | SYSTOLIC BLOOD PRESSURE: 126 MMHG | DIASTOLIC BLOOD PRESSURE: 72 MMHG | TEMPERATURE: 97.6 F | OXYGEN SATURATION: 97 % | RESPIRATION RATE: 18 BRPM | WEIGHT: 195.6 LBS

## 2022-04-12 DIAGNOSIS — E78.2 MIXED HYPERLIPIDEMIA: ICD-10-CM

## 2022-04-12 DIAGNOSIS — E11.65 TYPE 2 DIABETES MELLITUS WITH HYPERGLYCEMIA, WITHOUT LONG-TERM CURRENT USE OF INSULIN (HCC): Primary | ICD-10-CM

## 2022-04-12 DIAGNOSIS — I10 BENIGN ESSENTIAL HYPERTENSION: ICD-10-CM

## 2022-04-12 LAB
ALBUMIN SERPL-MCNC: 4.6 G/DL (ref 3.8–4.8)
ALBUMIN/GLOB SERPL: 1.7 {RATIO} (ref 1.2–2.2)
ALP SERPL-CCNC: 93 IU/L (ref 44–121)
ALT SERPL-CCNC: 17 IU/L (ref 0–32)
AST SERPL-CCNC: 20 IU/L (ref 0–40)
BILIRUB SERPL-MCNC: 0.5 MG/DL (ref 0–1.2)
BUN SERPL-MCNC: 16 MG/DL (ref 8–27)
BUN/CREAT SERPL: 17 (ref 12–28)
CALCIUM SERPL-MCNC: 10.1 MG/DL (ref 8.7–10.3)
CHLORIDE SERPL-SCNC: 97 MMOL/L (ref 96–106)
CO2 SERPL-SCNC: 21 MMOL/L (ref 20–29)
CREAT SERPL-MCNC: 0.93 MG/DL (ref 0.57–1)
EGFR: 67 ML/MIN/1.73
FERRITIN SERPL-MCNC: 10 NG/ML (ref 15–150)
GLOBULIN SER CALC-MCNC: 2.7 G/DL (ref 1.5–4.5)
GLUCOSE POC: 125 MG/DL
GLUCOSE SERPL-MCNC: 117 MG/DL (ref 65–99)
HBA1C MFR BLD HPLC: 8.6 %
IRON SERPL-MCNC: 71 UG/DL (ref 27–139)
POTASSIUM SERPL-SCNC: 4.7 MMOL/L (ref 3.5–5.2)
PROT SERPL-MCNC: 7.3 G/DL (ref 6–8.5)
SODIUM SERPL-SCNC: 139 MMOL/L (ref 134–144)

## 2022-04-12 PROCEDURE — G8510 SCR DEP NEG, NO PLAN REQD: HCPCS | Performed by: INTERNAL MEDICINE

## 2022-04-12 PROCEDURE — G8752 SYS BP LESS 140: HCPCS | Performed by: INTERNAL MEDICINE

## 2022-04-12 PROCEDURE — 83036 HEMOGLOBIN GLYCOSYLATED A1C: CPT | Performed by: INTERNAL MEDICINE

## 2022-04-12 PROCEDURE — 1101F PT FALLS ASSESS-DOCD LE1/YR: CPT | Performed by: INTERNAL MEDICINE

## 2022-04-12 PROCEDURE — G8754 DIAS BP LESS 90: HCPCS | Performed by: INTERNAL MEDICINE

## 2022-04-12 PROCEDURE — G8427 DOCREV CUR MEDS BY ELIG CLIN: HCPCS | Performed by: INTERNAL MEDICINE

## 2022-04-12 PROCEDURE — G9899 SCRN MAM PERF RSLTS DOC: HCPCS | Performed by: INTERNAL MEDICINE

## 2022-04-12 PROCEDURE — 3052F HG A1C>EQUAL 8.0%<EQUAL 9.0%: CPT | Performed by: INTERNAL MEDICINE

## 2022-04-12 PROCEDURE — 99214 OFFICE O/P EST MOD 30 MIN: CPT | Performed by: INTERNAL MEDICINE

## 2022-04-12 PROCEDURE — G8536 NO DOC ELDER MAL SCRN: HCPCS | Performed by: INTERNAL MEDICINE

## 2022-04-12 PROCEDURE — 82962 GLUCOSE BLOOD TEST: CPT | Performed by: INTERNAL MEDICINE

## 2022-04-12 PROCEDURE — 2022F DILAT RTA XM EVC RTNOPTHY: CPT | Performed by: INTERNAL MEDICINE

## 2022-04-12 PROCEDURE — 1090F PRES/ABSN URINE INCON ASSESS: CPT | Performed by: INTERNAL MEDICINE

## 2022-04-12 PROCEDURE — G8400 PT W/DXA NO RESULTS DOC: HCPCS | Performed by: INTERNAL MEDICINE

## 2022-04-12 PROCEDURE — 3017F COLORECTAL CA SCREEN DOC REV: CPT | Performed by: INTERNAL MEDICINE

## 2022-04-12 PROCEDURE — G8417 CALC BMI ABV UP PARAM F/U: HCPCS | Performed by: INTERNAL MEDICINE

## 2022-04-12 RX ORDER — ATORVASTATIN CALCIUM 40 MG/1
40 TABLET, FILM COATED ORAL
Qty: 90 TABLET | Refills: 2 | Status: SHIPPED | OUTPATIENT
Start: 2022-04-12 | End: 2022-07-11

## 2022-04-12 RX ORDER — METFORMIN HYDROCHLORIDE 500 MG/1
TABLET ORAL
Qty: 120 TABLET | Refills: 3 | Status: SHIPPED | OUTPATIENT
Start: 2022-04-12 | End: 2022-07-25 | Stop reason: SDUPTHER

## 2022-04-12 NOTE — PROGRESS NOTES
History and Physical    Patient: Antonella Sarabia MRN: [id number]  SSN: [id number]    YOB: 1955  Age: 74 y.o. Sex: female      Subjective: Antonella Sarabia is a 74 y.o. female with past medical history of hypertension, hyperlipidemia is here for follow-up of type 2 diabetes mellitus. She remains in primary care of Dr. Tabitha Acuna. Patient is here with her daughter. Since last visit patient had 1 hospitalization for acute hypoxic respiratory failure, CHF exacerbation. She has not had any more admissions for GI bleed or blood transfusions. She is taking metformin 500 mg 2 tablets twice a day. She was doing very well with diabetic diet until her brother passed away and then she was eating way too much. at the last visit I started her on Trulicity 6.22 mg weekly but she never picked it up from the pharmacy because she was scared about taking a new medication and possible side effects. She did not inform me about this. She is checking blood glucose once a day. She is having trouble with getting 10 strips and lancets filled from the pharmacy, they need some kind of documentation. She had diabetic eye exam recently and everything looked okay. She tells me that she thinks she is eating too much fruit and that is making her sugar high, planning to work on eating fruit in moderation.     Glucometer reading: Not available    updated diabetes history:  · Diagnosis: 2015    · Current treatment: Metformin 500 mg 2 tablets twice a day    · Past treatment: januvia?, Jardiance (expensive), glimepiride (low blood sugar), pioglitazone (CHF)    · Glucose checks: not checking    · Hyperglycemia: unknown    · Hypoglycemia: no    · Meals per day: 3, breakfast: eggs, and hotdog, lunch: chicken and vegetables, dinner: same, snacks: junk food,     · Exercise: no    · DM related hospitalizations: no        Complications of DM:    · CAD: no    · CVA: no    · PVD: no    · Amputations: no     · Retinopathy: no; last exam was 2022    · Gastropathy: no    · Nephropathy: yes    · Neuropathy: no        Medications:    · Statin: atorvastatin 40 mg    · ACE-I: lisinopril    · ASA: no allergic        · Diabetes education: no     Past Medical History:   Diagnosis Date    Acute gastrointestinal hemorrhage 2020    Allergic rhinitis 2020    Anemia 2020    Benign essential hypertension 2020    Body mass index 30.0-30.9, adult 2020    CAD (coronary artery disease) 2020    Foot callus 2020    GERD (gastroesophageal reflux disease) 2020    Hair loss     Heart murmur 2020    Hx of colonoscopy 2015    Done for anemia and rectal bleeding    Left bundle branch block 2020    Microalbuminuria due to type 2 diabetes mellitus ([facility] 2020    Mixed hyperlipidemia 2020    Morbid obesity ([facility] 2020    Tobacco dependence syndrome 2020    Type II diabetes mellitus, uncontrolled 2020     Past Surgical History:   Procedure Laterality Date    HX COLONOSCOPY      due in [id number]. Done for anemia and rectal bleeding      Family History   Problem Relation Age of Onset    Cancer Mother         uterine    Hypertension Father     Cancer Sister         breast     Social History     Tobacco Use    Smoking status: Former Smoker     Packs/day: 0.50     Quit date:      Years since quittin.2    Smokeless tobacco: Never Used   Substance Use Topics    Alcohol use: Not Currently      Prior to Admission medications    Medication Sig Start Date End Date Taking? Authorizing Provider   metFORMIN (GLUCOPHAGE) 500 mg tablet 2 tabs twice a day 22  Yes Kristen Kiser MD   empagliflozin (Jardiance) 25 mg tablet Take 1 Tablet by mouth daily for 30 days. 22 Yes Kristen Kiser MD   atorvastatin (LIPITOR) 40 mg tablet Take 1 Tablet by mouth nightly for 90 days.  22 Yes Kristen Kiser MD   lisinopriL (PRINIVIL, ZESTRIL) 20 mg tablet Take 1 Tablet by mouth two (2) times a day for 90 days. 3/16/22 6/14/22 Yes Calos Hernandez MD   pantoprazole (PROTONIX) 40 mg tablet TAKE 1 TABLET BY MOUTH DAILY 3/13/22  Yes Edi Smith NP   FeroSuL 325 mg (65 mg iron) tablet TAKE 1 TABLET BY MOUTH DAILY BEFORE BREAKFAST 1/18/22  Yes Shirley Kimball DO   lancets (One Touch Delica) 33 gauge misc Check glucose once a day 1/12/22  Yes Calos Hernandez MD   glucose blood VI test strips (OneTouch Verio test strips) strip Check glucose once a day 1/12/22  Yes Calos Hernandez MD   Blood-Glucose Meter (OneTouch Verio Meter) misc Check glucose once a day 1/12/22  Yes Calos Hernandez MD   furosemide (LASIX) 40 mg tablet TAKE 1 TABLET BY MOUTH DAILY 12/13/21  Yes Shirley Kimball DO   potassium chloride (KLOR-CON M10) 10 mEq tablet TAKE 1 TABLET BY MOUTH EVERY MONDAY, WEDNESDAY, AND FRIDAY 12/13/21  Yes Shirley Kimball DO   spironolactone (ALDACTONE) 25 mg tablet Take 1 Tablet by mouth two (2) times a day. Indications: heart failure with reduced ejection fraction 10/6/21  Yes Shirley Kimball DO   budesonide-glycopyr-formoterol (Breztri Aerosphere) 160-9-4.8 mcg/actuation HFAA Take 2 Puffs by inhalation two (2) times a day. Yes Provider, Historical   guaiFENesin (Mucinex) 1,200 mg Ta12 ER tablet Take 1,200 mg by mouth two (2) times a day. Yes Provider, Historical   albuterol (PROVENTIL HFA, VENTOLIN HFA, PROAIR HFA) 90 mcg/actuation inhaler 2 puffs every 4 hours as needed for cough, wheezing , short of breath. Insurance brand of choice 8/3/21  Yes Shirley Kimball DO   fexofenadine (ALLEGRA) 180 mg tablet Take 1 Tab by mouth daily. STOP loratadine 5/10/21  Yes Shirley Kimball DO   carvediloL (COREG) 3.125 mg tablet Take 1 Tab by mouth two (2) times daily (with meals).  4/12/21  Yes Shirley Sear, DO        Allergies   Allergen Reactions    Aspirin Unknown (comments)     hemorraging - ended up in the hospital       Review of Systems:  ROS    A comprehensive review of systems was preformed and it is negative except mentioned in HPI    Objective:     Vitals:    04/12/22 1142   BP: 126/72   Pulse: 83   Resp: 18   Temp: 97.6 °F (36.4 °C)   TempSrc: Oral   SpO2: 97%   Weight: 195 lb 9.6 oz (88.7 kg)   Height: 5' 5\" (1.651 m)        Physical Exam:    Physical Exam  Vitals and nursing note reviewed. Constitutional:       Appearance: Normal appearance. Cardiovascular:      Rate and Rhythm: Normal rate and regular rhythm. Pulmonary:      Effort: Pulmonary effort is normal.      Breath sounds: Normal breath sounds. Neurological:      Mental Status: She is alert. diabetic foot exam:  Bilateral diabetic foot exam was performed today. Dorsalis pedis pulses 1+ bilaterally. Monofilament sensation normal bilaterally. No ulcers or skin breakdown. Bilateral thickened toenails present. Labs and Imaging:  Results for Dony Han (MRN [id number]) as of 11/25/2020 12:52   Ref. Range 10/13/2020 10:58   Sodium Latest Ref Range: 134 - 144 mmol/L 134   Potassium Latest Ref Range: 3.5 - 5.2 mmol/L 4.2   Chloride Latest Ref Range: 96 - 106 mmol/L 92 (L)   CO2 Latest Ref Range: 20 - 29 mmol/L 26   Glucose Latest Ref Range: 65 - 99 mg/dL 170 (H)   BUN Latest Ref Range: 8 - 27 mg/dL 10   Creatinine Latest Ref Range: 0.57 - 1.00 mg/dL 0.53 (L)   BUN/Creatinine ratio Latest Ref Range: 12 - 28  19   Calcium Latest Ref Range: 8.7 - 10.3 mg/dL 10.1   GFR est non-AA Latest Ref Range: >59 mL/min/1.73 99   GFR est AA Latest Ref Range: >59 mL/min/1.73 114   Bilirubin, total Latest Ref Range: 0.0 - 1.2 mg/dL 0.5   Protein, total Latest Ref Range: 6.0 - 8.5 g/dL 7.5   Albumin Latest Ref Range: 3.8 - 4.8 g/dL 4.3   A-G Ratio Latest Ref Range: 1.2 - 2.2  1.3   ALT Latest Ref Range: 0 - 32 IU/L 30   AST Latest Ref Range: 0 - 40 IU/L 24   Alk.  phosphatase Latest Ref Range: 39 - 117 IU/L 101   Triglyceride Latest Ref Range: 0 - 149 mg/dL 65   Cholesterol, total Latest Ref Range: 100 - 199 mg/dL 123   HDL Cholesterol Latest Ref Range: >39 mg/dL 54   LDL Cholesterol Latest Ref Range: 0 - 99 mg/dL 55   Hemoglobin A1c, (calculated) Latest Ref Range: 4.8 - 5.6 % 7.9 (H)   Estimated average glucose Latest Units: mg/dL 180     Last 3 Recorded Weights in this Encounter    04/12/22 1142   Weight: 195 lb 9.6 oz (88.7 kg)        Lab Results   Component Value Date/Time    Hemoglobin A1c 9.1 (H) 12/06/2021 08:50 AM    Hemoglobin A1c 7.6 (H) 05/10/2021 04:15 PM    Hemoglobin A1c 7.9 (H) 10/13/2020 10:58 AM    Hemoglobin A1c, External 7.7 09/30/2019 12:00 AM        Assessment:     Patient Active Problem List   Diagnosis Code    Benign essential hypertension I10    Body mass index 30.0-30.9, adult Z68.30    Type II diabetes mellitus, uncontrolled [id number]    Heart murmur R01.1    Mixed hyperlipidemia E78.2    Tobacco dependence syndrome F17.200    Acute gastrointestinal hemorrhage K92.2    Allergic rhinitis J30.9    Anemia D64.9    Foot callus L84    Left bundle branch block I44.7    Microalbuminuria due to type 2 diabetes mellitus ([facility]) E11.29, R80.9    Morbid obesity ([facility]) E66.01    Hyponatremia E87.1    COPD exacerbation ([facility]) J44.1    Acute respiratory failure with hypoxia ([facility]) J96.01    Aortic stenosis I35.0   [facility] discharge follow-up Z09    Chronic obstructive pulmonary disease ([facility]) J44.9    Coronary artery disease involving native heart without angina pectoris I25.10    Gastroesophageal reflux disease without esophagitis K21.9    Congestive heart failure ([facility]) I50.9    Type 2 diabetes mellitus with hyperglycemia, without long-term current use of insulin ([facility]) E11.65    Acute and chronic respiratory failure with hypoxia ([facility]) J96.21    Acute on chronic systolic (congestive) heart failure ([facility]) I50.23    Hypokalemia E87.6           Plan:     type II diabetes mellitus uncontrolled  Hemoglobin A1c was 10.2% on 1-., 9.9% on 6-, 7.7% on 9-, 7.4% on 1-6-2020, 7.9% on 10-, 142, 6.7% 2- (however, this is not reliable because of blood transfusions and anemia in the past 3 months), 9.1% on 2021, 8.6% today. Fingerstick blood glucose is 125 mg/dL in my office today. Up to date with diabetes related annual labs: yes  except urine microalbumin/creatinine ratio    Up to date with diabetic eye exam:     plan:  target A1c is less than 7%. However avoiding hypoglycemia is important given patient's cardiac issues. Continue metformin 500 mg 2 tablets twice a day. Start Jardiance 25 mg daily. Discussed with patient common side effects and how to avoid it. We will follow up with pharmacy about what is the issue why patient is not able to get to strips and lancets. Check blood glucose once a day every day, for now use over-the-counter meter. Discussed with patient about diabetic diet. I will see her back in 3 months. essential hypertension  Blood pressure well controlled on current medications. Continue current medications. mixed hyperlipidemia  LDL 47 in 2020, 55 in .  : Total cholesterol 132, triglycerides 77, LDL 65. Continue atorvastatin 40 mg at bedtime. microalbuminuria due to type 2 diabetes mellitus  already on lisinopril. morbid obesity  diet and exercise  No significant change in weight since the last visit. heart murmur  following with cardiology    coronary arteriosclerosis  abnormal stress test and cardiac catheter. Needs CABG but currently not a candidate for surgery because of being dependent on oxygen. Advised patient to call cardiologist to make follow-up appointment as she saw them more than 6 months back. Systolic congestive heart failure  Cannot be on pioglitazone.     Orders Placed This Encounter    AMB POC GLUCOSE BLOOD, BY GLUCOSE MONITORING DEVICE    AMB POC HEMOGLOBIN A1C    metFORMIN (GLUCOPHAGE) 500 mg tablet     Si tabs twice a day     Dispense:  120 Tablet     Refill:  3    empagliflozin (Jardiance) 25 mg tablet     Sig: Take 1 Tablet by mouth daily for 30 days. Dispense:  30 Tablet     Refill:  3    atorvastatin (LIPITOR) 40 mg tablet     Sig: Take 1 Tablet by mouth nightly for 90 days.      Dispense:  90 Tablet     Refill:  2        Signed By: Julianne Canales MD     April 12, 2022      Return to clinic 3 months

## 2022-04-12 NOTE — TELEPHONE ENCOUNTER
Patient is saying [address] told her that she cannot get her test strips and lancets because they need some kind of documentation. Please check what is the issue? Do we need to send order to form to Medicare?

## 2022-04-12 NOTE — TELEPHONE ENCOUNTER
Phone call to [address] and they state that the script has to go through [employer] audit. They have already sent in the paper work that is needed . It will take 7-10 days to here from [employer] . Kalyan Turpin After they receive the approval they will fill the refill for the patient. I called the patient's daughter and left a voicemail asking for a call back. The daughter called me back I gave her the information re: the refill. She understands and will call back if any problems.

## 2022-04-12 NOTE — LETTER
4/12/2022    Patient: Antonella Sarabia   YOB: 1955   Date of Visit: 4/12/2022     Scar Diaz NP  [facility] [address] [facility]  [address] [facility] [address]    Dear Scar Diaz NP,      Thank you for referring Ms. Mary Spencer to [address] [place] [address] for evaluation. My notes for this consultation are attached. If you have questions, please do not hesitate to call me. I look forward to following your patient along with you.       Sincerely,    Yuko Navarro MD

## 2022-04-12 NOTE — PROGRESS NOTES
1. \"Have you been to the ER, urgent care clinic since your last visit? Hospitalized since your last visit? \" No    2. \"Have you seen or consulted any other health care providers outside of the [address] [place] [address] since your last visit? \" No     3. For patients aged 39-70: Has the patient had a colonoscopy / FIT/ Cologuard? Yes - Care Gap present. Most recent result on file      If the patient is female:    4. For patients aged 41-77: Has the patient had a mammogram within the past 2 years? No      5. For patients aged 21-65: Has the patient had a pap smear?  No

## 2022-04-18 DIAGNOSIS — I50.9 CONGESTIVE HEART FAILURE, UNSPECIFIED HF CHRONICITY, UNSPECIFIED HEART FAILURE TYPE (HCC): ICD-10-CM

## 2022-04-18 DIAGNOSIS — I10 BENIGN ESSENTIAL HYPERTENSION: ICD-10-CM

## 2022-04-18 RX ORDER — CARVEDILOL 3.12 MG/1
3.12 TABLET ORAL 2 TIMES DAILY WITH MEALS
Qty: 180 TABLET | Refills: 1 | Status: SHIPPED | OUTPATIENT
Start: 2022-04-18 | End: 2022-10-13 | Stop reason: SDUPTHER

## 2022-04-18 NOTE — TELEPHONE ENCOUNTER
Patient called and would like the prescription below refilled. Patient stated the pharmacy was suppose to send this in for her.         carvediloL (COREG) 3.125 mg tablet [[id number]

## 2022-04-19 ENCOUNTER — TELEPHONE (OUTPATIENT)
Dept: ENDOCRINOLOGY | Age: 67
End: 2022-04-19

## 2022-04-19 NOTE — TELEPHONE ENCOUNTER
Patient's daughter is calling to inform that Ms. Ferny Carranza is not feeling well her heart is beating fast and she is dizzy after taking the jardiance medication. Can you please give her a call @ [phone]?

## 2022-04-21 ENCOUNTER — HOSPITAL ENCOUNTER (OUTPATIENT)
Dept: MAMMOGRAPHY | Age: 67
Discharge: HOME | End: 2022-04-21
Attending: NURSE PRACTITIONER
Payer: MEDICARE

## 2022-04-21 DIAGNOSIS — Z12.31 ENCOUNTER FOR SCREENING MAMMOGRAM FOR MALIGNANT NEOPLASM OF BREAST: ICD-10-CM

## 2022-04-21 PROCEDURE — 77063 BREAST TOMOSYNTHESIS BI: CPT

## 2022-04-21 NOTE — TELEPHONE ENCOUNTER
Spoke to patient's daughter. Patient had an episode of feeling dizzy and heart beating fast initially when she started [place]. But she is still taking it and she has not had more such episodes. Advised that patient should check blood pressure and blood sugar next time she gets dizzy, because it is possible we may need to titrate down blood pressure medication. Advised to drink a lot of water. Daughter expressed understanding.

## 2022-05-08 NOTE — PROGRESS NOTES
Ladies- whoever has time:    Let pt know that her ferritin level- iron stored in the body to make future red blood cells- is low. Advise her to increase her iron tablet to 2 x day and get OTC combination stool softener/laxative at pharmacy and take as needed so she does not get constipated. Should also stay well hydrated.

## 2022-05-19 ENCOUNTER — OFFICE VISIT (OUTPATIENT)
Dept: PODIATRY | Age: 67
End: 2022-05-19
Payer: MEDICARE

## 2022-05-19 VITALS
HEIGHT: 65 IN | SYSTOLIC BLOOD PRESSURE: 133 MMHG | HEART RATE: 88 BPM | DIASTOLIC BLOOD PRESSURE: 67 MMHG | TEMPERATURE: 96.6 F | BODY MASS INDEX: 32.55 KG/M2 | OXYGEN SATURATION: 96 %

## 2022-05-19 DIAGNOSIS — L85.3 XEROSIS CUTIS: ICD-10-CM

## 2022-05-19 DIAGNOSIS — E11.65 POORLY CONTROLLED DIABETES MELLITUS (HCC): Primary | ICD-10-CM

## 2022-05-19 DIAGNOSIS — L60.3 ONYCHODYSTROPHY: ICD-10-CM

## 2022-05-19 PROCEDURE — G8432 DEP SCR NOT DOC, RNG: HCPCS | Performed by: PODIATRIST

## 2022-05-19 PROCEDURE — 3017F COLORECTAL CA SCREEN DOC REV: CPT | Performed by: PODIATRIST

## 2022-05-19 PROCEDURE — G8400 PT W/DXA NO RESULTS DOC: HCPCS | Performed by: PODIATRIST

## 2022-05-19 PROCEDURE — 3052F HG A1C>EQUAL 8.0%<EQUAL 9.0%: CPT | Performed by: PODIATRIST

## 2022-05-19 PROCEDURE — 1123F ACP DISCUSS/DSCN MKR DOCD: CPT | Performed by: PODIATRIST

## 2022-05-19 PROCEDURE — 1090F PRES/ABSN URINE INCON ASSESS: CPT | Performed by: PODIATRIST

## 2022-05-19 PROCEDURE — G8754 DIAS BP LESS 90: HCPCS | Performed by: PODIATRIST

## 2022-05-19 PROCEDURE — G8427 DOCREV CUR MEDS BY ELIG CLIN: HCPCS | Performed by: PODIATRIST

## 2022-05-19 PROCEDURE — 1101F PT FALLS ASSESS-DOCD LE1/YR: CPT | Performed by: PODIATRIST

## 2022-05-19 PROCEDURE — 99213 OFFICE O/P EST LOW 20 MIN: CPT | Performed by: PODIATRIST

## 2022-05-19 PROCEDURE — 2022F DILAT RTA XM EVC RTNOPTHY: CPT | Performed by: PODIATRIST

## 2022-05-19 PROCEDURE — G9899 SCRN MAM PERF RSLTS DOC: HCPCS | Performed by: PODIATRIST

## 2022-05-19 PROCEDURE — G8417 CALC BMI ABV UP PARAM F/U: HCPCS | Performed by: PODIATRIST

## 2022-05-19 PROCEDURE — G8752 SYS BP LESS 140: HCPCS | Performed by: PODIATRIST

## 2022-05-19 PROCEDURE — 11721 DEBRIDE NAIL 6 OR MORE: CPT | Performed by: PODIATRIST

## 2022-05-19 PROCEDURE — G8536 NO DOC ELDER MAL SCRN: HCPCS | Performed by: PODIATRIST

## 2022-05-19 NOTE — PROGRESS NOTES
1. Have you been to the ER, urgent care clinic since your last visit? Hospitalized since your last visit? No    2. Have you seen or consulted any other health care providers outside of the [address] [place] [address] since your last visit? Include any pap smears or colon screening.  No     Chief Complaint   Patient presents with    Diabetic Foot Exam

## 2022-05-25 ENCOUNTER — TELEPHONE (OUTPATIENT)
Dept: PODIATRY | Age: 67
End: 2022-05-25

## 2022-05-31 RX ORDER — AMMONIUM LACTATE 12 G/100G
CREAM TOPICAL 2 TIMES DAILY
Qty: 280 G | Refills: 2 | Status: SHIPPED | OUTPATIENT
Start: 2022-05-31

## 2022-06-06 DIAGNOSIS — K21.9 GASTROESOPHAGEAL REFLUX DISEASE WITHOUT ESOPHAGITIS: ICD-10-CM

## 2022-06-07 RX ORDER — PANTOPRAZOLE SODIUM 40 MG/1
40 TABLET, DELAYED RELEASE ORAL DAILY
Qty: 90 TABLET | Refills: 1 | Status: SHIPPED | OUTPATIENT
Start: 2022-06-07

## 2022-06-14 PROBLEM — E11.65 POORLY CONTROLLED DIABETES MELLITUS (HCC): Status: ACTIVE | Noted: 2020-06-25

## 2022-06-15 NOTE — PROGRESS NOTES
[facility]    Subjective:         Patient is a 69 y.o. female who is being seen as a new pt for diabetic pedal exam.  Patient states she has close follow-up with her PCP Shasha Atkinson NP). She states her last office visit with her PCP was 2022. She describes her diabetes as being poorly controlled, noting her last hemoglobin A1c was 8.6%. She denies any overt pedal pain. She denies any recent pedal trauma. She denies any systemic signs of infection but does state her nails are thick/disecolored. She denies any other lower extremity complaints    Past Medical History:   Diagnosis Date    Acute gastrointestinal hemorrhage 2020    Allergic rhinitis 2020    Anemia 2020    Benign essential hypertension 2020    Body mass index 30.0-30.9, adult 2020    CAD (coronary artery disease) 2020    Foot callus 2020    GERD (gastroesophageal reflux disease) 2020    Hair loss     Heart murmur 2020    Hx of colonoscopy 2015    Done for anemia and rectal bleeding    Left bundle branch block 2020    Microalbuminuria due to type 2 diabetes mellitus ([address] 2020    Mixed hyperlipidemia 2020    Morbid obesity ([address] 2020    Tobacco dependence syndrome 2020    Type II diabetes mellitus, uncontrolled 2020     Past Surgical History:   Procedure Laterality Date    HX COLONOSCOPY      due in [id number].   Done for anemia and rectal bleeding       Family History   Problem Relation Age of Onset    Cancer Mother         uterine    Hypertension Father     Cancer Sister         breast    Breast Cancer Sister       Social History     Tobacco Use    Smoking status: Former Smoker     Packs/day: 0.50     Quit date:      Years since quittin.4    Smokeless tobacco: Never Used   Substance Use Topics    Alcohol use: Not Currently     Allergies   Allergen Reactions    Aspirin Unknown (comments)     hemorraging - ended up in the hospital     Prior to Admission medications    Medication Sig Start Date End Date Taking? Authorizing Provider   ammonium lactate (AMLACTIN) 12 % topical cream Apply  to affected area two (2) times a day. rub in to affected area well 5/31/22  Yes July Cardona DPM   pantoprazole (PROTONIX) 40 mg tablet Take 1 Tablet by mouth daily. 6/7/22   Jyotsna Veloz NP   carvediloL (COREG) 3.125 mg tablet Take 1 Tablet by mouth two (2) times daily (with meals). 4/18/22   Jyotsna Veloz NP   metFORMIN (GLUCOPHAGE) 500 mg tablet 2 tabs twice a day 4/12/22   Sony Duenas MD   atorvastatin (LIPITOR) 40 mg tablet Take 1 Tablet by mouth nightly for 90 days. 4/12/22 7/11/22  Sony Duenas MD   lisinopriL (PRINIVIL, ZESTRIL) 20 mg tablet Take 1 Tablet by mouth two (2) times a day for 90 days. 3/16/22 6/14/22  Sony Duenas MD   FeroSuL 325 mg (65 mg iron) tablet TAKE 1 TABLET BY MOUTH DAILY BEFORE BREAKFAST 1/18/22   Brenda Thornton DO   lancets (One Touch Delica) 33 gauge misc Check glucose once a day 1/12/22   Sony Duenas MD   glucose blood VI test strips (OneTouch Verio test strips) strip Check glucose once a day 1/12/22   Sony uDenas MD   Blood-Glucose Meter (OneTouch Verio Meter) misc Check glucose once a day 1/12/22   Sony Duenas MD   furosemide (LASIX) 40 mg tablet TAKE 1 TABLET BY MOUTH DAILY 12/13/21   Brenda Thornton DO   potassium chloride (KLOR-CON M10) 10 mEq tablet TAKE 1 TABLET BY MOUTH EVERY MONDAY, WEDNESDAY, AND FRIDAY 12/13/21   Brenda Thornton DO   spironolactone (ALDACTONE) 25 mg tablet Take 1 Tablet by mouth two (2) times a day. Indications: heart failure with reduced ejection fraction 10/6/21   Brenda Thornton DO   budesonide-glycopyr-formoterol (Breztri Aerosphere) 160-9-4.8 mcg/actuation HFAA Take 2 Puffs by inhalation two (2) times a day. Provider, Historical   guaiFENesin (Mucinex) 1,200 mg Ta12 ER tablet Take 1,200 mg by mouth two (2) times a day.     Provider, Historical   albuterol (PROVENTIL HFA, VENTOLIN HFA, PROAIR HFA) 90 mcg/actuation inhaler 2 puffs every 4 hours as needed for cough, wheezing , short of breath. Insurance brand of choice 8/3/21   Minashtyn Ponce, DO   fexofenadine (ALLEGRA) 180 mg tablet Take 1 Tab by mouth daily. STOP loratadine 5/10/21   Minta Pinks, DO       Review of Systems   Constitutional: Negative. HENT: Negative. Eyes: Negative. Respiratory: Negative. Cardiovascular: Positive for leg swelling. Gastrointestinal: Negative. Endocrine: Negative. Genitourinary: Negative. Musculoskeletal: Negative. Skin: Negative. Allergic/Immunologic: Positive for immunocompromised state. Neurological: Negative. Hematological: Negative. Psychiatric/Behavioral: Negative. All other systems reviewed and are negative. Objective:     Visit Vitals  /67 (BP 1 Location: Right arm, BP Patient Position: Sitting, BP Cuff Size: Adult)   Pulse 88   Temp (!) 96.6 °F (35.9 °C) (Temporal)   Ht 5' 5\" (1.651 m)   SpO2 96%   BMI 32.55 kg/m²       Physical Exam  Vitals reviewed. Constitutional:       Appearance: She is obese. Cardiovascular:      Pulses:           Dorsalis pedis pulses are 2+ on the right side and 2+ on the left side. Posterior tibial pulses are 2+ on the right side and 2+ on the left side. Pulmonary:      Effort: Pulmonary effort is normal.   Musculoskeletal:      Right lower leg: Edema present. Left lower leg: Edema present. Right foot: Normal range of motion. No deformity or bunion. Left foot: Normal range of motion. No deformity or bunion. Feet:      Right foot:      Protective Sensation: 10 sites tested. 10 sites sensed. Skin integrity: Dry skin present. Toenail Condition: Right toenails are abnormally thick. Left foot:      Protective Sensation: 10 sites tested. 10 sites sensed. Skin integrity: Dry skin present. Toenail Condition: Left toenails are abnormally thick. Lymphadenopathy:      Lower Body: No right inguinal adenopathy. No left inguinal adenopathy. Skin:     General: Skin is warm. Capillary Refill: Capillary refill takes 2 to 3 seconds. Comments: Absent pedal hair growth with hyperpigmentation   Neurological:      Mental Status: She is alert and oriented to person, place, and time. Comments: Paresthesias noted   Psychiatric:         Mood and Affect: Mood and affect normal.         Behavior: Behavior is cooperative. Data Review: No results found for this or any previous visit (from the past 24 hour(s)). Impression:       ICD-10-CM ICD-9-CM    1. Poorly controlled diabetes mellitus ([facility]  E11.65 250.00    2. Onychodystrophy  L60.3 703.8    3. Xerosis cutis  L85.3 706.8          Recommendation:     Patient seen and evaluated in the office  Discussed and educated patient regarding their current medical condition at it pertains to her diabetes and her thick/discolored toenails. Instructed patient to monitor their feet daily for possible wound formation, be compliant with all medications and wear supportive shoe gear for wound prevention. Reviewed and discussed most recent lab work, specifically as it pertains to her diabetes. Pt verbalized understanding of all discussed and reviewed  A sharp toenail plate debridement was performed to all 10 pedal digits with a nail nipper without incident. Patient tolerated well no dressing was needed  A prescription was given for ammonium lactate 12% cream to be applied twice daily to all affected xerotic skin        Kee Jimenez, [facility], [address] [place] [address] and [facility]  [address] [place] [address]  O: [phone]  F: [phone]  C: [phone]

## 2022-06-21 ENCOUNTER — OFFICE VISIT (OUTPATIENT)
Dept: FAMILY MEDICINE CLINIC | Age: 67
End: 2022-06-21
Payer: MEDICARE

## 2022-06-21 VITALS
WEIGHT: 193 LBS | HEART RATE: 85 BPM | TEMPERATURE: 97.5 F | RESPIRATION RATE: 18 BRPM | OXYGEN SATURATION: 97 % | DIASTOLIC BLOOD PRESSURE: 70 MMHG | HEIGHT: 65 IN | BODY MASS INDEX: 32.15 KG/M2 | SYSTOLIC BLOOD PRESSURE: 130 MMHG

## 2022-06-21 DIAGNOSIS — D50.9 IRON DEFICIENCY ANEMIA, UNSPECIFIED IRON DEFICIENCY ANEMIA TYPE: ICD-10-CM

## 2022-06-21 DIAGNOSIS — I50.9 CONGESTIVE HEART FAILURE, UNSPECIFIED HF CHRONICITY, UNSPECIFIED HEART FAILURE TYPE (HCC): ICD-10-CM

## 2022-06-21 DIAGNOSIS — Z78.0 POST-MENOPAUSAL: ICD-10-CM

## 2022-06-21 DIAGNOSIS — E61.1 IRON DEFICIENCY: ICD-10-CM

## 2022-06-21 DIAGNOSIS — I10 BENIGN ESSENTIAL HYPERTENSION: ICD-10-CM

## 2022-06-21 DIAGNOSIS — Z11.59 ENCOUNTER FOR HEPATITIS C SCREENING TEST FOR LOW RISK PATIENT: ICD-10-CM

## 2022-06-21 DIAGNOSIS — E78.2 MIXED HYPERLIPIDEMIA: ICD-10-CM

## 2022-06-21 DIAGNOSIS — E11.65 POORLY CONTROLLED DIABETES MELLITUS (HCC): Primary | ICD-10-CM

## 2022-06-21 DIAGNOSIS — J30.89 NON-SEASONAL ALLERGIC RHINITIS, UNSPECIFIED TRIGGER: ICD-10-CM

## 2022-06-21 PROCEDURE — 1090F PRES/ABSN URINE INCON ASSESS: CPT | Performed by: NURSE PRACTITIONER

## 2022-06-21 PROCEDURE — G8754 DIAS BP LESS 90: HCPCS | Performed by: NURSE PRACTITIONER

## 2022-06-21 PROCEDURE — G8432 DEP SCR NOT DOC, RNG: HCPCS | Performed by: NURSE PRACTITIONER

## 2022-06-21 PROCEDURE — G8752 SYS BP LESS 140: HCPCS | Performed by: NURSE PRACTITIONER

## 2022-06-21 PROCEDURE — 3052F HG A1C>EQUAL 8.0%<EQUAL 9.0%: CPT | Performed by: NURSE PRACTITIONER

## 2022-06-21 PROCEDURE — G8536 NO DOC ELDER MAL SCRN: HCPCS | Performed by: NURSE PRACTITIONER

## 2022-06-21 PROCEDURE — G8427 DOCREV CUR MEDS BY ELIG CLIN: HCPCS | Performed by: NURSE PRACTITIONER

## 2022-06-21 PROCEDURE — G8417 CALC BMI ABV UP PARAM F/U: HCPCS | Performed by: NURSE PRACTITIONER

## 2022-06-21 PROCEDURE — 2022F DILAT RTA XM EVC RTNOPTHY: CPT | Performed by: NURSE PRACTITIONER

## 2022-06-21 PROCEDURE — G9899 SCRN MAM PERF RSLTS DOC: HCPCS | Performed by: NURSE PRACTITIONER

## 2022-06-21 PROCEDURE — 3017F COLORECTAL CA SCREEN DOC REV: CPT | Performed by: NURSE PRACTITIONER

## 2022-06-21 PROCEDURE — 1101F PT FALLS ASSESS-DOCD LE1/YR: CPT | Performed by: NURSE PRACTITIONER

## 2022-06-21 PROCEDURE — G8400 PT W/DXA NO RESULTS DOC: HCPCS | Performed by: NURSE PRACTITIONER

## 2022-06-21 PROCEDURE — 99214 OFFICE O/P EST MOD 30 MIN: CPT | Performed by: NURSE PRACTITIONER

## 2022-06-21 PROCEDURE — 1123F ACP DISCUSS/DSCN MKR DOCD: CPT | Performed by: NURSE PRACTITIONER

## 2022-06-21 RX ORDER — FLUTICASONE PROPIONATE 50 MCG
1 SPRAY, SUSPENSION (ML) NASAL DAILY
COMMUNITY
End: 2022-06-21 | Stop reason: SDUPTHER

## 2022-06-21 RX ORDER — FLUTICASONE PROPIONATE 50 MCG
SPRAY, SUSPENSION (ML) NASAL
Qty: 1 EACH | Refills: 3 | Status: SHIPPED | OUTPATIENT
Start: 2022-06-21

## 2022-06-21 RX ORDER — FUROSEMIDE 40 MG/1
40 TABLET ORAL DAILY
Qty: 90 TABLET | Refills: 1 | Status: SHIPPED | OUTPATIENT
Start: 2022-06-21

## 2022-06-21 RX ORDER — LISINOPRIL 20 MG/1
20 TABLET ORAL 2 TIMES DAILY
COMMUNITY
End: 2022-10-12 | Stop reason: SDUPTHER

## 2022-06-21 RX ORDER — LANOLIN ALCOHOL/MO/W.PET/CERES
325 CREAM (GRAM) TOPICAL
Qty: 180 TABLET | Refills: 1 | Status: SHIPPED | OUTPATIENT
Start: 2022-06-21

## 2022-06-21 NOTE — PROGRESS NOTES
Chief Complaint   Patient presents with    Follow-up     Lab work review     Visit Vitals  /70 (BP 1 Location: Left arm, BP Patient Position: Sitting)   Pulse 85   Temp 97.5 °F (36.4 °C) (Temporal)   Resp 18   Ht 5' 5\" (1.651 m)   Wt 193 lb (87.5 kg)   SpO2 97%   BMI 32.12 kg/m²     1. Have you been to the ER, urgent care clinic since your last visit? Hospitalized since your last visit? No    2. Have you seen or consulted any other health care providers outside of the [address] [place] [address] since your last visit? Include any pap smears or colon screening.  No

## 2022-06-21 NOTE — PROGRESS NOTES
Chief Complaint   Patient presents with    Follow-up     Lab work review     Visit Vitals  /70 (BP 1 Location: Left arm, BP Patient Position: Sitting)   Pulse 85   Temp 97.5 °F (36.4 °C) (Temporal)   Resp 18   Ht 5' 5\" (1.651 m)   Wt 193 lb (87.5 kg)   SpO2 97%   BMI 32.12 kg/m²     Subjective  Derrick Carter is a 69 y.o. female. HPI :  Pt presented for f/u. She was not accompanied by her daughter today who had a prior engagement. Medical history significant for uncontrolled type 2 diabetes, microalbuminuria, CHF, COPD, hypertension, hyperlipidemia, aortic stenosis, GERD, CAD, anemia, and elevated LFTs. Pt has hx of iron deficiency and anemia. She stated that she did not have anemia until she started ASA daily. She had a colonoscopy and told to resume her ASA and then started to bleed heavily. She then had another colonoscopy. She has hx of gastric ulcers but stable. Taking iron tabs 2 x day which was recently increased after her last labs because she was not at goal for her iron levels. COPD-  Patient requires use of oxygen at home and has a concentrator. Uses at night. SPO2 was 97% in office. Current inhalers are Breztri  and rescue inhaler albuterol. She is doing well on this medication regime. Uncontrolled T2DM- patient has been under the care of endocrinologist Dr. Yuridia Villalobos. Review of April 2022 endo note indicated that she generally is not compliant with  diabetic diet. Last A1c in April 2022 was 8.6%. Supposed to have started Jardiance after prior appointment with Dr. Yuridia Villalobos but she did not because she was afraid of the side effects of new medication. Unfortunately she did not let Dr. Yuridia Villalobos know this. She is now taking Jardiance and she understands the importance of taking it. Goal is less than 7% for her A1c but will need to be careful about hypoglycemia symptoms. Hypertension- BP in office acceptable at 130/70. Checking occasionally at home and generally runs in the same range. Current medication regime is carvedilol 3.125 mg twice a day, spironolactone 25 mg twice a day, and furosemide 40 mg daily. CHF- patient is under the care of [facility] cardiologist Denny Bass. He is ordering her cardiac medications. She has regular follow-up with him. Able on current medication and treatment plan.     Patient Active Problem List   Diagnosis Code    Body mass index 30.0-30.9, adult Z68.30    Poorly controlled diabetes mellitus ([facility] E11.65    Heart murmur R01.1    Mixed hyperlipidemia E78.2    Tobacco dependence syndrome F17.200    Allergic rhinitis J30.9    Anemia D64.9    Foot callus L84    Left bundle branch block I44.7    Microalbuminuria due to type 2 diabetes mellitus ([facility]) E11.29, R80.9    Morbid obesity ([facility]) E66.01    Hyponatremia E87.1    Aortic stenosis I35.0    Chronic obstructive pulmonary disease ([facility]) J44.9    Coronary artery disease involving native heart without angina pectoris I25.10    Gastroesophageal reflux disease without esophagitis K21.9    Congestive heart failure (HCC) I50.9    Type 2 diabetes mellitus with hyperglycemia, without long-term current use of insulin ([facility]) E11.65    Hypokalemia E87.6    Cataract, nuclear sclerotic, both eyes H25.13    Diabetes mellitus type 2 without retinopathy ([facility] E11.9    Disorder of hyperalimentation R63.2    Hyperchloremia E87.8    Essential hypertension I10    Chronic systolic (congestive) heart failure I50.22     Past Medical History:   Diagnosis Date    Acute gastrointestinal hemorrhage 8/28/2020    Acute respiratory failure with hypoxia ([facility]) 1/1/2021    Allergic rhinitis 8/28/2020    Anemia 8/28/2020    Benign essential hypertension 6/25/2020    Body mass index 30.0-30.9, adult 6/25/2020    CAD (coronary artery disease) 8/28/2020    Foot callus 8/28/2020    GERD (gastroesophageal reflux disease) 6/25/2020    Hair loss     Heart murmur 6/25/2020    Hx of colonoscopy 01/01/2015    Done for anemia and rectal bleeding    Left bundle branch block 8/28/2020    Microalbuminuria due to type 2 diabetes mellitus ([facility] 8/28/2020    Mixed hyperlipidemia 6/25/2020    Morbid obesity ([facility] 8/28/2020    Tobacco dependence syndrome 6/25/2020    Type II diabetes mellitus, uncontrolled 6/25/2020     Past Surgical History:   Procedure Laterality Date    HX COLONOSCOPY  2015    due in [id number]. Done for anemia and rectal bleeding     Current Outpatient Medications   Medication Instructions    albuterol (PROVENTIL HFA, VENTOLIN HFA, PROAIR HFA) 90 mcg/actuation inhaler 2 puffs every 4 hours as needed for cough, wheezing , short of breath. Insurance brand of choice    ammonium lactate (AMLACTIN) 12 % topical cream Topical, 2 TIMES DAILY, rub in to affected area well    budesonide-glycopyr-formoterol (Breztri Aerosphere) 160-9-4.8 mcg/actuation HFAA 2 Puffs, Inhalation, 2 TIMES DAILY    carvediloL (COREG) 3.125 mg, Oral, 2 TIMES DAILY WITH MEALS    empagliflozin (JARDIANCE) 25 mg, Oral, DAILY    ferrous sulfate (FEROSUL) 325 mg, Oral, 2 TIMES DAILY BEFORE MEALS    fexofenadine (ALLEGRA) 180 mg, Oral, DAILY, STOP loratadine    fluticasone propionate (FLONASE) 50 mcg/actuation nasal spray Instill 2 sprays into each nostril once daily as needed for nasal congestion.     furosemide (LASIX) 40 mg, Oral, DAILY    glucose blood VI test strips (OneTouch Verio test strips) strip Check glucose once a day    guaiFENesin (MUCINEX) 1,200 mg, Oral, 2 TIMES DAILY AS NEEDED    lisinopriL (PRINIVIL, ZESTRIL) 20 mg, Oral, 2 TIMES DAILY    metFORMIN (GLUCOPHAGE) 500 mg tablet 2 tabs twice a day    pantoprazole (PROTONIX) 40 mg, Oral, DAILY    potassium chloride (KLOR-CON M10) 10 mEq tablet TAKE 1 TABLET BY MOUTH EVERY MONDAY, WEDNESDAY, AND FRIDAY    spironolactone (ALDACTONE) 25 mg, Oral, 2 TIMES DAILY     Allergies   Allergen Reactions    Aspirin Unknown (comments)     hemorraging - ended up in the hospital    Diphenhydramine Hcl Other (comments)     Social History     Tobacco Use    Smoking status: Former Smoker     Packs/day: 0.50     Quit date:      Years since quittin.5    Smokeless tobacco: Never Used   Vaping Use    Vaping Use: Never used   Substance Use Topics    Alcohol use: Not Currently    Drug use: Never     Family History   Problem Relation Age of Onset    Cancer Mother         uterine    Hypertension Father     Cancer Sister         breast    Breast Cancer Sister        Review of Systems   Constitutional: Negative for chills, fever and malaise/fatigue. HENT: Negative for congestion, ear pain and sore throat. Respiratory: Positive for shortness of breath. Negative for cough and wheezing. Cardiovascular: Negative for chest pain, palpitations and leg swelling. Gastrointestinal: Positive for heartburn. Negative for abdominal pain, constipation, diarrhea, nausea and vomiting. Genitourinary: Negative for dysuria and frequency. Musculoskeletal: Negative for back pain, falls, joint pain, myalgias and neck pain. Skin: Negative for rash. Neurological: Negative for dizziness, tingling, sensory change, weakness and headaches. Psychiatric/Behavioral: Negative for depression. The patient is not nervous/anxious and does not have insomnia. Objective  Physical Exam  Vitals reviewed. Constitutional:       General: She is not in acute distress. Appearance: Normal appearance. HENT:      Head: Normocephalic. Right Ear: External ear normal.      Left Ear: External ear normal.      Nose: Nose normal.   Eyes:      Conjunctiva/sclera: Conjunctivae normal.   Neck:      Vascular: No carotid bruit. Cardiovascular:      Rate and Rhythm: Normal rate and regular rhythm. Heart sounds: Murmur heard. Comments: Murmur heart best at L 2nd ICS 2/4  Pulmonary:      Effort: Pulmonary effort is normal. No respiratory distress. Breath sounds: Normal breath sounds. Musculoskeletal:         General: No swelling or tenderness. Normal range of motion. Cervical back: Neck supple. Right lower leg: Edema present. Left lower leg: Edema present. Comments: Trace bilaterally   Skin:     General: Skin is warm and dry. Coloration: Skin is not jaundiced. Findings: No lesion or rash. Neurological:      Mental Status: She is alert and oriented to person, place, and time. Motor: No weakness. Gait: Gait normal.   Psychiatric:         Mood and Affect: Mood normal.         Behavior: Behavior normal.         Thought Content: Thought content normal.         Judgment: Judgment normal.       Assessment & Plan      ICD-10-CM ICD-9-CM    1. Poorly controlled diabetes mellitus ([facility]  E11.65 250.00 MICROALBUMIN, UR, RAND W/ MICROALB/CREAT RATIO   2. Benign essential hypertension  I10 401.1    3. Congestive heart failure, unspecified HF chronicity, unspecified heart failure type (HCC)  I50.9 428.0 furosemide (LASIX) 40 mg tablet   4. Mixed hyperlipidemia  E78.2 272.2 LIPID PANEL   5. Iron deficiency anemia, unspecified iron deficiency anemia type  D50.9 280.9 CBC WITH AUTOMATED DIFF      IRON      FERRITIN   6. Iron deficiency  E61.1 280.9 ferrous sulfate (FeroSuL) 325 mg (65 mg iron) tablet   7. Post-menopausal  Z78.0 V49.81 DEXA BONE DENSITY STUDY AXIAL   8. Non-seasonal allergic rhinitis, unspecified trigger  J30.89 477.8 fluticasone propionate (FLONASE) 50 mcg/actuation nasal spray   9. Encounter for hepatitis C screening test for low risk patient  Z11.59 V73.89 HEPATITIS C AB, RFLX TO QT BY PCR       1. Poorly controlled diabetes mellitus ([facility]  Patient will continue under the care of endocrinologist.  Patient encouraged to adhere to a diabetic diet plan. - MICROALBUMIN, UR, RAND W/ MICROALB/CREAT RATIO    2. Benign essential hypertension  Stable on current medication regime which she will continue without changes at this time.     3. Congestive heart failure, unspecified HF chronicity, unspecified heart failure type ([facility]  Stable. Continues under the care of her cardiologist who she sees regularly for routine follow-up. - furosemide (LASIX) 40 mg tablet; Take 1 Tablet by mouth daily. Dispense: 90 Tablet; Refill: 1    4. Mixed hyperlipidemia  Not currently on a statin for unclear reason. However since she is seeing a cardiologist there must be a reason to not be on it that I have been unable to discern at this time.    - LIPID PANEL    5. Iron deficiency anemia, unspecified iron deficiency anemia type  Her dose of iron was changed after last lab work. Will adjust dosage or consider referral to hematology if not at goal after current lab results are back. - CBC WITH AUTOMATED DIFF  - IRON  - FERRITIN    6. Iron deficiency  As above. - ferrous sulfate (FeroSuL) 325 mg (65 mg iron) tablet; Take 1 Tablet by mouth Before breakfast and dinner. Dispense: 180 Tablet; Refill: 1    7. Post-menopausal  Unclear when patient had her last bone density but since she cannot remember when she either did not have 1 or is been more than 2 years. Advised that she should hear from central scheduling in a few days to schedule the bone density. If not she was given the telephone number for central scheduling to call for an appointment. - DEXA BONE DENSITY STUDY AXIAL; Future    8. Non-seasonal allergic rhinitis, unspecified trigger  Effective for symptoms when they occur. Patient likes to have a supply on hand if needed. - fluticasone propionate (FLONASE) 50 mcg/actuation nasal spray; Instill 2 sprays into each nostril once daily as needed for nasal congestion. Dispense: 1 Each; Refill: 3    9. Encounter for hepatitis C screening test for low risk patient  One time recommended screening for pt in her age group per new guidelines.      - HEPATITIS C AB, RFLX TO QT BY PCR    I have discussed the diagnosis with the patient and the intended plan as seen in the above orders. Pt/caretaker has expressed understanding. Questions were answered concerning future plans. I have discussed medication side effects and warnings as indicated with the patient as well.     Keron Henderson NP

## 2022-06-27 PROBLEM — E87.8 HYPERCHLOREMIA: Status: ACTIVE | Noted: 2020-06-11

## 2022-06-27 PROBLEM — J44.1 COPD EXACERBATION (HCC): Status: RESOLVED | Noted: 2020-12-13 | Resolved: 2022-06-27

## 2022-06-27 PROBLEM — I10 ESSENTIAL HYPERTENSION: Status: ACTIVE | Noted: 2019-08-06

## 2022-06-27 PROBLEM — K92.2 ACUTE GASTROINTESTINAL HEMORRHAGE: Status: RESOLVED | Noted: 2020-08-28 | Resolved: 2022-06-27

## 2022-06-27 PROBLEM — J96.21 ACUTE AND CHRONIC RESPIRATORY FAILURE WITH HYPOXIA (HCC): Status: RESOLVED | Noted: 2021-07-12 | Resolved: 2022-06-27

## 2022-06-27 PROBLEM — Z09 HOSPITAL DISCHARGE FOLLOW-UP: Status: RESOLVED | Noted: 2021-02-10 | Resolved: 2022-06-27

## 2022-06-27 PROBLEM — J96.01 ACUTE RESPIRATORY FAILURE WITH HYPOXIA (HCC): Status: RESOLVED | Noted: 2021-01-01 | Resolved: 2022-06-27

## 2022-06-27 PROBLEM — R63.2 DISORDER OF HYPERALIMENTATION: Status: ACTIVE | Noted: 2019-08-06

## 2022-06-27 PROBLEM — I50.23 ACUTE ON CHRONIC SYSTOLIC (CONGESTIVE) HEART FAILURE (HCC): Status: RESOLVED | Noted: 2021-07-12 | Resolved: 2022-06-27

## 2022-06-27 PROBLEM — E11.9 DIABETES MELLITUS TYPE 2 WITHOUT RETINOPATHY (HCC): Status: ACTIVE | Noted: 2019-08-06

## 2022-06-29 LAB
ALBUMIN/CREAT UR: 15 MG/G CREAT (ref 0–29)
BASOPHILS # BLD AUTO: 0.1 X10E3/UL (ref 0–0.2)
BASOPHILS NFR BLD AUTO: 1 %
CHOLEST SERPL-MCNC: 125 MG/DL (ref 100–199)
CREAT UR-MCNC: 101.4 MG/DL
EOSINOPHIL # BLD AUTO: 0.1 X10E3/UL (ref 0–0.4)
EOSINOPHIL NFR BLD AUTO: 1 %
ERYTHROCYTE [DISTWIDTH] IN BLOOD BY AUTOMATED COUNT: 21.8 % (ref 11.7–15.4)
FERRITIN SERPL-MCNC: 17 NG/ML (ref 15–150)
HCT VFR BLD AUTO: 39.6 % (ref 34–46.6)
HCV AB S/CO SERPL IA: <0.1 S/CO RATIO (ref 0–0.9)
HCV AB SERPL QL IA: NORMAL
HDLC SERPL-MCNC: 46 MG/DL
HGB BLD-MCNC: 12.2 G/DL (ref 11.1–15.9)
IMM GRANULOCYTES # BLD AUTO: 0.1 X10E3/UL (ref 0–0.1)
IMM GRANULOCYTES NFR BLD AUTO: 1 %
IRON SERPL-MCNC: 34 UG/DL (ref 27–139)
LDLC SERPL CALC-MCNC: 61 MG/DL (ref 0–99)
LYMPHOCYTES # BLD AUTO: 2.7 X10E3/UL (ref 0.7–3.1)
LYMPHOCYTES NFR BLD AUTO: 26 %
MCH RBC QN AUTO: 23.7 PG (ref 26.6–33)
MCHC RBC AUTO-ENTMCNC: 30.8 G/DL (ref 31.5–35.7)
MCV RBC AUTO: 77 FL (ref 79–97)
MICROALBUMIN UR-MCNC: 15 UG/ML
MONOCYTES # BLD AUTO: 0.7 X10E3/UL (ref 0.1–0.9)
MONOCYTES NFR BLD AUTO: 6 %
NEUTROPHILS # BLD AUTO: 6.8 X10E3/UL (ref 1.4–7)
NEUTROPHILS NFR BLD AUTO: 65 %
PLATELET # BLD AUTO: 421 X10E3/UL (ref 150–450)
RBC # BLD AUTO: 5.15 X10E6/UL (ref 3.77–5.28)
TRIGL SERPL-MCNC: 96 MG/DL (ref 0–149)
VLDLC SERPL CALC-MCNC: 18 MG/DL (ref 5–40)
WBC # BLD AUTO: 10.4 X10E3/UL (ref 3.4–10.8)

## 2022-07-16 ENCOUNTER — TELEPHONE (OUTPATIENT)
Dept: FAMILY MEDICINE CLINIC | Age: 67
End: 2022-07-16

## 2022-07-17 PROBLEM — I50.22 CHRONIC SYSTOLIC (CONGESTIVE) HEART FAILURE (HCC): Status: ACTIVE | Noted: 2022-07-17

## 2022-07-17 NOTE — TELEPHONE ENCOUNTER
Stated that she saw Dr. Yon Bautista at [facility] for her diabetic eye exam this year. Please call them and see if they will fax us her eye exam report so her record can be updated. Thank you.

## 2022-07-17 NOTE — TELEPHONE ENCOUNTER
Please let patient and her daughter know about the recent lab results. Look pretty good actually. Cholesterol numbers all in good range. No protein in her urine which is good. Goals are now in the normal range so she should continue current iron doses. Her hemoglobin and hematocrit are now in the normal range though the size of her red cells is still a little small that is why think she should continue on the 2 times a day iron doses.

## 2022-07-17 NOTE — TELEPHONE ENCOUNTER
Also, please call patient's pharmacy and ask them what is the situation currently with patient getting test strips and lancets to check her blood sugar 1 time a day with her glucometer. Dr. Cori Chun office note in April 2022 said she was going to call the pharmacy and get it straightened out yet patient asked me to order One Touch glucometer and supplies when I saw her in June 2022. If they do not have an answer please call the patient/dughter because according Dr. Cori Chun note she was supposed to use an over-the-counter glucometer. Thank you.

## 2022-07-18 NOTE — TELEPHONE ENCOUNTER
Called and spoke to pharmacy. Medicare will not cover her current ordered based on how it was written. Pt order has to say use glucometer once a day to check blood sugar, Use one lancet daily to check blood sugar and one test strip daily to check blood sugar I can put the order in for the onetouch, would you like me to?     Thanks

## 2022-07-22 DIAGNOSIS — J44.9 CHRONIC OBSTRUCTIVE PULMONARY DISEASE, UNSPECIFIED COPD TYPE (HCC): ICD-10-CM

## 2022-07-22 RX ORDER — ALBUTEROL SULFATE 90 UG/1
AEROSOL, METERED RESPIRATORY (INHALATION)
Qty: 1 EACH | Refills: 3 | Status: SHIPPED | OUTPATIENT
Start: 2022-07-22 | End: 2022-08-20 | Stop reason: SDUPTHER

## 2022-07-22 NOTE — TELEPHONE ENCOUNTER
Patient called and would like the below prescription sent in as soon as possible. This is her rescue inhaler and she is almost out.     albuterol (PROVENTIL HFA, VENTOLIN HFA, PROAIR HFA) 90 mcg/actuation inhaler [[id number]

## 2022-07-25 ENCOUNTER — TELEPHONE (OUTPATIENT)
Dept: ENDOCRINOLOGY | Age: 67
End: 2022-07-25

## 2022-07-25 DIAGNOSIS — E11.65 TYPE 2 DIABETES MELLITUS WITH HYPERGLYCEMIA, WITHOUT LONG-TERM CURRENT USE OF INSULIN (HCC): ICD-10-CM

## 2022-07-25 RX ORDER — METFORMIN HYDROCHLORIDE 500 MG/1
TABLET ORAL
Qty: 120 TABLET | Refills: 5 | Status: SHIPPED | OUTPATIENT
Start: 2022-07-25 | End: 2022-10-17 | Stop reason: SDUPTHER

## 2022-08-04 ENCOUNTER — HOSPITAL ENCOUNTER (OUTPATIENT)
Dept: MAMMOGRAPHY | Age: 67
Discharge: HOME | End: 2022-08-04
Attending: NURSE PRACTITIONER
Payer: MEDICARE

## 2022-08-04 DIAGNOSIS — Z78.0 POST-MENOPAUSAL: ICD-10-CM

## 2022-08-04 PROCEDURE — 77080 DXA BONE DENSITY AXIAL: CPT

## 2022-08-08 DIAGNOSIS — E11.65 POORLY CONTROLLED DIABETES MELLITUS (HCC): Primary | ICD-10-CM

## 2022-08-08 DIAGNOSIS — J44.9 CHRONIC OBSTRUCTIVE PULMONARY DISEASE, UNSPECIFIED COPD TYPE (HCC): ICD-10-CM

## 2022-08-10 NOTE — PROGRESS NOTES
IMPRESSION:      1. Acute hypoxic hypercapnic respiratory failure  2. Acute pulmonary edema  3. Acute GI bleed  4. Sepsis  5. History of aortic stenosis coronary artery disease  6. Pericardial and pleural effusion  7. History of COVID-19 pneumonia  8. Symptomatic anemia patient hemoglobin  9. Additional workup outlined below  10. Pt is at high risk of sudden decline and decompensation with life threatening consequenses and continued end organ dysfunction and failure      RECOMMENDATIONS/PLAN:     1. She was extubated on 1/29 but again late evening she was short of breath hypoxic started on noninvasive ventilator BiPAP machine, received blood transfusion and went again into pulmonary edema Lasix given she is now on high flow nasal cannula alert awake talking on 45% FiO2  2. Patient chest x-ray shows CHF pulmonary edema  patient on Lasix with given history of aortic stenosis and congestive heart failure coronary artery disease three-vessel disease he was awaiting for valve replacement surgery  3. X-ray shows pulmonary edema improved we will continue high-dose Lasix  4. Patient on antibiotic Zosyn panculture  5. Continue with the nebulizer treatment and will change Symbicort to budesonide  6. Symptomatic anemia  7. IV vasopressors for circulatory shock refractory to fluids to maintain SBP> 90   8. Transfuse prn to maintain Hgb > 7  9. Labs to follow electrolytes, renal function and and blood counts  10. Bronchial hygiene with respiratory therapy techniques, bronchodilators  11. Pt needs IV fluids with additives and Drug therapy requiring intensive monitoring for toxicity  12. Prescription drug management with home med reconciliation reviewed  13. DVT, SUP prophylaxis       [x] High complexity decision making was performed  [x] See my orders for details  HPI  65-year-old lady came in because of generalized weakness and possible GI bleed hemoglobin was 5.4 on admission and patient has dark stool at home cardiologist  and gastroenterologist was seen the patient and patient was hospitalized on January 1 because of COVID-19 infection she also has congestive heart failure ejection fraction of 41% coronary artery disease and aortic stenosis history of aortic valve replacement. Yesterday patient was given enema and he developed shortness of breath hypoxia and wheezing she was put on supplemental oxygen patient became unresponsive tachycardic than patient had a rapid response transferred to ICU now intubated on ventilator. Was in flash pulmonary edema received Lasix she was having pink frothy sputum so not critical care consult was called as she is hemodynamically unstable. PMH:  has a past medical history of Acute gastrointestinal hemorrhage (8/28/2020), Allergic rhinitis (8/28/2020), Anemia (8/28/2020), Benign essential hypertension (6/25/2020), Body mass index 30.0-30.9, adult (6/25/2020), CAD (coronary artery disease) (8/28/2020), Foot callus (8/28/2020), GERD (gastroesophageal reflux disease) (6/25/2020), Heart murmur (6/25/2020), colonoscopy (01/01/2015), Left bundle branch block (8/28/2020), Microalbuminuria due to type 2 diabetes mellitus ([address] (8/28/2020), Mixed hyperlipidemia (6/25/2020), Morbid obesity ([address] (8/28/2020), Tobacco dependence syndrome (6/25/2020), and Type II diabetes mellitus, uncontrolled ([address] (6/25/2020). PSH:   has a past surgical history that includes hx colonoscopy (2015). FHX: family history includes Cancer in her mother and sister; Hypertension in her father. SHX:  reports that she quit smoking about 7 years ago. She smoked 0.50 packs per day. She has never used smokeless tobacco. She reports previous alcohol use. She reports that she does not use drugs.     ALL:   Allergies   Allergen Reactions    Aspirin Unknown (comments)     hemorraging - ended up in the hospital        MEDS:   [x] Reviewed - As Below   [] Not reviewed    Current Facility-Administered Medications   Medication    benzocaine-menthoL (CEPACOL) lozenge 1 Lozenge    spironolactone (ALDACTONE) tablet 25 mg    0.9% sodium chloride infusion 250 mL    furosemide (LASIX) injection 40 mg    lisinopriL (PRINIVIL, ZESTRIL) tablet 5 mg    budesonide (PULMICORT) 500 mcg/2 ml nebulizer suspension    albuterol-ipratropium (DUO-NEB) 2.5 MG-0.5 MG/3 ML    piperacillin-tazobactam (ZOSYN) 3.375 g in 0.9% sodium chloride (MBP/ADV) 100 mL MBP    pantoprazole (PROTONIX) tablet 40 mg    carvediloL (COREG) tablet 3.125 mg    0.9% sodium chloride infusion    LORazepam (ATIVAN) tablet 1 mg    acetaminophen (TYLENOL) tablet 650 mg    ondansetron (ZOFRAN ODT) tablet 4 mg    ondansetron (ZOFRAN) injection 4 mg    insulin lispro (HUMALOG) injection    glucose chewable tablet 16 g    glucagon (GLUCAGEN) injection 1 mg    dextrose (D50W) injection syrg 12.5-25 g      MAR reviewed and pertinent medications noted or modified as needed   Current Facility-Administered Medications   Medication    benzocaine-menthoL (CEPACOL) lozenge 1 Lozenge    spironolactone (ALDACTONE) tablet 25 mg    0.9% sodium chloride infusion 250 mL    furosemide (LASIX) injection 40 mg    lisinopriL (PRINIVIL, ZESTRIL) tablet 5 mg    budesonide (PULMICORT) 500 mcg/2 ml nebulizer suspension    albuterol-ipratropium (DUO-NEB) 2.5 MG-0.5 MG/3 ML    piperacillin-tazobactam (ZOSYN) 3.375 g in 0.9% sodium chloride (MBP/ADV) 100 mL MBP    pantoprazole (PROTONIX) tablet 40 mg    carvediloL (COREG) tablet 3.125 mg    0.9% sodium chloride infusion    LORazepam (ATIVAN) tablet 1 mg    acetaminophen (TYLENOL) tablet 650 mg    ondansetron (ZOFRAN ODT) tablet 4 mg    ondansetron (ZOFRAN) injection 4 mg    insulin lispro (HUMALOG) injection    glucose chewable tablet 16 g    glucagon (GLUCAGEN) injection 1 mg    dextrose (D50W) injection syrg 12.5-25 g      PMH:  has a past medical history of Acute gastrointestinal hemorrhage (8/28/2020), Allergic rhinitis (2020), Anemia (2020), Benign essential hypertension (2020), Body mass index 30.0-30.9, adult (2020), CAD (coronary artery disease) (2020), Foot callus (2020), GERD (gastroesophageal reflux disease) (2020), Heart murmur (2020), colonoscopy (2015), Left bundle branch block (2020), Microalbuminuria due to type 2 diabetes mellitus ([facility] (2020), Mixed hyperlipidemia (2020), Morbid obesity ([facility] (2020), Tobacco dependence syndrome (2020), and Type II diabetes mellitus, uncontrolled ([facility] (2020). PSH:   has a past surgical history that includes hx colonoscopy (). FHX: family history includes Cancer in her mother and sister; Hypertension in her father. SHX:  reports that she quit smoking about 7 years ago. She smoked 0.50 packs per day. She has never used smokeless tobacco. She reports previous alcohol use. She reports that she does not use drugs. ROS:  Unable to obtain intubated sedated on ventilator    Hemodynamics:    CO:    CI:    CVP:    SVR:   PAP Systolic:    PAP Diastolic:    PVR:    LM87:        Ventilator Settings:      Mode Rate TV Press PEEP FiO2 PIP Min.  Vent   Spontaneous, Pressure support    50 ml 15 cm H2O  8 cm H20 40 %  32 cm H2O  15.9 l/min        Vital Signs: Telemetry:    normal sinus rhythm Intake/Output:   Visit Vitals  /67   Pulse 72   Temp 98.4 °F (36.9 °C)   Resp 18   Ht 5' 5\" (1.651 m)   Wt 90.7 kg (200 lb)   SpO2 94%   BMI 33.28 kg/m²       Temp (24hrs), Av.2 °F (36.8 °C), Min:97.8 °F (36.6 °C), Max:98.4 °F (36.9 °C)        O2 Device: Heated, Hi flow nasal cannula O2 Flow Rate (L/min): 40 l/min       Wt Readings from Last 4 Encounters:   21 90.7 kg (200 lb)   21 90.7 kg (200 lb)   20 90.7 kg (200 lb)   20 97.4 kg (214 lb 11.7 oz)          Intake/Output Summary (Last 24 hours) at 2021 1032  Last data filed at 2021 0250  Gross per 24 hour   Intake    Output 1301 ml Net -1301 ml       Last shift:      No intake/output data recorded. Last 3 shifts: 01/31 1901 - 02/02 0700  In: 240 [P.O.:240]  Out: 4401 [Urine:4400]       Physical Exam:     General: On noninvasive ventilator BiPAP machine  HEENT: NCAT, poor dentition, lips and mucosa dry  Eyes: anicteric; conjunctiva clear  Neck: no nodes, no cuff leak, trach midline; no accessory MM use. Chest: no deformity,   Cardiac: R regular; no murmur;   Lungs: distant breath sounds; no wheezes  Abd: soft, NT, hypoactive BS  Ext: no edema; no joint swelling;  No clubbing  : NO casas, clear urine  Neuro: sedated;   Psych- no agitation, oriented to person;    Skin: warm, dry, no cyanosis;   Pulses: 1-2+ Bilateral pedal, radial  Capillary: brisk; pale      DATA:    MAR reviewed and pertinent medications noted or modified as needed  MEDS:   Current Facility-Administered Medications   Medication    benzocaine-menthoL (CEPACOL) lozenge 1 Lozenge    spironolactone (ALDACTONE) tablet 25 mg    0.9% sodium chloride infusion 250 mL    furosemide (LASIX) injection 40 mg    lisinopriL (PRINIVIL, ZESTRIL) tablet 5 mg    budesonide (PULMICORT) 500 mcg/2 ml nebulizer suspension    albuterol-ipratropium (DUO-NEB) 2.5 MG-0.5 MG/3 ML    piperacillin-tazobactam (ZOSYN) 3.375 g in 0.9% sodium chloride (MBP/ADV) 100 mL MBP    pantoprazole (PROTONIX) tablet 40 mg    carvediloL (COREG) tablet 3.125 mg    0.9% sodium chloride infusion    LORazepam (ATIVAN) tablet 1 mg    acetaminophen (TYLENOL) tablet 650 mg    ondansetron (ZOFRAN ODT) tablet 4 mg    ondansetron (ZOFRAN) injection 4 mg    insulin lispro (HUMALOG) injection    glucose chewable tablet 16 g    glucagon (GLUCAGEN) injection 1 mg    dextrose (D50W) injection syrg 12.5-25 g        Labs:    Recent Labs     01/31/21  1230   WBC 10.0   HGB 10.3*   *     Recent Labs     01/31/21  1230      K 3.1*   CL 97   CO2 33*   *   BUN 13   CREA 0.79   CA 8.8   MG 1.7 Recent Labs     02/01/21  0400 01/31/21  0350   PH 7.51* 7.50*   PCO2 46* 45   PO2 56* 58*   HCO3 36* 34*   FIO2 40.0 45.0     No results for input(s): CPK, CKNDX, TROIQ in the last 72 hours. No lab exists for component: CPKMB  No results found for: BNPP, BNP   Lab Results   Component Value Date/Time    Culture result: Scant Normal respiratory duane 01/28/2021 09:43 AM    Culture result: No Growth (<1000 cfu/mL) 01/28/2021 01:00 AM     Lab Results   Component Value Date/Time    TSH, External 2.420 01/16/2019        Imaging:    Results from Hospital Encounter encounter on 01/25/21   XR CHEST PORT    Narrative Chest, frontal view, 2/1/2021. History: CHF. Comparison: Including chest 1/31/2021. Findings: The cardiac silhouette is stable. The lungs are adequately expanded. Pulmonary vascular congestion persists. Opacities in the lungs most pronounced  at the bases is not significantly changed. Pleural effusions are not  significantly changed. No pneumothorax is identified. The osseous structures  are stable. Impression No significant interval change. Results from [place] encounter on 01/25/21   CTA CHEST W OR W WO CONT    Narrative Exam: CTA chest with intravenous contrast    Comparison: Plain radiograph 1/25/2021. CT 1/2/2021. Technique: During intravenous contrast administration (100 cc [id number]), axial  sections were obtained through the chest. Coronal and sagittal reconstructions  were generated. Maximal intensity projection images were generated. Dose reduction: All CT scans at this facility are performed using dose reduction  optimization techniques as appropriate to perform the exam including the  following: automated exposure control, adjustments of the mA and/or kV according  to patient size, or use of iterative reconstruction technique. Findings:  The examination is limited by motion degradation and by adduction of  the patient's upper extremities, which renders scattering and attenuation of the  diagnostic x-ray beam. Nevertheless, there is no demonstrated pulmonary embolus. Endotracheal tube tip in the middle third of the trachea. Distal trachea and  main bronchi poorly expanded. There is calcific atherosclerotic disease of a  nonaneurysmal thoracic aorta, which manifests no evidence of intimal  incompetence. Calcific atherosclerotic coronary arterial disease is present. Prominent bilateral mediastinal and hilar lymph nodes, increased compared to  1/2/2021, likely inflammatory. Small to moderate pericardial effusion of near  water attenuation fluid. It is 2 cm greatest axial thickness. Small bilateral  pleural effusions. Bilateral dependent airspace disease compatible with  pneumonia. Interstitial disease also present, compatible with pulmonary edema. Included abdomen indicates a stable 2 cm left adrenal nodule of water  attenuation, compatible with a benign adenoma. No axillary or supraclavicular  adenopathy or mass. Visualized thoracic bone scaffolding unremarkable. Impression Impression:  1. No evidence of pulmonary embolus. 2. Bilateral dependent airspace disease compatible with pneumonia. 3. Diffuse pulmonary interstitial disease compatible with pulmonary edema. 4. Bilateral pleural effusions. 5. Coronary artery calcific atherosclerotic disease. 6. Small to moderate pericardial effusion. 7. Distal trachea and main bronchi are poorly expanded. Yes

## 2022-08-12 NOTE — TELEPHONE ENCOUNTER
Last OV: 6/21/22  Next OV: 9/21/22  Last Refill: 4/12/22 (Jardiance- QTY 30, Refills 3); 7/22/22  Last A1c: 12/6/21  Last Microalbumin: 6/28/22    Rx for inhaler should be at [address] since it was recently sent.  Will send request to provider for review/authorization of other meds

## 2022-08-12 NOTE — TELEPHONE ENCOUNTER
Patient needs a refill for Jardiance 25 mg she was a patient of Antonella Gong and was told to call her PCP for this medication because Antonella Gong is no longer wit her practice she also needs a refill for Albuterol 90 mcg/actuation inhaler patient states that she put in for this about three weeks ago and has not had her inhaler for about a month

## 2022-08-17 NOTE — TELEPHONE ENCOUNTER
Patient is calling again about her refills Albuterol 90 mcg/actuation inhaler patient is out of medication

## 2022-08-20 RX ORDER — EMPAGLIFLOZIN 25 MG/1
TABLET, FILM COATED ORAL
Qty: 90 TABLET | Refills: 1 | Status: SHIPPED | OUTPATIENT
Start: 2022-08-20

## 2022-08-20 RX ORDER — ALBUTEROL SULFATE 90 UG/1
AEROSOL, METERED RESPIRATORY (INHALATION)
Qty: 1 EACH | Refills: 3 | Status: SHIPPED | OUTPATIENT
Start: 2022-08-20 | End: 2022-09-21 | Stop reason: SDUPTHER

## 2022-09-21 ENCOUNTER — OFFICE VISIT (OUTPATIENT)
Dept: FAMILY MEDICINE CLINIC | Age: 67
End: 2022-09-21
Payer: MEDICARE

## 2022-09-21 VITALS
DIASTOLIC BLOOD PRESSURE: 72 MMHG | HEIGHT: 65 IN | HEART RATE: 82 BPM | OXYGEN SATURATION: 98 % | RESPIRATION RATE: 18 BRPM | TEMPERATURE: 96.9 F | WEIGHT: 190 LBS | BODY MASS INDEX: 31.65 KG/M2 | SYSTOLIC BLOOD PRESSURE: 128 MMHG

## 2022-09-21 DIAGNOSIS — I10 BENIGN ESSENTIAL HYPERTENSION: ICD-10-CM

## 2022-09-21 DIAGNOSIS — E87.6 HYPOKALEMIA: ICD-10-CM

## 2022-09-21 DIAGNOSIS — J44.9 CHRONIC OBSTRUCTIVE PULMONARY DISEASE, UNSPECIFIED COPD TYPE (HCC): ICD-10-CM

## 2022-09-21 DIAGNOSIS — E11.65 TYPE 2 DIABETES MELLITUS WITH HYPERGLYCEMIA, WITHOUT LONG-TERM CURRENT USE OF INSULIN (HCC): Primary | ICD-10-CM

## 2022-09-21 DIAGNOSIS — D50.9 IRON DEFICIENCY ANEMIA, UNSPECIFIED IRON DEFICIENCY ANEMIA TYPE: ICD-10-CM

## 2022-09-21 PROBLEM — E87.1 HYPONATREMIA: Status: RESOLVED | Noted: 2020-12-13 | Resolved: 2022-09-21

## 2022-09-21 PROBLEM — E87.8 HYPERCHLOREMIA: Status: RESOLVED | Noted: 2020-06-11 | Resolved: 2022-09-21

## 2022-09-21 PROBLEM — I50.22 CHRONIC SYSTOLIC (CONGESTIVE) HEART FAILURE (HCC): Status: RESOLVED | Noted: 2022-07-17 | Resolved: 2022-09-21

## 2022-09-21 PROBLEM — I50.22 CHRONIC SYSTOLIC CONGESTIVE HEART FAILURE (HCC): Status: ACTIVE | Noted: 2021-02-10

## 2022-09-21 LAB — HBA1C MFR BLD HPLC: 8.4 %

## 2022-09-21 PROCEDURE — 99214 OFFICE O/P EST MOD 30 MIN: CPT | Performed by: FAMILY MEDICINE

## 2022-09-21 PROCEDURE — 3052F HG A1C>EQUAL 8.0%<EQUAL 9.0%: CPT | Performed by: FAMILY MEDICINE

## 2022-09-21 PROCEDURE — 1123F ACP DISCUSS/DSCN MKR DOCD: CPT | Performed by: FAMILY MEDICINE

## 2022-09-21 PROCEDURE — 83036 HEMOGLOBIN GLYCOSYLATED A1C: CPT | Performed by: FAMILY MEDICINE

## 2022-09-21 RX ORDER — ALBUTEROL SULFATE 90 UG/1
AEROSOL, METERED RESPIRATORY (INHALATION)
Qty: 1 EACH | Refills: 2 | Status: SHIPPED | OUTPATIENT
Start: 2022-09-21 | End: 2022-09-21 | Stop reason: SDUPTHER

## 2022-09-21 RX ORDER — POTASSIUM CHLORIDE 750 MG/1
TABLET, EXTENDED RELEASE ORAL
Qty: 40 TABLET | Refills: 3 | Status: SHIPPED | OUTPATIENT
Start: 2022-09-21 | End: 2022-09-21 | Stop reason: SDUPTHER

## 2022-09-21 RX ORDER — ALBUTEROL SULFATE 90 UG/1
AEROSOL, METERED RESPIRATORY (INHALATION)
Qty: 1 EACH | Refills: 2 | Status: SHIPPED | OUTPATIENT
Start: 2022-09-21

## 2022-09-21 RX ORDER — POTASSIUM CHLORIDE 750 MG/1
TABLET, EXTENDED RELEASE ORAL
Qty: 40 TABLET | Refills: 3 | Status: SHIPPED | OUTPATIENT
Start: 2022-09-21

## 2022-09-21 RX ORDER — ATORVASTATIN CALCIUM 40 MG/1
40 TABLET, FILM COATED ORAL DAILY
COMMUNITY

## 2022-09-21 RX ORDER — BUDESONIDE, GLYCOPYRROLATE, AND FORMOTEROL FUMARATE 160; 9; 4.8 UG/1; UG/1; UG/1
2 AEROSOL, METERED RESPIRATORY (INHALATION) 2 TIMES DAILY
Qty: 2 EACH | Refills: 5 | Status: SHIPPED | OUTPATIENT
Start: 2022-09-21 | End: 2022-09-21 | Stop reason: SDUPTHER

## 2022-09-21 RX ORDER — BUDESONIDE, GLYCOPYRROLATE, AND FORMOTEROL FUMARATE 160; 9; 4.8 UG/1; UG/1; UG/1
2 AEROSOL, METERED RESPIRATORY (INHALATION) 2 TIMES DAILY
Qty: 2 EACH | Refills: 5 | Status: SHIPPED | OUTPATIENT
Start: 2022-09-21

## 2022-09-21 NOTE — PROGRESS NOTES
Chief Complaint   Patient presents with    Diabetes     Pt stated that she is here for a 3 m0nth follow up. Medication Refill     Medication refills      Visit Vitals  /72 (BP 1 Location: Right arm, BP Patient Position: Sitting)   Pulse 82   Temp 96.9 °F (36.1 °C) (Temporal)   Resp 18   Ht 5' 5\" (1.651 m)   Wt 190 lb (86.2 kg)   SpO2 98%   BMI 31.62 kg/m²     1. Have you been to the ER, urgent care clinic since your last visit? Hospitalized since your last visit? No    2. Have you seen or consulted any other health care providers outside of the [address] [place] [address] since your last visit? Include any pap smears or colon screening.  No    Pt requesting refills

## 2022-09-21 NOTE — PROGRESS NOTES
Subjective  Chief Complaint   Patient presents with    Diabetes     Pt stated that she is here for a 3 m0nth follow up. Medication Refill     Medication refills      HPI:  Aundrea Wells is a 69 y.o. female with the below listed medical problems who presents for DM2 follow up. DM2:  Complications: none  Since last visit: Patient was last seen by Dr. Sha Alvarez, Endocrinology in 04/2022. She says that Dr. Sha Alvarez moved out of state at the end of August. She was seen by Lazara Pimentel, NP in 06/2022. No significant changes to regimen at that time. Patient reports that she has been unable to obtain a glucometer and strips for an affordable price so she has not been checking her sugar. A1c: 9.1 (12/2021) -->8.6 (04/2022)  Lipids: total 125, HDL 46, LDL 61 (06/2022)  BMP: Cr 0.93 (04/2022)  Urine microalbumin: Wnl (06/2022)  Eye exam: Last exam last year (Dr. Nannette Peraza). She was told no retinopathy but she does have cataracts. Foot exam: 04/2022, normal sensation, thickened toenails bilaterally. Sees Podiatry, last visit in 05/2022. ASA/statin: Started on atorvastatin in May per Cardiology. ASA caused GI bleed in 2015, so she has not taken this since. Lifestyle: Trying to maintain a diabetic diet. She does not do much regular exercise secondary to her dyspnea which is improving since she fell ill with COVID. She is able to walk greater distances without getting winded now. Medications: Jardiance 25mg, metformin 1g BID, lisinopril 20mg, atorvastatin 40mg  Vaccines: Due for influenza, COVID booster, and PCV20    Iron deficiency anemia: Hgb trend 8.9 (07/14/2021)-->9.7 (07/15/2021)-->10.0 (07/16/2021)-->9.5 (12/06/2021)-->12.2 (06/28/2022). Iron 71/ferritin 10 in 04/2022-->iron 34/ferritin 17 in 07/2022. Suffered GI bleed in 01/2021 requiring blood transfusions. ASA and Plavix were discontinued at that time. Currently taking ferrous sulfate 325mg BID. No issues with constipation.  She denies hematochezia, melena, hematuria, or any other bleeding. Asymptomatic. She was supposed to have a colonoscopy in January during her hospitalization, but they elected to defer while she was still recovering from COVID-19. She did have EGD which only showed mild gastritis without bleeding. She states that she does not wish to have any more colonoscopies as the last time she had one, she ended up with a GI bleed (~2015). She says that at least one polyp was removed and when she resumed her ASA is when the GI bleed occurred. COPD: Patient does see Pulmonology. She is prescribed Breztri (triple therapy) and albuterol PRN. She has also required supplemental O2 at night since she was hospitalized with COVID in 01/2021. She states that her COPD is well controlled at this time. She denies increased sputum production, cough, wheezing, or significant shortness of breath. She is requesting refills of her inhalers. GERD: Taking pantoprazole 40mg daily. She still has acid reflux on occasion. She believes it might be related to certain foods that she is eating. She sometimes eats citrus fruits and woodard which might cause her acid reflux to flare. She has previously been on Pepcid. She also reports past history of bleeding gastric ulcers. Last EGD (2021) showed mild gastritis without bleeding or ulceration.      Patient Active Problem List   Diagnosis Code    Body mass index 30.0-30.9, adult Z68.30    Heart murmur R01.1    Mixed hyperlipidemia E78.2    Tobacco dependence syndrome F17.200    Allergic rhinitis J30.9    Iron deficiency anemia D50.9    Foot callus L84    Left bundle branch block I44.7    Microalbuminuria due to type 2 diabetes mellitus ([facility]) E11.29, R80.9    Morbid obesity ([facility]) E66.01    Aortic stenosis I35.0    Chronic obstructive pulmonary disease ([facility]) J44.9    Coronary artery disease involving native heart without angina pectoris I25.10    Gastroesophageal reflux disease without esophagitis K21.9    Chronic systolic congestive heart failure (HCC) I50.22    Hypokalemia E87.6    Cataract, nuclear sclerotic, both eyes H25.13    Diabetes mellitus type 2 without retinopathy (HCC) E11.9    Disorder of hyperalimentation R63.2    Essential hypertension I10     Family History   Problem Relation Age of Onset    Cancer Mother         uterine    Hypertension Father     Cancer Sister         breast    Breast Cancer Sister      Social History     Tobacco Use    Smoking status: Former     Packs/day: 0.50     Types: Cigarettes     Quit date:      Years since quittin.7    Smokeless tobacco: Never   Vaping Use    Vaping Use: Never used   Substance Use Topics    Alcohol use: Not Currently    Drug use: Never     Current Outpatient Medications on File Prior to Visit   Medication Sig Dispense Refill    atorvastatin (LIPITOR) 40 mg tablet Take 40 mg by mouth daily. Jardiance 25 mg tablet Take 1 tablet by mouth once daily for 30 days 90 Tablet 1    metFORMIN (GLUCOPHAGE) 500 mg tablet 2 tabs twice a day 120 Tablet 5    guaiFENesin (MUCINEX) 1,200 mg Ta12 ER tablet Take 1,200 mg by mouth two (2) times daily as needed. lisinopriL (PRINIVIL, ZESTRIL) 20 mg tablet Take 20 mg by mouth two (2) times a day. fluticasone propionate (FLONASE) 50 mcg/actuation nasal spray Instill 2 sprays into each nostril once daily as needed for nasal congestion. 1 Each 3    furosemide (LASIX) 40 mg tablet Take 1 Tablet by mouth daily. 90 Tablet 1    pantoprazole (PROTONIX) 40 mg tablet Take 1 Tablet by mouth daily. 90 Tablet 1    ammonium lactate (AMLACTIN) 12 % topical cream Apply  to affected area two (2) times a day. rub in to affected area well 280 g 2    carvediloL (COREG) 3.125 mg tablet Take 1 Tablet by mouth two (2) times daily (with meals). 180 Tablet 1    glucose blood VI test strips (OneTouch Verio test strips) strip Check glucose once a day 50 Strip 5    spironolactone (ALDACTONE) 25 mg tablet Take 1 Tablet by mouth two (2) times a day. Indications: heart failure with reduced ejection fraction 180 Tablet 3    fexofenadine (ALLEGRA) 180 mg tablet Take 1 Tab by mouth daily. STOP loratadine 30 Tab 3    [DISCONTINUED] albuterol (PROVENTIL HFA, VENTOLIN HFA, PROAIR HFA) 90 mcg/actuation inhaler 2 puffs every 4-6 hours as needed for cough, wheezing , short of breath. Insurance brand of choice 1 Each 3    ferrous sulfate (FeroSuL) 325 mg (65 mg iron) tablet Take 1 Tablet by mouth Before breakfast and dinner. 180 Tablet 1    [DISCONTINUED] potassium chloride (KLOR-CON M10) 10 mEq tablet TAKE 1 TABLET BY MOUTH EVERY MONDAY, WEDNESDAY, AND FRIDAY 40 Tablet 3    [DISCONTINUED] budesonide-glycopyr-formoterol (Breztri Aerosphere) 160-9-4.8 mcg/actuation HFAA Take 2 Puffs by inhalation two (2) times a day. No current facility-administered medications on file prior to visit. Allergies   Allergen Reactions    Aspirin Unknown (comments)     hemorraging - ended up in the hospital    Diphenhydramine Hcl Other (comments)     Review of Systems   Constitutional:  Negative for chills and fever. Respiratory:  Negative for cough, sputum production, shortness of breath and wheezing. Cardiovascular:  Negative for chest pain, palpitations and leg swelling. Gastrointestinal:  Negative for abdominal pain, constipation, diarrhea, nausea and vomiting. Objective  Visit Vitals  /72 (BP 1 Location: Right arm, BP Patient Position: Sitting)   Pulse 82   Temp 96.9 °F (36.1 °C) (Temporal)   Resp 18   Ht 5' 5\" (1.651 m)   Wt 190 lb (86.2 kg)   SpO2 98%   BMI 31.62 kg/m²     Physical Exam  Constitutional:       General: She is not in acute distress. Appearance: Normal appearance. HENT:      Head: Normocephalic and atraumatic. Right Ear: External ear normal.      Left Ear: External ear normal.      Nose: Nose normal.      Mouth/Throat:      Mouth: Mucous membranes are moist.   Eyes:      Extraocular Movements: Extraocular movements intact. Conjunctiva/sclera: Conjunctivae normal.   Cardiovascular:      Rate and Rhythm: Normal rate and regular rhythm. Heart sounds: Normal heart sounds. Pulmonary:      Effort: Pulmonary effort is normal. No respiratory distress. Breath sounds: No wheezing. Comments: Decreased breath sounds diffusely  Abdominal:      General: Abdomen is flat. Musculoskeletal:         General: No swelling. Normal range of motion. Cervical back: Normal range of motion and neck supple. Right lower leg: No edema. Left lower leg: No edema. Skin:     General: Skin is warm and dry. Neurological:      General: No focal deficit present. Mental Status: She is alert and oriented to person, place, and time. Motor: No weakness. Gait: Gait normal.   Psychiatric:         Mood and Affect: Mood normal.         Behavior: Behavior normal.         Thought Content: Thought content normal.         Judgment: Judgment normal.        Assessment & Plan    ICD-10-CM ICD-9-CM    1. Type 2 diabetes mellitus with hyperglycemia, without long-term current use of insulin (HCC)  E11.65 250.00 AMB POC HEMOGLOBIN A1C     790.29       2. Benign essential hypertension  I10 401.1 potassium chloride (KLOR-CON M10) 10 mEq tablet      DISCONTINUED: potassium chloride (KLOR-CON M10) 10 mEq tablet      3. Chronic obstructive pulmonary disease, unspecified COPD type ([facility]  J44.9 496 budesonide-glycopyr-formoterol (Breztri Aerosphere) 160-9-4.8 mcg/actuation HFAA      albuterol (PROVENTIL HFA, VENTOLIN HFA, PROAIR HFA) 90 mcg/actuation inhaler      DISCONTINUED: budesonide-glycopyr-formoterol (Breztri Aerosphere) 160-9-4.8 mcg/actuation HFAA      DISCONTINUED: albuterol (PROVENTIL HFA, VENTOLIN HFA, PROAIR HFA) 90 mcg/actuation inhaler      4. Hypokalemia  E87.6 276.8       5. Iron deficiency anemia, unspecified iron deficiency anemia type  D50.9 280.9         Diagnoses and all orders for this visit:    1.  Type 2 diabetes mellitus with hyperglycemia, without long-term current use of insulin (HCC)  -     AMB POC HEMOGLOBIN A1C  A1c 8.4 today, improved from 8.6 in 04/2022. Goal A1c <8.0. Will not make any medication changes today. Continue Jardiance 25mg daily and metformin 1g BID as well as atorvastatin and lisinopril. Will need to refer to [facility] to update immunizations. Dicussed PCV20 and influenza but patient declines at this time. Records request faxed to Dr. Concetta Valencia for annual eye exams. 2. Benign essential hypertension  -     potassium chloride (KLOR-CON M10) 10 mEq tablet; TAKE 1 TABLET BY MOUTH EVERY MONDAY, WEDNESDAY, AND FRIDAY  Patient states that her potassium is always low when she is off of supplement. Will send refills. 3. Chronic obstructive pulmonary disease, unspecified COPD type ([facility]  -     budesonide-glycopyr-formoterol (Breztri Aerosphere) 160-9-4.8 mcg/actuation HFAA; Take 2 Puffs by inhalation two (2) times a day. -     albuterol (PROVENTIL HFA, VENTOLIN HFA, PROAIR HFA) 90 mcg/actuation inhaler; 2 puffs every 4-6 hours as needed for cough, wheezing , short of breath. Insurance brand of choice  Well controlled. Declines PCV20 and influenza. Refill Breztri and albuterol PRN. Next appointment with Pulmonology in December. Will need to complete records request form at follow up to retrieve those notes. 4. Hypokalemia  Refill K supplement (10mEq daily)    5. Iron deficiency anemia, unspecified iron deficiency anemia type  Continue ferrous sulfate 325mg BID. Plan to recheck CBC/iron panel in December. Patient declines referral for colonoscopy. Continue to discuss. Aspects of this note have been generated using voice recognition software. Despite editing, there may be some syntax errors.   Follow-up and Dispositions    Return in about 3 months (around 12/21/2022) for Medicare wellness exam.       Kristal Hart DO

## 2022-10-07 NOTE — PROGRESS NOTES
Consult    NAME: Trevor Healy   :  1955   MRN:  [id number]     Date/Time:  7/15/2021 4:13 PM    Patient PCP: Carmen Khan DO  ________________________________________________________________________     Assessment:     1. Acute systolic heart failure. 2. Three-vessel coronary artery disease with moderate aortic stenosis. 3. COPD with acute exacerbation        Plan:     1. Patient stated that she has some shortness of breath and cannot lie flat on the table for cardiac catheterization so cardiac catheterization is postponed and will do as outpatient. May need a higher dose of diuretic. []        High complexity decision making was performed        Subjective:   CHIEF COMPLAINT:     HISTORY OF PRESENT ILLNESS:     Bethany Lopez is a 74 y.o. -American female who is known to have three-vessel coronary artery disease and moderate aortic stenosis. Patient was awaiting coronary artery bypass surgery meanwhile developed a Covid pneumonia and pulmonary edema and severe left ventricular systolic dysfunction. Patient was waiting to have cardiac catheterization however she has shortness of breath and cannot lie flat. We were asked to consult for work up and evaluation of the above problems.      Past Medical History:   Diagnosis Date    Acute gastrointestinal hemorrhage 2020    Allergic rhinitis 2020    Anemia 2020    Benign essential hypertension 2020    Body mass index 30.0-30.9, adult 2020    CAD (coronary artery disease) 2020    Foot callus 2020    GERD (gastroesophageal reflux disease) 2020    Hair loss     Heart murmur 2020    Hx of colonoscopy 2015    Done for anemia and rectal bleeding    Left bundle branch block 2020    Microalbuminuria due to type 2 diabetes mellitus ([facility] 2020    Mixed hyperlipidemia 2020    Morbid obesity ([facility] 2020    Tobacco dependence syndrome 2020    Type II diabetes Left message on answering machine for patient to call back.  And sent [employer] message.    Please have the patient come in at 9:40 on 10/18/22 for visit.  The provider would like more time with the patient.    Thank you    May MAYER RN     mellitus, uncontrolled ([facility] 2020      Past Surgical History:   Procedure Laterality Date    HX COLONOSCOPY  2015    due in [id number]. Done for anemia and rectal bleeding     Allergies   Allergen Reactions    Aspirin Unknown (comments)     hemorraging - ended up in the hospital      Meds:  See below  Social History     Tobacco Use    Smoking status: Former Smoker     Packs/day: 0.50     Quit date:      Years since quittin.5    Smokeless tobacco: Never Used   Substance Use Topics    Alcohol use: Not Currently      Family History   Problem Relation Age of Onset    Cancer Mother         uterine    Hypertension Father     Cancer Sister         breast       REVIEW OF SYSTEMS:     []         Unable to obtain  ROS due to ---   [x]         Total of 12 systems reviewed as follows:    Constitutional: negative fever, negative chills, negative weight loss  Eyes:   negative visual changes  ENT:   negative sore throat, tongue or lip swelling  Respiratory:  negative cough, has dyspnea  Cards:  negative for chest pain, palpitations, lower extremity edema  GI:   negative for nausea, vomiting, diarrhea, and abdominal pain  Genitourinary: negative for frequency, dysuria  Integument:  negative for rash   Hematologic:  negative for easy bruising and gum/nose bleeding  Musculoskel: negative for myalgias,  back pain  Neurological:  negative for headaches, dizziness, vertigo, weakness  Behavl/Psych: negative for feelings of anxiety, depression     Pertinent Positives include :    Objective:      Physical Exam:    Last 24hrs VS reviewed since prior progress note.  Most recent are:    Visit Vitals  /79 (BP 1 Location: Left upper arm, BP Patient Position: At rest;Sitting)   Pulse 94   Temp 98.3 °F (36.8 °C)   Resp 18   Ht 5' 5\" (1.651 m)   Wt 88.6 kg (195 lb 5.2 oz)   SpO2 100%   Breastfeeding No   BMI 32.50 kg/m²       Intake/Output Summary (Last 24 hours) at 7/15/2021 6939  Last data filed at 7/15/2021 3131  Gross per 24 hour   Intake    Output 2400 ml   Net -2400 ml        General Appearance: Well developed, well nourished, alert & oriented x 3,    no acute distress. Ears/Nose/Mouth/Throat: Pupils equal and round, Hearing grossly normal.  Neck: Supple. JVP within normal limits. Carotids good upstrokes, with no bruit. Chest: Lungs clear to auscultation bilaterally. Cardiovascular: Regular rate and rhythm, S1S2 normal, no murmur, rubs, gallops. Abdomen: Soft, non-tender, bowel sounds are active. No organomegaly. Extremities: No edema bilaterally. .  Skin: Warm and dry. Neuro: No localizing neurological deficit  Data:      I have reviewed vital signs,labs and ekg independently    Telemetry:    EKG:     EKG RESULTS     Procedure Component Value Units Date/Time    EKG, 12 LEAD, INITIAL [[id number]] Collected: 07/12/21 0254    Order Status: Completed Updated: 07/12/21 0807     Ventricular Rate 106 BPM      Atrial Rate 106 BPM      P-R Interval 136 ms      QRS Duration 138 ms      Q-T Interval 390 ms      QTC Calculation (Bezet) 518 ms      Calculated P Axis 49 degrees      Calculated R Axis 85 degrees      Calculated T Axis 74 degrees      Diagnosis --     Sinus tachycardia  Non-specific intra-ventricular conduction block  Cannot rule out Anterior infarct , age undetermined  Abnormal ECG  No previous ECGs available  Confirmed by Misael Talavera ([id number]) on 7/12/2021 8:07:05 AM             XR CHEST PORT   Final Result   Findings/impression:      No consolidative airspace disease, pleural effusion or pneumothorax. Cardiac silhouette is enlarged. Trace interstitial edema. No acute osseous abnormality.            Echo Results  (Last 48 hours)    None        Cardiolite (Tc-99m Sestamibi) stress test    XR Results (most recent):     Results from Hospital Encounter encounter on 07/12/21    XR CHEST PORT    Narrative  Study: XR CHEST PORT    Clinical indication: Shortness of breath    Comparison: Chest x-ray 2/1/2021. Impression  Findings/impression:    No consolidative airspace disease, pleural effusion or pneumothorax. Cardiac silhouette is enlarged. Trace interstitial edema. No acute osseous abnormality. Prior to Admission medications    Medication Sig Start Date End Date Taking? Authorizing Provider   atorvastatin (LIPITOR) 40 mg tablet Take 1 Tablet by mouth nightly for 90 days. 7/7/21 10/5/21 Yes Thanh Mcdonald MD   spironolactone (ALDACTONE) 25 mg tablet Take 1 Tablet by mouth two (2) times a day. Indications: heart failure with reduced ejection fraction, fluid in the lungs due to chronic heart failure 6/18/21  Yes Jaret Burgess DO   furosemide (Lasix) 40 mg tablet Take 1 Tab by mouth daily. 5/11/21  Yes Jaret Burgess DO   clopidogreL (PLAVIX) 75 mg tab Take 75 mg by mouth daily. Yes Provider, Historical   fexofenadine (ALLEGRA) 180 mg tablet Take 1 Tab by mouth daily. STOP loratadine 5/10/21  Yes Jaret Burgess DO   metFORMIN (GLUCOPHAGE) 500 mg tablet Take 2 tablets twice a day with meals 4/27/21  Yes Thahn Mcdonald MD   carvediloL (COREG) 3.125 mg tablet Take 1 Tab by mouth two (2) times daily (with meals). 4/12/21  Yes Jaret Burgess DO   pantoprazole (Protonix) 40 mg tablet Take 1 Tab by mouth daily.  3/5/21  Yes Jaret Burgess DO   potassium chloride (KLOR-CON) 10 mEq tablet By oral route take one tablet Monday, Wednesday and Friday 9/11/20  Yes Jaret Burgess DO       Recent Results (from the past 24 hour(s))   GLUCOSE, POC    Collection Time: 07/14/21  8:13 PM   Result Value Ref Range    Glucose (POC) 339 (H) 65 - 117 mg/dL    Performed by [address] [place] [address], BASIC    Collection Time: 07/15/21  4:34 AM   Result Value Ref Range    Sodium 134 (L) 136 - 145 mmol/L    Potassium 4.5 3.5 - 5.1 mmol/L    Chloride 100 97 - 108 mmol/L    CO2 27 21 - 32 mmol/L    Anion gap 7 5 - 15 mmol/L    Glucose 222 (H) 65 - 100 mg/dL    BUN 26 (H) 6 - 20 mg/dL    Creatinine 0.84 0.55 - 1.02 mg/dL BUN/Creatinine ratio 31 (H) 12 - 20      GFR est AA >60 >60 ml/min/1.73m2    GFR est non-AA >60 >60 ml/min/1.73m2    Calcium 8.9 8.5 - 10.1 mg/dL   CBC WITH AUTOMATED DIFF    Collection Time: 07/15/21  4:34 AM   Result Value Ref Range    WBC 14.1 (H) 3.6 - 11.0 K/uL    RBC 4.37 3.80 - 5.20 M/uL    HGB 9.7 (L) 11.5 - 16.0 g/dL    HCT 31.8 (L) 35.0 - 47.0 %    MCV 72.8 (L) 80.0 - 99.0 FL    MCH 22.2 (L) 26.0 - 34.0 PG    MCHC 30.5 30.0 - 36.5 g/dL    RDW 17.5 (H) 11.5 - 14.5 %    PLATELET 259 (H) 730 - 400 K/uL    MPV 9.1 8.9 - 12.9 FL    NRBC 0.3 (H) 0.0  WBC    ABSOLUTE NRBC 0.04 (H) 0.00 - 0.01 K/uL    NEUTROPHILS 90 (H) 32 - 75 %    LYMPHOCYTES 6 (L) 12 - 49 %    MONOCYTES 3 (L) 5 - 13 %    EOSINOPHILS 0 0 - 7 %    BASOPHILS 0 0 - 1 %    IMMATURE GRANULOCYTES 1 (H) 0 - 0.5 %    ABS. NEUTROPHILS 12.7 (H) 1.8 - 8.0 K/UL    ABS. LYMPHOCYTES 0.9 0.8 - 3.5 K/UL    ABS. MONOCYTES 0.5 0.0 - 1.0 K/UL    ABS. EOSINOPHILS 0.0 0.0 - 0.4 K/UL    ABS. BASOPHILS 0.0 0.0 - 0.1 K/UL    ABS. IMM.  GRANS. 0.1 (H) 0.00 - 0.04 K/UL    DF AUTOMATED     GLUCOSE, POC    Collection Time: 07/15/21  7:55 AM   Result Value Ref Range    Glucose (POC) 236 (H) 65 - 117 mg/dL    Performed by [employer], POC    Collection Time: 07/15/21 10:57 AM   Result Value Ref Range    Glucose (POC) 413 (H) 65 - 117 mg/dL    Performed by [employer] [facility], POC    Collection Time: 07/15/21  3:49 PM   Result Value Ref Range    Glucose (POC) 150 (H) 65 - 117 mg/dL    Performed by Cecille Raza MD    Patient PCP: Daniel Cra Basil Stark, MD

## 2022-10-10 NOTE — TELEPHONE ENCOUNTER
Patient called and would like a refill on her prescription below. Patient states will not have none after today.     lisinopriL (PRINIVIL, ZESTRIL) 20 mg tablet [[id number]    Send prescription to [address] [place] [address] 9/21/22    FOV 12/22/22

## 2022-10-11 NOTE — TELEPHONE ENCOUNTER
Patient called and stated she needs her medication. She does want to get sick and miss her medication. She is running out and needs medication.

## 2022-10-12 DIAGNOSIS — I10 BENIGN ESSENTIAL HYPERTENSION: ICD-10-CM

## 2022-10-12 DIAGNOSIS — I50.9 CONGESTIVE HEART FAILURE, UNSPECIFIED HF CHRONICITY, UNSPECIFIED HEART FAILURE TYPE (HCC): ICD-10-CM

## 2022-10-12 RX ORDER — LISINOPRIL 20 MG/1
20 TABLET ORAL 2 TIMES DAILY
Qty: 180 TABLET | Refills: 1 | Status: SHIPPED | OUTPATIENT
Start: 2022-10-12

## 2022-10-12 NOTE — TELEPHONE ENCOUNTER
Last OV: 9/21/22  Next OV: 12/22/22  Last Refill: 3/16/22 (, refills 1)  Last CMP: 4/11/22    Will send to provider for review/auth

## 2022-10-12 NOTE — TELEPHONE ENCOUNTER
[facility] ([phone]) is calling looking for medication for patient. Patient is out and called pharmacy to help her get this medication. Please call [facility] when ordered.

## 2022-10-13 RX ORDER — CARVEDILOL 3.12 MG/1
3.12 TABLET ORAL 2 TIMES DAILY WITH MEALS
Qty: 180 TABLET | Refills: 1 | Status: SHIPPED | OUTPATIENT
Start: 2022-10-13

## 2022-10-17 ENCOUNTER — TELEPHONE (OUTPATIENT)
Dept: FAMILY MEDICINE CLINIC | Age: 67
End: 2022-10-17

## 2022-10-17 DIAGNOSIS — I50.9 CONGESTIVE HEART FAILURE, UNSPECIFIED HF CHRONICITY, UNSPECIFIED HEART FAILURE TYPE (HCC): ICD-10-CM

## 2022-10-17 DIAGNOSIS — E11.65 TYPE 2 DIABETES MELLITUS WITH HYPERGLYCEMIA, WITHOUT LONG-TERM CURRENT USE OF INSULIN (HCC): ICD-10-CM

## 2022-10-17 DIAGNOSIS — I10 BENIGN ESSENTIAL HYPERTENSION: ICD-10-CM

## 2022-10-17 RX ORDER — METFORMIN HYDROCHLORIDE 500 MG/1
TABLET ORAL
Qty: 360 TABLET | Refills: 3 | Status: SHIPPED | OUTPATIENT
Start: 2022-10-17

## 2022-10-17 RX ORDER — SPIRONOLACTONE 25 MG/1
25 TABLET ORAL 2 TIMES DAILY
Qty: 180 TABLET | Refills: 3 | Status: SHIPPED | OUTPATIENT
Start: 2022-10-17

## 2022-10-17 NOTE — TELEPHONE ENCOUNTER
Patient called and states she is out of the following medications and would like them sent to [address] [place] [address], [place].     spironolactone (ALDACTONE) 25 mg tablet [[id number]    metFORMIN (GLUCOPHAGE) 500 mg tablet [[id number]      LOV 9/21/22    FOV 12/22/22

## 2022-10-17 NOTE — TELEPHONE ENCOUNTER
Last OV: 22  Next OV:22  Last Refill:10/6/21  Last (labs):22  Requested Prescriptions     Pending Prescriptions Disp Refills    metFORMIN (GLUCOPHAGE) 500 mg tablet 120 Tablet 5     Si tabs twice a day    spironolactone (ALDACTONE) 25 mg tablet 180 Tablet 3     Sig: Take 1 Tablet by mouth two (2) times a day.  Indications: heart failure with reduced ejection fraction      -*Insert any notes here*

## 2022-10-19 PROBLEM — R71.8 MICROCYTOSIS: Status: ACTIVE | Noted: 2022-10-19

## 2022-10-25 NOTE — TELEPHONE ENCOUNTER
Patient is calling stating that her pharmacy has 2 medications that were called in and she should only have one. Can you please let her know what medication she should be taking? CT LE: near occlusive narrowing of right ext iliac, and focal dissection in distal pt, moderate/severe narrowing of b/l SFAs, left pop artery occlusion w/ distal reconstitution. Severe, disabling claudication in LLE, moderate claudication in RLE with nearly occluded R EIA  Continue Aspirin and statin  Diagnostic iliofemoral angiogram was done yesterday with no signals at the end of the case  Plan is for outpatient follow up for further intervention planning  IV heparin discontinued, eliquis started

## 2023-01-19 ENCOUNTER — OFFICE VISIT (OUTPATIENT)
Dept: FAMILY MEDICINE CLINIC | Age: 68
End: 2023-01-19
Payer: MEDICARE

## 2023-01-19 VITALS
SYSTOLIC BLOOD PRESSURE: 139 MMHG | BODY MASS INDEX: 31.99 KG/M2 | RESPIRATION RATE: 18 BRPM | OXYGEN SATURATION: 97 % | WEIGHT: 192 LBS | TEMPERATURE: 97.9 F | DIASTOLIC BLOOD PRESSURE: 76 MMHG | HEIGHT: 65 IN | HEART RATE: 74 BPM

## 2023-01-19 DIAGNOSIS — Z12.11 COLON CANCER SCREENING: ICD-10-CM

## 2023-01-19 DIAGNOSIS — E11.65 TYPE 2 DIABETES MELLITUS WITH HYPERGLYCEMIA, WITHOUT LONG-TERM CURRENT USE OF INSULIN (HCC): ICD-10-CM

## 2023-01-19 DIAGNOSIS — R71.8 MICROCYTOSIS: ICD-10-CM

## 2023-01-19 DIAGNOSIS — Z00.00 MEDICARE ANNUAL WELLNESS VISIT, SUBSEQUENT: Primary | ICD-10-CM

## 2023-01-19 DIAGNOSIS — I50.22 CHRONIC SYSTOLIC CONGESTIVE HEART FAILURE (HCC): ICD-10-CM

## 2023-01-19 PROBLEM — F17.200 TOBACCO DEPENDENCE SYNDROME: Status: RESOLVED | Noted: 2020-06-25 | Resolved: 2023-01-19

## 2023-01-19 PROCEDURE — G0439 PPPS, SUBSEQ VISIT: HCPCS | Performed by: FAMILY MEDICINE

## 2023-01-19 PROCEDURE — 3078F DIAST BP <80 MM HG: CPT | Performed by: FAMILY MEDICINE

## 2023-01-19 PROCEDURE — 2022F DILAT RTA XM EVC RTNOPTHY: CPT | Performed by: FAMILY MEDICINE

## 2023-01-19 PROCEDURE — G8536 NO DOC ELDER MAL SCRN: HCPCS | Performed by: FAMILY MEDICINE

## 2023-01-19 PROCEDURE — G8417 CALC BMI ABV UP PARAM F/U: HCPCS | Performed by: FAMILY MEDICINE

## 2023-01-19 PROCEDURE — G8427 DOCREV CUR MEDS BY ELIG CLIN: HCPCS | Performed by: FAMILY MEDICINE

## 2023-01-19 PROCEDURE — 1101F PT FALLS ASSESS-DOCD LE1/YR: CPT | Performed by: FAMILY MEDICINE

## 2023-01-19 PROCEDURE — G8510 SCR DEP NEG, NO PLAN REQD: HCPCS | Performed by: FAMILY MEDICINE

## 2023-01-19 PROCEDURE — 3017F COLORECTAL CA SCREEN DOC REV: CPT | Performed by: FAMILY MEDICINE

## 2023-01-19 PROCEDURE — 1123F ACP DISCUSS/DSCN MKR DOCD: CPT | Performed by: FAMILY MEDICINE

## 2023-01-19 PROCEDURE — 99214 OFFICE O/P EST MOD 30 MIN: CPT | Performed by: FAMILY MEDICINE

## 2023-01-19 PROCEDURE — G8399 PT W/DXA RESULTS DOCUMENT: HCPCS | Performed by: FAMILY MEDICINE

## 2023-01-19 PROCEDURE — G9899 SCRN MAM PERF RSLTS DOC: HCPCS | Performed by: FAMILY MEDICINE

## 2023-01-19 PROCEDURE — 3075F SYST BP GE 130 - 139MM HG: CPT | Performed by: FAMILY MEDICINE

## 2023-01-19 PROCEDURE — 3046F HEMOGLOBIN A1C LEVEL >9.0%: CPT | Performed by: FAMILY MEDICINE

## 2023-01-19 RX ORDER — LANCETS
EACH MISCELLANEOUS
Qty: 100 EACH | Refills: 3 | Status: SHIPPED | OUTPATIENT
Start: 2023-01-19

## 2023-01-19 RX ORDER — ISOPROPYL ALCOHOL 70 ML/100ML
SWAB TOPICAL
Qty: 100 PAD | Refills: 3 | Status: SHIPPED | OUTPATIENT
Start: 2023-01-19

## 2023-01-19 RX ORDER — INSULIN PUMP SYRINGE, 3 ML
EACH MISCELLANEOUS
Qty: 1 KIT | Refills: 0 | Status: SHIPPED | OUTPATIENT
Start: 2023-01-19

## 2023-01-19 NOTE — PROGRESS NOTES
This is the Subsequent Medicare Annual Wellness Exam, performed 12 months or more after the Initial AWV or the last Subsequent AWV    I have reviewed the patient's medical history in detail and updated the computerized patient record. Assessment/Plan   Education and counseling provided:  Are appropriate based on today's review and evaluation    1. Medicare annual wellness visit, subsequent  -     REFERRAL TO [facility] CLINICAL SPECIALIST  History reviewed and updated in chart. Records request form completed and sent to specialists (Pulmonology and Cardiology). 2. Type 2 diabetes mellitus with hyperglycemia, without long-term current use of insulin ([facility])  -     METABOLIC PANEL, BASIC  -     LIPID PANEL  -     MICROALBUMIN, UR, RAND W/ MICROALB/CREAT RATIO  -     HEMOGLOBIN A1C WITH EAG    Last A1c 8.4 in 09/2022, uncontrolled. Goal A1c is <7.0. Labs as above. Rx for glucometer and supplies sent to pharmacy at patient request. Continue metformin 1g BID and Jardiance 25mg daily for now, further recommendations pending. Last eye exam in 03/2022 performed by Dr. Sun Alejo, no diabetic complications noted. Will need to call patient to arrange 3 month follow up. 3. Microcytosis  -     CBC W/O DIFF  -     IRON PROFILE  -     FERRITIN  Patient was restarted on iron supplementation at last visit in September. Will repeat labs today. Further recommendations pending. 4. Colon cancer screening  -     REFERRAL TO GASTROENTEROLOGY    5. Chronic systolic congestive heart failure [facility])  Records request form sent to Dr. Ryne Bianchi. Patient states that she has an echo ordered for May of this year. Continue carvedilol, spironolactone, Lasix, and Jardiance as prescribed.      Depression Risk Factor Screening     3 most recent PHQ Screens 1/19/2023   Little interest or pleasure in doing things Not at all   Feeling down, depressed, irritable, or hopeless Not at all   Total Score PHQ 2 0       Alcohol & Drug Abuse Risk Screen Do you average more than 1 drink per night or more than 7 drinks a week:  No    On any one occasion in the past three months have you have had more than 3 drinks containing alcohol:  No          Functional Ability and Level of Safety    Hearing: Hearing is good. Activities of Daily Living: The home contains: handrails and grab bars  Patient does total self care      Ambulation: with no difficulty     Fall Risk:  Fall Risk Assessment, last 12 mths 1/19/2023   Able to walk? Yes   Fall in past 12 months? 0   Do you feel unsteady?  0   Are you worried about falling 0      Abuse Screen:  Patient is not abused       Cognitive Screening    Has your family/caregiver stated any concerns about your memory: no     Cognitive Screening: Normal - Mini Cog Test    Health Maintenance Due     Health Maintenance Due   Topic Date Due    Eye Exam Retinal or Dilated  Never done    Shingles Vaccine (1 of 2) Never done    DTaP/Tdap/Td series (1 - Tdap) 05/03/2014    COVID-19 Vaccine (3 - Booster for [employer] series) 12/16/2021    Colorectal Cancer Screening Combo  01/25/2022       Patient Care Team   Patient Care Team:  Lucendia Phoenix, DO as PCP - General (Family Medicine)  [address] Tav[address] Carranza DO as PCP - [facility]  Nilo Daly MD (Pulmonary Disease)  Roselia Serrano MD ([address] [place] [address] Vascular Surgery)  Sathya Barroso MD (Ophthalmology)    History     Patient Active Problem List   Diagnosis Code    Body mass index 30.0-30.9, adult Z68.30    Heart murmur R01.1    Mixed hyperlipidemia E78.2    Allergic rhinitis J30.9    Iron deficiency anemia D50.9    Foot callus L84    Left bundle branch block I44.7    Microalbuminuria due to type 2 diabetes mellitus ([address] E11.29, R80.9    Morbid obesity ([address] E66.01    Aortic stenosis I35.0    Chronic obstructive pulmonary disease ([address] J44.9    Coronary artery disease involving native heart without angina pectoris I25.10    Gastroesophageal reflux disease without esophagitis K21.9    Chronic systolic congestive heart failure (HCC) I50.22    Hypokalemia E87.6    Cataract, nuclear sclerotic, both eyes H25.13    Diabetes mellitus type 2 without retinopathy ([facility] E11.9    Disorder of hyperalimentation R63.2    Essential hypertension I10    Microcytosis R71.8     Past Medical History:   Diagnosis Date    Acute gastrointestinal hemorrhage 8/28/2020    Acute respiratory failure with hypoxia ([facility] 1/1/2021    Allergic rhinitis 8/28/2020    Anemia 8/28/2020    Benign essential hypertension 6/25/2020    Body mass index 30.0-30.9, adult 6/25/2020    CAD (coronary artery disease) 8/28/2020    Foot callus 8/28/2020    GERD (gastroesophageal reflux disease) 6/25/2020    Hair loss     Heart murmur 6/25/2020    Hx of colonoscopy 01/01/2015    Done for anemia and rectal bleeding    Left bundle branch block 8/28/2020    Microalbuminuria due to type 2 diabetes mellitus ([facility] 8/28/2020    Mixed hyperlipidemia 6/25/2020    Morbid obesity ([facility] 8/28/2020    Tobacco dependence syndrome 6/25/2020    Type II diabetes mellitus, uncontrolled 6/25/2020      Past Surgical History:   Procedure Laterality Date    HX COLONOSCOPY  2015    due in 0733-2621. Done for anemia and rectal bleeding     Current Outpatient Medications   Medication Sig Dispense Refill    Blood-Glucose Meter monitoring kit Check glucose once daily 1 Kit 0    glucose blood VI test strips strip Check glucose once daily. 100 Strip 3    lancets misc Check glucose once daily 100 Each 3    alcohol swabs (Alcohol Prep Pads) padm Clean skin prior to checking glucose once daily. 100 Pad 3    metFORMIN (GLUCOPHAGE) 500 mg tablet 2 tabs twice a day 360 Tablet 3    spironolactone (ALDACTONE) 25 mg tablet Take 1 Tablet by mouth two (2) times a day. Indications: heart failure with reduced ejection fraction 180 Tablet 3    carvediloL (COREG) 3.125 mg tablet Take 1 Tablet by mouth two (2) times daily (with meals).  180 Tablet 1    lisinopriL (Rhesa Calender) 20 mg tablet Take 1 Tablet by mouth two (2) times a day. 180 Tablet 1    budesonide-glycopyr-formoterol (Breztri Aerosphere) 160-9-4.8 mcg/actuation HFAA Take 2 Puffs by inhalation two (2) times a day. 2 Each 5    potassium chloride (KLOR-CON M10) 10 mEq tablet TAKE 1 TABLET BY MOUTH EVERY MONDAY, WEDNESDAY, AND FRIDAY 40 Tablet 3    albuterol (PROVENTIL HFA, VENTOLIN HFA, PROAIR HFA) 90 mcg/actuation inhaler 2 puffs every 4-6 hours as needed for cough, wheezing , short of breath. Insurance brand of choice 1 Each 2    atorvastatin (LIPITOR) 40 mg tablet Take 40 mg by mouth daily. Jardiance 25 mg tablet Take 1 tablet by mouth once daily for 30 days 90 Tablet 1    furosemide (LASIX) 40 mg tablet Take 1 Tablet by mouth daily. 90 Tablet 1    pantoprazole (PROTONIX) 40 mg tablet Take 1 Tablet by mouth daily.  90 Tablet 1     Allergies   Allergen Reactions    Aspirin Unknown (comments)     hemorraging - ended up in the hospital    Diphenhydramine Hcl Other (comments)       Family History   Problem Relation Age of Onset    Cancer Mother         uterine    Hypertension Father     Cancer Sister         breast    Breast Cancer Sister      Social History     Tobacco Use    Smoking status: Former     Packs/day: 0.50     Years: 20.00     Pack years: 10.00     Types: Cigarettes     Quit date:      Years since quittin.0    Smokeless tobacco: Never   Substance Use Topics    Alcohol use: Not Currently         [employer], DO

## 2023-01-19 NOTE — PROGRESS NOTES
1. Have you been to the ER, urgent care clinic since your last visit? Hospitalized since your last visit? No    2. Have you seen or consulted any other health care providers outside of the [address] [place] [address] since your last visit? Include any pap smears or colon screening.  No      Chief Complaint   Patient presents with    Annual Wellness Visit     Visit Vitals  /76 (BP 1 Location: Left upper arm, BP Patient Position: Sitting, BP Cuff Size: Adult)   Pulse 74   Temp 97.9 °F (36.6 °C) (Temporal)   Resp 18   Ht 5' 5\" (1.651 m)   Wt 192 lb (87.1 kg)   SpO2 97%   BMI 31.95 kg/m²

## 2023-01-19 NOTE — PATIENT INSTRUCTIONS
Medicare Wellness Visit, Female     The best way to live healthy is to have a lifestyle where you eat a well-balanced diet, exercise regularly, limit alcohol use, and quit all forms of tobacco/nicotine, if applicable. Regular preventive services are another way to keep healthy. Preventive services (vaccines, screening tests, monitoring & exams) can help personalize your care plan, which helps you manage your own care. Screening tests can find health problems at the earliest stages, when they are easiest to treat. [facility] follows the current, evidence-based guidelines published by the [facility] Ranjit Dumont ([facility]) when recommending preventive services for our patients. Because we follow these guidelines, sometimes recommendations change over time as research supports it. (For example, mammograms used to be recommended annually. Even though Medicare will still pay for an annual mammogram, the newer guidelines recommend a mammogram every two years for women of average risk). Of course, you and your doctor may decide to screen more often for some diseases, based on your risk and your co-morbidities (chronic disease you are already diagnosed with). Preventive services for you include:  - Medicare offers their members a free annual wellness visit, which is time for you and your primary care provider to discuss and plan for your preventive service needs.  Take advantage of this benefit every year!    -Over the age of 72 should receive the recommended pneumonia vaccines.    -All adults should have a flu vaccine yearly.  -All adults should have a tetanus vaccine every 10 years.   -Over the age 48 should receive the shingles vaccines.        -All adults should be screened once for Hepatitis C.  -All adults age 38-68 who are overweight should have a diabetes screening test once every three years.   -Other screening tests and preventive services for persons with diabetes include: an eye exam to screen for diabetic retinopathy, a kidney function test, a foot exam, and stricter control over your cholesterol.   -Cardiovascular screening for adults with routine risk involves an electrocardiogram (ECG) at intervals determined by your doctor.     -Colorectal cancer screenings should be done for adults age 39-70 with no increased risk factors for colorectal cancer. There are a number of acceptable methods of screening for this type of cancer. Each test has its own benefits and drawbacks. Discuss with your doctor what is most appropriate for you during your annual wellness visit. The different tests include: colonoscopy (considered the best screening method), a fecal occult blood test, a fecal DNA test, and sigmoidoscopy.    -Lung cancer screening is recommended annually with a low dose CT scan for adults between age 54 and 68, who have smoked at least 30 pack years (equivalent of 1 pack per day for 30 days), and who is a current smoker or quit less than 15 years ago.    -A bone mass density test is recommended when a woman turns 65 to screen for osteoporosis. This test is only recommended one time, as a screening. Some providers will use this same test as a disease monitoring tool if you already have osteoporosis. -Breast cancer screenings are recommended every other year for women of normal risk, age 54-69.    -Cervical cancer screenings for women over age 72 are only recommended with certain risk factors.      Here is a list of your current Health Maintenance items (your personalized list of preventive services) with a due date:  Health Maintenance Due   Topic Date Due    Eye Exam  Never done    Shingles Vaccine (1 of 2) Never done    DTaP/Tdap/Td  (1 - Tdap) 05/03/2014    COVID-19 Vaccine (3 - Booster for [employer] series) 12/16/2021    Colorectal Screening  01/25/2022

## 2023-01-20 ENCOUNTER — PATIENT OUTREACH (OUTPATIENT)
Dept: CASE MANAGEMENT | Age: 68
End: 2023-01-20

## 2023-01-20 LAB
EST. AVERAGE GLUCOSE BLD GHB EST-MCNC: 160 MG/DL
FERRITIN SERPL-MCNC: 25 NG/ML (ref 15–150)
HBA1C MFR BLD: 7.2 % (ref 4.8–5.6)
IRON SATN MFR SERPL: 21 % (ref 15–55)
IRON SERPL-MCNC: 88 UG/DL (ref 27–139)
TIBC SERPL-MCNC: 413 UG/DL (ref 250–450)
UIBC SERPL-MCNC: 325 UG/DL (ref 118–369)

## 2023-01-20 NOTE — ACP (ADVANCE CARE PLANNING)
Advance Care Planning   Ambulatory ACP Specialist Patient Outreach    Date:  1/20/2023    ACP Specialist:  Rachel Betancourt    Outreach call to patient in follow-up to ACP Specialist referral from:  Racheal Ramirez DO    [x] PCP  [] Provider   [] Ambulatory Care Management [] Other     For:                  [] Advance Directive Assistance              [] Complete Portable DNR order              [] Complete POST/MOST              [x] Code Status Discussion             [x] Discuss Goals of Care             [] Early ACP Decision-Making              [] Other (Specify)    Date Referral Received:  1/19/2023    Today's Outreach:  [x] First   [] Second  [] Third       Third outreach made by: [] Phone  [] Email / mail    [] Jaspersofthart     Intervention:  [x] Spoke with Patient   [] Left VM requesting return call      Outcome:  Patient would like ACP information to be mailed to her to review prior to deciding if she would like to schedule a conversation with an ACP Specialist.  A copy of [facility] and ACP information sheets \"What is Advance Care Planning\" and \"How to Choose a Health Care Agent\" sent to patient's confirmed home address on file. Patient agreeable to follow-up ACP outreach in one week. Next Step:   [] ACP scheduled conversation  [x] Outreach again in one week               [] Email / Mail ACP Info Sheets  [] Email / Mail Advance Directive   [] Closing referral.  Routing closure to referring provider/staff and to ACP Specialist . [] Closure letter mailed to patient with invitation to contact ACP Specialist if / when ready.   Thank you for this referral.

## 2023-01-21 LAB
ALBUMIN/CREAT UR: <24 MG/G CREAT (ref 0–29)
BUN SERPL-MCNC: 12 MG/DL (ref 8–27)
BUN/CREAT SERPL: 13 (ref 12–28)
CALCIUM SERPL-MCNC: 9.9 MG/DL (ref 8.7–10.3)
CHLORIDE SERPL-SCNC: 101 MMOL/L (ref 96–106)
CHOLEST SERPL-MCNC: 117 MG/DL (ref 100–199)
CO2 SERPL-SCNC: 23 MMOL/L (ref 20–29)
CREAT SERPL-MCNC: 0.93 MG/DL (ref 0.57–1)
CREAT UR-MCNC: 12.6 MG/DL
EGFR: 67 ML/MIN/1.73
ERYTHROCYTE [DISTWIDTH] IN BLOOD BY AUTOMATED COUNT: 13.3 % (ref 11.7–15.4)
GLUCOSE SERPL-MCNC: 115 MG/DL (ref 70–99)
HCT VFR BLD AUTO: 40.7 % (ref 34–46.6)
HDLC SERPL-MCNC: 43 MG/DL
HGB BLD-MCNC: 14.1 G/DL (ref 11.1–15.9)
LDLC SERPL CALC-MCNC: 57 MG/DL (ref 0–99)
MCH RBC QN AUTO: 29.7 PG (ref 26.6–33)
MCHC RBC AUTO-ENTMCNC: 34.6 G/DL (ref 31.5–35.7)
MCV RBC AUTO: 86 FL (ref 79–97)
MICROALBUMIN UR-MCNC: <3 UG/ML
PLATELET # BLD AUTO: 359 X10E3/UL (ref 150–450)
POTASSIUM SERPL-SCNC: 3.8 MMOL/L (ref 3.5–5.2)
RBC # BLD AUTO: 4.74 X10E6/UL (ref 3.77–5.28)
SODIUM SERPL-SCNC: 143 MMOL/L (ref 134–144)
TRIGL SERPL-MCNC: 88 MG/DL (ref 0–149)
VLDLC SERPL CALC-MCNC: 17 MG/DL (ref 5–40)
WBC # BLD AUTO: 10.3 X10E3/UL (ref 3.4–10.8)

## 2023-01-24 NOTE — ED NOTES
Pt assisited up to bedside commode, pt's o2 sats dropped to 78 percent, with o2 at 4 liters , increased sob, o2 increased to 6 liters.  Placed back in bed, o2 sats increased to 93 percent after rest, o2 at 6 liters for now, proventil inhaler used for acute sob, wheezing, pt instructed purewick would be applied for urine output no

## 2023-01-26 ENCOUNTER — PATIENT OUTREACH (OUTPATIENT)
Dept: CASE MANAGEMENT | Age: 68
End: 2023-01-26

## 2023-01-26 ENCOUNTER — TELEPHONE (OUTPATIENT)
Dept: FAMILY MEDICINE CLINIC | Age: 68
End: 2023-01-26

## 2023-01-26 NOTE — TELEPHONE ENCOUNTER
----- Message from [facility] AT YAMILKA JOHNSON, DO sent at 1/26/2023 11:12 AM EST -----  Electrolytes and kidney function were normal.   Cholesterol was within normal limits. Please continue your atorvastatin as prescribed. The urine sample was normal.   Blood counts were normal and so was iron. A1c was 7.2 which is improved as compared to September. I wouldn't make any medication changes at this time because we are so close to our goal (A1c <7.0). Please try to maintain a diabetic diet and get some regular exercise to help control your sugar. We'll continue the Jardiance and metformin as prescribed. I will have someone from the office call you to schedule a 3 month follow up (~April) so we can recheck your A1c.

## 2023-01-26 NOTE — ACP (ADVANCE CARE PLANNING)
Advance Care Planning   Ambulatory ACP Specialist Patient Outreach    Date:  1/26/2023    ACP Specialist:  Yon Jackson    Outreach call to patient in follow-up to ACP Specialist referral from:  No Vanegas DO    [x] PCP  [] Provider   [] Ambulatory Care Management [] Other     For:                  [] Advance Directive Assistance              [] Complete Portable DNR order              [] Complete POST/MOST              [x] Code Status Discussion             [x] Discuss Goals of Care             [] Early ACP Decision-Making              [] Other (Specify)    Date Referral Received:  1/19/2023    Today's Outreach:  [] First   [x] Second  [] Third       Third outreach made by: [] Phone  [] Email / mail    [] [employer]     Intervention:  [x] Spoke with Patient   [] Left VM requesting return call      Outcome:  Patient confirmed receipt of previous ACP  mailed to patient. Patient would like her daughter to be with her if a conversation with an ACP Specialist took place. Patient not able to commit to a date/time that daughter would be available due to her busy schedule. Patient stated she will call writer if and when time with daughter can be coordinated. Patient acknowledged that current referral will be closed, but would be re-opened if return response received. Patient has ACP Coordinator contact information. Next Step:   [] ACP scheduled conversation  [] Outreach again in one week               [] Email / Mail ACP Info Sheets  [] Email / Mail Advance Directive   [x] Closing referral.  Routing closure to referring provider/staff and to ACP Specialist . [] Closure letter mailed to patient with invitation to contact ACP Specialist if / when ready.   Thank you for this referral.

## 2023-02-16 DIAGNOSIS — E11.65 POORLY CONTROLLED DIABETES MELLITUS (HCC): ICD-10-CM

## 2023-02-16 NOTE — TELEPHONE ENCOUNTER
[facility] called stating that the pt is requesting a refill on her Jardiance. Pt reports that the pharmacy has apparently been trying to send in a request for about  a week now, so the patient is running low.

## 2023-02-16 NOTE — TELEPHONE ENCOUNTER
----- Message from [facility] sent at 2/16/2023  9:14 AM EST -----  Subject: Refill Request    QUESTIONS  Name of Medication? Jardiance 25 mg tablet  Patient-reported dosage and instructions? once a day  How many days do you have left? 1  Preferred Pharmacy? [facility] phone number (if available)? [phone]  Additional Information for Provider? patient states pharmacy faxed over a   refill and have not rec'd a response back yet she is completely out of her   medication  ---------------------------------------------------------------------------  --------------  [address]  What is the best way for the office to contact you? OK to leave message on   voicemail  Preferred Call Back Phone Number? [phone]  ---------------------------------------------------------------------------  --------------  SCRIPT ANSWERS  Relationship to Patient?  Self

## 2023-02-16 NOTE — TELEPHONE ENCOUNTER
Last OV:01/19/2023  Next OV:08/07/2023  Last Refill:08/20/2022  Last (labs):01/19/2023    Requested Prescriptions     Pending Prescriptions Disp Refills    empagliflozin (Jardiance) 25 mg tablet 90 Tablet 1     Sig: Take 1 tablet by mouth once daily

## 2023-03-06 DIAGNOSIS — I50.9 CONGESTIVE HEART FAILURE, UNSPECIFIED HF CHRONICITY, UNSPECIFIED HEART FAILURE TYPE (HCC): ICD-10-CM

## 2023-03-06 NOTE — TELEPHONE ENCOUNTER
Last OV:01/19/2023  Next OV:08/07/2023  Last Refill:06/21/2022  Last (labs):01/19/2023    -*Insert any notes here*  Requested Prescriptions     Pending Prescriptions Disp Refills    furosemide (LASIX) 40 mg tablet [Pharmacy Med Name: FUROSEMIDE 40 MG TABLET*] 30 Tablet 0     Sig: TAKE ONE TABLET BY MOUTH DAILY.

## 2023-03-07 RX ORDER — FUROSEMIDE 40 MG/1
40 TABLET ORAL DAILY
Qty: 90 TABLET | Refills: 1 | Status: SHIPPED | OUTPATIENT
Start: 2023-03-07

## 2023-03-08 DIAGNOSIS — J44.9 CHRONIC OBSTRUCTIVE PULMONARY DISEASE, UNSPECIFIED COPD TYPE (HCC): ICD-10-CM

## 2023-03-08 DIAGNOSIS — K21.9 GASTROESOPHAGEAL REFLUX DISEASE WITHOUT ESOPHAGITIS: ICD-10-CM

## 2023-03-08 NOTE — TELEPHONE ENCOUNTER
Requested Prescriptions     Pending Prescriptions Disp Refills    atorvastatin (LIPITOR) 40 mg tablet 90 Tablet 1     Sig: Take 1 Tablet by mouth daily. pantoprazole (PROTONIX) 40 mg tablet 90 Tablet 1     Sig: Take 1 Tablet by mouth daily.           Last OV:1/19/23  Next OV:8/7/23  Last Refill:      Atorvastatin historical provider      Pantoprazole 6/7/22 Malissa Mitchell 90  Refills 1  Labs 7/13/21

## 2023-03-08 NOTE — TELEPHONE ENCOUNTER
Patient called and stated she is out of her medication and send to [facility].     atorvastatin (LIPITOR) 40 mg tablet     pantoprazole (PROTONIX) 40 mg tablet

## 2023-03-09 RX ORDER — ATORVASTATIN CALCIUM 40 MG/1
TABLET, FILM COATED ORAL
Qty: 30 TABLET | Refills: 0 | OUTPATIENT
Start: 2023-03-09

## 2023-03-09 RX ORDER — ALBUTEROL SULFATE 90 UG/1
AEROSOL, METERED RESPIRATORY (INHALATION)
Qty: 18 G | Refills: 1 | Status: SHIPPED | OUTPATIENT
Start: 2023-03-09

## 2023-03-09 RX ORDER — PANTOPRAZOLE SODIUM 40 MG/1
40 TABLET, DELAYED RELEASE ORAL DAILY
Qty: 90 TABLET | Refills: 1 | Status: SHIPPED | OUTPATIENT
Start: 2023-03-09

## 2023-03-09 RX ORDER — LISINOPRIL 20 MG/1
20 TABLET ORAL 2 TIMES DAILY
Qty: 180 TABLET | Refills: 3 | Status: SHIPPED | OUTPATIENT
Start: 2023-03-09

## 2023-03-09 RX ORDER — ATORVASTATIN CALCIUM 40 MG/1
40 TABLET, FILM COATED ORAL DAILY
Qty: 90 TABLET | Refills: 3 | Status: SHIPPED | OUTPATIENT
Start: 2023-03-09

## 2023-04-04 ENCOUNTER — TRANSCRIBE ORDER (OUTPATIENT)
Dept: SCHEDULING | Age: 68
End: 2023-04-04

## 2023-04-05 RX ORDER — CARVEDILOL 3.12 MG/1
TABLET ORAL
Qty: 60 TABLET | Refills: 0
Start: 2023-04-05

## 2023-04-06 NOTE — TELEPHONE ENCOUNTER
Requested Prescriptions     Pending Prescriptions Disp Refills    carvediloL (COREG) 3.125 mg tablet [Pharmacy Med Name: CARVEDILOL 3.125 MG TABLET] 60 Tablet 0     Sig: TAKE 1 TABLET BY MOUTH TWICE DAILY WITH FOOD          Last OV:1/19/23  Next OV:4/18/23  Last Refill:10/13/22  Qty 180  Refills 1

## 2023-04-18 ENCOUNTER — OFFICE VISIT (OUTPATIENT)
Dept: FAMILY MEDICINE CLINIC | Age: 68
End: 2023-04-18
Payer: MEDICARE

## 2023-04-18 VITALS
OXYGEN SATURATION: 96 % | WEIGHT: 184 LBS | DIASTOLIC BLOOD PRESSURE: 72 MMHG | HEART RATE: 79 BPM | RESPIRATION RATE: 18 BRPM | SYSTOLIC BLOOD PRESSURE: 138 MMHG | HEIGHT: 65 IN | BODY MASS INDEX: 30.66 KG/M2 | TEMPERATURE: 97.5 F

## 2023-04-18 DIAGNOSIS — K59.00 CONSTIPATION, UNSPECIFIED CONSTIPATION TYPE: ICD-10-CM

## 2023-04-18 DIAGNOSIS — I10 BENIGN ESSENTIAL HYPERTENSION: ICD-10-CM

## 2023-04-18 DIAGNOSIS — I50.9 CONGESTIVE HEART FAILURE, UNSPECIFIED HF CHRONICITY, UNSPECIFIED HEART FAILURE TYPE (HCC): ICD-10-CM

## 2023-04-18 DIAGNOSIS — E11.65 TYPE 2 DIABETES MELLITUS WITH HYPERGLYCEMIA, WITHOUT LONG-TERM CURRENT USE OF INSULIN (HCC): Primary | ICD-10-CM

## 2023-04-18 LAB — HBA1C MFR BLD HPLC: 7.5 %

## 2023-04-18 PROCEDURE — 1123F ACP DISCUSS/DSCN MKR DOCD: CPT | Performed by: FAMILY MEDICINE

## 2023-04-18 PROCEDURE — 83036 HEMOGLOBIN GLYCOSYLATED A1C: CPT | Performed by: FAMILY MEDICINE

## 2023-04-18 PROCEDURE — 3074F SYST BP LT 130 MM HG: CPT | Performed by: FAMILY MEDICINE

## 2023-04-18 PROCEDURE — 3078F DIAST BP <80 MM HG: CPT | Performed by: FAMILY MEDICINE

## 2023-04-18 PROCEDURE — 3051F HG A1C>EQUAL 7.0%<8.0%: CPT | Performed by: FAMILY MEDICINE

## 2023-04-18 PROCEDURE — 99214 OFFICE O/P EST MOD 30 MIN: CPT | Performed by: FAMILY MEDICINE

## 2023-04-18 RX ORDER — UREA 10 %
LOTION (ML) TOPICAL
COMMUNITY

## 2023-04-18 RX ORDER — CARVEDILOL 3.12 MG/1
TABLET ORAL
Qty: 60 TABLET | Refills: 5 | Status: SHIPPED | OUTPATIENT
Start: 2023-04-18

## 2023-04-18 RX ORDER — FLUTICASONE PROPIONATE 50 MCG
2 SPRAY, SUSPENSION (ML) NASAL DAILY
COMMUNITY

## 2023-04-18 RX ORDER — BUDESONIDE, GLYCOPYRROLATE, AND FORMOTEROL FUMARATE 160; 9; 4.8 UG/1; UG/1; UG/1
AEROSOL, METERED RESPIRATORY (INHALATION)
COMMUNITY

## 2023-04-18 RX ORDER — FUROSEMIDE 40 MG/1
40 TABLET ORAL DAILY
Qty: 90 TABLET | Refills: 3 | Status: SHIPPED | OUTPATIENT
Start: 2023-04-18

## 2023-04-18 NOTE — PROGRESS NOTES
Dai Jaquez (: 1955) is a 69 y.o. female, established patient, here for evaluation of the following chief complaint(s):  Diabetes and Follow-up         ASSESSMENT/PLAN:  Below is the assessment and plan developed based on review of pertinent history, physical exam, labs, studies, and medications. SUBJECTIVE/OBJECTIVE:  Diabetes  The history is provided by the Patient. This is a chronic (AODM not controlled) problem. Episode onset: 10 years. The problem occurs constantly. The problem has not changed since onset. Pertinent negatives include no chest pain. Associated symptoms comments: No fatigue. Nocturia x 2. Treatments tried: Compliant with current medications w/o ADR. Follow-up  Pertinent negatives include no chest pain. Review of Systems   Constitutional:  Negative for chills and fever. Cardiovascular:  Negative for chest pain and leg swelling. Gastrointestinal:  Positive for constipation (occasional. Daily BMs). Negative for diarrhea, nausea and vomiting. Skin:  Negative for rash and wound. Neurological:  Negative for facial asymmetry, speech difficulty, weakness and numbness. Psychiatric/Behavioral:  Negative for hallucinations. Physical Exam  Constitutional:       General: She is not in acute distress. Appearance: Normal appearance. Neck:      Vascular: No carotid bruit. Cardiovascular:      Rate and Rhythm: Normal rate. Heart sounds: Normal heart sounds. Pulmonary:      Effort: Pulmonary effort is normal. No respiratory distress. Breath sounds: No wheezing, rhonchi or rales. Musculoskeletal:      Cervical back: Neck supple. No tenderness. Neurological:      Mental Status: She is alert and oriented to person, place, and time. Gait: Gait abnormal (uses arms to stand from seated position). Psychiatric:         Mood and Affect: Mood normal.         Behavior: Behavior normal.         Thought Content:  Thought content normal. Judgment: Judgment normal.         An electronic signature was used to authenticate this note.   -- Edwin Humphrey MD

## 2023-04-18 NOTE — PROGRESS NOTES
1. Have you been to the ER, urgent care clinic since your last visit? Hospitalized since your last visit? No    2. Have you seen or consulted any other health care providers outside of the [address] [place] [address] since your last visit? Include any pap smears or colon screening.  No      Chief Complaint   Patient presents with    Diabetes    Follow-up     Visit Vitals  /72 (BP 1 Location: Right upper arm, BP Patient Position: Sitting, BP Cuff Size: Large adult)   Pulse 79   Temp 97.5 °F (36.4 °C) (Temporal)   Resp 18   Ht 5' 5\" (1.651 m)   Wt 184 lb (83.5 kg)   SpO2 96%   BMI 30.62 kg/m²

## 2023-04-18 NOTE — PATIENT INSTRUCTIONS
Diagnosis(es) discussed to patient satisfaction. Drink enough water to void clear after first morning urine. Walk 30 minutes after each meal.   Eat healthy with 5 serving (tennis ball size) of green veggies/fresh fruit. TRACY diet No sodas. No junk food. No sugar. .  I asked patient to talk to the [facility] about getting the rx for a functioning glucose meter and Goal FBS  mg/dl. @ hours after eating RBS  <140 mg/dl to get next HgbA1c under 7.0%. Get 8 hours of sleep with avoiding drinking, eating and blue screen devices one hour before bedtime.

## 2023-04-23 DIAGNOSIS — Z12.31 VISIT FOR SCREENING MAMMOGRAM: Primary | ICD-10-CM

## 2023-05-03 ENCOUNTER — HOSPITAL ENCOUNTER (OUTPATIENT)
Dept: MAMMOGRAPHY | Age: 68
Discharge: HOME | End: 2023-05-03
Attending: FAMILY MEDICINE
Payer: MEDICARE

## 2023-05-03 DIAGNOSIS — Z12.31 VISIT FOR SCREENING MAMMOGRAM: ICD-10-CM

## 2023-05-03 PROCEDURE — 77063 BREAST TOMOSYNTHESIS BI: CPT

## 2023-05-24 RX ORDER — CARVEDILOL 3.12 MG/1
TABLET ORAL
COMMUNITY
Start: 2023-04-18

## 2023-05-24 RX ORDER — ATORVASTATIN CALCIUM 40 MG/1
40 TABLET, FILM COATED ORAL DAILY
COMMUNITY
Start: 2023-03-09

## 2023-05-24 RX ORDER — SPIRONOLACTONE 25 MG/1
25 TABLET ORAL 2 TIMES DAILY
COMMUNITY
Start: 2022-10-17

## 2023-05-24 RX ORDER — FLUTICASONE PROPIONATE 50 MCG
2 SPRAY, SUSPENSION (ML) NASAL DAILY
COMMUNITY

## 2023-05-24 RX ORDER — BUDESONIDE, GLYCOPYRROLATE, AND FORMOTEROL FUMARATE 160; 9; 4.8 UG/1; UG/1; UG/1
AEROSOL, METERED RESPIRATORY (INHALATION)
COMMUNITY

## 2023-05-24 RX ORDER — LANCETS 30 GAUGE
EACH MISCELLANEOUS
COMMUNITY
Start: 2023-01-19

## 2023-05-24 RX ORDER — PANTOPRAZOLE SODIUM 40 MG/1
40 TABLET, DELAYED RELEASE ORAL DAILY
COMMUNITY
Start: 2023-03-09

## 2023-05-24 RX ORDER — FERROUS SULFATE 325(65) MG
TABLET ORAL
COMMUNITY

## 2023-05-24 RX ORDER — POTASSIUM CHLORIDE 750 MG/1
TABLET, EXTENDED RELEASE ORAL
COMMUNITY
Start: 2022-09-21

## 2023-05-24 RX ORDER — ALBUTEROL SULFATE 90 UG/1
AEROSOL, METERED RESPIRATORY (INHALATION)
COMMUNITY
Start: 2023-03-09

## 2023-05-24 RX ORDER — LISINOPRIL 20 MG/1
20 TABLET ORAL 2 TIMES DAILY
COMMUNITY
Start: 2023-03-09

## 2023-05-24 RX ORDER — FUROSEMIDE 40 MG/1
40 TABLET ORAL DAILY
COMMUNITY
Start: 2023-04-18

## 2023-07-19 NOTE — TELEPHONE ENCOUNTER
Last OV: 9/21/22  Next OV: 12/22/22  Last Refill: 4/18/22 (, refills 1)  Last CMP: 4/11/22    Will send to provider for review/auth
Patient called and stated she would like per prescription below refilled and sent to [address] [place] [address].       carvediloL (COREG) 3.125 mg tablet [[id number]    LOV 9/21/22    FOV 12/22/22
Awake/Alert/Cooperative

## 2023-08-07 ENCOUNTER — OFFICE VISIT (OUTPATIENT)
Facility: CLINIC | Age: 68
End: 2023-08-07
Payer: MEDICARE

## 2023-08-07 VITALS
WEIGHT: 183 LBS | DIASTOLIC BLOOD PRESSURE: 88 MMHG | OXYGEN SATURATION: 97 % | BODY MASS INDEX: 30.49 KG/M2 | HEIGHT: 65 IN | SYSTOLIC BLOOD PRESSURE: 159 MMHG | TEMPERATURE: 97.9 F | HEART RATE: 87 BPM | RESPIRATION RATE: 18 BRPM

## 2023-08-07 DIAGNOSIS — I10 ESSENTIAL HYPERTENSION: ICD-10-CM

## 2023-08-07 DIAGNOSIS — J44.9 CHRONIC OBSTRUCTIVE PULMONARY DISEASE, UNSPECIFIED COPD TYPE (HCC): ICD-10-CM

## 2023-08-07 DIAGNOSIS — E11.9 DIABETES MELLITUS TYPE 2 WITHOUT RETINOPATHY (HCC): Primary | ICD-10-CM

## 2023-08-07 PROBLEM — D50.9 IRON DEFICIENCY ANEMIA: Status: RESOLVED | Noted: 2020-08-28 | Resolved: 2023-08-07

## 2023-08-07 PROBLEM — R01.1 HEART MURMUR: Status: RESOLVED | Noted: 2020-06-25 | Resolved: 2023-08-07

## 2023-08-07 PROBLEM — D64.9 ANEMIA: Status: ACTIVE | Noted: 2020-08-28

## 2023-08-07 PROBLEM — R71.8 MICROCYTOSIS: Status: RESOLVED | Noted: 2022-10-19 | Resolved: 2023-08-07

## 2023-08-07 PROBLEM — D64.9 ANEMIA: Status: RESOLVED | Noted: 2020-08-28 | Resolved: 2023-08-07

## 2023-08-07 LAB — HBA1C MFR BLD: 6.6 %

## 2023-08-07 PROCEDURE — 3023F SPIROM DOC REV: CPT | Performed by: FAMILY MEDICINE

## 2023-08-07 PROCEDURE — 3051F HG A1C>EQUAL 7.0%<8.0%: CPT | Performed by: FAMILY MEDICINE

## 2023-08-07 PROCEDURE — G8399 PT W/DXA RESULTS DOCUMENT: HCPCS | Performed by: FAMILY MEDICINE

## 2023-08-07 PROCEDURE — 2022F DILAT RTA XM EVC RTNOPTHY: CPT | Performed by: FAMILY MEDICINE

## 2023-08-07 PROCEDURE — G8419 CALC BMI OUT NRM PARAM NOF/U: HCPCS | Performed by: FAMILY MEDICINE

## 2023-08-07 PROCEDURE — 83036 HEMOGLOBIN GLYCOSYLATED A1C: CPT | Performed by: FAMILY MEDICINE

## 2023-08-07 PROCEDURE — 3017F COLORECTAL CA SCREEN DOC REV: CPT | Performed by: FAMILY MEDICINE

## 2023-08-07 PROCEDURE — 3079F DIAST BP 80-89 MM HG: CPT | Performed by: FAMILY MEDICINE

## 2023-08-07 PROCEDURE — G8427 DOCREV CUR MEDS BY ELIG CLIN: HCPCS | Performed by: FAMILY MEDICINE

## 2023-08-07 PROCEDURE — 1036F TOBACCO NON-USER: CPT | Performed by: FAMILY MEDICINE

## 2023-08-07 PROCEDURE — 1090F PRES/ABSN URINE INCON ASSESS: CPT | Performed by: FAMILY MEDICINE

## 2023-08-07 PROCEDURE — 1123F ACP DISCUSS/DSCN MKR DOCD: CPT | Performed by: FAMILY MEDICINE

## 2023-08-07 PROCEDURE — 3077F SYST BP >= 140 MM HG: CPT | Performed by: FAMILY MEDICINE

## 2023-08-07 PROCEDURE — 99214 OFFICE O/P EST MOD 30 MIN: CPT | Performed by: FAMILY MEDICINE

## 2023-08-07 RX ORDER — ALBUTEROL SULFATE 2.5 MG/3ML
2.5 SOLUTION RESPIRATORY (INHALATION) EVERY 4 HOURS PRN
Qty: 120 EACH | Refills: 5 | Status: SHIPPED | OUTPATIENT
Start: 2023-08-07

## 2023-08-07 ASSESSMENT — ENCOUNTER SYMPTOMS: RESPIRATORY NEGATIVE: 1

## 2023-08-08 ENCOUNTER — TELEPHONE (OUTPATIENT)
Facility: CLINIC | Age: 68
End: 2023-08-08

## 2023-08-08 NOTE — TELEPHONE ENCOUNTER
----- Message from [facility] AT GARRETT JENSEN, DO sent at 8/7/2023  3:10 PM EDT -----  A1c was 6.6 which is better as compared to 3 months ago. Keep up the good work and continue Jardiance and metformin as prescribed!

## 2023-08-15 NOTE — TELEPHONE ENCOUNTER
Requested Prescriptions     Pending Prescriptions Disp Refills    pantoprazole (PROTONIX) 40 MG tablet [Pharmacy Med Name: PANTOPRAZOLE SOD DR 40 MG TAB*] 30 tablet 0     Sig: TAKE ONE TABLET BY MOUTH DAILY          Last OV:8/7/23  Next OV:none

## 2023-08-16 RX ORDER — PANTOPRAZOLE SODIUM 40 MG/1
40 TABLET, DELAYED RELEASE ORAL DAILY PRN
Qty: 90 TABLET | Refills: 1 | Status: SHIPPED | OUTPATIENT
Start: 2023-08-16

## 2023-09-01 NOTE — TELEPHONE ENCOUNTER
Chief Complaint   Patient presents with    Medication Refill       Requested Prescriptions     Pending Prescriptions Disp Refills    albuterol sulfate HFA (PROVENTIL;VENTOLIN;PROAIR) 108 (90 Base) MCG/ACT inhaler [Pharmacy Med Name: ALBUTEROL HFA (VENT)] 18 g 0     Sig: INHALE 2 PUFFS EVERY 4-6 HOURS AS NEEDED FOR COUGH/WHEEZING/SHORTNESS OF BREATH. Allergies:   Allergies   Allergen Reactions    Aspirin      Other reaction(s): Unknown (comments)  hemorraging - ended up in the hospital    Diphenhydramine Other (See Comments)       Last visit with clinic:  8/7/2023   Next visit with clinic: Visit date not found     Last visit with this provider: 8/7/2023   Next Visit with this provider: Visit date not found    Signed by Danielle TABOR  09/01/23  11:23 AM

## 2023-09-05 RX ORDER — ALBUTEROL SULFATE 90 UG/1
AEROSOL, METERED RESPIRATORY (INHALATION)
Qty: 18 G | Refills: 2 | Status: SHIPPED | OUTPATIENT
Start: 2023-09-05

## 2023-09-06 NOTE — TELEPHONE ENCOUNTER
Requested Prescriptions     Pending Prescriptions Disp Refills    fluticasone (FLONASE) 50 MCG/ACT nasal spray [Pharmacy Med Name: FLUTICASONE PROP 50 MCG SPRAY] 16 g 0     Sig: INHALE 2 SPRAYS IN EACH NOSTRIL ONCE A DAY AS NEEDED.

## 2023-09-08 RX ORDER — FLUTICASONE PROPIONATE 50 MCG
SPRAY, SUSPENSION (ML) NASAL
Qty: 16 G | Refills: 11 | Status: SHIPPED | OUTPATIENT
Start: 2023-09-08

## 2023-09-13 RX ORDER — SPIRONOLACTONE 25 MG/1
25 TABLET ORAL 2 TIMES DAILY
Qty: 180 TABLET | Refills: 1 | Status: SHIPPED | OUTPATIENT
Start: 2023-09-13

## 2023-10-04 DIAGNOSIS — E87.6 HYPOKALEMIA: Primary | ICD-10-CM

## 2023-10-04 NOTE — TELEPHONE ENCOUNTER
Last OV:08/07/2023  Next OV:none  Last Refill:09/21/2022  Last (labs):01/19/2023    -*Insert any notes here*  Requested Prescriptions     Pending Prescriptions Disp Refills    potassium chloride (KLOR-CON) 10 MEQ extended release tablet [Pharmacy Med Name: POTASSIUM CL ER 10 MEQ TABLET] 12 tablet 0     Sig: TAKE (1) TABLET BY MOUTH ON MONDAY, [facility].

## 2023-10-05 NOTE — TELEPHONE ENCOUNTER
Patient called and stated she will be out of medication tomorrow. Would like medication filled today.

## 2023-10-09 RX ORDER — POTASSIUM CHLORIDE 750 MG/1
TABLET, FILM COATED, EXTENDED RELEASE ORAL
Qty: 90 TABLET | Refills: 1 | Status: SHIPPED | OUTPATIENT
Start: 2023-10-09

## 2023-10-24 NOTE — TELEPHONE ENCOUNTER
Requested Prescriptions     Pending Prescriptions Disp Refills    metFORMIN (GLUCOPHAGE) 500 MG tablet [Pharmacy Med Name: METFORMIN  MG TABLET] 120 tablet 0     Sig: TAKE (2) TABLETS BY MOUTH TWO TIMES DAILY

## 2023-11-21 ENCOUNTER — OFFICE VISIT (OUTPATIENT)
Facility: CLINIC | Age: 68
End: 2023-11-21
Payer: MEDICARE

## 2023-11-21 VITALS
OXYGEN SATURATION: 95 % | HEART RATE: 85 BPM | TEMPERATURE: 97.5 F | RESPIRATION RATE: 18 BRPM | BODY MASS INDEX: 30.16 KG/M2 | WEIGHT: 181 LBS | DIASTOLIC BLOOD PRESSURE: 71 MMHG | HEIGHT: 65 IN | SYSTOLIC BLOOD PRESSURE: 133 MMHG

## 2023-11-21 DIAGNOSIS — E11.9 DIABETES MELLITUS TYPE 2 WITHOUT RETINOPATHY (HCC): Primary | ICD-10-CM

## 2023-11-21 DIAGNOSIS — I10 ESSENTIAL HYPERTENSION: ICD-10-CM

## 2023-11-21 LAB — HBA1C MFR BLD: 8.1 %

## 2023-11-21 PROCEDURE — 2022F DILAT RTA XM EVC RTNOPTHY: CPT | Performed by: FAMILY MEDICINE

## 2023-11-21 PROCEDURE — 1090F PRES/ABSN URINE INCON ASSESS: CPT | Performed by: FAMILY MEDICINE

## 2023-11-21 PROCEDURE — 99213 OFFICE O/P EST LOW 20 MIN: CPT | Performed by: FAMILY MEDICINE

## 2023-11-21 PROCEDURE — 3074F SYST BP LT 130 MM HG: CPT | Performed by: FAMILY MEDICINE

## 2023-11-21 PROCEDURE — 1123F ACP DISCUSS/DSCN MKR DOCD: CPT | Performed by: FAMILY MEDICINE

## 2023-11-21 PROCEDURE — 3051F HG A1C>EQUAL 7.0%<8.0%: CPT | Performed by: FAMILY MEDICINE

## 2023-11-21 PROCEDURE — G8427 DOCREV CUR MEDS BY ELIG CLIN: HCPCS | Performed by: FAMILY MEDICINE

## 2023-11-21 PROCEDURE — G8399 PT W/DXA RESULTS DOCUMENT: HCPCS | Performed by: FAMILY MEDICINE

## 2023-11-21 PROCEDURE — 3078F DIAST BP <80 MM HG: CPT | Performed by: FAMILY MEDICINE

## 2023-11-21 PROCEDURE — G8484 FLU IMMUNIZE NO ADMIN: HCPCS | Performed by: FAMILY MEDICINE

## 2023-11-21 PROCEDURE — G8417 CALC BMI ABV UP PARAM F/U: HCPCS | Performed by: FAMILY MEDICINE

## 2023-11-21 PROCEDURE — 83036 HEMOGLOBIN GLYCOSYLATED A1C: CPT | Performed by: FAMILY MEDICINE

## 2023-11-21 PROCEDURE — 1036F TOBACCO NON-USER: CPT | Performed by: FAMILY MEDICINE

## 2023-11-21 PROCEDURE — 3017F COLORECTAL CA SCREEN DOC REV: CPT | Performed by: FAMILY MEDICINE

## 2023-11-21 RX ORDER — CLOPIDOGREL BISULFATE 75 MG/1
75 TABLET ORAL DAILY
COMMUNITY

## 2023-11-21 NOTE — PROGRESS NOTES
Subjective  Chief Complaint   Patient presents with    Follow-up    Diabetes     HPI:  Raisa Lay is a 77 y.o. female with medical problems as listed below who presents for diabetes follow up. DM2: Taking metformin and Jardiance. Patient has been eating poorly as she has had multiple deaths in the family. HTN: Lost BP cuff to daughter's dog so hasn't been checking BP recnetly. Taking carvedilol, spironolactone, lisinopril, Lasix.      Patient Active Problem List   Diagnosis    Coronary artery disease involving native heart without angina pectoris    Morbid obesity ([address])    Foot callus    Aortic stenosis    Allergic rhinitis    Microalbuminuria due to type 2 diabetes mellitus (HCC)    Left bundle branch block    Hypokalemia    Chronic obstructive pulmonary disease (HCC)    Gastroesophageal reflux disease without esophagitis    Mixed hyperlipidemia    Cataract, nuclear sclerotic, both eyes    Diabetes mellitus type 2 without retinopathy (HCC)    Disorder of hyperalimentation    Essential hypertension    Chronic systolic congestive heart failure ([address])     Family History   Problem Relation Age of Onset    Breast Cancer Sister     Cancer Sister         breast    Hypertension Father     Cancer Mother         uterine      Social History     Tobacco Use    Smoking status: Former     Packs/day: 0.50     Years: 30.00     Additional pack years: 0.00     Total pack years: 15.00     Types: Cigarettes     Quit date: 2014     Years since quittin.9    Smokeless tobacco: Never   Substance Use Topics    Alcohol use: Not Currently    Drug use: Never     Current Outpatient Medications on File Prior to Visit   Medication Sig Dispense Refill    clopidogrel (PLAVIX) 75 MG tablet Take 1 tablet by mouth daily      metFORMIN (GLUCOPHAGE) 500 MG tablet Take 2 tablets by mouth 2 times daily (with meals) 360 tablet 1    potassium chloride (KLOR-CON) 10 MEQ extended release tablet TAKE (1) TABLET BY MOUTH ON MONDAY,

## 2023-12-01 NOTE — TELEPHONE ENCOUNTER
Requested Prescriptions     Pending Prescriptions Disp Refills    albuterol sulfate HFA (PROVENTIL;VENTOLIN;PROAIR) 108 (90 Base) MCG/ACT inhaler [Pharmacy Med Name: ALBUTEROL HFA (VENT)] 18 g 0     Sig: INHALE 2 PUFFS EVERY 4-6 HOURS AS NEEDED FOR COUGH/WHEEZING/SHORTNESS OF BREATH.

## 2023-12-04 RX ORDER — ALBUTEROL SULFATE 90 UG/1
AEROSOL, METERED RESPIRATORY (INHALATION)
Qty: 18 G | Refills: 2 | Status: SHIPPED | OUTPATIENT
Start: 2023-12-04

## 2023-12-11 NOTE — TELEPHONE ENCOUNTER
Requested Prescriptions     Pending Prescriptions Disp Refills    carvedilol (COREG) 3.125 MG tablet [Pharmacy Med Name: CARVEDILOL 3.125 MG TABLET] 60 tablet 0     Sig: TAKE 1 TABLET BY MOUTH TWICE DAILY WITH FOOD

## 2023-12-13 RX ORDER — CARVEDILOL 3.12 MG/1
3.12 TABLET ORAL 2 TIMES DAILY WITH MEALS
Qty: 180 TABLET | Refills: 1 | Status: SHIPPED | OUTPATIENT
Start: 2023-12-13

## 2024-02-02 DIAGNOSIS — E11.9 DIABETES MELLITUS TYPE 2 WITHOUT RETINOPATHY (HCC): Primary | ICD-10-CM

## 2024-02-02 NOTE — TELEPHONE ENCOUNTER
Requested Prescriptions     Pending Prescriptions Disp Refills    atorvastatin (LIPITOR) 40 MG tablet [Pharmacy Med Name: ATORVASTATIN 40 MG TABLET] 30 tablet 0     Sig: TAKE ONE TABLET BY MOUTH DAILY.    pantoprazole (PROTONIX) 40 MG tablet [Pharmacy Med Name: PANTOPRAZOLE SOD DR 40 MG TAB*] 30 tablet 0     Sig: TAKE ONE TABLET BY MOUTH DAILY.

## 2024-02-02 NOTE — TELEPHONE ENCOUNTER
Requested Prescriptions     Pending Prescriptions Disp Refills    empagliflozin (JARDIANCE) 25 MG tablet 30 tablet      Sig: Take 1 tablet by mouth daily

## 2024-02-05 NOTE — TELEPHONE ENCOUNTER
Patient called and stated she is down to 2 pills and needs this prescription (Jardiance) as soon as possible.

## 2024-02-06 RX ORDER — PANTOPRAZOLE SODIUM 40 MG/1
40 TABLET, DELAYED RELEASE ORAL DAILY
Qty: 90 TABLET | Refills: 1 | Status: SHIPPED | OUTPATIENT
Start: 2024-02-06

## 2024-02-06 RX ORDER — ATORVASTATIN CALCIUM 40 MG/1
40 TABLET, FILM COATED ORAL DAILY
Qty: 90 TABLET | Refills: 3 | Status: SHIPPED | OUTPATIENT
Start: 2024-02-06

## 2024-02-23 ENCOUNTER — OFFICE VISIT (OUTPATIENT)
Facility: CLINIC | Age: 69
End: 2024-02-23
Payer: MEDICARE

## 2024-02-23 VITALS
HEIGHT: 65 IN | DIASTOLIC BLOOD PRESSURE: 76 MMHG | TEMPERATURE: 97.9 F | HEART RATE: 84 BPM | RESPIRATION RATE: 18 BRPM | OXYGEN SATURATION: 96 % | WEIGHT: 181 LBS | SYSTOLIC BLOOD PRESSURE: 122 MMHG | BODY MASS INDEX: 30.16 KG/M2

## 2024-02-23 DIAGNOSIS — E78.2 MIXED HYPERLIPIDEMIA: ICD-10-CM

## 2024-02-23 DIAGNOSIS — E87.6 HYPOKALEMIA: ICD-10-CM

## 2024-02-23 DIAGNOSIS — E11.65 TYPE 2 DIABETES MELLITUS WITH HYPERGLYCEMIA, WITHOUT LONG-TERM CURRENT USE OF INSULIN (HCC): ICD-10-CM

## 2024-02-23 DIAGNOSIS — Z86.010 HISTORY OF COLON POLYPS: ICD-10-CM

## 2024-02-23 DIAGNOSIS — I10 ESSENTIAL HYPERTENSION: ICD-10-CM

## 2024-02-23 DIAGNOSIS — I50.9 HEART FAILURE, UNSPECIFIED HF CHRONICITY, UNSPECIFIED HEART FAILURE TYPE (HCC): ICD-10-CM

## 2024-02-23 DIAGNOSIS — Z00.00 MEDICARE ANNUAL WELLNESS VISIT, SUBSEQUENT: ICD-10-CM

## 2024-02-23 DIAGNOSIS — J44.9 CHRONIC OBSTRUCTIVE PULMONARY DISEASE, UNSPECIFIED COPD TYPE (HCC): ICD-10-CM

## 2024-02-23 DIAGNOSIS — Z78.0 ASYMPTOMATIC MENOPAUSAL STATE: ICD-10-CM

## 2024-02-23 DIAGNOSIS — Z00.00 MEDICARE ANNUAL WELLNESS VISIT, SUBSEQUENT: Primary | ICD-10-CM

## 2024-02-23 DIAGNOSIS — R80.9 MICROALBUMINURIA DUE TO TYPE 2 DIABETES MELLITUS (HCC): ICD-10-CM

## 2024-02-23 DIAGNOSIS — E11.29 MICROALBUMINURIA DUE TO TYPE 2 DIABETES MELLITUS (HCC): ICD-10-CM

## 2024-02-23 DIAGNOSIS — Z12.31 ENCOUNTER FOR SCREENING MAMMOGRAM FOR MALIGNANT NEOPLASM OF BREAST: ICD-10-CM

## 2024-02-23 DIAGNOSIS — Z12.11 COLON CANCER SCREENING: ICD-10-CM

## 2024-02-23 PROCEDURE — 1090F PRES/ABSN URINE INCON ASSESS: CPT | Performed by: FAMILY MEDICINE

## 2024-02-23 PROCEDURE — 1123F ACP DISCUSS/DSCN MKR DOCD: CPT | Performed by: FAMILY MEDICINE

## 2024-02-23 PROCEDURE — 1036F TOBACCO NON-USER: CPT | Performed by: FAMILY MEDICINE

## 2024-02-23 PROCEDURE — G8427 DOCREV CUR MEDS BY ELIG CLIN: HCPCS | Performed by: FAMILY MEDICINE

## 2024-02-23 PROCEDURE — 3023F SPIROM DOC REV: CPT | Performed by: FAMILY MEDICINE

## 2024-02-23 PROCEDURE — 3074F SYST BP LT 130 MM HG: CPT | Performed by: FAMILY MEDICINE

## 2024-02-23 PROCEDURE — 99214 OFFICE O/P EST MOD 30 MIN: CPT | Performed by: FAMILY MEDICINE

## 2024-02-23 PROCEDURE — G8417 CALC BMI ABV UP PARAM F/U: HCPCS | Performed by: FAMILY MEDICINE

## 2024-02-23 PROCEDURE — 3017F COLORECTAL CA SCREEN DOC REV: CPT | Performed by: FAMILY MEDICINE

## 2024-02-23 PROCEDURE — 3046F HEMOGLOBIN A1C LEVEL >9.0%: CPT | Performed by: FAMILY MEDICINE

## 2024-02-23 PROCEDURE — G8484 FLU IMMUNIZE NO ADMIN: HCPCS | Performed by: FAMILY MEDICINE

## 2024-02-23 PROCEDURE — 3078F DIAST BP <80 MM HG: CPT | Performed by: FAMILY MEDICINE

## 2024-02-23 PROCEDURE — G8399 PT W/DXA RESULTS DOCUMENT: HCPCS | Performed by: FAMILY MEDICINE

## 2024-02-23 PROCEDURE — 2022F DILAT RTA XM EVC RTNOPTHY: CPT | Performed by: FAMILY MEDICINE

## 2024-02-23 PROCEDURE — G0439 PPPS, SUBSEQ VISIT: HCPCS | Performed by: FAMILY MEDICINE

## 2024-02-23 SDOH — ECONOMIC STABILITY: INCOME INSECURITY: HOW HARD IS IT FOR YOU TO PAY FOR THE VERY BASICS LIKE FOOD, HOUSING, MEDICAL CARE, AND HEATING?: NOT HARD AT ALL

## 2024-02-23 SDOH — ECONOMIC STABILITY: FOOD INSECURITY: WITHIN THE PAST 12 MONTHS, YOU WORRIED THAT YOUR FOOD WOULD RUN OUT BEFORE YOU GOT MONEY TO BUY MORE.: NEVER TRUE

## 2024-02-23 SDOH — ECONOMIC STABILITY: FOOD INSECURITY: WITHIN THE PAST 12 MONTHS, THE FOOD YOU BOUGHT JUST DIDN'T LAST AND YOU DIDN'T HAVE MONEY TO GET MORE.: NEVER TRUE

## 2024-02-23 SDOH — ECONOMIC STABILITY: HOUSING INSECURITY
IN THE LAST 12 MONTHS, WAS THERE A TIME WHEN YOU DID NOT HAVE A STEADY PLACE TO SLEEP OR SLEPT IN A SHELTER (INCLUDING NOW)?: NO

## 2024-02-23 ASSESSMENT — PATIENT HEALTH QUESTIONNAIRE - PHQ9
SUM OF ALL RESPONSES TO PHQ QUESTIONS 1-9: 0
SUM OF ALL RESPONSES TO PHQ9 QUESTIONS 1 & 2: 0
1. LITTLE INTEREST OR PLEASURE IN DOING THINGS: 0
2. FEELING DOWN, DEPRESSED OR HOPELESS: 0
SUM OF ALL RESPONSES TO PHQ QUESTIONS 1-9: 0

## 2024-02-23 ASSESSMENT — ANXIETY QUESTIONNAIRES
IF YOU CHECKED OFF ANY PROBLEMS ON THIS QUESTIONNAIRE, HOW DIFFICULT HAVE THESE PROBLEMS MADE IT FOR YOU TO DO YOUR WORK, TAKE CARE OF THINGS AT HOME, OR GET ALONG WITH OTHER PEOPLE: NOT DIFFICULT AT ALL
7. FEELING AFRAID AS IF SOMETHING AWFUL MIGHT HAPPEN: 0
5. BEING SO RESTLESS THAT IT IS HARD TO SIT STILL: 0
2. NOT BEING ABLE TO STOP OR CONTROL WORRYING: 0
3. WORRYING TOO MUCH ABOUT DIFFERENT THINGS: 0
6. BECOMING EASILY ANNOYED OR IRRITABLE: 0
GAD7 TOTAL SCORE: 0
4. TROUBLE RELAXING: 0
1. FEELING NERVOUS, ANXIOUS, OR ON EDGE: 0

## 2024-02-23 ASSESSMENT — LIFESTYLE VARIABLES
HOW MANY STANDARD DRINKS CONTAINING ALCOHOL DO YOU HAVE ON A TYPICAL DAY: PATIENT DOES NOT DRINK
HOW OFTEN DO YOU HAVE A DRINK CONTAINING ALCOHOL: NEVER

## 2024-02-23 NOTE — PROGRESS NOTES
/76 (Site: Left Upper Arm, Position: Sitting, Cuff Size: Large Adult) Comment: manual  Pulse 84   Temp 97.9 °F (36.6 °C) (Temporal)   Resp 18   Ht 1.651 m (5' 5\")   Wt 82.1 kg (181 lb)   SpO2 96%   BMI 30.12 kg/m²

## 2024-02-23 NOTE — PROGRESS NOTES
\"Have you been to the ER, urgent care clinic since your last visit?  Hospitalized since your last visit?\"    NO    “Have you seen or consulted any other health care providers outside of [facility] since your last visit?”    NO      Chief Complaint   Patient presents with    Medicare AWV     BP (!) 140/71 (Site: Left Upper Arm, Position: Sitting, Cuff Size: Large Adult)   Pulse 84   Temp 97.9 °F (36.6 °C) (Temporal)   Resp 18   Ht 1.651 m (5' 5\")   Wt 82.1 kg (181 lb)   SpO2 96%   BMI 30.12 kg/m²

## 2024-02-23 NOTE — PROGRESS NOTES
Medicare Annual Wellness Visit    Pati ARTEMIO Ferreira is here for Medicare AWV    Assessment & Plan   Medicare annual wellness visit, subsequent  -     Comprehensive Metabolic Panel; Future  -     Hemoglobin A1C; Future  -     CBC; Future  Chart reviewed and updated as needed. Care gaps discussed. Annual labs ordered and pending.     Heart failure, unspecified HF chronicity, unspecified heart failure type (HCC)  Followed by Cardiology. Continue Jardiance, spironolactone, carvedilol, Lasix as prescribed.     Chronic obstructive pulmonary disease, unspecified COPD type (HCC)  Followed by Dr. Ray. Continue Breztri and PRN albuterol and nocturnal O2.     Type 2 diabetes mellitus with hyperglycemia, without long-term current use of insulin (HCC)  Microalbuminuria due to type 2 diabetes mellitus (HCC)  -     Hemoglobin A1C; Future  -     Microalbumin / Creatinine Urine Ratio; Future  Last A1c 8.1. Repeat labs pending as above. Continue Jardiance 25mg daily, metformin 1g BID for now. Further recommendations to follow.     Mixed hyperlipidemia  -     Lipid Panel; Future  Labs pending. Continue atorvastatin 40mg daily for now.     Hypokalemia  -     Comprehensive Metabolic Panel; Future  Labs pending. Continue potassium supplement as prescribed for now.     Essential hypertension  -     Comprehensive Metabolic Panel; Future  Well controlled. Continue spironolactone 25mg BID, lisinopril 20mg BID, carvedilol 3.125mg BID, Lasix 40mg daily.     Colon cancer screening  History of colon polyps  -     Amb External Referral To Gastroenterology    Encounter for screening mammogram for malignant neoplasm of breast  -     CARLOS DIGITAL SCREEN W OR WO CAD BILATERAL; Future    Asymptomatic menopausal state  -     DEXA BONE DENSITY AXIAL SKELETON; Future    Recommendations for Preventive Services Due: see orders and patient instructions/AVS.  Recommended screening schedule for the next 5-10 years is provided to the patient in written

## 2024-02-23 NOTE — PATIENT INSTRUCTIONS
Learning About Being Active as an Older Adult  Why is being active important as you get older?     Being active is one of the best things you can do for your health. And it's never too late to start. Being active--or getting active, if you aren't already--has definite benefits. It can:  Give you more energy,  Keep your mind sharp.  Improve balance to reduce your risk of falls.  Help you manage chronic illness with fewer medicines.  No matter how old you are, how fit you are, or what health problems you have, there is a form of activity that will work for you. And the more physical activity you can do, the better your overall health will be.  What kinds of activity can help you stay healthy?  Being more active will make your daily activities easier. Physical activity includes planned exercise and things you do in daily life. There are four types of activity:  Aerobic.  Doing aerobic activity makes your heart and lungs strong.  Includes walking, dancing, and gardening.  Aim for at least 2½ hours spread throughout the week.  It improves your energy and can help you sleep better.  Muscle-strengthening.  This type of activity can help maintain muscle and strengthen bones.  Includes climbing stairs, using resistance bands, and lifting or carrying heavy loads.  Aim for at least twice a week.  It can help protect the knees and other joints.  Stretching.  Stretching gives you better range of motion in joints and muscles.  Includes upper arm stretches, calf stretches, and gentle yoga.  Aim for at least twice a week, preferably after your muscles are warmed up from other activities.  It can help you function better in daily life.  Balancing.  This helps you stay coordinated and have good posture.  Includes heel-to-toe walking, mary chi, and certain types of yoga.  Aim for at least 3 days a week.  It can reduce your risk of falling.  Even if you have a hard time meeting the recommendations, it's better to be more active than

## 2024-02-29 NOTE — TELEPHONE ENCOUNTER
Requested Prescriptions     Pending Prescriptions Disp Refills    lisinopril (PRINIVIL;ZESTRIL) 20 MG tablet [Pharmacy Med Name: LISINOPRIL 20MG] 60 tablet 0     Sig: TAKE 1 TABLET BY MOUTH TWICE DAILY.    spironolactone (ALDACTONE) 25 MG tablet [Pharmacy Med Name: SPIRONOLACTONE 25 MG TABLET*] 60 tablet 0     Sig: TAKE 1 TABLET BY MOUTH TWICE DAILY.

## 2024-03-01 RX ORDER — LISINOPRIL 20 MG/1
20 TABLET ORAL 2 TIMES DAILY
Qty: 180 TABLET | Refills: 1 | Status: SHIPPED | OUTPATIENT
Start: 2024-03-01

## 2024-03-01 RX ORDER — SPIRONOLACTONE 25 MG/1
25 TABLET ORAL 2 TIMES DAILY
Qty: 180 TABLET | Refills: 1 | Status: SHIPPED | OUTPATIENT
Start: 2024-03-01

## 2024-03-07 LAB
ALBUMIN SERPL-MCNC: 4.7 G/DL (ref 3.9–4.9)
ALBUMIN/GLOB SERPL: 1.5 {RATIO} (ref 1.2–2.2)
ALP SERPL-CCNC: 71 IU/L (ref 44–121)
ALT SERPL-CCNC: 17 IU/L (ref 0–32)
AST SERPL-CCNC: 19 IU/L (ref 0–40)
BILIRUB SERPL-MCNC: 0.5 MG/DL (ref 0–1.2)
BUN SERPL-MCNC: 24 MG/DL (ref 8–27)
BUN/CREAT SERPL: 19 (ref 12–28)
CALCIUM SERPL-MCNC: 10.2 MG/DL (ref 8.7–10.3)
CHLORIDE SERPL-SCNC: 96 MMOL/L (ref 96–106)
CHOLEST SERPL-MCNC: 131 MG/DL (ref 100–199)
CO2 SERPL-SCNC: 24 MMOL/L (ref 20–29)
CREAT SERPL-MCNC: 1.24 MG/DL (ref 0.57–1)
EGFRCR SERPLBLD CKD-EPI 2021: 47 ML/MIN/1.73
ERYTHROCYTE [DISTWIDTH] IN BLOOD BY AUTOMATED COUNT: 13.8 % (ref 11.7–15.4)
GLOBULIN SER CALC-MCNC: 3.1 G/DL (ref 1.5–4.5)
GLUCOSE SERPL-MCNC: 122 MG/DL (ref 70–99)
HBA1C MFR BLD: 7.7 % (ref 4.8–5.6)
HCT VFR BLD AUTO: 42.2 % (ref 34–46.6)
HDLC SERPL-MCNC: 46 MG/DL
HGB BLD-MCNC: 14.2 G/DL (ref 11.1–15.9)
LDLC SERPL CALC-MCNC: 64 MG/DL (ref 0–99)
MCH RBC QN AUTO: 28.8 PG (ref 26.6–33)
MCHC RBC AUTO-ENTMCNC: 33.6 G/DL (ref 31.5–35.7)
MCV RBC AUTO: 86 FL (ref 79–97)
PLATELET # BLD AUTO: 378 X10E3/UL (ref 150–450)
POTASSIUM SERPL-SCNC: 4.2 MMOL/L (ref 3.5–5.2)
PROT SERPL-MCNC: 7.8 G/DL (ref 6–8.5)
RBC # BLD AUTO: 4.93 X10E6/UL (ref 3.77–5.28)
SODIUM SERPL-SCNC: 140 MMOL/L (ref 134–144)
TRIGL SERPL-MCNC: 117 MG/DL (ref 0–149)
VLDLC SERPL CALC-MCNC: 21 MG/DL (ref 5–40)
WBC # BLD AUTO: 10.8 X10E3/UL (ref 3.4–10.8)

## 2024-03-09 LAB
ALBUMIN/CREAT UR: 25 MG/G CREAT (ref 0–29)
CREAT UR-MCNC: 41 MG/DL
MICROALBUMIN UR-MCNC: 10.4 UG/ML

## 2024-03-14 NOTE — TELEPHONE ENCOUNTER
Requested Prescriptions     Pending Prescriptions Disp Refills    albuterol sulfate HFA (PROVENTIL;VENTOLIN;PROAIR) 108 (90 Base) MCG/ACT inhaler [Pharmacy Med Name: ALBUTEROL HFA (VENT)]  0     Sig: SHAKE WELL & INHALE 2 PUFFS EVERY 4-6 HOURS AS NEEDED FOR COUGH/WHEEZING/SHORTNESS OF BREATH.

## 2024-03-15 RX ORDER — ALBUTEROL SULFATE 90 UG/1
2 AEROSOL, METERED RESPIRATORY (INHALATION) EVERY 4 HOURS PRN
Qty: 1 EACH | Refills: 2 | Status: SHIPPED | OUTPATIENT
Start: 2024-03-15

## 2024-03-26 ENCOUNTER — TELEPHONE (OUTPATIENT)
Facility: CLINIC | Age: 69
End: 2024-03-26

## 2024-03-26 DIAGNOSIS — R79.89 ELEVATED SERUM CREATININE: Primary | ICD-10-CM

## 2024-03-26 DIAGNOSIS — R79.89 ELEVATED SERUM CREATININE: ICD-10-CM

## 2024-03-26 NOTE — TELEPHONE ENCOUNTER
Spoke to patient informed of results and providers recommendations.  Patient voiced understanding.    Patient states may not take januvia due to having had reactions to new meds previously    States she had not been eating the best due to deaths in the family (stress eat)    States she will go back to taking better care of herself

## 2024-03-26 NOTE — TELEPHONE ENCOUNTER
----- Message from Demetria Conner DO sent at 3/26/2024 10:01 AM EDT -----  Electrolytes and liver enzymes were normal.   Kidney function was decreased and previously this has been normal. I would recommend we repeat these labs. I will send the order to [facility] for patient to complete. It is important that we keep blood pressure and blood sugar under control to prevent further damage to the kidneys. I would also recommend avoiding NSAIDs like Motrin, ibuprofen, Aleve, naproxen, or meloxicam.   A1c was high at 7.7 but it was improved as compared to the last result. I think we should consider addition of another medication like Januvia. If patient would like me to send this to the pharmacy I can.   Urine sample was normal.   Cholesterol was at goal. Please continue atorvastatin as prescribed.   Blood counts were normal.

## 2024-04-10 LAB
BUN SERPL-MCNC: 15 MG/DL (ref 8–27)
BUN/CREAT SERPL: 12 (ref 12–28)
CALCIUM SERPL-MCNC: 9.4 MG/DL (ref 8.7–10.3)
CHLORIDE SERPL-SCNC: 98 MMOL/L (ref 96–106)
CO2 SERPL-SCNC: 23 MMOL/L (ref 20–29)
CREAT SERPL-MCNC: 1.24 MG/DL (ref 0.57–1)
EGFRCR SERPLBLD CKD-EPI 2021: 47 ML/MIN/1.73
GLUCOSE SERPL-MCNC: 108 MG/DL (ref 70–99)
POTASSIUM SERPL-SCNC: 3.9 MMOL/L (ref 3.5–5.2)
SODIUM SERPL-SCNC: 141 MMOL/L (ref 134–144)

## 2024-04-24 NOTE — TELEPHONE ENCOUNTER
Requested Prescriptions     Pending Prescriptions Disp Refills    furosemide (LASIX) 40 MG tablet [Pharmacy Med Name: FUROSEMIDE 40 MG TABLET*] 30 tablet 0     Sig: TAKE ONE TABLET BY MOUTH DAILY.

## 2024-04-29 RX ORDER — FUROSEMIDE 40 MG/1
40 TABLET ORAL DAILY
Qty: 30 TABLET | Refills: 0 | Status: SHIPPED | OUTPATIENT
Start: 2024-04-29

## 2024-05-01 ENCOUNTER — TRANSCRIBE ORDERS (OUTPATIENT)
Facility: HOSPITAL | Age: 69
End: 2024-05-01

## 2024-05-01 DIAGNOSIS — Z12.31 SCREENING MAMMOGRAM, ENCOUNTER FOR: Primary | ICD-10-CM

## 2024-05-08 ENCOUNTER — TELEPHONE (OUTPATIENT)
Facility: CLINIC | Age: 69
End: 2024-05-08

## 2024-05-08 NOTE — TELEPHONE ENCOUNTER
----- Message from Demetria Conner, DO sent at 5/7/2024  7:55 AM EDT -----  Kidney function is still decreased. It's most likely related to your high blood sugars. We can discuss this more at your next appointment later this month. We will most likely repeat labs again.

## 2024-05-13 RX ORDER — LISINOPRIL 20 MG/1
20 TABLET ORAL 2 TIMES DAILY
Qty: 180 TABLET | Refills: 1 | Status: SHIPPED | OUTPATIENT
Start: 2024-05-13

## 2024-05-13 RX ORDER — SPIRONOLACTONE 25 MG/1
25 TABLET ORAL 2 TIMES DAILY
Qty: 180 TABLET | Refills: 1 | Status: SHIPPED | OUTPATIENT
Start: 2024-05-13

## 2024-05-22 NOTE — TELEPHONE ENCOUNTER
Requested Prescriptions     Pending Prescriptions Disp Refills    albuterol sulfate HFA (PROVENTIL;VENTOLIN;PROAIR) 108 (90 Base) MCG/ACT inhaler [Pharmacy Med Name: ALBUTEROL HFA (VENT)] 18 g 0     Sig: SHAKE WELL & INHALE 2 PUFFS EVERY 4-6 HOURS AS NEEDED FOR COUGH/WHEEZING/SHORTNESS OF BREATH.    carvedilol (COREG) 3.125 MG tablet [Pharmacy Med Name: CARVEDILOL 3.125 MG TABLET] 60 tablet 0     Sig: TAKE 1 TABLET BY MOUTH TWICE DAILY WITH FOOD

## 2024-05-23 ENCOUNTER — OFFICE VISIT (OUTPATIENT)
Facility: CLINIC | Age: 69
End: 2024-05-23
Payer: MEDICARE

## 2024-05-23 VITALS
HEART RATE: 78 BPM | SYSTOLIC BLOOD PRESSURE: 138 MMHG | HEIGHT: 65 IN | WEIGHT: 182 LBS | TEMPERATURE: 96.7 F | RESPIRATION RATE: 18 BRPM | BODY MASS INDEX: 30.32 KG/M2 | DIASTOLIC BLOOD PRESSURE: 82 MMHG

## 2024-05-23 DIAGNOSIS — I50.22 CHRONIC SYSTOLIC CONGESTIVE HEART FAILURE (HCC): ICD-10-CM

## 2024-05-23 DIAGNOSIS — E11.22 TYPE 2 DIABETES MELLITUS WITH DIABETIC CHRONIC KIDNEY DISEASE, UNSPECIFIED CKD STAGE, UNSPECIFIED WHETHER LONG TERM INSULIN USE (HCC): Primary | ICD-10-CM

## 2024-05-23 LAB — GLUCOSE, POC: 138 MG/DL

## 2024-05-23 PROCEDURE — 82947 ASSAY GLUCOSE BLOOD QUANT: CPT | Performed by: FAMILY MEDICINE

## 2024-05-23 PROCEDURE — 99214 OFFICE O/P EST MOD 30 MIN: CPT | Performed by: FAMILY MEDICINE

## 2024-05-23 PROCEDURE — 2022F DILAT RTA XM EVC RTNOPTHY: CPT | Performed by: FAMILY MEDICINE

## 2024-05-23 PROCEDURE — 3017F COLORECTAL CA SCREEN DOC REV: CPT | Performed by: FAMILY MEDICINE

## 2024-05-23 PROCEDURE — G8427 DOCREV CUR MEDS BY ELIG CLIN: HCPCS | Performed by: FAMILY MEDICINE

## 2024-05-23 PROCEDURE — 1123F ACP DISCUSS/DSCN MKR DOCD: CPT | Performed by: FAMILY MEDICINE

## 2024-05-23 PROCEDURE — G8399 PT W/DXA RESULTS DOCUMENT: HCPCS | Performed by: FAMILY MEDICINE

## 2024-05-23 PROCEDURE — 3051F HG A1C>EQUAL 7.0%<8.0%: CPT | Performed by: FAMILY MEDICINE

## 2024-05-23 PROCEDURE — 3079F DIAST BP 80-89 MM HG: CPT | Performed by: FAMILY MEDICINE

## 2024-05-23 PROCEDURE — 1036F TOBACCO NON-USER: CPT | Performed by: FAMILY MEDICINE

## 2024-05-23 PROCEDURE — 3075F SYST BP GE 130 - 139MM HG: CPT | Performed by: FAMILY MEDICINE

## 2024-05-23 PROCEDURE — 1090F PRES/ABSN URINE INCON ASSESS: CPT | Performed by: FAMILY MEDICINE

## 2024-05-23 PROCEDURE — G8417 CALC BMI ABV UP PARAM F/U: HCPCS | Performed by: FAMILY MEDICINE

## 2024-05-23 RX ORDER — FUROSEMIDE 40 MG/1
40 TABLET ORAL DAILY
Qty: 90 TABLET | Refills: 0 | Status: SHIPPED | OUTPATIENT
Start: 2024-05-23

## 2024-05-23 NOTE — PROGRESS NOTES
Subjective  Chief Complaint   Patient presents with    Follow-up     HPI:  Pati Ferreira is a 69 y.o. female with medical problems as listed below who presents for diabetes follow up.     DM2: A1c 7.7 in 03/2024. Taking Jardiance, metformin. She reports feeling hungry and is worried that her sugar is low today. Taking Jardiance, metformin.     CKD: Last 2 Cr's abnormal in March and April. Patient drinks lots of water.     CHF: Saw Dr. Bassett this year (March). Sees him q6 months. Requesting refills of Lasix which she takes every day. Patient thinks she's had an echo since 2021. Last results is from 11/2023, EF 40-45%.     Patient Active Problem List   Diagnosis    Coronary artery disease involving native heart without angina pectoris    Morbid obesity (HCC)    Foot callus    Aortic stenosis    Allergic rhinitis    Microalbuminuria due to type 2 diabetes mellitus (HCC)    Left bundle branch block    Hypokalemia    Chronic obstructive pulmonary disease (HCC)    Gastroesophageal reflux disease without esophagitis    Mixed hyperlipidemia    Cataract, nuclear sclerotic, both eyes    Diabetes mellitus type 2 without retinopathy (HCC)    Disorder of hyperalimentation    Essential hypertension    Chronic systolic congestive heart failure (HCC)    Type 2 diabetes mellitus with chronic kidney disease     Family History   Problem Relation Age of Onset    Breast Cancer Sister     Cancer Sister         breast    Hypertension Father     Cancer Mother         uterine      Social History     Tobacco Use    Smoking status: Former     Current packs/day: 0.00     Average packs/day: 0.5 packs/day for 30.0 years (15.0 ttl pk-yrs)     Types: Cigarettes     Start date: 1/1/1984     Quit date: 1/1/2014     Years since quitting: 10.4    Smokeless tobacco: Never   Substance Use Topics    Alcohol use: Not Currently    Drug use: Never     Current Outpatient Medications on File Prior to Visit   Medication Sig Dispense Refill    lisinopril

## 2024-05-23 NOTE — PROGRESS NOTES
\"Have you been to the ER, urgent care clinic since your last visit?  Hospitalized since your last visit?\"    NO    “Have you seen or consulted any other health care providers outside of [facility] since your last visit?”    NO    Have you had a mammogram?”   NO    Date of last Mammogram: 5/3/2023       Chief Complaint   Patient presents with    Follow-up     /82 (Site: Left Upper Arm, Position: Sitting, Cuff Size: Large Adult)   Pulse 78   Temp (!) 96.7 °F (35.9 °C) (Temporal)   Resp 18   Ht 1.651 m (5' 5\")   Wt 82.6 kg (182 lb)   BMI 30.29 kg/m²     Furosimide rx refill.    Click Here for Release of Records Request

## 2024-05-24 RX ORDER — ALBUTEROL SULFATE 90 UG/1
AEROSOL, METERED RESPIRATORY (INHALATION)
Qty: 18 G | Refills: 2 | Status: SHIPPED | OUTPATIENT
Start: 2024-05-24

## 2024-05-24 RX ORDER — CARVEDILOL 3.12 MG/1
3.12 TABLET ORAL 2 TIMES DAILY WITH MEALS
Qty: 180 TABLET | Refills: 1 | Status: SHIPPED | OUTPATIENT
Start: 2024-05-24

## 2024-05-31 ENCOUNTER — HOSPITAL ENCOUNTER (OUTPATIENT)
Facility: HOSPITAL | Age: 69
End: 2024-05-31
Attending: FAMILY MEDICINE
Payer: MEDICARE

## 2024-05-31 DIAGNOSIS — Z12.31 SCREENING MAMMOGRAM, ENCOUNTER FOR: ICD-10-CM

## 2024-05-31 PROCEDURE — 77063 BREAST TOMOSYNTHESIS BI: CPT

## 2024-06-03 ENCOUNTER — TELEPHONE (OUTPATIENT)
Facility: CLINIC | Age: 69
End: 2024-06-03

## 2024-06-06 DIAGNOSIS — E11.22 TYPE 2 DIABETES MELLITUS WITH DIABETIC CHRONIC KIDNEY DISEASE, UNSPECIFIED CKD STAGE, UNSPECIFIED WHETHER LONG TERM INSULIN USE (HCC): ICD-10-CM

## 2024-07-10 DIAGNOSIS — I50.22 CHRONIC SYSTOLIC CONGESTIVE HEART FAILURE (HCC): ICD-10-CM

## 2024-07-10 NOTE — TELEPHONE ENCOUNTER
Requested Prescriptions     Pending Prescriptions Disp Refills    furosemide (LASIX) 40 MG tablet [Pharmacy Med Name: FUROSEMIDE 40 MG TABLET*] 30 tablet 0     Sig: TAKE ONE TABLET BY MOUTH DAILY.    pantoprazole (PROTONIX) 40 MG tablet [Pharmacy Med Name: PANTOPRAZOLE SOD DR 40 MG TAB*] 30 tablet 0     Sig: TAKE ONE TABLET BY MOUTH DAILY.

## 2024-07-11 RX ORDER — FUROSEMIDE 40 MG/1
40 TABLET ORAL DAILY
Qty: 90 TABLET | Refills: 0 | Status: SHIPPED | OUTPATIENT
Start: 2024-07-11

## 2024-07-11 RX ORDER — PANTOPRAZOLE SODIUM 40 MG/1
40 TABLET, DELAYED RELEASE ORAL DAILY
Qty: 90 TABLET | Refills: 1 | Status: SHIPPED | OUTPATIENT
Start: 2024-07-11

## 2024-08-07 DIAGNOSIS — E87.6 HYPOKALEMIA: ICD-10-CM

## 2024-08-08 NOTE — TELEPHONE ENCOUNTER
Requested Prescriptions     Pending Prescriptions Disp Refills    albuterol sulfate HFA (PROVENTIL;VENTOLIN;PROAIR) 108 (90 Base) MCG/ACT inhaler [Pharmacy Med Name: ALBUTEROL HFA (VENT)] 18 g 0     Sig: SHAKE WELL & INHALE 2 PUFFS EVERY 4-6 HOURS AS NEEDED FOR COUGH/WHEEZING/SHORTNESS OF BREATH.    potassium chloride (KLOR-CON) 10 MEQ extended release tablet [Pharmacy Med Name: POTASSIUM CL ER 10 MEQ TABLET] 12 tablet 0     Sig: TAKE (1) TABLET BY MOUTH ON MONDAY, WEDNESDAY AND FRIDAY.    fluticasone (FLONASE) 50 MCG/ACT nasal spray [Pharmacy Med Name: FLUTICASONE PROP 50 MCG SPRAY] 16 g 0     Sig: INHALE 2 SPRAYS IN EACH NOSTRIL ONCE A DAY AS NEEDED.

## 2024-08-09 RX ORDER — ALBUTEROL SULFATE 90 UG/1
AEROSOL, METERED RESPIRATORY (INHALATION)
Qty: 18 G | Refills: 1 | Status: SHIPPED | OUTPATIENT
Start: 2024-08-09

## 2024-08-09 RX ORDER — POTASSIUM CHLORIDE 750 MG/1
TABLET, FILM COATED, EXTENDED RELEASE ORAL
Qty: 36 TABLET | Refills: 1 | Status: SHIPPED | OUTPATIENT
Start: 2024-08-09

## 2024-08-09 RX ORDER — FLUTICASONE PROPIONATE 50 MCG
SPRAY, SUSPENSION (ML) NASAL
Qty: 1 EACH | Refills: 5 | Status: SHIPPED | OUTPATIENT
Start: 2024-08-09

## 2024-10-03 DIAGNOSIS — I50.22 CHRONIC SYSTOLIC CONGESTIVE HEART FAILURE (HCC): ICD-10-CM

## 2024-10-03 DIAGNOSIS — E87.6 HYPOKALEMIA: ICD-10-CM

## 2024-10-04 RX ORDER — FUROSEMIDE 40 MG
40 TABLET ORAL DAILY
Qty: 90 TABLET | Refills: 0 | Status: SHIPPED | OUTPATIENT
Start: 2024-10-04

## 2024-10-04 RX ORDER — ALBUTEROL SULFATE 90 UG/1
INHALANT RESPIRATORY (INHALATION)
Qty: 18 G | Refills: 1 | Status: SHIPPED | OUTPATIENT
Start: 2024-10-04

## 2024-10-04 RX ORDER — POTASSIUM CHLORIDE 750 MG/1
TABLET, EXTENDED RELEASE ORAL
Qty: 12 TABLET | Refills: 0 | Status: SHIPPED | OUTPATIENT
Start: 2024-10-04

## 2024-10-04 NOTE — TELEPHONE ENCOUNTER
Will discuss medication regimen (potassium just 3 times weekly, daily Lasix) at next appointment end of October.

## 2024-10-04 NOTE — TELEPHONE ENCOUNTER
Requested Prescriptions     Pending Prescriptions Disp Refills    potassium chloride (KLOR-CON) 10 MEQ extended release tablet [Pharmacy Med Name: POTASSIUM CL ER 10 MEQ TABLET] 12 tablet 0     Sig: TAKE (1) TABLET BY MOUTH ON MONDAY, WEDNESDAY AND FRIDAY.    albuterol sulfate HFA (PROVENTIL;VENTOLIN;PROAIR) 108 (90 Base) MCG/ACT inhaler [Pharmacy Med Name: ALBUTEROL HFA (VENT)] 18 g 0     Sig: SHAKE WELL & INHALE 2 PUFFS EVERY 4-6 HOURS AS NEEDED FOR COUGH/WHEEZING/SHORTNESS OF BREATH.    furosemide (LASIX) 40 MG tablet [Pharmacy Med Name: FUROSEMIDE 40 MG TABLET*] 30 tablet 0     Sig: TAKE ONE TABLET BY MOUTH DAILY.

## 2024-10-13 ENCOUNTER — HOSPITAL ENCOUNTER (EMERGENCY)
Facility: HOSPITAL | Age: 69
Discharge: HOME | End: 2024-10-13
Attending: STUDENT IN AN ORGANIZED HEALTH CARE EDUCATION/TRAINING PROGRAM
Payer: MEDICARE

## 2024-10-13 ENCOUNTER — APPOINTMENT (OUTPATIENT)
Facility: HOSPITAL | Age: 69
End: 2024-10-13
Payer: MEDICARE

## 2024-10-13 VITALS
OXYGEN SATURATION: 98 % | DIASTOLIC BLOOD PRESSURE: 94 MMHG | HEIGHT: 65 IN | HEART RATE: 81 BPM | BODY MASS INDEX: 28.99 KG/M2 | WEIGHT: 174 LBS | TEMPERATURE: 97.8 F | SYSTOLIC BLOOD PRESSURE: 147 MMHG | RESPIRATION RATE: 16 BRPM

## 2024-10-13 DIAGNOSIS — I50.9 ACUTE ON CHRONIC CONGESTIVE HEART FAILURE, UNSPECIFIED HEART FAILURE TYPE (HCC): Primary | ICD-10-CM

## 2024-10-13 LAB
ALBUMIN SERPL-MCNC: 3.2 G/DL (ref 3.5–5)
ALBUMIN/GLOB SERPL: 0.8 (ref 1.1–2.2)
ALP SERPL-CCNC: 58 U/L (ref 45–117)
ALT SERPL-CCNC: 21 U/L (ref 12–78)
ANION GAP BLD CALC-SCNC: 11
ANION GAP SERPL CALC-SCNC: 11 MMOL/L (ref 2–12)
AST SERPL W P-5'-P-CCNC: 36 U/L (ref 15–37)
BASOPHILS # BLD: 0 K/UL (ref 0–0.1)
BASOPHILS NFR BLD: 0 % (ref 0–1)
BILIRUB SERPL-MCNC: 1.4 MG/DL (ref 0.2–1)
BNP SERPL-MCNC: 6025 PG/ML
BUN SERPL-MCNC: 18 MG/DL (ref 6–20)
BUN/CREAT SERPL: 12 (ref 12–20)
CA-I BLD-MCNC: 0.82 MMOL/L (ref 1.12–1.32)
CA-I BLD-MCNC: 8.6 MG/DL (ref 8.5–10.1)
CHLORIDE BLD-SCNC: 99 MMOL/L (ref 98–107)
CHLORIDE SERPL-SCNC: 102 MMOL/L (ref 97–108)
CO2 BLD-SCNC: 30 MMOL/L
CO2 SERPL-SCNC: 27 MMOL/L (ref 21–32)
CREAT SERPL-MCNC: 1.47 MG/DL (ref 0.55–1.02)
CREAT UR-MCNC: 1.13 MG/DL (ref 0.6–1.3)
DIFFERENTIAL METHOD BLD: ABNORMAL
EKG ATRIAL RATE: 103 BPM
EKG DIAGNOSIS: NORMAL
EKG P AXIS: 47 DEGREES
EKG P-R INTERVAL: 152 MS
EKG Q-T INTERVAL: 418 MS
EKG QRS DURATION: 136 MS
EKG QTC CALCULATION (BAZETT): 547 MS
EKG R AXIS: 266 DEGREES
EKG T AXIS: 86 DEGREES
EKG VENTRICULAR RATE: 103 BPM
EOSINOPHIL # BLD: 0 K/UL (ref 0–0.4)
EOSINOPHIL NFR BLD: 0 % (ref 0–7)
ERYTHROCYTE [DISTWIDTH] IN BLOOD BY AUTOMATED COUNT: 18.8 % (ref 11.5–14.5)
GLOBULIN SER CALC-MCNC: 4.2 G/DL (ref 2–4)
GLUCOSE BLD STRIP.AUTO-MCNC: 145 MG/DL (ref 65–100)
GLUCOSE BLD STRIP.AUTO-MCNC: 80 MG/DL (ref 65–100)
GLUCOSE SERPL-MCNC: 115 MG/DL (ref 65–100)
HCT VFR BLD AUTO: 40.2 % (ref 35–47)
HGB BLD-MCNC: 12.8 G/DL (ref 11.5–16)
IMM GRANULOCYTES # BLD AUTO: 0 K/UL (ref 0–0.04)
IMM GRANULOCYTES NFR BLD AUTO: 0 % (ref 0–0.5)
LACTATE BLD-SCNC: 2.13 MMOL/L (ref 0.4–2)
LYMPHOCYTES # BLD: 0.7 K/UL (ref 0.8–3.5)
LYMPHOCYTES NFR BLD: 10 % (ref 12–49)
MAGNESIUM SERPL-MCNC: 1.2 MG/DL (ref 1.6–2.4)
MCH RBC QN AUTO: 26.6 PG (ref 26–34)
MCHC RBC AUTO-ENTMCNC: 31.8 G/DL (ref 30–36.5)
MCV RBC AUTO: 83.6 FL (ref 80–99)
MONOCYTES # BLD: 0.4 K/UL (ref 0–1)
MONOCYTES NFR BLD: 6 % (ref 5–13)
NEUTS SEG # BLD: 5.7 K/UL (ref 1.8–8)
NEUTS SEG NFR BLD: 84 % (ref 32–75)
NRBC # BLD: 0 K/UL (ref 0–0.01)
NRBC BLD-RTO: 0 PER 100 WBC
PERFORMED BY:: ABNORMAL
PERFORMED BY:: NORMAL
PLATELET # BLD AUTO: 304 K/UL (ref 150–400)
PMV BLD AUTO: 9.5 FL (ref 8.9–12.9)
POTASSIUM BLD-SCNC: 3.8 MMOL/L (ref 3.5–5.5)
POTASSIUM SERPL-SCNC: 3 MMOL/L (ref 3.5–5.1)
PROT SERPL-MCNC: 7.4 G/DL (ref 6.4–8.2)
RBC # BLD AUTO: 4.81 M/UL (ref 3.8–5.2)
SERVICE CMNT-IMP: ABNORMAL
SODIUM BLD-SCNC: 139 MMOL/L (ref 136–145)
SODIUM SERPL-SCNC: 140 MMOL/L (ref 136–145)
SPECIMEN SITE: ABNORMAL
WBC # BLD AUTO: 6.8 K/UL (ref 3.6–11)

## 2024-10-13 PROCEDURE — 85025 COMPLETE CBC W/AUTO DIFF WBC: CPT

## 2024-10-13 PROCEDURE — 96368 THER/DIAG CONCURRENT INF: CPT

## 2024-10-13 PROCEDURE — 80047 BASIC METABLC PNL IONIZED CA: CPT

## 2024-10-13 PROCEDURE — 80053 COMPREHEN METABOLIC PANEL: CPT

## 2024-10-13 PROCEDURE — 99285 EMERGENCY DEPT VISIT HI MDM: CPT

## 2024-10-13 PROCEDURE — 93005 ELECTROCARDIOGRAM TRACING: CPT | Performed by: EMERGENCY MEDICINE

## 2024-10-13 PROCEDURE — 96366 THER/PROPH/DIAG IV INF ADDON: CPT

## 2024-10-13 PROCEDURE — 83605 ASSAY OF LACTIC ACID: CPT

## 2024-10-13 PROCEDURE — 83735 ASSAY OF MAGNESIUM: CPT

## 2024-10-13 PROCEDURE — 71045 X-RAY EXAM CHEST 1 VIEW: CPT

## 2024-10-13 PROCEDURE — 36415 COLL VENOUS BLD VENIPUNCTURE: CPT

## 2024-10-13 PROCEDURE — 82962 GLUCOSE BLOOD TEST: CPT

## 2024-10-13 PROCEDURE — 96365 THER/PROPH/DIAG IV INF INIT: CPT

## 2024-10-13 PROCEDURE — 83880 ASSAY OF NATRIURETIC PEPTIDE: CPT

## 2024-10-13 PROCEDURE — 96375 TX/PRO/DX INJ NEW DRUG ADDON: CPT

## 2024-10-13 PROCEDURE — 6360000002 HC RX W HCPCS: Performed by: STUDENT IN AN ORGANIZED HEALTH CARE EDUCATION/TRAINING PROGRAM

## 2024-10-13 RX ORDER — POTASSIUM CHLORIDE 7.45 MG/ML
10 INJECTION INTRAVENOUS
Status: COMPLETED | OUTPATIENT
Start: 2024-10-13 | End: 2024-10-13

## 2024-10-13 RX ORDER — MAGNESIUM SULFATE IN WATER 40 MG/ML
2000 INJECTION, SOLUTION INTRAVENOUS
Status: COMPLETED | OUTPATIENT
Start: 2024-10-13 | End: 2024-10-13

## 2024-10-13 RX ORDER — FUROSEMIDE 10 MG/ML
40 INJECTION INTRAMUSCULAR; INTRAVENOUS
Status: COMPLETED | OUTPATIENT
Start: 2024-10-13 | End: 2024-10-13

## 2024-10-13 RX ADMIN — POTASSIUM CHLORIDE 10 MEQ: 7.45 INJECTION INTRAVENOUS at 14:04

## 2024-10-13 RX ADMIN — POTASSIUM CHLORIDE 10 MEQ: 7.45 INJECTION INTRAVENOUS at 16:15

## 2024-10-13 RX ADMIN — FUROSEMIDE 40 MG: 10 INJECTION, SOLUTION INTRAMUSCULAR; INTRAVENOUS at 13:57

## 2024-10-13 RX ADMIN — POTASSIUM CHLORIDE 10 MEQ: 7.45 INJECTION INTRAVENOUS at 15:11

## 2024-10-13 RX ADMIN — MAGNESIUM SULFATE HEPTAHYDRATE 2000 MG: 40 INJECTION, SOLUTION INTRAVENOUS at 14:02

## 2024-10-13 ASSESSMENT — PAIN - FUNCTIONAL ASSESSMENT: PAIN_FUNCTIONAL_ASSESSMENT: NONE - DENIES PAIN

## 2024-10-13 NOTE — ED TRIAGE NOTES
Arrived by EMS from home with a c/o shortness of breath and weakness x 1 month. Pt wears 2 LPM at home at baseline. Pt titirated to 6 LPM NC en route and bilateral ankle edema noted

## 2024-10-13 NOTE — ED PROVIDER NOTES
[facility] EMERGENCY DEPT  EMERGENCY DEPARTMENT HISTORY AND PHYSICAL EXAM      Date: 10/13/2024  Patient Name: Pati Ferreira  MRN: [id number]  Birthdate 1955  Date of evaluation: 10/13/2024  Provider: Francisco Monahan MD   Note Started: 12:55 PM EDT 10/13/24    HISTORY OF PRESENT ILLNESS     Chief Complaint   Patient presents with    Shortness of Breath       History Provided By: Patient    HPI: Pati Ferreira is a 69 y.o. female with PMH and medication as below that includes heart failure comes to ED with shortness of breath.  She reports that she has shortness of breath for years but has worsened recently.  She is having trouble breathing when she is walking up the steps more than usual.  She however does not have any chest pain or abdominal pain.  No fever, coughing, or nose congestion.  She has been having worsening lower extremity swelling.  She however did not gain any weight.  She has asked all of her medications reports has been compliant with her medications.      PAST MEDICAL HISTORY   Past Medical History:  Past Medical History:   Diagnosis Date    Acute gastrointestinal hemorrhage 8/28/2020    Acute respiratory failure with hypoxia 1/1/2021    Allergic rhinitis 8/28/2020    Anemia 8/28/2020    Benign essential hypertension 6/25/2020    Body mass index 30.0-30.9, adult 6/25/2020    CAD (coronary artery disease) 8/28/2020    Foot callus 8/28/2020    GERD (gastroesophageal reflux disease) 6/25/2020    Hair loss     Heart murmur 6/25/2020    Hx of colonoscopy 01/01/2015    Done for anemia and rectal bleeding    Iron deficiency anemia 8/28/2020    Left bundle branch block 8/28/2020    Microalbuminuria due to type 2 diabetes mellitus (HCC) 8/28/2020    Mixed hyperlipidemia 6/25/2020    Morbid obesity 8/28/2020    Tobacco dependence syndrome 6/25/2020    Type II diabetes mellitus, uncontrolled 6/25/2020       Past Surgical History:  Past Surgical History:   Procedure Laterality Date    COLONOSCOPY  2015

## 2024-10-14 NOTE — DISCHARGE INSTRUCTIONS
Thank you for choosing our Emergency Department for your care.  It is our privilege to care for you in your time of need.  In the next several days, you may receive a survey via email or mailed to your home about your experience with our team.  We would greatly appreciate you taking a few minutes to complete the survey, as we use this information to learn what we have done well and what we could be doing better. Thank you for trusting us with your care!    Below you will find a list of your tests from today's visit.   Labs  Recent Results (from the past 12 hour(s))   EKG 12 Lead    Collection Time: 10/13/24 12:46 PM   Result Value Ref Range    Ventricular Rate 103 BPM    Atrial Rate 103 BPM    P-R Interval 152 ms    QRS Duration 136 ms    Q-T Interval 418 ms    QTc Calculation (Bazett) 547 ms    P Axis 47 degrees    R Axis 266 degrees    T Axis 86 degrees    Diagnosis       Sinus tachycardia with Premature supraventricular complexes and Fusion   complexes  Non-specific intra-ventricular conduction block  Possible Anterolateral infarct (cited on or before 12-JUL-2021)  Abnormal ECG  When compared with ECG of 12-JUL-2021 02:54,  Fusion complexes are now Present  Premature supraventricular complexes are now Present  QRS axis Shifted left  Questionable change in initial forces of Lateral leads  Nonspecific T wave abnormality no longer evident in Inferior leads  Confirmed by KEVIN MCDANIELS [facility] ([id number]) on 10/13/2024 1:32:33 PM     CBC with Auto Differential    Collection Time: 10/13/24 12:52 PM   Result Value Ref Range    WBC 6.8 3.6 - 11.0 K/uL    RBC 4.81 3.80 - 5.20 M/uL    Hemoglobin 12.8 11.5 - 16.0 g/dL    Hematocrit 40.2 35.0 - 47.0 %    MCV 83.6 80.0 - 99.0 FL    MCH 26.6 26.0 - 34.0 PG    MCHC 31.8 30.0 - 36.5 g/dL    RDW 18.8 (H) 11.5 - 14.5 %    Platelets 304 150 - 400 K/uL    MPV 9.5 8.9 - 12.9 FL    Nucleated RBCs 0.0 0.0  WBC    nRBC 0.00 0.00 - 0.01 K/uL    Neutrophils % 84 (H) 32 - 75 %

## 2024-10-16 ENCOUNTER — APPOINTMENT (OUTPATIENT)
Facility: HOSPITAL | Age: 69
DRG: 280 | End: 2024-10-16
Payer: MEDICARE

## 2024-10-16 ENCOUNTER — HOSPITAL ENCOUNTER (INPATIENT)
Facility: HOSPITAL | Age: 69
LOS: 1 days | Discharge: HOME HEALTH | DRG: 280 | End: 2024-10-18
Attending: STUDENT IN AN ORGANIZED HEALTH CARE EDUCATION/TRAINING PROGRAM | Admitting: INTERNAL MEDICINE
Payer: MEDICARE

## 2024-10-16 DIAGNOSIS — I50.9 ACUTE ON CHRONIC CONGESTIVE HEART FAILURE, UNSPECIFIED HEART FAILURE TYPE (HCC): ICD-10-CM

## 2024-10-16 DIAGNOSIS — I50.22 CHRONIC SYSTOLIC CONGESTIVE HEART FAILURE (HCC): ICD-10-CM

## 2024-10-16 DIAGNOSIS — R06.02 SHORTNESS OF BREATH: ICD-10-CM

## 2024-10-16 DIAGNOSIS — E87.6 HYPOKALEMIA: Primary | ICD-10-CM

## 2024-10-16 PROBLEM — I50.43 CHF (CONGESTIVE HEART FAILURE), NYHA CLASS I, ACUTE ON CHRONIC, COMBINED (HCC): Status: ACTIVE | Noted: 2024-10-16

## 2024-10-16 LAB
ALBUMIN SERPL-MCNC: 3.3 G/DL (ref 3.5–5)
ALBUMIN/GLOB SERPL: 0.9 (ref 1.1–2.2)
ALP SERPL-CCNC: 58 U/L (ref 45–117)
ALT SERPL-CCNC: 22 U/L (ref 12–78)
ANION GAP SERPL CALC-SCNC: 12 MMOL/L (ref 2–12)
AST SERPL W P-5'-P-CCNC: 25 U/L (ref 15–37)
BASOPHILS # BLD: 0 K/UL (ref 0–0.1)
BASOPHILS NFR BLD: 0 % (ref 0–1)
BILIRUB SERPL-MCNC: 1.1 MG/DL (ref 0.2–1)
BNP SERPL-MCNC: 6220 PG/ML
BUN SERPL-MCNC: 16 MG/DL (ref 6–20)
BUN/CREAT SERPL: 11 (ref 12–20)
CA-I BLD-MCNC: 8.7 MG/DL (ref 8.5–10.1)
CHLORIDE SERPL-SCNC: 100 MMOL/L (ref 97–108)
CO2 SERPL-SCNC: 30 MMOL/L (ref 21–32)
CREAT SERPL-MCNC: 1.41 MG/DL (ref 0.55–1.02)
DIFFERENTIAL METHOD BLD: ABNORMAL
EOSINOPHIL # BLD: 0.1 K/UL (ref 0–0.4)
EOSINOPHIL NFR BLD: 1 % (ref 0–7)
ERYTHROCYTE [DISTWIDTH] IN BLOOD BY AUTOMATED COUNT: 19.3 % (ref 11.5–14.5)
GLOBULIN SER CALC-MCNC: 3.6 G/DL (ref 2–4)
GLUCOSE SERPL-MCNC: 122 MG/DL (ref 65–100)
HCT VFR BLD AUTO: 41.9 % (ref 35–47)
HGB BLD-MCNC: 13.1 G/DL (ref 11.5–16)
IMM GRANULOCYTES # BLD AUTO: 0 K/UL (ref 0–0.04)
IMM GRANULOCYTES NFR BLD AUTO: 0 % (ref 0–0.5)
LYMPHOCYTES # BLD: 1.1 K/UL (ref 0.8–3.5)
LYMPHOCYTES NFR BLD: 14 % (ref 12–49)
MAGNESIUM SERPL-MCNC: 1.2 MG/DL (ref 1.6–2.4)
MCH RBC QN AUTO: 26.6 PG (ref 26–34)
MCHC RBC AUTO-ENTMCNC: 31.3 G/DL (ref 30–36.5)
MCV RBC AUTO: 85.2 FL (ref 80–99)
MONOCYTES # BLD: 0.6 K/UL (ref 0–1)
MONOCYTES NFR BLD: 7 % (ref 5–13)
NEUTS SEG # BLD: 6 K/UL (ref 1.8–8)
NEUTS SEG NFR BLD: 78 % (ref 32–75)
NRBC # BLD: 0.02 K/UL (ref 0–0.01)
NRBC BLD-RTO: 0.3 PER 100 WBC
PLATELET # BLD AUTO: 311 K/UL (ref 150–400)
PMV BLD AUTO: 9.5 FL (ref 8.9–12.9)
POTASSIUM SERPL-SCNC: 2.8 MMOL/L (ref 3.5–5.1)
PROT SERPL-MCNC: 6.9 G/DL (ref 6.4–8.2)
RBC # BLD AUTO: 4.92 M/UL (ref 3.8–5.2)
SODIUM SERPL-SCNC: 142 MMOL/L (ref 136–145)
TROPONIN I SERPL HS-MCNC: 85 NG/L (ref 0–51)
WBC # BLD AUTO: 7.8 K/UL (ref 3.6–11)

## 2024-10-16 PROCEDURE — 93005 ELECTROCARDIOGRAM TRACING: CPT | Performed by: STUDENT IN AN ORGANIZED HEALTH CARE EDUCATION/TRAINING PROGRAM

## 2024-10-16 PROCEDURE — 83880 ASSAY OF NATRIURETIC PEPTIDE: CPT

## 2024-10-16 PROCEDURE — 85025 COMPLETE CBC W/AUTO DIFF WBC: CPT

## 2024-10-16 PROCEDURE — 6360000002 HC RX W HCPCS: Performed by: STUDENT IN AN ORGANIZED HEALTH CARE EDUCATION/TRAINING PROGRAM

## 2024-10-16 PROCEDURE — 96375 TX/PRO/DX INJ NEW DRUG ADDON: CPT

## 2024-10-16 PROCEDURE — 80053 COMPREHEN METABOLIC PANEL: CPT

## 2024-10-16 PROCEDURE — 84484 ASSAY OF TROPONIN QUANT: CPT

## 2024-10-16 PROCEDURE — 83735 ASSAY OF MAGNESIUM: CPT

## 2024-10-16 PROCEDURE — 6370000000 HC RX 637 (ALT 250 FOR IP): Performed by: STUDENT IN AN ORGANIZED HEALTH CARE EDUCATION/TRAINING PROGRAM

## 2024-10-16 PROCEDURE — 71045 X-RAY EXAM CHEST 1 VIEW: CPT

## 2024-10-16 PROCEDURE — 96365 THER/PROPH/DIAG IV INF INIT: CPT

## 2024-10-16 PROCEDURE — 99285 EMERGENCY DEPT VISIT HI MDM: CPT

## 2024-10-16 PROCEDURE — G0378 HOSPITAL OBSERVATION PER HR: HCPCS

## 2024-10-16 RX ORDER — FUROSEMIDE 40 MG/1
40 TABLET ORAL DAILY
Status: DISCONTINUED | OUTPATIENT
Start: 2024-10-17 | End: 2024-10-18 | Stop reason: HOSPADM

## 2024-10-16 RX ORDER — BUDESONIDE AND FORMOTEROL FUMARATE DIHYDRATE 160; 4.5 UG/1; UG/1
2 AEROSOL RESPIRATORY (INHALATION)
Status: DISCONTINUED | OUTPATIENT
Start: 2024-10-17 | End: 2024-10-18 | Stop reason: HOSPADM

## 2024-10-16 RX ORDER — MAGNESIUM SULFATE IN WATER 40 MG/ML
2000 INJECTION, SOLUTION INTRAVENOUS
Status: COMPLETED | OUTPATIENT
Start: 2024-10-16 | End: 2024-10-17

## 2024-10-16 RX ORDER — ONDANSETRON 2 MG/ML
4 INJECTION INTRAMUSCULAR; INTRAVENOUS EVERY 6 HOURS PRN
Status: DISCONTINUED | OUTPATIENT
Start: 2024-10-16 | End: 2024-10-18 | Stop reason: HOSPADM

## 2024-10-16 RX ORDER — PANTOPRAZOLE SODIUM 40 MG/1
40 TABLET, DELAYED RELEASE ORAL
Status: DISCONTINUED | OUTPATIENT
Start: 2024-10-17 | End: 2024-10-18 | Stop reason: HOSPADM

## 2024-10-16 RX ORDER — ATORVASTATIN CALCIUM 40 MG/1
40 TABLET, FILM COATED ORAL DAILY
Status: DISCONTINUED | OUTPATIENT
Start: 2024-10-17 | End: 2024-10-18 | Stop reason: HOSPADM

## 2024-10-16 RX ORDER — LISINOPRIL 20 MG/1
20 TABLET ORAL 2 TIMES DAILY
Status: DISCONTINUED | OUTPATIENT
Start: 2024-10-17 | End: 2024-10-18 | Stop reason: HOSPADM

## 2024-10-16 RX ORDER — ALBUTEROL SULFATE 2.5 MG/.5ML
2.5 SOLUTION RESPIRATORY (INHALATION) EVERY 4 HOURS PRN
Status: DISCONTINUED | OUTPATIENT
Start: 2024-10-16 | End: 2024-10-18 | Stop reason: HOSPADM

## 2024-10-16 RX ORDER — FERROUS SULFATE 325(65) MG
325 TABLET ORAL
Status: DISCONTINUED | OUTPATIENT
Start: 2024-10-17 | End: 2024-10-18 | Stop reason: HOSPADM

## 2024-10-16 RX ORDER — FLUTICASONE PROPIONATE 50 MCG
2 SPRAY, SUSPENSION (ML) NASAL DAILY
Status: DISCONTINUED | OUTPATIENT
Start: 2024-10-17 | End: 2024-10-18 | Stop reason: HOSPADM

## 2024-10-16 RX ORDER — ENOXAPARIN SODIUM 100 MG/ML
40 INJECTION SUBCUTANEOUS DAILY
Status: DISCONTINUED | OUTPATIENT
Start: 2024-10-17 | End: 2024-10-18 | Stop reason: HOSPADM

## 2024-10-16 RX ORDER — ALBUTEROL SULFATE 90 UG/1
2 INHALANT RESPIRATORY (INHALATION) EVERY 4 HOURS PRN
Status: DISCONTINUED | OUTPATIENT
Start: 2024-10-16 | End: 2024-10-18 | Stop reason: HOSPADM

## 2024-10-16 RX ORDER — POTASSIUM CHLORIDE 7.45 MG/ML
10 INJECTION INTRAVENOUS PRN
Status: DISCONTINUED | OUTPATIENT
Start: 2024-10-16 | End: 2024-10-18 | Stop reason: HOSPADM

## 2024-10-16 RX ORDER — POLYETHYLENE GLYCOL 3350 17 G/17G
17 POWDER, FOR SOLUTION ORAL DAILY PRN
Status: DISCONTINUED | OUTPATIENT
Start: 2024-10-16 | End: 2024-10-18 | Stop reason: HOSPADM

## 2024-10-16 RX ORDER — FUROSEMIDE 10 MG/ML
40 INJECTION INTRAMUSCULAR; INTRAVENOUS 2 TIMES DAILY
Status: DISCONTINUED | OUTPATIENT
Start: 2024-10-16 | End: 2024-10-18 | Stop reason: HOSPADM

## 2024-10-16 RX ORDER — MAGNESIUM SULFATE IN WATER 40 MG/ML
2000 INJECTION, SOLUTION INTRAVENOUS PRN
Status: DISCONTINUED | OUTPATIENT
Start: 2024-10-16 | End: 2024-10-18 | Stop reason: HOSPADM

## 2024-10-16 RX ORDER — SODIUM CHLORIDE 0.9 % (FLUSH) 0.9 %
5-40 SYRINGE (ML) INJECTION PRN
Status: DISCONTINUED | OUTPATIENT
Start: 2024-10-16 | End: 2024-10-18 | Stop reason: HOSPADM

## 2024-10-16 RX ORDER — ONDANSETRON 4 MG/1
4 TABLET, ORALLY DISINTEGRATING ORAL EVERY 8 HOURS PRN
Status: DISCONTINUED | OUTPATIENT
Start: 2024-10-16 | End: 2024-10-18 | Stop reason: HOSPADM

## 2024-10-16 RX ORDER — SODIUM CHLORIDE 0.9 % (FLUSH) 0.9 %
5-40 SYRINGE (ML) INJECTION EVERY 12 HOURS SCHEDULED
Status: DISCONTINUED | OUTPATIENT
Start: 2024-10-16 | End: 2024-10-18 | Stop reason: HOSPADM

## 2024-10-16 RX ORDER — FUROSEMIDE 10 MG/ML
20 INJECTION INTRAMUSCULAR; INTRAVENOUS ONCE
Status: COMPLETED | OUTPATIENT
Start: 2024-10-16 | End: 2024-10-16

## 2024-10-16 RX ORDER — POTASSIUM CHLORIDE 1500 MG/1
40 TABLET, EXTENDED RELEASE ORAL PRN
Status: DISCONTINUED | OUTPATIENT
Start: 2024-10-16 | End: 2024-10-18 | Stop reason: HOSPADM

## 2024-10-16 RX ORDER — ACETAMINOPHEN 650 MG/1
650 SUPPOSITORY RECTAL EVERY 6 HOURS PRN
Status: DISCONTINUED | OUTPATIENT
Start: 2024-10-16 | End: 2024-10-18 | Stop reason: HOSPADM

## 2024-10-16 RX ORDER — ACETAMINOPHEN 325 MG/1
650 TABLET ORAL EVERY 6 HOURS PRN
Status: DISCONTINUED | OUTPATIENT
Start: 2024-10-16 | End: 2024-10-18 | Stop reason: HOSPADM

## 2024-10-16 RX ORDER — SODIUM CHLORIDE 9 MG/ML
INJECTION, SOLUTION INTRAVENOUS PRN
Status: DISCONTINUED | OUTPATIENT
Start: 2024-10-16 | End: 2024-10-18 | Stop reason: HOSPADM

## 2024-10-16 RX ORDER — POTASSIUM CHLORIDE 750 MG/1
10 TABLET, EXTENDED RELEASE ORAL ONCE
Status: DISCONTINUED | OUTPATIENT
Start: 2024-10-17 | End: 2024-10-18 | Stop reason: HOSPADM

## 2024-10-16 RX ORDER — CLOPIDOGREL BISULFATE 75 MG/1
75 TABLET ORAL DAILY
Status: DISCONTINUED | OUTPATIENT
Start: 2024-10-17 | End: 2024-10-18 | Stop reason: HOSPADM

## 2024-10-16 RX ADMIN — MAGNESIUM SULFATE HEPTAHYDRATE 2000 MG: 40 INJECTION, SOLUTION INTRAVENOUS at 23:00

## 2024-10-16 RX ADMIN — FUROSEMIDE 20 MG: 10 INJECTION, SOLUTION INTRAMUSCULAR; INTRAVENOUS at 20:45

## 2024-10-16 RX ADMIN — POTASSIUM BICARBONATE 40 MEQ: 782 TABLET, EFFERVESCENT ORAL at 20:45

## 2024-10-16 ASSESSMENT — LIFESTYLE VARIABLES
HOW OFTEN DO YOU HAVE A DRINK CONTAINING ALCOHOL: NEVER
HOW MANY STANDARD DRINKS CONTAINING ALCOHOL DO YOU HAVE ON A TYPICAL DAY: PATIENT DOES NOT DRINK

## 2024-10-16 ASSESSMENT — ENCOUNTER SYMPTOMS
SHORTNESS OF BREATH: 1
GASTROINTESTINAL NEGATIVE: 1

## 2024-10-16 NOTE — ED TRIAGE NOTES
Patient reports via EMS for SOB. 2L NC at baseline  Was seen Sunday for SOB; but difficulty breathing increased around lunch today; Hx COPD, CHF    Currently showing Afib on the monitor

## 2024-10-17 ENCOUNTER — APPOINTMENT (OUTPATIENT)
Facility: HOSPITAL | Age: 69
DRG: 280 | End: 2024-10-17
Payer: MEDICARE

## 2024-10-17 PROBLEM — I50.9 ACUTE DECOMPENSATED HEART FAILURE (HCC): Status: ACTIVE | Noted: 2024-10-17

## 2024-10-17 LAB
ANION GAP SERPL CALC-SCNC: 7 MMOL/L (ref 2–12)
ANION GAP SERPL CALC-SCNC: 9 MMOL/L (ref 2–12)
BASOPHILS # BLD: 0 K/UL (ref 0–0.1)
BASOPHILS NFR BLD: 0 % (ref 0–1)
BUN SERPL-MCNC: 16 MG/DL (ref 6–20)
BUN SERPL-MCNC: 18 MG/DL (ref 6–20)
BUN/CREAT SERPL: 12 (ref 12–20)
BUN/CREAT SERPL: 14 (ref 12–20)
CA-I BLD-MCNC: 8.8 MG/DL (ref 8.5–10.1)
CA-I BLD-MCNC: 9 MG/DL (ref 8.5–10.1)
CHLORIDE SERPL-SCNC: 97 MMOL/L (ref 97–108)
CHLORIDE SERPL-SCNC: 99 MMOL/L (ref 97–108)
CO2 SERPL-SCNC: 30 MMOL/L (ref 21–32)
CO2 SERPL-SCNC: 34 MMOL/L (ref 21–32)
CREAT SERPL-MCNC: 1.3 MG/DL (ref 0.55–1.02)
CREAT SERPL-MCNC: 1.31 MG/DL (ref 0.55–1.02)
DIFFERENTIAL METHOD BLD: ABNORMAL
ECHO AO ASC DIAM: 3 CM
ECHO AO ASCENDING AORTA INDEX: 1.54 CM/M2
ECHO AO ROOT DIAM: 3.1 CM
ECHO AO ROOT INDEX: 1.59 CM/M2
ECHO AV AREA PEAK VELOCITY: 1.1 CM2
ECHO AV AREA VTI: 1 CM2
ECHO AV AREA/BSA PEAK VELOCITY: 0.6 CM2/M2
ECHO AV AREA/BSA VTI: 0.5 CM2/M2
ECHO AV MEAN GRADIENT: 19 MMHG
ECHO AV MEAN VELOCITY: 2 M/S
ECHO AV PEAK GRADIENT: 36 MMHG
ECHO AV PEAK VELOCITY: 3 M/S
ECHO AV VELOCITY RATIO: 0.27
ECHO AV VTI: 62.8 CM
ECHO BSA: 2 M2
ECHO EST RA PRESSURE: 15 MMHG
ECHO IVC EXP: 2.7 CM
ECHO LA AREA 4C: 19.2 CM2
ECHO LA DIAMETER INDEX: 2.21 CM/M2
ECHO LA DIAMETER: 4.3 CM
ECHO LA MAJOR AXIS: 6.1 CM
ECHO LA TO AORTIC ROOT RATIO: 1.39
ECHO LA VOL MOD A4C: 49 ML (ref 22–52)
ECHO LA VOLUME INDEX MOD A4C: 25 ML/M2 (ref 16–34)
ECHO LV E' LATERAL VELOCITY: 3.1 CM/S
ECHO LV E' SEPTAL VELOCITY: 3.8 CM/S
ECHO LV EDV A4C: 97 ML
ECHO LV EDV INDEX A4C: 50 ML/M2
ECHO LV EJECTION FRACTION A4C: 32 %
ECHO LV EJECTION FRACTION BIPLANE: 32 % (ref 55–100)
ECHO LV ESV A4C: 66 ML
ECHO LV ESV INDEX A4C: 34 ML/M2
ECHO LV FRACTIONAL SHORTENING: 11 % (ref 28–44)
ECHO LV INTERNAL DIMENSION DIASTOLE INDEX: 2.31 CM/M2
ECHO LV INTERNAL DIMENSION DIASTOLIC: 4.5 CM (ref 3.9–5.3)
ECHO LV INTERNAL DIMENSION SYSTOLIC INDEX: 2.05 CM/M2
ECHO LV INTERNAL DIMENSION SYSTOLIC: 4 CM
ECHO LV IVSD: 1.1 CM (ref 0.6–0.9)
ECHO LV MASS 2D: 235.3 G (ref 67–162)
ECHO LV MASS INDEX 2D: 120.7 G/M2 (ref 43–95)
ECHO LV POSTERIOR WALL DIASTOLIC: 1.6 CM (ref 0.6–0.9)
ECHO LV RELATIVE WALL THICKNESS RATIO: 0.71
ECHO LVOT AREA: 3.8 CM2
ECHO LVOT AV VTI INDEX: 0.26
ECHO LVOT DIAM: 2.2 CM
ECHO LVOT MEAN GRADIENT: 1 MMHG
ECHO LVOT PEAK GRADIENT: 3 MMHG
ECHO LVOT PEAK VELOCITY: 0.8 M/S
ECHO LVOT STROKE VOLUME INDEX: 31.6 ML/M2
ECHO LVOT SV: 61.6 ML
ECHO LVOT VTI: 16.2 CM
ECHO MV AREA VTI: 3.8 CM2
ECHO MV LVOT VTI INDEX: 0.99
ECHO MV MAX VELOCITY: 1.2 M/S
ECHO MV MEAN GRADIENT: 3 MMHG
ECHO MV MEAN VELOCITY: 0.7 M/S
ECHO MV PEAK GRADIENT: 6 MMHG
ECHO MV REGURGITANT PEAK GRADIENT: 38 MMHG
ECHO MV REGURGITANT PEAK VELOCITY: 3.1 M/S
ECHO MV VTI: 16 CM
ECHO PV ACCELERATION TIME (AT): 63 MS
ECHO PV MAX VELOCITY: 0.7 M/S
ECHO PV PEAK GRADIENT: 2 MMHG
ECHO RA AREA 4C: 21.4 CM2
ECHO RA END SYSTOLIC VOLUME APICAL 4 CHAMBER INDEX BSA: 32 ML/M2
ECHO RA VOLUME: 63 ML
ECHO RIGHT VENTRICULAR SYSTOLIC PRESSURE (RVSP): 54 MMHG
ECHO RV BASAL DIMENSION: 4.5 CM
ECHO RV FREE WALL PEAK S': 5.2 CM/S
ECHO RV TAPSE: 0.9 CM (ref 1.7–?)
ECHO TV REGURGITANT MAX VELOCITY: 3.14 M/S
ECHO TV REGURGITANT PEAK GRADIENT: 39 MMHG
EOSINOPHIL # BLD: 0.1 K/UL (ref 0–0.4)
EOSINOPHIL NFR BLD: 1 % (ref 0–7)
ERYTHROCYTE [DISTWIDTH] IN BLOOD BY AUTOMATED COUNT: 19.2 % (ref 11.5–14.5)
GLUCOSE BLD STRIP.AUTO-MCNC: 124 MG/DL (ref 65–100)
GLUCOSE BLD STRIP.AUTO-MCNC: 129 MG/DL (ref 65–100)
GLUCOSE BLD STRIP.AUTO-MCNC: 137 MG/DL (ref 65–100)
GLUCOSE BLD STRIP.AUTO-MCNC: 229 MG/DL (ref 65–100)
GLUCOSE SERPL-MCNC: 108 MG/DL (ref 65–100)
GLUCOSE SERPL-MCNC: 131 MG/DL (ref 65–100)
HCT VFR BLD AUTO: 39.8 % (ref 35–47)
HGB BLD-MCNC: 12.6 G/DL (ref 11.5–16)
IMM GRANULOCYTES # BLD AUTO: 0 K/UL (ref 0–0.04)
IMM GRANULOCYTES NFR BLD AUTO: 0 % (ref 0–0.5)
LYMPHOCYTES # BLD: 1.2 K/UL (ref 0.8–3.5)
LYMPHOCYTES NFR BLD: 17 % (ref 12–49)
MAGNESIUM SERPL-MCNC: 1.5 MG/DL (ref 1.6–2.4)
MAGNESIUM SERPL-MCNC: 2.4 MG/DL (ref 1.6–2.4)
MCH RBC QN AUTO: 26.6 PG (ref 26–34)
MCHC RBC AUTO-ENTMCNC: 31.7 G/DL (ref 30–36.5)
MCV RBC AUTO: 84.1 FL (ref 80–99)
MONOCYTES # BLD: 0.5 K/UL (ref 0–1)
MONOCYTES NFR BLD: 6 % (ref 5–13)
NEUTS SEG # BLD: 5.3 K/UL (ref 1.8–8)
NEUTS SEG NFR BLD: 76 % (ref 32–75)
NRBC # BLD: 0 K/UL (ref 0–0.01)
NRBC BLD-RTO: 0 PER 100 WBC
PERFORMED BY:: ABNORMAL
PHOSPHATE SERPL-MCNC: 2.3 MG/DL (ref 2.6–4.7)
PLATELET # BLD AUTO: 287 K/UL (ref 150–400)
PMV BLD AUTO: 9.2 FL (ref 8.9–12.9)
POTASSIUM SERPL-SCNC: 2.7 MMOL/L (ref 3.5–5.1)
POTASSIUM SERPL-SCNC: 3.3 MMOL/L (ref 3.5–5.1)
POTASSIUM SERPL-SCNC: 3.7 MMOL/L (ref 3.5–5.1)
RBC # BLD AUTO: 4.73 M/UL (ref 3.8–5.2)
SODIUM SERPL-SCNC: 138 MMOL/L (ref 136–145)
SODIUM SERPL-SCNC: 138 MMOL/L (ref 136–145)
TROPONIN I SERPL HS-MCNC: 91 NG/L (ref 0–51)
TROPONIN I SERPL HS-MCNC: 97 NG/L (ref 0–51)
WBC # BLD AUTO: 7.1 K/UL (ref 3.6–11)

## 2024-10-17 PROCEDURE — 96375 TX/PRO/DX INJ NEW DRUG ADDON: CPT

## 2024-10-17 PROCEDURE — 96366 THER/PROPH/DIAG IV INF ADDON: CPT

## 2024-10-17 PROCEDURE — 80048 BASIC METABOLIC PNL TOTAL CA: CPT

## 2024-10-17 PROCEDURE — 6370000000 HC RX 637 (ALT 250 FOR IP): Performed by: INTERNAL MEDICINE

## 2024-10-17 PROCEDURE — 93306 TTE W/DOPPLER COMPLETE: CPT

## 2024-10-17 PROCEDURE — 84484 ASSAY OF TROPONIN QUANT: CPT

## 2024-10-17 PROCEDURE — 6360000004 HC RX CONTRAST MEDICATION

## 2024-10-17 PROCEDURE — 6360000002 HC RX W HCPCS: Performed by: INTERNAL MEDICINE

## 2024-10-17 PROCEDURE — 6370000000 HC RX 637 (ALT 250 FOR IP)

## 2024-10-17 PROCEDURE — 83735 ASSAY OF MAGNESIUM: CPT

## 2024-10-17 PROCEDURE — 97530 THERAPEUTIC ACTIVITIES: CPT

## 2024-10-17 PROCEDURE — 2580000003 HC RX 258

## 2024-10-17 PROCEDURE — 94640 AIRWAY INHALATION TREATMENT: CPT

## 2024-10-17 PROCEDURE — 82962 GLUCOSE BLOOD TEST: CPT

## 2024-10-17 PROCEDURE — 6360000002 HC RX W HCPCS

## 2024-10-17 PROCEDURE — 1100000000 HC RM PRIVATE

## 2024-10-17 PROCEDURE — 84132 ASSAY OF SERUM POTASSIUM: CPT

## 2024-10-17 PROCEDURE — G0378 HOSPITAL OBSERVATION PER HR: HCPCS

## 2024-10-17 PROCEDURE — 2500000003 HC RX 250 WO HCPCS: Performed by: INTERNAL MEDICINE

## 2024-10-17 PROCEDURE — 96376 TX/PRO/DX INJ SAME DRUG ADON: CPT

## 2024-10-17 PROCEDURE — 36415 COLL VENOUS BLD VENIPUNCTURE: CPT

## 2024-10-17 PROCEDURE — 84100 ASSAY OF PHOSPHORUS: CPT

## 2024-10-17 PROCEDURE — 97161 PT EVAL LOW COMPLEX 20 MIN: CPT

## 2024-10-17 PROCEDURE — 85025 COMPLETE CBC W/AUTO DIFF WBC: CPT

## 2024-10-17 PROCEDURE — 2700000000 HC OXYGEN THERAPY PER DAY

## 2024-10-17 RX ORDER — DEXTROSE MONOHYDRATE 100 MG/ML
INJECTION, SOLUTION INTRAVENOUS CONTINUOUS PRN
Status: DISCONTINUED | OUTPATIENT
Start: 2024-10-17 | End: 2024-10-18 | Stop reason: HOSPADM

## 2024-10-17 RX ORDER — GLUCAGON 1 MG/ML
1 KIT INJECTION PRN
Status: DISCONTINUED | OUTPATIENT
Start: 2024-10-17 | End: 2024-10-18 | Stop reason: HOSPADM

## 2024-10-17 RX ORDER — POTASSIUM CHLORIDE 1500 MG/1
40 TABLET, EXTENDED RELEASE ORAL
Status: COMPLETED | OUTPATIENT
Start: 2024-10-17 | End: 2024-10-17

## 2024-10-17 RX ORDER — MAGNESIUM SULFATE 1 G/100ML
1000 INJECTION INTRAVENOUS ONCE
Status: COMPLETED | OUTPATIENT
Start: 2024-10-17 | End: 2024-10-17

## 2024-10-17 RX ORDER — HYDRALAZINE HYDROCHLORIDE 10 MG/1
10 TABLET, FILM COATED ORAL EVERY 8 HOURS SCHEDULED
Status: DISCONTINUED | OUTPATIENT
Start: 2024-10-17 | End: 2024-10-18 | Stop reason: HOSPADM

## 2024-10-17 RX ORDER — MAGNESIUM SULFATE HEPTAHYDRATE 40 MG/ML
2000 INJECTION, SOLUTION INTRAVENOUS ONCE
Status: COMPLETED | OUTPATIENT
Start: 2024-10-17 | End: 2024-10-17

## 2024-10-17 RX ORDER — CARVEDILOL 3.12 MG/1
6.25 TABLET ORAL 2 TIMES DAILY WITH MEALS
Status: DISCONTINUED | OUTPATIENT
Start: 2024-10-17 | End: 2024-10-17

## 2024-10-17 RX ORDER — INSULIN LISPRO 100 [IU]/ML
0-8 INJECTION, SOLUTION INTRAVENOUS; SUBCUTANEOUS
Status: DISCONTINUED | OUTPATIENT
Start: 2024-10-17 | End: 2024-10-18 | Stop reason: HOSPADM

## 2024-10-17 RX ORDER — POTASSIUM CHLORIDE 7.45 MG/ML
10 INJECTION INTRAVENOUS
Status: DISPENSED | OUTPATIENT
Start: 2024-10-17 | End: 2024-10-17

## 2024-10-17 RX ORDER — LORATADINE 10 MG/1
10 TABLET ORAL DAILY
COMMUNITY

## 2024-10-17 RX ORDER — CARVEDILOL 3.12 MG/1
3.12 TABLET ORAL 2 TIMES DAILY WITH MEALS
Status: DISCONTINUED | OUTPATIENT
Start: 2024-10-17 | End: 2024-10-18 | Stop reason: HOSPADM

## 2024-10-17 RX ORDER — INSULIN LISPRO 100 [IU]/ML
0-4 INJECTION, SOLUTION INTRAVENOUS; SUBCUTANEOUS
Status: DISCONTINUED | OUTPATIENT
Start: 2024-10-17 | End: 2024-10-17

## 2024-10-17 RX ADMIN — EMPAGLIFLOZIN 25 MG: 10 TABLET, FILM COATED ORAL at 09:16

## 2024-10-17 RX ADMIN — PERFLUTREN 1.5 ML: 6.52 INJECTION, SUSPENSION INTRAVENOUS at 17:10

## 2024-10-17 RX ADMIN — POTASSIUM CHLORIDE 40 MEQ: 1500 TABLET, EXTENDED RELEASE ORAL at 09:16

## 2024-10-17 RX ADMIN — ATORVASTATIN CALCIUM 40 MG: 40 TABLET, FILM COATED ORAL at 09:16

## 2024-10-17 RX ADMIN — SODIUM CHLORIDE, PRESERVATIVE FREE 10 ML: 5 INJECTION INTRAVENOUS at 20:32

## 2024-10-17 RX ADMIN — SODIUM CHLORIDE, PRESERVATIVE FREE 10 ML: 5 INJECTION INTRAVENOUS at 09:17

## 2024-10-17 RX ADMIN — POTASSIUM CHLORIDE 40 MEQ: 1500 TABLET, EXTENDED RELEASE ORAL at 22:33

## 2024-10-17 RX ADMIN — MAGNESIUM SULFATE HEPTAHYDRATE 2000 MG: 40 INJECTION, SOLUTION INTRAVENOUS at 09:35

## 2024-10-17 RX ADMIN — POTASSIUM CHLORIDE 10 MEQ: 7.46 INJECTION, SOLUTION INTRAVENOUS at 15:47

## 2024-10-17 RX ADMIN — PANTOPRAZOLE SODIUM 40 MG: 40 TABLET, DELAYED RELEASE ORAL at 05:54

## 2024-10-17 RX ADMIN — INSULIN LISPRO 2 UNITS: 100 INJECTION, SOLUTION INTRAVENOUS; SUBCUTANEOUS at 20:28

## 2024-10-17 RX ADMIN — CARVEDILOL 3.12 MG: 3.12 TABLET, FILM COATED ORAL at 10:54

## 2024-10-17 RX ADMIN — CARVEDILOL 3.12 MG: 3.12 TABLET, FILM COATED ORAL at 18:23

## 2024-10-17 RX ADMIN — FLUTICASONE PROPIONATE 2 SPRAY: 50 SPRAY, METERED NASAL at 10:54

## 2024-10-17 RX ADMIN — FERROUS SULFATE TAB 325 MG (65 MG ELEMENTAL FE) 325 MG: 325 (65 FE) TAB at 05:54

## 2024-10-17 RX ADMIN — POTASSIUM CHLORIDE 10 MEQ: 7.46 INJECTION, SOLUTION INTRAVENOUS at 12:06

## 2024-10-17 RX ADMIN — Medication 2 PUFF: at 07:55

## 2024-10-17 RX ADMIN — FUROSEMIDE 40 MG: 10 INJECTION, SOLUTION INTRAMUSCULAR; INTRAVENOUS at 01:20

## 2024-10-17 RX ADMIN — SODIUM CHLORIDE, PRESERVATIVE FREE 10 ML: 5 INJECTION INTRAVENOUS at 01:20

## 2024-10-17 RX ADMIN — FUROSEMIDE 40 MG: 10 INJECTION, SOLUTION INTRAMUSCULAR; INTRAVENOUS at 09:42

## 2024-10-17 RX ADMIN — MAGNESIUM SULFATE HEPTAHYDRATE 1000 MG: 1 INJECTION, SOLUTION INTRAVENOUS at 12:06

## 2024-10-17 RX ADMIN — POTASSIUM CHLORIDE 10 MEQ: 7.46 INJECTION, SOLUTION INTRAVENOUS at 09:35

## 2024-10-17 RX ADMIN — CLOPIDOGREL BISULFATE 75 MG: 75 TABLET ORAL at 09:16

## 2024-10-17 RX ADMIN — FUROSEMIDE 40 MG: 10 INJECTION, SOLUTION INTRAMUSCULAR; INTRAVENOUS at 18:23

## 2024-10-17 RX ADMIN — POTASSIUM CHLORIDE 40 MEQ: 1500 TABLET, EXTENDED RELEASE ORAL at 12:08

## 2024-10-17 RX ADMIN — Medication 2 PUFF: at 20:53

## 2024-10-17 NOTE — PLAN OF CARE
support  Standing - Dynamic: Fair;Constant support  Wheelchair Mobility:   Wheelchair Management  Wheelchair Management: No  Ambulation/Gait Training:   Gait  Gait Training: No   Therapeutic Intervention provided:   bed mobility , EOB transfers, OOB transfers, seated static and dynamic balance, and standing static and dynamic balance    Functional Measure:  [facility] AM-PAC™ “6 Clicks”         Basic Mobility Inpatient Short Form  How much difficulty does the patient currently have... Unable A Lot A Little None   1.  Turning over in bed (including adjusting bedclothes, sheets and blankets)?   [] 1   [] 2   [x] 3   [] 4   2.  Sitting down on and standing up from a chair with arms ( e.g., wheelchair, bedside commode, etc.)   [] 1   [x] 2   [] 3   [] 4   3.  Moving from lying on back to sitting on the side of the bed?   [] 1   [] 2   [x] 3   [] 4          How much help from another person does the patient currently need... Total A Lot A Little None   4.  Moving to and from a bed to a chair (including a wheelchair)?   [] 1   [] 2   [x] 3   [] 4   5.  Need to walk in hospital room?   [] 1   [x] 2   [] 3   [] 4   6.  Climbing 3-5 steps with a railing?   [] 1   [x] 2   [] 3   [] 4   © 2007, Trustees of [facility], under license to [employer]. All rights reserved     Score:  Initial: 15/24 Most Recent: (Date: 10/17/2024 )   Interpretation of Tool:  Represents activities that are increasingly more difficult (i.e. Bed mobility, Transfers, Gait).  Score 24 23 22-20 19-15 14-10 9-7 6   Modifier CH CI CJ CK CL CM CN         Physical Therapy Evaluation Charge Determination   History Examination Presentation Decision-Making   MEDIUM  Complexity : 1-2 comorbidities / personal factors will impact the outcome/ POC  MEDIUM Complexity : 3 Standardized tests and measures addressing body structure, function, activity limitation and / or participation in recreation  LOW Complexity : Stable, uncomplicated  Other outcome  measures [facility] 6  MEDIUM      Based on the above components, the patient evaluation is determined to be of the following complexity level: LOW    Pain Ratin/10 reported  Pain Intervention(s):   pain is at a level acceptable to the patient    Activity Tolerance:   Fair , requires frequent rest breaks, observed shortness of breath on exertion, and desaturates with activity and requires oxygen    After treatment patient left in no apparent distress:   Patient left sitting on bedside commode with and nsg updated.    COMMUNICATION/EDUCATION:   The patient’s plan of care was discussed with: Registered nurse and Case management     Patient Education  Education Given To: Patient  Education Provided: Role of Therapy;Plan of Care;Equipment;ADL Adaptive Strategies;Fall Prevention Strategies;Energy Conservation  Education Provided Comments: patient educated on sequencing for transfers, pursed lip breathing in order to optimize oxygen intake and discharge recommendations  Education Method: Demonstration;Verbal  Barriers to Learning: None  Education Outcome: Verbalized understanding;Demonstrated understanding;Continued education needed    JOHN Lindsey, under direct supervision of Esha Schaffer, PT, DPT provided care and treatment of pt with verbal consent from pt received.       Thank you for this referral.  JOHN Lindsey, DPT  Minutes: 31

## 2024-10-17 NOTE — PLAN OF CARE
Problem: Chronic Conditions and Co-morbidities  Goal: Patient's chronic conditions and co-morbidity symptoms are monitored and maintained or improved  10/17/2024 1123 by Chichi Winchester RN  Outcome: Progressing  Flowsheets (Taken 10/17/2024 0915)  Care Plan - Patient's Chronic Conditions and Co-Morbidity Symptoms are Monitored and Maintained or Improved: Monitor and assess patient's chronic conditions and comorbid symptoms for stability, deterioration, or improvement  10/17/2024 0143 by de Los Reyes, Beverly Jover, RN  Outcome: Progressing  Flowsheets (Taken 10/17/2024 0030)  Care Plan - Patient's Chronic Conditions and Co-Morbidity Symptoms are Monitored and Maintained or Improved: Monitor and assess patient's chronic conditions and comorbid symptoms for stability, deterioration, or improvement     Problem: Discharge Planning  Goal: Discharge to home or other facility with appropriate resources  Outcome: Progressing  Flowsheets  Taken 10/17/2024 0915 by Chichi Winchester RN  Discharge to home or other facility with appropriate resources: Identify barriers to discharge with patient and caregiver  Taken 10/17/2024 0030 by de Los Reyes, Beverly Jover, RN  Discharge to home or other facility with appropriate resources: Identify barriers to discharge with patient and caregiver     Problem: Skin/Tissue Integrity  Goal: Absence of new skin breakdown  Description: 1.  Monitor for areas of redness and/or skin breakdown  2.  Assess vascular access sites hourly  3.  Every 4-6 hours minimum:  Change oxygen saturation probe site  4.  Every 4-6 hours:  If on nasal continuous positive airway pressure, respiratory therapy assess nares and determine need for appliance change or resting period.  Outcome: Progressing     Problem: Safety - Adult  Goal: Free from fall injury  10/17/2024 1123 by Chichi Winchester, RN  Outcome: Progressing  10/17/2024 0143 by de Los Reyes, Beverly Jover, RN  Outcome: Progressing     Problem:  ABCDS Injury Assessment  Goal: Absence of physical injury  Outcome: Progressing

## 2024-10-17 NOTE — CARE COORDINATION
10/17/24 1120   Service Assessment   Patient Orientation Alert and Oriented   Cognition Alert   History Provided By Patient   Primary Caregiver Self   Support Systems Children   Patient's Healthcare Decision Maker is: Legal Next of Kin   PCP Verified by CM Yes   Last Visit to PCP Within last 6 months   Prior Functional Level Independent in ADLs/IADLs   Current Functional Level Independent in ADLs/IADLs   Can patient return to prior living arrangement Yes   Ability to make needs known: Good   Family able to assist with home care needs: Yes   Would you like for me to discuss the discharge plan with any other family members/significant others, and if so, who? Yes     Advance Care Planning     General Advance Care Planning (ACP) Conversation    Date of Conversation: 10/17/2024  Conducted with: Patient with Decision Making Capacity  Other persons present: None    Healthcare Decision Maker:   Primary Decision Maker: FerreiraNikimarianne - Child - [phone]         Length of Voluntary ACP Conversation in minutes:  <16 minutes (Non-Billable)    Daren Sheppard RN CM met with patient at bedside to complete discharge planning assessment. Patient lives alone at address on facesheet. Only DME usage is 2LPM Oxygen Via Nasal cannula supplied through [employer]. Patients daughter will  at time of discharge, CM informed her to make sure her daughter brings her portable home oxygen to hospital on discharge day for her to use on way home.  Patient has never been to rehab in the past. She had home health 2-3 years ago.  Patient is agreeable with home health at time of discharge. [place] of choice for no preference. CM sent referral to [facility]. Patient is pending acceptance at this time.

## 2024-10-17 NOTE — H&P
Hospitalist Admission Note    NAME: Pati Ferreira   :  1955   MRN:  [id number]     Date/Time:  10/16/2024 11:30 PM    Patient PCP: Demetria Conner, DO    ______________________________________________________________________  Given the patient's current clinical presentation, I have a high level of concern for decompensation if discharged from the emergency department.  Complex decision making was performed, which includes reviewing the patient's available past medical records, laboratory results, and x-ray films.       My assessment of this patient's clinical condition and my plan of care is as follows.    Assessment / Plan:      Acute on chronic heart failure exacerbation, history of heart failure with reduced EF 40 to 45%  -Currently on 2 L of oxygen at baseline  -BNP is 6000  -Will continue IV Lasix 40 mg twice daily  -Monitor I's and O  -Monitor creatinine, potassium and magnesium  -Daily weights  -Will repeat echo  -On GDMT, carvedilol, lisinopril, Jardiance, Aldactone  -Will continue Jardiance, hold the rest  -Will obtain cardiology consultation    Severe hypokalemia  Severe hypomagnesemia  -Likely iatrogenic  -BMP Q6 hr/Mag q 8 hr   -Monitor patient on telemetry for arrhythmias  -Replete as necessary    ANTHONY on CKD versus CKD  -Could be baseline, versus prerenal/cardiorenal  -Continue diuresis  -Lisinopril, Aldactone will hold due to ANTHONY  -Reinitiate once creatinine improves    Elevated troponin likely NSTEMI type II  -Trend troponin  -Unlikely ACS as EKG is unremarkable and no anginal symptoms.    COPD/chronic hypoxemic respiratory failure, stable  -Baseline oxygen requirement currently  -continue home meds     Irregular heart rhythm noted, possibly A-fib  -could be due to electrolyte abnormalities   -Will obtain EKG stat    Diabetes  -Hold metformin while inpatient  -continue sliding scale     Code Status: Full  DVT Prophylaxis: Lovenox  GI Prophylaxis: PPI     Medical Decision Making

## 2024-10-17 NOTE — PROGRESS NOTES
Received Order for Telemetry     Pati Ferreira   1955   [id number]   CHF (congestive heart failure), NYHA class I, acute on chronic, combined (HCC) [I50.43]   Arline Rivera MD     Tele Box # 1 placed on patient at  0019 am  ER Room # 5  Admitting to Room 204  Verified with Primary Nurse Cookie at  0044 am

## 2024-10-17 NOTE — PLAN OF CARE
4 Eyes Skin Assessment     NAME:  Pati Ferreira  YOB: 1955  MEDICAL RECORD NUMBER:  [id number]    The patient is being assessed for  Admission    I agree that at least one RN has performed a thorough Head to Toe Skin Assessment on the patient. ALL assessment sites listed below have been assessed.      Areas assessed by both nurses:    Head, Face, Ears, Shoulders, Back, Chest, Arms, Elbows, Hands, Sacrum. Buttock, Coccyx, Ischium, and Legs. Feet and Heels        Does the Patient have a Wound? No noted wound(s)       Aubrey Prevention initiated by RN: Yes  Wound Care Orders initiated by RN: No    Pressure Injury (Stage 3,4, Unstageable, DTI, NWPT, and Complex wounds) if present, place Wound referral order by RN under : No    New Ostomies, if present place, Ostomy referral order under : No     Nurse 1 eSignature: Electronically signed by Beverly De Los Reyes, RN on 10/17/24 at 1:44 AM EDT    **SHARE this note so that the co-signing nurse can place an eSignature**    Nurse 2 eSignature: Electronically signed by Prisca Chu RN on 10/17/24 at 1:51 AM EDT     Problem: Safety - Adult  Goal: Free from fall injury  Outcome: Progressing     Problem: Chronic Conditions and Co-morbidities  Goal: Patient's chronic conditions and co-morbidity symptoms are monitored and maintained or improved  Outcome: Progressing  Flowsheets (Taken 10/17/2024 0030)  Care Plan - Patient's Chronic Conditions and Co-Morbidity Symptoms are Monitored and Maintained or Improved: Monitor and assess patient's chronic conditions and comorbid symptoms for stability, deterioration, or improvement

## 2024-10-17 NOTE — CONSULTS
10/17/2024, 10:08 AM        [facility]  [address] [place] [address]  Phone: [phone]      Cardiology Consult      10/17/2024, 10:08 AM      Pati Ferreira  69 y.o. female  1955      Admit Date:  10/16/2024    Primary Cardiologist:  Dr. Bassett  Reason for consult: CHF  Requesting provider: Dr. Rivera    Impression:      Patient's clinical presentation with history of known coronary artery disease and LV systolic dysfunction is consistent with acute on chronic HFrEF.  Patient baseline LV ejection fraction is 40-45% with borderline left main disease and critical left circumflex disease for which patient has chosen medical management.  Mildly elevated troponin which appears to be type II non-STEMI related to problem 1.  Mild to moderate aortic stenosis with mean gradient of 19 mmHg.  COPD  Hypertension  Diabetes mellitus     Plan:      Agree with IV Lasix for diuresis with watch on electrolytes and serum magnesium.  I will use hydralazine in place of ACE inhibitor for afterload reduction due to patient's CKD.  Continue Plavix with beta-blocker and high-dose statin for empiric antianginal coverage.  Will follow-up on echocardiogram ordered earlier for patient's LV ejection fraction and aortic stenosis.      Subjective   This is a 69-year-old -American female with history of known coronary artery disease with left main 50 to 60% distal stenosis and  90% ostial left circumflex disease with baseline LV ejection fraction of 40-45% for which patient has chosen medical management, mild to moderate aortic stenosis with mean transaortic gradient of 19 mmHg, COPD, CKD, hypertension, diabetes mellitus, GI bleed with anemia and GERD who presented to emergency room with complaints of increasing shortness of breath, 3 pillow orthopnea and bilateral leg edema of past few days duration.  Patient on presentation denied any complaints of chest pain, palpitation, chest fluttering or  1/1/2014     Years since quitting: 10.8    Smokeless tobacco: Never   Substance and Sexual Activity    Alcohol use: Not Currently    Drug use: Never    Sexual activity: Not Currently   Other Topics Concern    Not on file   Social History Narrative    Not on file     Social Determinants of Health     Financial Resource Strain: Low Risk  (2/23/2024)    Overall Financial Resource Strain (CARDIA)     Difficulty of Paying Living Expenses: Not hard at all   Food Insecurity: No Food Insecurity (10/17/2024)    Hunger Vital Sign     Worried About Running Out of Food in the Last Year: Never true     Ran Out of Food in the Last Year: Never true   Transportation Needs: No Transportation Needs (10/17/2024)    PRAPARE - Transportation     Lack of Transportation (Medical): No     Lack of Transportation (Non-Medical): No   Physical Activity: Inactive (2/23/2024)    Exercise Vital Sign     Days of Exercise per Week: 0 days     Minutes of Exercise per Session: 0 min   Stress: No Stress Concern Present (4/18/2023)    [place] [employer] - Occupational Stress Questionnaire     Feeling of Stress : Not at all   Social Connections: Moderately Integrated (4/18/2023)    Social Connection and Isolation Panel [NHANES]     Frequency of Communication with Friends and Family: More than three times a week     Frequency of Social Gatherings with Friends and Family: More than three times a week     Attends Rastafari Services: More than 4 times per year     Active Member of Clubs or Organizations: Yes     Attends Club or Organization Meetings: More than 4 times per year     Marital Status:    Intimate Partner Violence: Not At Risk (4/18/2023)    Humiliation, Afraid, Rape, and Kick questionnaire     Fear of Current or Ex-Partner: No     Emotionally Abused: No     Physically Abused: No     Sexually Abused: No   Housing Stability: Low Risk  (10/17/2024)    Housing Stability Vital Sign     Unable to Pay for Housing in the

## 2024-10-18 VITALS
HEART RATE: 73 BPM | TEMPERATURE: 97.7 F | RESPIRATION RATE: 16 BRPM | HEIGHT: 65 IN | WEIGHT: 192.24 LBS | BODY MASS INDEX: 32.03 KG/M2 | OXYGEN SATURATION: 97 % | DIASTOLIC BLOOD PRESSURE: 75 MMHG | SYSTOLIC BLOOD PRESSURE: 115 MMHG

## 2024-10-18 LAB
ANION GAP SERPL CALC-SCNC: 6 MMOL/L (ref 2–12)
ANION GAP SERPL CALC-SCNC: 7 MMOL/L (ref 2–12)
BASOPHILS # BLD: 0 K/UL (ref 0–0.1)
BASOPHILS NFR BLD: 0 % (ref 0–1)
BUN SERPL-MCNC: 18 MG/DL (ref 6–20)
BUN SERPL-MCNC: 19 MG/DL (ref 6–20)
BUN/CREAT SERPL: 13 (ref 12–20)
BUN/CREAT SERPL: 14 (ref 12–20)
CA-I BLD-MCNC: 8.8 MG/DL (ref 8.5–10.1)
CA-I BLD-MCNC: 8.9 MG/DL (ref 8.5–10.1)
CHLORIDE SERPL-SCNC: 99 MMOL/L (ref 97–108)
CHLORIDE SERPL-SCNC: 99 MMOL/L (ref 97–108)
CO2 SERPL-SCNC: 32 MMOL/L (ref 21–32)
CO2 SERPL-SCNC: 33 MMOL/L (ref 21–32)
CREAT SERPL-MCNC: 1.29 MG/DL (ref 0.55–1.02)
CREAT SERPL-MCNC: 1.43 MG/DL (ref 0.55–1.02)
DIFFERENTIAL METHOD BLD: ABNORMAL
EKG ATRIAL RATE: 107 BPM
EKG DIAGNOSIS: NORMAL
EKG P-R INTERVAL: 144 MS
EKG Q-T INTERVAL: 384 MS
EKG QRS DURATION: 132 MS
EKG QTC CALCULATION (BAZETT): 510 MS
EKG R AXIS: 265 DEGREES
EKG T AXIS: 111 DEGREES
EKG VENTRICULAR RATE: 106 BPM
EOSINOPHIL # BLD: 0.2 K/UL (ref 0–0.4)
EOSINOPHIL NFR BLD: 3 % (ref 0–7)
ERYTHROCYTE [DISTWIDTH] IN BLOOD BY AUTOMATED COUNT: 19.1 % (ref 11.5–14.5)
EST. AVERAGE GLUCOSE BLD GHB EST-MCNC: 143 MG/DL
GLUCOSE BLD STRIP.AUTO-MCNC: 192 MG/DL (ref 65–100)
GLUCOSE BLD STRIP.AUTO-MCNC: 92 MG/DL (ref 65–100)
GLUCOSE SERPL-MCNC: 196 MG/DL (ref 65–100)
GLUCOSE SERPL-MCNC: 94 MG/DL (ref 65–100)
HBA1C MFR BLD: 6.6 % (ref 4–5.6)
HCT VFR BLD AUTO: 38.7 % (ref 35–47)
HGB BLD-MCNC: 12.1 G/DL (ref 11.5–16)
IMM GRANULOCYTES # BLD AUTO: 0 K/UL (ref 0–0.04)
IMM GRANULOCYTES NFR BLD AUTO: 1 % (ref 0–0.5)
LYMPHOCYTES # BLD: 1.2 K/UL (ref 0.8–3.5)
LYMPHOCYTES NFR BLD: 20 % (ref 12–49)
MAGNESIUM SERPL-MCNC: 2.1 MG/DL (ref 1.6–2.4)
MCH RBC QN AUTO: 26.5 PG (ref 26–34)
MCHC RBC AUTO-ENTMCNC: 31.3 G/DL (ref 30–36.5)
MCV RBC AUTO: 84.9 FL (ref 80–99)
MONOCYTES # BLD: 0.5 K/UL (ref 0–1)
MONOCYTES NFR BLD: 8 % (ref 5–13)
NEUTS SEG # BLD: 4.1 K/UL (ref 1.8–8)
NEUTS SEG NFR BLD: 68 % (ref 32–75)
NRBC # BLD: 0 K/UL (ref 0–0.01)
NRBC BLD-RTO: 0 PER 100 WBC
PERFORMED BY:: ABNORMAL
PERFORMED BY:: NORMAL
PHOSPHATE SERPL-MCNC: 2.7 MG/DL (ref 2.6–4.7)
PLATELET # BLD AUTO: 267 K/UL (ref 150–400)
PMV BLD AUTO: 9.5 FL (ref 8.9–12.9)
POTASSIUM SERPL-SCNC: 3.4 MMOL/L (ref 3.5–5.1)
POTASSIUM SERPL-SCNC: 3.6 MMOL/L (ref 3.5–5.1)
RBC # BLD AUTO: 4.56 M/UL (ref 3.8–5.2)
SODIUM SERPL-SCNC: 138 MMOL/L (ref 136–145)
SODIUM SERPL-SCNC: 138 MMOL/L (ref 136–145)
WBC # BLD AUTO: 5.9 K/UL (ref 3.6–11)

## 2024-10-18 PROCEDURE — 97110 THERAPEUTIC EXERCISES: CPT

## 2024-10-18 PROCEDURE — 6370000000 HC RX 637 (ALT 250 FOR IP): Performed by: INTERNAL MEDICINE

## 2024-10-18 PROCEDURE — 97116 GAIT TRAINING THERAPY: CPT

## 2024-10-18 PROCEDURE — 2700000000 HC OXYGEN THERAPY PER DAY

## 2024-10-18 PROCEDURE — 2580000003 HC RX 258

## 2024-10-18 PROCEDURE — 6360000002 HC RX W HCPCS

## 2024-10-18 PROCEDURE — 84100 ASSAY OF PHOSPHORUS: CPT

## 2024-10-18 PROCEDURE — 94640 AIRWAY INHALATION TREATMENT: CPT

## 2024-10-18 PROCEDURE — 83735 ASSAY OF MAGNESIUM: CPT

## 2024-10-18 PROCEDURE — 94761 N-INVAS EAR/PLS OXIMETRY MLT: CPT

## 2024-10-18 PROCEDURE — 80048 BASIC METABOLIC PNL TOTAL CA: CPT

## 2024-10-18 PROCEDURE — 82962 GLUCOSE BLOOD TEST: CPT

## 2024-10-18 PROCEDURE — 36415 COLL VENOUS BLD VENIPUNCTURE: CPT

## 2024-10-18 PROCEDURE — 6370000000 HC RX 637 (ALT 250 FOR IP)

## 2024-10-18 PROCEDURE — 85025 COMPLETE CBC W/AUTO DIFF WBC: CPT

## 2024-10-18 PROCEDURE — 83036 HEMOGLOBIN GLYCOSYLATED A1C: CPT

## 2024-10-18 RX ORDER — FUROSEMIDE 40 MG/1
80 TABLET ORAL DAILY
Qty: 90 TABLET | Refills: 0 | Status: SHIPPED | OUTPATIENT
Start: 2024-10-18

## 2024-10-18 RX ADMIN — FUROSEMIDE 40 MG: 10 INJECTION, SOLUTION INTRAMUSCULAR; INTRAVENOUS at 09:28

## 2024-10-18 RX ADMIN — HYDRALAZINE HYDROCHLORIDE 10 MG: 10 TABLET, FILM COATED ORAL at 06:29

## 2024-10-18 RX ADMIN — INSULIN LISPRO 2 UNITS: 100 INJECTION, SOLUTION INTRAVENOUS; SUBCUTANEOUS at 12:32

## 2024-10-18 RX ADMIN — Medication 2 PUFF: at 08:29

## 2024-10-18 RX ADMIN — Medication 2 PUFF: at 08:30

## 2024-10-18 RX ADMIN — EMPAGLIFLOZIN 25 MG: 10 TABLET, FILM COATED ORAL at 09:22

## 2024-10-18 RX ADMIN — CLOPIDOGREL BISULFATE 75 MG: 75 TABLET ORAL at 09:23

## 2024-10-18 RX ADMIN — SODIUM CHLORIDE, PRESERVATIVE FREE 10 ML: 5 INJECTION INTRAVENOUS at 09:23

## 2024-10-18 RX ADMIN — POTASSIUM CHLORIDE 40 MEQ: 1500 TABLET, EXTENDED RELEASE ORAL at 12:36

## 2024-10-18 RX ADMIN — CARVEDILOL 3.12 MG: 3.12 TABLET, FILM COATED ORAL at 09:29

## 2024-10-18 RX ADMIN — ATORVASTATIN CALCIUM 40 MG: 40 TABLET, FILM COATED ORAL at 09:22

## 2024-10-18 RX ADMIN — FERROUS SULFATE TAB 325 MG (65 MG ELEMENTAL FE) 325 MG: 325 (65 FE) TAB at 06:29

## 2024-10-18 RX ADMIN — FLUTICASONE PROPIONATE 2 SPRAY: 50 SPRAY, METERED NASAL at 09:27

## 2024-10-18 RX ADMIN — PANTOPRAZOLE SODIUM 40 MG: 40 TABLET, DELAYED RELEASE ORAL at 06:30

## 2024-10-18 NOTE — PROGRESS NOTES
0735: Bedside and Verbal shift change report given to Stefania Wong RN (oncoming nurse) by de Los Reyes, Beverly Jover, RN (offgoing nurse). Report included the following information Nurse Handoff Report, Index, ED Encounter Summary, Adult Overview, Intake/Output, MAR, Recent Results, Cardiac Rhythm SR, Alarm Parameters, Quality Measures, and Neuro Assessment.     0800: Assessment completed. Pt repositioned in bed.     1000: Pt up to use the bathroom and return to bed. 1x void and 1x BM.     1200: Pt repositioned in bed.     1400: PT worked with patient. Pt walked 10 feet, pt reports shortness of breath.     1500: Per Michell Brown, , pt has Home health set up already and home oxygen is already set up.     1600: Reassessment completed. Pt repositioned in bed.     1625: PIV removed. Pt and daughter verbalized understanding of all discharge instructions. Telemetry removed. Telemetry notified. Pt packed all her belongings. Pt has home oxygen at the bedside.     1635: Telemetry box returned and signed in. Pt discharged in stable condition with all pt belongings. Pt is on oxygen and is in no acute distress. Pt transported via wheelchair to [facility] to be picked up by daughter via private vehicle. Pt and daughter have no further questions.

## 2024-10-18 NOTE — DISCHARGE SUMMARY
Pati Ferreira 1955 [id number]  PCP:  Demetria Conner DO    Admit date: 10/16/2024  Admitting Physician: Francisco Fields MD    Discharge date: 10/18/2024 Discharge Physician: Umesh Mendoza MD      Reason for admission:   Chief Complaint   Patient presents with    Shortness of Breath     Present on Admission:   CHF (congestive heart failure), NYHA class I, acute on chronic, combined (HCC)   Acute decompensated heart failure (HCC)       Discharge Diagnoses & Hospital Course::     Acute on chronic hypoxic respiratory failure  Acute systolic congestive heart failure exacerbation  Hypokalemia  Hypomagnesemia  ANTHONY on CKD stage III  COPD, not in exacerbation  DM type II    Hospital course    69-year-old female with a medical history of congestive heart failure, DM type II, COPD, presented to the ED on account of shortness of breath with pronounced bilateral lower extremity edema.  She was admitted for acute systolic CHF exacerbation and initiated on diuresis with appropriate response.  She achieved euvolemia.  Shortness of breath subsided.  Lower extremity edema significantly improved.  Patient reviewed by cardiology with recommendation to discharge her on doubled her home diuretics.  She was hypoxic on presentation requiring 2 L of nasal cannula oxygen which had resolved at the time of discharge.  There is a chronic medical condition including DM type II, COPD, ANTHONY were appropriately managed.  Patient discharged home.    Patient was feeling and looking better on the day of discharge.  Patient was discharged in a stable condition with following medications and instructions.      Exam:   Wt Readings from Last 3 Encounters:   10/17/24 87.2 kg (192 lb 3.9 oz)   10/13/24 78.9 kg (174 lb)   05/23/24 82.6 kg (182 lb)       Blood pressure 109/73, pulse 97, temperature 97.7 °F (36.5 °C), temperature source Oral, resp. rate 17, height 1.651 m (5' 5\"), weight 87.2 kg (192 lb 3.9 oz), SpO2 92%.    General:  DAILY.     metFORMIN 500 MG tablet  Commonly known as: GLUCOPHAGE  Take 2 tablets by mouth 2 times daily (with meals)     pantoprazole 40 MG tablet  Commonly known as: PROTONIX  TAKE ONE TABLET BY MOUTH DAILY.     potassium chloride 10 MEQ extended release tablet  Commonly known as: KLOR-CON  TAKE (1) TABLET BY MOUTH ON MONDAY, WEDNESDAY AND FRIDAY.     spironolactone 25 MG tablet  Commonly known as: ALDACTONE  TAKE 1 TABLET BY MOUTH TWICE DAILY.           * This list has 2 medication(s) that are the same as other medications prescribed for you. Read the directions carefully, and ask your doctor or other care provider to review them with you.                   Where to Get Your Medications        These medications were sent to [address] [phone] - F [phone]  [address] [place] [address]      Phone: [phone]   furosemide 40 MG tablet          Code Status: Full Code     Consults:   IP CONSULT TO CARDIOLOGY    Diet: cardiac diet    Activity: activity as tolerated   Work:    Discharged Condition: good    Prognosis: Good    Disposition: home      Follow-up with   PCP within   5-7    Days  Cardiology in 1 to 2 weeks           Discharge Physician Signed:   Hospitalist, Internal medicine  10/18/2024 at 2:13 PM    The patient was seen and examined on day of discharge and this discharge summary is in conjunction with any daily progress note from day of discharge.  Time spent on discharge in the examination, evaluation, counseling and review of medications and discharge plan: 35 minutes    Please forward this discharge summary to patient's PCP

## 2024-10-18 NOTE — CARE COORDINATION
1511 CM noted discharge order.     Patient was recommended SNF today by PT but has chosen to remain with [facility] on discharge.    DC summary and HH order sent via [employer].    Transition of Care Plan:    RUR: 15  Prior Level of Functioning: Needs assistance  Disposition: Home Health  If SNF or IPR: Date FOC offered: N/a  Date FOC received: N/a  Accepting facility: [facility]  Date authorization started with reference number: N/a  Date authorization received and expires: N/a  Follow up appointments: Per MD  DME needed: N/a  Transportation at discharge: Family to transport  IM/IMM Medicare/ letter given: 10/17  Is patient a [place] and connected with [facility]? N/a   If yes, was  transfer form completed and [facility] notified?   Caregiver Contact: Patient notified  Discharge Caregiver contacted prior to discharge? yes  Care Conference needed? N/a  Barriers to discharge: N/a      1348 CM reviewed chart.     DCP is home with [facility].   Updates sent via [employer].    Patient is pending IV diuresis and clinical improvement.     Patient has home oxygen through [facility].     CM will continue to follow.

## 2024-10-18 NOTE — PROGRESS NOTES
[facility] at [address] [address]  Phone: [phone]      Progress Note      10/18/2024 12:21 PM  NAME: Pati Ferreira   MRN:  [id number]   Admit Diagnosis: Shortness of breath [R06.02]  Hypokalemia [E87.6]  CHF (congestive heart failure), NYHA class I, acute on chronic, combined (HCC) [I50.43]  Acute on chronic congestive heart failure, unspecified heart failure type (HCC) [I50.9]  Acute decompensated heart failure (HCC) [I50.9]          Assessment/Plan:     Acute on chronic HFrEF with LV ejection fraction of 30-35% as per echo on this admission.  The patient on current anti-CHF regimen post diuresis feels a whole lot better.    Type II non-STEMI  The patient has a history of borderline left main disease and critical ostial left circumflex disease for which she has chosen medical management.  Continue Plavix with beta-blocker and high-dose statin for empiric antianginal coverage.  Patient to follow her primary cardiologist Dr. Bassett following discharge.         []       High complexity decision making was performed in this patient at high risk for decompensation with multiple organ involvement.    Subjective:     Pati Ferreira denies chest pain, dyspnea.  Discussed with RN events overnight.     Review of Systems:     []         Unable to obtain  ROS due to ---   [x]         Total of 12 systems reviewed as follows:     Constitutional: negative fever, negative chills, negative weight loss  Eyes:               negative visual changes  ENT:                negative sore throat, tongue or lip swelling  Respiratory:     negative cough, negative dyspnea  Cards:             As per HPI  GI:                   negative for nausea, vomiting, diarrhea, and abdominal pain  Genitourinary: negative for frequency, dysuria  Integument:     negative for rash   Hematologic:   negative for easy bruising and gum/nose bleeding  Musculoskel:   negative for myalgias,  back  Yes Demetira Conner DO   fluticasone (FLONASE) 50 MCG/ACT nasal spray INHALE 2 SPRAYS IN EACH NOSTRIL ONCE A DAY AS NEEDED. 8/9/24  Yes Demetria Conner DO   pantoprazole (PROTONIX) 40 MG tablet TAKE ONE TABLET BY MOUTH DAILY. 7/11/24  Yes Demetria Conner DO   carvedilol (COREG) 3.125 MG tablet TAKE 1 TABLET BY MOUTH TWICE DAILY WITH FOOD 5/24/24  Yes Demetria Conner DO   lisinopril (PRINIVIL;ZESTRIL) 20 MG tablet TAKE 1 TABLET BY MOUTH TWICE DAILY. 5/13/24  Yes Demetria Conner DO   spironolactone (ALDACTONE) 25 MG tablet TAKE 1 TABLET BY MOUTH TWICE DAILY. 5/13/24  Yes Demetria Conner DO   metFORMIN (GLUCOPHAGE) 500 MG tablet Take 2 tablets by mouth 2 times daily (with meals) 3/29/24  Yes Demetria Conner DO   atorvastatin (LIPITOR) 40 MG tablet TAKE ONE TABLET BY MOUTH DAILY. 2/6/24  Yes Demetria Conner DO   empagliflozin (JARDIANCE) 25 MG tablet Take 1 tablet by mouth daily 2/6/24  Yes Demetria Conner DO   clopidogrel (PLAVIX) 75 MG tablet Take 1 tablet by mouth daily   Yes Provider, MD Lorenzo   albuterol (PROVENTIL) (2.5 MG/3ML) 0.083% nebulizer solution Take 3 mLs by nebulization every 4 hours as needed for Wheezing or Shortness of Breath 8/7/23  Yes Demetria Conner DO   Lancets MISC Check glucose once daily 1/19/23  Yes Automatic Reconciliation, Ar   Budeson-Glycopyrrol-Formoterol (BREZTRI AEROSPHERE) 160-9-4.8 MCG/ACT AERO Inhale into the lungs   Yes Automatic Reconciliation, Ar   ferrous sulfate (IRON 325) 325 (65 Fe) MG tablet Take by mouth every morning (before breakfast)   Yes Automatic Reconciliation, Ar           Quinn Gardner MD

## 2024-10-30 DIAGNOSIS — E87.6 HYPOKALEMIA: ICD-10-CM

## 2024-10-31 NOTE — TELEPHONE ENCOUNTER
Requested Prescriptions     Pending Prescriptions Disp Refills    carvedilol (COREG) 3.125 MG tablet [Pharmacy Med Name: CARVEDILOL 3.125 MG TABLET] 60 tablet 0     Sig: TAKE 1 TABLET BY MOUTH TWICE DAILY WITH FOOD    lisinopril (PRINIVIL;ZESTRIL) 20 MG tablet [Pharmacy Med Name: LISINOPRIL 20MG] 60 tablet 0     Sig: TAKE 1 TABLET BY MOUTH TWICE DAILY.    spironolactone (ALDACTONE) 25 MG tablet [Pharmacy Med Name: SPIRONOLACTONE 25 MG TABLET*] 60 tablet 0     Sig: TAKE 1 TABLET BY MOUTH TWICE DAILY.    potassium chloride (KLOR-CON) 10 MEQ extended release tablet [Pharmacy Med Name: POTASSIUM CL ER 10 MEQ TABLET] 12 tablet 0     Sig: TAKE (1) TABLET BY MOUTH ON MONDAY, WEDNESDAY AND FRIDAY.

## 2024-11-01 RX ORDER — POTASSIUM CHLORIDE 750 MG/1
TABLET, EXTENDED RELEASE ORAL
Qty: 12 TABLET | Refills: 0 | Status: SHIPPED | OUTPATIENT
Start: 2024-11-01

## 2024-11-01 RX ORDER — CARVEDILOL 3.12 MG/1
3.12 TABLET ORAL 2 TIMES DAILY WITH MEALS
Qty: 180 TABLET | Refills: 1 | Status: SHIPPED | OUTPATIENT
Start: 2024-11-01

## 2024-11-01 RX ORDER — LISINOPRIL 20 MG/1
20 TABLET ORAL 2 TIMES DAILY
Qty: 180 TABLET | Refills: 1 | Status: SHIPPED | OUTPATIENT
Start: 2024-11-01

## 2024-11-01 RX ORDER — SPIRONOLACTONE 25 MG/1
25 TABLET ORAL 2 TIMES DAILY
Qty: 180 TABLET | Refills: 1 | Status: SHIPPED | OUTPATIENT
Start: 2024-11-01

## 2024-11-02 NOTE — TELEPHONE ENCOUNTER
Patient needs a hospital follow up. I have sent some refills to the pharmacy, but we'll need to figure out potassium supplementation during a visit.

## 2024-11-05 ENCOUNTER — OFFICE VISIT (OUTPATIENT)
Facility: CLINIC | Age: 69
End: 2024-11-05

## 2024-11-05 VITALS
DIASTOLIC BLOOD PRESSURE: 71 MMHG | HEIGHT: 65 IN | SYSTOLIC BLOOD PRESSURE: 120 MMHG | RESPIRATION RATE: 16 BRPM | BODY MASS INDEX: 28.99 KG/M2 | WEIGHT: 174 LBS | TEMPERATURE: 97.3 F | HEART RATE: 80 BPM

## 2024-11-05 DIAGNOSIS — Z09 HOSPITAL DISCHARGE FOLLOW-UP: Primary | ICD-10-CM

## 2024-11-05 DIAGNOSIS — E11.22 TYPE 2 DIABETES MELLITUS WITH DIABETIC CHRONIC KIDNEY DISEASE, UNSPECIFIED CKD STAGE, UNSPECIFIED WHETHER LONG TERM INSULIN USE (HCC): ICD-10-CM

## 2024-11-05 DIAGNOSIS — E87.6 HYPOKALEMIA: ICD-10-CM

## 2024-11-05 DIAGNOSIS — I50.43 CHF (CONGESTIVE HEART FAILURE), NYHA CLASS I, ACUTE ON CHRONIC, COMBINED (HCC): ICD-10-CM

## 2024-11-05 NOTE — PROGRESS NOTES
\"Have you been to the ER, urgent care clinic since your last visit?  Hospitalized since your last visit?\"    YES - When: approximately 1 days ago.  Where and Why: 10/16/2024 [facility] urinary .    “Have you seen or consulted any other health care providers outside our system since your last visit?”    NO      “Have you had a diabetic eye exam?”    NO     No diabetic eye exam on file   Chief Complaint   Patient presents with    Follow-Up from Hospital     /71 (Site: Left Upper Arm, Position: Sitting, Cuff Size: Large Adult)   Pulse 80   Temp 97.3 °F (36.3 °C) (Temporal)   Resp 16   Ht 1.651 m (5' 5\")   Wt 78.9 kg (174 lb)   BMI 28.96 kg/m²

## 2024-11-05 NOTE — PROGRESS NOTES
Subjective  Chief Complaint   Patient presents with    Follow-Up from Hospital     HPI:  Pati Ferreira is a 69 y.o. female with medical problems as listed below who presents for hospital discharge.     Acute on chronic heart failure: In brief, patient admitted from 10/16 through 10/18 for heart faliure exacerbation. Medication changes include double dose of diuretic (Lasix 80mg daily). Now on 5-6L per nasal cannula with any activity and 3L at rest 24 hours a day. Has no follow up appointment with Dr. Bassett yet. No changes were made to potassium supplement so she is still taking 10mEq M/W/F. Echo on 10/17 showing EF 30-35%. Prior to this, echo from 08/2023 showing EF 40-45%. Hasn't seen PT yet; had to reschedule and never heard back from them. Caregiver plans to call and reschedule.     DM2: A1c 6.6 on 10/18/24. Taking Jardiance and metformin.     Patient Active Problem List   Diagnosis    Coronary artery disease involving native heart without angina pectoris    Morbid obesity    Foot callus    Aortic stenosis    Allergic rhinitis    Microalbuminuria due to type 2 diabetes mellitus (HCC)    Left bundle branch block    Hypokalemia    Chronic obstructive pulmonary disease (HCC)    Gastroesophageal reflux disease without esophagitis    Mixed hyperlipidemia    Cataract, nuclear sclerotic, both eyes    Diabetes mellitus type 2 without retinopathy (HCC)    Disorder of hyperalimentation    Essential hypertension    Chronic systolic congestive heart failure (HCC)    Type 2 diabetes mellitus with chronic kidney disease    CHF (congestive heart failure), NYHA class I, acute on chronic, combined (HCC)    Acute decompensated heart failure (HCC)     Family History   Problem Relation Age of Onset    Breast Cancer Sister     Cancer Sister         breast    Hypertension Father     Cancer Mother         uterine      Social History     Tobacco Use    Smoking status: Former     Current packs/day: 0.00     Average packs/day: 0.5

## 2024-11-06 LAB
ALBUMIN SERPL-MCNC: 4.1 G/DL (ref 3.8–4.8)
ALP SERPL-CCNC: 68 IU/L (ref 44–121)
ALT SERPL-CCNC: 19 IU/L (ref 0–32)
AST SERPL-CCNC: 22 IU/L (ref 0–40)
BILIRUB SERPL-MCNC: 0.8 MG/DL (ref 0–1.2)
BUN SERPL-MCNC: 14 MG/DL (ref 8–27)
BUN/CREAT SERPL: 14 (ref 12–28)
CALCIUM SERPL-MCNC: 10.8 MG/DL (ref 8.7–10.3)
CHLORIDE SERPL-SCNC: 92 MMOL/L (ref 96–106)
CO2 SERPL-SCNC: 36 MMOL/L (ref 20–29)
CREAT SERPL-MCNC: 1.01 MG/DL (ref 0.57–1)
EGFRCR SERPLBLD CKD-EPI 2021: 60 ML/MIN/1.73
GLOBULIN SER CALC-MCNC: 2.8 G/DL (ref 1.5–4.5)
GLUCOSE SERPL-MCNC: 133 MG/DL (ref 70–99)
MAGNESIUM SERPL-MCNC: 1.6 MG/DL (ref 1.6–2.3)
POTASSIUM SERPL-SCNC: 4.2 MMOL/L (ref 3.5–5.2)
PROT SERPL-MCNC: 6.9 G/DL (ref 6–8.5)
SODIUM SERPL-SCNC: 142 MMOL/L (ref 134–144)

## 2024-11-07 ENCOUNTER — TELEPHONE (OUTPATIENT)
Facility: CLINIC | Age: 69
End: 2024-11-07

## 2024-11-07 NOTE — TELEPHONE ENCOUNTER
Is this requesting for an order of some sort?  []Yes    [x]No  If Yes, what is being requested?      Is the patient calling about a new issue (illness, pain, etc)?  []Yes    [x]No  If yes, when did this specific issue start?      ENC Note: Patient called and would like a call back for her lab results.      Best Contact number: [phone]

## 2024-11-12 NOTE — TELEPHONE ENCOUNTER
Is this requesting for an order of some sort?  []Yes    [x]No  If Yes, what is being requested?      Is the patient calling about a new issue (illness, pain, etc)?  []Yes    [x]No  If yes, when did this specific issue start?      ENC Note:    Patient called requesting a return call about her lab results.    Best Contact number: [phone]

## 2024-11-13 NOTE — TELEPHONE ENCOUNTER
Results have been put in result management patient will be called through the results encounter from dr. Medeiros.

## 2024-11-18 ENCOUNTER — TELEPHONE (OUTPATIENT)
Facility: CLINIC | Age: 69
End: 2024-11-18

## 2024-11-18 NOTE — TELEPHONE ENCOUNTER
----- Message from Dr. Demetria Conner, DO sent at 11/13/2024  7:12 AM EST -----  Potassium level is fine, so I would recommend to keep taking the potassium as you have been.  Your CO2 was more elevated than usual and this could be due to your increased oxygen use in the setting of COPD. I would highly recommend you see Dr. Bassett and Dr. Ray for hospital follow up as soon as possible.   Magnesium level was within normal limits, but it was lower end of normal, so I would start taking an over the counter magnesium supplement.

## 2024-11-20 NOTE — TELEPHONE ENCOUNTER
Is this requesting for an order of some sort?  []Yes    [x]No  If Yes, what is being requested?    Call back    Is the patient calling about a new issue (illness, pain, etc)?  []Yes    []No  If yes, when did this specific issue start?      ENC Note: Danna Nurse from [facility] called to discuss medications and vital signs, and labs      Best Contact number: Danna [phone]

## 2024-11-22 DIAGNOSIS — E87.6 HYPOKALEMIA: ICD-10-CM

## 2024-11-22 DIAGNOSIS — E11.9 DIABETES MELLITUS TYPE 2 WITHOUT RETINOPATHY (HCC): ICD-10-CM

## 2024-11-25 NOTE — TELEPHONE ENCOUNTER
Requested Prescriptions     Pending Prescriptions Disp Refills    JARDIANCE 25 MG tablet [Pharmacy Med Name: JARDIANCE 25 MG TABLET] 30 tablet 0     Sig: TAKE 1 TABLET BY MOUTH DAILY    albuterol sulfate HFA (PROVENTIL;VENTOLIN;PROAIR) 108 (90 Base) MCG/ACT inhaler [Pharmacy Med Name: ALBUTEROL HFA (VENT)] 18 g 0     Sig: SHAKE WELL & INHALE 2 PUFFS EVERY 4-6 HOURS AS NEEDED FOR COUGH/WHEEZING/SHORTNESS OF BREATH.    potassium chloride (KLOR-CON) 10 MEQ extended release tablet [Pharmacy Med Name: POTASSIUM CL ER 10 MEQ TABLET] 12 tablet 0     Sig: TAKE (1) TABLET BY MOUTH ON MONDAY, WEDNESDAY AND FRIDAY.     Date of last OV: 11/05/2024  Future OV visit scheduled:  [x] Yes -> Date: 12/17/2024  [] No    Last Refill: [] N/A Jardiance   Date:   02/06/24  Qty: 90  # of refills: 3    Last Refill: [] N/A albuterol   Date: 10/04/24  Qty: 18 g    # of refills: 1    Last Refill: [] N/A potassium chloride   Date:  11/01/2024  Qty: 12  # of refills: 0    Med pending for provider review:  [x] Yes  [] No (provide reason why):     Requested Pharmacy updated in ENC:  [] Yes    Applicable labs (provide date of completion):  [] Diabetic med- A1c:  [] Cholesterol med- Lipids:  [] BP med- CMP or BMP:   [] Thyroid med- TSH:  [] Gout med- Uric acid:  [] Prostate med- PSA:  [] Other (provide what type of med and lab):    Additional notes:    
shoulder pain/injury

## 2024-11-26 RX ORDER — ALBUTEROL SULFATE 90 UG/1
2 INHALANT RESPIRATORY (INHALATION) EVERY 4 HOURS PRN
Qty: 18 G | Refills: 2 | Status: SHIPPED | OUTPATIENT
Start: 2024-11-26

## 2024-11-26 RX ORDER — POTASSIUM CHLORIDE 750 MG/1
TABLET, EXTENDED RELEASE ORAL
Qty: 36 TABLET | Refills: 1 | Status: SHIPPED | OUTPATIENT
Start: 2024-11-26

## 2024-11-26 RX ORDER — EMPAGLIFLOZIN 25 MG/1
25 TABLET, FILM COATED ORAL DAILY
Qty: 90 TABLET | Refills: 1 | Status: SHIPPED | OUTPATIENT
Start: 2024-11-26

## 2024-12-17 ENCOUNTER — OFFICE VISIT (OUTPATIENT)
Facility: CLINIC | Age: 69
End: 2024-12-17
Payer: MEDICARE

## 2024-12-17 VITALS
TEMPERATURE: 98 F | RESPIRATION RATE: 18 BRPM | DIASTOLIC BLOOD PRESSURE: 71 MMHG | HEART RATE: 103 BPM | HEIGHT: 65 IN | BODY MASS INDEX: 26.33 KG/M2 | WEIGHT: 158 LBS | OXYGEN SATURATION: 92 % | SYSTOLIC BLOOD PRESSURE: 108 MMHG

## 2024-12-17 DIAGNOSIS — I50.22 CHRONIC SYSTOLIC CONGESTIVE HEART FAILURE (HCC): Primary | ICD-10-CM

## 2024-12-17 PROCEDURE — 1123F ACP DISCUSS/DSCN MKR DOCD: CPT

## 2024-12-17 PROCEDURE — 1036F TOBACCO NON-USER: CPT

## 2024-12-17 PROCEDURE — 1159F MED LIST DOCD IN RCRD: CPT

## 2024-12-17 PROCEDURE — G8399 PT W/DXA RESULTS DOCUMENT: HCPCS

## 2024-12-17 PROCEDURE — 3017F COLORECTAL CA SCREEN DOC REV: CPT

## 2024-12-17 PROCEDURE — 99213 OFFICE O/P EST LOW 20 MIN: CPT

## 2024-12-17 PROCEDURE — G8417 CALC BMI ABV UP PARAM F/U: HCPCS

## 2024-12-17 PROCEDURE — G8484 FLU IMMUNIZE NO ADMIN: HCPCS

## 2024-12-17 PROCEDURE — G8427 DOCREV CUR MEDS BY ELIG CLIN: HCPCS

## 2024-12-17 PROCEDURE — 3074F SYST BP LT 130 MM HG: CPT

## 2024-12-17 PROCEDURE — 1090F PRES/ABSN URINE INCON ASSESS: CPT

## 2024-12-17 PROCEDURE — 1160F RVW MEDS BY RX/DR IN RCRD: CPT

## 2024-12-17 PROCEDURE — 3078F DIAST BP <80 MM HG: CPT

## 2024-12-17 ASSESSMENT — PATIENT HEALTH QUESTIONNAIRE - PHQ9
1. LITTLE INTEREST OR PLEASURE IN DOING THINGS: NOT AT ALL
SUM OF ALL RESPONSES TO PHQ QUESTIONS 1-9: 0
SUM OF ALL RESPONSES TO PHQ QUESTIONS 1-9: 0
2. FEELING DOWN, DEPRESSED OR HOPELESS: NOT AT ALL
SUM OF ALL RESPONSES TO PHQ9 QUESTIONS 1 & 2: 0
SUM OF ALL RESPONSES TO PHQ QUESTIONS 1-9: 0
SUM OF ALL RESPONSES TO PHQ QUESTIONS 1-9: 0

## 2024-12-17 ASSESSMENT — ENCOUNTER SYMPTOMS
SHORTNESS OF BREATH: 0
CHEST TIGHTNESS: 0

## 2024-12-17 NOTE — PROGRESS NOTES
\"Have you been to the ER, urgent care clinic since your last visit?  Hospitalized since your last visit?\"    NO    “Have you seen or consulted any other health care providers outside our system since your last visit?”    NO      “Have you had a diabetic eye exam?”    YES - Where: [facility]24 [facility]     No diabetic eye exam on file     Chief Complaint   Patient presents with    Follow-up     CHF      /71 (Site: Right Upper Arm, Position: Sitting, Cuff Size: Medium Adult)   Pulse (!) 103   Temp 98 °F (36.7 °C) (Temporal)   Resp 18   Ht 1.651 m (5' 5\")   Wt 71.7 kg (158 lb)   SpO2 92%   BMI 26.29 kg/m²            
 (10/17/2024)    Housing Stability Vital Sign     Unable to Pay for Housing in the Last Year: No     Number of Times Moved in the Last Year: 1     Homeless in the Last Year: No     Family History   Problem Relation Age of Onset    Breast Cancer Sister     Cancer Sister         breast    Hypertension Father     Cancer Mother         uterine       Current Outpatient Medications on File Prior to Visit   Medication Sig Dispense Refill    sacubitril-valsartan (ENTRESTO) 24-26 MG per tablet Take 1 tablet by mouth 2 times daily      Magnesium 250 MG CAPS Take 1 capsule by mouth daily      empagliflozin (JARDIANCE) 25 MG tablet TAKE 1 TABLET BY MOUTH DAILY 90 tablet 1    albuterol sulfate HFA (PROVENTIL;VENTOLIN;PROAIR) 108 (90 Base) MCG/ACT inhaler Inhale 2 puffs into the lungs every 4 hours as needed for Wheezing or Shortness of Breath 18 g 2    potassium chloride (KLOR-CON) 10 MEQ extended release tablet TAKE (1) TABLET BY MOUTH ON MONDAY, WEDNESDAY AND FRIDAY. 36 tablet 1    carvedilol (COREG) 3.125 MG tablet TAKE 1 TABLET BY MOUTH TWICE DAILY WITH FOOD 180 tablet 1    lisinopril (PRINIVIL;ZESTRIL) 20 MG tablet TAKE 1 TABLET BY MOUTH TWICE DAILY. 180 tablet 1    spironolactone (ALDACTONE) 25 MG tablet TAKE 1 TABLET BY MOUTH TWICE DAILY. 180 tablet 1    furosemide (LASIX) 40 MG tablet Take 2 tablets by mouth daily 90 tablet 0    loratadine (CLARITIN) 10 MG tablet Take 1 tablet by mouth daily      fluticasone (FLONASE) 50 MCG/ACT nasal spray INHALE 2 SPRAYS IN EACH NOSTRIL ONCE A DAY AS NEEDED. 1 each 5    pantoprazole (PROTONIX) 40 MG tablet TAKE ONE TABLET BY MOUTH DAILY. 90 tablet 1    metFORMIN (GLUCOPHAGE) 500 MG tablet Take 2 tablets by mouth 2 times daily (with meals) 360 tablet 3    atorvastatin (LIPITOR) 40 MG tablet TAKE ONE TABLET BY MOUTH DAILY. 90 tablet 3    clopidogrel (PLAVIX) 75 MG tablet Take 1 tablet by mouth daily      albuterol (PROVENTIL) (2.5 MG/3ML) 0.083% nebulizer solution Take 3 mLs by 
independent

## 2024-12-18 LAB
BUN SERPL-MCNC: 33 MG/DL (ref 8–27)
BUN/CREAT SERPL: 24 (ref 12–28)
CALCIUM SERPL-MCNC: 10.8 MG/DL (ref 8.7–10.3)
CHLORIDE SERPL-SCNC: 94 MMOL/L (ref 96–106)
CO2 SERPL-SCNC: 24 MMOL/L (ref 20–29)
CREAT SERPL-MCNC: 1.36 MG/DL (ref 0.57–1)
EGFRCR SERPLBLD CKD-EPI 2021: 42 ML/MIN/1.73
GLUCOSE SERPL-MCNC: 156 MG/DL (ref 70–99)
MAGNESIUM SERPL-MCNC: 1.9 MG/DL (ref 1.6–2.3)
POTASSIUM SERPL-SCNC: 4.6 MMOL/L (ref 3.5–5.2)
SODIUM SERPL-SCNC: 137 MMOL/L (ref 134–144)

## 2024-12-24 ENCOUNTER — TELEPHONE (OUTPATIENT)
Facility: CLINIC | Age: 69
End: 2024-12-24

## 2024-12-24 DIAGNOSIS — I50.22 CHRONIC SYSTOLIC CONGESTIVE HEART FAILURE (HCC): ICD-10-CM

## 2024-12-24 RX ORDER — FUROSEMIDE 40 MG/1
40 TABLET ORAL DAILY
Qty: 90 TABLET | Refills: 0 | Status: SHIPPED | OUTPATIENT
Start: 2024-12-24

## 2024-12-24 NOTE — TELEPHONE ENCOUNTER
Okay, let's decrease the lasix to 40mg a day. Please keep a close eye on fluid retention and weight.     Let's recheck labs in 1 month.     Roxanna

## 2024-12-24 NOTE — TELEPHONE ENCOUNTER
Tried to call patient to review lab results and they have a voice mail that is not set up. Will set to follow up on the next business day.

## 2024-12-24 NOTE — TELEPHONE ENCOUNTER
----- Message from JULIO Manzanares sent at 12/23/2024 11:56 AM EST -----  Good morning,     Your magnesium is stable, but your kidney function has decreased again. I know you have cardiology, but do you have a nephrologist that you are following with?    Roxanna

## 2024-12-24 NOTE — TELEPHONE ENCOUNTER
Patient called back and verified  - Message was read to her. Patient stated that when she had her last visit with Dr. Conner that she stated that she may have to adjust her meds some and decrease the fluid pill as it could mess with the kidney function. Patient stated that she is constantly having to pass urine at home.     OLAYINKA Fonseca Notified of the message and advise

## 2024-12-24 NOTE — TELEPHONE ENCOUNTER
Called patient back and read message from provider to only take 40 mg of Lasix a day now and to come and recheck labs in a month. Patient stated that she will cut back tomorrow and will come in a month and have labs redrawn.

## 2024-12-26 ENCOUNTER — TELEPHONE (OUTPATIENT)
Facility: CLINIC | Age: 69
End: 2024-12-26

## 2024-12-30 NOTE — TELEPHONE ENCOUNTER
Requested Prescriptions     Pending Prescriptions Disp Refills    atorvastatin (LIPITOR) 40 MG tablet [Pharmacy Med Name: ATORVASTATIN 40 MG TABLET] 30 tablet 0     Sig: TAKE ONE TABLET BY MOUTH DAILY    carvedilol (COREG) 3.125 MG tablet [Pharmacy Med Name: CARVEDILOL 3.125 MG TABLET] 60 tablet 0     Sig: TAKE 1 TABLET BY MOUTH TWICE DAILY WITH FOOD     Date of last OV:12/17/2024  Future OV visit scheduled:  [] Yes -> Date:   [x] No    Last Refill: [] N/A  Date:2/6/25  Qty:90  # of refills:3    Med pending for provider review:  [x] Yes  [] No (provide reason why):     Requested Pharmacy updated in ENC:  [x] Yes    Applicable labs (provide date of completion):  [x] Diabetic med- A1c:  [] Cholesterol med- Lipids:  [x] BP med- CMP or BMP:   [] Thyroid med- TSH:  [] Gout med- Uric acid:  [] Prostate med- PSA:  [] Other (provide what type of med and lab):  11/05/24  Additional notes:

## 2024-12-31 RX ORDER — CARVEDILOL 3.12 MG/1
3.12 TABLET ORAL 2 TIMES DAILY WITH MEALS
Qty: 180 TABLET | Refills: 1 | Status: SHIPPED | OUTPATIENT
Start: 2024-12-31

## 2024-12-31 RX ORDER — ATORVASTATIN CALCIUM 40 MG/1
40 TABLET, FILM COATED ORAL DAILY
Qty: 90 TABLET | Refills: 1 | Status: SHIPPED | OUTPATIENT
Start: 2024-12-31

## 2025-01-13 NOTE — TELEPHONE ENCOUNTER
Requested Prescriptions     Pending Prescriptions Disp Refills    pantoprazole (PROTONIX) 40 MG tablet [Pharmacy Med Name: PANTOPRAZOLE SOD DR 40 MG TAB*] 30 tablet 0     Sig: TAKE ONE TABLET BY MOUTH DAILY

## 2025-01-14 RX ORDER — PANTOPRAZOLE SODIUM 40 MG/1
40 TABLET, DELAYED RELEASE ORAL DAILY
Qty: 30 TABLET | Refills: 0 | Status: SHIPPED | OUTPATIENT
Start: 2025-01-14

## 2025-01-18 DIAGNOSIS — E87.6 HYPOKALEMIA: ICD-10-CM

## 2025-01-20 NOTE — TELEPHONE ENCOUNTER
Requested Prescriptions     Pending Prescriptions Disp Refills    fluticasone (FLONASE) 50 MCG/ACT nasal spray [Pharmacy Med Name: FLUTICASONE PROP 50 MCG SPRAY] 16 g 0     Sig: INHALE 2 SPRAYS IN EACH NOSTRIL ONCE A DAY AS NEEDED.    potassium chloride (KLOR-CON) 10 MEQ extended release tablet [Pharmacy Med Name: POTASSIUM CL ER 10 MEQ TABLET] 12 tablet 0     Sig: TAKE (1) TABLET BY MOUTH ON MONDAY, WEDNESDAY AND FRIDAY.     Date of last OV:12/17/24  Future OV visit scheduled:  [x] Yes -> Date: 2/4/25  [] No    Last Refill: [] N/A  Date:  Qty:  # of refills:    Med pending for provider review:  [x] Yes  [] No (provide reason why):     Requested Pharmacy updated in ENC:  [x] Yes    Applicable labs (provide date of completion):  [x] Diabetic med- A1c:  [x] Cholesterol med- Lipids:  [x] BP med- CMP or BMP:   [] Thyroid med- TSH:  [] Gout med- Uric acid:  [] Prostate med- PSA:  [] Other (provide what type of med and lab):  12/17/24  Additional notes:

## 2025-01-21 RX ORDER — POTASSIUM CHLORIDE 750 MG/1
TABLET, EXTENDED RELEASE ORAL
Qty: 12 TABLET | Refills: 0 | Status: SHIPPED | OUTPATIENT
Start: 2025-01-21

## 2025-01-21 RX ORDER — FLUTICASONE PROPIONATE 50 MCG
SPRAY, SUSPENSION (ML) NASAL
Qty: 16 G | Refills: 0 | Status: SHIPPED | OUTPATIENT
Start: 2025-01-21

## 2025-01-21 RX ORDER — PANTOPRAZOLE SODIUM 40 MG/1
40 TABLET, DELAYED RELEASE ORAL DAILY
Qty: 30 TABLET | Refills: 0 | Status: SHIPPED | OUTPATIENT
Start: 2025-01-21

## 2025-01-31 LAB
BUN SERPL-MCNC: 22 MG/DL (ref 8–27)
BUN/CREAT SERPL: 18 (ref 12–28)
CALCIUM SERPL-MCNC: 10.3 MG/DL (ref 8.7–10.3)
CHLORIDE SERPL-SCNC: 97 MMOL/L (ref 96–106)
CO2 SERPL-SCNC: 26 MMOL/L (ref 20–29)
CREAT SERPL-MCNC: 1.19 MG/DL (ref 0.57–1)
EGFRCR SERPLBLD CKD-EPI 2021: 49 ML/MIN/1.73
GLUCOSE SERPL-MCNC: 142 MG/DL (ref 70–99)
POTASSIUM SERPL-SCNC: 4 MMOL/L (ref 3.5–5.2)
SODIUM SERPL-SCNC: 141 MMOL/L (ref 134–144)

## 2025-03-10 DIAGNOSIS — E87.6 HYPOKALEMIA: ICD-10-CM

## 2025-03-10 RX ORDER — PANTOPRAZOLE SODIUM 40 MG/1
40 TABLET, DELAYED RELEASE ORAL DAILY
Qty: 30 TABLET | Refills: 0 | Status: SHIPPED | OUTPATIENT
Start: 2025-03-10

## 2025-03-10 RX ORDER — FLUTICASONE PROPIONATE 50 MCG
SPRAY, SUSPENSION (ML) NASAL
Qty: 16 G | Refills: 0 | Status: SHIPPED | OUTPATIENT
Start: 2025-03-10

## 2025-03-10 RX ORDER — POTASSIUM CHLORIDE 750 MG/1
TABLET, EXTENDED RELEASE ORAL
Qty: 12 TABLET | Refills: 0 | Status: SHIPPED | OUTPATIENT
Start: 2025-03-10

## 2025-03-10 RX ORDER — ALBUTEROL SULFATE 90 UG/1
INHALANT RESPIRATORY (INHALATION)
Qty: 18 G | Refills: 0 | Status: SHIPPED | OUTPATIENT
Start: 2025-03-10

## 2025-03-10 NOTE — TELEPHONE ENCOUNTER
Requested Prescriptions     Pending Prescriptions Disp Refills    albuterol sulfate HFA (PROVENTIL;VENTOLIN;PROAIR) 108 (90 Base) MCG/ACT inhaler [Pharmacy Med Name: ALBUTEROL HFA (VENT)] 18 g 0     Sig: SHAKE WELL & INHALE 2 PUFFS EVERY 4-6 HOURS AS NEEDED FOR COUGH/WHEEZING/SHORTNESS OF BREATH.

## 2025-03-10 NOTE — TELEPHONE ENCOUNTER
Requested Prescriptions     Pending Prescriptions Disp Refills    pantoprazole (PROTONIX) 40 MG tablet [Pharmacy Med Name: PANTOPRAZOLE SOD DR 40 MG TAB*] 30 tablet 0     Sig: TAKE ONE TABLET BY MOUTH DAILY    fluticasone (FLONASE) 50 MCG/ACT nasal spray [Pharmacy Med Name: FLUTICASONE PROP 50 MCG SPRAY] 16 g 0     Sig: INHALE 2 SPRAYS IN EACH NOSTRIL ONCE A DAY AS NEEDED.    potassium chloride (KLOR-CON) 10 MEQ extended release tablet [Pharmacy Med Name: POTASSIUM CL ER 10 MEQ TABLET] 12 tablet 0     Sig: TAKE (1) TABLET BY MOUTH ON MONDAY, WEDNESDAY AND FRIDAY.

## 2025-04-14 NOTE — TELEPHONE ENCOUNTER
Requested Prescriptions     Pending Prescriptions Disp Refills    pantoprazole (PROTONIX) 40 MG tablet 30 tablet 0     Sig: Take 1 tablet by mouth daily

## 2025-04-14 NOTE — TELEPHONE ENCOUNTER
Method of communication:  [x]Fax []Phone call []In person   []Other:    Who is making request: pharmacy  What medication/s (include strength and dosing):    Pantoprazole 40mg    This is for a:   [x]Refill    []New medication request  []Follow up on prior request    Pharmacy:  Best contact for patient: [phone]    Additional notes:

## 2025-04-15 NOTE — TELEPHONE ENCOUNTER
Requested Prescriptions     Pending Prescriptions Disp Refills    albuterol sulfate HFA (PROVENTIL;VENTOLIN;PROAIR) 108 (90 Base) MCG/ACT inhaler 18 g 0

## 2025-04-15 NOTE — TELEPHONE ENCOUNTER
Method of communication:  [x]Fax []Phone call []In person   []Other:    Who is making request:[facility]  What medication/s (include strength and dosing):  Albuterol HFA  18 gm      This is for a:   [x]Refill    []New medication request  []Follow up on prior request    Pharmacy:[facility]  Best contact for patient:[phone]    Additional notes:

## 2025-04-18 RX ORDER — PANTOPRAZOLE SODIUM 40 MG/1
40 TABLET, DELAYED RELEASE ORAL DAILY
Qty: 90 TABLET | Refills: 1 | Status: SHIPPED | OUTPATIENT
Start: 2025-04-18

## 2025-04-18 RX ORDER — ALBUTEROL SULFATE 90 UG/1
2 INHALANT RESPIRATORY (INHALATION) EVERY 4 HOURS PRN
Qty: 18 G | Refills: 1 | Status: SHIPPED | OUTPATIENT
Start: 2025-04-18

## 2025-04-22 ENCOUNTER — OFFICE VISIT (OUTPATIENT)
Facility: CLINIC | Age: 70
End: 2025-04-22
Payer: MEDICARE

## 2025-04-22 VITALS
HEIGHT: 65 IN | TEMPERATURE: 97.9 F | BODY MASS INDEX: 28.32 KG/M2 | HEART RATE: 94 BPM | OXYGEN SATURATION: 97 % | SYSTOLIC BLOOD PRESSURE: 119 MMHG | DIASTOLIC BLOOD PRESSURE: 66 MMHG | RESPIRATION RATE: 18 BRPM | WEIGHT: 170 LBS

## 2025-04-22 DIAGNOSIS — J30.2 SEASONAL ALLERGIC RHINITIS, UNSPECIFIED TRIGGER: Primary | ICD-10-CM

## 2025-04-22 DIAGNOSIS — E11.22 TYPE 2 DIABETES MELLITUS WITH DIABETIC CHRONIC KIDNEY DISEASE, UNSPECIFIED CKD STAGE, UNSPECIFIED WHETHER LONG TERM INSULIN USE (HCC): ICD-10-CM

## 2025-04-22 DIAGNOSIS — E87.6 HYPOKALEMIA: ICD-10-CM

## 2025-04-22 PROBLEM — I50.9 ACUTE DECOMPENSATED HEART FAILURE (HCC): Status: RESOLVED | Noted: 2024-10-17 | Resolved: 2025-04-22

## 2025-04-22 PROCEDURE — G8417 CALC BMI ABV UP PARAM F/U: HCPCS | Performed by: FAMILY MEDICINE

## 2025-04-22 PROCEDURE — 3078F DIAST BP <80 MM HG: CPT | Performed by: FAMILY MEDICINE

## 2025-04-22 PROCEDURE — 99214 OFFICE O/P EST MOD 30 MIN: CPT | Performed by: FAMILY MEDICINE

## 2025-04-22 PROCEDURE — 2022F DILAT RTA XM EVC RTNOPTHY: CPT | Performed by: FAMILY MEDICINE

## 2025-04-22 PROCEDURE — 3074F SYST BP LT 130 MM HG: CPT | Performed by: FAMILY MEDICINE

## 2025-04-22 PROCEDURE — G8399 PT W/DXA RESULTS DOCUMENT: HCPCS | Performed by: FAMILY MEDICINE

## 2025-04-22 PROCEDURE — 1126F AMNT PAIN NOTED NONE PRSNT: CPT | Performed by: FAMILY MEDICINE

## 2025-04-22 PROCEDURE — 1090F PRES/ABSN URINE INCON ASSESS: CPT | Performed by: FAMILY MEDICINE

## 2025-04-22 PROCEDURE — 3017F COLORECTAL CA SCREEN DOC REV: CPT | Performed by: FAMILY MEDICINE

## 2025-04-22 PROCEDURE — 1123F ACP DISCUSS/DSCN MKR DOCD: CPT | Performed by: FAMILY MEDICINE

## 2025-04-22 PROCEDURE — 3046F HEMOGLOBIN A1C LEVEL >9.0%: CPT | Performed by: FAMILY MEDICINE

## 2025-04-22 PROCEDURE — G8427 DOCREV CUR MEDS BY ELIG CLIN: HCPCS | Performed by: FAMILY MEDICINE

## 2025-04-22 PROCEDURE — 1036F TOBACCO NON-USER: CPT | Performed by: FAMILY MEDICINE

## 2025-04-22 RX ORDER — POTASSIUM CHLORIDE 750 MG/1
TABLET, EXTENDED RELEASE ORAL
Qty: 36 TABLET | Refills: 1 | Status: SHIPPED | OUTPATIENT
Start: 2025-04-22

## 2025-04-22 ASSESSMENT — PATIENT HEALTH QUESTIONNAIRE - PHQ9
SUM OF ALL RESPONSES TO PHQ QUESTIONS 1-9: 0
SUM OF ALL RESPONSES TO PHQ QUESTIONS 1-9: 0
2. FEELING DOWN, DEPRESSED OR HOPELESS: NOT AT ALL
1. LITTLE INTEREST OR PLEASURE IN DOING THINGS: NOT AT ALL
SUM OF ALL RESPONSES TO PHQ QUESTIONS 1-9: 0
SUM OF ALL RESPONSES TO PHQ QUESTIONS 1-9: 0

## 2025-04-22 NOTE — PROGRESS NOTES
Subjective  Chief Complaint   Patient presents with    Allergies     Little sore throat, some green phlegm     HPI:  Pati Ferreira is a 70 y.o. female with medical problems as listed below who presents for allergies.     Coughing has subsided since it started a couple of weeks ago. She reports some mucus that she's bringing up. Scratchy throat. She says she knows it's her allergies. She is taking fexofenadine and Flonase twice daily. Has tried nasal saline rinse in the past but didn't like it.     Requesting refills of metformin and potassium.     Patient Active Problem List   Diagnosis    Coronary artery disease involving native heart without angina pectoris    Morbid obesity ([facility])    Foot callus    Aortic stenosis    Allergic rhinitis    Microalbuminuria due to type 2 diabetes mellitus ([facility])    Left bundle branch block    Hypokalemia    Chronic obstructive pulmonary disease ([facility])    Gastroesophageal reflux disease without esophagitis    Mixed hyperlipidemia    Cataract, nuclear sclerotic, both eyes    Diabetes mellitus type 2 without retinopathy ([facility])    Disorder of hyperalimentation    Essential hypertension    Chronic systolic congestive heart failure ([facility])    Type 2 diabetes mellitus with chronic kidney disease    CHF (congestive heart failure), NYHA class I, acute on chronic, combined ([facility])     Family History   Problem Relation Age of Onset    Breast Cancer Sister     Cancer Sister         breast    Hypertension Father     Cancer Mother         uterine      Social History     Tobacco Use    Smoking status: Former     Current packs/day: 0.00     Average packs/day: 0.5 packs/day for 30.0 years (15.0 ttl pk-yrs)     Types: Cigarettes     Start date: 1984     Quit date: 2014     Years since quittin.3    Smokeless tobacco: Never   Substance Use Topics    Alcohol use: Not Currently    Drug use: Never     Current Outpatient Medications on File Prior to Visit   Medication Sig Dispense Refill

## 2025-04-24 NOTE — TELEPHONE ENCOUNTER
Date of last OV:4/22/25  Future OV visit scheduled:  [x] Yes -> Date: 7/15/25  [] No    Last Refill: [] N/A  Date:11/1/24  Qty:180  # of refills:1    Med pending for provider review:  [x] Yes  [] No (provide reason why):     Requested Pharmacy updated in ENC:  [] Yes    Applicable labs (provide date of completion):  [] Diabetic med- A1c:  [] Cholesterol med- Lipids:  [] BP med- CMP or BMP:   [] Thyroid med- TSH:  [] Gout med- Uric acid:  [] Prostate med- PSA:  [] Other (provide what type of med and lab):    Additional notes:

## 2025-04-29 ENCOUNTER — COMMUNITY OUTREACH (OUTPATIENT)
Facility: CLINIC | Age: 70
End: 2025-04-29

## 2025-04-29 RX ORDER — SPIRONOLACTONE 25 MG/1
25 TABLET ORAL 2 TIMES DAILY
Qty: 180 TABLET | Refills: 1 | Status: SHIPPED | OUTPATIENT
Start: 2025-04-29

## 2025-05-05 ENCOUNTER — HOSPITAL ENCOUNTER (EMERGENCY)
Facility: HOSPITAL | Age: 70
Discharge: HOME | End: 2025-05-05
Attending: EMERGENCY MEDICINE
Payer: MEDICARE

## 2025-05-05 VITALS
SYSTOLIC BLOOD PRESSURE: 117 MMHG | WEIGHT: 170 LBS | OXYGEN SATURATION: 97 % | HEIGHT: 65 IN | BODY MASS INDEX: 28.32 KG/M2 | RESPIRATION RATE: 17 BRPM | TEMPERATURE: 97.7 F | DIASTOLIC BLOOD PRESSURE: 70 MMHG | HEART RATE: 84 BPM

## 2025-05-05 DIAGNOSIS — R11.0 NAUSEA: ICD-10-CM

## 2025-05-05 DIAGNOSIS — N39.0 URINARY TRACT INFECTION WITHOUT HEMATURIA, SITE UNSPECIFIED: ICD-10-CM

## 2025-05-05 DIAGNOSIS — R73.9 HYPERGLYCEMIA: Primary | ICD-10-CM

## 2025-05-05 DIAGNOSIS — F43.21 GRIEF: ICD-10-CM

## 2025-05-05 LAB
ALBUMIN SERPL-MCNC: 3.9 G/DL (ref 3.5–5)
ALBUMIN/GLOB SERPL: 0.9 (ref 1.1–2.2)
ALP SERPL-CCNC: 90 U/L (ref 45–117)
ALT SERPL-CCNC: 23 U/L (ref 12–78)
ANION GAP SERPL CALC-SCNC: 7 MMOL/L (ref 2–12)
APPEARANCE UR: CLEAR
AST SERPL W P-5'-P-CCNC: 13 U/L (ref 15–37)
BACTERIA URNS QL MICRO: ABNORMAL /HPF
BASOPHILS # BLD: 0.03 K/UL (ref 0–0.1)
BASOPHILS NFR BLD: 0.2 % (ref 0–1)
BILIRUB SERPL-MCNC: 0.3 MG/DL (ref 0.2–1)
BILIRUB UR QL: NEGATIVE
BNP SERPL-MCNC: 132 PG/ML
BUN SERPL-MCNC: 31 MG/DL (ref 6–20)
BUN/CREAT SERPL: 19 (ref 12–20)
CA-I BLD-MCNC: 10.1 MG/DL (ref 8.5–10.1)
CHLORIDE SERPL-SCNC: 93 MMOL/L (ref 97–108)
CO2 SERPL-SCNC: 30 MMOL/L (ref 21–32)
COLOR UR: ABNORMAL
CREAT SERPL-MCNC: 1.62 MG/DL (ref 0.55–1.02)
DIFFERENTIAL METHOD BLD: ABNORMAL
EKG ATRIAL RATE: 87 BPM
EKG DIAGNOSIS: NORMAL
EKG P AXIS: 69 DEGREES
EKG P-R INTERVAL: 144 MS
EKG Q-T INTERVAL: 412 MS
EKG QRS DURATION: 130 MS
EKG QTC CALCULATION (BAZETT): 495 MS
EKG R AXIS: -52 DEGREES
EKG T AXIS: 151 DEGREES
EKG VENTRICULAR RATE: 87 BPM
EOSINOPHIL # BLD: 0.28 K/UL (ref 0–0.4)
EOSINOPHIL NFR BLD: 2 % (ref 0–7)
EPITH CASTS URNS QL MICRO: ABNORMAL /LPF
ERYTHROCYTE [DISTWIDTH] IN BLOOD BY AUTOMATED COUNT: 11.9 % (ref 11.5–14.5)
GLOBULIN SER CALC-MCNC: 4.5 G/DL (ref 2–4)
GLUCOSE BLD STRIP.AUTO-MCNC: 176 MG/DL (ref 65–100)
GLUCOSE BLD STRIP.AUTO-MCNC: 310 MG/DL (ref 65–100)
GLUCOSE BLD STRIP.AUTO-MCNC: 347 MG/DL (ref 65–100)
GLUCOSE SERPL-MCNC: 374 MG/DL (ref 65–100)
GLUCOSE UR STRIP.AUTO-MCNC: >1000 MG/DL
HCT VFR BLD AUTO: 44.6 % (ref 35–47)
HGB BLD-MCNC: 15 G/DL (ref 11.5–16)
HGB UR QL STRIP: NEGATIVE
IMM GRANULOCYTES # BLD AUTO: 0.05 K/UL (ref 0–0.04)
IMM GRANULOCYTES NFR BLD AUTO: 0.4 % (ref 0–0.5)
KETONES UR QL STRIP.AUTO: NEGATIVE MG/DL
LEUKOCYTE ESTERASE UR QL STRIP.AUTO: ABNORMAL
LIPASE SERPL-CCNC: 121 U/L (ref 13–75)
LYMPHOCYTES # BLD: 1.41 K/UL (ref 0.8–3.5)
LYMPHOCYTES NFR BLD: 10.2 % (ref 12–49)
MAGNESIUM SERPL-MCNC: 2.3 MG/DL (ref 1.6–2.4)
MCH RBC QN AUTO: 30.9 PG (ref 26–34)
MCHC RBC AUTO-ENTMCNC: 33.6 G/DL (ref 30–36.5)
MCV RBC AUTO: 92 FL (ref 80–99)
MONOCYTES # BLD: 0.79 K/UL (ref 0–1)
MONOCYTES NFR BLD: 5.7 % (ref 5–13)
NEUTS SEG # BLD: 11.29 K/UL (ref 1.8–8)
NEUTS SEG NFR BLD: 81.5 % (ref 32–75)
NITRITE UR QL STRIP.AUTO: NEGATIVE
PERFORMED BY:: ABNORMAL
PH UR STRIP: 5 (ref 5–8)
PLATELET # BLD AUTO: 291 K/UL (ref 150–400)
PMV BLD AUTO: 8.7 FL (ref 8.9–12.9)
POTASSIUM SERPL-SCNC: 4.6 MMOL/L (ref 3.5–5.1)
PROT SERPL-MCNC: 8.4 G/DL (ref 6.4–8.2)
PROT UR STRIP-MCNC: NEGATIVE MG/DL
RBC # BLD AUTO: 4.85 M/UL (ref 3.8–5.2)
RBC #/AREA URNS HPF: ABNORMAL /HPF (ref 0–5)
SODIUM SERPL-SCNC: 130 MMOL/L (ref 136–145)
SP GR UR REFRACTOMETRY: 1 (ref 1–1.03)
TROPONIN I SERPL HS-MCNC: 17 NG/L (ref 0–51)
TROPONIN I SERPL HS-MCNC: 52 NG/L (ref 0–51)
URINE CULTURE IF INDICATED: ABNORMAL
UROBILINOGEN UR QL STRIP.AUTO: 0.1 EU/DL (ref 0.2–1)
WBC # BLD AUTO: 13.9 K/UL (ref 3.6–11)
WBC URNS QL MICRO: ABNORMAL /HPF (ref 0–4)

## 2025-05-05 PROCEDURE — 6360000002 HC RX W HCPCS: Performed by: EMERGENCY MEDICINE

## 2025-05-05 PROCEDURE — 87086 URINE CULTURE/COLONY COUNT: CPT

## 2025-05-05 PROCEDURE — 84484 ASSAY OF TROPONIN QUANT: CPT

## 2025-05-05 PROCEDURE — 85025 COMPLETE CBC W/AUTO DIFF WBC: CPT

## 2025-05-05 PROCEDURE — 6370000000 HC RX 637 (ALT 250 FOR IP): Performed by: EMERGENCY MEDICINE

## 2025-05-05 PROCEDURE — 83690 ASSAY OF LIPASE: CPT

## 2025-05-05 PROCEDURE — 83880 ASSAY OF NATRIURETIC PEPTIDE: CPT

## 2025-05-05 PROCEDURE — 81001 URINALYSIS AUTO W/SCOPE: CPT

## 2025-05-05 PROCEDURE — 82962 GLUCOSE BLOOD TEST: CPT

## 2025-05-05 PROCEDURE — 99284 EMERGENCY DEPT VISIT MOD MDM: CPT

## 2025-05-05 PROCEDURE — 2500000003 HC RX 250 WO HCPCS: Performed by: EMERGENCY MEDICINE

## 2025-05-05 PROCEDURE — 80053 COMPREHEN METABOLIC PANEL: CPT

## 2025-05-05 PROCEDURE — 2580000003 HC RX 258: Performed by: EMERGENCY MEDICINE

## 2025-05-05 PROCEDURE — 83735 ASSAY OF MAGNESIUM: CPT

## 2025-05-05 PROCEDURE — 36415 COLL VENOUS BLD VENIPUNCTURE: CPT

## 2025-05-05 PROCEDURE — 96374 THER/PROPH/DIAG INJ IV PUSH: CPT

## 2025-05-05 PROCEDURE — 93005 ELECTROCARDIOGRAM TRACING: CPT | Performed by: EMERGENCY MEDICINE

## 2025-05-05 PROCEDURE — 96361 HYDRATE IV INFUSION ADD-ON: CPT

## 2025-05-05 RX ORDER — CEPHALEXIN 500 MG/1
500 CAPSULE ORAL 2 TIMES DAILY
Qty: 10 CAPSULE | Refills: 0 | Status: SHIPPED | OUTPATIENT
Start: 2025-05-05 | End: 2025-05-10

## 2025-05-05 RX ORDER — 0.9 % SODIUM CHLORIDE 0.9 %
500 INTRAVENOUS SOLUTION INTRAVENOUS
Status: COMPLETED | OUTPATIENT
Start: 2025-05-05 | End: 2025-05-05

## 2025-05-05 RX ADMIN — SODIUM CHLORIDE 500 ML: 0.9 INJECTION, SOLUTION INTRAVENOUS at 11:03

## 2025-05-05 RX ADMIN — INSULIN HUMAN 10 UNITS: 100 INJECTION, SOLUTION PARENTERAL at 13:20

## 2025-05-05 RX ADMIN — INSULIN HUMAN 6 UNITS: 100 INJECTION, SOLUTION PARENTERAL at 11:04

## 2025-05-05 RX ADMIN — CEFTRIAXONE SODIUM 1000 MG: 1 INJECTION, POWDER, FOR SOLUTION INTRAMUSCULAR; INTRAVENOUS at 12:10

## 2025-05-05 ASSESSMENT — PAIN - FUNCTIONAL ASSESSMENT: PAIN_FUNCTIONAL_ASSESSMENT: 0-10

## 2025-05-05 NOTE — DISCHARGE INSTRUCTIONS
Thank you for choosing our Emergency Department for your care.  It is our privilege to care for you in your time of need.  In the next several days, you may receive a survey via email or mailed to your home about your experience with our team.  We would greatly appreciate you taking a few minutes to complete the survey, as we use this information to learn what we have done well and what we could be doing better. Thank you for trusting us with your care!    Below you will find a list of your tests from today's visit.   Labs and Radiology Studies  Recent Results (from the past 12 hours)   POCT Glucose    Collection Time: 05/05/25  9:27 AM   Result Value Ref Range    POC Glucose 347 (H) 65 - 100 mg/dL    Performed by: AFSHAN WASHBURN    Troponin    Collection Time: 05/05/25 10:12 AM   Result Value Ref Range    Troponin, High Sensitivity 52 (H) 0 - 51 ng/L   CBC with Auto Differential    Collection Time: 05/05/25 10:13 AM   Result Value Ref Range    WBC 13.9 (H) 3.6 - 11.0 K/uL    RBC 4.85 3.80 - 5.20 M/uL    Hemoglobin 15.0 11.5 - 16.0 g/dL    Hematocrit 44.6 35.0 - 47.0 %    MCV 92.0 80.0 - 99.0 FL    MCH 30.9 26.0 - 34.0 PG    MCHC 33.6 30.0 - 36.5 g/dL    RDW 11.9 11.5 - 14.5 %    Platelets 291 150 - 400 K/uL    MPV 8.7 (L) 8.9 - 12.9 FL    Neutrophils % 81.5 (H) 32.0 - 75.0 %    Lymphocytes % 10.2 (L) 12.0 - 49.0 %    Monocytes % 5.7 5.0 - 13.0 %    Eosinophils % 2.0 0.0 - 7.0 %    Basophils % 0.2 0.0 - 1.0 %    Immature Granulocytes % 0.4 0.0 - 0.5 %    Neutrophils Absolute 11.29 (H) 1.80 - 8.00 K/UL    Lymphocytes Absolute 1.41 0.80 - 3.50 K/UL    Monocytes Absolute 0.79 0.00 - 1.00 K/UL    Eosinophils Absolute 0.28 0.00 - 0.40 K/UL    Basophils Absolute 0.03 0.00 - 0.10 K/UL    Immature Granulocytes Absolute 0.05 (H) 0.00 - 0.04 K/UL    Differential Type AUTOMATED     Comprehensive Metabolic Panel    Collection Time: 05/05/25 10:13 AM   Result Value Ref Range    Sodium 130 (L) 136 - 145 mmol/L    Potassium

## 2025-05-05 NOTE — ED TRIAGE NOTES
Pt c/o generalized weakness, nausea and feeling faint.  After she ate a cheese and egg biscuit from [employer] she felt like she was going to vomit. Hx diabetes and stated that she felt real hungry before she ate her biscuit. Unsure if her sugar was too low or high.

## 2025-05-05 NOTE — ED PROVIDER NOTES
[facility] EMERGENCY DEPT  EMERGENCY DEPARTMENT HISTORY AND PHYSICAL EXAM      Date of evaluation: 5/5/2025  Patient Name: Pati Ferreira  Birthdate 1955  MRN: [id number]  ED Provider: Haven Woods MD   Note Started: 10:01 AM EDT 5/5/25    HISTORY OF PRESENT ILLNESS     Chief Complaint   Patient presents with    Nausea       History Provided By: Patient, daughter/son     HPI: Pati Ferreira is a 70 y.o. female presents with her daughter for a transient episode of nausea fter eating at [employer], followed by feeling like all her energy was being drained out of her. She denies chest pain or SOB. She reports feeling much better now than 90 min ago. Pt took her Metformin this morning. She states her sugars are usually in the 100s.    PAST MEDICAL HISTORY   Past Medical History:  Past Medical History:   Diagnosis Date    Acute gastrointestinal hemorrhage 8/28/2020    Acute respiratory failure with hypoxia (HCC) 1/1/2021    Allergic rhinitis 8/28/2020    Anemia 8/28/2020    Benign essential hypertension 6/25/2020    Body mass index 30.0-30.9, adult 6/25/2020    CAD (coronary artery disease) 8/28/2020    Foot callus 8/28/2020    GERD (gastroesophageal reflux disease) 6/25/2020    Hair loss     Heart murmur 6/25/2020    Hx of colonoscopy 01/01/2015    Done for anemia and rectal bleeding    Iron deficiency anemia 8/28/2020    Left bundle branch block 8/28/2020    Microalbuminuria due to type 2 diabetes mellitus (HCC) 8/28/2020    Mixed hyperlipidemia 6/25/2020    Morbid obesity (HCC) 8/28/2020    Tobacco dependence syndrome 6/25/2020    Type II diabetes mellitus, uncontrolled 6/25/2020       Past Surgical History:  Past Surgical History:   Procedure Laterality Date    COLONOSCOPY  2015    due in 1109-9427.  Done for anemia and rectal bleeding       Family History:  Family History   Problem Relation Age of Onset    Breast Cancer Sister     Cancer Sister         breast    Hypertension Father     Cancer Mother          (PRINIVIL;ZESTRIL) 20 MG tablet TAKE 1 TABLET BY MOUTH TWICE DAILY. 180 tablet 1    loratadine (CLARITIN) 10 MG tablet Take 1 tablet by mouth daily      clopidogrel (PLAVIX) 75 MG tablet Take 1 tablet by mouth daily      albuterol (PROVENTIL) (2.5 MG/3ML) 0.083% nebulizer solution Take 3 mLs by nebulization every 4 hours as needed for Wheezing or Shortness of Breath 120 each 5    Lancets MISC Check glucose once daily      Budeson-Glycopyrrol-Formoterol (BREZTRI AEROSPHERE) 160-9-4.8 MCG/ACT AERO Inhale into the lungs      ferrous sulfate (IRON 325) 325 (65 Fe) MG tablet Take by mouth every morning (before breakfast)         Social Determinants of Health:   Social Drivers of Health     Tobacco Use: Medium Risk (5/5/2025)    Patient History     Smoking Tobacco Use: Former     Smokeless Tobacco Use: Never     Passive Exposure: Not on file   Alcohol Use: Not At Risk (10/16/2024)    AUDIT-C     Frequency of Alcohol Consumption: Never     Average Number of Drinks: Patient does not drink     Frequency of Binge Drinking: Never   Financial Resource Strain: Low Risk  (2/23/2024)    Overall Financial Resource Strain (CARDIA)     Difficulty of Paying Living Expenses: Not hard at all   Food Insecurity: No Food Insecurity (10/17/2024)    Hunger Vital Sign     Worried About Running Out of Food in the Last Year: Never true     Ran Out of Food in the Last Year: Never true   Transportation Needs: No Transportation Needs (10/17/2024)    PRAPARE - Transportation     Lack of Transportation (Medical): No     Lack of Transportation (Non-Medical): No   Physical Activity: Inactive (2/23/2024)    Exercise Vital Sign     Days of Exercise per Week: 0 days     Minutes of Exercise per Session: 0 min   Stress: No Stress Concern Present (4/18/2023)    [employer] [place] [employer] - Occupational Stress Questionnaire     Feeling of Stress : Not at all   Social Connections: Moderately Integrated (4/18/2023)    Social Connection and

## 2025-05-06 ENCOUNTER — OFFICE VISIT (OUTPATIENT)
Facility: CLINIC | Age: 70
End: 2025-05-06
Payer: MEDICARE

## 2025-05-06 VITALS
DIASTOLIC BLOOD PRESSURE: 70 MMHG | RESPIRATION RATE: 18 BRPM | BODY MASS INDEX: 28.82 KG/M2 | HEIGHT: 65 IN | OXYGEN SATURATION: 95 % | HEART RATE: 83 BPM | SYSTOLIC BLOOD PRESSURE: 142 MMHG | TEMPERATURE: 97.8 F | WEIGHT: 173 LBS

## 2025-05-06 DIAGNOSIS — E11.22 TYPE 2 DIABETES MELLITUS WITH DIABETIC CHRONIC KIDNEY DISEASE, UNSPECIFIED CKD STAGE, UNSPECIFIED WHETHER LONG TERM INSULIN USE (HCC): Primary | ICD-10-CM

## 2025-05-06 DIAGNOSIS — J44.9 CHRONIC OBSTRUCTIVE PULMONARY DISEASE, UNSPECIFIED COPD TYPE (HCC): ICD-10-CM

## 2025-05-06 DIAGNOSIS — I50.43 CHF (CONGESTIVE HEART FAILURE), NYHA CLASS I, ACUTE ON CHRONIC, COMBINED (HCC): ICD-10-CM

## 2025-05-06 DIAGNOSIS — E11.65 UNCONTROLLED TYPE 2 DIABETES MELLITUS WITH HYPERGLYCEMIA (HCC): ICD-10-CM

## 2025-05-06 LAB
BACTERIA SPEC CULT: NORMAL
COLONY COUNT, CNT: NORMAL
COLONY COUNT, CNT: NORMAL
HBA1C MFR BLD: 9.5 %
Lab: NORMAL

## 2025-05-06 PROCEDURE — 99214 OFFICE O/P EST MOD 30 MIN: CPT | Performed by: NURSE PRACTITIONER

## 2025-05-06 PROCEDURE — 2022F DILAT RTA XM EVC RTNOPTHY: CPT | Performed by: NURSE PRACTITIONER

## 2025-05-06 PROCEDURE — 83036 HEMOGLOBIN GLYCOSYLATED A1C: CPT | Performed by: NURSE PRACTITIONER

## 2025-05-06 PROCEDURE — 3046F HEMOGLOBIN A1C LEVEL >9.0%: CPT | Performed by: NURSE PRACTITIONER

## 2025-05-06 PROCEDURE — 1036F TOBACCO NON-USER: CPT | Performed by: NURSE PRACTITIONER

## 2025-05-06 PROCEDURE — 3023F SPIROM DOC REV: CPT | Performed by: NURSE PRACTITIONER

## 2025-05-06 PROCEDURE — 1159F MED LIST DOCD IN RCRD: CPT | Performed by: NURSE PRACTITIONER

## 2025-05-06 PROCEDURE — 1090F PRES/ABSN URINE INCON ASSESS: CPT | Performed by: NURSE PRACTITIONER

## 2025-05-06 PROCEDURE — 1123F ACP DISCUSS/DSCN MKR DOCD: CPT | Performed by: NURSE PRACTITIONER

## 2025-05-06 PROCEDURE — 1160F RVW MEDS BY RX/DR IN RCRD: CPT | Performed by: NURSE PRACTITIONER

## 2025-05-06 PROCEDURE — G8417 CALC BMI ABV UP PARAM F/U: HCPCS | Performed by: NURSE PRACTITIONER

## 2025-05-06 PROCEDURE — 3077F SYST BP >= 140 MM HG: CPT | Performed by: NURSE PRACTITIONER

## 2025-05-06 PROCEDURE — G8427 DOCREV CUR MEDS BY ELIG CLIN: HCPCS | Performed by: NURSE PRACTITIONER

## 2025-05-06 PROCEDURE — G8399 PT W/DXA RESULTS DOCUMENT: HCPCS | Performed by: NURSE PRACTITIONER

## 2025-05-06 PROCEDURE — 3017F COLORECTAL CA SCREEN DOC REV: CPT | Performed by: NURSE PRACTITIONER

## 2025-05-06 PROCEDURE — 3078F DIAST BP <80 MM HG: CPT | Performed by: NURSE PRACTITIONER

## 2025-05-06 RX ORDER — TIRZEPATIDE 2.5 MG/.5ML
2.5 INJECTION, SOLUTION SUBCUTANEOUS WEEKLY
Qty: 2 ML | Refills: 0 | Status: SHIPPED | OUTPATIENT
Start: 2025-05-06

## 2025-05-06 SDOH — ECONOMIC STABILITY: FOOD INSECURITY: WITHIN THE PAST 12 MONTHS, THE FOOD YOU BOUGHT JUST DIDN'T LAST AND YOU DIDN'T HAVE MONEY TO GET MORE.: NEVER TRUE

## 2025-05-06 SDOH — ECONOMIC STABILITY: FOOD INSECURITY: WITHIN THE PAST 12 MONTHS, YOU WORRIED THAT YOUR FOOD WOULD RUN OUT BEFORE YOU GOT MONEY TO BUY MORE.: NEVER TRUE

## 2025-05-06 ASSESSMENT — PATIENT HEALTH QUESTIONNAIRE - PHQ9
SUM OF ALL RESPONSES TO PHQ QUESTIONS 1-9: 0
SUM OF ALL RESPONSES TO PHQ QUESTIONS 1-9: 0
1. LITTLE INTEREST OR PLEASURE IN DOING THINGS: NOT AT ALL
2. FEELING DOWN, DEPRESSED OR HOPELESS: NOT AT ALL
SUM OF ALL RESPONSES TO PHQ QUESTIONS 1-9: 0
SUM OF ALL RESPONSES TO PHQ QUESTIONS 1-9: 0

## 2025-05-06 NOTE — PROGRESS NOTES
Have you been to the ER, urgent care clinic since your last visit?  Hospitalized since your last visit?   Yes -  FS - May 05, 2025    Have you seen or consulted any other health care providers outside our system since your last visit?   NO      “Have you had a colorectal cancer screening such as a colonoscopy/FIT/Cologuard?    NO    Date of last Colonoscopy: 1/5/2015  No cologuard on file  Date of last FIT: 1/25/2021   No flexible sigmoidoscopy on file     “Have you had a diabetic eye exam?”    NO     No diabetic eye exam on file     Chief Complaint   Patient presents with    ER Visit      FS - Hyperglycemia     BP (!) 142/70 (BP Site: Right Upper Arm, Patient Position: Sitting, BP Cuff Size: Large Adult)   Pulse 83   Temp 97.8 °F (36.6 °C) (Temporal)   Resp 18   Ht 1.651 m (5' 5\")   Wt 78.5 kg (173 lb)   SpO2 95%   BMI 28.79 kg/m²

## 2025-05-06 NOTE — PROGRESS NOTES
[facility]  [address] [place] [address] [address]   [place] [address]  (P) [phone] (F) [phone]    Subjective:   Pati Ferreira is a 70 y.o. female  established here with complaints of ER Visit ( FS - Hyperglycemia)   Reports after eating a [employer] biscuit did not feel good went to the ER blood glucose level 372.  Currently takes Jardiance, metformin for management of diabetes.  Last A1c October 2024 was 6.6.  Reports that overall has not changed her diet or exercise although endorses that she drinks [employer] regularly.    Patient Active Problem List   Diagnosis    Coronary artery disease involving native heart without angina pectoris    Morbid obesity ([facility])    Foot callus    Aortic stenosis    Allergic rhinitis    Microalbuminuria due to type 2 diabetes mellitus ([facility])    Left bundle branch block    Hypokalemia    Chronic obstructive pulmonary disease ([facility])    Gastroesophageal reflux disease without esophagitis    Mixed hyperlipidemia    Cataract, nuclear sclerotic, both eyes    Diabetes mellitus type 2 without retinopathy ([facility])    Disorder of hyperalimentation    Essential hypertension    Chronic systolic congestive heart failure ([facility])    Type 2 diabetes mellitus with chronic kidney disease    CHF (congestive heart failure), NYHA class I, acute on chronic, combined ([facility])      Current Outpatient Medications   Medication Sig Dispense Refill    MOUNJARO 2.5 MG/0.5ML SOAJ pen Inject 2.5 mg into the skin once a week 2 mL 0    cephALEXin (KEFLEX) 500 MG capsule Take 1 capsule by mouth 2 times daily for 5 days 10 capsule 0    spironolactone (ALDACTONE) 25 MG tablet TAKE 1 TABLET BY MOUTH TWICE DAILY. 180 tablet 1    metFORMIN (GLUCOPHAGE) 500 MG tablet Take 2 tablets by mouth 2 times daily (with meals) 120 tablet 0    potassium chloride (KLOR-CON) 10 MEQ extended release tablet Take one tablet Monday, Wednesday and Friday. 36 tablet 1    pantoprazole (PROTONIX) 40 MG tablet Take 1

## 2025-05-13 NOTE — TELEPHONE ENCOUNTER
Requested Prescriptions     Pending Prescriptions Disp Refills    fluticasone (FLONASE) 50 MCG/ACT nasal spray 16 g 0

## 2025-05-13 NOTE — TELEPHONE ENCOUNTER
Method of communication:  [x]Fax []Phone call []In person   []Other:    Who is making request:[facility]  What medication/s (include strength and dosing):  Fluticasone Prop 50 MCG    This is for a:   [x]Refill    []New medication request  []Follow up on prior request    Pharmacy:[facility]  Best contact for patient:[phone]    Additional notes:

## 2025-05-16 RX ORDER — FLUTICASONE PROPIONATE 50 MCG
2 SPRAY, SUSPENSION (ML) NASAL DAILY PRN
Qty: 1 EACH | Refills: 5 | Status: SHIPPED | OUTPATIENT
Start: 2025-05-16

## 2025-05-21 DIAGNOSIS — E11.22 TYPE 2 DIABETES MELLITUS WITH DIABETIC CHRONIC KIDNEY DISEASE, UNSPECIFIED CKD STAGE, UNSPECIFIED WHETHER LONG TERM INSULIN USE (HCC): ICD-10-CM

## 2025-05-21 DIAGNOSIS — E11.65 UNCONTROLLED TYPE 2 DIABETES MELLITUS WITH HYPERGLYCEMIA (HCC): ICD-10-CM

## 2025-05-21 DIAGNOSIS — E11.9 DIABETES MELLITUS TYPE 2 WITHOUT RETINOPATHY (HCC): ICD-10-CM

## 2025-05-21 ASSESSMENT — ENCOUNTER SYMPTOMS: COUGH: 1

## 2025-05-22 NOTE — TELEPHONE ENCOUNTER
Requested Prescriptions     Pending Prescriptions Disp Refills    metFORMIN (GLUCOPHAGE) 500 MG tablet [Pharmacy Med Name: METFORMIN  MG TABLET] 120 tablet 0     Sig: TAKE 2 TABLETS BY MOUTH TWICE DAILY WITH MEALS.    albuterol sulfate HFA (PROVENTIL;VENTOLIN;PROAIR) 108 (90 Base) MCG/ACT inhaler [Pharmacy Med Name: ALBUTEROL HFA (VENT)] 18 g 0     Sig: Inhale 2 puffs into the lungs every 4 hours as needed for Wheezing or Shortness of Breath    JARDIANCE 25 MG tablet [Pharmacy Med Name: JARDIANCE 25 MG TABLET] 30 tablet 0     Sig: TAKE 1 TABLET BY MOUTH DAILY

## 2025-05-22 NOTE — TELEPHONE ENCOUNTER
Requested Prescriptions     Pending Prescriptions Disp Refills    MOUNJARO 2.5 MG/0.5ML SOAJ pen [Pharmacy Med Name: MOUNJARO2.5 MG/0.5 ML PEN] 2 mL 0     Sig: Inject 2.5 mg into the skin once a week

## 2025-05-23 RX ORDER — EMPAGLIFLOZIN 25 MG/1
25 TABLET, FILM COATED ORAL DAILY
Qty: 90 TABLET | Refills: 1 | Status: SHIPPED | OUTPATIENT
Start: 2025-05-23

## 2025-05-23 RX ORDER — TIRZEPATIDE 2.5 MG/.5ML
2.5 INJECTION, SOLUTION SUBCUTANEOUS WEEKLY
Qty: 2 ML | Refills: 2 | Status: SHIPPED | OUTPATIENT
Start: 2025-05-23

## 2025-05-23 RX ORDER — ALBUTEROL SULFATE 90 UG/1
2 INHALANT RESPIRATORY (INHALATION) EVERY 4 HOURS PRN
Qty: 18 G | Refills: 1 | Status: SHIPPED | OUTPATIENT
Start: 2025-05-23

## 2025-06-11 DIAGNOSIS — I50.22 CHRONIC SYSTOLIC CONGESTIVE HEART FAILURE (HCC): ICD-10-CM

## 2025-06-12 NOTE — TELEPHONE ENCOUNTER
Requested Prescriptions     Pending Prescriptions Disp Refills    furosemide (LASIX) 40 MG tablet [Pharmacy Med Name: FUROSEMIDE 40 MG TABLET*] 30 tablet 0     Sig: TAKE ONE TABLET BY MOUTH DAILY

## 2025-06-16 RX ORDER — FUROSEMIDE 40 MG/1
40 TABLET ORAL DAILY
Qty: 30 TABLET | Refills: 0 | Status: SHIPPED | OUTPATIENT
Start: 2025-06-16

## 2025-06-19 DIAGNOSIS — I50.22 CHRONIC SYSTOLIC CONGESTIVE HEART FAILURE (HCC): ICD-10-CM

## 2025-06-19 RX ORDER — FUROSEMIDE 40 MG/1
40 TABLET ORAL DAILY
Qty: 30 TABLET | Refills: 0 | OUTPATIENT
Start: 2025-06-19

## 2025-07-06 DIAGNOSIS — I50.22 CHRONIC SYSTOLIC CONGESTIVE HEART FAILURE (HCC): ICD-10-CM

## 2025-07-07 NOTE — TELEPHONE ENCOUNTER
Method of communication:  [x]Fax []Phone call []In person   []Other:    Who is making request:[facility]  What medication/s (include strength and dosing):    Atorvastatin 40 mg tablet  Qty 30    Carvedilol 3.125 mg tablet  Qty 60    This is for a:   [x]Refill    []New medication request  []Follow up on prior request    Pharmacy:[facility]    Best contact for patient:[phone]    Additional notes:

## 2025-07-08 RX ORDER — FUROSEMIDE 40 MG/1
40 TABLET ORAL DAILY
Qty: 90 TABLET | Refills: 0 | Status: SHIPPED | OUTPATIENT
Start: 2025-07-08

## 2025-07-08 NOTE — TELEPHONE ENCOUNTER
PCP: Demetria Conner DO    Last appt:   5/6/2025    Future Appointments   Date Time Provider Department Center   7/15/2025  1:00 PM Demetria Conner DO [facility] DEP       Requested Prescriptions     Pending Prescriptions Disp Refills    atorvastatin (LIPITOR) 40 MG tablet 90 tablet 1     Sig: Take 1 tablet by mouth daily

## 2025-07-11 RX ORDER — ATORVASTATIN CALCIUM 40 MG/1
40 TABLET, FILM COATED ORAL DAILY
Qty: 90 TABLET | Refills: 1 | Status: SHIPPED | OUTPATIENT
Start: 2025-07-11

## 2025-07-15 ENCOUNTER — OFFICE VISIT (OUTPATIENT)
Facility: CLINIC | Age: 70
End: 2025-07-15
Payer: MEDICARE

## 2025-07-15 VITALS
BODY MASS INDEX: 27.92 KG/M2 | TEMPERATURE: 98.3 F | RESPIRATION RATE: 18 BRPM | HEART RATE: 93 BPM | WEIGHT: 167.6 LBS | SYSTOLIC BLOOD PRESSURE: 123 MMHG | OXYGEN SATURATION: 97 % | DIASTOLIC BLOOD PRESSURE: 84 MMHG | HEIGHT: 65 IN

## 2025-07-15 DIAGNOSIS — I10 ESSENTIAL HYPERTENSION: ICD-10-CM

## 2025-07-15 DIAGNOSIS — Z12.31 ENCOUNTER FOR SCREENING MAMMOGRAM FOR MALIGNANT NEOPLASM OF BREAST: ICD-10-CM

## 2025-07-15 DIAGNOSIS — J44.9 CHRONIC OBSTRUCTIVE PULMONARY DISEASE, UNSPECIFIED COPD TYPE (HCC): ICD-10-CM

## 2025-07-15 DIAGNOSIS — I50.22 CHRONIC SYSTOLIC CONGESTIVE HEART FAILURE (HCC): ICD-10-CM

## 2025-07-15 DIAGNOSIS — E78.2 MIXED HYPERLIPIDEMIA: ICD-10-CM

## 2025-07-15 DIAGNOSIS — I25.10 CORONARY ARTERY DISEASE INVOLVING NATIVE HEART WITHOUT ANGINA PECTORIS, UNSPECIFIED VESSEL OR LESION TYPE: ICD-10-CM

## 2025-07-15 DIAGNOSIS — E11.22 TYPE 2 DIABETES MELLITUS WITH DIABETIC CHRONIC KIDNEY DISEASE, UNSPECIFIED CKD STAGE, UNSPECIFIED WHETHER LONG TERM INSULIN USE (HCC): ICD-10-CM

## 2025-07-15 DIAGNOSIS — Z00.00 MEDICARE ANNUAL WELLNESS VISIT, SUBSEQUENT: Primary | ICD-10-CM

## 2025-07-15 PROCEDURE — 3023F SPIROM DOC REV: CPT | Performed by: FAMILY MEDICINE

## 2025-07-15 PROCEDURE — G0439 PPPS, SUBSEQ VISIT: HCPCS | Performed by: FAMILY MEDICINE

## 2025-07-15 PROCEDURE — 1123F ACP DISCUSS/DSCN MKR DOCD: CPT | Performed by: FAMILY MEDICINE

## 2025-07-15 PROCEDURE — 1126F AMNT PAIN NOTED NONE PRSNT: CPT | Performed by: FAMILY MEDICINE

## 2025-07-15 PROCEDURE — G8417 CALC BMI ABV UP PARAM F/U: HCPCS | Performed by: FAMILY MEDICINE

## 2025-07-15 PROCEDURE — G8427 DOCREV CUR MEDS BY ELIG CLIN: HCPCS | Performed by: FAMILY MEDICINE

## 2025-07-15 PROCEDURE — 1090F PRES/ABSN URINE INCON ASSESS: CPT | Performed by: FAMILY MEDICINE

## 2025-07-15 PROCEDURE — 99213 OFFICE O/P EST LOW 20 MIN: CPT | Performed by: FAMILY MEDICINE

## 2025-07-15 PROCEDURE — 1036F TOBACCO NON-USER: CPT | Performed by: FAMILY MEDICINE

## 2025-07-15 PROCEDURE — 1160F RVW MEDS BY RX/DR IN RCRD: CPT | Performed by: FAMILY MEDICINE

## 2025-07-15 PROCEDURE — G8399 PT W/DXA RESULTS DOCUMENT: HCPCS | Performed by: FAMILY MEDICINE

## 2025-07-15 PROCEDURE — 3074F SYST BP LT 130 MM HG: CPT | Performed by: FAMILY MEDICINE

## 2025-07-15 PROCEDURE — 3017F COLORECTAL CA SCREEN DOC REV: CPT | Performed by: FAMILY MEDICINE

## 2025-07-15 PROCEDURE — 3046F HEMOGLOBIN A1C LEVEL >9.0%: CPT | Performed by: FAMILY MEDICINE

## 2025-07-15 PROCEDURE — 2022F DILAT RTA XM EVC RTNOPTHY: CPT | Performed by: FAMILY MEDICINE

## 2025-07-15 PROCEDURE — 3079F DIAST BP 80-89 MM HG: CPT | Performed by: FAMILY MEDICINE

## 2025-07-15 PROCEDURE — 1159F MED LIST DOCD IN RCRD: CPT | Performed by: FAMILY MEDICINE

## 2025-07-15 RX ORDER — AVOBENZONE, HOMOSALATE, OCTISALATE, OCTOCRYLENE 30; 40; 45; 26 MG/ML; MG/ML; MG/ML; MG/ML
CREAM TOPICAL
Qty: 100 EACH | Refills: 3 | Status: SHIPPED | OUTPATIENT
Start: 2025-07-15

## 2025-07-15 RX ORDER — GLUCOSAMINE HCL/CHONDROITIN SU 500-400 MG
CAPSULE ORAL
Qty: 100 STRIP | Refills: 3 | Status: SHIPPED | OUTPATIENT
Start: 2025-07-15

## 2025-07-15 RX ORDER — BLOOD-GLUCOSE METER
KIT MISCELLANEOUS
Qty: 1 KIT | Refills: 0 | Status: SHIPPED | OUTPATIENT
Start: 2025-07-15

## 2025-07-15 SDOH — SOCIAL STABILITY: SOCIAL INSECURITY: WITHIN THE LAST YEAR, HAVE YOU BEEN HUMILIATED OR EMOTIONALLY ABUSED IN OTHER WAYS BY YOUR PARTNER OR EX-PARTNER?: NO

## 2025-07-15 SDOH — SOCIAL STABILITY: SOCIAL NETWORK
DO YOU BELONG TO ANY CLUBS OR ORGANIZATIONS SUCH AS CHURCH GROUPS, UNIONS, FRATERNAL OR ATHLETIC GROUPS, OR SCHOOL GROUPS?: YES

## 2025-07-15 SDOH — SOCIAL STABILITY: SOCIAL INSECURITY
WITHIN THE LAST YEAR, HAVE YOU BEEN KICKED, HIT, SLAPPED, OR OTHERWISE PHYSICALLY HURT BY YOUR PARTNER OR EX-PARTNER?: NO

## 2025-07-15 SDOH — SOCIAL STABILITY: SOCIAL NETWORK
IN A TYPICAL WEEK, HOW MANY TIMES DO YOU TALK ON THE PHONE WITH FAMILY, FRIENDS, OR NEIGHBORS?: MORE THAN THREE TIMES A WEEK

## 2025-07-15 SDOH — HEALTH STABILITY: MENTAL HEALTH: HOW MANY DRINKS CONTAINING ALCOHOL DO YOU HAVE ON A TYPICAL DAY WHEN YOU ARE DRINKING?: PATIENT DOES NOT DRINK

## 2025-07-15 SDOH — HEALTH STABILITY: MENTAL HEALTH
DO YOU FEEL STRESS - TENSE, RESTLESS, NERVOUS, OR ANXIOUS, OR UNABLE TO SLEEP AT NIGHT BECAUSE YOUR MIND IS TROUBLED ALL THE TIME - THESE DAYS?: NOT AT ALL

## 2025-07-15 SDOH — SOCIAL STABILITY: SOCIAL NETWORK: HOW OFTEN DO YOU ATTEND MEETINGS OF THE CLUBS OR ORGANIZATIONS YOU BELONG TO?: MORE THAN 4 TIMES PER YEAR

## 2025-07-15 SDOH — SOCIAL STABILITY: SOCIAL INSECURITY: WITHIN THE LAST YEAR, HAVE YOU BEEN AFRAID OF YOUR PARTNER OR EX-PARTNER?: NO

## 2025-07-15 SDOH — SOCIAL STABILITY: SOCIAL NETWORK: HOW OFTEN DO YOU GET TOGETHER WITH FRIENDS OR RELATIVES?: MORE THAN THREE TIMES A WEEK

## 2025-07-15 SDOH — ECONOMIC STABILITY: FOOD INSECURITY: HOW HARD IS IT FOR YOU TO PAY FOR THE VERY BASICS LIKE FOOD, HOUSING, MEDICAL CARE, AND HEATING?: NOT HARD AT ALL

## 2025-07-15 SDOH — HEALTH STABILITY: MENTAL HEALTH: HOW OFTEN DO YOU HAVE A DRINK CONTAINING ALCOHOL?: NEVER

## 2025-07-15 SDOH — ECONOMIC STABILITY: HOUSING INSECURITY: IN THE LAST 12 MONTHS, WAS THERE A TIME WHEN YOU WERE NOT ABLE TO PAY THE MORTGAGE OR RENT ON TIME?: NO

## 2025-07-15 SDOH — SOCIAL STABILITY: SOCIAL INSECURITY: ARE YOU MARRIED, WIDOWED, DIVORCED, SEPARATED, NEVER MARRIED, OR LIVING WITH A PARTNER?: DIVORCED

## 2025-07-15 SDOH — SOCIAL STABILITY: SOCIAL INSECURITY
WITHIN THE LAST YEAR, HAVE YOU BEEN RAPED OR FORCED TO HAVE ANY KIND OF SEXUAL ACTIVITY BY YOUR PARTNER OR EX-PARTNER?: NO

## 2025-07-15 SDOH — SOCIAL STABILITY: SOCIAL NETWORK: HOW OFTEN DO YOU ATTEND CHURCH OR RELIGIOUS SERVICES?: MORE THAN 4 TIMES PER YEAR

## 2025-07-15 SDOH — HEALTH STABILITY: PHYSICAL HEALTH: ON AVERAGE, HOW MANY MINUTES DO YOU ENGAGE IN EXERCISE AT THIS LEVEL?: 0 MIN

## 2025-07-15 SDOH — ECONOMIC STABILITY: FOOD INSECURITY: WITHIN THE PAST 12 MONTHS, THE FOOD YOU BOUGHT JUST DIDN'T LAST AND YOU DIDN'T HAVE MONEY TO GET MORE.: NEVER TRUE

## 2025-07-15 SDOH — ECONOMIC STABILITY: TRANSPORTATION INSECURITY: IN THE PAST 12 MONTHS, HAS LACK OF TRANSPORTATION KEPT YOU FROM MEDICAL APPOINTMENTS OR FROM GETTING MEDICATIONS?: NO

## 2025-07-15 SDOH — ECONOMIC STABILITY: FOOD INSECURITY: WITHIN THE PAST 12 MONTHS, YOU WORRIED THAT YOUR FOOD WOULD RUN OUT BEFORE YOU GOT THE MONEY TO BUY MORE.: NEVER TRUE

## 2025-07-15 SDOH — HEALTH STABILITY: PHYSICAL HEALTH: ON AVERAGE, HOW MANY DAYS PER WEEK DO YOU ENGAGE IN MODERATE TO STRENUOUS EXERCISE (LIKE A BRISK WALK)?: 0 DAYS

## 2025-07-15 ASSESSMENT — PATIENT HEALTH QUESTIONNAIRE - PHQ9
SUM OF ALL RESPONSES TO PHQ QUESTIONS 1-9: 0
2. FEELING DOWN, DEPRESSED OR HOPELESS: NOT AT ALL
SUM OF ALL RESPONSES TO PHQ QUESTIONS 1-9: 0
1. LITTLE INTEREST OR PLEASURE IN DOING THINGS: NOT AT ALL
SUM OF ALL RESPONSES TO PHQ QUESTIONS 1-9: 0
SUM OF ALL RESPONSES TO PHQ QUESTIONS 1-9: 0

## 2025-07-15 ASSESSMENT — ACTIVITIES OF DAILY LIVING (ADL): LACK_OF_TRANSPORTATION: NO

## 2025-07-15 NOTE — PROGRESS NOTES
Medicare Annual Wellness Visit    Pati Ferreira is here for Medicare AWV (Pt here for her medicare AWV)    Assessment & Plan  1. Diabetes Mellitus.  - Her A1c level was recorded as 9.5 on 05/06/2025, indicating a need for further management of her diabetes.  - She will continue her current regimen of metformin and Jardiance.  - A prescription for a glucometer and testing supplies will be sent to her pharmacy. If she encounters any issues in obtaining these from the pharmacy, she is advised to contact us.  - An order for a comprehensive panel of blood work, excluding A1c, will be placed. She is also advised to schedule an appointment with an ophthalmologist for an annual diabetes eye exam.    2. Health Maintenance.  - She is due for a mammogram, which will be ordered today.  - She is also due for some vaccines, including RSV.  - The importance of regular screenings and vaccinations was discussed.    3. Chronic Obstructive Pulmonary Disease.  - Her breathing is stable with the use of Breztri and a rescue inhaler.  - She uses oxygen at night.  - She is advised to schedule an appointment with her pulmonologist, Dr. Ray, for a follow-up.    4. CAD/CHF/HTN.  - Stable. Followed by Cardiology.   - Continue Entresto, spironolactone, lisinopril, carvedilol, Jardiance, Lasix, Plavix, and atorvastatin as prescribed.     Follow-up  - A follow-up appointment is scheduled for 08/07/2025.    Medicare annual wellness visit, subsequent  -     Comprehensive Metabolic Panel  -     Lipid Panel  -     CBC  Encounter for screening mammogram for malignant neoplasm of breast  -     CARLOS DIGITAL SCREEN W OR WO CAD BILATERAL; Future  Type 2 diabetes mellitus with diabetic chronic kidney disease, unspecified CKD stage, unspecified whether long term insulin use (HCC)  -     glucose monitoring kit; Disp-1 kit, R-0, NormalCheck glucose once daily and as needed.  -     Lancets MISC; Disp-100 each, R-3, NormalCheck glucose once daily and

## 2025-07-15 NOTE — PROGRESS NOTES
Chief Complaint   Patient presents with    Medicare AWV     Pt here for her medicare AWV     /84 (BP Site: Right Upper Arm, Patient Position: Sitting, BP Cuff Size: Medium Adult)   Pulse 93   Temp 98.3 °F (36.8 °C) (Temporal)   Resp 18   Ht 1.651 m (5' 5\")   Wt 76 kg (167 lb 9.6 oz)   SpO2 97%   BMI 27.89 kg/m²     Have you been to the ER, urgent care clinic since your last visit?  Hospitalized since your last visit?   NO    Have you seen or consulted any other health care providers outside our system since your last visit?   YES, [facility]    Have you had a mammogram?”   NO    Date of last Mammogram: 5/31/2024       “Have you had a colorectal cancer screening such as a colonoscopy/FIT/Cologuard?    NO    Date of last Colonoscopy: 1/5/2015  No cologuard on file  Date of last FIT: 1/25/2021   No flexible sigmoidoscopy on file     “Have you had a diabetic eye exam?”    NO     No diabetic eye exam on file

## 2025-07-16 LAB
ALBUMIN SERPL-MCNC: 4.8 G/DL (ref 3.8–4.8)
ALP SERPL-CCNC: 97 IU/L (ref 44–121)
ALT SERPL-CCNC: 16 IU/L (ref 0–32)
AST SERPL-CCNC: 19 IU/L (ref 0–40)
BILIRUB SERPL-MCNC: 0.8 MG/DL (ref 0–1.2)
BUN SERPL-MCNC: 20 MG/DL (ref 8–27)
BUN/CREAT SERPL: 17 (ref 12–28)
CALCIUM SERPL-MCNC: 11 MG/DL (ref 8.7–10.3)
CHLORIDE SERPL-SCNC: 96 MMOL/L (ref 96–106)
CHOLEST SERPL-MCNC: 127 MG/DL (ref 100–199)
CO2 SERPL-SCNC: 24 MMOL/L (ref 20–29)
CREAT SERPL-MCNC: 1.17 MG/DL (ref 0.57–1)
EGFRCR SERPLBLD CKD-EPI 2021: 50 ML/MIN/1.73
ERYTHROCYTE [DISTWIDTH] IN BLOOD BY AUTOMATED COUNT: 13.1 % (ref 11.7–15.4)
GLOBULIN SER CALC-MCNC: 3.1 G/DL (ref 1.5–4.5)
GLUCOSE SERPL-MCNC: 108 MG/DL (ref 70–99)
HCT VFR BLD AUTO: 43.8 % (ref 34–46.6)
HDLC SERPL-MCNC: 44 MG/DL
HGB BLD-MCNC: 14.8 G/DL (ref 11.1–15.9)
LDLC SERPL CALC-MCNC: 61 MG/DL (ref 0–99)
MCH RBC QN AUTO: 30.6 PG (ref 26.6–33)
MCHC RBC AUTO-ENTMCNC: 33.8 G/DL (ref 31.5–35.7)
MCV RBC AUTO: 91 FL (ref 79–97)
PLATELET # BLD AUTO: 320 X10E3/UL (ref 150–450)
POTASSIUM SERPL-SCNC: 4 MMOL/L (ref 3.5–5.2)
PROT SERPL-MCNC: 7.9 G/DL (ref 6–8.5)
RBC # BLD AUTO: 4.84 X10E6/UL (ref 3.77–5.28)
SODIUM SERPL-SCNC: 140 MMOL/L (ref 134–144)
TRIGL SERPL-MCNC: 122 MG/DL (ref 0–149)
VLDLC SERPL CALC-MCNC: 22 MG/DL (ref 5–40)
WBC # BLD AUTO: 8.8 X10E3/UL (ref 3.4–10.8)

## 2025-07-16 NOTE — TELEPHONE ENCOUNTER
Requested Prescriptions     Pending Prescriptions Disp Refills    albuterol sulfate HFA (PROVENTIL;VENTOLIN;PROAIR) 108 (90 Base) MCG/ACT inhaler [Pharmacy Med Name: ALBUTEROL HFA (VENT)] 18 g 0     Sig: Inhale 2 puffs into the lungs every 4 hours as needed for Wheezing or Shortness of Breath

## 2025-07-17 ENCOUNTER — RESULTS FOLLOW-UP (OUTPATIENT)
Facility: CLINIC | Age: 70
End: 2025-07-17

## 2025-07-17 NOTE — TELEPHONE ENCOUNTER
----- Message from Dr. Demetria Conner, DO sent at 7/17/2025  7:01 AM EDT -----  Electrolytes and liver enzymes were normal.   Kidney function was decreased but improved as compared to two months ago which is good.   Calcium was high. Are you taking a calcium supplement or a lot of Tums? If so, I would recommend you stop them. If you are not, we might want to do some additional blood work.   Cholesterol was at goal. Please continue atorvastatin as prescribed.   Blood counts were normal.           I spoke with the patient and advised of lab results.

## 2025-07-18 RX ORDER — ALBUTEROL SULFATE 90 UG/1
2 INHALANT RESPIRATORY (INHALATION) EVERY 4 HOURS PRN
Qty: 18 G | Refills: 0 | Status: SHIPPED | OUTPATIENT
Start: 2025-07-18

## 2025-08-06 ENCOUNTER — HOSPITAL ENCOUNTER (OUTPATIENT)
Facility: HOSPITAL | Age: 70
Discharge: HOME | End: 2025-08-09
Attending: FAMILY MEDICINE
Payer: MEDICARE

## 2025-08-06 DIAGNOSIS — Z12.31 ENCOUNTER FOR SCREENING MAMMOGRAM FOR MALIGNANT NEOPLASM OF BREAST: ICD-10-CM

## 2025-08-06 PROCEDURE — 77063 BREAST TOMOSYNTHESIS BI: CPT

## 2025-08-07 ENCOUNTER — OFFICE VISIT (OUTPATIENT)
Facility: CLINIC | Age: 70
End: 2025-08-07

## 2025-08-07 VITALS
TEMPERATURE: 97.6 F | HEIGHT: 65 IN | WEIGHT: 168 LBS | DIASTOLIC BLOOD PRESSURE: 60 MMHG | SYSTOLIC BLOOD PRESSURE: 124 MMHG | HEART RATE: 82 BPM | RESPIRATION RATE: 17 BRPM | BODY MASS INDEX: 27.99 KG/M2

## 2025-08-07 DIAGNOSIS — I50.43 CHF (CONGESTIVE HEART FAILURE), NYHA CLASS I, ACUTE ON CHRONIC, COMBINED (HCC): ICD-10-CM

## 2025-08-07 DIAGNOSIS — E11.22 TYPE 2 DIABETES MELLITUS WITH DIABETIC CHRONIC KIDNEY DISEASE, UNSPECIFIED CKD STAGE, UNSPECIFIED WHETHER LONG TERM INSULIN USE (HCC): Primary | ICD-10-CM

## 2025-08-07 DIAGNOSIS — I25.10 CORONARY ARTERY DISEASE INVOLVING NATIVE CORONARY ARTERY OF NATIVE HEART WITHOUT ANGINA PECTORIS: ICD-10-CM

## 2025-08-07 LAB — HBA1C MFR BLD: 7.2 %

## 2025-08-07 RX ORDER — CARVEDILOL 3.12 MG/1
3.12 TABLET ORAL 2 TIMES DAILY WITH MEALS
Qty: 180 TABLET | Refills: 1 | Status: SHIPPED | OUTPATIENT
Start: 2025-08-07

## 2025-08-15 RX ORDER — ALBUTEROL SULFATE 90 UG/1
2 INHALANT RESPIRATORY (INHALATION) EVERY 4 HOURS PRN
Qty: 18 G | Refills: 1 | Status: SHIPPED | OUTPATIENT
Start: 2025-08-15

## (undated) DEVICE — FCPS RAD JAW 4LC 240CM W/NDL -- BX/20 RADIAL JAW 4

## (undated) DEVICE — THE ENDO CARRY-ON PROCEDURE KIT CONTAINS ALL OF THE SUPPLIES AND INFECTION PREVENTION PRODUCTS NEEDED FOR ENDOSCOPIC PROCEDURES: Brand: ENDO CARRY-ON PROCEDURE KIT

## (undated) DEVICE — CONMED SCOPE SAVER BITE BLOCK, 14 X 20 MM: Brand: CONMED SCOPE SAVER